# Patient Record
Sex: FEMALE | Race: WHITE | NOT HISPANIC OR LATINO | Employment: OTHER | URBAN - METROPOLITAN AREA
[De-identification: names, ages, dates, MRNs, and addresses within clinical notes are randomized per-mention and may not be internally consistent; named-entity substitution may affect disease eponyms.]

---

## 2017-01-10 ENCOUNTER — OFFICE VISIT (OUTPATIENT)
Dept: OPTOMETRY | Facility: CLINIC | Age: 72
End: 2017-01-10
Payer: MEDICARE

## 2017-01-10 DIAGNOSIS — H54.50: ICD-10-CM

## 2017-01-10 DIAGNOSIS — E11.3553 STABLE PROLIFERATIVE DIABETIC RETINOPATHY OF BOTH EYES ASSOCIATED WITH TYPE 2 DIABETES MELLITUS: Primary | ICD-10-CM

## 2017-01-10 DIAGNOSIS — E11.39: ICD-10-CM

## 2017-01-10 DIAGNOSIS — H52.13 MYOPIA WITH PRESBYOPIA OF BOTH EYES: ICD-10-CM

## 2017-01-10 DIAGNOSIS — H52.4 MYOPIA WITH PRESBYOPIA OF BOTH EYES: ICD-10-CM

## 2017-01-10 PROCEDURE — 99499 UNLISTED E&M SERVICE: CPT | Mod: S$GLB,,, | Performed by: OPTOMETRIST

## 2017-01-10 PROCEDURE — 92015 DETERMINE REFRACTIVE STATE: CPT | Mod: S$GLB,,, | Performed by: OPTOMETRIST

## 2017-01-10 PROCEDURE — 92004 COMPRE OPH EXAM NEW PT 1/>: CPT | Mod: S$GLB,,, | Performed by: OPTOMETRIST

## 2017-01-10 PROCEDURE — 99999 PR PBB SHADOW E&M-EST. PATIENT-LVL II: CPT | Mod: PBBFAC,,, | Performed by: OPTOMETRIST

## 2017-01-10 NOTE — LETTER
January 11, 2017      Myriam Vasquez, NP  [address]  [facility] [address]           [facility]  [address]  [facility] [address]  Phone: [phone]  Fax: [phone]          Patient: Zoey Ham   MR Number: [id number]   YOB: 1945   Date of Visit: 1/10/2017       Dear Myriam Vasquez:    Thank you for referring Zoey Ham to me for evaluation. Attached you will find relevant portions of my assessment and plan of care.    If you have questions, please do not hesitate to call me. I look forward to following Zoey Ham along with you.    Sincerely,    Alem Garza, OD    Enclosure  CC:  No Recipients    If you would like to receive this communication electronically, please contact [email or web address] or [phone] to request more information on [employer] Link access.    For providers and/or their staff who would like to refer a patient to [facility], please contact us through our one-stop-shop provider referral line, [facility], at [phone].    If you feel you have received this communication in error or would no longer like to receive these types of communications, please e-mail [email or web address]

## 2017-01-11 NOTE — PROGRESS NOTES
HPI     Hemoglobin A1C       Date                     Value               Ref Range             Status                02/28/2016               9.2 (H)             4.5 - 6.2 %           Final            ----------    Low vision OD with normal vision OS  Pt reports doctor is happy with BG control. Reports it could be a little   better.  Last  yesterday morning    Pt lost glasses 6/2015. Has been using OTC readers    (-)fluctuations in vision  (-)flashes  (+)longstanding floater  (-)headaches  (-)dryness, itching, watering  (-)transient vision loss  (-)pain on eye movements         Last edited by Alem Garza, OD on 1/10/2017  8:58 AM.     ROS     Negative for: Constitutional, Gastrointestinal, Neurological, Skin,   Genitourinary, Musculoskeletal, HENT, Endocrine, Cardiovascular, Eyes,   Respiratory, Psychiatric, Allergic/Imm, Heme/Lymph    Last edited by Alem Garza, OD on 1/11/2017 12:47 PM. (History)        Assessment /Plan     For exam results, see Encounter Report.    Stable proliferative diabetic retinopathy of both eyes associated with type 2 diabetes mellitus    Diabetic visual loss: low vision, one eye    Myopia with presbyopia of both eyes            1-2.  Extensive history of laser to both retina.  Vision loss OD secondary to PDR.  No active retinopathy OU.  3. Bifocal rx given        RTC 1 year for dm exam.

## 2017-01-18 ENCOUNTER — OFFICE VISIT (OUTPATIENT)
Dept: INTERNAL MEDICINE | Facility: CLINIC | Age: 72
End: 2017-01-18
Payer: MEDICARE

## 2017-01-18 ENCOUNTER — OFFICE VISIT (OUTPATIENT)
Dept: NEPHROLOGY | Facility: CLINIC | Age: 72
End: 2017-01-18
Payer: MEDICARE

## 2017-01-18 VITALS
OXYGEN SATURATION: 97 % | HEIGHT: 64 IN | DIASTOLIC BLOOD PRESSURE: 60 MMHG | RESPIRATION RATE: 18 BRPM | BODY MASS INDEX: 31.96 KG/M2 | SYSTOLIC BLOOD PRESSURE: 119 MMHG | HEART RATE: 72 BPM | TEMPERATURE: 98 F | WEIGHT: 187.19 LBS

## 2017-01-18 VITALS
SYSTOLIC BLOOD PRESSURE: 136 MMHG | WEIGHT: 186.5 LBS | OXYGEN SATURATION: 98 % | DIASTOLIC BLOOD PRESSURE: 70 MMHG | BODY MASS INDEX: 31.84 KG/M2 | HEART RATE: 70 BPM | HEIGHT: 64 IN

## 2017-01-18 DIAGNOSIS — Z79.4 TYPE 2 DIABETES MELLITUS WITH DIABETIC POLYNEUROPATHY, WITH LONG-TERM CURRENT USE OF INSULIN: ICD-10-CM

## 2017-01-18 DIAGNOSIS — E11.42 DIABETIC POLYNEUROPATHY ASSOCIATED WITH TYPE 2 DIABETES MELLITUS: ICD-10-CM

## 2017-01-18 DIAGNOSIS — M85.80 OSTEOPENIA: ICD-10-CM

## 2017-01-18 DIAGNOSIS — E11.42 TYPE 2 DIABETES MELLITUS WITH DIABETIC POLYNEUROPATHY, WITH LONG-TERM CURRENT USE OF INSULIN: ICD-10-CM

## 2017-01-18 DIAGNOSIS — N18.4 CHRONIC KIDNEY DISEASE, STAGE IV (SEVERE): ICD-10-CM

## 2017-01-18 DIAGNOSIS — E21.3 HYPERPARATHYROIDISM: ICD-10-CM

## 2017-01-18 DIAGNOSIS — I25.708 CORONARY ARTERY DISEASE OF BYPASS GRAFT OF NATIVE HEART WITH STABLE ANGINA PECTORIS: ICD-10-CM

## 2017-01-18 DIAGNOSIS — I12.9 BENIGN HYPERTENSION WITH CKD (CHRONIC KIDNEY DISEASE) STAGE IV: ICD-10-CM

## 2017-01-18 DIAGNOSIS — I15.0 RENOVASCULAR HYPERTENSION: ICD-10-CM

## 2017-01-18 DIAGNOSIS — D63.1 ANEMIA OF CHRONIC RENAL FAILURE, STAGE 4 (SEVERE): ICD-10-CM

## 2017-01-18 DIAGNOSIS — I70.0 ATHEROSCLEROSIS OF AORTA: ICD-10-CM

## 2017-01-18 DIAGNOSIS — Z79.4 TYPE 2 DIABETES MELLITUS WITH DIABETIC NEPHROPATHY, WITH LONG-TERM CURRENT USE OF INSULIN: ICD-10-CM

## 2017-01-18 DIAGNOSIS — N18.4 ANEMIA OF CHRONIC RENAL FAILURE, STAGE 4 (SEVERE): ICD-10-CM

## 2017-01-18 DIAGNOSIS — E11.22 CKD STAGE 4 DUE TO TYPE 2 DIABETES MELLITUS: ICD-10-CM

## 2017-01-18 DIAGNOSIS — D69.2 SENILE PURPURA: ICD-10-CM

## 2017-01-18 DIAGNOSIS — N18.4 CKD STAGE 4 DUE TO TYPE 2 DIABETES MELLITUS: ICD-10-CM

## 2017-01-18 DIAGNOSIS — Z60.4 SOCIAL ISOLATION: ICD-10-CM

## 2017-01-18 DIAGNOSIS — N18.4 BENIGN HYPERTENSION WITH CKD (CHRONIC KIDNEY DISEASE) STAGE IV: ICD-10-CM

## 2017-01-18 DIAGNOSIS — I48.20 CHRONIC ATRIAL FIBRILLATION: ICD-10-CM

## 2017-01-18 DIAGNOSIS — I10 ESSENTIAL HYPERTENSION: ICD-10-CM

## 2017-01-18 DIAGNOSIS — E11.21 TYPE 2 DIABETES MELLITUS WITH DIABETIC NEPHROPATHY, WITH LONG-TERM CURRENT USE OF INSULIN: ICD-10-CM

## 2017-01-18 DIAGNOSIS — R80.9 PROTEINURIA, UNSPECIFIED TYPE: ICD-10-CM

## 2017-01-18 DIAGNOSIS — N18.4 CKD (CHRONIC KIDNEY DISEASE) STAGE 4, GFR 15-29 ML/MIN: Primary | ICD-10-CM

## 2017-01-18 DIAGNOSIS — N25.0 RENAL OSTEODYSTROPHY: ICD-10-CM

## 2017-01-18 DIAGNOSIS — Z12.31 ENCOUNTER FOR SCREENING MAMMOGRAM FOR MALIGNANT NEOPLASM OF BREAST: ICD-10-CM

## 2017-01-18 DIAGNOSIS — I50.42 CHRONIC COMBINED SYSTOLIC AND DIASTOLIC CONGESTIVE HEART FAILURE: ICD-10-CM

## 2017-01-18 PROCEDURE — 1126F AMNT PAIN NOTED NONE PRSNT: CPT | Mod: S$GLB,,, | Performed by: INTERNAL MEDICINE

## 2017-01-18 PROCEDURE — 99499 UNLISTED E&M SERVICE: CPT | Mod: S$GLB,,, | Performed by: INTERNAL MEDICINE

## 2017-01-18 PROCEDURE — 3074F SYST BP LT 130 MM HG: CPT | Mod: S$GLB,,, | Performed by: INTERNAL MEDICINE

## 2017-01-18 PROCEDURE — 1157F ADVNC CARE PLAN IN RCRD: CPT | Mod: S$GLB,,, | Performed by: INTERNAL MEDICINE

## 2017-01-18 PROCEDURE — 99215 OFFICE O/P EST HI 40 MIN: CPT | Mod: S$GLB,,, | Performed by: INTERNAL MEDICINE

## 2017-01-18 PROCEDURE — 3066F NEPHROPATHY DOC TX: CPT | Mod: S$GLB,,, | Performed by: INTERNAL MEDICINE

## 2017-01-18 PROCEDURE — 3078F DIAST BP <80 MM HG: CPT | Mod: S$GLB,,, | Performed by: INTERNAL MEDICINE

## 2017-01-18 PROCEDURE — 99999 PR PBB SHADOW E&M-EST. PATIENT-LVL III: CPT | Mod: PBBFAC,,, | Performed by: INTERNAL MEDICINE

## 2017-01-18 PROCEDURE — 3075F SYST BP GE 130 - 139MM HG: CPT | Mod: S$GLB,,, | Performed by: INTERNAL MEDICINE

## 2017-01-18 PROCEDURE — 3046F HEMOGLOBIN A1C LEVEL >9.0%: CPT | Mod: S$GLB,,, | Performed by: INTERNAL MEDICINE

## 2017-01-18 PROCEDURE — 1160F RVW MEDS BY RX/DR IN RCRD: CPT | Mod: S$GLB,,, | Performed by: INTERNAL MEDICINE

## 2017-01-18 PROCEDURE — 1159F MED LIST DOCD IN RCRD: CPT | Mod: S$GLB,,, | Performed by: INTERNAL MEDICINE

## 2017-01-18 PROCEDURE — 2022F DILAT RTA XM EVC RTNOPTHY: CPT | Mod: S$GLB,,, | Performed by: INTERNAL MEDICINE

## 2017-01-18 PROCEDURE — 99999 PR PBB SHADOW E&M-EST. PATIENT-LVL IV: CPT | Mod: PBBFAC,,, | Performed by: INTERNAL MEDICINE

## 2017-01-18 RX ORDER — INSULIN GLARGINE 100 [IU]/ML
11 INJECTION, SOLUTION SUBCUTANEOUS NIGHTLY
Qty: 3.3 ML | Refills: 11 | Status: SHIPPED | OUTPATIENT
Start: 2017-01-18 | End: 2017-01-30 | Stop reason: SDUPTHER

## 2017-01-18 RX ORDER — ERGOCALCIFEROL 1.25 MG/1
50000 CAPSULE ORAL
Qty: 10 CAPSULE | Refills: 0 | Status: CANCELLED | OUTPATIENT
Start: 2017-01-18

## 2017-01-18 RX ORDER — CLOPIDOGREL BISULFATE 75 MG/1
TABLET ORAL
Qty: 90 TABLET | Refills: 3 | Status: SHIPPED | OUTPATIENT
Start: 2017-01-18 | End: 2018-02-16 | Stop reason: SDUPTHER

## 2017-01-18 RX ORDER — NITROGLYCERIN 400 UG/1
1 SPRAY ORAL EVERY 5 MIN PRN
Qty: 12 G | Refills: 0 | Status: SHIPPED | OUTPATIENT
Start: 2017-01-18 | End: 2017-01-30 | Stop reason: SDUPTHER

## 2017-01-18 RX ORDER — LOSARTAN POTASSIUM 50 MG/1
50 TABLET ORAL DAILY
Qty: 90 TABLET | Refills: 3 | Status: SHIPPED | OUTPATIENT
Start: 2017-01-18 | End: 2017-02-20 | Stop reason: SDUPTHER

## 2017-01-18 RX ORDER — METOPROLOL TARTRATE 50 MG/1
50 TABLET ORAL 2 TIMES DAILY
Qty: 180 TABLET | Refills: 3 | Status: SHIPPED | OUTPATIENT
Start: 2017-01-18 | End: 2017-03-10 | Stop reason: SDUPTHER

## 2017-01-18 RX ORDER — ATORVASTATIN CALCIUM 80 MG/1
TABLET, FILM COATED ORAL
Qty: 90 TABLET | Refills: 3 | Status: SHIPPED | OUTPATIENT
Start: 2017-01-18 | End: 2018-02-16 | Stop reason: SDUPTHER

## 2017-01-18 RX ORDER — INSULIN ASPART 100 [IU]/ML
15 INJECTION, SOLUTION INTRAVENOUS; SUBCUTANEOUS
Qty: 13.5 ML | Refills: 11 | Status: SHIPPED | OUTPATIENT
Start: 2017-01-18 | End: 2017-01-30 | Stop reason: SDUPTHER

## 2017-01-18 RX ORDER — FUROSEMIDE 40 MG/1
40 TABLET ORAL DAILY PRN
Qty: 90 TABLET | Refills: 3 | Status: SHIPPED | OUTPATIENT
Start: 2017-01-18 | End: 2018-02-16 | Stop reason: SDUPTHER

## 2017-01-18 RX ORDER — FERROUS SULFATE 325(65) MG
TABLET ORAL
Qty: 90 TABLET | Refills: 0 | Status: CANCELLED | OUTPATIENT
Start: 2017-01-18

## 2017-01-18 RX ORDER — INSULIN PUMP SYRINGE, 3 ML
EACH MISCELLANEOUS
Qty: 1 EACH | Refills: 0 | Status: SHIPPED | OUTPATIENT
Start: 2017-01-18 | End: 2017-01-30 | Stop reason: SDUPTHER

## 2017-01-18 SDOH — SOCIAL DETERMINANTS OF HEALTH (SDOH): SOCIAL EXCLUSION AND REJECTION: Z60.4

## 2017-01-18 NOTE — ASSESSMENT & PLAN NOTE
GFR 23, followed by Nephrology, previously on Fe supplements  · F/u Nephrology  · Repeat anemia labs  · Previously on fe supplementation  · Defer to nephrology for continuation

## 2017-01-18 NOTE — ASSESSMENT & PLAN NOTE
Patient lives alone, but has children nearby  · Info given for:  · [facility] center in [place]  · [employer] [facility] at [facility]  · [facility]    Silver sneakers

## 2017-01-18 NOTE — PROGRESS NOTES
Primary Care Provider Appointment    Subjective:      Patient ID: Zoey Ham is a 71 y.o. female.    Chief Complaint: Establish Care; Chronic Kidney Disease (Pt stated that she has stage 4 kidney failure); Foot Swelling (Ankle swelling Rt off and on for 2+ years during cold weather); and Numbness (hand numbeness rt off and on for 2+ years during cold weather)  Previous patient of Dr Smith, here to establish care today. She was in prescription doughnut hole in 11/2016 and could not afford her insulin. She currently uses insulin pens and is willing to switch to vials. CBG today was 263, at home has been 170s in AM. Leoncio stopped checking due to not having insulin.    She experiences 4/10 pain in L ankle with swelling worse with weather changes. She does not like being on pain medications. Is able to ambulate.    She rarely eats at restaurants with her friends. Her son lives near her in [place], and her daughter lives on the [facility]. Patient cleaned out her garage this weekend by herself, continues to cross stitch and craft regularly. She is making 9 quilts for her grandchildren.    She likes inspirational [employer] tv, old sitcoms ([employer], [place]).       Social History  Components    Tobacco (-)     Alcohol (-)    Ililcit drugs (-)    Sex (-) , exclusive, one male partner    Employment (-) retired       Past Surgical History   Procedure Laterality Date    Hysterectomy      Tonsillectomy      Appendectomy      Cardiac surgery  2002     CABG    Cholecystectomy      Coronary artery bypass graft      Fracture surgery       right ankle    Eye surgery       cataracts bilaterally       Past Medical History   Diagnosis Date    Acute myocardial infarction of anterolateral wall 7/9/2015    Air embolism as an early complication of trauma 7/9/2015    Anemia of chronic renal failure, stage 4 (severe) 1/22/2015    Atherosclerosis of aorta 10/3/2012    Benign hypertension  with CKD (chronic kidney disease) stage IV 3/11/2016    Chronic diastolic congestive heart failure 10/3/2012    Chronic kidney disease, stage IV (severe) 7/3/2012    Coronary artery disease      s/p 1v CABG 2002 and multiple PCI (last angiogram in 6/2012 with patent LIMA->LAD and patent LCx and RCA stents)    Diabetic polyneuropathy associated with type 2 diabetes mellitus 7/9/2015    Diabetic polyneuropathy associated with type 2 diabetes mellitus 7/9/2015    Heart attack September 2012    Hyperlipidemia     Nephritis and nephropathy, 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 7/9/2015    Occlusion and stenosis of carotid artery with cerebral infarction 7/9/2015    PAF (paroxysmal atrial fibrillation) 10/3/2012    Pneumonia     Proliferative diabetic retinopathy 10/3/2012    Sepsis due to Escherichia coli with acute renal failure 2/2016     UTI     Type II diabetes mellitus with neurological manifestations 7/9/2015    Type II diabetes mellitus with renal manifestations 7/3/2012       Family History  Diagnosis    Heart disease- first degree relatives    Cancer    · Lung- mother     Diabetes- first degree relative       Review of Systems   Constitutional: Positive for fatigue. Negative for activity change and unexpected weight change.   HENT: Negative for ear discharge.    Eyes: Negative for photophobia and discharge.   Cardiovascular: Negative for chest pain, palpitations and leg swelling.   Gastrointestinal: Negative for constipation and diarrhea.   Endocrine: Negative for polydipsia and polyphagia.   Genitourinary: Negative for frequency and menstrual problem.   Musculoskeletal: Negative for back pain and gait problem.        Ankle pain - R  Ankle swelling - R   Skin: Negative for color change.   Neurological: Negative for light-headedness.   Hematological: Bruises/bleeds easily.   Psychiatric/Behavioral: Negative for confusion.       Objective:     Visit Vitals    /60 (BP Location: Right arm,  "Patient Position: Sitting, BP Method: Manual)    Pulse 72    Temp 97.8 °F (36.6 °C)    Resp 18    Ht 5' 4" (1.626 m)    Wt 84.9 kg (187 lb 2.7 oz)    LMP  (LMP Unknown)    SpO2 97%    BMI 32.13 kg/m2       Physical Exam   Constitutional: She appears well-developed and well-nourished.   HENT:   Head: Normocephalic and atraumatic.   Eyes: Conjunctivae and EOM are normal. Pupils are equal, round, and reactive to light. Right eye exhibits no discharge. Left eye exhibits no discharge.   Neck: Normal range of motion.   Cardiovascular:   Distant heart sounds  Radial pulse 1+, regular   Pulmonary/Chest: Effort normal and breath sounds normal. No respiratory distress. She has no wheezes.   Abdominal: Soft. She exhibits no distension. There is no tenderness.   Musculoskeletal: She exhibits edema and deformity. She exhibits no tenderness.   R ankle swelling and no TTP   Lymphadenopathy:     She has no cervical adenopathy.   Neurological: No cranial nerve deficit.   Skin: No erythema.   Ecchymoses and purpura on UE bilaterally   Psychiatric: She has a normal mood and affect. Her behavior is normal.   Vitals reviewed.          Lab Results   Component Value Date    WBC 9.87 03/03/2016    HGB 11.7 (L) 03/03/2016    HCT 35.4 (L) 03/03/2016     03/03/2016    CHOL 149 02/04/2014    TRIG 178 (H) 02/04/2014    HDL 36 (L) 02/04/2014    ALT 22 03/01/2016    AST 28 03/01/2016     06/23/2016    K 5.4 (H) 06/23/2016     06/23/2016    CREATININE 2.1 (H) 06/23/2016    BUN 39 (H) 06/23/2016    CO2 27 06/23/2016    TSH 2.011 06/17/2012    INR 1.0 02/04/2015    GLUF 132 (H) 05/03/2012    HGBA1C 9.2 (H) 02/28/2016       Current Outpatient Prescriptions on File Prior to Visit   Medication Sig Dispense Refill    aspirin (ECOTRIN) 81 MG EC tablet Take 81 mg by mouth. 1 Tablet, Delayed Release (E.C.) Oral Every day      insulin needles, disposable, 31 Ndle Takes insulin 4 times daily 300 each 3    [DISCONTINUED] " ACCU-CHEK FASTCLIX Misc USE  1  LANCET FOUR TIMES DAILY 408 each 11    [DISCONTINUED] ACCU-CHEK SMARTVIEW TEST STRIP Strp USE  1  STRIP FOUR TIMES DAILY 400 strip 11    [DISCONTINUED] atorvastatin (LIPITOR) 80 MG tablet TAKE 1 TABLET ONE TIME DAILY 90 tablet 3    [DISCONTINUED] clopidogrel (PLAVIX) 75 mg tablet TAKE 1 TABLET ONE TIME DAILY 90 tablet 4    [DISCONTINUED] ergocalciferol (ERGOCALCIFEROL) 50,000 unit Cap Take 1 capsule (50,000 Units total) by mouth every 7 days. (Patient taking differently: Take 50,000 Units by mouth every 7 days. on wednesday) 10 capsule 0    [DISCONTINUED] ferrous sulfate 325 mg (65 mg iron) Tab tablet TAKE 1 TABLET ONE TIME DAILY 90 tablet 0    [DISCONTINUED] furosemide (LASIX) 40 MG tablet TAKE 1 TABLET ONE TIME DAILY 90 tablet 3    [DISCONTINUED] insulin aspart (NOVOLOG FLEXPEN) 100 unit/mL InPn pen 12 units am, 15 units afternoon and 15 units in the pm with meals, Sliding Scale 90 mL 3    [DISCONTINUED] insulin glargine (LANTUS SOLOSTAR) 100 unit/mL (3 mL) InPn pen Inject 11 Units into the skin every evening.      [DISCONTINUED] losartan (COZAAR) 50 MG tablet TAKE 1 TABLET BY MOUTH ONCE DAILY 30 tablet 3    [DISCONTINUED] losartan (COZAAR) 50 MG tablet Take 1 tablet (50 mg total) by mouth once daily. 30 tablet 1    [DISCONTINUED] metoprolol tartrate (LOPRESSOR) 50 MG tablet Take 1 tablet (50 mg total) by mouth 2 (two) times daily. 60 tablet 1    [DISCONTINUED] nitroGLYCERIN 0.4 MG/DOSE TL SPRY (NITROLINGUAL) 0.4 mg/dose spray Place 1 spray under the tongue every 5 (five) minutes as needed for Chest pain. 12 g 0     No current facility-administered medications on file prior to visit.          Assessment:   71 y.o. female with multiple co-morbid illnesses here to establish care with new PCP and continue work-up of chronic issues notably DM, CKD, HF.     Plan:     Problem List Items Addressed This Visit        Neuro    Diabetic polyneuropathy associated with type 2  diabetes mellitus     Stocking/glove pattern intermittent neuropathy  · DM control   · Annual diabetic foot exam         Relevant Medications    insulin glargine (LANTUS) 100 unit/mL injection    insulin aspart (NOVOLOG) 100 unit/mL injection    Other Relevant Orders    Hemoglobin A1c       Cardiac    Atherosclerosis of aorta     Seen on CXR 5/2/2012  · Continue statin, ASA, DM control  · monitor         Relevant Orders    Ambulatory consult to Cardiology    Chronic combined systolic and diastolic congestive heart failure     Seen on echo in 3/2016  · Refer to Cardiology  · Compliant with BB, ARB, DAPT, statin, diuretic  · DM control         Relevant Medications    nitroGLYCERIN 0.4 MG/DOSE TL SPRY (NITROLINGUAL) 400 mcg/spray spray    atorvastatin (LIPITOR) 80 MG tablet    clopidogrel (PLAVIX) 75 mg tablet    furosemide (LASIX) 40 MG tablet    metoprolol tartrate (LOPRESSOR) 50 MG tablet    Other Relevant Orders    Ambulatory consult to Cardiology    Chronic atrial fibrillation     Rate-controlled on metoprolol 50mg BID  · Continue BB BID  · Elevated ZRQJW-2-[id number] score of 9 (10.8% risk of stroke)  · Cards eval for anticoagulation  · Continue DAPT         Relevant Medications    metoprolol tartrate (LOPRESSOR) 50 MG tablet    Other Relevant Orders    Ambulatory consult to Cardiology    Coronary artery disease of bypass graft of native heart with stable angina pectoris     CABG X2  2002, stent RCA, OM1 2003, NSTEMI with total occlusion of LAD in 2012. Continues to have stable angina  · Refer to Cardiology  · Elevated BMFAL-7-Nlbu score of 9 (10.8% risk of stroke)  · Cards eval for anticoagulation  · Continue DAPT         Relevant Medications    nitroGLYCERIN 0.4 MG/DOSE TL SPRY (NITROLINGUAL) 400 mcg/spray spray    insulin glargine (LANTUS) 100 unit/mL injection    insulin aspart (NOVOLOG) 100 unit/mL injection    atorvastatin (LIPITOR) 80 MG tablet    clopidogrel (PLAVIX) 75 mg tablet    furosemide (LASIX) 40 MG  tablet    metoprolol tartrate (LOPRESSOR) 50 MG tablet    Other Relevant Orders    Lipid panel    Ambulatory consult to Cardiology    Senile purpura     Ecchymoses and purpura on LE bilterally, on DAPT  · Continue DAPT  · Advised mindfulness of movements         Relevant Orders    Mammo Digital Screening Bilateral With CAD       Renal    CKD stage 4 due to type 2 diabetes mellitus     GFR 23 in 6/2016, followed by Nephrology  · Repeat labs  · Seen today by Nephro         Relevant Medications    blood-glucose meter kit    insulin glargine (LANTUS) 100 unit/mL injection    insulin aspart (NOVOLOG) 100 unit/mL injection    losartan (COZAAR) 50 MG tablet    Other Relevant Orders    Hemoglobin A1c    Uncontrolled type 2 diabetes mellitus with stage 4 chronic kidney disease, with long-term current use of insulin     A1c 9.2, noncompliant with insulin due to finances  · Refill insulin in vials  · Avoid doughnut hole by discotinuing insulin pens  · F/U in 2 months with glucose log         Relevant Medications    atorvastatin (LIPITOR) 80 MG tablet    clopidogrel (PLAVIX) 75 mg tablet    furosemide (LASIX) 40 MG tablet    metoprolol tartrate (LOPRESSOR) 50 MG tablet    Other Relevant Orders    Hemoglobin A1c    Lipid panel       Hem/Onc    Anemia of chronic renal failure, stage 4 (severe)     GFR 23, followed by Nephrology, previously on Fe supplements  · F/u Nephrology  · Repeat anemia labs  · Previously on fe supplementation  · Defer to nephrology for continuation         Relevant Orders    Vitamin D    Occult blood x 1, stool       Endocrine    Hyperparathyroidism     Elevated PTH of 310 in 6/2016  · F/u with Renal  · Recheck PTH  · Check Vit D            Musculoskeletal and Integument    Osteopenia     T-score -1.8 in 2014, low vitamin D, CKD, uncontrolled DM, ankle fracture 2013  · Start Ca + vit D  · Completed high dose vit D supplement  · Check vit D            Other    Social isolation     Patient lives alone, but  has children nearby  · Info given for:  · [facility] in [place]  · [employer] [facility] at [facility]  · [employer]    Silver sneakers           Other Visit Diagnoses     Benign hypertension with CKD (chronic kidney disease) stage IV        Relevant Medications    losartan (COZAAR) 50 MG tablet    Chronic kidney disease, stage IV (severe)        Relevant Medications    metoprolol tartrate (LOPRESSOR) 50 MG tablet    Other Relevant Orders    Hemoglobin A1c    Renovascular hypertension        Relevant Medications    losartan (COZAAR) 50 MG tablet    metoprolol tartrate (LOPRESSOR) 50 MG tablet    Other Relevant Orders    Lipid panel    Ambulatory consult to Cardiology    Encounter for screening mammogram for malignant neoplasm of breast         Relevant Orders    Mammo Digital Screening Bilateral With CAD    Type 2 diabetes mellitus with diabetic polyneuropathy, with long-term current use of insulin              Health Maintenance       Date Due Completion Date    Colonoscopy 8/21/1996 --- FOBT today    Lipid Panel 2/4/2015 2/4/2014- today    Mammogram 2/4/2016 2/4/2014- today    Hemoglobin A1c 8/28/2016 2/28/2016- today    DEXA SCAN 2/4/2017 2/4/2014    Foot Exam 12/6/2017 12/6/2016    Override on 12/6/2016: Done    Eye Exam 1/10/2018 1/10/2017    TETANUS VACCINE 3/3/2026 3/3/2016          Return in about 2 months (around 3/18/2017). One hour spent with this patient today, half of that in counseling.    Cesia Rosales MD/MPH  Internal Medicine  [facility]  [phone]

## 2017-01-18 NOTE — ASSESSMENT & PLAN NOTE
Seen on echo in 3/2016  · Refer to Cardiology  · Compliant with BB, ARB, DAPT, statin, diuretic  · DM control

## 2017-01-18 NOTE — PATIENT INSTRUCTIONS
TODAY:  - switch to insulin vials  - referral to cardiology  - glucose log  - mammogram  - refills sent to [facility]  - labs  - hold high dose vitamin D and iron until labs result  - home stool card    NEXT APPT:  - check on average glucose  - stool card

## 2017-01-18 NOTE — ASSESSMENT & PLAN NOTE
A1c 9.2, noncompliant with insulin due to finances  · Refill insulin in vials  · Avoid doughnut hole by discotinuing insulin pens  · F/U in 2 months with glucose log

## 2017-01-18 NOTE — ASSESSMENT & PLAN NOTE
Rate-controlled on metoprolol 50mg BID  · Continue BB BID  · Elevated KRXLT-1-Qnzg score of 9 (10.8% risk of stroke)  · Cards eval for anticoagulation  · Continue DAPT

## 2017-01-18 NOTE — PROGRESS NOTES
Patient, Zoey Ham (MRN #[id number]), presented with a recent Estimated Glumerular Filtration Rate (EGFR) between 15 and 29 consistent with the definition of chronic kidney disease stage 4 (ICD10 - N18.4).    eGFR if non    Date Value Ref Range Status   06/23/2016 23.5 (A) >60 mL/min/1.73 m^2 Final     Comment:     Calculation used to obtain the estimated glomerular filtration  rate (eGFR) is the CKD-EPI equation. Since race is unknown   in our information system, the eGFR values for   -American and Non--American patients are given   for each creatinine result.         The patient's chronic kidney disease stage 4 was monitored, evaluated, addressed and/or treated. This addendum to the medical record is made on 01/18/2017.

## 2017-01-18 NOTE — PROGRESS NOTES
Subjective:       Patient ID: Zoey Ham is a 71 y.o. White female who presents for follow-up evaluation of No chief complaint on file.    HPI This is a 71 -year-old  female with longstanding diabetes, hyperparathyroidism, hypertension, and CKD is coming in for followup of these. Her blood pressures are stable in the 120's/70's . DM stable. Her CKD has been stable. Has proteinuria.  Creatinine stable.    PAST MEDICAL HISTORY: Significant for hypertension for 10 years, diabetes   for 10 years, CABG, lacunar infarcts, hyperlipidemia, hyperparathyroidism,   anemia, CABG, CAD, and diabetic retinopathy.     PAST MEDICAL HISTORY: Significant for hypertension for 10 years, diabetes   for 10 years, CABG, lacunar infarcts, hyperlipidemia, hyperparathyroidism,   anemia, CABG, CAD, and diabetic retinopathy.     Review of Systems   Constitutional: Negative for fatigue.   Eyes: Negative for discharge.   Respiratory: Negative for cough, shortness of breath and wheezing.    Cardiovascular: Negative for chest pain and palpitations.   Gastrointestinal: Negative for abdominal pain, diarrhea, nausea and vomiting.   Genitourinary: Negative for dysuria, frequency, hematuria and urgency.   Skin: Negative for color change and rash.   Psychiatric/Behavioral: Negative for confusion.   All other systems reviewed and are negative.      Objective:      Physical Exam   Constitutional: She is oriented to person, place, and time. She appears well-developed and well-nourished.   HENT:   Right Ear: External ear normal.   Left Ear: External ear normal.   Nose: Nose normal.   Mouth/Throat: Normal dentition.   Eyes: Conjunctivae, EOM and lids are normal. Pupils are equal, round, and reactive to light.   Neck: Trachea normal. No thyroid mass present.   Cardiovascular: Normal rate and regular rhythm.  Exam reveals no friction rub.    No murmur heard.  No lower extremity edema   Pulmonary/Chest: Effort normal and breath sounds normal. No  respiratory distress. She has no decreased breath sounds. She has no rales.   Abdominal: Soft. Bowel sounds are normal. She exhibits no mass. There is no tenderness. There is no rebound. No hernia.   Musculoskeletal: She exhibits no edema.   Neurological: She is alert and oriented to person, place, and time.   Skin: Skin is warm and dry. No rash noted. No erythema.   Psychiatric: She has a normal mood and affect. Judgment normal. Her mood appears not anxious. She does not exhibit a depressed mood.   Oriented to time, place and person.   Vitals reviewed.      Assessment:       No diagnosis found.    Plan:           1.  Renovascular hypertension    2.  Type 2 diabetes mellitus with diabetic nephropathy    3.  Proteinuria    4.  Anemia of chronic renal failure, stage 4 (severe)      Plan:     Labs pending.   1. CKD stage 3-4 with an MDRD GFR of 25-28 mL/min. Her baseline creatinine is   anywhere from 1.8 to 2.1 with last creatinine of 2.0 with gfr of 25 ml/min. Her CKD is secondary to longstanding hypertension and diabetes.   2. Hypertension. Blood pressures are stable  at home. On cozaar.   3. Diabetes. on cozaar. Increased cozaar to 50 mg last time as there was increase in proteinuria likely sec poorly controlled DM in the recent past but better now per pt.  Recheck. Does not want biopsy.   4. Hyperparathyroidism. Check i pth -off calcitriol. Ca/phos stable.  6. Anemia. H&H is stable. On iron once a day.   Attended ckd education class and would like HD. Will send her for access if her GFR decreases further.   Return in 3 months with rfp, urine protin and urine creatinine, ipth.

## 2017-01-18 NOTE — MR AVS SNAPSHOT
[facility] - Internal Medicine  [address]  [facility] [address]  Phone: [phone]  Fax: [phone]                  Zoey Ham   2017 10:00 AM   Office Visit    Description:  Female : 1945   Provider:  Cesia Rosales MD   Department:  [facility] - [facility]           Reason for Visit     Establish Care     Chronic Kidney Disease     Foot Swelling     Numbness           Diagnoses this Visit        Comments    Atherosclerosis of aorta         CKD stage 4 due to type 2 diabetes mellitus         Chronic atrial fibrillation         Coronary artery disease of bypass graft of native heart with stable angina pectoris         Chronic combined systolic and diastolic congestive heart failure         Uncontrolled type 2 diabetes mellitus with stage 4 chronic kidney disease, with long-term current use of insulin         Hyperparathyroidism         Senile purpura         Diabetic polyneuropathy associated with type 2 diabetes mellitus         Anemia of chronic renal failure, stage 4 (severe)         Osteopenia         Benign hypertension with CKD (chronic kidney disease) stage IV         Chronic kidney disease, stage IV (severe)         Renovascular hypertension         Encounter for screening mammogram for malignant neoplasm of breast         Social isolation                To Do List           Future Appointments        Provider Department Dept Phone    2017 11:20 AM LAB, APPOINTMENT [facility] IN[facility]ED [facility] [phone]    2017 12:30 PM Cesia Rosales MD [facility] Internal Medicine [phone]    2017 9:30 AM [facility] MAMMO2 SCREEN [facility] [phone]      Goals (5 Years of Data)     None      Follow-Up and Disposition     Return in about 2 months (around 3/18/2017).       These Medications        Disp Refills Start End    nitroGLYCERIN 0.4 MG/DOSE TL SPRY (NITROLINGUAL) 400 mcg/spray spray 12 g 0 2017      Place 1 spray under the tongue every 5 (five) minutes as needed for Chest pain. - Sublingual    Pharmacy: [address] Ph #: [phone]       blood-glucose meter kit 1 each 0 1/18/2017 1/18/2018    Use as instructed    Pharmacy: [address] Ph #: [phone]       Notes to Pharmacy: PayDragon glucose monitoring kit with needles, strips, machine    insulin glargine (LANTUS) 100 unit/mL injection 3.3 mL 11 1/18/2017 1/18/2018    Inject 11 Units into the skin every evening. - Subcutaneous    Pharmacy: [address] Ph #: [phone]       insulin aspart (NOVOLOG) 100 unit/mL injection 13.5 mL 11 1/18/2017 1/18/2018    Inject 15 Units into the skin 3 (three) times daily before meals. - Subcutaneous    Pharmacy: [address] Ph #: [phone]       losartan (COZAAR) 50 MG tablet 90 tablet 3 1/18/2017     Take 1 tablet (50 mg total) by mouth once daily. - Oral    Pharmacy: [address] Ph #: [phone]       atorvastatin (LIPITOR) 80 MG tablet 90 tablet 3 1/18/2017     TAKE 1 TABLET ONE TIME DAILY    Pharmacy: [address] Ph #: [phone]       clopidogrel (PLAVIX) 75 mg tablet 90 tablet 3 1/18/2017     TAKE 1 TABLET ONE TIME DAILY    Pharmacy: [address] Ph #: [phone]       furosemide (LASIX) 40 MG tablet 90 tablet 3 1/18/2017     Take 1 tablet (40 mg total) by mouth daily as needed (take water pill daily as needed for leg swelling and SOB). - Oral    Pharmacy: [address] Ph #: [phone]       metoprolol tartrate (LOPRESSOR) 50 MG tablet 180 tablet 3 1/18/2017  1/18/2018    Take 1 tablet (50 mg total) by mouth 2 (two) times daily. - Oral    Pharmacy: [facility] - [facility] [address]  Ph #: [phone]         [facility]     [facility] On Call Nurse Care Line - 24/7 Assistance  Registered nurses in the [facility] On Call Center provide clinical advisement, health education, appointment booking, and other advisory services.  Call for this free service at [phone].             Medications           Message regarding Medications     Verify the changes and/or additions to your medication regime listed below are the same as discussed with your clinician today.  If any of these changes or additions are incorrect, please notify your healthcare provider.        START taking these NEW medications        Refills    blood-glucose meter kit 0    Sig: Use as instructed    Class: Normal    insulin glargine (LANTUS) 100 unit/mL injection 11    Sig: Inject 11 Units into the skin every evening.    Class: Normal    Route: Subcutaneous    insulin aspart (NOVOLOG) 100 unit/mL injection 11    Sig: Inject 15 Units into the skin 3 (three) times daily before meals.    Class: Normal    Route: Subcutaneous      CHANGE how you are taking these medications     Start Taking Instead of    nitroGLYCERIN 0.4 MG/DOSE TL SPRY (NITROLINGUAL) 400 mcg/spray spray nitroGLYCERIN 0.4 MG/DOSE TL SPRY (NITROLINGUAL) 0.4 mg/dose spray    Dosage:  Place 1 spray under the tongue every 5 (five) minutes as needed for Chest pain. Dosage:  Place 1 spray under the tongue every 5 (five) minutes as needed for Chest pain.    Reason for Change:  Reorder     furosemide (LASIX) 40 MG tablet furosemide (LASIX) 40 MG tablet    Dosage:  Take 1 tablet (40 mg total) by mouth daily as needed (take water pill daily as needed for leg swelling and SOB). Dosage:  TAKE 1 TABLET ONE TIME DAILY    Reason for Change:  Reorder       STOP taking these medications     insulin aspart (NOVOLOG FLEXPEN) 100  unit/mL InPn pen 12 units am, 15 units afternoon and 15 units in the pm with meals, Sliding Scale    insulin glargine (LANTUS SOLOSTAR) 100 unit/mL (3 mL) InPn pen Inject 11 Units into the skin every evening.    ACCU-CHEK FASTCLIX Misc USE  1  LANCET FOUR TIMES DAILY    ACCU-CHEK SMARTVIEW TEST STRIP Strp USE  1  STRIP FOUR TIMES DAILY    ergocalciferol (ERGOCALCIFEROL) 50,000 unit Cap Take 1 capsule (50,000 Units total) by mouth every 7 days.    ferrous sulfate 325 mg (65 mg iron) Tab tablet TAKE 1 TABLET ONE TIME DAILY           Verify that the below list of medications is an accurate representation of the medications you are currently taking.  If none reported, the list may be blank. If incorrect, please contact your healthcare provider. Carry this list with you in case of emergency.           Current Medications     aspirin (ECOTRIN) 81 MG EC tablet Take 81 mg by mouth. 1 Tablet, Delayed Release (E.C.) Oral Every day    insulin needles, disposable, 31 Ndle Takes insulin 4 times daily    atorvastatin (LIPITOR) 80 MG tablet TAKE 1 TABLET ONE TIME DAILY    blood-glucose meter kit Use as instructed    clopidogrel (PLAVIX) 75 mg tablet TAKE 1 TABLET ONE TIME DAILY    furosemide (LASIX) 40 MG tablet Take 1 tablet (40 mg total) by mouth daily as needed (take water pill daily as needed for leg swelling and SOB).    insulin aspart (NOVOLOG) 100 unit/mL injection Inject 15 Units into the skin 3 (three) times daily before meals.    insulin glargine (LANTUS) 100 unit/mL injection Inject 11 Units into the skin every evening.    losartan (COZAAR) 50 MG tablet Take 1 tablet (50 mg total) by mouth once daily.    metoprolol tartrate (LOPRESSOR) 50 MG tablet Take 1 tablet (50 mg total) by mouth 2 (two) times daily.    nitroGLYCERIN 0.4 MG/DOSE TL SPRY (NITROLINGUAL) 400 mcg/spray spray Place 1 spray under the tongue every 5 (five) minutes as needed for Chest pain.           Clinical Reference Information           Vital Signs -  "Last Recorded  Most recent update: 1/18/2017  9:08 AM by Nena Bahena MA    BP Pulse Temp Resp Ht Wt    119/60 (BP Location: Right arm, Patient Position: Sitting, BP Method: Manual) 72 97.8 °F (36.6 °C) 18 5' 4" (1.626 m) 84.9 kg (187 lb 2.7 oz)    LMP SpO2 BMI          (LMP Unknown) 97% 32.13 kg/m2        Blood Pressure          Most Recent Value    BP  119/60      Allergies as of 1/18/2017     Pcn [Penicillins]    Percodan [Oxycodone-aspirin]    Phenergan [Promethazine]      Immunizations Administered on Date of Encounter - 1/18/2017     None      Orders Placed During Today's Visit     Future Labs/Procedures Expected by Expires    Hemoglobin A1c  1/18/2017 3/19/2018    Lipid panel  1/18/2017 3/19/2018    Mammo Digital Screening Bilateral With CAD  1/18/2017 3/18/2018    Occult blood x 1, stool  1/18/2017 1/18/2018    Vitamin D  1/18/2017 3/19/2018      Instructions    TODAY:  - switch to insulin vials  - referral to cardiology  - glucose log  - mammogram  - refills sent to [facility] mail order pharmacy  - labs  - hold high dose vitamin D and iron until labs result  - home stool card    NEXT APPT:  - check on average glucose  - stool card       "

## 2017-01-18 NOTE — ASSESSMENT & PLAN NOTE
CABG X2  2002, stent RCA, OM1 2003, NSTEMI with total occlusion of LAD in 2012. Continues to have stable angina  · Refer to Cardiology  · Elevated UWRJH-2-Hwjz score of 9 (10.8% risk of stroke)  · Cards eval for anticoagulation  · Continue DAPT

## 2017-01-18 NOTE — ASSESSMENT & PLAN NOTE
T-score -1.8 in 2014, low vitamin D, CKD, uncontrolled DM, ankle fracture 2013  · Start Ca + vit D  · Completed high dose vit D supplement  · Check vit D

## 2017-01-19 ENCOUNTER — TELEPHONE (OUTPATIENT)
Dept: INTERNAL MEDICINE | Facility: CLINIC | Age: 72
End: 2017-01-19

## 2017-01-19 DIAGNOSIS — E55.9 HYPOVITAMINOSIS D: Primary | ICD-10-CM

## 2017-01-19 NOTE — TELEPHONE ENCOUNTER
Please let patient know that her A1c was extremely high (10), which is due to her not taking insulin recently. She is low on vitamin D. I sent a high-dose vitamin D script to her pharmacy. She should restart her insulin as soon as possible    ThanksSOLEDAD

## 2017-01-20 RX ORDER — ERGOCALCIFEROL 1.25 MG/1
50000 CAPSULE ORAL
Qty: 12 CAPSULE | Refills: 0 | Status: SHIPPED | OUTPATIENT
Start: 2017-01-20 | End: 2017-04-08

## 2017-01-20 NOTE — TELEPHONE ENCOUNTER
Pt stated that she has started taking insulin and that her meter went out today and that Dr. Rosales ordered her a new one which should be on the way , Pt stated that she will go  her vitamin D and has been scheduled for cardio appt.     Thanks Dr. Rosales

## 2017-01-24 ENCOUNTER — HOSPITAL ENCOUNTER (OUTPATIENT)
Dept: CARDIOLOGY | Facility: CLINIC | Age: 72
Discharge: HOME | End: 2017-01-24
Payer: MEDICARE

## 2017-01-24 ENCOUNTER — OFFICE VISIT (OUTPATIENT)
Dept: CARDIOLOGY | Facility: CLINIC | Age: 72
End: 2017-01-24
Payer: MEDICARE

## 2017-01-24 VITALS
SYSTOLIC BLOOD PRESSURE: 140 MMHG | BODY MASS INDEX: 31.62 KG/M2 | DIASTOLIC BLOOD PRESSURE: 65 MMHG | HEART RATE: 71 BPM | HEIGHT: 64 IN | WEIGHT: 185.19 LBS

## 2017-01-24 DIAGNOSIS — E66.9 OBESITY (BMI 30.0-34.9): ICD-10-CM

## 2017-01-24 DIAGNOSIS — E11.22 CKD STAGE 4 DUE TO TYPE 2 DIABETES MELLITUS: ICD-10-CM

## 2017-01-24 DIAGNOSIS — I25.708 CORONARY ARTERY DISEASE OF BYPASS GRAFT OF NATIVE HEART WITH STABLE ANGINA PECTORIS: Primary | ICD-10-CM

## 2017-01-24 DIAGNOSIS — E11.42 DIABETIC POLYNEUROPATHY ASSOCIATED WITH TYPE 2 DIABETES MELLITUS: ICD-10-CM

## 2017-01-24 DIAGNOSIS — N18.4 CKD STAGE 4 DUE TO TYPE 2 DIABETES MELLITUS: ICD-10-CM

## 2017-01-24 DIAGNOSIS — I25.10 CORONARY ARTERY DISEASE INVOLVING NATIVE CORONARY ARTERY OF NATIVE HEART WITHOUT ANGINA PECTORIS: ICD-10-CM

## 2017-01-24 DIAGNOSIS — I25.10 CORONARY ARTERY DISEASE INVOLVING NATIVE CORONARY ARTERY OF NATIVE HEART WITHOUT ANGINA PECTORIS: Primary | ICD-10-CM

## 2017-01-24 PROCEDURE — 93000 ELECTROCARDIOGRAM COMPLETE: CPT | Mod: S$GLB,,, | Performed by: INTERNAL MEDICINE

## 2017-01-24 PROCEDURE — 1159F MED LIST DOCD IN RCRD: CPT | Mod: S$GLB,,, | Performed by: INTERNAL MEDICINE

## 2017-01-24 PROCEDURE — 3066F NEPHROPATHY DOC TX: CPT | Mod: S$GLB,,, | Performed by: INTERNAL MEDICINE

## 2017-01-24 PROCEDURE — 99499 UNLISTED E&M SERVICE: CPT | Mod: S$GLB,,, | Performed by: INTERNAL MEDICINE

## 2017-01-24 PROCEDURE — 1157F ADVNC CARE PLAN IN RCRD: CPT | Mod: S$GLB,,, | Performed by: INTERNAL MEDICINE

## 2017-01-24 PROCEDURE — 1160F RVW MEDS BY RX/DR IN RCRD: CPT | Mod: S$GLB,,, | Performed by: INTERNAL MEDICINE

## 2017-01-24 PROCEDURE — 99999 PR PBB SHADOW E&M-EST. PATIENT-LVL III: CPT | Mod: PBBFAC,,, | Performed by: INTERNAL MEDICINE

## 2017-01-24 PROCEDURE — 3077F SYST BP >= 140 MM HG: CPT | Mod: S$GLB,,, | Performed by: INTERNAL MEDICINE

## 2017-01-24 PROCEDURE — 3046F HEMOGLOBIN A1C LEVEL >9.0%: CPT | Mod: S$GLB,,, | Performed by: INTERNAL MEDICINE

## 2017-01-24 PROCEDURE — 3078F DIAST BP <80 MM HG: CPT | Mod: S$GLB,,, | Performed by: INTERNAL MEDICINE

## 2017-01-24 PROCEDURE — 99203 OFFICE O/P NEW LOW 30 MIN: CPT | Mod: S$GLB,,, | Performed by: INTERNAL MEDICINE

## 2017-01-24 PROCEDURE — 1126F AMNT PAIN NOTED NONE PRSNT: CPT | Mod: S$GLB,,, | Performed by: INTERNAL MEDICINE

## 2017-01-24 NOTE — Clinical Note
Thank you for referring Zoey Ham for evaluation of coronary artery disease. Please see my note for details of this encounter. If you have any questions, please contact me.  Thank you again for the referral.

## 2017-01-24 NOTE — LETTER
January 24, 2017      Cesia Rosales MD  [address]  [place] [address]           [facility] - Cardiology  [address]  [place] [address]  Phone: [phone]          Patient: Zoey Ham   MR Number: [id number]   YOB: 1945   Date of Visit: 1/24/2017       Dear Dr. Cesia Rosales:    Thank you for referring Zoey Ham to me for evaluation. Attached you will find relevant portions of my assessment and plan of care.    If you have questions, please do not hesitate to call me. I look forward to following Zoey Ham along with you.    Sincerely,    Elver Kelley MD    Enclosure  CC:  No Recipients    If you would like to receive this communication electronically, please contact [email or web address] or [phone] to request more information on [employer] access.    For providers and/or their staff who would like to refer a patient to [facility], please contact us through our one-stop-shop provider referral line, [facility] , at [phone].    If you feel you have received this communication in error or would no longer like to receive these types of communications, please e-mail [email or web address]

## 2017-01-24 NOTE — PROGRESS NOTES
Chart has been dictated using voice recognition software.  It is not been reviewed carefully for any transcriptional errors due to this technology.   Subjective:   Patient ID:  Zoey Ham is a 71 y.o. female who presents for evaluation of Atherosclerosis of aorta; Congestive Heart Failure; Coronary Artery Disease; and Renovascular hypertension      HPI: Patent with IDDM, S/P CABG x 1 2002, S/P OM1 stent 03/26/2003, S/P RCA stent 03/26/2003, NSTEMI 2/05, PCI P-LAD, OM2, OM3 2/05,  RESIDUAL ANATOMY 2/05: PATENT LIMA-LAD, NO SIG RCA disease, LVEF PRESERVED, hypertension, dyslipidemia, and obese. Patient presents because she has not seen a cardiologist in a while.Patient has been feeling well.  Patient denies any chest discomfort on exertion or at rest.  Patient denies any dyspnea at rest or on exertion, orthopnea, PND, or edema, although ha some swelling around right ankle which was previously fractured. Patient denies any palpitations, lightheadedness, or syncope.     Cardiac risk factors: diabetes, hyperlipidemia, hypertension, obesity, positive family history    Past Medical History   Diagnosis Date    Acute myocardial infarction of anterolateral wall 7/9/2015    Air embolism as an early complication of trauma 7/9/2015    Anemia of chronic renal failure, stage 4 (severe) 1/22/2015    Atherosclerosis of aorta 10/3/2012    Benign hypertension with CKD (chronic kidney disease) stage IV 3/11/2016    Chronic diastolic congestive heart failure 10/3/2012    Chronic kidney disease, stage IV (severe) 7/3/2012    Coronary artery disease      s/p 1v CABG 2002 and multiple PCI (last angiogram in 6/2012 with patent LIMA->LAD and patent LCx and RCA stents)    Diabetic polyneuropathy associated with type 2 diabetes mellitus 7/9/2015    Diabetic polyneuropathy associated with type 2 diabetes mellitus 7/9/2015    Heart attack September 2012    Hyperlipidemia     Nephritis and nephropathy, with pathological lesion  in kidney 7/9/2015    Occlusion and stenosis of carotid artery with cerebral infarction 7/9/2015    PAF (paroxysmal atrial fibrillation) 10/3/2012    Pneumonia     Proliferative diabetic retinopathy 10/3/2012    Sepsis due to Escherichia coli with acute renal failure 2/2016     UTI     Type II diabetes mellitus with neurological manifestations 7/9/2015    Type II diabetes mellitus with renal manifestations 7/3/2012       Past Surgical History   Procedure Laterality Date    Hysterectomy      Tonsillectomy      Appendectomy      Cardiac surgery  2002     CABG    Cholecystectomy      Coronary artery bypass graft      Fracture surgery       right ankle    Eye surgery       cataracts bilaterally       Social History   Substance Use Topics    Smoking status: Never Smoker    Smokeless tobacco: Never Used    Alcohol use No         Current Outpatient Prescriptions:     aspirin (ECOTRIN) 81 MG EC tablet, Take 81 mg by mouth. 1 Tablet, Delayed Release (E.C.) Oral Every day, Disp: , Rfl:     atorvastatin (LIPITOR) 80 MG tablet, TAKE 1 TABLET ONE TIME DAILY, Disp: 90 tablet, Rfl: 3    blood-glucose meter kit, Use as instructed, Disp: 1 each, Rfl: 0    clopidogrel (PLAVIX) 75 mg tablet, TAKE 1 TABLET ONE TIME DAILY, Disp: 90 tablet, Rfl: 3    ergocalciferol (VITAMIN D2) 50,000 unit Cap, Take 1 capsule (50,000 Units total) by mouth every 7 days., Disp: 12 capsule, Rfl: 0    furosemide (LASIX) 40 MG tablet, Take 1 tablet (40 mg total) by mouth daily as needed (take water pill daily as needed for leg swelling and SOB)., Disp: 90 tablet, Rfl: 3    insulin aspart (NOVOLOG) 100 unit/mL injection, Inject 15 Units into the skin 3 (three) times daily before meals., Disp: 13.5 mL, Rfl: 11    insulin glargine (LANTUS) 100 unit/mL injection, Inject 11 Units into the skin every evening., Disp: 3.3 mL, Rfl: 11    insulin needles, disposable, 31 Ndle, Takes insulin 4 times daily, Disp: 300 each, Rfl: 3    losartan  "(COZAAR) 50 MG tablet, Take 1 tablet (50 mg total) by mouth once daily., Disp: 90 tablet, Rfl: 3    metoprolol tartrate (LOPRESSOR) 50 MG tablet, Take 1 tablet (50 mg total) by mouth 2 (two) times daily., Disp: 180 tablet, Rfl: 3    nitroGLYCERIN 0.4 MG/DOSE TL SPRY (NITROLINGUAL) 400 mcg/spray spray, Place 1 spray under the tongue every 5 (five) minutes as needed for Chest pain., Disp: 12 g, Rfl: 0    Review of patient's allergies indicates:   Allergen Reactions    Pcn [penicillins] Swelling    Percodan [oxycodone-aspirin] Hives     Tolerated Rocephin IM in clinic    Phenergan [promethazine] Other (See Comments)     Altered mental status       ROS - Less strength in legs with diabetic neuropathy. Otherwise, the patient's review of systems is negative for any significant constitutional, respiratory, endocrine, hematologic, musculoskeletal or neurologic symptoms.   Objective:   Physical Exam   Constitutional: She is oriented to person, place, and time. She appears well-developed and well-nourished.   BP (!) 140/65 (BP Location: Left arm, Patient Position: Sitting, BP Method: Automatic)  Pulse 71  Ht 5' 4" (1.626 m)  Wt 84 kg (185 lb 3 oz)  LMP  (LMP Unknown)  BMI 31.79 kg/m2   Neck: Neck supple. No JVD present. Carotid bruit is not present. No thyromegaly present.   Cardiovascular: Normal rate, regular rhythm, S1 normal, S2 normal and intact distal pulses.  Exam reveals S4. Exam reveals no friction rub.    No murmur heard.  Pulmonary/Chest: Breath sounds normal. She has no wheezes. She has no rales.   Abdominal: Soft. Bowel sounds are normal. There is no hepatosplenomegaly. There is no tenderness.   Musculoskeletal: She exhibits no edema.   Neurological: She is alert and oriented to person, place, and time. She has normal strength.   Skin: No cyanosis. Nails show no clubbing.       Lab Results   Component Value Date    WBC 9.87 03/03/2016    HGB 11.7 (L) 03/03/2016    HCT 35.4 (L) 03/03/2016    MCV 93 03/03/2016 "     03/03/2016       Lab Results   Component Value Date     06/23/2016    K 5.4 (H) 06/23/2016    BUN 39 (H) 06/23/2016    CREATININE 2.1 (H) 06/23/2016     (H) 06/23/2016    HGBA1C 10.8 (H) 01/18/2017    CHOL 172 01/18/2017    HDL 31 (L) 01/18/2017    LDLCALC 90.2 01/18/2017    TRIG 254 (H) 01/18/2017    CHOLHDL 18.0 (L) 01/18/2017    HGB 11.7 (L) 03/03/2016    HCT 35.4 (L) 03/03/2016     03/03/2016    INR 1.0 02/04/2015     ECG shows normal sinus rhythm, possible inferior infarct of undetermined age, T-wave inversions in lateral leads compatible with ischemia, nonspecific ST changes.  The rhythm strip shows occasional PACs.  Assessment:     1. Coronary artery disease of bypass graft of native heart with stable angina pectoris    2. Diabetic polyneuropathy associated with type 2 diabetes mellitus    3. CKD stage 4 due to type 2 diabetes mellitus    4. Obesity (BMI 30.0-34.9)       The patient has significant coronary risk factors of which only diabetes is relatively uncontrolled.  At this time, she has no symptom of dermatology of significant arrhythmias, heart failure, or recurrent ischemia.  Patient will continue on her current medications.  The patient is a candidate for the ADAPTABLE study which is testing 81 mg versus 325 mg of aspirin in patients with coronary artery disease.  Patient will return in 6 months for reevaluation     Plan:     Zoey was seen today for atherosclerosis of aorta, congestive heart failure, coronary artery disease and renovascular hypertension.    Diagnoses and all orders for this visit:    Coronary artery disease of bypass graft of native heart with stable angina pectoris    Diabetic polyneuropathy associated with type 2 diabetes mellitus    CKD stage 4 due to type 2 diabetes mellitus    Obesity (BMI 30.0-34.9)

## 2017-01-24 NOTE — MR AVS SNAPSHOT
[facility] - Cardiology  [address]  [place] [address]  Phone: [phone]                  Zoey Ham   2017 1:00 PM   Office Visit    Description:  Female : 1945   Provider:  Elver Kelley MD   Department:  [facility]           Reason for Visit     Atherosclerosis of aorta     Congestive Heart Failure     Coronary Artery Disease     Renovascular hypertension           Diagnoses this Visit        Comments    Coronary artery disease of bypass graft of native heart with stable angina pectoris    -  Primary     Diabetic polyneuropathy associated with type 2 diabetes mellitus         CKD stage 4 due to type 2 diabetes mellitus         Obesity (BMI 30.0-34.9)                To Do List           Future Appointments        Provider Department Dept Phone    2017 9:30 AM [facility] MAMMO2 SCREEN [facility] [phone]    3/20/2017 10:00 AM Cesia Rosales MD [facility] - Internal Medicine [phone]      Goals (5 Years of Data)     None      Follow-Up and Disposition     Return in about 6 months (around 2017).      [facility]     [facility] Nurse Care Line -  Assistance  Registered nurses in the [facility] On [facility] Center provide clinical advisement, health education, appointment booking, and other advisory services.  Call for this free service at [phone].             Medications           Message regarding Medications     Verify the changes and/or additions to your medication regime listed below are the same as discussed with your clinician today.  If any of these changes or additions are incorrect, please notify your healthcare provider.             Verify that the below list of medications is an accurate representation of the medications you are currently taking.  If none reported, the list may be blank. If incorrect, please contact your healthcare provider. Carry this list with you in case of emergency.           Current  "Medications     aspirin (ECOTRIN) 81 MG EC tablet Take 81 mg by mouth. 1 Tablet, Delayed Release (E.C.) Oral Every day    atorvastatin (LIPITOR) 80 MG tablet TAKE 1 TABLET ONE TIME DAILY    blood-glucose meter kit Use as instructed    clopidogrel (PLAVIX) 75 mg tablet TAKE 1 TABLET ONE TIME DAILY    ergocalciferol (VITAMIN D2) 50,000 unit Cap Take 1 capsule (50,000 Units total) by mouth every 7 days.    furosemide (LASIX) 40 MG tablet Take 1 tablet (40 mg total) by mouth daily as needed (take water pill daily as needed for leg swelling and SOB).    insulin aspart (NOVOLOG) 100 unit/mL injection Inject 15 Units into the skin 3 (three) times daily before meals.    insulin glargine (LANTUS) 100 unit/mL injection Inject 11 Units into the skin every evening.    insulin needles, disposable, 31 Ndle Takes insulin 4 times daily    losartan (COZAAR) 50 MG tablet Take 1 tablet (50 mg total) by mouth once daily.    metoprolol tartrate (LOPRESSOR) 50 MG tablet Take 1 tablet (50 mg total) by mouth 2 (two) times daily.    nitroGLYCERIN 0.4 MG/DOSE TL SPRY (NITROLINGUAL) 400 mcg/spray spray Place 1 spray under the tongue every 5 (five) minutes as needed for Chest pain.           Clinical Reference Information           Vital Signs - Last Recorded  Most recent update: 1/24/2017 12:44 PM by Bethany Masters MA    BP Pulse Ht Wt LMP BMI    (!) 140/65 (BP Location: Left arm, Patient Position: Sitting, BP Method: Automatic) 71 5' 4" (1.626 m) 84 kg (185 lb 3 oz) (LMP Unknown) 31.79 kg/m2      Blood Pressure          Most Recent Value    Right Arm BP - Sitting  142/70    Left Arm BP - Sitting  140/65    BP  (!)  140/65      Allergies as of 1/24/2017     Pcn [Penicillins]    Percodan [Oxycodone-aspirin]    Phenergan [Promethazine]      Immunizations Administered on Date of Encounter - 1/24/2017     None      "

## 2017-01-25 ENCOUNTER — HOSPITAL ENCOUNTER (OUTPATIENT)
Dept: RADIOLOGY | Facility: HOSPITAL | Age: 72
Discharge: HOME | End: 2017-01-25
Attending: INTERNAL MEDICINE
Payer: MEDICARE

## 2017-01-25 DIAGNOSIS — Z12.31 ENCOUNTER FOR SCREENING MAMMOGRAM FOR MALIGNANT NEOPLASM OF BREAST: ICD-10-CM

## 2017-01-25 DIAGNOSIS — D69.2 SENILE PURPURA: ICD-10-CM

## 2017-01-25 PROCEDURE — 77067 SCR MAMMO BI INCL CAD: CPT | Mod: 26,,, | Performed by: RADIOLOGY

## 2017-01-25 PROCEDURE — 77067 SCR MAMMO BI INCL CAD: CPT | Mod: TC

## 2017-01-25 PROCEDURE — 77063 BREAST TOMOSYNTHESIS BI: CPT | Mod: 26,,, | Performed by: RADIOLOGY

## 2017-01-26 ENCOUNTER — TELEPHONE (OUTPATIENT)
Dept: INTERNAL MEDICINE | Facility: CLINIC | Age: 72
End: 2017-01-26

## 2017-01-26 NOTE — TELEPHONE ENCOUNTER
Called Pt no answer, message has been left informing Pt to please contact us at our direct line , will try again .    GUICHO Bahena

## 2017-01-26 NOTE — TELEPHONE ENCOUNTER
Patient called to discuss her abnormal mammogram, but she did not answer phone. Message was left on her voicemail about follow-up mammogram along with clinic phone number if she has questions.    Please give her repeat mammogram information if she returns call to discuss it.    SOLEDAD

## 2017-01-27 ENCOUNTER — HOSPITAL ENCOUNTER (OUTPATIENT)
Dept: RADIOLOGY | Facility: HOSPITAL | Age: 72
Discharge: HOME | End: 2017-01-27
Attending: INTERNAL MEDICINE
Payer: MEDICARE

## 2017-01-27 DIAGNOSIS — R92.8 ABNORMAL MAMMOGRAM: ICD-10-CM

## 2017-01-27 PROCEDURE — 77061 BREAST TOMOSYNTHESIS UNI: CPT | Mod: TC,LT

## 2017-01-27 PROCEDURE — 77061 BREAST TOMOSYNTHESIS UNI: CPT | Mod: 26,LT,, | Performed by: RADIOLOGY

## 2017-01-27 PROCEDURE — 77065 DX MAMMO INCL CAD UNI: CPT | Mod: 26,LT,, | Performed by: RADIOLOGY

## 2017-01-30 ENCOUNTER — TELEPHONE (OUTPATIENT)
Dept: INTERNAL MEDICINE | Facility: CLINIC | Age: 72
End: 2017-01-30

## 2017-01-30 DIAGNOSIS — I50.42 CHRONIC COMBINED SYSTOLIC AND DIASTOLIC CONGESTIVE HEART FAILURE: ICD-10-CM

## 2017-01-30 DIAGNOSIS — E11.42 DIABETIC POLYNEUROPATHY ASSOCIATED WITH TYPE 2 DIABETES MELLITUS: ICD-10-CM

## 2017-01-30 DIAGNOSIS — I25.708 CORONARY ARTERY DISEASE OF BYPASS GRAFT OF NATIVE HEART WITH STABLE ANGINA PECTORIS: ICD-10-CM

## 2017-01-30 DIAGNOSIS — N18.4 CKD STAGE 4 DUE TO TYPE 2 DIABETES MELLITUS: ICD-10-CM

## 2017-01-30 DIAGNOSIS — E11.22 CKD STAGE 4 DUE TO TYPE 2 DIABETES MELLITUS: ICD-10-CM

## 2017-01-30 RX ORDER — INSULIN PUMP SYRINGE, 3 ML
EACH MISCELLANEOUS
Qty: 1 EACH | Refills: 0 | Status: SHIPPED | OUTPATIENT
Start: 2017-01-30 | End: 2018-01-30

## 2017-01-30 RX ORDER — INSULIN ASPART 100 [IU]/ML
15 INJECTION, SOLUTION INTRAVENOUS; SUBCUTANEOUS
Qty: 13.5 ML | Refills: 11 | Status: SHIPPED | OUTPATIENT
Start: 2017-01-30 | End: 2018-02-08

## 2017-01-30 RX ORDER — NITROGLYCERIN 400 UG/1
1 SPRAY ORAL EVERY 5 MIN PRN
Qty: 12 G | Refills: 0 | Status: SHIPPED | OUTPATIENT
Start: 2017-01-30 | End: 2018-05-14 | Stop reason: SDUPTHER

## 2017-01-30 RX ORDER — INSULIN GLARGINE 100 [IU]/ML
11 INJECTION, SOLUTION SUBCUTANEOUS NIGHTLY
Qty: 3.3 ML | Refills: 11 | Status: SHIPPED | OUTPATIENT
Start: 2017-01-30 | End: 2018-02-08 | Stop reason: SDUPTHER

## 2017-01-30 NOTE — TELEPHONE ENCOUNTER
Pt needs her insulin vials along with syringes to be faxed over to [facility] if possible and glucometer to be faxed over to [facility] . Currently in the process of calling [facility] to verify :::::    Spoke with rep at [facility] and they informed me that the following medications were cancelled for Pt :::    Glucometer - Accucheck Diana plus   Lantus-  Novolog -   Nitro glycerin -     Rep could not give me any reason why these medications were cancelled .   Please advise

## 2017-01-30 NOTE — TELEPHONE ENCOUNTER
They were all ordered on 1/18/17, i just sent them again. Not sure what happened.    Thanks,  SOLEDAD

## 2017-01-31 NOTE — TELEPHONE ENCOUNTER
Pt has been advised that medication has been called into local pharmacy , Pt stressed great understanding .    Mery    Thanks Dr. Rosales

## 2017-02-08 ENCOUNTER — HOSPITAL ENCOUNTER (INPATIENT)
Facility: HOSPITAL | Age: 72
LOS: 2 days | Discharge: HOME | DRG: 247 | End: 2017-02-10
Attending: HOSPITALIST | Admitting: HOSPITALIST
Payer: MEDICARE

## 2017-02-08 ENCOUNTER — TELEPHONE (OUTPATIENT)
Dept: INTERNAL MEDICINE | Facility: CLINIC | Age: 72
End: 2017-02-08

## 2017-02-08 ENCOUNTER — HOSPITAL ENCOUNTER (EMERGENCY)
Facility: OTHER | Age: 72
Discharge: ANOTHER HOSPITAL | End: 2017-02-08
Attending: EMERGENCY MEDICINE
Payer: MEDICARE

## 2017-02-08 VITALS
SYSTOLIC BLOOD PRESSURE: 158 MMHG | DIASTOLIC BLOOD PRESSURE: 66 MMHG | HEART RATE: 76 BPM | HEIGHT: 64 IN | RESPIRATION RATE: 18 BRPM | BODY MASS INDEX: 31.58 KG/M2 | OXYGEN SATURATION: 95 % | TEMPERATURE: 98 F | WEIGHT: 185 LBS

## 2017-02-08 DIAGNOSIS — E21.3 HYPERPARATHYROIDISM: ICD-10-CM

## 2017-02-08 DIAGNOSIS — D63.1 ANEMIA OF CHRONIC RENAL FAILURE, STAGE 4 (SEVERE): ICD-10-CM

## 2017-02-08 DIAGNOSIS — E78.5 HYPERLIPIDEMIA, UNSPECIFIED HYPERLIPIDEMIA TYPE: ICD-10-CM

## 2017-02-08 DIAGNOSIS — I20.0 UNSTABLE ANGINA: ICD-10-CM

## 2017-02-08 DIAGNOSIS — E66.9 OBESITY (BMI 30.0-34.9): ICD-10-CM

## 2017-02-08 DIAGNOSIS — R07.9 CHEST PAIN: ICD-10-CM

## 2017-02-08 DIAGNOSIS — I48.20 CHRONIC ATRIAL FIBRILLATION: ICD-10-CM

## 2017-02-08 DIAGNOSIS — E11.3553 STABLE PROLIFERATIVE DIABETIC RETINOPATHY OF BOTH EYES ASSOCIATED WITH TYPE 2 DIABETES MELLITUS: ICD-10-CM

## 2017-02-08 DIAGNOSIS — R07.9 CHEST PAIN, UNSPECIFIED TYPE: ICD-10-CM

## 2017-02-08 DIAGNOSIS — I10 ESSENTIAL (PRIMARY) HYPERTENSION: ICD-10-CM

## 2017-02-08 DIAGNOSIS — D69.2 SENILE PURPURA: ICD-10-CM

## 2017-02-08 DIAGNOSIS — I50.42 CHRONIC COMBINED SYSTOLIC AND DIASTOLIC CONGESTIVE HEART FAILURE: Primary | ICD-10-CM

## 2017-02-08 DIAGNOSIS — N18.9 CKD (CHRONIC KIDNEY DISEASE), UNSPECIFIED STAGE: ICD-10-CM

## 2017-02-08 DIAGNOSIS — I70.0 ATHEROSCLEROSIS OF AORTA: ICD-10-CM

## 2017-02-08 DIAGNOSIS — R07.9 ACUTE CHEST PAIN: ICD-10-CM

## 2017-02-08 DIAGNOSIS — M85.80 OSTEOPENIA: ICD-10-CM

## 2017-02-08 DIAGNOSIS — N18.4 ANEMIA OF CHRONIC RENAL FAILURE, STAGE 4 (SEVERE): ICD-10-CM

## 2017-02-08 DIAGNOSIS — I20.0 UNSTABLE ANGINA PECTORIS: Primary | ICD-10-CM

## 2017-02-08 DIAGNOSIS — E11.22 CKD STAGE 4 DUE TO TYPE 2 DIABETES MELLITUS: ICD-10-CM

## 2017-02-08 DIAGNOSIS — Z60.4 SOCIAL ISOLATION: ICD-10-CM

## 2017-02-08 DIAGNOSIS — I10 ESSENTIAL HYPERTENSION: ICD-10-CM

## 2017-02-08 DIAGNOSIS — I21.4 NSTEMI (NON-ST ELEVATED MYOCARDIAL INFARCTION): ICD-10-CM

## 2017-02-08 DIAGNOSIS — E11.42 DIABETIC POLYNEUROPATHY ASSOCIATED WITH TYPE 2 DIABETES MELLITUS: ICD-10-CM

## 2017-02-08 DIAGNOSIS — I25.708 CORONARY ARTERY DISEASE OF BYPASS GRAFT OF NATIVE HEART WITH STABLE ANGINA PECTORIS: ICD-10-CM

## 2017-02-08 DIAGNOSIS — N18.4 CKD STAGE 4 DUE TO TYPE 2 DIABETES MELLITUS: ICD-10-CM

## 2017-02-08 LAB
ALBUMIN SERPL BCP-MCNC: 3.4 G/DL
ALBUMIN SERPL-MCNC: 3.3 G/DL (ref 3.3–5.5)
ALP SERPL-CCNC: 153 U/L
ALP SERPL-CCNC: 161 U/L (ref 42–141)
ALT SERPL W/O P-5'-P-CCNC: 11 U/L
ANION GAP SERPL CALC-SCNC: 8 MMOL/L
AST SERPL-CCNC: 15 U/L
BASOPHILS # BLD AUTO: 0.04 K/UL
BASOPHILS NFR BLD: 0.4 %
BILIRUB SERPL-MCNC: 0.2 MG/DL
BILIRUB SERPL-MCNC: 0.4 MG/DL (ref 0.2–1.6)
BUN SERPL-MCNC: 76 MG/DL
BUN SERPL-MCNC: 79 MG/DL (ref 7–22)
CALCIUM SERPL-MCNC: 8.6 MG/DL
CALCIUM SERPL-MCNC: 8.7 MG/DL (ref 8–10.3)
CHLORIDE SERPL-SCNC: 106 MMOL/L (ref 98–108)
CHLORIDE SERPL-SCNC: 112 MMOL/L
CHOLEST/HDLC SERPL: 4.6 {RATIO}
CO2 SERPL-SCNC: 21 MMOL/L
CREAT SERPL-MCNC: 2.5 MG/DL
CREAT SERPL-MCNC: 2.7 MG/DL (ref 0.6–1.2)
CRP SERPL-MCNC: 2.45 MG/L
DIASTOLIC DYSFUNCTION: NO
DIASTOLIC DYSFUNCTION: YES
DIFFERENTIAL METHOD: ABNORMAL
EOSINOPHIL # BLD AUTO: 0.1 K/UL
EOSINOPHIL NFR BLD: 1.2 %
ERYTHROCYTE [DISTWIDTH] IN BLOOD BY AUTOMATED COUNT: 13.8 %
ERYTHROCYTE [SEDIMENTATION RATE] IN BLOOD BY WESTERGREN METHOD: 37 MM/HR
EST. GFR  (AFRICAN AMERICAN): 22 ML/MIN/1.73 M^2
EST. GFR  (NON AFRICAN AMERICAN): 19 ML/MIN/1.73 M^2
ESTIMATED PA SYSTOLIC PRESSURE: 39.36
GLOBAL PERICARDIAL EFFUSION: ABNORMAL
GLUCOSE SERPL-MCNC: 152 MG/DL
GLUCOSE SERPL-MCNC: 294 MG/DL (ref 73–118)
HCT VFR BLD AUTO: 29.8 %
HDL/CHOLESTEROL RATIO: 21.9 %
HDLC SERPL-MCNC: 137 MG/DL
HDLC SERPL-MCNC: 30 MG/DL
HGB BLD-MCNC: 9.7 G/DL
INR PPP: 0.9
LDLC SERPL CALC-MCNC: 77.4 MG/DL
LYMPHOCYTES # BLD AUTO: 2.4 K/UL
LYMPHOCYTES NFR BLD: 22.6 %
MAGNESIUM SERPL-MCNC: 2.1 MG/DL
MCH RBC QN AUTO: 30.8 PG
MCHC RBC AUTO-ENTMCNC: 32.6 %
MCV RBC AUTO: 95 FL
MITRAL VALVE MOBILITY: NORMAL
MITRAL VALVE REGURGITATION: ABNORMAL
MONOCYTES # BLD AUTO: 1.1 K/UL
MONOCYTES NFR BLD: 10.3 %
NEUTROPHILS # BLD AUTO: 6.9 K/UL
NEUTROPHILS NFR BLD: 65.5 %
NONHDLC SERPL-MCNC: 107 MG/DL
PHOSPHATE SERPL-MCNC: 4.7 MG/DL
PLATELET # BLD AUTO: 246 K/UL
PMV BLD AUTO: 11.4 FL
POC ALT (SGPT): 20 (ref 10–47)
POC AST (SGOT): 27 (ref 11–38)
POC B-TYPE NATRIURETIC PEPTIDE: 318 PG/ML (ref 0–100)
POC CARDIAC TROPONIN I: 0.03 NG/ML
POC PTINR: 1 (ref 0.9–1.2)
POC PTWBT: 12 SEC (ref 9.7–14.3)
POC TCO2: 22 (ref 18–33)
POCT GLUCOSE: 129 MG/DL (ref 70–110)
POCT GLUCOSE: 191 MG/DL (ref 70–110)
POCT GLUCOSE: 225 MG/DL (ref 70–110)
POTASSIUM BLD-SCNC: 5 MMOL/L (ref 3.6–5.1)
POTASSIUM SERPL-SCNC: 4.9 MMOL/L
PROT SERPL-MCNC: 6.5 G/DL
PROTEIN, POC: 6.7 (ref 6.4–8.1)
PROTHROMBIN TIME: 10 SEC
RBC # BLD AUTO: 3.15 M/UL
RETICS/RBC NFR AUTO: 1.7 %
RETIRED EF AND QEF - SEE NOTES: 55 (ref 55–65)
SAMPLE: NORMAL
SAMPLE: NORMAL
SODIUM BLD-SCNC: 142 MMOL/L (ref 128–145)
SODIUM SERPL-SCNC: 141 MMOL/L
T3FREE SERPL-MCNC: 2.6 PG/ML
T4 FREE SERPL-MCNC: 0.9 NG/DL
TRICUSPID VALVE REGURGITATION: ABNORMAL
TRIGL SERPL-MCNC: 148 MG/DL
TROPONIN I SERPL DL<=0.01 NG/ML-MCNC: 0.21 NG/ML
TROPONIN I SERPL DL<=0.01 NG/ML-MCNC: 0.23 NG/ML
TROPONIN I SERPL DL<=0.01 NG/ML-MCNC: 0.28 NG/ML
TSH SERPL DL<=0.005 MIU/L-ACNC: 1.58 UIU/ML
WBC # BLD AUTO: 10.5 K/UL

## 2017-02-08 PROCEDURE — 63600175 PHARM REV CODE 636 W HCPCS: Performed by: HOSPITALIST

## 2017-02-08 PROCEDURE — 85025 COMPLETE CBC W/AUTO DIFF WBC: CPT

## 2017-02-08 PROCEDURE — 80061 LIPID PANEL: CPT

## 2017-02-08 PROCEDURE — 25000003 PHARM REV CODE 250: Performed by: HOSPITALIST

## 2017-02-08 PROCEDURE — 84481 FREE ASSAY (FT-3): CPT

## 2017-02-08 PROCEDURE — 63600175 PHARM REV CODE 636 W HCPCS

## 2017-02-08 PROCEDURE — 93306 TTE W/DOPPLER COMPLETE: CPT

## 2017-02-08 PROCEDURE — 93017 CV STRESS TEST TRACING ONLY: CPT

## 2017-02-08 PROCEDURE — 85045 AUTOMATED RETICULOCYTE COUNT: CPT

## 2017-02-08 PROCEDURE — 85610 PROTHROMBIN TIME: CPT

## 2017-02-08 PROCEDURE — 93016 CV STRESS TEST SUPVJ ONLY: CPT | Mod: ,,, | Performed by: INTERNAL MEDICINE

## 2017-02-08 PROCEDURE — 25000003 PHARM REV CODE 250: Performed by: INTERNAL MEDICINE

## 2017-02-08 PROCEDURE — 83735 ASSAY OF MAGNESIUM: CPT

## 2017-02-08 PROCEDURE — 86141 C-REACTIVE PROTEIN HS: CPT

## 2017-02-08 PROCEDURE — 84484 ASSAY OF TROPONIN QUANT: CPT

## 2017-02-08 PROCEDURE — 36415 COLL VENOUS BLD VENIPUNCTURE: CPT

## 2017-02-08 PROCEDURE — 84443 ASSAY THYROID STIM HORMONE: CPT

## 2017-02-08 PROCEDURE — 83880 ASSAY OF NATRIURETIC PEPTIDE: CPT

## 2017-02-08 PROCEDURE — 84100 ASSAY OF PHOSPHORUS: CPT

## 2017-02-08 PROCEDURE — 84484 ASSAY OF TROPONIN QUANT: CPT | Mod: 91

## 2017-02-08 PROCEDURE — 80053 COMPREHEN METABOLIC PANEL: CPT

## 2017-02-08 PROCEDURE — 84439 ASSAY OF FREE THYROXINE: CPT

## 2017-02-08 PROCEDURE — 93306 TTE W/DOPPLER COMPLETE: CPT | Mod: 26,,, | Performed by: INTERNAL MEDICINE

## 2017-02-08 PROCEDURE — 63600175 PHARM REV CODE 636 W HCPCS: Performed by: EMERGENCY MEDICINE

## 2017-02-08 PROCEDURE — 99223 1ST HOSP IP/OBS HIGH 75: CPT | Mod: ,,, | Performed by: INTERNAL MEDICINE

## 2017-02-08 PROCEDURE — 96376 TX/PRO/DX INJ SAME DRUG ADON: CPT

## 2017-02-08 PROCEDURE — 96375 TX/PRO/DX INJ NEW DRUG ADDON: CPT

## 2017-02-08 PROCEDURE — 81000 URINALYSIS NONAUTO W/SCOPE: CPT

## 2017-02-08 PROCEDURE — 99285 EMERGENCY DEPT VISIT HI MDM: CPT | Mod: 25

## 2017-02-08 PROCEDURE — 93018 CV STRESS TEST I&R ONLY: CPT | Mod: ,,, | Performed by: INTERNAL MEDICINE

## 2017-02-08 PROCEDURE — 78452 HT MUSCLE IMAGE SPECT MULT: CPT | Mod: 26,,, | Performed by: INTERNAL MEDICINE

## 2017-02-08 PROCEDURE — 85651 RBC SED RATE NONAUTOMATED: CPT

## 2017-02-08 PROCEDURE — 21400001 HC TELEMETRY ROOM

## 2017-02-08 PROCEDURE — 25000003 PHARM REV CODE 250: Performed by: EMERGENCY MEDICINE

## 2017-02-08 PROCEDURE — 96374 THER/PROPH/DIAG INJ IV PUSH: CPT

## 2017-02-08 PROCEDURE — 96372 THER/PROPH/DIAG INJ SC/IM: CPT

## 2017-02-08 RX ORDER — SODIUM CHLORIDE 9 MG/ML
INJECTION, SOLUTION INTRAVENOUS CONTINUOUS
Status: DISCONTINUED | OUTPATIENT
Start: 2017-02-08 | End: 2017-02-09

## 2017-02-08 RX ORDER — ACETAMINOPHEN 325 MG/1
650 TABLET ORAL EVERY 6 HOURS PRN
Status: DISCONTINUED | OUTPATIENT
Start: 2017-02-08 | End: 2017-02-10 | Stop reason: HOSPADM

## 2017-02-08 RX ORDER — NITROGLYCERIN 0.4 MG/1
0.4 TABLET SUBLINGUAL EVERY 5 MIN PRN
Status: DISCONTINUED | OUTPATIENT
Start: 2017-02-08 | End: 2017-02-10 | Stop reason: HOSPADM

## 2017-02-08 RX ORDER — LOSARTAN POTASSIUM 25 MG/1
50 TABLET ORAL DAILY
Status: DISCONTINUED | OUTPATIENT
Start: 2017-02-08 | End: 2017-02-10 | Stop reason: HOSPADM

## 2017-02-08 RX ORDER — METOPROLOL TARTRATE 50 MG/1
50 TABLET ORAL 2 TIMES DAILY
Status: DISCONTINUED | OUTPATIENT
Start: 2017-02-08 | End: 2017-02-10 | Stop reason: HOSPADM

## 2017-02-08 RX ORDER — HEPARIN SODIUM 5000 [USP'U]/ML
5000 INJECTION, SOLUTION INTRAVENOUS; SUBCUTANEOUS EVERY 8 HOURS
Status: DISCONTINUED | OUTPATIENT
Start: 2017-02-08 | End: 2017-02-10 | Stop reason: HOSPADM

## 2017-02-08 RX ORDER — REGADENOSON 0.08 MG/ML
INJECTION, SOLUTION INTRAVENOUS
Status: DISPENSED
Start: 2017-02-08 | End: 2017-02-08

## 2017-02-08 RX ORDER — RAMELTEON 8 MG/1
8 TABLET ORAL NIGHTLY PRN
Status: DISCONTINUED | OUTPATIENT
Start: 2017-02-08 | End: 2017-02-10 | Stop reason: HOSPADM

## 2017-02-08 RX ORDER — ONDANSETRON 2 MG/ML
8 INJECTION INTRAMUSCULAR; INTRAVENOUS EVERY 8 HOURS PRN
Status: DISCONTINUED | OUTPATIENT
Start: 2017-02-08 | End: 2017-02-09 | Stop reason: SDUPTHER

## 2017-02-08 RX ORDER — AMOXICILLIN 250 MG
1 CAPSULE ORAL 2 TIMES DAILY
Status: DISCONTINUED | OUTPATIENT
Start: 2017-02-08 | End: 2017-02-10 | Stop reason: HOSPADM

## 2017-02-08 RX ORDER — ASPIRIN 325 MG
325 TABLET ORAL DAILY
Status: DISCONTINUED | OUTPATIENT
Start: 2017-02-08 | End: 2017-02-10 | Stop reason: HOSPADM

## 2017-02-08 RX ORDER — MORPHINE SULFATE 10 MG/ML
2 INJECTION INTRAMUSCULAR; INTRAVENOUS; SUBCUTANEOUS EVERY 4 HOURS PRN
Status: DISCONTINUED | OUTPATIENT
Start: 2017-02-08 | End: 2017-02-10 | Stop reason: HOSPADM

## 2017-02-08 RX ORDER — INSULIN ASPART 100 [IU]/ML
1-10 INJECTION, SOLUTION INTRAVENOUS; SUBCUTANEOUS EVERY 6 HOURS PRN
Status: DISCONTINUED | OUTPATIENT
Start: 2017-02-08 | End: 2017-02-10 | Stop reason: HOSPADM

## 2017-02-08 RX ORDER — DEXTROMETHORPHAN POLISTIREX 30 MG/5 ML
1 SUSPENSION, EXTENDED RELEASE 12 HR ORAL DAILY PRN
Status: DISCONTINUED | OUTPATIENT
Start: 2017-02-08 | End: 2017-02-10 | Stop reason: HOSPADM

## 2017-02-08 RX ORDER — GLUCAGON 1 MG
1 KIT INJECTION
Status: DISCONTINUED | OUTPATIENT
Start: 2017-02-08 | End: 2017-02-10 | Stop reason: HOSPADM

## 2017-02-08 RX ORDER — ONDANSETRON 2 MG/ML
4 INJECTION INTRAMUSCULAR; INTRAVENOUS
Status: COMPLETED | OUTPATIENT
Start: 2017-02-08 | End: 2017-02-08

## 2017-02-08 RX ORDER — ASPIRIN 325 MG
325 TABLET ORAL DAILY
COMMUNITY
End: 2018-04-13 | Stop reason: DRUGHIGH

## 2017-02-08 RX ORDER — ERGOCALCIFEROL 1.25 MG/1
50000 CAPSULE ORAL
Status: DISCONTINUED | OUTPATIENT
Start: 2017-02-08 | End: 2017-02-10 | Stop reason: HOSPADM

## 2017-02-08 RX ORDER — NITROGLYCERIN 0.4 MG/1
0.4 TABLET SUBLINGUAL EVERY 5 MIN PRN
Status: DISCONTINUED | OUTPATIENT
Start: 2017-02-08 | End: 2017-02-08 | Stop reason: HOSPADM

## 2017-02-08 RX ORDER — POLYETHYLENE GLYCOL 3350 17 G/17G
17 POWDER, FOR SOLUTION ORAL DAILY
Status: DISCONTINUED | OUTPATIENT
Start: 2017-02-08 | End: 2017-02-10 | Stop reason: HOSPADM

## 2017-02-08 RX ORDER — ASPIRIN 325 MG
325 TABLET ORAL
Status: COMPLETED | OUTPATIENT
Start: 2017-02-08 | End: 2017-02-08

## 2017-02-08 RX ORDER — ATORVASTATIN CALCIUM 40 MG/1
80 TABLET, FILM COATED ORAL DAILY
Status: DISCONTINUED | OUTPATIENT
Start: 2017-02-08 | End: 2017-02-10 | Stop reason: HOSPADM

## 2017-02-08 RX ORDER — BISACODYL 10 MG
10 SUPPOSITORY, RECTAL RECTAL DAILY PRN
Status: DISCONTINUED | OUTPATIENT
Start: 2017-02-08 | End: 2017-02-10 | Stop reason: HOSPADM

## 2017-02-08 RX ORDER — SODIUM CHLORIDE 0.9 % (FLUSH) 0.9 %
3 SYRINGE (ML) INJECTION EVERY 8 HOURS
Status: DISCONTINUED | OUTPATIENT
Start: 2017-02-08 | End: 2017-02-10 | Stop reason: HOSPADM

## 2017-02-08 RX ORDER — INSULIN ASPART 100 [IU]/ML
15 INJECTION, SOLUTION INTRAVENOUS; SUBCUTANEOUS
Status: DISCONTINUED | OUTPATIENT
Start: 2017-02-08 | End: 2017-02-10 | Stop reason: HOSPADM

## 2017-02-08 RX ORDER — CLOPIDOGREL BISULFATE 75 MG/1
75 TABLET ORAL DAILY
Status: DISCONTINUED | OUTPATIENT
Start: 2017-02-08 | End: 2017-02-10 | Stop reason: HOSPADM

## 2017-02-08 RX ORDER — FUROSEMIDE 40 MG/1
40 TABLET ORAL DAILY
Status: DISCONTINUED | OUTPATIENT
Start: 2017-02-08 | End: 2017-02-09

## 2017-02-08 RX ADMIN — LOSARTAN POTASSIUM 50 MG: 25 TABLET, FILM COATED ORAL at 09:02

## 2017-02-08 RX ADMIN — CLOPIDOGREL 75 MG: 75 TABLET, FILM COATED ORAL at 09:02

## 2017-02-08 RX ADMIN — Medication 3 ML: at 02:02

## 2017-02-08 RX ADMIN — FUROSEMIDE 40 MG: 40 TABLET ORAL at 09:02

## 2017-02-08 RX ADMIN — NITROGLYCERIN 1 INCH: 20 OINTMENT TOPICAL at 02:02

## 2017-02-08 RX ADMIN — ONDANSETRON HYDROCHLORIDE 4 MG: 2 SOLUTION INTRAMUSCULAR; INTRAVENOUS at 01:02

## 2017-02-08 RX ADMIN — SODIUM CHLORIDE: 0.9 INJECTION, SOLUTION INTRAVENOUS at 04:02

## 2017-02-08 RX ADMIN — ASPIRIN 325 MG ORAL TABLET 325 MG: 325 PILL ORAL at 09:02

## 2017-02-08 RX ADMIN — INSULIN ASPART 4 UNITS: 100 INJECTION, SOLUTION INTRAVENOUS; SUBCUTANEOUS at 02:02

## 2017-02-08 RX ADMIN — ONDANSETRON HYDROCHLORIDE 4 MG: 2 SOLUTION INTRAMUSCULAR; INTRAVENOUS at 02:02

## 2017-02-08 RX ADMIN — INSULIN ASPART 1 UNITS: 100 INJECTION, SOLUTION INTRAVENOUS; SUBCUTANEOUS at 10:02

## 2017-02-08 RX ADMIN — METOPROLOL TARTRATE 50 MG: 50 TABLET ORAL at 09:02

## 2017-02-08 RX ADMIN — HEPARIN SODIUM 5000 UNITS: 5000 INJECTION, SOLUTION INTRAVENOUS; SUBCUTANEOUS at 06:02

## 2017-02-08 RX ADMIN — ASPIRIN 325 MG ORAL TABLET 325 MG: 325 PILL ORAL at 01:02

## 2017-02-08 RX ADMIN — ATORVASTATIN CALCIUM 80 MG: 40 TABLET, FILM COATED ORAL at 06:02

## 2017-02-08 RX ADMIN — HEPARIN SODIUM 5000 UNITS: 5000 INJECTION, SOLUTION INTRAVENOUS; SUBCUTANEOUS at 09:02

## 2017-02-08 RX ADMIN — DOCUSATE SODIUM AND SENNOSIDES 1 TABLET: 8.6; 5 TABLET, FILM COATED ORAL at 09:02

## 2017-02-08 RX ADMIN — ERGOCALCIFEROL 50000 UNITS: 1.25 CAPSULE ORAL at 09:02

## 2017-02-08 RX ADMIN — METOPROLOL TARTRATE 50 MG: 50 TABLET ORAL at 06:02

## 2017-02-08 RX ADMIN — INSULIN DETEMIR 8 UNITS: 100 INJECTION, SOLUTION SUBCUTANEOUS at 09:02

## 2017-02-08 RX ADMIN — Medication 3 ML: at 06:02

## 2017-02-08 RX ADMIN — HEPARIN SODIUM 5000 UNITS: 5000 INJECTION, SOLUTION INTRAVENOUS; SUBCUTANEOUS at 02:02

## 2017-02-08 SDOH — SOCIAL DETERMINANTS OF HEALTH (SDOH): SOCIAL EXCLUSION AND REJECTION: Z60.4

## 2017-02-08 NOTE — IP AVS SNAPSHOT
[place] [address] [address] [address]  Phone: [phone]           Patient Discharge Instructions     Our goal is to set you up for success. This packet includes information on your condition, medications, and your home care. It will help you to care for yourself so you don't get sicker and need to go back to the hospital.     Please ask your nurse if you have any questions.        There are many details to remember when preparing to leave the hospital. Here is what you will need to do:    1. Take your medicine. If you are prescribed medications, review your Medication List in the following pages. You may have new medications to  at the pharmacy and others that you'll need to stop taking. Review the instructions for how and when to take your medications. Talk with your doctor or nurses if you are unsure of what to do.     2. Go to your follow-up appointments. Specific follow-up information is listed in the following pages. Your may be contacted by a transition nurse or clinical provider about future appointments. Be sure we have all of the phone numbers to reach you, if needed. Please contact your provider's office if you are unable to make an appointment.     3. Watch for warning signs. Your doctor or nurse will give you detailed warning signs to watch for and when to call for assistance. These instructions may also include educational information about your condition. If you experience any of warning signs to your health, call your doctor.               [facility]  Unless otherwise directed by your provider, please contact [facility], our nurse care line that is available for 24/7 assistance.     [phone] (toll-free)    Registered nurses in the [facility] provide clinical advisement, health education, appointment booking, and other advisory services.                    ** Verify the list of medication(s) below is accurate and up to date.  Carry this with you in case of emergency. If your medications have changed, please notify your healthcare provider.             Medication List      CHANGE how you take these medications        Additional Info                      aspirin 325 MG tablet   Refills:  0   Dose:  325 mg   What changed:  Another medication with the same name was removed. Continue taking this medication, and follow the directions you see here.    Last time this was given:  325 mg on 2/10/2017  9:33 AM   Instructions:  Take 325 mg by mouth once daily.     Begin Date    AM    Noon    PM    Bedtime         CONTINUE taking these medications        Additional Info                      atorvastatin 80 MG tablet   Commonly known as:  LIPITOR   Quantity:  90 tablet   Refills:  3    Last time this was given:  80 mg on 2/10/2017  9:32 AM   Instructions:  TAKE 1 TABLET ONE TIME DAILY     Begin Date    AM    Noon    PM    Bedtime       blood-glucose meter kit   Quantity:  1 each   Refills:  0   Comments:  Unified Color glucose monitoring kit with needles, strips, machine    Instructions:  Use as instructed     Begin Date    AM    Noon    PM    Bedtime       clopidogrel 75 mg tablet   Commonly known as:  PLAVIX   Quantity:  90 tablet   Refills:  3    Last time this was given:  75 mg on 2/10/2017  9:33 AM   Instructions:  TAKE 1 TABLET ONE TIME DAILY     Begin Date    AM    Noon    PM    Bedtime       ergocalciferol 50,000 unit Cap   Commonly known as:  VITAMIN D2   Quantity:  12 capsule   Refills:  0   Dose:  31852 Units    Last time this was given:  50,000 Units on 2/8/2017  9:02 AM   Instructions:  Take 1 capsule (50,000 Units total) by mouth every 7 days.     Begin Date    AM    Noon    PM    Bedtime       furosemide 40 MG tablet   Commonly known as:  LASIX   Quantity:  90 tablet   Refills:  3   Dose:  40 mg    Last time this was given:  40 mg on 2/9/2017  8:20 AM   Instructions:  Take 1 tablet (40 mg total) by mouth daily as needed (take water pill daily  as needed for leg swelling and SOB).     Begin Date    AM    Noon    PM    Bedtime       insulin aspart 100 unit/mL injection   Commonly known as:  NOVOLOG   Quantity:  13.5 mL   Refills:  11   Dose:  15 Units    Instructions:  Inject 15 Units into the skin 3 (three) times daily before meals.     Begin Date    AM    Noon    PM    Bedtime       insulin glargine 100 unit/mL injection   Commonly known as:  LANTUS   Quantity:  3.3 mL   Refills:  11   Dose:  11 Units    Instructions:  Inject 11 Units into the skin every evening.     Begin Date    AM    Noon    PM    Bedtime       insulin needles (disposable) 31 Ndle   Quantity:  300 each   Refills:  3    Instructions:  Takes insulin 4 times daily     Begin Date    AM    Noon    PM    Bedtime       losartan 50 MG tablet   Commonly known as:  COZAAR   Quantity:  90 tablet   Refills:  3   Dose:  50 mg    Last time this was given:  50 mg on 2/10/2017  9:32 AM   Instructions:  Take 1 tablet (50 mg total) by mouth once daily.     Begin Date    AM    Noon    PM    Bedtime       metoprolol tartrate 50 MG tablet   Commonly known as:  LOPRESSOR   Quantity:  180 tablet   Refills:  3   Dose:  50 mg    Last time this was given:  50 mg on 2/10/2017  9:33 AM   Instructions:  Take 1 tablet (50 mg total) by mouth 2 (two) times daily.     Begin Date    AM    Noon    PM    Bedtime       nitroGLYCERIN 0.4 MG/DOSE TL SPRY 400 mcg/spray spray   Commonly known as:  NITROLINGUAL   Quantity:  12 g   Refills:  0   Dose:  1 spray    Instructions:  Place 1 spray under the tongue every 5 (five) minutes as needed for Chest pain.     Begin Date    AM    Noon    PM    Bedtime                  Please bring to all follow up appointments:    1. A copy of your discharge instructions.  2. All medicines you are currently taking in their original bottles.  3. Identification and insurance card.    Please arrive 15 minutes ahead of scheduled appointment time.    Please call 24 hours in advance if you must  reschedule your appointment and/or time.        Your Scheduled Appointments     Feb 16, 2017 11:30 AM CST   Established Patient Visit with Cesia Rosales MD   [facility] - Internal Medicine ([facility])    [address]  [facility] [address]   [phone]            Mar 03, 2017  2:30 PM CST   Established Patient Visit with Elver Kelley MD   [facility] - Cardiology ([facility] )    [address]  [place] [address]   [phone]            Mar 20, 2017 10:00 AM CDT   Established Patient Visit with Cesia Rosales MD   [facility] - [facility] ([facility])    [address]  [facility] [address]   [phone]              Follow-up Information     Follow up with Cesia Rosales MD. Go on 2/16/2017.    Specialty:  Internal Medicine    Why:  Outpatient Services, PCP Follow-up Appointment, Please arrive to clinic for 11:30AM    Contact information:    [address]  [facility] [address]  [phone]          Follow up with Elver Kelley MD On 2/20/2017.    Specialty:  Cardiology    Why:  Appointment scheduled for Monday February 20th at 2pm.    Contact information:    [address]  [facility] [address]  [phone]          Discharge Instructions     Future Orders    Activity as tolerated     Diet general     Questions:    Total calories:      Fat restriction, if any:      Protein restriction, if any:      Na restriction, if any:      Fluid restriction:      Additional restrictions:        Discharge References/Attachments     CARDIAC CATHETERIZATION, DISCHARGE INSTRUCTIONS FOR (ENGLISH)        Primary Diagnosis     Your primary diagnosis was:  Heart Attack      Admission Information     Date & Time Provider Department CSN    2/8/2017  3:59 AM Yasmin De Souza MD [facility] [id number]      Care Providers     Provider Role Specialty Primary office phone    Yasmin  "MD Nolvia Attending Provider Hospitalist [phone]    Earl Maynard MD Consulting Physician  Cardiology [phone]      Important Medicare Message          Most Recent Value    Important Message from Medicare Regarding Discharge Appeal Rights  Given to patient/caregiver, Explained to patient/caregiver, Signed/date by patient/caregiver yes 02/08/2017 1645      Your Vitals Were     BP Pulse Temp Resp Height Weight    129/60 (BP Location: Left arm, Patient Position: Sitting, BP Method: Automatic) 60 98.5 °F (36.9 °C) (Oral) 18 5' 4" (1.626 m) 82.3 kg (181 lb 7 oz)    Last Period SpO2 BMI          (LMP Unknown) 93% 31.14 kg/m2        Recent Lab Values        5/30/2012 6/18/2012 9/16/2012 2/4/2014 4/16/2014 1/20/2015 2/28/2016 1/18/2017      4:57 AM  3:00 AM  5:10 AM  9:10 AM  6:09 AM 12:20 AM  9:06 AM 11:25 AM    A1C 7.7 (H) 7.3 (H) 10.3 (H) 10.5 (H) 11.1 (H) 9.7 (H) 9.2 (H) 10.8 (H)    Comment for A1C at 11:25 AM on 1/18/2017:  According to ADA guidelines, hemoglobin A1C <7.0% represents  optimal control in non-pregnant diabetic patients.  Different  metrics may apply to specific populations.   Standards of Medical Care in Diabetes - 2016.  For the purpose of screening for the presence of diabetes:  <5.7%     Consistent with the absence of diabetes  5.7-6.4%  Consistent with increasing risk for diabetes   (prediabetes)  >or=6.5%  Consistent with diabetes  Currently no consensus exists for use of hemoglobin A1C  for diagnosis of diabetes for children.        Allergies as of 2/10/2017        Reactions    Pcn [Penicillins] Hives, Swelling    Percodan [Oxycodone-aspirin] Hives    welps  Tolerated Rocephin IM in clinic    Phenergan [Promethazine] Other (See Comments)    Altered mental status; jerking of extremities      Advance Directives     An advance directive is a document which, in the event you are no longer able to make decisions for yourself, tells your healthcare team what kind of treatment you do " or do not want to receive, or who you would like to make those decisions for you.  If you do not currently have an advance directive, [facility] encourages you to create one.  For more information call:  [phone]), [phone] ([phone]),  or log on to [email or web address].        Language Assistance Services     ATTENTION: Language assistance services are available, free of charge. Please call [phone].      ATENCIÓN: Si habla español, tiene a friend disposición servicios gratuitos de asistencia lingüística. Llame al [phone].     CHÚ Ý: N?u b?n nói Ti?ng Vi?t, có các d?ch v? h? tr? ngôn ng? mi?n phí dành cho b?n. G?i s? [phone].        Heart Failure Education       Heart Failure: Being Active  You have a condition called heart failure. Being active doesnt mean that you have to wear yourself out. Even a little movement each day helps to strengthen your heart. If you cant get out to exercise, you can do simple stretching and strengthening exercises at home. These are good ways to keep you well-conditioned and prevent you and your heart from becoming excessively weak.    Ideas to get you started  · Add a little movement to things you do now. Walk to mail letters. Park your car at the far end of the parking lot and walk to the store. Walk up a flight of stairs instead of taking the elevator.  · Choose activities you enjoy. You might walk, swim, or ride an exercise bike. Things like gardening and washing the car count, too. Other possibilities include: washing dishes, walking the dog, walking around the mall, and doing aerobic activities with friends.  · Join a group exercise program at a [facility] or [facility], a senior center, or a community center. Or look into a hospital cardiac rehabilitation program. Ask your doctor if you qualify.  Tips to keep you going  · Get up and get dressed each day. Go to a coffee shop and read a newspaper or go somewhere that you'll be in the presence of  other active people. Youll feel more like being active.  · Make a plan. Choose one or more activities that you enjoy and that you can easily do. Then plan to do at least one each day. You might write your plan on a calendar.  · Go with a friend or a group if you like company. This can help you feel supported and stay motivated, too.  · Plan social events that you enjoy. This will keep you mentally engaged as well as physically motivated to do things you find pleasure in.  For your safety  · Talk with your healthcare provider before starting an exercise program.  · Exercise indoors when its too hot or too cold outside, or when the air quality is poor. Try walking at a shopping mall.  · Wear socks and sturdy shoes to maintain your balance and prevent falls.  · Start slowly. Do a few minutes several times a day at first. Increase your time and speed little by little.  · Stop and rest whenever you feel tired or get short of breath.  · Dont push yourself on days when you dont feel well.  Date Last Reviewed: 3/20/2016  © 0550-1429 [employer]. [address] [place] [address]. All rights reserved. This information is not intended as a substitute for professional medical care. Always follow your healthcare professional's instructions.              Heart Failure: Evaluating Your Heart  You have a condition called heart failure. To evaluate your condition, your doctor will examine you, ask questions, and do some tests. Along with looking for signs of heart failure, the doctor looks for any other health problems that may have led to heart failure. The results of your evaluation will help your doctor form a treatment plan.  Health history and physical exam  Your visit will start with a health history. Tell the doctor about any symptoms youve noticed and about all medicines you take. Then youll have a physical exam. This includes listening to your heartbeat and breathing. Youll also be checked for  swelling (edema) in your legs and neck. When you have fluid buildup or fluid in the lungs, it may be called congestive heart failure.  Diagnosing heart failure     During an echocardiogram, sound waves bounce off the heart. These are converted into a picture on the screen.   The following may be done to help your doctor form a diagnosis:  · X-rays show the size and shape of your heart. These pictures can also show fluid in your lungs.  · An electrocardiogram (ECG or EKG) shows the pattern of your heartbeat. Small pads (electrodes) are placed on your chest, arms, and legs. Wires connect the pads to the ECG machine, which records your hearts electrical signals. This can give the doctor information about heart function.  · An echocardiogram uses ultrasound waves to show the structure and movement of your heart muscle. This shows how well the heart pumps. It also shows the thickness of the heart walls, and if the heart is enlarged. It is one of the most useful, non-invasive tests as it provides information about the heart's general function. This helps your doctor make treatment decisions.  · Lab tests evaluate small amounts of blood or urine for signs of problems. A BNP lab test can help diagnose and evaluate heart failure. BNP stands for B-type natriuretic peptide. The ventricles secrete more BNP when heart failure worsens. Lab tests can also provide information about metabolic dysfunction or heart dysfunction.  Your treatment plan  Based on the results of your evaluation and tests, your doctor will develop a treatment plan. This plan is designed to relieve some of your heart failure symptoms and help make you more comfortable. Your treatment plan may include:  · Medicine to help your heart work better and improve your quality of life  · Changes in what you eat and drink to help prevent fluid from backing up in your body  · Daily monitoring of your weight and heart failure symptoms to see how well your treatment plan  is working  · Exercise to help you stay healthy  · Help with quitting smoking  · Emotional and psychological support to help adjust to the changes  · Referrals to other specialists to make sure you are being treated comprehensively  Date Last Reviewed: 3/21/2016  © 9952-3366 [employer]. [address] [place] [address], [place] [address]. All rights reserved. This information is not intended as a substitute for professional medical care. Always follow your healthcare professional's instructions.              Heart Failure: Making Changes to Your Diet  You have a condition called heart failure. When you have heart failure, excess fluid is more likely to build up in your body because your heart isn't working well. This makes the heart work harder to pump blood. Fluid buildup causes symptoms such as shortness of breath and swelling (edema). This is often referred to as congestive heart failure or CHF. Controlling the amount of salt (sodium) you eat may help stop fluid from building up. Your doctor may also tell you to reduce the amount of fluid you drink.  Reading food labels    Your healthcare provider will tell you how much sodium you can eat each day. Read food labels to keep track. Keep in mind that certain foods are high in salt. These include canned, frozen, and processed foods. Check the amount of sodium in each serving. Watch out for high-sodium ingredients. These include MSG (monosodium glutamate), baking soda, and sodium phosphate.   Eating less salt  Give yourself time to get used to eating less salt. It may take a little while. Here are some tips to help:  · Take the saltshaker off the table. Replace it with salt-free herb mixes and spices.  · Eat fresh or plain frozen vegetables. These have much less salt than canned vegetables.  · Choose low-sodium snacks like sodium-free pretzels, crackers, or air-popped popcorn.  · Dont add salt to your food when youre cooking. Instead, season your foods with  pepper, lemon, garlic, or onion.  · When you eat out, ask that your food be cooked without added salt.  · Avoid eating fried foods as these often have a great deal of salt.  If youre told to limit fluids  You may need to limit how much fluid you have to help prevent swelling. This includes anything that is liquid at room temperature, such as ice cream and soup. If your doctor tells you to limit fluid, try these tips:  · Measure drinks in a measuring cup before you drink them. This will help you meet daily goals.  · Chill drinks to make them more refreshing.  · Suck on frozen lemon wedges to quench thirst.  · Only drink when youre thirsty.  · Chew sugarless gum or suck on hard candy to keep your mouth moist.  · Weigh yourself daily to know if your body's fluid content is rising.  My sodium goal  Your healthcare provider may give you a sodium goal to meet each day. This includes sodium found in food as well as salt that you add. My goal is to eat no more than ___________ mg of sodium per day.     When to call your doctor  Call your doctor right away if you have any symptoms of worsening heart failure. These can include:  · Sudden weight gain  · Increased swelling of your legs or ankles  · Trouble breathing when youre resting or at night  · Increase in the number of pillows you have to sleep on  · Chest pain, pressure, discomfort, or pain in the jaw, neck, or back   Date Last Reviewed: 3/21/2016  © 1547-4315 [employer]. [address] [place] [address], [place] [address]. All rights reserved. This information is not intended as a substitute for professional medical care. Always follow your healthcare professional's instructions.              Heart Failure: Medicines to Help Your Heart    You have a condition called heart failure (also known as congestive heart failure, or CHF). Your doctor will likely prescribe medicines for heart failure and any underlying health problems you have. Most heart failure patients  take one or more types of medicinen. Your healthcare provider will work to find the combination of medicines that works best for you.  Heart failure medicines  Here are the most common heart failure medicines:  · ACE inhibitors lower blood pressure and decrease strain on the heart. This makes it easier for the heart to pump. Angiotensin receptor blockers have similar effects. These are prescribed for some patients instead of ACE inhibitors.  · Beta-blockers relieve stress on the heart. They also improve symptoms. They may also improve the heart's pumping action over time.  · Diuretics (also called water pills) help rid your body of excess water. This can help rid your body of swelling (edema). Having less fluid to pump means your heart doesnt have to work as hard. Some diuretics make your body lose a mineral called potassium. Your doctor will tell you if you need to take supplements or eat more foods high in potassium.  · Digoxin helps your heart pump with more strength. This helps your heart pump more blood with each beat. So, more oxygen-rich blood travels to the rest of the body.  · Aldosterone antagonists help alter hormones and decrease strain on the heart.  · Hydralazine and nitrates are two separate medicines used together to treat heart failure. They may come in one combination pill. They lower blood pressure and decrease how hard the heart has to pump.  Medicines for related conditions  Controlling other heart problems helps keep heart failure under control, too. Depending on other heart problems you have, medicines may be prescribed to:  · Lower blood pressure (antihypertensives).  · Lower cholesterol levels (statins).  · Prevent blood clots (anticoagulants or aspirin).  · Keep the heartbeat steady (antiarrhythmics).  Date Last Reviewed: 3/5/2016  © 5914-7447 [employer]. [address] [place] [address], [place] [address]. All rights reserved. This information is not intended as a substitute for  professional medical care. Always follow your healthcare professional's instructions.              Heart Failure: Procedures That May Help    The heart is a muscle that pumps oxygen-rich blood to all parts of the body. When you have heart failure, the heart is not able to pump as well as it should. Blood and fluid may back up into the lungs (congestive heart failure), and some parts of the body dont get enough oxygen-rich blood to work normally. These problems lead to the symptoms of heart failure.     Certain procedures may help the heart pump better in some cases of heart failure. Some procedures are done to treat health problems that may have caused the heart failure such as coronary artery disease or heart rhythm problems. For more serious heart failure, other options are available.  Treating artery and valve problems  If you have coronary artery disease or valve disease, procedures may be done to improve blood flow. This helps the heart pump better, which can improve heart failure symptoms. First, your doctor may do a cardiac catheterization to help detect clogged blood vessels or valve damage. During this procedure, a  thin tube (catheter) in inserted into a blood vessel and guided to the heart. There a dye is injected and a special type of X-ray (angiogram) is taken of the blood vessels. Procedures to open a blocked artery or fix damaged valves can also be done using catheterization.  · Angioplasty uses a balloon-tipped instrument at the end of the catheter. The balloon is inflated to widen the narrowed artery. In many cases, a stent is expanded to further support the narrowed artery. A stent is a metal mesh tube.  · Valve surgery repairs or replacement of faulty valves can also be done during catheterization so blood can flow properly through the chambers of the heart.  Bypass surgery is another option to help treat blocked arteries. It uses a healthy blood vessel from elsewhere in the body. The healthy  blood vessel is attached above and below the blocked area so that blood can flow around the blocked artery.  Treating heart rhythm problems  A device may be placed in the chest to help a weak heart maintain a healthy, heartbeat so the heart can pump more effectively:  · Pacemaker. A pacemaker is an implanted device that regulates your heartbeat electronically. It monitors your heart's rhythm and generates a painless electric impulse that helps the heart beat in a regular rhythm. A pacemaker is programmed to meet your specific heart rhythm needs.  · Biventricular pacing/cardiac resynchronization therapy. A type of pacemaker that paces both pumping chambers of the heart at the same time to coordinate contractions and to improve the heart's function. Some people with heart failure are candidates for this therapy.  · Implantable cardioverter defibrillator. A device similar to a pacemaker that senses when the heart is beating too fast and delivers an electrical shock to convert the fast rhythm to a normal rhythm. This can be a life saving device.  In severe cases  In more serious cases of heart failure when other treatments no longer work, other options may include:  · Ventricular assist devices (VADs). These are mechanical devices used to take over the pumping function for one or both of the heart's ventricles, or pumping chambers. A VAD may be necessary when heart failure progresses to the point that medicines and other treatments no longer help. In some cases, a VAD may be used as a bridge to transplant.  · Heart transplant. This is replacing the diseased heart with a healthy one from a donor. This is an option for a few people who are very sick. A heart transplant is very serious and not an option for all patients. Your doctor can tell you more.  Date Last Reviewed: 3/20/2016  © 0415-5502 [employer]. [address] [place] [id number]. All rights reserved. This information is not intended as a  substitute for professional medical care. Always follow your healthcare professional's instructions.              Heart Failure: Tracking Your Weight  You have a condition called heart failure. When you have heart failure, a sudden weight gain or a steady rise in weight is a warning sign that your body is retaining too much water and salt. This could mean your heart failure is getting worse. If left untreated, it can cause problems for your lungs and result in shortness of breath. Weighing yourself each day is the best way to know if youre retaining water. If your weight goes up quickly, call your doctor. You will be given instructions on how to get rid of the excess water. You will likely need medicines and to avoid salt. This will help your heart work better.  Call your doctor if you gain more than 2 pounds in 1 day, more than 5 pounds in 1 week, or whatever weight gain you were told to report by your doctor. This is often a sign of worsening heart failure and needs to be evaluated and treated. Your doctor will tell you what to do next.   Tips for weighing yourself    · Weigh yourself at the same time each morning, wearing the same clothes. Weigh yourself after urinating and before eating.  · Use the same scale each day. Make sure the numbers are easy to read. Put the scale on a flat, hard surface -- not on a rug or carpet.  · Do not stop weighing yourself. If you forget one day, weigh again the next morning.  How to use your weight chart  · Keep your weight chart near the scale. Write your weight on the chart as soon as you get off the scale.  · Fill in the month and the start date on the chart. Then write down your weight each day. Your chart will look like this:    · If you miss a day, leave the space blank. Weigh yourself the next day and write your weight in the next space.  · Take your weight chart with you when you go to see your doctor.  Date Last Reviewed: 3/20/2016  © 9592-0004 [employer] [address] [place] [address]. All rights reserved. This information is not intended as a substitute for professional medical care. Always follow your healthcare professional's instructions.              Heart Failure: Warning Signs of a Flare-Up  You have a condition called heart failure. Once you have heart failure, flare-ups can happen. Below are signs that can mean your heart failure is getting worse. If you notice any of these warning signs, call your healthcare provider.  Swelling    · Your feet, ankles, or lower legs get puffier.  · You notice skin changes on your lower legs.  · Your shoes feel too tight.  · Your clothes are tighter in the waist.  · You have trouble getting rings on or off your fingers.  Shortness of breath  · You have to breathe harder even when youre doing your normal activities or when youre resting.  · You are short of breath walking up stairs or even short distances.  · You wake up at night short of breath or coughing.  · You need to use more pillows or sit up to sleep.  · You wake up tired or restless.  Other warning signs  · You feel weaker, dizzy, or more tired.  · You have chest pain or changes in your heartbeat.  · You have a cough that wont go away.  · You cant remember things or dont feel like eating.  Tracking your weight  Gaining weight is often the first warning sign that heart failure is getting worse. Gaining even a few pounds can be a sign that your body is retaining excess water and salt. Weighing yourself each day in the morning after you urinate and before you eat, is the best way to know if you're retaining water. Get a scale that is easy to read and make sure you wear the same clothes and use the same scale every time you weigh. Your healthcare provider will show you how to track your weight. Call your doctor if you gain more than 2 pounds in 1 day, 5 pounds in 1 week, or whatever weight gain you were told to report by your doctor. This is often a  sign of worsening heart failure and needs to be evaluated and treated before it compromises your breathing. Your doctor will tell you what to do next.    Date Last Reviewed: 3/15/2016  © 1181-6872 [employer]. [address] [place] [address], [place] [address]. All rights reserved. This information is not intended as a substitute for professional medical care. Always follow your healthcare professional's instructions.              Chronic [place] Disease Education             Diabetes Discharge Instructions                                    [facility] complies with applicable Federal civil rights laws and does not discriminate on the basis of race, color, national origin, age, disability, or sex.

## 2017-02-08 NOTE — NURSING
Pt received from [facility] assisted by ambulance service via stretcher. Daughter and grand daughter at bedside . Assisted to bed and bed locked, lowered, SR up x2. Vitals obtained and documented. Tele monitor initiated. Pt is AAO x4. Resp are diminished on room air. Pt is presenting SR with PAC's on tele monitor. Abd is obese, active x4 and reports last bowel movement on 2/7.  Skin is c/d/i, however pt has bruising to RUE. 18g PIV to RAC SL. Pt denied pain at current time and is in NAD. Assessment completed and documented. See doc flow sheet. Plan of care reviewed with pt and pt verbalized understanding. Call light placed within reach. Will continue to monitor pt closely.

## 2017-02-08 NOTE — ASSESSMENT & PLAN NOTE
Acute Chest Pain, rule-out myocardial infarction   · Intitial EKG findings shows no evidence of ST elevation MI, but with chronic ST changes similar to previous EKGs  · Initial troponin is within normal limits  · Intermediate to high risk for MI;  known coronary artery disease with previous MIs and 2002, 2003, 2012.  Status post CABG X2  2002, stent RCA, OM1 2003, NSTEMI with total occlusion of LAD in 2012 and reported to have continued stable angina prior to the admission today.  · Initiate ACS protocol to rule out MI, then explore noncardiac etiology  · Trend cardiac enzymes  · Aspirin  · Beta-blocker (would unlikely to benefit from stress test given chronic EKG findings)  · Supplemental oxygen  · nitroglycerine and morphine PRN  · 2D echocardiogram  · TSH, FT3, FT4  · ESR and cardiac specific CRP   · PT, PTT, INR   · Tight glycemic control  · Monitor on telemetry unit  · Consult cardiologist  · Will admit for rule out acute MI and possible further provocative testing for risk stratification.

## 2017-02-08 NOTE — ASSESSMENT & PLAN NOTE
· Currently rate controlled  · Maintain with beta-blocker with hold parameters  · Monitor on telemetry  · Maintain magnesium around 2.0  · Maintain potassium around 4.0  _______________________________________  · CHF - yes  · Hypertension - yes  · Age >74 - no  · DM - yes  · Prior stroke or TIA - no    CHADS2 score = 3    Oral anticoagulation is strongly advised with a score of greater than 1.

## 2017-02-08 NOTE — ASSESSMENT & PLAN NOTE
· BG is not in acceptable range at this time  · Maintain w/ subcutaneous insulin management order set  · Hold oral diabetic meds  · ADA 1800 kcal diet  · BG goal while in patient is <180mg/dL  · HgA1c = Pending

## 2017-02-08 NOTE — ED PROVIDER NOTES
Encounter Date: 2/8/2017       History     Chief Complaint   Patient presents with    Chest Pain     left sided cp, radiated to right side earlier, + nausea, started at 2215, took nitro spray and eased pain, hx of CABG     Review of patient's allergies indicates:   Allergen Reactions    Pcn [penicillins] Swelling    Percodan [oxycodone-aspirin] Hives     Tolerated Rocephin IM in clinic    Phenergan [promethazine] Other (See Comments)     Altered mental status     The history is provided by the patient, a relative and medical records.    this is a 71-year-old who complains of nausea.  Patient is currently nauseated.  However she became nauseated after she had chest pain tonight.  Patient began having chest pain around 1020.  She took nitroglycerin with improvement.  However the pain recurred again 2 hours later.  She took nitroglycerin both times with improvement of the chest pain.  However she continues to be nauseated.  Patient describes the pain as sharp and located on the left side of her chest.  The pain did not radiate.  She did feel short of breath and nausea with the pain.  She does have a history of coronary artery disease and has had bypass and stents.  She denies recent illness.  No fever.  No coughing.  She does feel dizzy.  Patient has 0 out of 10 pain currently.  Patient also had one episode of chest pain 2 nights ago that was relieved with nitroglycerin as well  Past Medical History   Diagnosis Date    Acute myocardial infarction of anterolateral wall 7/9/2015    Air embolism as an early complication of trauma 7/9/2015    Anemia of chronic renal failure, stage 4 (severe) 1/22/2015    Atherosclerosis of aorta 10/3/2012    Benign hypertension with CKD (chronic kidney disease) stage IV 3/11/2016    Chronic diastolic congestive heart failure 10/3/2012    Chronic kidney disease, stage IV (severe) 7/3/2012    Coronary artery disease      s/p 1v CABG 2002 and multiple PCI (last angiogram in 6/2012  with patent LIMA->LAD and patent LCx and RCA stents)    Diabetic polyneuropathy associated with type 2 diabetes mellitus 7/9/2015    Diabetic polyneuropathy associated with type 2 diabetes mellitus 7/9/2015    Heart attack September 2012    Hyperlipidemia     Nephritis and nephropathy, 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 7/9/2015    Occlusion and stenosis of carotid artery with cerebral infarction 7/9/2015    PAF (paroxysmal atrial fibrillation) 10/3/2012    Pneumonia     Proliferative diabetic retinopathy 10/3/2012    Sepsis due to Escherichia coli with acute renal failure 2/2016     UTI     Type II diabetes mellitus with neurological manifestations 7/9/2015    Type II diabetes mellitus with renal manifestations 7/3/2012     Past Medical History Pertinent Negatives   Diagnosis Date Noted    Amblyopia 10/22/2013    Arthritis 10/22/2013    Cancer 12/6/2016    Cataract 10/22/2013    Glaucoma 12/6/2016    Macular degeneration 10/22/2013    Retinal detachment 10/22/2013    Seizures 12/6/2016    Strabismus 10/22/2013    Stroke 12/6/2016    Uveitis 10/22/2013     Past Surgical History   Procedure Laterality Date    Hysterectomy      Tonsillectomy      Appendectomy      Cardiac surgery  2002     CABG    Cholecystectomy      Coronary artery bypass graft      Fracture surgery       right ankle    Eye surgery       cataracts bilaterally     Family History   Problem Relation Age of Onset    Heart disease Mother     Hypertension Mother     Cancer Mother      lung    Heart disease Father     Hypertension Father     Psoriasis Father     Dementia Father     Hypertension Sister     Stroke Sister     Hypertension Brother     Diabetes Daughter     No Known Problems Son     Melanoma Neg Hx     Lupus Neg Hx     Eczema Neg Hx     Amblyopia Neg Hx     Blindness Neg Hx     Cataracts Neg Hx     Glaucoma Neg Hx     Macular degeneration Neg Hx     Retinal detachment Neg Hx     Strabismus  Neg Hx     Thyroid disease Neg Hx      Social History   Substance Use Topics    Smoking status: Never Smoker    Smokeless tobacco: Never Used    Alcohol use No     Review of Systems   Constitutional: Negative for fever.   HENT: Positive for congestion. Negative for sore throat. Rhinorrhea: resolved.    Eyes: Negative for pain.   Respiratory: Positive for shortness of breath.    Cardiovascular: Positive for chest pain and leg swelling (chronic).   Gastrointestinal: Positive for nausea. Negative for abdominal pain.   Genitourinary: Negative for dysuria.   Musculoskeletal: Positive for back pain (right sided, short lived, resolved).   Skin: Negative for rash.   Neurological: Positive for dizziness. Negative for headaches.   Hematological:        No bleeding       Physical Exam   Initial Vitals   BP Pulse Resp Temp SpO2   02/08/17 0042 02/08/17 0042 02/08/17 0042 02/08/17 0042 02/08/17 0042   166/76 98 18 98 °F (36.7 °C) 97 %     Physical Exam    Nursing note and vitals reviewed.  Constitutional: She appears well-developed and well-nourished.   HENT:   Head: Normocephalic and atraumatic.   Eyes: Conjunctivae and EOM are normal. Pupils are equal, round, and reactive to light.   Neck: Normal range of motion. Neck supple.   Cardiovascular: Normal rate, regular rhythm and intact distal pulses.   Pulmonary/Chest: Breath sounds normal.   Abdominal: Soft. There is no tenderness. There is no rebound and no guarding.   Musculoskeletal: Normal range of motion. She exhibits edema.   Neurological: She is alert and oriented to person, place, and time. She has normal strength.   Skin: Skin is warm and dry.   Psychiatric: She has a normal mood and affect.         ED Course   Critical Care  Date/Time: 2/8/2017 1:38 AM  Performed by: EDGAR CRUZ.  Authorized by: EDGAR CRUZ   Direct patient critical care time: 30 minutes  Additional history critical care time: 5 minutes  Ordering / reviewing critical care time: 5  minutes  Documentation critical care time: 5 minutes  Consulting other physicians critical care time: 5 minutes  Total critical care time (exclusive of procedural time) : 50 minutes  Critical care was time spent personally by me on the following activities: development of treatment plan with patient or surrogate, discussions with consultants, examination of patient, ordering and review of radiographic studies, ordering and review of laboratory studies, evaluation of patient's response to treatment, ordering and performing treatments and interventions, obtaining history from patient or surrogate, pulse oximetry, re-evaluation of patient's condition and review of old charts.        Labs Reviewed - No data to display  EKG Readings: (Independently Interpreted)   Normal sinus rhythm, heart rate 102, nonspecific ST changes, unchanged from EKG done on January 17, 2017.  No ST segment elevation, occasional PACs          Medical Decision Making:   Initial Assessment:   71-year-old with a history significant for coronary artery disease who complains of chest pain.  Patient says that she has not had to take nitroglycerin for chest pain for several months but has had to take it 3 times in the last 2 days.  On exam patient is in no acute distress.  She denies chest pain at this time.  Clinical Tests:   Lab Tests: Ordered and Reviewed  The following lab test(s) were unremarkable: CBC, PT, BNP and Troponin       <> Summary of Lab: Creatinine 2.7  Radiological Study: Ordered and Reviewed  ED Management:  EKG shows nonspecific changes which are unchanged from EKG done on January 17.  She will be given aspirin and nitroglycerin when necessary.  However she does not have chest pain.  She will be given Zofran.  Cardiac workup will be initiated.  Patient will require admission for further workup.  Patient's workup was significant for a creatinine of 2.7 which appears to be chronic based on previous labs.  Troponin was negative.  I  discussed the patient with Barbie  in the transfer center.                   ED Course     Clinical Impression:   The primary encounter diagnosis was Unstable angina pectoris. Diagnoses of Chest pain, Chest pain, unspecified type, and CKD (chronic kidney disease), unspecified stage were also pertinent to this visit.          Meseret Garcia MD  02/08/17 0138       Meseret Garcia MD  02/08/17 0147

## 2017-02-08 NOTE — ASSESSMENT & PLAN NOTE
· previous MIs and 2002, 2003, 2012.  Status post CABG X2  2002, stent RCA, OM1 2003, NSTEMI with total occlusion of LAD in 2012 and reported to have continued stable angina prior to the admission today.  No evidence of acute coronary syndrome at this time  Maintain adequate blood pressure control  Heart healthy diet  Aspirin  Statin

## 2017-02-08 NOTE — ASSESSMENT & PLAN NOTE
· This may be contributory to her ongoing chest pain.  · Obtain urinalysis and Hemoccult stool to look for chronic low loss although this is most likely related to renal failure as noted above.

## 2017-02-08 NOTE — ASSESSMENT & PLAN NOTE
· Goal while inpatient is a systolic blood pressure less than 160mmHg  · BP in acceptable range at this time  · Continue current home regimen with hold parameters  · PRN antihypertensives available

## 2017-02-08 NOTE — ASSESSMENT & PLAN NOTE
Compensated CHF   · Evidenced by history,   · No evidence of pulmonary edema, peripheral edema  · BNP pending  · Provide diuresis w/ by mouth medication  · Maintain w/ beta-blocker, ACE inhibitor   · Daily Weights  · Strict I/O  · Low-sodium cardiac diet  · Chest X-ray  · Check TSH, albumin, UA, and renal function  · Obtain 2D echo if <6 months     EF   Date Value Ref Range Status   02/29/2016 65 55 - 65      · EKG and cardiac enzymes PRN  · DVT prophylaxis w/ pharmacological and/or mechanical measures  · Oxygen supplementation support PRN    Instructions given to patient/family:  Monitor daily weight.  Regular activity within patient's limitations.  Low salt, low fat and low choleterol diet and restrict fluid < 2L per day.  Call MD if SOB, chest pain, weight gain > 2-3 lbs per day and/or 5-6 lbs per week.   No smoking. Annual influenza vaccine required.

## 2017-02-08 NOTE — PLAN OF CARE
02/08/17 1046   Discharge Assessment   Assessment Type Discharge Planning Assessment   TN to patient's room to complete DC needs assessment.  Patient out of room.  TN to follow up.

## 2017-02-08 NOTE — TELEPHONE ENCOUNTER
Called Pt and no answer on the home line , called Pt on mobile and left voice message informing Pt to please give us a call back on our direct line . I will continue to try I am aware that Pt is in the hospital at this time .

## 2017-02-08 NOTE — CONSULTS
CARDIOVASCULAR CONSULTATION      Consult Requested By: Katty Trent MD  PCP: Cesia Rosales MD  Primary Cardiologist: [place] ([facility] [place])     Reason for Consult: CP    Admitting Diagnosis:  Unstable angina [I20.0]      SUBJECTIVE:     History of Present Illness:   71-year-old who complains of nausea. Patient is currently nauseated. However she became nauseated after she had chest pain tonight. Patient began having chest pain around 1020. She took nitroglycerin with improvement. However the pain recurred again 2 hours later. She took nitroglycerin both times with improvement of the chest pain. However she continues to be nauseated. Patient describes the pain as sharp and located on the left side of her chest. The pain did not radiate. She did feel short of breath and nausea with the pain. She does have a history of coronary artery disease and has had bypass and stents. She denies recent illness. No fever. No coughing. She does feel dizzy. Patient has 0 out of 10 pain currently. Patient also had one episode of chest pain 2 nights ago that was relieved with nitroglycerin as well.    The pt describes stabbing CP and associated nausea.  There was some pressure associated with this.  Sxs abated with NTG, but lasted 3-4 hrs and trop only up to 0.2 (initial trop neg in [facility]) in setting of CRI.  She has recently been seen by Dr. Kelley and is in the ADAPTABLE study and was randomized to the 325mg arm.    Past Medical History   Diagnosis Date    Acute myocardial infarction of anterolateral wall 7/9/2015    Anemia of chronic renal failure, stage 4 (severe) 1/22/2015    Atherosclerosis of aorta 10/3/2012    Benign hypertension with CKD (chronic kidney disease) stage IV 3/11/2016    Chronic diastolic congestive heart failure 10/3/2012    Chronic kidney disease, stage IV (severe) 7/3/2012    Coronary artery disease      s/p 1v CABG 2002 and multiple PCI (last angiogram in 6/2012 with patent LIMA->LAD and  patent LCx and RCA stents)    Diabetic polyneuropathy associated with type 2 diabetes mellitus 7/9/2015    Diabetic polyneuropathy associated with type 2 diabetes mellitus 7/9/2015    Encounter for blood transfusion     Heart attack 09/2012     5-6 events    Hyperlipidemia     Nephritis and nephropathy, 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 7/9/2015    Occlusion and stenosis of carotid artery with cerebral infarction 7/9/2015    PAF (paroxysmal atrial fibrillation) 10/3/2012    Pneumonia     Proliferative diabetic retinopathy 10/3/2012    Sepsis due to Escherichia coli with acute renal failure 2/2016     UTI     Type II diabetes mellitus with neurological manifestations 7/9/2015    Type II diabetes mellitus with renal manifestations 7/3/2012     Past Surgical History   Procedure Laterality Date    Hysterectomy      Tonsillectomy      Appendectomy      Cholecystectomy      Coronary artery bypass graft      Eye surgery       cataracts bilaterally    Fracture surgery       right ankle    Cardiac surgery  2002     CABG    Coronary angioplasty  2004       Prescriptions Prior to Admission   Medication Sig Dispense Refill Last Dose    atorvastatin (LIPITOR) 80 MG tablet TAKE 1 TABLET ONE TIME DAILY 90 tablet 3 2/7/2017 at Unknown time    blood-glucose meter kit Use as instructed 1 each 0 Past Week at Unknown time    clopidogrel (PLAVIX) 75 mg tablet TAKE 1 TABLET ONE TIME DAILY 90 tablet 3 Past Week at Unknown time    furosemide (LASIX) 40 MG tablet Take 1 tablet (40 mg total) by mouth daily as needed (take water pill daily as needed for leg swelling and SOB). 90 tablet 3 2/7/2017 at Unknown time    insulin aspart (NOVOLOG) 100 unit/mL injection Inject 15 Units into the skin 3 (three) times daily before meals. 13.5 mL 11 Past Week at Unknown time    insulin glargine (LANTUS) 100 unit/mL injection Inject 11 Units into the skin every evening. 3.3 mL 11 2/7/2017 at Unknown time    losartan (COZAAR)  50 MG tablet Take 1 tablet (50 mg total) by mouth once daily. 90 tablet 3 Past Week at Unknown time    metoprolol tartrate (LOPRESSOR) 50 MG tablet Take 1 tablet (50 mg total) by mouth 2 (two) times daily. 180 tablet 3 2/7/2017 at Unknown time    nitroGLYCERIN 0.4 MG/DOSE TL SPRY (NITROLINGUAL) 400 mcg/spray spray Place 1 spray under the tongue every 5 (five) minutes as needed for Chest pain. 12 g 0 2/7/2017 at Unknown time    aspirin 325 MG tablet Take 325 mg by mouth once daily.   2/7/2017    ergocalciferol (VITAMIN D2) 50,000 unit Cap Take 1 capsule (50,000 Units total) by mouth every 7 days. 12 capsule 0 Taking    insulin needles, disposable, 31 Ndle Takes insulin 4 times daily 300 each 3 Taking       Current Medications:   aspirin  325 mg Oral Daily    atorvastatin  80 mg Oral Daily    clopidogrel  75 mg Oral Daily    ergocalciferol  50,000 Units Oral Q7 Days    furosemide  40 mg Oral Daily    heparin (porcine)  5,000 Units Subcutaneous Q8H    insulin aspart  15 Units Subcutaneous TID AC    insulin detemir  8 Units Subcutaneous BID    losartan  50 mg Oral Daily    metoprolol tartrate  50 mg Oral BID    polyethylene glycol  17 g Oral Daily    senna-docusate 8.6-50 mg  1 tablet Oral BID    sodium chloride 0.9%  3 mL Intravenous Q8H        acetaminophen, bisacodyl, dextrose 50%, glucagon (human recombinant), flu vacc [id number] 65yr up(PF), insulin aspart, mineral oil, morphine, nitroGLYCERIN, ondansetron, ramelteon    Family History   Problem Relation Age of Onset    Heart disease Mother     Hypertension Mother     Cancer Mother      lung    Heart disease Father     Hypertension Father     Psoriasis Father     Dementia Father     Hypertension Sister     Stroke Sister     Hypertension Brother     Diabetes Daughter     No Known Problems Son     Melanoma Neg Hx     Lupus Neg Hx     Eczema Neg Hx     Amblyopia Neg Hx     Blindness Neg Hx     Cataracts Neg Hx     Glaucoma Neg Hx      Macular degeneration Neg Hx     Retinal detachment Neg Hx     Strabismus Neg Hx     Thyroid disease Neg Hx        Social History   Substance Use Topics    Smoking status: Never Smoker    Smokeless tobacco: Never Used    Alcohol use No       Review of patient's allergies indicates:   Allergen Reactions    Pcn [penicillins] Hives and Swelling    Percodan [oxycodone-aspirin] Hives     welps  Tolerated Rocephin IM in clinic    Phenergan [promethazine] Other (See Comments)     Altered mental status; jerking of extremities        Review of Systems   Constitutional: Negative for chills, diaphoresis and fever.   HENT: Negative for nosebleeds.    Eyes: Negative for blurred vision, double vision and photophobia.   Respiratory: Positive for shortness of breath. Negative for hemoptysis and wheezing.    Cardiovascular: Positive for chest pain. Negative for palpitations, orthopnea, claudication, leg swelling and PND.   Gastrointestinal: Positive for nausea. Negative for abdominal pain, blood in stool, heartburn, melena and vomiting.   Genitourinary: Negative for flank pain and hematuria.   Musculoskeletal: Negative for falls, myalgias and neck pain.   Skin: Negative for rash.   Neurological: Negative for dizziness, seizures, loss of consciousness, weakness and headaches.   Endo/Heme/Allergies: Negative for polydipsia. Does not bruise/bleed easily.   Psychiatric/Behavioral: Negative for depression and memory loss. The patient is not nervous/anxious.          OBJECTIVE:     Vital Signs Range (Last 24H):  Temp:  [97.6 °F (36.4 °C)-98 °F (36.7 °C)]   Pulse:  [76-98]   Resp:  [18]   BP: (158-178)/(66-82)   SpO2:  [95 %-98 %]     Body mass index is 31.24 kg/(m^2).    Physical Exam   Constitutional: She is oriented to person, place, and time and well-developed, well-nourished, and in no distress.   HENT:   Head: Normocephalic and atraumatic.   Eyes: Conjunctivae and EOM are normal. Pupils are equal, round, and reactive to  light. No scleral icterus.   Neck: Normal range of motion. Neck supple. No JVD present.   Cardiovascular: Normal rate, regular rhythm, S1 normal and S2 normal.  Exam reveals no gallop and no friction rub.    No murmur heard.  Pulmonary/Chest: Effort normal and breath sounds normal. No respiratory distress. She has no wheezes. She has no rales.   Abdominal: Soft. Bowel sounds are normal. There is no rebound and no guarding.   obese   Musculoskeletal: She exhibits no edema or tenderness.   Neurological: She is alert and oriented to person, place, and time. No cranial nerve deficit.   Skin: Skin is warm and dry. No rash noted. No erythema.   Psychiatric: Mood and affect normal.       Laboratory:  CBC:    Recent Labs  Lab 03/01/16  0452 03/03/16  1117 02/08/17  0549   WHITE BLOOD CELL COUNT 8.72 9.87 10.50   HEMOGLOBIN 10.8 L 11.7 L 9.7 L   HEMATOCRIT 32.6 L 35.4 L 29.8 L   PLATELETS 206 273 246       CHEMISTRIES:    Recent Labs  Lab 02/29/16  0546  03/01/16  0452  03/10/16  1149 06/23/16  0945 02/08/17  0549   GLUCOSE 185 H  < > 170 H  < > 109 208 H 152 H   SODIUM 132 L  < > 136  < > 142 137 141   POTASSIUM 4.0  < > 4.4  < > 5.3 H 5.4 H 4.9   BUN BLD 45 H  < > 53 H  < > 36 H 39 H 76 H   CREATININE 2.4 H  < > 2.5 H  < > 1.7 H 2.1 H 2.5 H   EGFR IF  23.0 A  < > 21.9 A  < > 35.0 A 27.1 A 22 A   EGFR IF NON- 20.0 A  < > 19.0 A  < > 30.3 A 23.5 A 19 A   CALCIUM 8.6 L  < > 8.9  < > 9.3 9.4 8.6 L   MAGNESIUM 1.7  --  1.9  --   --   --  2.1   < > = values in this interval not displayed.    CARDIAC BIOMARKERS:    Recent Labs  Lab 02/28/16  0906 02/28/16  1253 02/08/17  0549   TROPONIN I 0.083 H 0.085 H 0.214 H       COAGS:    Recent Labs  Lab 01/19/15  1203 02/04/15  0500 02/08/17  0549   INR 0.9 1.0 0.9       LIPIDS/LFTS:    Recent Labs  Lab 02/29/16  0546 03/01/16  0452 01/18/17  1125 02/08/17  0549   CHOLESTEROL  --   --  172 137   TRIGLYCERIDES  --   --  254 H 148   HDL  --   --  31 L 30 L    LDL CHOLESTEROL  --   --  90.2 77.4   NON-HDL CHOLESTEROL  --   --  141 107   AST 26 28  --  15   ALT 18 22  --  11           Diagnostic Results:  ECG (personally reviewed tracings):   2/8/17 0043 NSR 94 with PACs, PRWP lat ST abnl ?isch but similar to 1/24/17    Chest X-Ray (personally reviewed image(s)): 2/8/17 NAD, prior sternotomy    Echo: 2/29/16    1 - Concentric hypertrophy.     2 - Normal left ventricular systolic function (EF 60-65%).     3 - Left ventricular diastolic dysfunction.     4 - The estimated PA systolic pressure is 32 mmHg.     5 - Right ventricular hypertrophy.     6 - Low normal to mildly depressed right ventricular systolic function.     7 - Trivial tricuspid regurgitation.    Cath: 6/17/12  Previous Angios:  2004   CABG x 1 (2002 LIMA to LAD (single graft))  Previous PCI:  S/P stent to RCA, 03/26/2003   S/P stent to OM1, 03/26/2003   ACS:  unstable angina 03/29/2012   NSTEMI 05/28/2012   B. HEMODYNAMIC RESULTS:  LVP: 90  LVPEDP: 10  AOP: 92/50 (66)  C. ANGIOGRAPHIC RESULTS:       Patient has a right dominant coronary artery.      - Left Main Coronary Artery:             The LM is normal. There is KEDAR 3 flow.     - Left Anterior Descending Artery:             The proximal LAD has chronic total occlusion. There is KEDAR 0 flow.     - Left Circumflex Artery:             The LCX is patent within the stent. There is KEDAR 3 flow.     - Right Coronary Artery:             The RCA is patent within the stent. There is KEDAR 3 flow.     - KEE To LAD:             The LIMA to LAD is patent. There is KEDAR 3 flow.     - Common Femoral Artery:             The right CFA has luminal irregularities.  D. SUMMARY:  1. Severe 3 vessel CAD with patent RCA and LCX stents.  2.  of proximal LAD with Patent LIMA to LAD.  Proximal vessel and diagonal diffusely diseased.  3. LVEDP = 10.  4. L arm and shoulder pain, etiology unclear.  Doubt ischemia.  Suspect radiculopathy, thoracic pathology, or other?  5. Contrast  given to patient:  17cc.    ASSESSMENT:   # CP with both typ and atyp features.  Flat trop on background of CRI.  Cannot exclude ACS.  # CAD s/p single vessel CABG (LIMA-LAD) and PCI of RCA/LCx.  Cath 6/2012 with patent LIMA and stents.  # HTN  # HLP, LDL acceptable  # DM  # CRI, POA  # ADAPTABLE STUDY PATIENT (on ASA 325mg)    Active Hospital Problems    Diagnosis  POA    *Acute chest pain [R07.9]  Yes    Unstable angina [I20.0]  Yes    Coronary artery disease of bypass graft of native heart with stable angina pectoris [I25.708]  Yes     CABG X2  2002, stent RCA, OM1 2003, NSTEMI with total occlusion of LAD in 2012. Continues to have stable angina      Uncontrolled type 2 diabetes mellitus with stage 4 chronic kidney disease, with long-term current use of insulin [E11.22, E11.65, N18.4, Z79.4]  Not Applicable     A1c 9.2, noncompliant with insulin due to finances      Chronic atrial fibrillation [I48.2]  Yes     Rate-controlled on BB, [id number] score of 9, compliant with DAPT      Hyperparathyroidism [E21.3]  Yes     Elevated PTH of 310 in 6/2016      CKD stage 4 due to type 2 diabetes mellitus [E11.22, N18.4]  Yes     GFR 23 in 6/2016, followed by Nephrology      Anemia of chronic renal failure, stage 4 (severe) [N18.4, D63.1]  Yes     GFR 23, followed by Nephrology, taking Fe supplements      Chronic combined systolic and diastolic congestive heart failure [I50.42]  Yes     Seen on echo in 3/2016, MIs in 2002, 2003, 2012      HTN (hypertension) [I10]  Yes      Resolved Hospital Problems    Diagnosis Date Resolved POA   No resolved problems to display.       PLAN:   Cont ASA 325mg qd (sxs may be related to GI discomfort from higher ASA dose)  Cont Plavix/BBl/Statin/ARB  Check echo  Daksha MPI (would prefer to avoid cath if possible given her renal insuff)  Further recs pending outcome of above  I will review angio from 2012      Earl Maynard MD, [facility]    Addendum 315pm:    Angio 2012 images  reviewed noting patent LIMA-LAD.  Multiple stents present in RCA and LCx.    Daksha MPI 2/8/17 (images pers rev)  Nuclear Quantitative Functional Analysis:   LVEF: 50 %  LVED Volume: 108 ml  LVES Volume: 54 ml  Impression: ABNORMAL MYOCARDIAL PERFUSION  1. There is evidence for a large sized area of moderate myocardial ischemia that extends from the base to the apical lateral wall of the left ventricle.   2. The perfusion scan is free of evidence for myocardial injury.   3. There is abnormal wall motion at rest showing hypokinesis of the lateral wall of the left ventricle.   4. Resting LV function is normal.   5. The ventricular volumes are normal at rest and stress.   6. The extracardiac distribution of radioactivity is normal.   7. There was no previous study available to compare.    Echo 2/8/17 (images pers rev)    1 - Normal left ventricular systolic function (EF 55-60%).  No diagnostic regional wall motion abnormalities.     2 - Left ventricular diastolic dysfunction.     3 - Mild mitral regurgitation.     4 - Trivial to mild tricuspid regurgitation.     5 - The estimated PA systolic pressure is 39 mmHg.     Findings d/w pt and daughter.  Cath advised (attendant renal risks noted) and they agree to proceed.  Risks, benefits and alternatives of the catheterization procedure were discussed with the patient and her daughter in attendance which include but are not limited to: bleeding, infection, death, heart attack, arrhythmia, kidney failure, stroke, need for emergency surgery, etc.  Patient understands and and agrees to proceed.  Consent was placed on the chart.

## 2017-02-08 NOTE — ASSESSMENT & PLAN NOTE
· Relief given with nitroglycerin administration ×2 episodes prior to arrival  · Nitroglycerin when necessary with possibility to escalate to drip if she does not remain chest pain-free

## 2017-02-08 NOTE — H&P
[facility] Medicine  History & Physical    Patient Name: Zoey Ham  MRN: [id number]  Admission Date: 02/08/2017  Attending Physician: Earl Martin MD, MPH      PCP:     Cesia Rosales MD    CC:     Transfer from [place] emergency department for evaluation of chest pain    HISTORY OF PRESENT ILLNESS:     Zoey Ham is a 71 y.o. female that (in part)  has a past medical history of Acute myocardial infarction of anterolateral wall (7/9/2015); Anemia of chronic renal failure, stage 4 (severe) (1/22/2015); Atherosclerosis of aorta (10/3/2012); Benign hypertension with CKD (chronic kidney disease) stage IV (3/11/2016); Chronic diastolic congestive heart failure (10/3/2012); Chronic kidney disease, stage IV (severe) (7/3/2012); Coronary artery disease; Diabetic polyneuropathy associated with type 2 diabetes mellitus (7/9/2015); Diabetic polyneuropathy associated with type 2 diabetes mellitus (7/9/2015); Encounter for blood transfusion; Heart attack (09/2012); Hyperlipidemia; Nephritis and nephropathy, 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 Presents to [facility]  as a transfer patient from the [place] emergency department for evaluation of chest pain as described below in detail.     Description of symptoms    Location:  Left sided chest  Onset:  Acute onset at 10:15 PM prior to arrival  Character:  Aching, squeezing pain  Frequency:  Occurring at two-hour intervals approximately  Duration:  Each episode lasting 15-20 minutes  Associated Symptoms:   Positive for shortness of breath and nausea Denies vomiting, or diaphoresis.    Radiation:    radiates to right chest  Exacerbating factors:  exertion   Relieving factors:  nitroglycerin given in the ED  X 2        In the emergency department routine laboratory studies, chest x-ray, and EKG were obtained.   Repeat EKG appeared unchanged from prior EKGs.       Hospital medicine has been asked to admit for further evaluation and treatment.       REVIEW OF SYSTEMS:     -- Constitutional: No fever or chills.  -- Eyes: No visual changes, diplopia, pain, tearing, blind spots, or discharge.   -- Ears, nose, mouth, throat, and face:  positive for congestion.No epistaxis, d/c, bleeding gums, neck stiffness masses, or dental issues.  -- Respiratory: as above.  -- Cardiovascular: As above in the history of present illness.   -- Gastrointestinal: nausea.  No vomiting, abdominal pain, dysphagia, hematemesis, melena, dyspepsia, or change in bowel habits.  -- Genitourinary: No hematuria, dysuria, frequency, urgency, nocturia, polyuria, stones, or incontinence.  -- Integument/breast: No rash, pruritis, pigmentation changes, dryness, or changes in hair  -- Hematologic/lymphatic:  positive for easy bruising or lymphadenopathy.   -- Musculoskeletal: Chronic back pain.  No acute arthralgias, acute myalgias, joint swelling, acute limitations of ROM,  -- Neurological: Chronic dizziness.  Headache with nitroglycerin.  No seizures, incoordination, paraesthesias, ataxia, vertigo, or tremors.  -- Behavioral/Psych: No auditory or visual hallucinations, depression, or suicidal/homicidal ideations.  -- Endocrine: No heat or cold intolerance, polydipsia, or unintentional weight gain / loss.  -- Allergy/Immunologic: No recurrent infections or adverse reaction to food, insects, or difficulty breathing.        PAST MEDICAL / SURGICAL HISTORY:     Past Medical History   Diagnosis Date    Acute myocardial infarction of anterolateral wall 7/9/2015     Anemia of chronic renal failure, stage 4 (severe) 1/22/2015    Atherosclerosis of aorta 10/3/2012    Benign hypertension with CKD (chronic kidney disease) stage IV 3/11/2016    Chronic diastolic congestive heart failure 10/3/2012    Chronic kidney disease, stage IV (severe) 7/3/2012    Coronary artery disease      s/p 1v CABG 2002 and multiple PCI (last angiogram in 6/2012 with patent LIMA->LAD and patent LCx and RCA stents)    Diabetic polyneuropathy associated with type 2 diabetes mellitus 7/9/2015    Diabetic polyneuropathy associated with type 2 diabetes mellitus 7/9/2015    Encounter for blood transfusion     Heart attack 09/2012     5-6 events    Hyperlipidemia     Nephritis and nephropathy, 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 7/9/2015    Occlusion and stenosis of carotid artery with cerebral infarction 7/9/2015    PAF (paroxysmal atrial fibrillation) 10/3/2012    Pneumonia     Proliferative diabetic retinopathy 10/3/2012    Sepsis due to Escherichia coli with acute renal failure 2/2016     UTI     Type II diabetes mellitus with neurological manifestations 7/9/2015    Type II diabetes mellitus with renal manifestations 7/3/2012     Past Surgical History   Procedure Laterality Date    Hysterectomy      Tonsillectomy      Appendectomy      Cholecystectomy      Coronary artery bypass graft      Eye surgery       cataracts bilaterally    Fracture surgery       right ankle    Cardiac surgery  2002     CABG    Coronary angioplasty  2004         FAMILY HISTORY:     Family History   Problem Relation Age of Onset    Heart disease Mother     Hypertension Mother     Cancer Mother      lung    Heart disease Father     Hypertension Father     Psoriasis Father     Dementia Father     Hypertension Sister     Stroke Sister     Hypertension Brother     Diabetes Daughter     No Known Problems Son     Melanoma Neg Hx     Lupus Neg Hx     Eczema Neg Hx     Amblyopia Neg Hx      Blindness Neg Hx     Cataracts Neg Hx     Glaucoma Neg Hx     Macular degeneration Neg Hx     Retinal detachment Neg Hx     Strabismus Neg Hx     Thyroid disease Neg Hx          SOCIAL HISTORY:     Social History   Substance Use Topics    Smoking status: Never Smoker    Smokeless tobacco: Never Used    Alcohol use No     Social History     Social History    Marital status: Single     Spouse name: N/A    Number of children: N/A    Years of education: N/A     Occupational History    Homemaker      Social History Main Topics    Smoking status: Never Smoker    Smokeless tobacco: Never Used    Alcohol use No    Drug use: No    Sexual activity: No     Other Topics Concern    Are You Pregnant Or Think You May Be? No    Breast-Feeding No     Social History Narrative    2 biological kids, 1 adopted9 grandkids (1 nory lives with her while she 's attending pharmacy school at [employer])3 great-grandkidsGoing to [place] in [place] for 1 month         ALLERGIES:       Review of patient's allergies indicates:   Allergen Reactions    Pcn [penicillins] Hives and Swelling    Percodan [oxycodone-aspirin] Hives     welps  Tolerated Rocephin IM in clinic    Phenergan [promethazine] Other (See Comments)     Altered mental status; jerking of extremities         HEALTH SCREENING:     -- Prevnar 13 pneumonia vaccine =  evidence of previous vaccination found in the medical record  -- Influenza vaccine = No evidence of current flu vaccination found in the medical record for this flu season      HOME MEDICATIONS:     Prior to Admission medications    Medication Sig Start Date End Date Taking? Authorizing Provider   atorvastatin (LIPITOR) 80 MG tablet TAKE 1 TABLET ONE TIME DAILY 1/18/17  Yes Cesia Rosales MD   blood-glucose meter kit Use as instructed 1/30/17 1/30/18 Yes Cesia Rosales MD   clopidogrel (PLAVIX) 75 mg tablet TAKE 1 TABLET ONE TIME DAILY 1/18/17  Yes Cesia Tapia  MD Connie   furosemide (LASIX) 40 MG tablet Take 1 tablet (40 mg total) by mouth daily as needed (take water pill daily as needed for leg swelling and SOB). 1/18/17  Yes Cesia Rosales MD   insulin aspart (NOVOLOG) 100 unit/mL injection Inject 15 Units into the skin 3 (three) times daily before meals. 1/30/17 1/30/18 Yes Cesai Rosales MD   insulin glargine (LANTUS) 100 unit/mL injection Inject 11 Units into the skin every evening. 1/30/17 1/30/18 Yes Cesia Rosales MD   losartan (COZAAR) 50 MG tablet Take 1 tablet (50 mg total) by mouth once daily. 1/18/17  Yes Cesia Rosales MD   metoprolol tartrate (LOPRESSOR) 50 MG tablet Take 1 tablet (50 mg total) by mouth 2 (two) times daily. 1/18/17 1/18/18 Yes Cesia Rosales MD   nitroGLYCERIN 0.4 MG/DOSE TL SPRY (NITROLINGUAL) 400 mcg/spray spray Place 1 spray under the tongue every 5 (five) minutes as needed for Chest pain. 1/30/17  Yes Cesia Rosales MD   aspirin 325 MG tablet Take 325 mg by mouth once daily.    Historical Provider, MD   ergocalciferol (VITAMIN D2) 50,000 unit Cap Take 1 capsule (50,000 Units total) by mouth every 7 days. 1/20/17 4/8/17  Cesia Rosales MD   insulin needles, disposable, 31 Ndle Takes insulin 4 times daily 3/11/14   Terence Mclean MD   aspirin (ECOTRIN) 81 MG EC tablet Take 81 mg by mouth. 1 Tablet, Delayed Release (E.C.) Oral Every day  2/8/17  Historical Provider, MD         HOSPITAL MEDICATIONS:     Scheduled Meds:  Continuous Infusions:  PRN Meds:.      PHYSICAL EXAM:     There is no height or weight on file to calculate BMI.  Wt Readings from Last 1 Encounters:   02/08/17 0042 83.9 kg (185 lb)       Vitals:    02/08/17 0413   Pulse: 80          -- General appearance: elderly chronically ill-appearing  female who is well developed. appears stated age   -- Head: normocephalic, atraumatic   -- Eyes: conjunctivae clear. Extraocular muscles intact  -- Nose: Nares  normal. Septum midline.   -- Throat: lips, mucosa, and tongue normal. no throat erythema.   -- Neck: supple, symmetrical, trachea midline, no JVD and thyroid not grossly enlarged, appears symmetric  -- Lungs: clear to auscultation bilaterally. normal respiratory effort. No use of accessory muscles.   -- Chest wall: no tenderness. equal bilateral chest rise   -- Heart: regular rate and rhythm. S1, S2 normal.  no click, rub or gallop   -- Abdomen: soft, non-tender, non-distended, non-tympanic; bowel sounds normal; no masses  -- Extremities: no cyanosis, club.  Peripheral edema.  - Pulses: 2+ and symmetric   -- Skin: color normal, texture normal, turgor normal. No rashes or lesions.   -- Neurologic: Normal strength and tone. No focal numbness or weakness. CNII-XII intact. Chelsea coma scale: eyes open spontaneously-4, oriented & converses-5, obeys commands-6.      LABORATORY STUDIES:     See laboratory studies scanned into Epic.   CMP appears to be missing and has been requested.     Recent Results (from the past 36 hour(s))   ISTAT PROCEDURE    Collection Time: 02/08/17  1:08 AM   Result Value Ref Range    POC PTWBT 12.0 9.7 - 14.3 sec    POC PTINR 1.0 0.9 - 1.2    Sample UNK    POCT CMP    Collection Time: 02/08/17  1:10 AM   Result Value Ref Range    Albumin, POC 3.3 3.3 - 5.5    Alkaline Phosphatase,  42 - 141    ALT (SGPT), POC 20 10.0 - 47.0    AST (SGOT), POC 27 11.0 - 38    POC BUN 79 7.0 - 22.0    Calcium, POC 8.7 8.0 - 10.3    POC Chloride 106 98 - 108    POC Creatinine 2.7 0.6 - 1.2    POC Glucose 294 73 - 118    POC Potassium 5.0 3.6 - 5.1    POC Sodium 142 128 - 145    Bilirubin, UA 0.4 0.2 - 1.6    POC TCO2 22 18 - 33    Protein, UA 6.7 6.4 - 8.1   Troponin ISTAT    Collection Time: 02/08/17  1:14 AM   Result Value Ref Range    POC Cardiac Troponin I 0.03 <0.09 ng/mL    Sample UNK    POCT B-type natriuretic peptide (BNP)    Collection Time: 02/08/17  1:28 AM   Result Value Ref Range    POC B-Type  Natriuretic Peptide 318 0.0 - 100 pg/mL       Lab Results   Component Value Date    INR 1.0 02/04/2015    INR 0.9 01/19/2015    INR 1.0 09/15/2012     Lab Results   Component Value Date    HGBA1C 10.8 (H) 01/18/2017           IMAGING:       CHEST Xray single view     COMPARISON: None.     FINDINGS:  The lungs are clear.     No focal airspace disease or pleural effusions appreciated. The   cardiomediastinal silhouette demonstrates no acute pathology.  Sternotomy wires and   vascular clips noted.     IMPRESSION:     No acute cardiopulmonary disease appreciated.     Follow-up recommended if ongoing clinical concern.     SL: 24 Signed by: Derrell Cartagena MD.  2017-02-08 01:24:58             CONSULTS:     None       ASSESSMENT & PLAN:     Primary Diagnosis:  Acute chest pain    Active Hospital Problems    Diagnosis  POA    *Acute chest pain [R07.9]  Yes     Priority: 1 - High    Unstable angina [I20.0]  Yes     Priority: 2     Coronary artery disease of bypass graft of native heart with stable angina pectoris [I25.708]  Yes     Priority: 3      CABG X2  2002, stent RCA, OM1 2003, NSTEMI with total occlusion of LAD in 2012. Continues to have stable angina      Chronic combined systolic and diastolic congestive heart failure [I50.42]  Yes     Priority: 4      Seen on echo in 3/2016, MIs in 2002, 2003, 2012      HTN (hypertension) [I10]  Yes     Priority: 4     Uncontrolled type 2 diabetes mellitus with stage 4 chronic kidney disease, with long-term current use of insulin [E11.22, E11.65, N18.4, Z79.4]  Not Applicable     Priority: 5      A1c 9.2, noncompliant with insulin due to finances      Anemia of chronic renal failure, stage 4 (severe) [N18.4, D63.1]  Yes     Priority: 6      GFR 23, followed by Nephrology, taking Fe supplements      CKD stage 4 due to type 2 diabetes mellitus [E11.22, N18.4]  Yes     Priority: 7      GFR 23 in 6/2016, followed by Nephrology      Chronic atrial fibrillation [I48.2]  Yes      Priority: 8      Rate-controlled on BB, TDYVI-0-AWVP score of 9, compliant with DAPT      Hyperparathyroidism [E21.3]  Yes     Priority: 8      Elevated PTH of 310 in 6/2016        Resolved Hospital Problems    Diagnosis Date Resolved POA   No resolved problems to display.         Acute chest pain  Acute Chest Pain, rule-out myocardial infarction   · Intitial EKG findings shows no evidence of ST elevation MI, but with chronic ST changes similar to previous EKGs  · Initial troponin is within normal limits  · Intermediate to high risk for MI;  known coronary artery disease with previous MIs and 2002, 2003, 2012.  Status post CABG X2  2002, stent RCA, OM1 2003, NSTEMI with total occlusion of LAD in 2012 and reported to have continued stable angina prior to the admission today.  · Initiate ACS protocol to rule out MI, then explore noncardiac etiology  · Trend cardiac enzymes  · Aspirin  · Beta-blocker (would unlikely to benefit from stress test given chronic EKG findings)  · Supplemental oxygen  · nitroglycerine and morphine PRN  · 2D echocardiogram  · TSH, FT3, FT4  · ESR and cardiac specific CRP   · PT, PTT, INR   · Tight glycemic control  · Monitor on telemetry unit  · Consult cardiologist  · Will admit for rule out acute MI and possible further provocative testing for risk stratification.    Unstable angina  · Relief given with nitroglycerin administration ×2 episodes prior to arrival  · Nitroglycerin when necessary with possibility to escalate to drip if she does not remain chest pain-free      Coronary artery disease of bypass graft of native heart with stable angina pectoris  · previous MIs and 2002, 2003, 2012.  Status post CABG X2  2002, stent RCA, OM1 2003, NSTEMI with total occlusion of LAD in 2012 and reported to have continued stable angina prior to the admission today.  No evidence of acute coronary syndrome at this time  Maintain adequate blood pressure control  Heart healthy diet  Aspirin  Statin    HTN  (hypertension)  · Goal while inpatient is a systolic blood pressure less than 160mmHg  · BP in acceptable range at this time  · Continue current home regimen with hold parameters  · PRN antihypertensives available        Chronic combined systolic and diastolic congestive heart failure  Compensated CHF   · Evidenced by history,   · No evidence of pulmonary edema, peripheral edema  · BNP pending  · Provide diuresis w/ by mouth medication  · Maintain w/ beta-blocker, ACE inhibitor   · Daily Weights  · Strict I/O  · Low-sodium cardiac diet  · Chest X-ray  · Check TSH, albumin, UA, and renal function  · Obtain 2D echo if <6 months     EF   Date Value Ref Range Status   02/29/2016 65 55 - 65      · EKG and cardiac enzymes PRN  · DVT prophylaxis w/ pharmacological and/or mechanical measures  · Oxygen supplementation support PRN    Instructions given to patient/family:  Monitor daily weight.  Regular activity within patient's limitations.  Low salt, low fat and low choleterol diet and restrict fluid < 2L per day.  Call MD if SOB, chest pain, weight gain > 2-3 lbs per day and/or 5-6 lbs per week.   No smoking. Annual influenza vaccine required.      Uncontrolled type 2 diabetes mellitus with stage 4 chronic kidney disease, with long-term current use of insulin  · BG is not in acceptable range at this time  · Maintain w/ subcutaneous insulin management order set  · Hold oral diabetic meds  · ADA 1800 kcal diet  · BG goal while in patient is <180mg/dL  · HgA1c = Pending      Anemia of chronic renal failure, stage 4 (severe)  · This may be contributory to her ongoing chest pain.  · Obtain urinalysis and Hemoccult stool to look for chronic low loss although this is most likely related to renal failure as noted above.      CKD stage 4 due to type 2 diabetes mellitus  · Renal dose medications  · Avoid nephrotoxic agents  · Maintain euvolemic state            Hyperparathyroidism  · Continue current home regimen  · Ordered PTH    · follow-up outpatient with endocrinology       Chronic atrial fibrillation  · Currently rate controlled  · Maintain with beta-blocker with hold parameters  · Monitor on telemetry  · Maintain magnesium around 2.0  · Maintain potassium around 4.0  _______________________________________  · CHF - yes  · Hypertension - yes  · Age >74 - no  · DM - yes  · Prior stroke or TIA - no    CHADS2 score = 3    Oral anticoagulation is strongly advised with a score of greater than 1.        VTE Risk Mitigation     None            Adult PRN medications available   DVT prophylaxis given       DISPOSITION:     Will admit to the Hospital Medicine service for further evaluation and treatment.    Chart reviewed and updated where applicable.    High Risk Conditions:  Patient has a condition that poses threat to life and bodily function: Acute chest pain rule out MI      ===============================================================    Earl Martin MD, MPH  Department of Hospital Medicine   [facility]  [phone] pg  (7pm - 6am)          This note is dictated using Dragon Medical 360 voice recognition software.  There are word recognition mistakes that are occasionally missed on review.

## 2017-02-09 PROBLEM — I21.4 NSTEMI (NON-ST ELEVATED MYOCARDIAL INFARCTION): Status: ACTIVE | Noted: 2017-02-09

## 2017-02-09 LAB
ALBUMIN SERPL BCP-MCNC: 3.5 G/DL
ALP SERPL-CCNC: 134 U/L
ALT SERPL W/O P-5'-P-CCNC: 9 U/L
ANION GAP SERPL CALC-SCNC: 8 MMOL/L
AST SERPL-CCNC: 17 U/L
BACTERIA #/AREA URNS HPF: NORMAL /HPF
BASOPHILS # BLD AUTO: 0.04 K/UL
BASOPHILS NFR BLD: 0.5 %
BILIRUB SERPL-MCNC: 0.3 MG/DL
BILIRUB UR QL STRIP: NEGATIVE
BUN SERPL-MCNC: 59 MG/DL
CALCIUM SERPL-MCNC: 8.8 MG/DL
CHLORIDE SERPL-SCNC: 114 MMOL/L
CLARITY UR: CLEAR
CO2 SERPL-SCNC: 20 MMOL/L
COLOR UR: ABNORMAL
CORONARY STENOSIS: ABNORMAL
CORONARY STENT: YES
CREAT SERPL-MCNC: 2.2 MG/DL
DIFFERENTIAL METHOD: ABNORMAL
EOSINOPHIL # BLD AUTO: 0.2 K/UL
EOSINOPHIL NFR BLD: 2 %
ERYTHROCYTE [DISTWIDTH] IN BLOOD BY AUTOMATED COUNT: 13.7 %
EST. GFR  (AFRICAN AMERICAN): 25 ML/MIN/1.73 M^2
EST. GFR  (NON AFRICAN AMERICAN): 22 ML/MIN/1.73 M^2
GLUCOSE SERPL-MCNC: 139 MG/DL
GLUCOSE UR QL STRIP: ABNORMAL
HCT VFR BLD AUTO: 32.8 %
HGB BLD-MCNC: 10.4 G/DL
HGB UR QL STRIP: ABNORMAL
HYALINE CASTS #/AREA URNS LPF: 0 /LPF
KETONES UR QL STRIP: NEGATIVE
LEUKOCYTE ESTERASE UR QL STRIP: NEGATIVE
LYMPHOCYTES # BLD AUTO: 1.9 K/UL
LYMPHOCYTES NFR BLD: 22.7 %
MAGNESIUM SERPL-MCNC: 2 MG/DL
MCH RBC QN AUTO: 30.4 PG
MCHC RBC AUTO-ENTMCNC: 31.7 %
MCV RBC AUTO: 96 FL
MICROSCOPIC COMMENT: NORMAL
MONOCYTES # BLD AUTO: 0.7 K/UL
MONOCYTES NFR BLD: 8.4 %
NEUTROPHILS # BLD AUTO: 5.7 K/UL
NEUTROPHILS NFR BLD: 66.4 %
NITRITE UR QL STRIP: NEGATIVE
PH UR STRIP: 5 [PH] (ref 5–8)
PLATELET # BLD AUTO: 271 K/UL
PMV BLD AUTO: 11.4 FL
POCT GLUCOSE: 137 MG/DL (ref 70–110)
POCT GLUCOSE: 157 MG/DL (ref 70–110)
POCT GLUCOSE: 207 MG/DL (ref 70–110)
POCT GLUCOSE: 92 MG/DL (ref 70–110)
POTASSIUM SERPL-SCNC: 5.3 MMOL/L
PROT SERPL-MCNC: 6.7 G/DL
PROT UR QL STRIP: ABNORMAL
RBC # BLD AUTO: 3.42 M/UL
RBC #/AREA URNS HPF: 1 /HPF (ref 0–4)
SODIUM SERPL-SCNC: 142 MMOL/L
SP GR UR STRIP: 1.01 (ref 1–1.03)
SQUAMOUS #/AREA URNS HPF: 5 /HPF
URN SPEC COLLECT METH UR: ABNORMAL
UROBILINOGEN UR STRIP-ACNC: NEGATIVE EU/DL
WBC # BLD AUTO: 8.56 K/UL
WBC #/AREA URNS HPF: 1 /HPF (ref 0–5)

## 2017-02-09 PROCEDURE — 21400001 HC TELEMETRY ROOM

## 2017-02-09 PROCEDURE — 92929 CATH LAB PROCEDURE: CPT | Mod: LC,,, | Performed by: INTERNAL MEDICINE

## 2017-02-09 PROCEDURE — B2131ZZ FLUOROSCOPY OF MULTIPLE CORONARY ARTERY BYPASS GRAFTS USING LOW OSMOLAR CONTRAST: ICD-10-PCS | Performed by: INTERNAL MEDICINE

## 2017-02-09 PROCEDURE — 80053 COMPREHEN METABOLIC PANEL: CPT

## 2017-02-09 PROCEDURE — B2111ZZ FLUOROSCOPY OF MULTIPLE CORONARY ARTERIES USING LOW OSMOLAR CONTRAST: ICD-10-PCS | Performed by: INTERNAL MEDICINE

## 2017-02-09 PROCEDURE — 63600175 PHARM REV CODE 636 W HCPCS: Performed by: HOSPITALIST

## 2017-02-09 PROCEDURE — 92928 PRQ TCAT PLMT NTRAC ST 1 LES: CPT | Mod: LC,,, | Performed by: INTERNAL MEDICINE

## 2017-02-09 PROCEDURE — 36415 COLL VENOUS BLD VENIPUNCTURE: CPT

## 2017-02-09 PROCEDURE — 93005 ELECTROCARDIOGRAM TRACING: CPT

## 2017-02-09 PROCEDURE — 027135Z DILATION OF CORONARY ARTERY, TWO ARTERIES WITH TWO DRUG-ELUTING INTRALUMINAL DEVICES, PERCUTANEOUS APPROACH: ICD-10-PCS | Performed by: INTERNAL MEDICINE

## 2017-02-09 PROCEDURE — 63600175 PHARM REV CODE 636 W HCPCS

## 2017-02-09 PROCEDURE — 25000003 PHARM REV CODE 250

## 2017-02-09 PROCEDURE — B2181ZZ FLUOROSCOPY OF LEFT INTERNAL MAMMARY BYPASS GRAFT USING LOW OSMOLAR CONTRAST: ICD-10-PCS | Performed by: INTERNAL MEDICINE

## 2017-02-09 PROCEDURE — 85025 COMPLETE CBC W/AUTO DIFF WBC: CPT

## 2017-02-09 PROCEDURE — 83735 ASSAY OF MAGNESIUM: CPT

## 2017-02-09 PROCEDURE — B2151ZZ FLUOROSCOPY OF LEFT HEART USING LOW OSMOLAR CONTRAST: ICD-10-PCS | Performed by: INTERNAL MEDICINE

## 2017-02-09 PROCEDURE — 4A023N7 MEASUREMENT OF CARDIAC SAMPLING AND PRESSURE, LEFT HEART, PERCUTANEOUS APPROACH: ICD-10-PCS | Performed by: INTERNAL MEDICINE

## 2017-02-09 PROCEDURE — C1887 CATHETER, GUIDING: HCPCS

## 2017-02-09 PROCEDURE — 93459 L HRT ART/GRFT ANGIO: CPT | Mod: 26,59,, | Performed by: INTERNAL MEDICINE

## 2017-02-09 PROCEDURE — 25000003 PHARM REV CODE 250: Performed by: HOSPITALIST

## 2017-02-09 PROCEDURE — 25000003 PHARM REV CODE 250: Performed by: INTERNAL MEDICINE

## 2017-02-09 RX ORDER — ONDANSETRON 2 MG/ML
4 INJECTION INTRAMUSCULAR; INTRAVENOUS EVERY 12 HOURS PRN
Status: DISCONTINUED | OUTPATIENT
Start: 2017-02-09 | End: 2017-02-10 | Stop reason: HOSPADM

## 2017-02-09 RX ORDER — MORPHINE SULFATE 10 MG/ML
2 INJECTION INTRAMUSCULAR; INTRAVENOUS; SUBCUTANEOUS EVERY 10 MIN PRN
Status: DISCONTINUED | OUTPATIENT
Start: 2017-02-09 | End: 2017-02-10 | Stop reason: HOSPADM

## 2017-02-09 RX ORDER — HYDRALAZINE HYDROCHLORIDE 20 MG/ML
10 INJECTION INTRAMUSCULAR; INTRAVENOUS EVERY 4 HOURS PRN
Status: DISCONTINUED | OUTPATIENT
Start: 2017-02-09 | End: 2017-02-10 | Stop reason: HOSPADM

## 2017-02-09 RX ORDER — HYDROCODONE BITARTRATE AND ACETAMINOPHEN 5; 325 MG/1; MG/1
1 TABLET ORAL EVERY 4 HOURS PRN
Status: DISCONTINUED | OUTPATIENT
Start: 2017-02-09 | End: 2017-02-09 | Stop reason: SINTOL

## 2017-02-09 RX ORDER — AMLODIPINE BESYLATE 5 MG/1
10 TABLET ORAL DAILY
Status: DISCONTINUED | OUTPATIENT
Start: 2017-02-09 | End: 2017-02-10

## 2017-02-09 RX ORDER — ACETAMINOPHEN 325 MG/1
650 TABLET ORAL EVERY 4 HOURS PRN
Status: DISCONTINUED | OUTPATIENT
Start: 2017-02-09 | End: 2017-02-10 | Stop reason: HOSPADM

## 2017-02-09 RX ORDER — ATROPINE SULFATE 0.1 MG/ML
0.5 INJECTION INTRAVENOUS
Status: DISCONTINUED | OUTPATIENT
Start: 2017-02-09 | End: 2017-02-10 | Stop reason: HOSPADM

## 2017-02-09 RX ORDER — SODIUM CHLORIDE 9 MG/ML
3 INJECTION, SOLUTION INTRAVENOUS CONTINUOUS
Status: ACTIVE | OUTPATIENT
Start: 2017-02-09 | End: 2017-02-09

## 2017-02-09 RX ADMIN — INSULIN DETEMIR 8 UNITS: 100 INJECTION, SOLUTION SUBCUTANEOUS at 09:02

## 2017-02-09 RX ADMIN — FUROSEMIDE 40 MG: 40 TABLET ORAL at 08:02

## 2017-02-09 RX ADMIN — LOSARTAN POTASSIUM 50 MG: 25 TABLET, FILM COATED ORAL at 08:02

## 2017-02-09 RX ADMIN — Medication 3 ML: at 10:02

## 2017-02-09 RX ADMIN — INSULIN DETEMIR 8 UNITS: 100 INJECTION, SOLUTION SUBCUTANEOUS at 08:02

## 2017-02-09 RX ADMIN — HEPARIN SODIUM 5000 UNITS: 5000 INJECTION, SOLUTION INTRAVENOUS; SUBCUTANEOUS at 09:02

## 2017-02-09 RX ADMIN — NITROGLYCERIN 0.4 MG: 0.4 TABLET SUBLINGUAL at 07:02

## 2017-02-09 RX ADMIN — Medication 3 ML: at 02:02

## 2017-02-09 RX ADMIN — ASPIRIN 325 MG ORAL TABLET 325 MG: 325 PILL ORAL at 08:02

## 2017-02-09 RX ADMIN — METOPROLOL TARTRATE 50 MG: 50 TABLET ORAL at 09:02

## 2017-02-09 RX ADMIN — SODIUM CHLORIDE 100 ML/HR: 0.9 INJECTION, SOLUTION INTRAVENOUS at 02:02

## 2017-02-09 RX ADMIN — CLOPIDOGREL 75 MG: 75 TABLET, FILM COATED ORAL at 08:02

## 2017-02-09 RX ADMIN — DOCUSATE SODIUM AND SENNOSIDES 1 TABLET: 8.6; 5 TABLET, FILM COATED ORAL at 08:02

## 2017-02-09 RX ADMIN — SODIUM POLYSTYRENE SULFONATE 30 G: 15 SUSPENSION ORAL; RECTAL at 02:02

## 2017-02-09 RX ADMIN — NITROGLYCERIN 1 INCH: 20 OINTMENT TOPICAL at 11:02

## 2017-02-09 RX ADMIN — AMLODIPINE BESYLATE 10 MG: 5 TABLET ORAL at 02:02

## 2017-02-09 RX ADMIN — SODIUM CHLORIDE 3 ML/KG/HR: 0.9 INJECTION, SOLUTION INTRAVENOUS at 01:02

## 2017-02-09 RX ADMIN — NITROGLYCERIN 1 INCH: 20 OINTMENT TOPICAL at 08:02

## 2017-02-09 RX ADMIN — INSULIN ASPART 15 UNITS: 100 INJECTION, SOLUTION INTRAVENOUS; SUBCUTANEOUS at 05:02

## 2017-02-09 RX ADMIN — SODIUM CHLORIDE 3 ML/KG/HR: 0.9 INJECTION, SOLUTION INTRAVENOUS at 12:02

## 2017-02-09 RX ADMIN — ATORVASTATIN CALCIUM 80 MG: 40 TABLET, FILM COATED ORAL at 08:02

## 2017-02-09 RX ADMIN — METOPROLOL TARTRATE 50 MG: 50 TABLET ORAL at 08:02

## 2017-02-09 RX ADMIN — ONDANSETRON 8 MG: 2 INJECTION INTRAMUSCULAR; INTRAVENOUS at 07:02

## 2017-02-09 NOTE — OP NOTE
[facility]  Brief Operative Note     SUMMARY     Surgery Date: 2/9/2017     Surgeon(s) and Role:     * Earl Maynard MD - Primary    Assisting Surgeon: None    Pre-op Diagnosis:  Unstable angina [I20.0]    Post-op Diagnosis:  same    Procedure(s) (LRB):  HEART CATH WITH GRAFTS-LEFT Not before 9am (N/A)   [facility]  Cor angio  LIMA angio  PCI mid LCx/OM1 2.75x18 & 2.5x26 Resolute BETHANY  Angioseal RFA    Anesthesia: Choice    Description of the findings of the procedure: see below    Findings/Key Components:  LVEDP: 19mmHg  LVEF: 55-60% by echo  Wall Motion: normal by echo    Dominance: Right  LM: normal  LAD: prox , distal vessel supplied by patent LIMA-LAD  LCx: mid 90% ISR T3 flow   OM1 prox 70% ISR T3 flow  RCA: dom, prox-mid stents patent, dist 90% (?de arron vs ISR) T3 flow    LIMA-LAD patent, backfills into Diag1 with diffuse disease, native LAD with diffuse noncritical disease.    PCI LCx/OM1  Angiomax  Predil 2.5x12 and 2.5x10 Angioscore  Stent:  OM1 2.5x26 Resolute BETHANY  Mid LCx 2.75x18 Resolute BETHANY  Postdil prox 2.75x15 NC @ 25 dustin  Excellent result, T3 flow, 0% residual stenosis    Hemostasis:  RFA angioseal    Impression:  ACS  MPI with lateral (LCx territory) ischemia  3V CAD, normal LV fxn  Patent LIMA-LAD  Patent RCA stents, distal de-arron stenosis noted  Severe ISR mid LCx/OM1 (in territory of ischemia on MPI)  Successful PCI:  OM1 2.5x26 Resolute BETHANY  Mid LCx 2.75x18 Resolute BETHANY  RFA angioseal  43cc contrast used for case    Plan:  Observe overnight  IVF hydration  ASA/Plavix/Statin/BBl (ASA dose 325 mg, pt enrolled in ADAPTABLE Study)  Home in am  If recurrent angina, consider PCI dist RCA  Pt to follow up with Dr. Elver Kelley at [facility]    Estimated Blood Loss: <50cc         Specimens: None

## 2017-02-09 NOTE — ASSESSMENT & PLAN NOTE
Acute Chest Pain, rule-out myocardial infarction   · Intitial EKG findings shows no evidence of ST elevation MI, but with chronic ST changes similar to previous EKGs  · Initial troponin is within normal limits  · Intermediate to high risk for MI;  known coronary artery disease with previous MIs and 2002, 2003, 2012.  Status post CABG X2  2002, stent RCA, OM1 2003, NSTEMI with total occlusion of LAD in 2012 and reported to have continued stable angina prior to the admission today.  · Initiate ACS protocol to rule out MI, then explore noncardiac etiology  · Trend cardiac enzymes  · Aspirin  · Beta-blocker (would unlikely to benefit from stress test given chronic EKG findings)  · Supplemental oxygen  · nitroglycerine and morphine PRN  · 2D echocardiogram  · TSH, FT3, FT4  · ESR and cardiac specific CRP   · PT, PTT, INR   · Tight glycemic control  · Monitor on telemetry unit  · Consult cardiologist,S/P LHC,S/P PCI on Lcx and OM1

## 2017-02-09 NOTE — PROGRESS NOTES
@ 0727 Patient complain of nausea, chest discomfort and pressure 6/10    @ 0728   BP automatic 216/97   HR 82 sinus rhtyhm   Room air oxygen saturation 91%  2L of oxygen via nasal canula applied    @0729  BP manual 186/92   HR 82 Sinus rhythm  Oxygen saturation 100% on 2L via nasal canula     @0730  0.4mg of nitroglycerin sublingual given  8mg of IV Zofran given  Informed Dr. Maynard  12 lead EKG order STAT     @0736 chest discomfort and pressure 3/10  BP automatic 140/74  HR 75 sinus rhythm   Patient refuse another sublingual nitroglycerin     @0750 12 lead EKG results show to Dr. Aleyda Maynard ordered 1inch of nitro pasta q6hrs.

## 2017-02-09 NOTE — ASSESSMENT & PLAN NOTE
· Goal while inpatient is a systolic blood pressure less than 160mmHg  · BP in acceptable range at this time  · Continue current home regimen with hold parameters  · PRN antihypertensives available  Added Norvasc and prn Hydralazine.

## 2017-02-09 NOTE — PLAN OF CARE
02/08/17 1645   Medicare Message   Important Message from Medicare regarding Discharge Appeal Rights Given to patient/caregiver;Explained to patient/caregiver;Signed/date by patient/caregiver

## 2017-02-09 NOTE — PROGRESS NOTES
Patient seen and examined. I agreed with Dr. Martin H&P. Patient admitted for left chest pain radiating to right chest associated with shortness of breath felt to be unstable angina found to have abnormal NST. Patient aware of risk of worsen CKD and agrees to proceed with cardiac cath. IVF have been started. Plan for cardiac cath tomorrow 2/9/17. Patient admitted to inpatient.     Kristina Reyes NP

## 2017-02-09 NOTE — PROGRESS NOTES
PT care assumed, pt lying in supine position talking with family at bedside. Pt with no c/o of pain or any discomfort. Pt with telemetry monitor in use with alarm in use.pt respirations are even and unlabored. Pt with iv fluids infusing to rt ac area at 100ml/hour. Pt  And family informed of md orders for the am.pt personal item within reach and call bell for nurse at bedside.

## 2017-02-09 NOTE — ASSESSMENT & PLAN NOTE
·   · Currently rate controlled  · Maintain with beta-blocker with hold parameters  · Monitor on telemetry  · Maintain magnesium around 2.0  · Maintain potassium around 4.0  _______________________________________  · CHF - yes  · Hypertension - yes  · Age >74 - no  · DM - yes  · Prior stroke or TIA - no    CHADS2 score = 3    Oral anticoagulation is strongly advised with a score of greater than 1.  She would like follow with her cardiologist for OAC,krish on ASA and plavix.

## 2017-02-09 NOTE — PLAN OF CARE
Problem: Cardiac: ACS (Acute Coronary Syndrome) (Adult)  Intervention: Monitor ECG for Changes/Signs of Ischemia    02/09/17 0649   ECG   Lead Monitored Lead II   Rhythm normal sinus rhythm       Intervention: Prevent/Manage Thrombi/Emboli    02/08/17 0800 02/08/17 2115   Safety Interventions   Bleeding Precautions --  blood pressure closely monitored;monitored for signs of bleeding   Minimize Embolism Risk   VTE Prevention/Management ambulation promoted;bleeding precautions maintained;bleeding risk assessed;ROM (active) performed --    OTHER   VTE Required Core Measure --  Pharmacological prophylaxis initiated/maintained       Intervention: Support Psychosocial Response to Life-changing Event/Hospitalization    02/09/17 0649   Coping/Psychosocial Interventions   Supportive Measures active listening utilized;relaxation techniques promoted       Intervention: Monitor/Manage Fluid Balance    02/08/17 2115   Nutrition Interventions   Fluid/Electrolyte Management intravenous fluids adjusted           Comments:   Pt  Has been free of chest pains or any cardiac discomfort. Pt remains in NSR on tele.pt for cardiac cath this am. Pt has been NPO.shaved  And preped.

## 2017-02-09 NOTE — SUBJECTIVE & OBJECTIVE
Past Medical History   Diagnosis Date    Acute myocardial infarction of anterolateral wall 7/9/2015    Anemia of chronic renal failure, stage 4 (severe) 1/22/2015    Atherosclerosis of aorta 10/3/2012    Benign hypertension with CKD (chronic kidney disease) stage IV 3/11/2016    Chronic diastolic congestive heart failure 10/3/2012    Chronic kidney disease, stage IV (severe) 7/3/2012    Coronary artery disease      s/p 1v CABG 2002 and multiple PCI (last angiogram in 6/2012 with patent LIMA->LAD and patent LCx and RCA stents)    Diabetic polyneuropathy associated with type 2 diabetes mellitus 7/9/2015    Diabetic polyneuropathy associated with type 2 diabetes mellitus 7/9/2015    Encounter for blood transfusion     Heart attack 09/2012     5-6 events    Hyperlipidemia     Nephritis and nephropathy, 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 7/9/2015    Occlusion and stenosis of carotid artery with cerebral infarction 7/9/2015    PAF (paroxysmal atrial fibrillation) 10/3/2012    Pneumonia     Proliferative diabetic retinopathy 10/3/2012    Sepsis due to Escherichia coli with acute renal failure 2/2016     UTI     Type II diabetes mellitus with neurological manifestations 7/9/2015    Type II diabetes mellitus with renal manifestations 7/3/2012       Past Surgical History   Procedure Laterality Date    Hysterectomy      Tonsillectomy      Appendectomy      Cholecystectomy      Coronary artery bypass graft      Eye surgery       cataracts bilaterally    Fracture surgery       right ankle    Cardiac surgery  2002     CABG    Coronary angioplasty  2004       Review of patient's allergies indicates:   Allergen Reactions    Pcn [penicillins] Hives and Swelling    Percodan [oxycodone-aspirin] Hives     welps  Tolerated Rocephin IM in clinic    Phenergan [promethazine] Other (See Comments)     Altered mental status; jerking of extremities       No current facility-administered medications on file prior  to encounter.      Current Outpatient Prescriptions on File Prior to Encounter   Medication Sig    atorvastatin (LIPITOR) 80 MG tablet TAKE 1 TABLET ONE TIME DAILY    blood-glucose meter kit Use as instructed    clopidogrel (PLAVIX) 75 mg tablet TAKE 1 TABLET ONE TIME DAILY    furosemide (LASIX) 40 MG tablet Take 1 tablet (40 mg total) by mouth daily as needed (take water pill daily as needed for leg swelling and SOB).    insulin aspart (NOVOLOG) 100 unit/mL injection Inject 15 Units into the skin 3 (three) times daily before meals.    insulin glargine (LANTUS) 100 unit/mL injection Inject 11 Units into the skin every evening.    losartan (COZAAR) 50 MG tablet Take 1 tablet (50 mg total) by mouth once daily.    metoprolol tartrate (LOPRESSOR) 50 MG tablet Take 1 tablet (50 mg total) by mouth 2 (two) times daily.    nitroGLYCERIN 0.4 MG/DOSE TL SPRY (NITROLINGUAL) 400 mcg/spray spray Place 1 spray under the tongue every 5 (five) minutes as needed for Chest pain.    aspirin 325 MG tablet Take 325 mg by mouth once daily.    ergocalciferol (VITAMIN D2) 50,000 unit Cap Take 1 capsule (50,000 Units total) by mouth every 7 days.    insulin needles, disposable, 31 Ndle Takes insulin 4 times daily     Family History     Problem Relation (Age of Onset)    Cancer Mother    Dementia Father    Diabetes Daughter    Heart disease Mother, Father    Hypertension Mother, Father, Sister, Brother    No Known Problems Son    Psoriasis Father    Stroke Sister        Social History Main Topics    Smoking status: Never Smoker    Smokeless tobacco: Never Used    Alcohol use No    Drug use: No    Sexual activity: No     Review of Systems   Constitutional: Negative for activity change and appetite change.   HENT: Negative for congestion and dental problem.    Eyes: Negative for discharge and itching.   Respiratory: Negative for apnea.    Cardiovascular: Negative for chest pain and leg swelling.   Gastrointestinal: Negative  for abdominal distention and abdominal pain.   Endocrine: Negative for cold intolerance and heat intolerance.   Genitourinary: Negative for difficulty urinating and dyspareunia.   Musculoskeletal: Negative for arthralgias.   Allergic/Immunologic: Negative for environmental allergies and food allergies.   Hematological: Negative for adenopathy. Does not bruise/bleed easily.   Psychiatric/Behavioral: Negative for agitation and behavioral problems.     Objective:     Vital Signs (Most Recent):  Temp: 98.5 °F (36.9 °C) (02/09/17 1055)  Pulse: 69 (02/09/17 1110)  Resp: 18 (02/09/17 1055)  BP: (!) 184/84 (02/09/17 1110)  SpO2: 98 % (02/09/17 1110) Vital Signs (24h Range):  Temp:  [97.7 °F (36.5 °C)-98.5 °F (36.9 °C)] 98.5 °F (36.9 °C)  Pulse:  [64-80] 69  Resp:  [18] 18  SpO2:  [95 %-98 %] 98 %  BP: (120-216)/(70-97) 184/84     Weight: 82.6 kg (182 lb)  Body mass index is 31.24 kg/(m^2).    Physical Exam   Constitutional: She is oriented to person, place, and time. No distress.   HENT:   Head: Atraumatic.   Eyes: EOM are normal. Pupils are equal, round, and reactive to light.   Neck: Normal range of motion. Neck supple.   Cardiovascular: Normal rate and regular rhythm.    Pulmonary/Chest: Effort normal and breath sounds normal.   Abdominal: Soft. Bowel sounds are normal.   Musculoskeletal: Normal range of motion. She exhibits no edema.   Neurological: She is oriented to person, place, and time. No cranial nerve deficit.   Skin: Skin is warm and dry. She is not diaphoretic.   Psychiatric: She has a normal mood and affect. Her behavior is normal.        Significant Labs:   BMP:   Recent Labs  Lab 02/09/17  0520   *      K 5.3*   *   CO2 20*   BUN 59*   CREATININE 2.2*   CALCIUM 8.8   MG 2.0     CBC:   Recent Labs  Lab 02/08/17  0549 02/09/17  0520   WBC 10.50 8.56   HGB 9.7* 10.4*   HCT 29.8* 32.8*    271     Troponin:   Recent Labs  Lab 02/08/17  0549 02/08/17  0840 02/08/17  1600   TROPONINI  0.214* 0.283* 0.234*       Significant Imaging: reviewed

## 2017-02-09 NOTE — PROGRESS NOTES
ANDREY contacted Dr. Rosales office @ [phone] to schedule PCP follow-up, ANDREY spoke to Nena, appointment scheduled for 2/16/17 @ 11:30am. ANDREY contacted [facility] @ [phone] to schedule follow-up, ANDREY spoke to Zoey, appointment scheduled for 3/3/17 @ 2:30pm.

## 2017-02-09 NOTE — PROGRESS NOTES
[facility] Hospital Medicine  Progress Note    Patient Name: Zoey Ham  MRN: [id number]  Patient Class: IP- Inpatient   Admission Date: 2/8/2017  Length of Stay: 1 days  Attending Physician: Yasmin De Souza MD  Primary Care Provider: Cesia Rosales MD        Subjective:     Principal Problem:NSTEMI (non-ST elevated myocardial infarction)    HPI:  Zoey Ham is a 71 y.o. female that (in part)  has a past medical history of Acute myocardial infarction of anterolateral wall (7/9/2015); Anemia of chronic renal failure, stage 4 (severe) (1/22/2015); Atherosclerosis of aorta (10/3/2012); Benign hypertension with CKD (chronic kidney disease) stage IV (3/11/2016); Chronic diastolic congestive heart failure (10/3/2012); Chronic kidney disease, stage IV (severe) (7/3/2012); Coronary artery disease; Diabetic polyneuropathy associated with type 2 diabetes mellitus (7/9/2015); Diabetic polyneuropathy associated with type 2 diabetes mellitus (7/9/2015); Encounter for blood transfusion; Heart attack (09/2012); Hyperlipidemia; Nephritis and nephropathy, 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 Presents to [facility] as a transfer patient from the [place] emergency department for evaluation of chest pain as described below in detail.     Description of symptoms     Location: Left sided chest  Onset: Acute onset at 10:15 PM prior to arrival  Character: Aching, squeezing pain  Frequency: Occurring at two-hour intervals approximately  Duration: Each episode lasting 15-20  minutes  Associated Symptoms: Positive for shortness of breath and nausea Denies vomiting, or diaphoresis.   Radiation:  radiates to right chest  Exacerbating factors: exertion   Relieving factors: nitroglycerin given in the ED X 2          In the emergency department routine laboratory studies, chest x-ray, and EKG were obtained. Repeat EKG appeared unchanged from prior EKGs.      Hospital medicine has been asked to admit for further evaluation and treatment.       Hospital Course:  Zoey Ham is a 71 y.o. female that (in part)  has a past medical history of Acute myocardial infarction of anterolateral wall (7/9/2015); Anemia of chronic renal failure, stage 4 (severe) (1/22/2015); Atherosclerosis of aorta (10/3/2012); Benign hypertension with CKD (chronic kidney disease) stage IV (3/11/2016); Chronic diastolic congestive heart failure (10/3/2012); Chronic kidney disease, stage IV (severe) (7/3/2012); Coronary artery disease; Diabetic polyneuropathy associated with type 2 diabetes mellitus (7/9/2015); Diabetic polyneuropathy associated with type 2 diabetes mellitus (7/9/2015); Encounter for blood transfusion; Heart attack (09/2012); Hyperlipidemia; Nephritis and nephropathy, 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 Presents to [facility] as a transfer patient from the [place] emergency department for evaluation of chest pain as described below in detail.     Description of symptoms     Location: Left sided chest  Onset: Acute onset at 10:15 PM prior to arrival  Character: Aching, squeezing  pain  Frequency: Occurring at two-hour intervals approximately  Duration: Each episode lasting 15-20 minutes  Associated Symptoms: Positive for shortness of breath and nausea Denies vomiting, or diaphoresis.   Radiation:  radiates to right chest  Exacerbating factors: exertion   Relieving factors: nitroglycerin given in the ED X 2          In the emergency department routine laboratory studies, chest x-ray, and EKG were obtained. Repeat EKG appeared unchanged from prior EKGs.      Hospital medicine has been asked to admit for further evaluation and treatment.  Patient had abnormal NST,she had LHC with PCI on Lcx and OM1.  She denies chest pain at this time.      Past Medical History   Diagnosis Date    Acute myocardial infarction of anterolateral wall 7/9/2015    Anemia of chronic renal failure, stage 4 (severe) 1/22/2015    Atherosclerosis of aorta 10/3/2012    Benign hypertension with CKD (chronic kidney disease) stage IV 3/11/2016    Chronic diastolic congestive heart failure 10/3/2012    Chronic kidney disease, stage IV (severe) 7/3/2012    Coronary artery disease      s/p 1v CABG 2002 and multiple PCI (last angiogram in 6/2012 with patent LIMA->LAD and patent LCx and RCA stents)    Diabetic polyneuropathy associated with type 2 diabetes mellitus 7/9/2015    Diabetic polyneuropathy associated with type 2 diabetes mellitus 7/9/2015    Encounter for blood transfusion     Heart attack 09/2012     5-6 events    Hyperlipidemia     Nephritis and nephropathy, 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 7/9/2015    Occlusion and stenosis of carotid artery with cerebral infarction 7/9/2015    PAF (paroxysmal atrial fibrillation) 10/3/2012    Pneumonia     Proliferative diabetic retinopathy 10/3/2012    Sepsis due to Escherichia coli with acute renal failure 2/2016     UTI     Type II diabetes mellitus with neurological manifestations 7/9/2015    Type II diabetes mellitus with renal manifestations 7/3/2012        Past Surgical History   Procedure Laterality Date    Hysterectomy      Tonsillectomy      Appendectomy      Cholecystectomy      Coronary artery bypass graft      Eye surgery       cataracts bilaterally    Fracture surgery       right ankle    Cardiac surgery  2002     CABG    Coronary angioplasty  2004       Review of patient's allergies indicates:   Allergen Reactions    Pcn [penicillins] Hives and Swelling    Percodan [oxycodone-aspirin] Hives     welps  Tolerated Rocephin IM in clinic    Phenergan [promethazine] Other (See Comments)     Altered mental status; jerking of extremities       No current facility-administered medications on file prior to encounter.      Current Outpatient Prescriptions on File Prior to Encounter   Medication Sig    atorvastatin (LIPITOR) 80 MG tablet TAKE 1 TABLET ONE TIME DAILY    blood-glucose meter kit Use as instructed    clopidogrel (PLAVIX) 75 mg tablet TAKE 1 TABLET ONE TIME DAILY    furosemide (LASIX) 40 MG tablet Take 1 tablet (40 mg total) by mouth daily as needed (take water pill daily as needed for leg swelling and SOB).    insulin aspart (NOVOLOG) 100 unit/mL injection Inject 15 Units into the skin 3 (three) times daily before meals.    insulin glargine (LANTUS) 100 unit/mL injection Inject 11 Units into the skin every evening.    losartan (COZAAR) 50 MG tablet Take 1 tablet (50 mg total) by mouth once daily.    metoprolol tartrate (LOPRESSOR) 50 MG tablet Take 1 tablet (50 mg total) by mouth 2 (two) times daily.    nitroGLYCERIN 0.4 MG/DOSE TL SPRY (NITROLINGUAL) 400 mcg/spray spray Place 1 spray under the tongue every 5 (five) minutes as needed for Chest pain.    aspirin 325 MG tablet Take 325 mg by mouth once daily.    ergocalciferol (VITAMIN D2) 50,000 unit Cap Take 1 capsule (50,000 Units total) by mouth every 7 days.    insulin needles, disposable, 31 Ndle Takes insulin 4 times daily     Family History     Problem Relation (Age of  Onset)    Cancer Mother    Dementia Father    Diabetes Daughter    Heart disease Mother, Father    Hypertension Mother, Father, Sister, Brother    No Known Problems Son    Psoriasis Father    Stroke Sister        Social History Main Topics    Smoking status: Never Smoker    Smokeless tobacco: Never Used    Alcohol use No    Drug use: No    Sexual activity: No     Review of Systems   Constitutional: Negative for activity change and appetite change.   HENT: Negative for congestion and dental problem.    Eyes: Negative for discharge and itching.   Respiratory: Negative for apnea.    Cardiovascular: Negative for chest pain and leg swelling.   Gastrointestinal: Negative for abdominal distention and abdominal pain.   Endocrine: Negative for cold intolerance and heat intolerance.   Genitourinary: Negative for difficulty urinating and dyspareunia.   Musculoskeletal: Negative for arthralgias.   Allergic/Immunologic: Negative for environmental allergies and food allergies.   Hematological: Negative for adenopathy. Does not bruise/bleed easily.   Psychiatric/Behavioral: Negative for agitation and behavioral problems.     Objective:     Vital Signs (Most Recent):  Temp: 98.5 °F (36.9 °C) (02/09/17 1055)  Pulse: 69 (02/09/17 1110)  Resp: 18 (02/09/17 1055)  BP: (!) 184/84 (02/09/17 1110)  SpO2: 98 % (02/09/17 1110) Vital Signs (24h Range):  Temp:  [97.7 °F (36.5 °C)-98.5 °F (36.9 °C)] 98.5 °F (36.9 °C)  Pulse:  [64-80] 69  Resp:  [18] 18  SpO2:  [95 %-98 %] 98 %  BP: (120-216)/(70-97) 184/84     Weight: 82.6 kg (182 lb)  Body mass index is 31.24 kg/(m^2).    Physical Exam   Constitutional: She is oriented to person, place, and time. No distress.   HENT:   Head: Atraumatic.   Eyes: EOM are normal. Pupils are equal, round, and reactive to light.   Neck: Normal range of motion. Neck supple.   Cardiovascular: Normal rate and regular rhythm.    Pulmonary/Chest: Effort normal and breath sounds normal.   Abdominal: Soft. Bowel  sounds are normal.   Musculoskeletal: Normal range of motion. She exhibits no edema.   Neurological: She is oriented to person, place, and time. No cranial nerve deficit.   Skin: Skin is warm and dry. She is not diaphoretic.   Psychiatric: She has a normal mood and affect. Her behavior is normal.        Significant Labs:   BMP:   Recent Labs  Lab 02/09/17  0520   *      K 5.3*   *   CO2 20*   BUN 59*   CREATININE 2.2*   CALCIUM 8.8   MG 2.0     CBC:   Recent Labs  Lab 02/08/17  0549 02/09/17  0520   WBC 10.50 8.56   HGB 9.7* 10.4*   HCT 29.8* 32.8*    271     Troponin:   Recent Labs  Lab 02/08/17  0549 02/08/17  0840 02/08/17  1600   TROPONINI 0.214* 0.283* 0.234*       Significant Imaging: reviewed    Assessment/Plan:      * NSTEMI (non-ST elevated myocardial infarction)  S/P [facility] with PCI in Lcx and OM1,patient is on core measure acute MI with ASA,plavix,statin,BB,ARB,cardiology is following,continue monitor.      Essential hypertension  · Goal while inpatient is a systolic blood pressure less than 160mmHg  · BP in acceptable range at this time  · Continue current home regimen with hold parameters  · PRN antihypertensives available  Added Norvasc and prn Hydralazine.      Chronic combined systolic and diastolic congestive heart failure  Compensated CHF   · Evidenced by history,   · No evidence of pulmonary edema, peripheral edema  · BNP pending  · Provide diuresis w/ by mouth medication  · Maintain w/ beta-blocker, ACE inhibitor   · Daily Weights  · Strict I/O  · Low-sodium cardiac diet  · Chest X-ray  · Check TSH, albumin, UA, and renal function  · Obtain 2D echo if <6 months     EF   Date Value Ref Range Status   02/08/2017 55 55 - 65    02/29/2016 65 55 - 65      · EKG and cardiac enzymes PRN  · DVT prophylaxis w/ pharmacological and/or mechanical measures  · Oxygen supplementation support PRN    Instructions given to patient/family:  Monitor daily weight.  Regular activity within  patient's limitations.  Low salt, low fat and low choleterol diet and restrict fluid < 2L per day.  Call MD if SOB, chest pain, weight gain > 2-3 lbs per day and/or 5-6 lbs per week.   No smoking. Annual influenza vaccine required.      Anemia of chronic renal failure, stage 4 (severe)  · This may be contributory to her ongoing chest pain.  .      CKD stage 4 due to type 2 diabetes mellitus  · Renal dose medications  · Avoid nephrotoxic agents  · Maintain euvolemic state            Uncontrolled type 2 diabetes mellitus with stage 4 chronic kidney disease, with long-term current use of insulin  · BG is not in acceptable range at this time  · Maintain w/ subcutaneous insulin management order set  · Hold oral diabetic meds  · ADA 1800 kcal diet  · BG goal while in patient is <180mg/dL  · HgA1c = Pending      Hyperparathyroidism  · Continue current home regimen  · Ordered PTH   · follow-up outpatient with endocrinology       Chronic atrial fibrillation  ·   · Currently rate controlled  · Maintain with beta-blocker with hold parameters  · Monitor on telemetry  · Maintain magnesium around 2.0  · Maintain potassium around 4.0  _______________________________________  · CHF - yes  · Hypertension - yes  · Age >74 - no  · DM - yes  · Prior stroke or TIA - no    CHADS2 score = 3    Oral anticoagulation is strongly advised with a score of greater than 1.  She would like follow with her cardiologist for OAC,alreday on ASA and plavix.    Coronary artery disease of bypass graft of native heart with stable angina pectoris  · previous MIs and 2002, 2003, 2012.  Status post CABG X2  2002, stent RCA, OM1 2003, NSTEMI with total occlusion of LAD in 2012 and reported to have continued stable angina prior to the admission today.  No evidence of acute coronary syndrome at this time  Maintain adequate blood pressure control  Heart healthy diet  Aspirin  Statin    Unstable angina  · Relief given with nitroglycerin administration ×2 episodes  prior to arrival      Acute chest pain  Acute Chest Pain, rule-out myocardial infarction   · Intitial EKG findings shows no evidence of ST elevation MI, but with chronic ST changes similar to previous EKGs  · Initial troponin is within normal limits  · Intermediate to high risk for MI;  known coronary artery disease with previous MIs and 2002, 2003, 2012.  Status post CABG X2  2002, stent RCA, OM1 2003, NSTEMI with total occlusion of LAD in 2012 and reported to have continued stable angina prior to the admission today.  · Initiate ACS protocol to rule out MI, then explore noncardiac etiology  · Trend cardiac enzymes  · Aspirin  · Beta-blocker (would unlikely to benefit from stress test given chronic EKG findings)  · Supplemental oxygen  · nitroglycerine and morphine PRN  · 2D echocardiogram  · TSH, FT3, FT4  · ESR and cardiac specific CRP   · PT, PTT, INR   · Tight glycemic control  · Monitor on telemetry unit  · Consult cardiologist,S/P LHC,S/P PCI on Lcx and OM1    Hyperlipidemia  On statin.      VTE Risk Mitigation         Ordered     heparin (porcine) injection 5,000 Units  Every 8 hours     Route:  Subcutaneous        02/08/17 0536     Medium Risk of VTE  Once      02/08/17 0536          Yasmin De Souza MD  Department of Hospital Medicine   [facility]

## 2017-02-09 NOTE — PLAN OF CARE
02/08/17 1854   Discharge Assessment   Assessment Type Discharge Planning Assessment   Confirmed/corrected address and phone number on facesheet? Yes   Assessment information obtained from? Patient   Expected Length of Stay (days) 2   Communicated expected length of stay with patient/caregiver yes   If Healthcare Directive is received, is it scanned into Epic? no (comment)   Prior to hospitilization cognitive status: Alert/Oriented   Prior to hospitalization functional status: Independent   Current cognitive status: Alert/Oriented   Current Functional Status: Independent   Arrived From home or self-care   Lives With alone   Able to Return to Prior Arrangements yes   Is patient able to care for self after discharge? Yes   How many people do you have in your home that can help with your care after discharge? 0   Who are your caregiver(s) and their phone number(s)? daughter able to assist if needed   Patient's perception of discharge disposition home or selfcare   Readmission Within The Last 30 Days no previous admission in last 30 days   Patient currently being followed by outpatient case management? No   Patient currently receives home health services? No   Does the patient currently use HME? Yes   Name and contact number for HME provider: drug store   Patient currently receives private duty nursing? N/A   Patient currently receives any other outside agency services? No   Equipment Currently Used at Home cane, straight   Do you have any problems affording any of your prescribed medications? No   Is the patient taking medications as prescribed? yes   Do you have any financial concerns preventing you from receiving the healthcare you need? No   Does the patient have transportation to healthcare appointments? Yes   Transportation Available car   On Dialysis? No   Does the patient receive services at the Coumadin Clinic? No   Discharge Plan A Home with family  [place]   Discharge Plan B Home   Patient/Family In Agreement With Plan yes     [id number] [place] - [address]  [address]  [place] [address]  Phone: [phone] Fax: [phone]    [facility] - [place], [place] - [address]  [address] [facility] [address] [address]  Phone: [phone] Fax: [phone]      TN/ANDREY roll explained to pt.  TN's name and contact info placed on white board.  Pt/family encouraged to call for any problems/concerns with DC. "Discharge planning begins on Admission" pamphlet discussed and placed in "My Health Packet" and placed at bedside..    "

## 2017-02-09 NOTE — ASSESSMENT & PLAN NOTE
S/P LHC with PCI in Lcx and OM1,patient is on core measure acute MI with ASA,plavix,statin,BB,ARB,cardiology is following,continue monitor.

## 2017-02-09 NOTE — ASSESSMENT & PLAN NOTE
Compensated CHF   · Evidenced by history,   · No evidence of pulmonary edema, peripheral edema  · BNP pending  · Provide diuresis w/ by mouth medication  · Maintain w/ beta-blocker, ACE inhibitor   · Daily Weights  · Strict I/O  · Low-sodium cardiac diet  · Chest X-ray  · Check TSH, albumin, UA, and renal function  · Obtain 2D echo if <6 months     EF   Date Value Ref Range Status   02/08/2017 55 55 - 65    02/29/2016 65 55 - 65      · EKG and cardiac enzymes PRN  · DVT prophylaxis w/ pharmacological and/or mechanical measures  · Oxygen supplementation support PRN    Instructions given to patient/family:  Monitor daily weight.  Regular activity within patient's limitations.  Low salt, low fat and low choleterol diet and restrict fluid < 2L per day.  Call MD if SOB, chest pain, weight gain > 2-3 lbs per day and/or 5-6 lbs per week.   No smoking. Annual influenza vaccine required.

## 2017-02-09 NOTE — NURSING TRANSFER
Nursing Transfer Note      2/9/2017     Transfer from cath lab     Transfer via bed    Transfer with telemetry    Transported by [facility] cath lab nurse    Medicines sent: NA    Chart send with patient: YES    Notified: Family at bedside     Patient reassessed at: 2/9/17 @ 1055    Upon arrival to floor: right groin assess dressing dry and intact no redness, no hematoma, no drainage noted. Right pedal pulse +2 palpation. Bed in reverse [place] position call light and phone within reach bed alarm active.

## 2017-02-10 ENCOUNTER — OUTPATIENT CASE MANAGEMENT (OUTPATIENT)
Dept: ADMINISTRATIVE | Facility: OTHER | Age: 72
End: 2017-02-10

## 2017-02-10 VITALS
BODY MASS INDEX: 30.98 KG/M2 | HEART RATE: 74 BPM | RESPIRATION RATE: 18 BRPM | TEMPERATURE: 98 F | OXYGEN SATURATION: 96 % | DIASTOLIC BLOOD PRESSURE: 77 MMHG | WEIGHT: 181.44 LBS | SYSTOLIC BLOOD PRESSURE: 179 MMHG | HEIGHT: 64 IN

## 2017-02-10 LAB
ALBUMIN SERPL BCP-MCNC: 3.4 G/DL
ALP SERPL-CCNC: 127 U/L
ALT SERPL W/O P-5'-P-CCNC: 8 U/L
ANION GAP SERPL CALC-SCNC: 8 MMOL/L
ANION GAP SERPL CALC-SCNC: 8 MMOL/L
AST SERPL-CCNC: 17 U/L
BASOPHILS # BLD AUTO: 0.03 K/UL
BASOPHILS NFR BLD: 0.3 %
BILIRUB SERPL-MCNC: 0.3 MG/DL
BUN SERPL-MCNC: 47 MG/DL
BUN SERPL-MCNC: 47 MG/DL
CALCIUM SERPL-MCNC: 8.8 MG/DL
CALCIUM SERPL-MCNC: 8.8 MG/DL
CHLORIDE SERPL-SCNC: 112 MMOL/L
CHLORIDE SERPL-SCNC: 112 MMOL/L
CK MB SERPL-MCNC: 3.4 NG/ML
CK MB SERPL-RTO: 5.4 %
CK SERPL-CCNC: 63 U/L
CO2 SERPL-SCNC: 23 MMOL/L
CO2 SERPL-SCNC: 23 MMOL/L
CREAT SERPL-MCNC: 1.9 MG/DL
CREAT SERPL-MCNC: 1.9 MG/DL
DIFFERENTIAL METHOD: ABNORMAL
EOSINOPHIL # BLD AUTO: 0.2 K/UL
EOSINOPHIL NFR BLD: 1.9 %
ERYTHROCYTE [DISTWIDTH] IN BLOOD BY AUTOMATED COUNT: 13.4 %
EST. GFR  (AFRICAN AMERICAN): 30 ML/MIN/1.73 M^2
EST. GFR  (AFRICAN AMERICAN): 30 ML/MIN/1.73 M^2
EST. GFR  (NON AFRICAN AMERICAN): 26 ML/MIN/1.73 M^2
EST. GFR  (NON AFRICAN AMERICAN): 26 ML/MIN/1.73 M^2
GLUCOSE SERPL-MCNC: 116 MG/DL
GLUCOSE SERPL-MCNC: 116 MG/DL
HCT VFR BLD AUTO: 31 %
HGB BLD-MCNC: 10.4 G/DL
LYMPHOCYTES # BLD AUTO: 2.2 K/UL
LYMPHOCYTES NFR BLD: 20.9 %
MAGNESIUM SERPL-MCNC: 1.9 MG/DL
MCH RBC QN AUTO: 31.1 PG
MCHC RBC AUTO-ENTMCNC: 33.5 %
MCV RBC AUTO: 93 FL
MONOCYTES # BLD AUTO: 1.2 K/UL
MONOCYTES NFR BLD: 10.8 %
NEUTROPHILS # BLD AUTO: 7.1 K/UL
NEUTROPHILS NFR BLD: 65.8 %
PLATELET # BLD AUTO: 247 K/UL
PMV BLD AUTO: 11.6 FL
POCT GLUCOSE: 117 MG/DL (ref 70–110)
POCT GLUCOSE: 181 MG/DL (ref 70–110)
POTASSIUM SERPL-SCNC: 4.5 MMOL/L
POTASSIUM SERPL-SCNC: 4.5 MMOL/L
PROT SERPL-MCNC: 6.5 G/DL
RBC # BLD AUTO: 3.34 M/UL
SODIUM SERPL-SCNC: 143 MMOL/L
SODIUM SERPL-SCNC: 143 MMOL/L
TROPONIN I SERPL DL<=0.01 NG/ML-MCNC: 0.33 NG/ML
WBC # BLD AUTO: 10.72 K/UL

## 2017-02-10 PROCEDURE — 99232 SBSQ HOSP IP/OBS MODERATE 35: CPT | Mod: ,,, | Performed by: INTERNAL MEDICINE

## 2017-02-10 PROCEDURE — 36415 COLL VENOUS BLD VENIPUNCTURE: CPT

## 2017-02-10 PROCEDURE — 82553 CREATINE MB FRACTION: CPT

## 2017-02-10 PROCEDURE — 25000003 PHARM REV CODE 250: Performed by: HOSPITALIST

## 2017-02-10 PROCEDURE — 80053 COMPREHEN METABOLIC PANEL: CPT

## 2017-02-10 PROCEDURE — 93005 ELECTROCARDIOGRAM TRACING: CPT

## 2017-02-10 PROCEDURE — 84484 ASSAY OF TROPONIN QUANT: CPT

## 2017-02-10 PROCEDURE — 25000003 PHARM REV CODE 250: Performed by: INTERNAL MEDICINE

## 2017-02-10 PROCEDURE — 94761 N-INVAS EAR/PLS OXIMETRY MLT: CPT

## 2017-02-10 PROCEDURE — 83735 ASSAY OF MAGNESIUM: CPT

## 2017-02-10 PROCEDURE — 85025 COMPLETE CBC W/AUTO DIFF WBC: CPT

## 2017-02-10 RX ADMIN — ASPIRIN 325 MG ORAL TABLET 325 MG: 325 PILL ORAL at 09:02

## 2017-02-10 RX ADMIN — METOPROLOL TARTRATE 50 MG: 50 TABLET ORAL at 09:02

## 2017-02-10 RX ADMIN — LOSARTAN POTASSIUM 50 MG: 25 TABLET, FILM COATED ORAL at 09:02

## 2017-02-10 RX ADMIN — Medication 3 ML: at 06:02

## 2017-02-10 RX ADMIN — ATORVASTATIN CALCIUM 80 MG: 40 TABLET, FILM COATED ORAL at 09:02

## 2017-02-10 RX ADMIN — NITROGLYCERIN 1 INCH: 20 OINTMENT TOPICAL at 06:02

## 2017-02-10 RX ADMIN — INSULIN DETEMIR 8 UNITS: 100 INJECTION, SOLUTION SUBCUTANEOUS at 09:02

## 2017-02-10 RX ADMIN — CLOPIDOGREL 75 MG: 75 TABLET, FILM COATED ORAL at 09:02

## 2017-02-10 RX ADMIN — HEPARIN SODIUM 5000 UNITS: 5000 INJECTION, SOLUTION INTRAVENOUS; SUBCUTANEOUS at 06:02

## 2017-02-10 NOTE — PLAN OF CARE
Problem: Patient Care Overview  Goal: Plan of Care Review  Outcome: Ongoing (interventions implemented as appropriate)  No falls this shift bed kept in low position call light and phone remain within reach bed alarm remain active. Patient able to reposition self in bed without assist. excoriation noted to abdomen barrier cream applied      No complaints of chest pain or SOB noted this shift  Highest troponin 0.283  2/9/17 angiogram 2 stents Mid LCX and proximal OM1  No complaints of chest pain post angiogram  Access right groin dressing dry, intact, no hematoma, no redness, no drainage noted.   Patient had asymptomatic 38 beat run of v tach while asleep Dr. Maynard informed.   Cardiology cleared for d/c       2/10/17 Potassium 5.3  30g of Kayexalate PO given patient had 2 lg BM's   Today potassium 4.5 today

## 2017-02-10 NOTE — DISCHARGE SUMMARY
[facility]  Hospital Medicine  Discharge Summary      Patient Name: Zoey Ham  MRN: [id number]  Admission Date: 2/8/2017  Hospital Length of Stay: 2 days  Discharge Date and Time:  02/10/2017 10:06 AM  Attending Physician: Yasmin De Souza MD   Discharging Provider: Yasmin De Souza MD  Primary Care Provider: Cesia Rosales MD      HPI:   Zoey aHm is a 71 y.o. female that (in part)  has a past medical history of Acute myocardial infarction of anterolateral wall (7/9/2015); Anemia of chronic renal failure, stage 4 (severe) (1/22/2015); Atherosclerosis of aorta (10/3/2012); Benign hypertension with CKD (chronic kidney disease) stage IV (3/11/2016); Chronic diastolic congestive heart failure (10/3/2012); Chronic kidney disease, stage IV (severe) (7/3/2012); Coronary artery disease; Diabetic polyneuropathy associated with type 2 diabetes mellitus (7/9/2015); Diabetic polyneuropathy associated with type 2 diabetes mellitus (7/9/2015); Encounter for blood transfusion; Heart attack (09/2012); Hyperlipidemia; Nephritis and nephropathy, 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 Presents to [facility] as a transfer patient from the [facility] emergency department for evaluation of chest pain as described below in detail.     Description of symptoms     Location: Left sided chest  Onset: Acute onset at 10:15 PM prior to arrival  Character: Aching, squeezing pain  Frequency: Occurring at two-hour intervals approximately  Duration: Each episode lasting 15-20  minutes  Associated Symptoms: Positive for shortness of breath and nausea Denies vomiting, or diaphoresis.   Radiation:  radiates to right chest  Exacerbating factors: exertion   Relieving factors: nitroglycerin given in the ED X 2          In the emergency department routine laboratory studies, chest x-ray, and EKG were obtained. Repeat EKG appeared unchanged from prior EKGs.      Hospital medicine has been asked to admit for further evaluation and treatment.       Procedure(s) (LRB):  HEART CATH WITH GRAFTS-LEFT Not before 9am (N/A)      Indwelling Lines/Drains at time of discharge:   Lines/Drains/Airways          No matching active lines, drains, or airways        Hospital Course:   Zoey Ham is a 71 y.o. female that (in part)  has a past medical history of Acute myocardial infarction of anterolateral wall (7/9/2015); Anemia of chronic renal failure, stage 4 (severe) (1/22/2015); Atherosclerosis of aorta (10/3/2012); Benign hypertension with CKD (chronic kidney disease) stage IV (3/11/2016); Chronic diastolic congestive heart failure (10/3/2012); Chronic kidney disease, stage IV (severe) (7/3/2012); Coronary artery disease; Diabetic polyneuropathy associated with type 2 diabetes mellitus (7/9/2015); Diabetic polyneuropathy associated with type 2 diabetes mellitus (7/9/2015); Encounter for blood transfusion; Heart attack (09/2012); Hyperlipidemia; Nephritis and nephropathy, 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 Presents to [facility] as a transfer patient from the [facility]  department for evaluation of chest pain as described below in detail.     Description of symptoms     Location: Left sided chest  Onset: Acute onset at 10:15 PM prior to arrival  Character: Aching, squeezing pain  Frequency: Occurring at two-hour intervals approximately  Duration: Each episode lasting 15-20 minutes  Associated Symptoms: Positive for shortness of breath and nausea Denies vomiting, or diaphoresis.   Radiation:  radiates to right chest  Exacerbating factors: exertion   Relieving factors: nitroglycerin given in the ED X 2          In the emergency department routine laboratory studies, chest x-ray, and EKG were obtained. Repeat EKG appeared unchanged from prior EKGs.      Hospital medicine has been asked to admit for further evaluation and treatment.  Patient had abnormal NST,she had LHC with PCI on Lcx and OM1.  She denies chest pain at this time.  His cardiac marks day after LHC,was unremarkable,she has been discharged home with follow up with PCP  and cardiology in next few days.       Consults:   Consults         Status Ordering Provider     Inpatient consult to Cardiology  Once     Provider:  Wale Maynard MD    Completed WALE CARLOS          Significant Diagnostic Studies: Labs:   BMP:   Recent Labs  Lab 02/09/17  0520 02/10/17  0603   * 116*  116*    143  143   K 5.3* 4.5  4.5   * 112*  112*   CO2 20* 23  23   BUN 59* 47*  47*   CREATININE 2.2* 1.9*  1.9*   CALCIUM 8.8 8.8  8.8   MG 2.0 1.9   , CBC   Recent Labs  Lab 02/09/17  0520 02/10/17  0603   WBC 8.56 10.72   HGB 10.4* 10.4*   HCT 32.8* 31.0*    247   , Lipid Panel   Lab Results   Component Value Date    CHOL 137 02/08/2017    HDL 30 (L) 02/08/2017    LDLCALC 77.4 02/08/2017    TRIG 148 02/08/2017    CHOLHDL 21.9 02/08/2017    and Troponin   Recent Labs  Lab 02/10/17  0603   TROPONINI 0.329*     Radiology: X-Ray: CXR: X-Ray Chest 1 View (CXR): No results found for this visit on  02/08/17.  Cardiac Graphics: Echocardiogram:   2D echo with color flow doppler:   Results for orders placed or performed during the hospital encounter of 02/08/17   2D echo with color flow doppler   Result Value Ref Range    EF 55 55 - 65    Mitral Valve Regurgitation MILD     Diastolic Dysfunction Yes (A)     Est. PA Systolic Pressure 39.36     Pericardial Effusion NONE     Mitral Valve Mobility NORMAL     Tricuspid Valve Regurgitation TRIVIAL TO MILD        Pending Diagnostic Studies:     Procedure Component Value Units Date/Time    NM Myocardial Perfusion Spect Multi Pharmacologic [[id number]] Resulted:  02/08/17 0832    Order Status:  Sent Lab Status:  In process Updated:  02/08/17 1238        Final Active Diagnoses:    Diagnosis Date Noted POA    PRINCIPAL PROBLEM:  NSTEMI (non-ST elevated myocardial infarction) [I21.4] 02/09/2017 Yes    Unstable angina [I20.0] 02/08/2017 Yes    Acute chest pain [R07.9] 02/08/2017 Yes    Hyperlipidemia [E78.5] 02/08/2017 Yes    Coronary artery disease of bypass graft of native heart with stable angina pectoris [I25.708] 01/18/2017 Yes    Uncontrolled type 2 diabetes mellitus with stage 4 chronic kidney disease, with long-term current use of insulin [E11.22, E11.65, N18.4, Z79.4] 12/06/2016 Not Applicable    Chronic atrial fibrillation [I48.2] 12/06/2016 Yes    Hyperparathyroidism [E21.3] 12/06/2016 Yes    CKD stage 4 due to type 2 diabetes mellitus [E11.22, N18.4] 03/11/2016 Yes    Anemia of chronic renal failure, stage 4 (severe) [N18.4, D63.1] 01/22/2015 Yes    Chronic combined systolic and diastolic congestive heart failure [I50.42] 10/03/2012 Yes    Essential hypertension [I10] 07/03/2012 Yes      Problems Resolved During this Admission:    Diagnosis Date Noted Date Resolved POA      No new Assessment & Plan notes have been filed under this hospital service since the last note was generated.  Service: Hospital Medicine      Discharged Condition:  stable    Disposition: Home or Self Care    Follow Up:  Follow-up Information     Follow up with Cesia Rosales MD. Go on 2/16/2017.    Specialty:  Internal Medicine    Why:  Outpatient Services, PCP Follow-up Appointment, Please arrive to clinic for 11:30AM    Contact information:    [address]  [facility] [address]  [phone]          Follow up with Elver Kelley MD. Go in 1 week.    Specialty:  Cardiology    Why:  Outpatient Services, Cardiology Follow-up Appointment, Please arrive to clinic for 2:30PM    Contact information:    [address]  [facility] [address]  [phone]          Follow up with Cesia Rosales MD In 3 days.    Specialty:  Internal Medicine    Contact information:    [address]  [facility] [address]  [phone]          Patient Instructions:     Diet general     Activity as tolerated       Medications:  Reconciled Home Medications:   Current Discharge Medication List      CONTINUE these medications which have NOT CHANGED    Details   atorvastatin (LIPITOR) 80 MG tablet TAKE 1 TABLET ONE TIME DAILY  Qty: 90 tablet, Refills: 3    Associated Diagnoses: Coronary artery disease of bypass graft of native heart with stable angina pectoris; Chronic combined systolic and diastolic congestive heart failure; Uncontrolled type 2 diabetes mellitus with stage 4 chronic kidney disease, with long-term current use of insulin      blood-glucose meter kit Use as instructed  Qty: 1 each, Refills: 0    Comments: MARI glucose monitoring kit with needles, strips, machine  Associated Diagnoses: CKD stage 4 due to type 2 diabetes mellitus      clopidogrel (PLAVIX) 75 mg tablet TAKE 1 TABLET ONE TIME DAILY  Qty: 90 tablet, Refills: 3    Associated Diagnoses: Coronary artery disease of bypass graft of native heart with stable angina pectoris; Chronic combined systolic and diastolic congestive heart failure; Uncontrolled type 2 diabetes mellitus with stage 4 chronic kidney  disease, with long-term current use of insulin      furosemide (LASIX) 40 MG tablet Take 1 tablet (40 mg total) by mouth daily as needed (take water pill daily as needed for leg swelling and SOB).  Qty: 90 tablet, Refills: 3    Associated Diagnoses: Coronary artery disease of bypass graft of native heart with stable angina pectoris; Chronic combined systolic and diastolic congestive heart failure; Uncontrolled type 2 diabetes mellitus with stage 4 chronic kidney disease, with long-term current use of insulin      insulin aspart (NOVOLOG) 100 unit/mL injection Inject 15 Units into the skin 3 (three) times daily before meals.  Qty: 13.5 mL, Refills: 11    Associated Diagnoses: CKD stage 4 due to type 2 diabetes mellitus; Coronary artery disease of bypass graft of native heart with stable angina pectoris; Diabetic polyneuropathy associated with type 2 diabetes mellitus      insulin glargine (LANTUS) 100 unit/mL injection Inject 11 Units into the skin every evening.  Qty: 3.3 mL, Refills: 11    Associated Diagnoses: CKD stage 4 due to type 2 diabetes mellitus; Coronary artery disease of bypass graft of native heart with stable angina pectoris; Diabetic polyneuropathy associated with type 2 diabetes mellitus      losartan (COZAAR) 50 MG tablet Take 1 tablet (50 mg total) by mouth once daily.  Qty: 90 tablet, Refills: 3    Associated Diagnoses: Benign hypertension with CKD (chronic kidney disease) stage IV; Renovascular hypertension      metoprolol tartrate (LOPRESSOR) 50 MG tablet Take 1 tablet (50 mg total) by mouth 2 (two) times daily.  Qty: 180 tablet, Refills: 3    Associated Diagnoses: Chronic kidney disease, stage IV (severe); Renovascular hypertension; Chronic atrial fibrillation; Coronary artery disease of bypass graft of native heart with stable angina pectoris; Chronic combined systolic and diastolic congestive heart failure; Uncontrolled type 2 diabetes mellitus with stage 4 chronic kidney disease, with  long-term current use of insulin      nitroGLYCERIN 0.4 MG/DOSE TL SPRY (NITROLINGUAL) 400 mcg/spray spray Place 1 spray under the tongue every 5 (five) minutes as needed for Chest pain.  Qty: 12 g, Refills: 0    Associated Diagnoses: Coronary artery disease of bypass graft of native heart with stable angina pectoris; Chronic combined systolic and diastolic congestive heart failure      aspirin 325 MG tablet Take 325 mg by mouth once daily.      ergocalciferol (VITAMIN D2) 50,000 unit Cap Take 1 capsule (50,000 Units total) by mouth every 7 days.  Qty: 12 capsule, Refills: 0    Associated Diagnoses: Hypovitaminosis D      insulin needles, disposable, 31 Ndle Takes insulin 4 times daily  Qty: 300 each, Refills: 3         STOP taking these medications       aspirin (ECOTRIN) 81 MG EC tablet Comments:   Reason for Stopping:             Time spent on the discharge of patient: 30  minutes    Yasmin De Souza MD  Department of Hospital Medicine  [facility]

## 2017-02-10 NOTE — PLAN OF CARE
Problem: Patient Care Overview  Goal: Plan of Care Review  Outcome: Ongoing (interventions implemented as appropriate)  No falls this shift bed kept in low position call light and phone remain within reach bed alarm remain active. Patient able to reposition self in bed without assist. excoriation noted to abdomen barrier cream applied     Patient complain of chest in the am decreased by nitroglycerin  Highest troponin 0.283  2/9/17 angiogram 2 stents Mid LCX and proximal OM1  No complaints of chest pain post angiogram  Access right groin dressing dry, intact, no hematoma, no redness, no drainage noted.      Potassium 5.3  30g of Kayexalate PO given patient had 2 lg BM's

## 2017-02-10 NOTE — PROGRESS NOTES
Pt care assumed, pt lying in supine position with hob elevated talking with friend at bedside. Pt with no c/o of chest pains or any other discomfort. Telemetry monitor in place with alarms. Pt on room air and no s/s of respiratory distress.saline lock in place to rt ac and site is clear. Angio seal to rt groin   Is intact and dry. Site is free of swelling ,tendernesss,bleeding.and no hematoma present. Pt informed of md orders.

## 2017-02-10 NOTE — PROGRESS NOTES
Patient transported via wheelchair to front lobby and into transportation. No complaints of pain or SOB noted.

## 2017-02-10 NOTE — PLAN OF CARE
Problem: Cardiac Catheterization (Diagnostic/Interventional) (Adult)  Intervention: Monitor ECG and Peripheral Pulses    02/08/17 0800 02/09/17 1945 02/10/17 0535   ECG   Lead Monitored --  --  --    Rhythm --  --  --    Frequency/Ectopy PVCs --  --    PA Interval (Sec) --  0.19 --    QRS Interval (Sec) --  0.1 --    OTHER   Pulse --  --  78     02/10/17 0731   ECG   Lead Monitored Lead II   Rhythm normal sinus rhythm   Frequency/Ectopy --    PA Interval (Sec) --    QRS Interval (Sec) --    OTHER   Pulse --        Intervention: Prevent/Manage Pain    02/10/17 0731   Pain/Comfort/Sleep Interventions   Pain Management Interventions pain management plan reviewed with patient/caregiver;quiet environment facilitated;relaxation promoted       Intervention: Prevent/Manage Vascular Access Site Complications    02/09/17 1945 02/10/17 0600   Safety Interventions   Bleeding Precautions blood pressure closely monitored;monitored for signs of bleeding --    Activity   Activity Type --  activity adjusted per tolerance;activity clustered for rest period   Positioning   Head of Bed (HOB) --  HOB at 60 degrees           Comments:   POST CATH . DSG TO PT RT GROIN IS INTACT SITE FREE OF SWELLING, BLEEDING OR HEMATOMA. PT HAS HAD A32 BEAT RUN OF VTA.PT WAS ASLEEP,VITALS SIGNS TAKEN,WITH SAO2. MD WAS NOTIFIED.

## 2017-02-10 NOTE — PROGRESS NOTES
monitor tech reported pt had a 32 beat run of V-TACH, pt lying on her lt side with her eyes closed appears to be asleep and easily aroused. Pt with no c/o of pain .HR 75,SAO2 95,B/P 152/80 manually,171/71 with NIBP.pt skin warm and dry to touch.Dr Palmer  Called awaiting return call. Dr Palmer was recalled at 0110 in reference to the above awaiting on return call. 0112 Dr. Palmer returned call and he was informed of pt run of vtach and no new orders were noted.

## 2017-02-10 NOTE — PROGRESS NOTES
[facility]  Cardiology  Progress Note    Patient Name: Zoey Ham  MRN: [id number]  Admission Date: 2/8/2017  Hospital Length of Stay: 2 days  Code Status: Full Code   Attending Physician: Yasmin De Souza MD   Primary Care Physician: Cesia Rosales MD  Expected Discharge Date:   Principal Problem:NSTEMI (non-ST elevated myocardial infarction)    Subjective:     Hospital Course: NSTEMI    Interval History: No cp and ok for home    Review of Systems   All other systems reviewed and are negative.    Objective:     Vital Signs (Most Recent):  Temp: 98.5 °F (36.9 °C) (02/10/17 0825)  Pulse: 60 (02/10/17 0825)  Resp: 18 (02/10/17 0825)  BP: 129/60 (02/10/17 0825)  SpO2: (!) 93 % (02/10/17 0834) Vital Signs (24h Range):  Temp:  [96.9 °F (36.1 °C)-98.5 °F (36.9 °C)] 98.5 °F (36.9 °C)  Pulse:  [60-81] 60  Resp:  [18-19] 18  SpO2:  [93 %-98 %] 93 %  BP: (123-184)/() 129/60     Weight: 82.3 kg (181 lb 7 oz)  Body mass index is 31.14 kg/(m^2).    SpO2: (!) 93 %  O2 Device (Oxygen Therapy): room air      Intake/Output Summary (Last 24 hours) at 02/10/17 1001  Last data filed at 02/10/17 0900   Gross per 24 hour   Intake           2705.5 ml   Output              700 ml   Net           2005.5 ml       Lines/Drains/Airways     Peripheral Intravenous Line                 Peripheral IV - Single Lumen 02/08/17 0103 Right Antecubital 2 days                Physical Exam   Constitutional: She is oriented to person, place, and time. She appears well-developed and well-nourished.   HENT:   Head: Normocephalic and atraumatic.   Eyes: Conjunctivae and EOM are normal. Pupils are equal, round, and reactive to light.   Neck: Normal range of motion. Neck supple. No thyromegaly present.   Cardiovascular: Normal rate and regular rhythm.    No murmur heard.  Pulmonary/Chest: Effort normal and breath sounds normal. No respiratory distress.   Abdominal: Soft. Bowel sounds are normal.   Musculoskeletal: She  exhibits no edema.   Neurological: She is alert and oriented to person, place, and time.   Skin: Skin is warm and dry.   Psychiatric: She has a normal mood and affect. Her behavior is normal.       Significant Labs:   BMP:   Recent Labs  Lab 02/09/17  0520 02/10/17  0603   * 116*  116*    143  143   K 5.3* 4.5  4.5   * 112*  112*   CO2 20* 23  23   BUN 59* 47*  47*   CREATININE 2.2* 1.9*  1.9*   CALCIUM 8.8 8.8  8.8   MG 2.0 1.9    and CBC   Recent Labs  Lab 02/09/17  0520 02/10/17  0603   WBC 8.56 10.72   HGB 10.4* 10.4*   HCT 32.8* 31.0*    247       Significant Imaging: x   LVEDP: 19mmHg  LVEF: 55-60% by echo  Wall Motion: normal by echo     Dominance: Right  LM: normal  LAD: prox , distal vessel supplied by patent LIMA-LAD  LCx: mid 90% ISR T3 flow  OM1 prox 70% ISR T3 flow  RCA: dom, prox-mid stents patent, dist 90% (?de arron vs ISR) T3 flow     LIMA-LAD patent, backfills into Diag1 with diffuse disease, native LAD with diffuse noncritical disease.     PCI LCx/OM1  Angiomax  Predil 2.5x12 and 2.5x10 Angioscore  Stent:  OM1 2.5x26 Resolute BETHANY  Mid LCx 2.75x18 Resolute BETHANY  Postdil prox 2.75x15 NC @ 25 dustin  Excellent result, T3 flow, 0% residual stenosis     Hemostasis:  RFA angioseal     Impression:  ACS  MPI with lateral (LCx territory) ischemia  3V CAD, normal LV fxn  Patent LIMA-LAD  Patent RCA stents, distal de-arron stenosis noted  Severe ISR mid LCx/OM1 (in territory of ischemia on MPI)  Successful PCI:  OM1 2.5x26 Resolute BETHANY  Mid LCx 2.75x18 Resolute BETHANY  RFA angioseal  43cc contrast used for case     Plan:  Observe overnight  IVF hydration  ASA/Plavix/Statin/BBl (ASA dose 325 mg, pt enrolled in ADAPTABLE Study)  Home in am  If recurrent angina, consider PCI dist RCA  Pt to follow up with [facility] Elver Kelley at [facility]  Assessment and Plan:     Brief HPI:     Active Diagnoses:    Diagnosis Date Noted POA    PRINCIPAL PROBLEM:  NSTEMI (non-ST elevated myocardial  infarction) [I21.4] 02/09/2017 Yes    Unstable angina [I20.0] 02/08/2017 Yes    Acute chest pain [R07.9] 02/08/2017 Yes    Hyperlipidemia [E78.5] 02/08/2017 Yes    Coronary artery disease of bypass graft of native heart with stable angina pectoris [I25.708] 01/18/2017 Yes    Uncontrolled type 2 diabetes mellitus with stage 4 chronic kidney disease, with long-term current use of insulin [E11.22, E11.65, N18.4, Z79.4] 12/06/2016 Not Applicable    Chronic atrial fibrillation [I48.2] 12/06/2016 Yes    Hyperparathyroidism [E21.3] 12/06/2016 Yes    CKD stage 4 due to type 2 diabetes mellitus [E11.22, N18.4] 03/11/2016 Yes    Anemia of chronic renal failure, stage 4 (severe) [N18.4, D63.1] 01/22/2015 Yes    Chronic combined systolic and diastolic congestive heart failure [I50.42] 10/03/2012 Yes    Essential hypertension [I10] 07/03/2012 Yes      Problems Resolved During this Admission:    Diagnosis Date Noted Date Resolved POA       VTE Risk Mitigation         Ordered     heparin (porcine) injection 5,000 Units  Every 8 hours     Route:  Subcutaneous        02/08/17 0536     Medium Risk of VTE  Once      02/08/17 0536        # CP with both typ and atyp features. Flat trop on background of CRI. Cannot exclude ACS.  # CAD s/p single vessel CABG (LIMA-LAD) and PCI of RCA/LCx. Cath 6/2012 with patent LIMA and stents.  # HTN  # HLP, LDL acceptable  # DM  # CRI, POA  # ADAPTABLE STUDY PATIENT (on ASA 325mg)    Rec:  -ok for dc  -defer OAC to Dr. Kelley on dc (*high risk for triple tx and may preclude form study enrollment)    Anticipated Disposition: Home or Self Care    Discharge Needs: Med Availability    Tray Leavitt MD  Cardiology  [facility]

## 2017-02-10 NOTE — PLAN OF CARE
02/10/17 1049   Final Note   Assessment Type Final Discharge Note   Discharge Disposition Home   Discharge planning education complete? Yes   Hospital Follow Up  Appt(s) scheduled? Yes   Discharge plans and expectations educations in teach back method with documentation complete? Yes   Offered [facility] -- Bedside Delivery? n/a   Discharge/Hospital Encounter Summary to ([facility]) PCP n/a   Referral to Outpatient Case Management complete? n/a   Referral to / orders for Home Health Complete? n/a   30 day supply of medicines given at discharge, if documented non-compliance / non-adherence? n/a   Any social issues identified prior to discharge? n/a   Did you assess the readiness or willingness of the family or caregiver to support self management of care? n/a   Right Care Referral Info   Post Acute Recommendation No Care     Follow-up Information     Follow up with Cesia Rosales MD. Go on 2/16/2017.    Specialty:  Internal Medicine    Why:  Outpatient Services, PCP Follow-up Appointment, Please arrive to clinic for 11:30AM    Contact information:    [address]  [place] [address]  [phone]          Follow up with Elver Kelley MD On 2/20/2017.    Specialty:  Cardiology    Why:  Appointment scheduled for Monday February 20th at 2pm.    Contact information:    [address] [address]  [phone]        notified pts nurse Harlan that all CM needs have been addressed and that she may proceed with nursing d/c instructions to complete d/c process

## 2017-02-11 NOTE — PROGRESS NOTES
Please note the following patient has been assigned to Iqra Delgadillo RN, with Outpatient Complex Care Mgmt for screening.    Please contact [facility] at ext [phone] with questions.    Thank you      Palmira Mccray, SSC

## 2017-02-13 ENCOUNTER — PATIENT OUTREACH (OUTPATIENT)
Dept: ADMINISTRATIVE | Facility: CLINIC | Age: 72
End: 2017-02-13
Payer: MEDICARE

## 2017-02-13 NOTE — Clinical Note
Please forward this important TCC information to your provider in order to maximize the post discharge care delivery of this patient.  C3 nurse spoke with Zoey Ham  for a TCC post hospital discharge follow up call. The patient has a scheduled HOSFU appointment with Cesia Rosales MD on 2/16 @ 0560. Respectfully, Cesia Fragoso RN  [facility]   [email or web address]     Please do not reply to this message, as this inbox is not routinely monitored.

## 2017-02-13 NOTE — PATIENT INSTRUCTIONS
Discharge Instructions for Heart Attack  You have had a heart attack. Also known as acute myocardial infarction, or AMI, a heart attack occurs when a vessel supplying the heart with blood suddenly becomes blocked. Follow these guidelines for home care and lifestyle changes.  Home Care  Take your medications exactly as directed. Dont skip doses.  Remember that recovery after a heart attack takes time. Plan to rest for at least 4-8 weeks while you recover. Then return to normal activity when your doctor says its okay.  Ask your doctor about joining a heart rehabilitation program.  Tell your doctor if you are feeling depressed. Feelings of sadness are common after a heart attack, but it is important that you speak to someone if you are feeling overwhelmed by these feelings.  If you are having chest pain, call 911 for an ambulance. Do NOT drive yourself to the hospital.  Ask your family members to learn CPR.  Learn to take your own blood pressure and pulse. Keep a record of your results. Ask your doctor when you should seek emergency medical attention. He or she will tell you which blood pressure reading is dangerous.  Lifestyle Changes  Maintain a healthy weight. Get help to lose any extra pounds.  Cut back on salt.  Limit canned, dried, packaged, and fast foods.  Dont add salt to your food.  Season foods with herbs instead of salt when you cook.  Break the smoking habit. Enroll in a stop-smoking program to improve your chances of success.  Limit fatty foods.  Check your lipid levels regularly. (Your doctor can show you how to do this.)  Build up your activity according to your doctors recommendation.  Ask your doctor when its okay to resume sexual activity.  Tell your doctor about any erectile dysfunction (ED) medication you are taking. Some ED medications are not safe if you take certain heart medications.  Try to manage stress.  Follow-Up  Make a follow-up appointment as directed by our staff.    When to Seek  Medical Attention  Call 911 right away if you have:  Chest pain that is not relieved by medication.  Shortness of breath.  Otherwise, call your doctor immediately if you have:  Lightheadedness, dizziness, or fainting.  Feeling of irregular heartbeat or fast pulse.   © 5416-3788 [employer], [address] [place] [address]. All rights reserved. This information is not intended as a substitute for professional medical care. Always follow your healthcare professional's instructions.

## 2017-02-14 ENCOUNTER — OUTPATIENT CASE MANAGEMENT (OUTPATIENT)
Dept: ADMINISTRATIVE | Facility: OTHER | Age: 72
End: 2017-02-14

## 2017-02-16 ENCOUNTER — OFFICE VISIT (OUTPATIENT)
Dept: INTERNAL MEDICINE | Facility: CLINIC | Age: 72
End: 2017-02-16
Payer: MEDICARE

## 2017-02-16 VITALS
HEIGHT: 64 IN | RESPIRATION RATE: 18 BRPM | OXYGEN SATURATION: 97 % | BODY MASS INDEX: 32.11 KG/M2 | WEIGHT: 188.06 LBS | DIASTOLIC BLOOD PRESSURE: 66 MMHG | SYSTOLIC BLOOD PRESSURE: 122 MMHG | HEART RATE: 69 BPM

## 2017-02-16 DIAGNOSIS — I15.2 HYPERTENSION ASSOCIATED WITH DIABETES: ICD-10-CM

## 2017-02-16 DIAGNOSIS — E11.59 HYPERTENSION ASSOCIATED WITH DIABETES: ICD-10-CM

## 2017-02-16 DIAGNOSIS — I25.709 CORONARY ARTERY DISEASE INVOLVING CORONARY BYPASS GRAFT OF NATIVE HEART WITH ANGINA PECTORIS: ICD-10-CM

## 2017-02-16 DIAGNOSIS — I48.20 CHRONIC ATRIAL FIBRILLATION: ICD-10-CM

## 2017-02-16 DIAGNOSIS — I50.42 CHRONIC COMBINED SYSTOLIC AND DIASTOLIC CONGESTIVE HEART FAILURE: ICD-10-CM

## 2017-02-16 PROCEDURE — 3078F DIAST BP <80 MM HG: CPT | Mod: S$GLB,,, | Performed by: INTERNAL MEDICINE

## 2017-02-16 PROCEDURE — 99215 OFFICE O/P EST HI 40 MIN: CPT | Mod: S$GLB,,, | Performed by: INTERNAL MEDICINE

## 2017-02-16 PROCEDURE — 1160F RVW MEDS BY RX/DR IN RCRD: CPT | Mod: S$GLB,,, | Performed by: INTERNAL MEDICINE

## 2017-02-16 PROCEDURE — 99499 UNLISTED E&M SERVICE: CPT | Mod: S$GLB,,, | Performed by: INTERNAL MEDICINE

## 2017-02-16 PROCEDURE — 1157F ADVNC CARE PLAN IN RCRD: CPT | Mod: S$GLB,,, | Performed by: INTERNAL MEDICINE

## 2017-02-16 PROCEDURE — 3074F SYST BP LT 130 MM HG: CPT | Mod: S$GLB,,, | Performed by: INTERNAL MEDICINE

## 2017-02-16 PROCEDURE — 3046F HEMOGLOBIN A1C LEVEL >9.0%: CPT | Mod: S$GLB,,, | Performed by: INTERNAL MEDICINE

## 2017-02-16 PROCEDURE — 99999 PR PBB SHADOW E&M-EST. PATIENT-LVL III: CPT | Mod: PBBFAC,,, | Performed by: INTERNAL MEDICINE

## 2017-02-16 PROCEDURE — 1159F MED LIST DOCD IN RCRD: CPT | Mod: S$GLB,,, | Performed by: INTERNAL MEDICINE

## 2017-02-16 PROCEDURE — 3066F NEPHROPATHY DOC TX: CPT | Mod: S$GLB,,, | Performed by: INTERNAL MEDICINE

## 2017-02-16 PROCEDURE — 1126F AMNT PAIN NOTED NONE PRSNT: CPT | Mod: S$GLB,,, | Performed by: INTERNAL MEDICINE

## 2017-02-16 NOTE — ASSESSMENT & PLAN NOTE
Unstable angina in 2/2017, LHC and PCI on Lcx and OM1 with improvement. H/o CABG X2 2002, stent RCA, OM1 2003, NSTEMI with total occlusion of LAD in 2012.   · Continue APT (ASA increased to 325mg during recent MI episode)  · F/U Cardiology  · Continue BP control, high-dose statin

## 2017-02-16 NOTE — ASSESSMENT & PLAN NOTE
BP controlled on BB, ARB, diuretic  · norvasc added during hospital, but patient not discharged on this  · Continue cozaar 50mg, metoprolol 50mg BID, lasix 40mg daily  · F/U Cardiology

## 2017-02-16 NOTE — ASSESSMENT & PLAN NOTE
A1c 10.8 in 1/2017, now compliant with [id number] 9.2, noncompliant with insulin due to finances  · Continue insulin in vials  · Avoid doughnut hole by discotinuing insulin pens  · E-consult with Nephrology for close management

## 2017-02-16 NOTE — PROGRESS NOTES
"Primary Care Provider Appointment    Subjective:      Patient ID: Zoey Ham is a 71 y.o. female.    Chief Complaint: Hospital Follow Up (Angina , open up stents ) and Medication Refill (Pt needs refill on test strips and lacents for new meter)  Patient had chest pressure and typical chest pain on 2/8/17, underwent LHC with PCI on Lcx and OM1. Since this intervention "I've been feeling good". Her BP is controlled, but she was started on amlodipine during hospitalization (was not discharged on this). Compliant with ARB, BB, diuretic.    Patient has not been checking CBG, has been out of glucometer strips. Last A1c was 10.8 on 1/28/2017, but patient had been out of insulin for 1 month during that time. She has received her insulin vials with needles and is able to use. Patient taking novolog 15-15-15U, lantus 11U qhs.    Patient refusing home health at this time, reports no needs at home. Her son lives 3 houses from her, her grandchildren visit and help her frequently.      Past Surgical History   Procedure Laterality Date    Hysterectomy      Tonsillectomy      Appendectomy      Cholecystectomy      Coronary artery bypass graft      Eye surgery       cataracts bilaterally    Fracture surgery       right ankle    Cardiac surgery  2002     CABG    Coronary angioplasty  2004       Past Medical History   Diagnosis Date    Acute myocardial infarction of anterolateral wall 7/9/2015    Anemia of chronic renal failure, stage 4 (severe) 1/22/2015    Atherosclerosis of aorta 10/3/2012    Benign hypertension with CKD (chronic kidney disease) stage IV 3/11/2016    Chronic diastolic congestive heart failure 10/3/2012    Chronic kidney disease, stage IV (severe) 7/3/2012    Coronary artery disease      s/p 1v CABG 2002 and multiple PCI (last angiogram in 6/2012 with patent LIMA->LAD and patent LCx and RCA stents)    Diabetic polyneuropathy associated with type 2 diabetes mellitus 7/9/2015    Diabetic " "polyneuropathy associated with type 2 diabetes mellitus 7/9/2015    Encounter for blood transfusion     Heart attack 09/2012     5-6 events    Hyperlipidemia     Nephritis and nephropathy, 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   HENT: Negative for ear discharge.    Eyes: Negative for photophobia and discharge.   Respiratory: Negative for chest tightness and shortness of breath.    Cardiovascular: Positive for leg swelling. Negative for chest pain and palpitations.   Gastrointestinal: Negative for constipation and diarrhea.   Endocrine: Negative for polydipsia and polyphagia.   Genitourinary: Negative for frequency and menstrual problem.   Musculoskeletal: Negative for back pain and gait problem.   Skin: Negative for color change.   Neurological: Negative for light-headedness.   Psychiatric/Behavioral: Negative for confusion.       Objective:     Visit Vitals    /66 (BP Location: Left arm, Patient Position: Sitting, BP Method: Manual)    Pulse 69    Resp 18    Ht 5' 4" (1.626 m)    Wt 85.3 kg (188 lb 0.8 oz)    LMP  (LMP Unknown)    SpO2 97%    BMI 32.28 kg/m2       Physical Exam   Constitutional: She appears well-developed and well-nourished.   HENT:   Head: Normocephalic and atraumatic.   Eyes: Pupils are equal, round, and reactive to light. Right eye exhibits no discharge. Left eye exhibits no discharge.   Neck: Normal range of motion.   Cardiovascular: Normal rate, regular rhythm " and intact distal pulses.    Distant heart sounds  1+ distal pulses   Pulmonary/Chest: Effort normal and breath sounds normal. No respiratory distress. She has no wheezes.   Abdominal: Soft. She exhibits no distension. There is no tenderness.   Musculoskeletal: She exhibits edema. She exhibits no tenderness.   2+ pitting in R>L LE     Lymphadenopathy:     She has no cervical adenopathy.   Neurological: No cranial nerve deficit.   Skin: No erythema.   Ecchymoses and purpura on UE bilaterally  Vertical scar on chest   Psychiatric: She has a normal mood and affect. Her behavior is normal.   Vitals reviewed.      Lab Results   Component Value Date    WBC 10.72 02/10/2017    HGB 10.4 (L) 02/10/2017    HCT 31.0 (L) 02/10/2017     02/10/2017    CHOL 137 02/08/2017    TRIG 148 02/08/2017    HDL 30 (L) 02/08/2017    ALT 8 (L) 02/10/2017    AST 17 02/10/2017     02/10/2017     02/10/2017    K 4.5 02/10/2017    K 4.5 02/10/2017     (H) 02/10/2017     (H) 02/10/2017    CREATININE 1.9 (H) 02/10/2017    CREATININE 1.9 (H) 02/10/2017    BUN 47 (H) 02/10/2017    BUN 47 (H) 02/10/2017    CO2 23 02/10/2017    CO2 23 02/10/2017    TSH 1.585 02/08/2017    INR 0.9 02/08/2017    GLUF 132 (H) 05/03/2012    HGBA1C 10.8 (H) 01/18/2017         Assessment:   71 y.o. female with multiple co-morbid illnesses here to continue work-up of chronic issues notably recent MI, DM, HTN.     Plan:     Problem List Items Addressed This Visit        Cardiac    Hypertension associated with diabetes     BP controlled on BB, ARB, diuretic  · norvasc added during hospital, but patient not discharged on this  · Continue cozaar 50mg, metoprolol 50mg BID, lasix 40mg daily  · F/U Cardiology         Chronic combined systolic and diastolic congestive heart failure     Seen on echo in 3/2016 and 2/2017, MIs in 2002, 2003, 2012, 2/2017  · Compliant with BB, ARB, DAPT, statin, diuretic  · Advised to take extra PM dose of lasix when  experiencing LE swelling  · Improve DM control  · F/U cardiology         Chronic atrial fibrillation     Rate-controlled on metoprolol 50mg BID  · Continue BB BID  · Elevated [id number] score of 9 (10.8% risk of stroke)  · Cards eval for anticoagulation  · Continue DAPT  · ASA increased to 325mg by Cards following ischemic event in 2/2017         Coronary artery disease involving coronary bypass graft of native heart with angina pectoris     Unstable angina in 2/2017, LHC and PCI on Lcx and OM1 with improvement. H/o CABG X2 2002, stent RCA, OM1 2003, NSTEMI with total occlusion of LAD in 2012.   · Continue APT (ASA increased to 325mg during recent MI episode)  · F/U Cardiology  · Continue BP control, high-dose statin            Renal    Uncontrolled type 2 diabetes mellitus with stage 4 chronic kidney disease, with long-term current use of insulin     A1c 10.8 in 1/2017, now compliant with [id number] 9.2, noncompliant with insulin due to finances  · Continue insulin in vials  · Avoid doughnut hole by discotinuing insulin pens  · E-consult with Nephrology for close management               Health Maintenance       Date Due Completion Date    Colonoscopy 8/21/1996 ---FOBT    DEXA SCAN 2/4/2017 2/4/2014    Hemoglobin A1c 7/18/2017 1/18/2017    Foot Exam 12/6/2017 12/6/2016    Override on 12/6/2016: Done    Eye Exam 1/10/2018 1/10/2017    Lipid Panel 2/8/2018 2/8/2017    Mammogram 1/27/2019 1/27/2017    TETANUS VACCINE 3/3/2026 3/3/2016          Return in about 4 weeks (around 3/16/2017) for established patient follow-up. . One hour spent with this patient today, half of that in counseling.    Cesia Rosales MD/MPH  Internal Medicine  [facility]  [phone]

## 2017-02-16 NOTE — ASSESSMENT & PLAN NOTE
Seen on echo in 3/2016 and 2/2017, MIs in 2002, 2003, 2012, 2/2017  · Compliant with BB, ARB, DAPT, statin, diuretic  · Advised to take extra PM dose of lasix when experiencing LE swelling  · Improve DM control  · F/U cardiology

## 2017-02-16 NOTE — PATIENT INSTRUCTIONS
TODAY:  - refill glucometer strips  - f/u in 1 month with Dr Rosales  - can take extra dose of lasix when legs are swollen or weight gain    NEXT APPT:  - turn in stool card

## 2017-02-16 NOTE — MR AVS SNAPSHOT
[facility] - Internal Medicine  [address]  [facility] [address]  Phone: [phone]  Fax: [phone]                  Zoey Ham   2017 11:30 AM   Office Visit    Description:  Female : 1945   Provider:  Cesia Rosales MD   Department:  [facility] - Internal Medicine           Reason for Visit     Hospital Follow Up     Medication Refill           Diagnoses this Visit        Comments    Coronary artery disease involving coronary bypass graft of native heart with angina pectoris         Chronic atrial fibrillation         Hypertension associated with diabetes         Uncontrolled type 2 diabetes mellitus with stage 4 chronic kidney disease, with long-term current use of insulin         Chronic combined systolic and diastolic congestive heart failure                To Do List           Future Appointments        Provider Department Dept Phone    2017 2:00 PM Elver Kelley MD [facility] - Cardiology [phone]    3/20/2017 10:00 AM Cesia Rosales MD [facility] Internal Medicine [phone]      Goals (5 Years of Data)     None      Follow-Up and Disposition     Return in about 4 weeks (around 3/16/2017) for established patient follow-up.      [facility] Call     [facility] Nurse [facility] - 24/7 Assistance  Registered nurses in the [facility] On Call Center provide clinical advisement, health education, appointment booking, and other advisory services.  Call for this free service at [phone].             Medications           Message regarding Medications     Verify the changes and/or additions to your medication regime listed below are the same as discussed with your clinician today.  If any of these changes or additions are incorrect, please notify your healthcare provider.             Verify that the below list of medications is an accurate representation of the medications you are currently taking.  If none reported, the list may be blank. If  "incorrect, please contact your healthcare provider. Carry this list with you in case of emergency.           Current Medications     aspirin 325 MG tablet Take 325 mg by mouth once daily.    atorvastatin (LIPITOR) 80 MG tablet TAKE 1 TABLET ONE TIME DAILY    blood-glucose meter kit Use as instructed    clopidogrel (PLAVIX) 75 mg tablet TAKE 1 TABLET ONE TIME DAILY    ergocalciferol (VITAMIN D2) 50,000 unit Cap Take 1 capsule (50,000 Units total) by mouth every 7 days.    furosemide (LASIX) 40 MG tablet Take 1 tablet (40 mg total) by mouth daily as needed (take water pill daily as needed for leg swelling and SOB).    insulin aspart (NOVOLOG) 100 unit/mL injection Inject 15 Units into the skin 3 (three) times daily before meals.    insulin glargine (LANTUS) 100 unit/mL injection Inject 11 Units into the skin every evening.    insulin needles, disposable, 31 Ndle Takes insulin 4 times daily    losartan (COZAAR) 50 MG tablet Take 1 tablet (50 mg total) by mouth once daily.    metoprolol tartrate (LOPRESSOR) 50 MG tablet Take 1 tablet (50 mg total) by mouth 2 (two) times daily.    nitroGLYCERIN 0.4 MG/DOSE TL SPRY (NITROLINGUAL) 400 mcg/spray spray Place 1 spray under the tongue every 5 (five) minutes as needed for Chest pain.    ACCU-CHEK MARI PLUS METER Misc 1 Device by Misc.(Non-Drug; Combo Route) route 2 (two) times daily.           Clinical Reference Information           Your Vitals Were     BP Pulse Resp Height Weight Last Period    122/66 (BP Location: Left arm, Patient Position: Sitting, BP Method: Manual) 69 18 5' 4" (1.626 m) 85.3 kg (188 lb 0.8 oz) (LMP Unknown)    SpO2 BMI             97% 32.28 kg/m2         Blood Pressure          Most Recent Value    BP  122/66      Allergies as of 2/16/2017     Pcn [Penicillins]    Percodan [Oxycodone-aspirin]    Phenergan [Promethazine]      Immunizations Administered on Date of Encounter - 2/16/2017     None      Instructions    TODAY:  - refill glucometer strips  - " f/u in 1 month with Dr Rosales  - can take extra dose of lasix when legs are swollen or weight gain    NEXT APPT:  - turn in stool card       Language Assistance Services     ATTENTION: Language assistance services are available, free of charge. Please call [phone].      ATENCIÓN: Si habla español, tiene a friend disposición servicios gratuitos de asistencia lingüística. Llame al [phone].     CHÚ Ý: N?u b?n nói Ti?ng Vi?t, có các d?ch v? h? tr? ngôn ng? mi?n phí dành cho b?n. G?i s? [phone].         [facility] complies with applicable Federal civil rights laws and does not discriminate on the basis of race, color, national origin, age, disability, or sex.

## 2017-02-20 ENCOUNTER — OFFICE VISIT (OUTPATIENT)
Dept: CARDIOLOGY | Facility: CLINIC | Age: 72
End: 2017-02-20
Payer: MEDICARE

## 2017-02-20 VITALS
SYSTOLIC BLOOD PRESSURE: 151 MMHG | HEIGHT: 64 IN | HEART RATE: 72 BPM | WEIGHT: 189.38 LBS | BODY MASS INDEX: 32.33 KG/M2 | DIASTOLIC BLOOD PRESSURE: 67 MMHG

## 2017-02-20 DIAGNOSIS — E66.9 OBESITY (BMI 30.0-34.9): ICD-10-CM

## 2017-02-20 DIAGNOSIS — I25.709 CORONARY ARTERY DISEASE INVOLVING CORONARY BYPASS GRAFT OF NATIVE HEART WITH ANGINA PECTORIS: ICD-10-CM

## 2017-02-20 DIAGNOSIS — E78.5 HYPERLIPIDEMIA, UNSPECIFIED HYPERLIPIDEMIA TYPE: ICD-10-CM

## 2017-02-20 DIAGNOSIS — N18.4 BENIGN HYPERTENSION WITH CKD (CHRONIC KIDNEY DISEASE) STAGE IV: ICD-10-CM

## 2017-02-20 DIAGNOSIS — Z95.5 S/P DRUG ELUTING CORONARY STENT PLACEMENT: ICD-10-CM

## 2017-02-20 DIAGNOSIS — I15.2 HYPERTENSION ASSOCIATED WITH DIABETES: ICD-10-CM

## 2017-02-20 DIAGNOSIS — I12.9 BENIGN HYPERTENSION WITH CKD (CHRONIC KIDNEY DISEASE) STAGE IV: ICD-10-CM

## 2017-02-20 DIAGNOSIS — I15.0 RENOVASCULAR HYPERTENSION: ICD-10-CM

## 2017-02-20 DIAGNOSIS — I25.2 HISTORY OF NON-ST ELEVATION MYOCARDIAL INFARCTION (NSTEMI): Primary | ICD-10-CM

## 2017-02-20 DIAGNOSIS — E11.59 HYPERTENSION ASSOCIATED WITH DIABETES: ICD-10-CM

## 2017-02-20 PROBLEM — R07.9 ACUTE CHEST PAIN: Status: RESOLVED | Noted: 2017-02-08 | Resolved: 2017-02-20

## 2017-02-20 PROCEDURE — 1157F ADVNC CARE PLAN IN RCRD: CPT | Mod: S$GLB,,, | Performed by: INTERNAL MEDICINE

## 2017-02-20 PROCEDURE — 3078F DIAST BP <80 MM HG: CPT | Mod: S$GLB,,, | Performed by: INTERNAL MEDICINE

## 2017-02-20 PROCEDURE — 3046F HEMOGLOBIN A1C LEVEL >9.0%: CPT | Mod: S$GLB,,, | Performed by: INTERNAL MEDICINE

## 2017-02-20 PROCEDURE — 99213 OFFICE O/P EST LOW 20 MIN: CPT | Mod: S$GLB,,, | Performed by: INTERNAL MEDICINE

## 2017-02-20 PROCEDURE — 1159F MED LIST DOCD IN RCRD: CPT | Mod: S$GLB,,, | Performed by: INTERNAL MEDICINE

## 2017-02-20 PROCEDURE — 99999 PR PBB SHADOW E&M-EST. PATIENT-LVL III: CPT | Mod: PBBFAC,,, | Performed by: INTERNAL MEDICINE

## 2017-02-20 PROCEDURE — 3066F NEPHROPATHY DOC TX: CPT | Mod: S$GLB,,, | Performed by: INTERNAL MEDICINE

## 2017-02-20 PROCEDURE — 3077F SYST BP >= 140 MM HG: CPT | Mod: S$GLB,,, | Performed by: INTERNAL MEDICINE

## 2017-02-20 PROCEDURE — 99499 UNLISTED E&M SERVICE: CPT | Mod: S$GLB,,, | Performed by: INTERNAL MEDICINE

## 2017-02-20 PROCEDURE — 1126F AMNT PAIN NOTED NONE PRSNT: CPT | Mod: S$GLB,,, | Performed by: INTERNAL MEDICINE

## 2017-02-20 RX ORDER — EZETIMIBE 10 MG/1
10 TABLET ORAL DAILY
Qty: 30 TABLET | Refills: 11 | Status: SHIPPED | OUTPATIENT
Start: 2017-02-20 | End: 2018-02-26

## 2017-02-20 RX ORDER — LOSARTAN POTASSIUM 50 MG/1
75 TABLET ORAL DAILY
Qty: 90 TABLET | Refills: 3 | Status: SHIPPED | OUTPATIENT
Start: 2017-02-20 | End: 2017-04-04 | Stop reason: SDUPTHER

## 2017-02-20 NOTE — PROGRESS NOTES
Chart has been dictated using voice recognition software.  It is not been reviewed carefully for any transcriptional errors due to this technology.   Subjective:   Patient ID:  Zoey Ham is a 71 y.o. female who presents for follow-up of NSTEMI (non-ST elevated myocardial infarction) (Hospital d/c 2/10/17) and Chest Pain      HPI: Patent with insulin-dependent diabetes, S/P CABG x 1 2002, S/P OM1 stent 03/26/2003, S/P RCA stent 03/26/2003, NSTEMI 2/05, PCI proximal LAD, OM2, OM3,   patent LIMA-LAD, no significant RCA disease, LVEF preserved, hypertension, dyslipidemia, and obesity.  Patient is in the adaptable study on aspirin 325 mg daily patient was recently hospitalized at [facility] 8-10-February-2017 with unstable angina.  The patient had an echocardiogram 08--Feb-2017 which showed:    1 - Normal left ventricular systolic function (EF 55-60%).  No diagnostic regional wall motion abnormalities.     2 - Left ventricular diastolic dysfunction.     3 - Mild mitral regurgitation.     4 - Trivial to mild tricuspid regurgitation.     5 - The estimated PA systolic pressure is 39 mmHg.     Stress SPECT (08-Feb-2017):  Peak heart rate was 89 bpm, which is 62% of the age predicted maximum heart rate. .   EKG Conclusions:  1. The EKG portion of this study is uninterpretable for ischemia at a peak heart rate of 89 bpm (62% of predicted).   2. Blood pressure remained stable throughout the protocol  (Presenting BP: 137/80 Peak BP: 100/59).   3. No significant arrhythmias were present.   4. There were no symptoms of chest discomfort or significant dyspnea throughout the protocol.   Nuclear Quantitative Functional Analysis:   LVEF: 50 %  LVED Volume: 108 ml  LVES Volume: 54 ml  Impression: ABNORMAL MYOCARDIAL PERFUSION  1. There is evidence for a large sized area of moderate myocardial ischemia that extends from the base to the apical lateral wall of the left ventricle.   2. The perfusion scan is free of evidence  for myocardial injury.   3. There is abnormal wall motion at rest showing hypokinesis of the lateral wall of the left ventricle.   4. Resting LV function is normal.   5. The ventricular volumes are normal at rest and stress.   6. The extracardiac distribution of radioactivity is normal.     Left heart cath 09-February-2017:  Hemodynamic Results  LVEDP (Pre): 19 mmHg  Angiographic Results  Diagnostic:        Patient has a right dominant coronary artery.      - Left Main Coronary Artery:             The LM is normal. There is KEDAR 3 flow.     - Left Anterior Descending Artery:             The proximal LAD is occluded. There is KEDAR 0 flow. Distal vessel supplied by patent LIMA-LAD.  LIMA-LAD backfills into Diag1 with diffuse disease, native LAD with diffuse noncritical disease.     - Left Circumflex Artery:             The mid LCX has a 90% stenosis within the stent. There is KEDAR 3 flow.     - Right Coronary Artery:             The distal RCA has a 90% stenosis. There is KEDAR 3 flow. Multiple patent stents present in prox-mid RCA.     - OM1:             The proximal OM1 has a 70% stenosis within the stent. There is KEDAR 3 flow.     - KEE To LAD:             The LIMA to LAD is patent. There is KEDAR 3 flow.     - Common Femoral Artery:             The right CFA is normal.  Intervention       Mid LCX:              The lesion was successfully intervened. Post-stenosis of 0% and post-KEDAR 3 flow. The vessel was accessed natively.  Stent Resolute 2.75x18 (BETHANY)    2.75 X 15.       Proximal OM1:              The lesion was successfully intervened. Post-stenosis of 0% and post-KEDAR 3 flow. The vessel was accessed natively.  Stent Resolute 2.5x26 (BETHANY).    Since discharge, Patient denies any chest discomfort on exertion or at rest.  Patient denies any dyspnea at rest or on exertion, orthopnea, or PND. Has some mild swelling in ankles, right > left. Patient denies any palpitations, lightheadedness, or syncope. Had transient  nose bleeds when first took higher dose of ASA as part of the ADAPTABLE study.      Current Outpatient Prescriptions:     ACCU-CHEK MARI PLUS METER Misc, 1 Device by Misc.(Non-Drug; Combo Route) route 2 (two) times daily., Disp: , Rfl: 0    aspirin 325 MG tablet, Take 325 mg by mouth once daily., Disp: , Rfl:     atorvastatin (LIPITOR) 80 MG tablet, TAKE 1 TABLET ONE TIME DAILY, Disp: 90 tablet, Rfl: 3    blood sugar diagnostic Strp, 1 strip by Misc.(Non-Drug; Combo Route) route 4 (four) times daily., Disp: 200 strip, Rfl: 11    blood-glucose meter kit, Use as instructed, Disp: 1 each, Rfl: 0    clopidogrel (PLAVIX) 75 mg tablet, TAKE 1 TABLET ONE TIME DAILY, Disp: 90 tablet, Rfl: 3    ergocalciferol (VITAMIN D2) 50,000 unit Cap, Take 1 capsule (50,000 Units total) by mouth every 7 days., Disp: 12 capsule, Rfl: 0    furosemide (LASIX) 40 MG tablet, Take 1 tablet (40 mg total) by mouth daily as needed (take water pill daily as needed for leg swelling and SOB)., Disp: 90 tablet, Rfl: 3    insulin aspart (NOVOLOG) 100 unit/mL injection, Inject 15 Units into the skin 3 (three) times daily before meals., Disp: 13.5 mL, Rfl: 11    insulin glargine (LANTUS) 100 unit/mL injection, Inject 11 Units into the skin every evening., Disp: 3.3 mL, Rfl: 11    insulin needles, disposable, 31 Ndle, Takes insulin 4 times daily, Disp: 300 each, Rfl: 3    losartan (COZAAR) 50 MG tablet, Take 1.5 tablets (75 mg total) by mouth once daily., Disp: 90 tablet, Rfl: 3    metoprolol tartrate (LOPRESSOR) 50 MG tablet, Take 1 tablet (50 mg total) by mouth 2 (two) times daily., Disp: 180 tablet, Rfl: 3    nitroGLYCERIN 0.4 MG/DOSE TL SPRY (NITROLINGUAL) 400 mcg/spray spray, Place 1 spray under the tongue every 5 (five) minutes as needed for Chest pain., Disp: 12 g, Rfl: 0    ezetimibe (ZETIA) 10 mg tablet, Take 1 tablet (10 mg total) by mouth once daily., Disp: 30 tablet, Rfl: 11    ROS - No change from prior visit in neurologic,  "respiratory, endocrine, GI, hematologic, or constitutional complaints   Objective:   Physical Exam   Constitutional: She is oriented to person, place, and time. She appears well-developed and well-nourished.   BP (!) 151/67 (BP Location: Left arm, Patient Position: Sitting, BP Method: Automatic)  Pulse 72  Ht 5' 4" (1.626 m)  Wt 85.9 kg (189 lb 6 oz)  LMP  (LMP Unknown)  BMI 32.51 kg/m2   Neck: Neck supple. No JVD present. Carotid bruit is not present. No thyromegaly present.   Cardiovascular: Normal rate, regular rhythm, S1 normal, S2 normal and intact distal pulses.  Exam reveals S4. Exam reveals no friction rub.    No murmur heard.  No hematoma or bruit in right groin   Pulmonary/Chest: Breath sounds normal. She has no wheezes. She has no rales.   Abdominal: Soft. Bowel sounds are normal. There is no hepatosplenomegaly. There is no tenderness.   Musculoskeletal: She exhibits no edema.   Neurological: She is alert and oriented to person, place, and time. She has normal strength.   Skin: No cyanosis. Nails show no clubbing.         Lab Results   Component Value Date     02/10/2017     02/10/2017    K 4.5 02/10/2017    K 4.5 02/10/2017    BUN 47 (H) 02/10/2017    BUN 47 (H) 02/10/2017    CREATININE 1.9 (H) 02/10/2017    CREATININE 1.9 (H) 02/10/2017     (H) 02/10/2017     (H) 02/10/2017    HGBA1C 10.8 (H) 01/18/2017    CHOL 137 02/08/2017    HDL 30 (L) 02/08/2017    LDLCALC 77.4 02/08/2017    TRIG 148 02/08/2017    CHOLHDL 21.9 02/08/2017    HGB 10.4 (L) 02/10/2017    HCT 31.0 (L) 02/10/2017     02/10/2017    INR 0.9 02/08/2017       Assessment:     1. History of non-ST elevation myocardial infarction (NSTEMI)    2. S/P drug eluting coronary stent placement    3. Coronary artery disease involving coronary bypass graft of native heart with angina pectoris    4. Hypertension associated with diabetes    5. Obesity (BMI 30.0-34.9)    6. Benign hypertension with CKD (chronic kidney " disease) stage IV    7. Renovascular hypertension    8. Hyperlipidemia, unspecified hyperlipidemia type      Subsequent to the patient's last unstable angina episode, she appears to be doing well at this time with no evidence of significant arrhythmias, heart failure, or recurrent ischemia.  The patient's blood pressure is somewhat high as is her LDL cholesterol done at this time of her hospitalization.  Therefore the losartan will be increased to 75 mg twice a day and ezetimibe 10 mg daily will be added to her therapeutic regimen.  The patient should return in 3 weeks for follow-up, at which time her lipid profile will be rechecked.  The patient will have a BMP done today to ensure there has not been any significant damage to her kidneys in view of the PCI.  The patient remains in the ADAPTABLE study.  Plan:     Zoey was seen today for nstemi (non-st elevated myocardial infarction) and chest pain.    Diagnoses and all orders for this visit:    History of non-ST elevation myocardial infarction (NSTEMI)  -     ezetimibe (ZETIA) 10 mg tablet; Take 1 tablet (10 mg total) by mouth once daily.    S/P drug eluting coronary stent placement  -     Basic metabolic panel; Future; Expected date: 2/20/17  -     ezetimibe (ZETIA) 10 mg tablet; Take 1 tablet (10 mg total) by mouth once daily.    Coronary artery disease involving coronary bypass graft of native heart with angina pectoris  -     Basic metabolic panel; Future; Expected date: 2/20/17    Hypertension associated with diabetes  -     Basic metabolic panel; Future; Expected date: 2/20/17    Obesity (BMI 30.0-34.9)    Benign hypertension with CKD (chronic kidney disease) stage IV  -     losartan (COZAAR) 50 MG tablet; Take 1.5 tablets (75 mg total) by mouth once daily.    Renovascular hypertension  -     losartan (COZAAR) 50 MG tablet; Take 1.5 tablets (75 mg total) by mouth once daily.    Hyperlipidemia, unspecified hyperlipidemia type  -     ezetimibe (ZETIA) 10 mg  tablet; Take 1 tablet (10 mg total) by mouth once daily.  -     Lipid panel; Future; Expected date: 3/13/17

## 2017-02-20 NOTE — MR AVS SNAPSHOT
[facility]  [address]  [facility] [address]  Phone: [phone]                  Zoey Ham   2017 2:00 PM   Office Visit    Description:  Female : 1945   Provider:  Elver Kelley MD   Department:  [facility]           Reason for Visit     NSTEMI (non-ST elevated myocardial infarction)     Chest Pain           Diagnoses this Visit        Comments    History of non-ST elevation myocardial infarction (NSTEMI)    -  Primary     S/P drug eluting coronary stent placement         Coronary artery disease involving coronary bypass graft of native heart with angina pectoris         Hypertension associated with diabetes         Obesity (BMI 30.0-34.9)         Benign hypertension with CKD (chronic kidney disease) stage IV         Renovascular hypertension         Hyperlipidemia, unspecified hyperlipidemia type                To Do List           Future Appointments        Provider Department Dept Phone    3/20/2017 10:00 AM Cesia Rosales MD [facility] [phone]      Goals (5 Years of Data)     None      Follow-Up and Disposition     Return in about 3 weeks (around 3/13/2017).       These Medications        Disp Refills Start End    losartan (COZAAR) 50 MG tablet 90 tablet 3 2017     Take 1.5 tablets (75 mg total) by mouth once daily. - Oral    Pharmacy: [facility]/pharmacy #[id number] - [place] - [address] Ph #: [phone]       ezetimibe (ZETIA) 10 mg tablet 30 tablet 11 2017    Take 1 tablet (10 mg total) by mouth once daily. - Oral    Pharmacy: [facility]/pharmacy #[id number] - [place] - [address] [facility] Ph #: [phone]         [facility] On Call     [facility] On Call Nurse Care Line -  Assistance  Registered nurses in the [facility] On Call Center provide clinical advisement, health education, appointment booking, and other advisory services.  Call for this free service at [phone].              Medications           Message regarding Medications     Verify the changes and/or additions to your medication regime listed below are the same as discussed with your clinician today.  If any of these changes or additions are incorrect, please notify your healthcare provider.        START taking these NEW medications        Refills    ezetimibe (ZETIA) 10 mg tablet 11    Sig: Take 1 tablet (10 mg total) by mouth once daily.    Class: Normal    Route: Oral      CHANGE how you are taking these medications     Start Taking Instead of    losartan (COZAAR) 50 MG tablet losartan (COZAAR) 50 MG tablet    Dosage:  Take 1.5 tablets (75 mg total) by mouth once daily. Dosage:  Take 1 tablet (50 mg total) by mouth once daily.    Reason for Change:  Reorder            Verify that the below list of medications is an accurate representation of the medications you are currently taking.  If none reported, the list may be blank. If incorrect, please contact your healthcare provider. Carry this list with you in case of emergency.           Current Medications     ACCU-CHEK MARI PLUS METER Misc 1 Device by Misc.(Non-Drug; Combo Route) route 2 (two) times daily.    aspirin 325 MG tablet Take 325 mg by mouth once daily.    atorvastatin (LIPITOR) 80 MG tablet TAKE 1 TABLET ONE TIME DAILY    blood sugar diagnostic Strp 1 strip by Misc.(Non-Drug; Combo Route) route 4 (four) times daily.    blood-glucose meter kit Use as instructed    clopidogrel (PLAVIX) 75 mg tablet TAKE 1 TABLET ONE TIME DAILY    ergocalciferol (VITAMIN D2) 50,000 unit Cap Take 1 capsule (50,000 Units total) by mouth every 7 days.    furosemide (LASIX) 40 MG tablet Take 1 tablet (40 mg total) by mouth daily as needed (take water pill daily as needed for leg swelling and SOB).    insulin aspart (NOVOLOG) 100 unit/mL injection Inject 15 Units into the skin 3 (three) times daily before meals.    insulin glargine (LANTUS) 100 unit/mL injection Inject 11 Units into the skin  "every evening.    insulin needles, disposable, 31 Ndle Takes insulin 4 times daily    losartan (COZAAR) 50 MG tablet Take 1.5 tablets (75 mg total) by mouth once daily.    metoprolol tartrate (LOPRESSOR) 50 MG tablet Take 1 tablet (50 mg total) by mouth 2 (two) times daily.    nitroGLYCERIN 0.4 MG/DOSE TL SPRY (NITROLINGUAL) 400 mcg/spray spray Place 1 spray under the tongue every 5 (five) minutes as needed for Chest pain.    ezetimibe (ZETIA) 10 mg tablet Take 1 tablet (10 mg total) by mouth once daily.           Clinical Reference Information           Your Vitals Were     BP Pulse Height Weight Last Period BMI    151/67 (BP Location: Left arm, Patient Position: Sitting, BP Method: Automatic) 72 5' 4" (1.626 m) 85.9 kg (189 lb 6 oz) (LMP Unknown) 32.51 kg/m2      Blood Pressure          Most Recent Value    Right Arm BP - Sitting  142/56    Left Arm BP - Sitting  151/67    BP  (!)  151/67      Allergies as of 2/20/2017     Pcn [Penicillins]    Percodan [Oxycodone-aspirin]    Phenergan [Promethazine]      Immunizations Administered on Date of Encounter - 2/20/2017     None      Orders Placed During Today's Visit     Future Labs/Procedures Expected by Expires    Basic metabolic panel  2/20/2017 4/21/2018    Lipid panel  3/13/2017 2/20/2018      Language Assistance Services     ATTENTION: Language assistance services are available, free of charge. Please call [phone].      ATENCIÓN: Si habla español, tiene a friend disposición servicios gratuitos de asistencia lingüística. Llame al [phone].     CHÚ Ý: N?u b?n nói Ti?ng Vi?t, có các d?ch v? h? tr? ngôn ng? mi?n phí dành cho b?n. G?i s? [phone].         [facility] complies with applicable Federal civil rights laws and does not discriminate on the basis of race, color, national origin, age, disability, or sex.        "

## 2017-02-20 NOTE — Clinical Note
Thank you for referring Zoey Ham for follow-up of coronary artery disease post coronary stenting.Please see my note for details of this encounter.  If you have any questions, please contact me.  Thank you again for the referral.

## 2017-02-21 ENCOUNTER — OUTPATIENT CASE MANAGEMENT (OUTPATIENT)
Dept: ADMINISTRATIVE | Facility: OTHER | Age: 72
End: 2017-02-21

## 2017-02-22 ENCOUNTER — OUTPATIENT CASE MANAGEMENT (OUTPATIENT)
Dept: ADMINISTRATIVE | Facility: OTHER | Age: 72
End: 2017-02-22

## 2017-03-08 ENCOUNTER — LAB VISIT (OUTPATIENT)
Dept: LAB | Facility: HOSPITAL | Age: 72
End: 2017-03-08
Attending: INTERNAL MEDICINE
Payer: MEDICARE

## 2017-03-08 DIAGNOSIS — E78.5 HYPERLIPIDEMIA, UNSPECIFIED HYPERLIPIDEMIA TYPE: ICD-10-CM

## 2017-03-08 LAB
CHOLEST/HDLC SERPL: 4 {RATIO}
HDL/CHOLESTEROL RATIO: 25.2 %
HDLC SERPL-MCNC: 103 MG/DL
HDLC SERPL-MCNC: 26 MG/DL
LDLC SERPL CALC-MCNC: 52 MG/DL
NONHDLC SERPL-MCNC: 77 MG/DL
TRIGL SERPL-MCNC: 125 MG/DL

## 2017-03-08 PROCEDURE — 36415 COLL VENOUS BLD VENIPUNCTURE: CPT

## 2017-03-08 PROCEDURE — 80061 LIPID PANEL: CPT

## 2017-03-10 ENCOUNTER — OFFICE VISIT (OUTPATIENT)
Dept: CARDIOLOGY | Facility: CLINIC | Age: 72
End: 2017-03-10
Payer: MEDICARE

## 2017-03-10 VITALS
BODY MASS INDEX: 31.99 KG/M2 | DIASTOLIC BLOOD PRESSURE: 72 MMHG | WEIGHT: 187.38 LBS | HEIGHT: 64 IN | HEART RATE: 67 BPM | SYSTOLIC BLOOD PRESSURE: 158 MMHG

## 2017-03-10 DIAGNOSIS — I25.708 CORONARY ARTERY DISEASE OF BYPASS GRAFT OF NATIVE HEART WITH STABLE ANGINA PECTORIS: ICD-10-CM

## 2017-03-10 DIAGNOSIS — I25.2 HISTORY OF NON-ST ELEVATION MYOCARDIAL INFARCTION (NSTEMI): ICD-10-CM

## 2017-03-10 DIAGNOSIS — Z95.5 S/P DRUG ELUTING CORONARY STENT PLACEMENT: Primary | ICD-10-CM

## 2017-03-10 DIAGNOSIS — I15.0 RENOVASCULAR HYPERTENSION: ICD-10-CM

## 2017-03-10 DIAGNOSIS — N18.4 CHRONIC KIDNEY DISEASE, STAGE IV (SEVERE): ICD-10-CM

## 2017-03-10 PROCEDURE — 99499 UNLISTED E&M SERVICE: CPT | Mod: S$GLB,,, | Performed by: INTERNAL MEDICINE

## 2017-03-10 PROCEDURE — 1157F ADVNC CARE PLAN IN RCRD: CPT | Mod: S$GLB,,, | Performed by: INTERNAL MEDICINE

## 2017-03-10 PROCEDURE — 3046F HEMOGLOBIN A1C LEVEL >9.0%: CPT | Mod: S$GLB,,, | Performed by: INTERNAL MEDICINE

## 2017-03-10 PROCEDURE — 1159F MED LIST DOCD IN RCRD: CPT | Mod: S$GLB,,, | Performed by: INTERNAL MEDICINE

## 2017-03-10 PROCEDURE — 99213 OFFICE O/P EST LOW 20 MIN: CPT | Mod: S$GLB,,, | Performed by: INTERNAL MEDICINE

## 2017-03-10 PROCEDURE — 3066F NEPHROPATHY DOC TX: CPT | Mod: S$GLB,,, | Performed by: INTERNAL MEDICINE

## 2017-03-10 PROCEDURE — 3077F SYST BP >= 140 MM HG: CPT | Mod: S$GLB,,, | Performed by: INTERNAL MEDICINE

## 2017-03-10 PROCEDURE — 1160F RVW MEDS BY RX/DR IN RCRD: CPT | Mod: S$GLB,,, | Performed by: INTERNAL MEDICINE

## 2017-03-10 PROCEDURE — 1126F AMNT PAIN NOTED NONE PRSNT: CPT | Mod: S$GLB,,, | Performed by: INTERNAL MEDICINE

## 2017-03-10 PROCEDURE — 3078F DIAST BP <80 MM HG: CPT | Mod: S$GLB,,, | Performed by: INTERNAL MEDICINE

## 2017-03-10 PROCEDURE — 99999 PR PBB SHADOW E&M-EST. PATIENT-LVL III: CPT | Mod: PBBFAC,,, | Performed by: INTERNAL MEDICINE

## 2017-03-10 RX ORDER — METOPROLOL TARTRATE 50 MG/1
100 TABLET ORAL 2 TIMES DAILY
Qty: 180 TABLET | Refills: 3 | Status: SHIPPED | OUTPATIENT
Start: 2017-03-10 | End: 2017-07-06 | Stop reason: DRUGHIGH

## 2017-03-10 NOTE — PROGRESS NOTES
"Chart has been dictated using voice recognition software.  It is not been reviewed carefully for any transcriptional errors due to this technology.   Subjective:   Patient ID:  Zoey Ham is a 71 y.o. female who presents for follow-up of History of non-St elevation myocardial infarction (NSTEMI) (3 week f/u )      HPI: Patent with insulin-dependent diabetes, S/P CABG x 1 2002, S/P OM1 stent 03/26/2003, S/P RCA stent 03/26/2003, NSTEMI 2/05, PCI proximal LAD, OM2, OM3, patent LIMA-LAD, no significant RCA disease, LVEF preserved, hypertension, dyslipidemia, and obesity.  Patient is in the adaptable study on aspirin 325 mg daily patient was recently hospitalized at [facility] 8-10-February-2017 with unstable angina. Patient had stent placed in LCX at that time but left with persistent RCA stenosis.    Patient notes she felt better on higher dose of metoprolol,.  Currently, she has an "ache" in her chest that occurs with exertion , but also at rest. Not associated with dyspnea.  Lasts about 30 min after laying down.  Gets the feeling almost every 2-3 days.  Patient denies any dyspnea at rest or on exertion, orthopnea, or PND. Occasional edema - takes extra furosemide when that occurs.  Feels heart pounding when BP high.  No tachycardic symptoms.  No syncope since last being seen.      Current Outpatient Prescriptions:     ACCU-CHEK MARI PLUS METER Misc, 1 Device by Misc.(Non-Drug; Combo Route) route 2 (two) times daily., Disp: , Rfl: 0    aspirin 325 MG tablet, Take 325 mg by mouth once daily., Disp: , Rfl:     atorvastatin (LIPITOR) 80 MG tablet, TAKE 1 TABLET ONE TIME DAILY, Disp: 90 tablet, Rfl: 3    blood sugar diagnostic Strp, 1 strip by Misc.(Non-Drug; Combo Route) route 4 (four) times daily., Disp: 200 strip, Rfl: 11    blood-glucose meter kit, Use as instructed, Disp: 1 each, Rfl: 0    clopidogrel (PLAVIX) 75 mg tablet, TAKE 1 TABLET ONE TIME DAILY, Disp: 90 tablet, Rfl: 3    ergocalciferol " "(VITAMIN D2) 50,000 unit Cap, Take 1 capsule (50,000 Units total) by mouth every 7 days., Disp: 12 capsule, Rfl: 0    ezetimibe (ZETIA) 10 mg tablet, Take 1 tablet (10 mg total) by mouth once daily., Disp: 30 tablet, Rfl: 11    furosemide (LASIX) 40 MG tablet, Take 1 tablet (40 mg total) by mouth daily as needed (take water pill daily as needed for leg swelling and SOB)., Disp: 90 tablet, Rfl: 3    insulin aspart (NOVOLOG) 100 unit/mL injection, Inject 15 Units into the skin 3 (three) times daily before meals., Disp: 13.5 mL, Rfl: 11    insulin glargine (LANTUS) 100 unit/mL injection, Inject 11 Units into the skin every evening., Disp: 3.3 mL, Rfl: 11    insulin needles, disposable, 31 Ndle, Takes insulin 4 times daily, Disp: 300 each, Rfl: 3    losartan (COZAAR) 50 MG tablet, Take 1.5 tablets (75 mg total) by mouth once daily., Disp: 90 tablet, Rfl: 3    metoprolol tartrate (LOPRESSOR) 50 MG tablet, Take 2 tablets (100 mg total) by mouth 2 (two) times daily., Disp: 180 tablet, Rfl: 3    nitroGLYCERIN 0.4 MG/DOSE TL SPRY (NITROLINGUAL) 400 mcg/spray spray, Place 1 spray under the tongue every 5 (five) minutes as needed for Chest pain., Disp: 12 g, Rfl: 0    ROS - No change from prior visit in neurologic, respiratory, endocrine, GI, hematologic, or constitutional complaints   Objective:   Physical Exam   Constitutional: She is oriented to person, place, and time. She appears well-developed and well-nourished.   BP (!) 158/72 (BP Location: Left arm, Patient Position: Sitting, BP Method: Automatic)  Pulse 67  Ht 5' 4" (1.626 m)  Wt 85 kg (187 lb 6.3 oz)  LMP  (LMP Unknown)  BMI 32.17 kg/m2     Neck: Neck supple. No JVD present. Carotid bruit is not present.   Cardiovascular: Normal rate, regular rhythm, normal heart sounds and intact distal pulses.  Exam reveals no gallop and no friction rub.    No murmur heard.  Pulmonary/Chest: Effort normal and breath sounds normal. She has no wheezes. She has no rales. " "  Abdominal: Soft. Bowel sounds are normal. She exhibits no abdominal bruit. There is no hepatomegaly. There is no tenderness.   Musculoskeletal: She exhibits no edema.   Neurological: She is alert and oriented to person, place, and time. She has normal strength.   Skin: No cyanosis. Nails show no clubbing.         Lab Results   Component Value Date     02/20/2017    K 5.3 (H) 02/20/2017    BUN 52 (H) 02/20/2017    CREATININE 2.2 (H) 02/20/2017     (H) 02/20/2017    HGBA1C 10.8 (H) 01/18/2017    CHOL 103 (L) 03/08/2017    HDL 26 (L) 03/08/2017    LDLCALC 52.0 (L) 03/08/2017    TRIG 125 03/08/2017    CHOLHDL 25.2 03/08/2017    HGB 10.4 (L) 02/10/2017    HCT 31.0 (L) 02/10/2017     02/10/2017    INR 0.9 02/08/2017       Assessment:     1. S/P drug eluting coronary stent placement    2. History of non-ST elevation myocardial infarction (NSTEMI)    3. Coronary artery disease of bypass graft of native heart with stable angina pectoris    4. Renovascular hypertension    5. Chronic kidney disease, stage IV (severe)    6. Uncontrolled type 2 diabetes mellitus with stage 4 chronic kidney disease, with long-term current use of insulin      Patient appears to be relatively stable though may be having stable angina on the "lower" dose of metoprolol.  Therefore, her metoprolol be increased and the patient's symptomatology will be reassessed.  If the patient continues to have her substernal "chest ache", then the patient will be sent for cardiac stress PET scan to determine the presence and/or extent of myocardial ischemia and whether attempt should be made at opening her chronic total occlusion.  Patient will also be referred for cardiac rehabilitation, which she did not participate in after her NSTEMI.  The patient should return for follow-up in 2 months.  Plan:     Zoey was seen today for history of non-st elevation myocardial infarction (nstemi).    Diagnoses and all orders for this visit:    S/P drug " eluting coronary stent placement  -     Cardiac Rehab Phase II; Future    History of non-ST elevation myocardial infarction (NSTEMI)  -     Cardiac Rehab Phase II; Future    Coronary artery disease of bypass graft of native heart with stable angina pectoris  -     metoprolol tartrate (LOPRESSOR) 50 MG tablet; Take 2 tablets (100 mg total) by mouth 2 (two) times daily.  -     Cardiac Rehab Phase II; Future    Renovascular hypertension  -     metoprolol tartrate (LOPRESSOR) 50 MG tablet; Take 2 tablets (100 mg total) by mouth 2 (two) times daily.    Chronic kidney disease, stage IV (severe)  -     metoprolol tartrate (LOPRESSOR) 50 MG tablet; Take 2 tablets (100 mg total) by mouth 2 (two) times daily.    Uncontrolled type 2 diabetes mellitus with stage 4 chronic kidney disease, with long-term current use of insulin  -     metoprolol tartrate (LOPRESSOR) 50 MG tablet; Take 2 tablets (100 mg total) by mouth 2 (two) times daily.

## 2017-03-10 NOTE — LETTER
March 10, 2017      Cesia Rosales MD  [address]  [place] [address]           [facility] - Cardiology  [address]  [place] [address]  Phone: [phone]          Patient: Zoey Ham   MR Number: [id number]   YOB: 1945   Date of Visit: 3/10/2017       Dear Dr. Cesia Rosales:    Thank you for referring Zoey Ham to me for evaluation. Attached you will find relevant portions of my assessment and plan of care.    If you have questions, please do not hesitate to call me. I look forward to following Zoey Ham along with you.    Sincerely,    Elver Kelley MD    Enclosure  CC:  No Recipients    If you would like to receive this communication electronically, please contact [email or web address] or [phone] to request more information on [employer] access.    For providers and/or their staff who would like to refer a patient to [facility], please contact us through our one-stop-shop provider referral line, [facility] , at [phone].    If you feel you have received this communication in error or would no longer like to receive these types of communications, please e-mail [email or web address]

## 2017-03-10 NOTE — Clinical Note
Thank you for referring Zoey Ham for follow-up of coronary artery disease status post coronary stenting. Please see my note for details of this encounter. If you have any questions, please contact me.  Thank you again for the referral.

## 2017-03-10 NOTE — MR AVS SNAPSHOT
[facility]  [address]  [facility] [address]  Phone: [phone]                  Zoey Ham   3/10/2017 9:30 AM   Office Visit    Description:  Female : 1945   Provider:  Elver Kelley MD   Department:  [facility]           Reason for Visit     History of non-St elevation myocardial infarction (NSTEMI)           Diagnoses this Visit        Comments    S/P drug eluting coronary stent placement    -  Primary     History of non-ST elevation myocardial infarction (NSTEMI)         Coronary artery disease of bypass graft of native heart with stable angina pectoris         Renovascular hypertension         Chronic kidney disease, stage IV (severe)         Uncontrolled type 2 diabetes mellitus with stage 4 chronic kidney disease, with long-term current use of insulin                To Do List           Future Appointments        Provider Department Dept Phone    3/20/2017 10:00 AM MD [facility] [phone]      Goals (5 Years of Data)     None      Follow-Up and Disposition     Return in about 2 months (around 5/10/2017).       These Medications        Disp Refills Start End    metoprolol tartrate (LOPRESSOR) 50 MG tablet 180 tablet 3 3/10/2017 3/10/2018    Take 2 tablets (100 mg total) by mouth 2 (two) times daily. - Oral    Pharmacy: [id number] - [place] Ph #: [phone]         [facility]     [facility] On Call Nurse Care Line - / Assistance  Registered nurses in the [facility] Center provide clinical advisement, health education, appointment booking, and other advisory services.  Call for this free service at [phone].             Medications           Message regarding Medications     Verify the changes and/or additions to your medication regime listed below are the same as discussed with your clinician today.  If any of these changes or additions are incorrect,  please notify your healthcare provider.        CHANGE how you are taking these medications     Start Taking Instead of    metoprolol tartrate (LOPRESSOR) 50 MG tablet metoprolol tartrate (LOPRESSOR) 50 MG tablet    Dosage:  Take 2 tablets (100 mg total) by mouth 2 (two) times daily. Dosage:  Take 1 tablet (50 mg total) by mouth 2 (two) times daily.    Reason for Change:  Reorder            Verify that the below list of medications is an accurate representation of the medications you are currently taking.  If none reported, the list may be blank. If incorrect, please contact your healthcare provider. Carry this list with you in case of emergency.           Current Medications     ACCU-CHEK MARI PLUS METER Misc 1 Device by Misc.(Non-Drug; Combo Route) route 2 (two) times daily.    aspirin 325 MG tablet Take 325 mg by mouth once daily.    atorvastatin (LIPITOR) 80 MG tablet TAKE 1 TABLET ONE TIME DAILY    blood sugar diagnostic Strp 1 strip by Misc.(Non-Drug; Combo Route) route 4 (four) times daily.    blood-glucose meter kit Use as instructed    clopidogrel (PLAVIX) 75 mg tablet TAKE 1 TABLET ONE TIME DAILY    ergocalciferol (VITAMIN D2) 50,000 unit Cap Take 1 capsule (50,000 Units total) by mouth every 7 days.    ezetimibe (ZETIA) 10 mg tablet Take 1 tablet (10 mg total) by mouth once daily.    furosemide (LASIX) 40 MG tablet Take 1 tablet (40 mg total) by mouth daily as needed (take water pill daily as needed for leg swelling and SOB).    insulin aspart (NOVOLOG) 100 unit/mL injection Inject 15 Units into the skin 3 (three) times daily before meals.    insulin glargine (LANTUS) 100 unit/mL injection Inject 11 Units into the skin every evening.    insulin needles, disposable, 31 Ndle Takes insulin 4 times daily    losartan (COZAAR) 50 MG tablet Take 1.5 tablets (75 mg total) by mouth once daily.    metoprolol tartrate (LOPRESSOR) 50 MG tablet Take 2 tablets (100 mg total) by mouth 2 (two) times daily.     "nitroGLYCERIN 0.4 MG/DOSE TL SPRY (NITROLINGUAL) 400 mcg/spray spray Place 1 spray under the tongue every 5 (five) minutes as needed for Chest pain.           Clinical Reference Information           Your Vitals Were     BP Pulse Height Weight Last Period BMI    158/72 (BP Location: Left arm, Patient Position: Sitting, BP Method: Automatic) 67 5' 4" (1.626 m) 85 kg (187 lb 6.3 oz) (LMP Unknown) 32.17 kg/m2      Blood Pressure          Most Recent Value    Right Arm BP - Sitting  160/69    Left Arm BP - Sitting  158/72    BP  (!)  158/72      Allergies as of 3/10/2017     Pcn [Penicillins]    Percodan [Oxycodone-aspirin]    Phenergan [Promethazine]      Immunizations Administered on Date of Encounter - 3/10/2017     None      Orders Placed During Today's Visit     Future Labs/Procedures Expected by Expires    Cardiac Rehab Phase II  As directed 3/10/2018      Language Assistance Services     ATTENTION: Language assistance services are available, free of charge. Please call [phone].      ATENCIÓN: Si habla español, tiene a friend disposición servicios gratuitos de asistencia lingüística. Llame al [phone].     SWAPNIL Ý: N?u b?n nói Ti?ng Vi?t, có các d?ch v? h? tr? ngôn ng? mi?n phí dành cho b?n. G?i s? [phone].         [facility] complies with applicable Federal civil rights laws and does not discriminate on the basis of race, color, national origin, age, disability, or sex.        "

## 2017-04-04 ENCOUNTER — OFFICE VISIT (OUTPATIENT)
Dept: INTERNAL MEDICINE | Facility: CLINIC | Age: 72
End: 2017-04-04
Payer: MEDICARE

## 2017-04-04 ENCOUNTER — LAB VISIT (OUTPATIENT)
Dept: LAB | Facility: HOSPITAL | Age: 72
End: 2017-04-04
Attending: INTERNAL MEDICINE
Payer: MEDICARE

## 2017-04-04 VITALS
WEIGHT: 183.88 LBS | DIASTOLIC BLOOD PRESSURE: 60 MMHG | BODY MASS INDEX: 31.39 KG/M2 | OXYGEN SATURATION: 97 % | RESPIRATION RATE: 18 BRPM | HEIGHT: 64 IN | HEART RATE: 67 BPM | SYSTOLIC BLOOD PRESSURE: 118 MMHG

## 2017-04-04 DIAGNOSIS — E11.59 HYPERTENSION ASSOCIATED WITH DIABETES: ICD-10-CM

## 2017-04-04 DIAGNOSIS — I15.0 RENOVASCULAR HYPERTENSION: ICD-10-CM

## 2017-04-04 DIAGNOSIS — I25.709 CORONARY ARTERY DISEASE INVOLVING CORONARY BYPASS GRAFT OF NATIVE HEART WITH ANGINA PECTORIS: ICD-10-CM

## 2017-04-04 DIAGNOSIS — I12.9 BENIGN HYPERTENSION WITH CKD (CHRONIC KIDNEY DISEASE) STAGE IV: ICD-10-CM

## 2017-04-04 DIAGNOSIS — I48.20 CHRONIC ATRIAL FIBRILLATION: Primary | ICD-10-CM

## 2017-04-04 DIAGNOSIS — N18.4 BENIGN HYPERTENSION WITH CKD (CHRONIC KIDNEY DISEASE) STAGE IV: ICD-10-CM

## 2017-04-04 DIAGNOSIS — E11.69 TYPE 2 DIABETES MELLITUS WITH HYPERLIPIDEMIA: ICD-10-CM

## 2017-04-04 DIAGNOSIS — I15.2 HYPERTENSION ASSOCIATED WITH DIABETES: ICD-10-CM

## 2017-04-04 DIAGNOSIS — I50.32 CHRONIC DIASTOLIC HEART FAILURE: ICD-10-CM

## 2017-04-04 DIAGNOSIS — E78.5 TYPE 2 DIABETES MELLITUS WITH HYPERLIPIDEMIA: ICD-10-CM

## 2017-04-04 LAB
ANION GAP SERPL CALC-SCNC: 11 MMOL/L
BUN SERPL-MCNC: 73 MG/DL
CALCIUM SERPL-MCNC: 9.4 MG/DL
CHLORIDE SERPL-SCNC: 108 MMOL/L
CO2 SERPL-SCNC: 23 MMOL/L
CREAT SERPL-MCNC: 2.2 MG/DL
EST. GFR  (AFRICAN AMERICAN): 25 ML/MIN/1.73 M^2
EST. GFR  (NON AFRICAN AMERICAN): 22 ML/MIN/1.73 M^2
GLUCOSE SERPL-MCNC: 146 MG/DL
POTASSIUM SERPL-SCNC: 5.2 MMOL/L
SODIUM SERPL-SCNC: 142 MMOL/L

## 2017-04-04 PROCEDURE — 1160F RVW MEDS BY RX/DR IN RCRD: CPT | Mod: S$GLB,,, | Performed by: INTERNAL MEDICINE

## 2017-04-04 PROCEDURE — 1126F AMNT PAIN NOTED NONE PRSNT: CPT | Mod: S$GLB,,, | Performed by: INTERNAL MEDICINE

## 2017-04-04 PROCEDURE — 1157F ADVNC CARE PLAN IN RCRD: CPT | Mod: S$GLB,,, | Performed by: INTERNAL MEDICINE

## 2017-04-04 PROCEDURE — 3074F SYST BP LT 130 MM HG: CPT | Mod: S$GLB,,, | Performed by: INTERNAL MEDICINE

## 2017-04-04 PROCEDURE — 99215 OFFICE O/P EST HI 40 MIN: CPT | Mod: S$GLB,,, | Performed by: INTERNAL MEDICINE

## 2017-04-04 PROCEDURE — 3066F NEPHROPATHY DOC TX: CPT | Mod: S$GLB,,, | Performed by: INTERNAL MEDICINE

## 2017-04-04 PROCEDURE — 3078F DIAST BP <80 MM HG: CPT | Mod: S$GLB,,, | Performed by: INTERNAL MEDICINE

## 2017-04-04 PROCEDURE — 99499 UNLISTED E&M SERVICE: CPT | Mod: S$GLB,,, | Performed by: INTERNAL MEDICINE

## 2017-04-04 PROCEDURE — 83036 HEMOGLOBIN GLYCOSYLATED A1C: CPT

## 2017-04-04 PROCEDURE — 99999 PR PBB SHADOW E&M-EST. PATIENT-LVL III: CPT | Mod: PBBFAC,,, | Performed by: INTERNAL MEDICINE

## 2017-04-04 PROCEDURE — 3046F HEMOGLOBIN A1C LEVEL >9.0%: CPT | Mod: S$GLB,,, | Performed by: INTERNAL MEDICINE

## 2017-04-04 PROCEDURE — 80048 BASIC METABOLIC PNL TOTAL CA: CPT

## 2017-04-04 PROCEDURE — 1159F MED LIST DOCD IN RCRD: CPT | Mod: S$GLB,,, | Performed by: INTERNAL MEDICINE

## 2017-04-04 PROCEDURE — 36415 COLL VENOUS BLD VENIPUNCTURE: CPT

## 2017-04-04 RX ORDER — LOSARTAN POTASSIUM 50 MG/1
50 TABLET ORAL DAILY
Qty: 90 TABLET | Refills: 3 | Status: SHIPPED | OUTPATIENT
Start: 2017-04-04 | End: 2017-12-06

## 2017-04-04 NOTE — ASSESSMENT & PLAN NOTE
BP overtreated on metoprolol 100mg BID, losartan 75mg, diuretic PRN  · norvasc added during hospital, but patient not discharged on this  · Decrease cozaar to 50mg  · Cards increased metoprolol to 100mg BID  · Continue lasix 40mg PRN  · Can increase to BID dosing if needed  · F/U Cardiology

## 2017-04-04 NOTE — ASSESSMENT & PLAN NOTE
Seen on echo in 2/2017, MIs in 2002, 2003, 2012, 2/2017  · Compliant with BB, ARB, DAPT, statin, diuretic  · Advised to take extra PM dose of lasix when experiencing LE swelling  · Improve DM control  · F/U cardiology

## 2017-04-04 NOTE — PATIENT INSTRUCTIONS
TODAY:  - check glucose first thing in AM, before each meal and at bedtime   + call Nena with results on Thursday  - call this office if you haven't heard from Cardiac rehab  - labs today

## 2017-04-04 NOTE — ASSESSMENT & PLAN NOTE
A1c 10.8 in 1/2017, previously non-compliant due to finances: short-acting 15-15-15U, long-acting 11U. Has NOT been checking CBG  · Continue insulin in vials  · Avoid doughnut hole by discotinuing insulin pens  · Continue current regimen  · Pt to call with qac and qhs readings in 2 days  · Adjust insulin dosing with this info

## 2017-04-04 NOTE — ASSESSMENT & PLAN NOTE
Rate-controlled on metoprolol 100mg BID  · Continue BB BID  · Elevated UZOPQ-1-Iwqj score of 9 (10.8% risk of stroke)  · Cards eval for anticoagulation  · Continue DAPT

## 2017-04-04 NOTE — ASSESSMENT & PLAN NOTE
Unstable angina in 2/2017, LHC and PCI on Lcx and OM1 with improvement. H/o CABG X2 2002, stent RCA, OM1 2003, NSTEMI with total occlusion of LAD in 2012.   · Continue APT (ASA increased to 325mg during recent MI episode)  · F/U Cardiology  · Patient to call if she is not registered for cardiac rehab  · Continue BP control, high-dose statin

## 2017-04-04 NOTE — PROGRESS NOTES
"Primary Care Provider Appointment    Subjective:      Patient ID: Zoey Ham is a 71 y.o. female.    Chief Complaint: Follow-up (Pt is here for overall health check up ) and Medication Re-Started (Pt re started back on Lopressor Per Cardio )  Patient recently went on 2 week trip to visit her her adopted daughter in [place], and her grandchildren through her.    Patient is taking lopressor 100mg BID, with significant improvement in energy, "I feel like my old self again."    A referral was placed by Dr Kinney (Cardiology) for cardiac rehab, patient has been out of town and unaware of them reaching out.     LE swelling has improved since last appointment. Has been taking lasix PRN, with BID dosing if continued swelling. On average takes AM dose of lasix q3-4 days.  She checks herself for pedal edema daily.     She wants to switch to nephrology in the [facility].      She has not been checking CBG. She has been dosing her insulin with short-acting 15-15-15U, and long-acting 11U. She has had no symptoms of hypOglycemia.    Past Surgical History:   Procedure Laterality Date    APPENDECTOMY      CARDIAC SURGERY  2002    CABG    CHOLECYSTECTOMY      CORONARY ANGIOPLASTY  2004    CORONARY ARTERY BYPASS GRAFT      EYE SURGERY      cataracts bilaterally    FRACTURE SURGERY      right ankle    HYSTERECTOMY      TONSILLECTOMY         Past Medical History:   Diagnosis Date    Acute myocardial infarction of anterolateral wall 7/9/2015    Anemia of chronic renal failure, stage 4 (severe) 1/22/2015    Atherosclerosis of aorta 10/3/2012    Benign hypertension with CKD (chronic kidney disease) stage IV 3/11/2016    Chronic diastolic congestive heart failure 10/3/2012    Chronic kidney disease, stage IV (severe) 7/3/2012    Coronary artery disease     s/p 1v CABG 2002 and multiple PCI (last angiogram in 6/2012 with patent LIMA->LAD and patent LCx and RCA stents)    Diabetic polyneuropathy associated with " "type 2 diabetes mellitus 7/9/2015    Diabetic polyneuropathy associated with type 2 diabetes mellitus 7/9/2015    Encounter for blood transfusion     Heart attack 09/2012    5-6 events    Hyperlipidemia     Nephritis and nephropathy, 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 7/9/2015    Occlusion and stenosis of carotid artery with cerebral infarction 7/9/2015    PAF (paroxysmal atrial fibrillation) 10/3/2012    Pneumonia     Proliferative diabetic retinopathy 10/3/2012    Sepsis due to Escherichia coli with acute renal failure 2/2016    UTI     Type II diabetes mellitus with neurological manifestations 7/9/2015    Type II diabetes mellitus with renal manifestations 7/3/2012       Review of Systems   Constitutional: Negative for activity change, appetite change and unexpected weight change.   Respiratory: Negative for cough and choking.    Cardiovascular: Negative for leg swelling.   Musculoskeletal: Negative for back pain, gait problem and myalgias.   Neurological: Negative for dizziness, tremors, syncope, facial asymmetry, weakness and headaches.   Psychiatric/Behavioral: Negative for agitation, behavioral problems and decreased concentration.       Objective:   /60 (BP Location: Left arm, Patient Position: Sitting, BP Method: Manual)  Pulse 67  Resp 18  Ht 5' 4" (1.626 m)  Wt 83.4 kg (183 lb 13.8 oz)  LMP  (LMP Unknown)  SpO2 97%  BMI 31.56 kg/m2    Physical Exam   Constitutional: She is oriented to person, place, and time. She appears well-developed and well-nourished.   HENT:   Head: Normocephalic and atraumatic.   Neck: Normal range of motion.   Neurological: She is alert and oriented to person, place, and time.   Psychiatric: She has a normal mood and affect. Her behavior is normal. Thought content normal.   Vitals reviewed.      Lab Results   Component Value Date    WBC 10.72 02/10/2017    HGB 10.4 (L) 02/10/2017    HCT 31.0 (L) 02/10/2017     02/10/2017    CHOL 103 (L) 03/08/2017    " TRIG 125 03/08/2017    HDL 26 (L) 03/08/2017    ALT 8 (L) 02/10/2017    AST 17 02/10/2017     02/20/2017    K 5.3 (H) 02/20/2017     02/20/2017    CREATININE 2.2 (H) 02/20/2017    BUN 52 (H) 02/20/2017    CO2 24 02/20/2017    TSH 1.585 02/08/2017    INR 0.9 02/08/2017    GLUF 132 (H) 05/03/2012    HGBA1C 10.8 (H) 01/18/2017         Assessment:   71 y.o. female with multiple co-morbid illnesses here to continue work-up of chronic issues notably DM, HTN, CAD.     Plan:     Problem List Items Addressed This Visit        Other    Hypertension associated with diabetes     BP overtreated on metoprolol 100mg BID, losartan 75mg, diuretic PRN  · norvasc added during hospital, but patient not discharged on this  · Decrease cozaar to 50mg  · Cards increased metoprolol to 100mg BID  · Continue lasix 40mg PRN  · Can increase to BID dosing if needed  · F/U Cardiology         Relevant Medications    losartan (COZAAR) 50 MG tablet    Uncontrolled type 2 diabetes mellitus with stage 4 chronic kidney disease, with long-term current use of insulin     A1c 10.8 in 1/2017, previously non-compliant due to finances: short-acting 15-15-15U, long-acting 11U. Has NOT been checking CBG  · Continue insulin in vials  · Avoid doughnut hole by discotinuing insulin pens  · Continue current regimen  · Pt to call with qac and qhs readings in 2 days  · Adjust insulin dosing with this info         Relevant Orders    Hemoglobin A1c    Chronic atrial fibrillation - Primary     Rate-controlled on metoprolol 100mg BID  · Continue BB BID  · Elevated NMCFA-8-Nmvk score of 9 (10.8% risk of stroke)  · Cards eval for anticoagulation  · Continue DAPT         Coronary artery disease involving coronary bypass graft of native heart with angina pectoris     Unstable angina in 2/2017, LHC and PCI on Lcx and OM1 with improvement. H/o CABG X2 2002, stent RCA, OM1 2003, NSTEMI with total occlusion of LAD in 2012.   · Continue APT (ASA increased to 325mg  during recent MI episode)  · F/U Cardiology  · Patient to call if she is not registered for cardiac rehab  · Continue BP control, high-dose statin         Type 2 diabetes mellitus with hyperlipidemia     LDL 52 on atorvastatin 40mg, high ASCVD with DM and age  · Continue statin  · Continue ASA         Relevant Orders    Hemoglobin A1c    Chronic diastolic heart failure     Seen on echo in 2/2017, MIs in 2002, 2003, 2012, 2/2017  · Compliant with BB, ARB, DAPT, statin, diuretic  · Advised to take extra PM dose of lasix when experiencing LE swelling  · Improve DM control  · F/U cardiology         Relevant Orders    Basic metabolic panel      Other Visit Diagnoses     Benign hypertension with CKD (chronic kidney disease) stage IV        Relevant Medications    losartan (COZAAR) 50 MG tablet    Renovascular hypertension        Relevant Medications    losartan (COZAAR) 50 MG tablet          Health Maintenance       Date Due Completion Date    Colonoscopy 8/21/1996 ---    DEXA SCAN 2/4/2016 2/4/2014    Hemoglobin A1c 7/18/2017 1/18/2017    Mammogram 7/27/2017 1/27/2017    Foot Exam 12/6/2017 12/6/2016 (Done)    Override on 12/6/2016: Done    Eye Exam 1/10/2018 1/10/2017    Lipid Panel 3/8/2018 3/8/2017    TETANUS VACCINE 3/3/2026 3/3/2016          Return in about 3 months (around 7/4/2017) for established patient follow-up. . One hour spent with this patient today, half of that in counseling.    Cesia Rosales MD/MPH  Internal Medicine  [facility]  [phone]

## 2017-04-04 NOTE — MR AVS SNAPSHOT
[facility]  [address]  [facility] [address]  Phone: [phone]  Fax: [phone]                  Zoey Ham   2017 9:00 AM   Office Visit    Description:  Female : 1945   Provider:  Cesia Rosales MD   Department:  [facility]           Reason for Visit     Follow-up     Medication Re-Started           Diagnoses this Visit        Comments    Chronic atrial fibrillation    -  Primary     Uncontrolled type 2 diabetes mellitus with stage 4 chronic kidney disease, with long-term current use of insulin         Type 2 diabetes mellitus with hyperlipidemia         Chronic diastolic heart failure         Benign hypertension with CKD (chronic kidney disease) stage IV         Renovascular hypertension         Hypertension associated with diabetes         Coronary artery disease involving coronary bypass graft of native heart with angina pectoris                To Do List           Future Appointments        Provider Department Dept Phone    5/10/2017 10:30 AM Elver Kelley MD [facility] Cardiology [phone]    5/10/2017 12:30 PM Cesia Rosales MD [facility] Internal Medicine [phone]    2017 10:00 AM LAB, APPOINTMENT [place] [facility] [phone]    2017 10:05 AM SPECIMEN, MAIN CAMPUS [facility] [phone]    6/15/2017 9:00 AM Karla Iglesias,  [facility] Nephrology [phone]      Goals (5 Years of Data)     None      Follow-Up and Disposition     Return in about 3 months (around 2017) for established patient follow-up.    Follow-up and Disposition History       These Medications        Disp Refills Start End    losartan (COZAAR) 50 MG tablet 90 tablet 3 2017     Take 1 tablet (50 mg total) by mouth once daily. - Oral    Pharmacy: [facility] - [address] Ph #: [phone]         [facility]     [facility] Nurse Care Line - 24/7 Assistance  Unless otherwise directed by your provider, please contact [facility], our nurse care line that is available for 24/7 assistance.     Registered nurses in the [facility] provide: appointment scheduling, clinical advisement, health education, and other advisory services.  Call: [phone] (toll free)               Medications           Message regarding Medications     Verify the changes and/or additions to your medication regime listed below are the same as discussed with your clinician today.  If any of these changes or additions are incorrect, please notify your healthcare provider.        CHANGE how you are taking these medications     Start Taking Instead of    losartan (COZAAR) 50 MG tablet losartan (COZAAR) 50 MG tablet    Dosage:  Take 1 tablet (50 mg total) by mouth once daily. Dosage:  Take 1.5 tablets (75 mg total) by mouth once daily.    Reason for Change:  Reorder            Verify that the below list of medications is an accurate representation of the medications you are currently taking.  If none reported, the list may be blank. If incorrect, please contact your healthcare provider. Carry this list with you in case of emergency.           Current Medications     ACCU-CHEK MARI PLUS METER Misc 1 Device by Misc.(Non-Drug; Combo Route) route 2 (two) times daily.    aspirin 325 MG tablet Take 325 mg by mouth once daily.    atorvastatin (LIPITOR) 80 MG tablet TAKE 1 TABLET ONE TIME DAILY    blood sugar diagnostic Strp 1 strip by Misc.(Non-Drug; Combo Route) route 4 (four) times daily.    blood-glucose meter kit Use as instructed    clopidogrel (PLAVIX) 75 mg tablet TAKE 1 TABLET ONE TIME DAILY    ergocalciferol (VITAMIN D2) 50,000 unit Cap Take 1 capsule (50,000 Units total) by mouth every 7 days.    ezetimibe (ZETIA) 10 mg tablet Take 1 tablet (10 mg total) by mouth once daily.    furosemide (LASIX) 40 MG tablet Take 1 tablet (40 mg total) by  "mouth daily as needed (take water pill daily as needed for leg swelling and SOB).    insulin aspart (NOVOLOG) 100 unit/mL injection Inject 15 Units into the skin 3 (three) times daily before meals.    insulin glargine (LANTUS) 100 unit/mL injection Inject 11 Units into the skin every evening.    insulin needles, disposable, 31 Ndle Takes insulin 4 times daily    losartan (COZAAR) 50 MG tablet Take 1 tablet (50 mg total) by mouth once daily.    metoprolol tartrate (LOPRESSOR) 50 MG tablet Take 2 tablets (100 mg total) by mouth 2 (two) times daily.    nitroGLYCERIN 0.4 MG/DOSE TL SPRY (NITROLINGUAL) 400 mcg/spray spray Place 1 spray under the tongue every 5 (five) minutes as needed for Chest pain.           Clinical Reference Information           Your Vitals Were     BP Pulse Resp Height Weight Last Period    118/60 (BP Location: Left arm, Patient Position: Sitting, BP Method: Manual) 67 18 5' 4" (1.626 m) 83.4 kg (183 lb 13.8 oz) (LMP Unknown)    SpO2 BMI             97% 31.56 kg/m2         Blood Pressure          Most Recent Value    BP  118/60      Allergies as of 4/4/2017     Pcn [Penicillins]    Percodan [Oxycodone-aspirin]    Phenergan [Promethazine]      Immunizations Administered on Date of Encounter - 4/4/2017     None      Orders Placed During Today's Visit     Future Labs/Procedures Expected by Expires    Basic metabolic panel  4/4/2017 6/3/2018    Hemoglobin A1c  4/4/2017 6/3/2018      Instructions    TODAY:  - check glucose first thing in AM, before each meal and at bedtime   + call Nena with results on Thursday  - call this office if you haven't heard from Cardiac rehab  - labs today       Language Assistance Services     ATTENTION: Language assistance services are available, free of charge. Please call [phone].      ATENCIÓN: Si bobbyla zita, tiene a friend disposición servicios gratuitos de asistencia lingüística. Llame al [phone].     CHÚ Ý: N?u b?n nói Ti?ng Vi?t, có các d?ch v? h? " tr? derrek mcmillan? mi?n phí dành cho b?n. G?i s? [phone].         [facility] complies with applicable Federal civil rights laws and does not discriminate on the basis of race, color, national origin, age, disability, or sex.

## 2017-04-05 ENCOUNTER — TELEPHONE (OUTPATIENT)
Dept: INTERNAL MEDICINE | Facility: CLINIC | Age: 72
End: 2017-04-05

## 2017-04-05 LAB
ESTIMATED AVG GLUCOSE: 212 MG/DL
HBA1C MFR BLD HPLC: 9 %

## 2017-04-05 NOTE — TELEPHONE ENCOUNTER
Please advise patient that her A1c is improved from December, it is 9 (the lowest it's ever been). Her goal A1c is 7.     Please advise her to increase her to give us her glucose log information as soon as she collects 1-2 days of it. We will change her insulin regimen based on that.    Thanks,  SOLEDAD

## 2017-04-05 NOTE — TELEPHONE ENCOUNTER
Pt has been advised about results and stressed great understanding .    * Pt stated that she will call in tomorrow with glucose readings.    Thanks Dr. Rosales

## 2017-04-06 ENCOUNTER — TELEPHONE (OUTPATIENT)
Dept: INTERNAL MEDICINE | Facility: CLINIC | Age: 72
End: 2017-04-06

## 2017-04-06 NOTE — TELEPHONE ENCOUNTER
Pt has been advised and stressed a great understanding.    Pt has also been advised to please call us on Monday with weekend readings .    Thanks Dr. Rosales

## 2017-04-06 NOTE — TELEPHONE ENCOUNTER
These look great! Please advise her to call on Monday with her recordings over the weekend.    Thanks,  SOLEDAD

## 2017-04-06 NOTE — TELEPHONE ENCOUNTER
Pt called with glucose readings :      05th:  AM- 142  Lunch- 107  Supper- 82  Bed-  145    06th:  AM- 101  Lunch- 105      * Please advise

## 2017-04-12 DIAGNOSIS — E11.9 DIABETES MELLITUS WITHOUT COMPLICATION: ICD-10-CM

## 2017-06-15 ENCOUNTER — LAB VISIT (OUTPATIENT)
Dept: LAB | Facility: HOSPITAL | Age: 72
End: 2017-06-15
Attending: INTERNAL MEDICINE
Payer: MEDICARE

## 2017-06-15 ENCOUNTER — TELEPHONE (OUTPATIENT)
Dept: NEPHROLOGY | Facility: CLINIC | Age: 72
End: 2017-06-15

## 2017-06-15 ENCOUNTER — OFFICE VISIT (OUTPATIENT)
Dept: NEPHROLOGY | Facility: CLINIC | Age: 72
End: 2017-06-15
Payer: MEDICARE

## 2017-06-15 VITALS
BODY MASS INDEX: 31.66 KG/M2 | OXYGEN SATURATION: 99 % | DIASTOLIC BLOOD PRESSURE: 62 MMHG | HEART RATE: 68 BPM | WEIGHT: 185.44 LBS | HEIGHT: 64 IN | SYSTOLIC BLOOD PRESSURE: 128 MMHG

## 2017-06-15 DIAGNOSIS — I10 ESSENTIAL HYPERTENSION: ICD-10-CM

## 2017-06-15 DIAGNOSIS — D64.9 ANEMIA, UNSPECIFIED TYPE: ICD-10-CM

## 2017-06-15 DIAGNOSIS — N18.4 CKD (CHRONIC KIDNEY DISEASE) STAGE 4, GFR 15-29 ML/MIN: Primary | ICD-10-CM

## 2017-06-15 DIAGNOSIS — N25.0 RENAL OSTEODYSTROPHY: ICD-10-CM

## 2017-06-15 DIAGNOSIS — N25.0 RENAL OSTEODYSTROPHY: Primary | ICD-10-CM

## 2017-06-15 DIAGNOSIS — R80.9 PROTEINURIA, UNSPECIFIED TYPE: ICD-10-CM

## 2017-06-15 LAB
BACTERIA #/AREA URNS AUTO: ABNORMAL /HPF
BILIRUB UR QL STRIP: NEGATIVE
CLARITY UR REFRACT.AUTO: ABNORMAL
COLOR UR AUTO: YELLOW
CREAT UR-MCNC: 55 MG/DL
GLUCOSE UR QL STRIP: NEGATIVE
HGB UR QL STRIP: ABNORMAL
HYALINE CASTS UR QL AUTO: 0 /LPF
KETONES UR QL STRIP: NEGATIVE
LEUKOCYTE ESTERASE UR QL STRIP: ABNORMAL
MICROSCOPIC COMMENT: ABNORMAL
NITRITE UR QL STRIP: NEGATIVE
NON-SQ EPI CELLS #/AREA URNS AUTO: 3 /HPF
PH UR STRIP: 5 [PH] (ref 5–8)
PROT UR QL STRIP: ABNORMAL
PROT UR-MCNC: 61 MG/DL
PROT/CREAT RATIO, UR: 1.11
RBC #/AREA URNS AUTO: 7 /HPF (ref 0–4)
SP GR UR STRIP: 1.01 (ref 1–1.03)
SQUAMOUS #/AREA URNS AUTO: 7 /HPF
URN SPEC COLLECT METH UR: ABNORMAL
UROBILINOGEN UR STRIP-ACNC: NEGATIVE EU/DL
WBC #/AREA URNS AUTO: >100 /HPF (ref 0–5)
WBC CLUMPS UR QL AUTO: ABNORMAL

## 2017-06-15 PROCEDURE — 1159F MED LIST DOCD IN RCRD: CPT | Mod: S$GLB,,, | Performed by: INTERNAL MEDICINE

## 2017-06-15 PROCEDURE — 99499 UNLISTED E&M SERVICE: CPT | Mod: S$GLB,,, | Performed by: INTERNAL MEDICINE

## 2017-06-15 PROCEDURE — 84156 ASSAY OF PROTEIN URINE: CPT

## 2017-06-15 PROCEDURE — 87088 URINE BACTERIA CULTURE: CPT

## 2017-06-15 PROCEDURE — 87086 URINE CULTURE/COLONY COUNT: CPT

## 2017-06-15 PROCEDURE — 99999 PR PBB SHADOW E&M-EST. PATIENT-LVL III: CPT | Mod: PBBFAC,,, | Performed by: INTERNAL MEDICINE

## 2017-06-15 PROCEDURE — 99214 OFFICE O/P EST MOD 30 MIN: CPT | Mod: S$GLB,,, | Performed by: INTERNAL MEDICINE

## 2017-06-15 PROCEDURE — 81001 URINALYSIS AUTO W/SCOPE: CPT

## 2017-06-15 PROCEDURE — 87077 CULTURE AEROBIC IDENTIFY: CPT | Mod: 59

## 2017-06-15 PROCEDURE — 1126F AMNT PAIN NOTED NONE PRSNT: CPT | Mod: S$GLB,,, | Performed by: INTERNAL MEDICINE

## 2017-06-15 PROCEDURE — 87186 SC STD MICRODIL/AGAR DIL: CPT

## 2017-06-15 RX ORDER — CALCITRIOL 0.25 UG/1
CAPSULE ORAL
Qty: 12 CAPSULE | Refills: 2 | Status: SHIPPED | OUTPATIENT
Start: 2017-06-15 | End: 2017-09-30 | Stop reason: SDUPTHER

## 2017-06-15 RX ORDER — CIPROFLOXACIN 500 MG/1
250 TABLET ORAL 2 TIMES DAILY
Qty: 10 TABLET | Refills: 0 | Status: SHIPPED | OUTPATIENT
Start: 2017-06-15 | End: 2017-07-06

## 2017-06-15 NOTE — TELEPHONE ENCOUNTER
Potassium elevated.  Please ask the pt to stop losartan for now and take an extra lasix today and hydrate well and repeat rfp tomorrow.  Hydrate well.  Follow low potassium diet.  Please review potassium list.

## 2017-06-15 NOTE — PROGRESS NOTES
Subjective:       Patient ID: Zoey Ham is a 71 y.o. White female who presents for follow-up evaluation of No chief complaint on file.    HPI This is a 71 -year-old  female with longstanding diabetes, hyperparathyroidism, hypertension, and CKD is coming in for followup of these. Her blood pressures are stable in the 120's-130's/70's . DM not controlled with a1c of 9 but improving. Her CKD has been stable. Has proteinuria.  Creatinine stable.    PAST MEDICAL HISTORY: Significant for hypertension for 10 years, diabetes   for 10 years, CABG, lacunar infarcts, hyperlipidemia, hyperparathyroidism,   anemia, CABG, CAD, and diabetic retinopathy.     PAST MEDICAL HISTORY: Significant for hypertension for 10 years, diabetes   for 10 years, CABG, lacunar infarcts, hyperlipidemia, hyperparathyroidism,   anemia, CABG, CAD, and diabetic retinopathy.     Review of Systems   Constitutional: Positive for appetite change and fatigue.   Eyes: Negative for discharge.   Respiratory: Negative for cough, shortness of breath and wheezing.    Cardiovascular: Negative for chest pain and palpitations.   Gastrointestinal: Negative for abdominal pain, diarrhea, nausea and vomiting.   Genitourinary: Positive for urgency. Negative for dysuria, frequency and hematuria.   Skin: Negative for color change and rash.   Psychiatric/Behavioral: Negative for confusion.   All other systems reviewed and are negative.      Objective:      Physical Exam   Constitutional: She is oriented to person, place, and time. She appears well-developed and well-nourished.   HENT:   Right Ear: External ear normal.   Left Ear: External ear normal.   Nose: Nose normal.   Mouth/Throat: Normal dentition.   Eyes: Conjunctivae, EOM and lids are normal. Pupils are equal, round, and reactive to light.   Neck: Trachea normal. No thyroid mass present.   Cardiovascular: Normal rate and regular rhythm.  Exam reveals no friction rub.    No murmur heard.  No lower  extremity edema   Pulmonary/Chest: Effort normal and breath sounds normal. No respiratory distress. She has no decreased breath sounds. She has no rales.   Abdominal: Soft. Bowel sounds are normal. She exhibits no mass. There is no tenderness. There is no rebound. No hernia.   Musculoskeletal: She exhibits edema.   Trace edema   Neurological: She is alert and oriented to person, place, and time.   Skin: Skin is warm and dry. No rash noted. No erythema.   l neck mole-rec derm f/u.   Psychiatric: She has a normal mood and affect. Judgment normal. Her mood appears not anxious. She does not exhibit a depressed mood.   Oriented to time, place and person.   Vitals reviewed.      Assessment:       No diagnosis found.    Plan:           1.  Renovascular hypertension    2.  Type 2 diabetes mellitus with diabetic nephropathy    3.  Proteinuria    4.  Anemia of chronic renal failure, stage 4 (severe)      Plan:     Labs pending.   1. CKD stage 3-4 with an MDRD GFR of 25-28 mL/min. Her baseline creatinine is   anywhere from 1.8 to 2.1 with last creatinine of 2.2 with gfr of 22 ml/min. Her CKD is secondary to longstanding hypertension and diabetes.   2. Hypertension. Blood pressures are stable  at home. On cozaar. Unable to get her off metoprolol -tried in the past but HR became a problem per the pt and had to go back on it.  Unable to titrate cozaar without decreasing metoprolol.   3. Diabetes. on cozaar. Increased cozaar to 50 mg last time as there was increase in proteinuria likely sec poorly controlled DM.  Recheck. Does not want biopsy.   4. Hyperparathyroidism. Check i pth -off calcitriol. Ca/phos stable. Low phos and low potassium diet discussed-lists given.  6. Anemia. H&H is stable. On iron once a day.   Attended ckd education class and would like HD. Will send her for access if her GFR decreases further.   Return in 3 months with rfp, urine protin and urine creatinine, ipth.

## 2017-06-17 LAB — BACTERIA UR CULT: NORMAL

## 2017-06-19 ENCOUNTER — LAB VISIT (OUTPATIENT)
Dept: LAB | Facility: HOSPITAL | Age: 72
End: 2017-06-19
Attending: INTERNAL MEDICINE
Payer: MEDICARE

## 2017-06-19 ENCOUNTER — TELEPHONE (OUTPATIENT)
Dept: NEPHROLOGY | Facility: CLINIC | Age: 72
End: 2017-06-19

## 2017-06-19 DIAGNOSIS — N18.4 CKD (CHRONIC KIDNEY DISEASE) STAGE 4, GFR 15-29 ML/MIN: Primary | ICD-10-CM

## 2017-06-19 DIAGNOSIS — N18.4 CKD (CHRONIC KIDNEY DISEASE) STAGE 4, GFR 15-29 ML/MIN: ICD-10-CM

## 2017-06-19 DIAGNOSIS — I12.9 BENIGN HYPERTENSION WITH CKD (CHRONIC KIDNEY DISEASE) STAGE IV: ICD-10-CM

## 2017-06-19 DIAGNOSIS — N18.4 BENIGN HYPERTENSION WITH CKD (CHRONIC KIDNEY DISEASE) STAGE IV: ICD-10-CM

## 2017-06-19 LAB
ALBUMIN SERPL BCP-MCNC: 3.6 G/DL
ALBUMIN SERPL BCP-MCNC: 3.6 G/DL
ANION GAP SERPL CALC-SCNC: 11 MMOL/L
ANION GAP SERPL CALC-SCNC: 11 MMOL/L
BUN SERPL-MCNC: 69 MG/DL
BUN SERPL-MCNC: 69 MG/DL
CALCIUM SERPL-MCNC: 9.3 MG/DL
CALCIUM SERPL-MCNC: 9.3 MG/DL
CHLORIDE SERPL-SCNC: 106 MMOL/L
CHLORIDE SERPL-SCNC: 106 MMOL/L
CO2 SERPL-SCNC: 24 MMOL/L
CO2 SERPL-SCNC: 24 MMOL/L
CREAT SERPL-MCNC: 2.4 MG/DL
CREAT SERPL-MCNC: 2.4 MG/DL
EST. GFR  (AFRICAN AMERICAN): 22.9 ML/MIN/1.73 M^2
EST. GFR  (AFRICAN AMERICAN): 22.9 ML/MIN/1.73 M^2
EST. GFR  (NON AFRICAN AMERICAN): 19.9 ML/MIN/1.73 M^2
EST. GFR  (NON AFRICAN AMERICAN): 19.9 ML/MIN/1.73 M^2
GLUCOSE SERPL-MCNC: 148 MG/DL
GLUCOSE SERPL-MCNC: 148 MG/DL
PHOSPHATE SERPL-MCNC: 4.6 MG/DL
PHOSPHATE SERPL-MCNC: 4.6 MG/DL
POTASSIUM SERPL-SCNC: 4.9 MMOL/L
POTASSIUM SERPL-SCNC: 4.9 MMOL/L
SODIUM SERPL-SCNC: 141 MMOL/L
SODIUM SERPL-SCNC: 141 MMOL/L

## 2017-06-19 PROCEDURE — 36415 COLL VENOUS BLD VENIPUNCTURE: CPT

## 2017-06-19 PROCEDURE — 80069 RENAL FUNCTION PANEL: CPT

## 2017-06-19 NOTE — TELEPHONE ENCOUNTER
Please let her know that her kidney function is slightly better at 20%.  Potassium improved.  She  should still con't to hold her losartan and repeat rfp in about 2 weeks and monitor her bp's.  Her urine cltx grew bacteria-cipro should help. Please arrange appoint in august.

## 2017-06-20 ENCOUNTER — TELEPHONE (OUTPATIENT)
Dept: NEPHROLOGY | Facility: CLINIC | Age: 72
End: 2017-06-20

## 2017-07-06 ENCOUNTER — DOCUMENTATION ONLY (OUTPATIENT)
Dept: INTERNAL MEDICINE | Facility: CLINIC | Age: 72
End: 2017-07-06

## 2017-07-06 ENCOUNTER — OFFICE VISIT (OUTPATIENT)
Dept: INTERNAL MEDICINE | Facility: CLINIC | Age: 72
End: 2017-07-06
Payer: MEDICARE

## 2017-07-06 ENCOUNTER — TELEPHONE (OUTPATIENT)
Dept: CARDIOLOGY | Facility: CLINIC | Age: 72
End: 2017-07-06

## 2017-07-06 VITALS
OXYGEN SATURATION: 97 % | SYSTOLIC BLOOD PRESSURE: 119 MMHG | HEART RATE: 64 BPM | HEIGHT: 64 IN | BODY MASS INDEX: 31.32 KG/M2 | DIASTOLIC BLOOD PRESSURE: 58 MMHG | WEIGHT: 183.44 LBS

## 2017-07-06 DIAGNOSIS — R92.1 BREAST CALCIFICATION, LEFT: ICD-10-CM

## 2017-07-06 DIAGNOSIS — Z12.11 COLON CANCER SCREENING: Primary | ICD-10-CM

## 2017-07-06 DIAGNOSIS — E21.3 HYPERPARATHYROIDISM: ICD-10-CM

## 2017-07-06 DIAGNOSIS — E11.69 TYPE 2 DIABETES MELLITUS WITH HYPERLIPIDEMIA: ICD-10-CM

## 2017-07-06 DIAGNOSIS — I15.2 HYPERTENSION ASSOCIATED WITH DIABETES: ICD-10-CM

## 2017-07-06 DIAGNOSIS — I25.708 CORONARY ARTERY DISEASE OF BYPASS GRAFT OF NATIVE HEART WITH STABLE ANGINA PECTORIS: ICD-10-CM

## 2017-07-06 DIAGNOSIS — E55.9 HYPOVITAMINOSIS D: ICD-10-CM

## 2017-07-06 DIAGNOSIS — I15.0 RENOVASCULAR HYPERTENSION: ICD-10-CM

## 2017-07-06 DIAGNOSIS — E11.59 HYPERTENSION ASSOCIATED WITH DIABETES: ICD-10-CM

## 2017-07-06 DIAGNOSIS — N18.4 CKD STAGE 4 DUE TO TYPE 2 DIABETES MELLITUS: ICD-10-CM

## 2017-07-06 DIAGNOSIS — E78.5 TYPE 2 DIABETES MELLITUS WITH HYPERLIPIDEMIA: ICD-10-CM

## 2017-07-06 DIAGNOSIS — E11.22 CKD STAGE 4 DUE TO TYPE 2 DIABETES MELLITUS: ICD-10-CM

## 2017-07-06 DIAGNOSIS — R17 JAUNDICE: ICD-10-CM

## 2017-07-06 DIAGNOSIS — N18.4 CHRONIC KIDNEY DISEASE, STAGE IV (SEVERE): ICD-10-CM

## 2017-07-06 DIAGNOSIS — K29.00 ACUTE GASTRITIS WITHOUT HEMORRHAGE, UNSPECIFIED GASTRITIS TYPE: ICD-10-CM

## 2017-07-06 DIAGNOSIS — M85.89 OSTEOPENIA OF MULTIPLE SITES: ICD-10-CM

## 2017-07-06 PROCEDURE — 1126F AMNT PAIN NOTED NONE PRSNT: CPT | Mod: S$GLB,,, | Performed by: INTERNAL MEDICINE

## 2017-07-06 PROCEDURE — 1159F MED LIST DOCD IN RCRD: CPT | Mod: S$GLB,,, | Performed by: INTERNAL MEDICINE

## 2017-07-06 PROCEDURE — 99999 PR PBB SHADOW E&M-EST. PATIENT-LVL III: CPT | Mod: PBBFAC,,, | Performed by: INTERNAL MEDICINE

## 2017-07-06 PROCEDURE — 99215 OFFICE O/P EST HI 40 MIN: CPT | Mod: S$GLB,,, | Performed by: INTERNAL MEDICINE

## 2017-07-06 PROCEDURE — 3066F NEPHROPATHY DOC TX: CPT | Mod: S$GLB,,, | Performed by: INTERNAL MEDICINE

## 2017-07-06 PROCEDURE — 99499 UNLISTED E&M SERVICE: CPT | Mod: S$GLB,,, | Performed by: INTERNAL MEDICINE

## 2017-07-06 PROCEDURE — 3045F PR MOST RECENT HEMOGLOBIN A1C LEVEL 7.0-9.0%: CPT | Mod: S$GLB,,, | Performed by: INTERNAL MEDICINE

## 2017-07-06 RX ORDER — METOPROLOL TARTRATE 50 MG/1
100 TABLET ORAL 2 TIMES DAILY
Qty: 180 TABLET | Refills: 3 | Status: CANCELLED | OUTPATIENT
Start: 2017-07-06 | End: 2018-07-06

## 2017-07-06 RX ORDER — METOPROLOL TARTRATE 100 MG/1
100 TABLET ORAL 2 TIMES DAILY
Qty: 180 TABLET | Refills: 3 | Status: SHIPPED | OUTPATIENT
Start: 2017-07-06 | End: 2018-05-14 | Stop reason: SDUPTHER

## 2017-07-06 RX ORDER — METOPROLOL TARTRATE 100 MG/1
100 TABLET ORAL 2 TIMES DAILY
Qty: 180 TABLET | Refills: 3 | Status: SHIPPED | OUTPATIENT
Start: 2017-07-06 | End: 2017-07-06 | Stop reason: SDUPTHER

## 2017-07-06 NOTE — TELEPHONE ENCOUNTER
----- Message from Cheyenne Holloway sent at 7/5/2017  6:04 PM CDT -----  Contact: pt  Pt called and states she would like a sooner appointment then Sept 12 with the doctor for her follow up visited. Pt can be reached at [phone].

## 2017-07-06 NOTE — ASSESSMENT & PLAN NOTE
Related to fast food intake, with questionable jaundice on exam. History of cholecystectomy in 2016.  · Check biliary labs, LFTs and acute hep panel  · monitor

## 2017-07-06 NOTE — ASSESSMENT & PLAN NOTE
BP controlled on BB, diuretic. ARB discontinued by Renal due to hyperkalemia  · norvasc added during hospital, but patient not discharged on this  · cozaar on hold  · Cards increased metoprolol to 100mg BID  · Continue lasix 40mg PRN  · Can increase to BID dosing if needed  · F/U Cardiology

## 2017-07-06 NOTE — ASSESSMENT & PLAN NOTE
T-score -1.8 in 2014, low vitamin D (15 in 1/2017), CKD, uncontrolled DM, ankle fracture 2013  · PCP message Renal (Dr Iglesias) about calcium supplements  · Patient taking calcitriol  · Completed high dose vit D supplement  · Start daily vitamin D supplementation  · Order from [employer] OTC mag  · Repeat DEXA

## 2017-07-06 NOTE — ASSESSMENT & PLAN NOTE
GFR 23 in 6/2016, followed by Nephrology  · Repeat labs today  · Cozaar on hold due to hyperkalemia  · Seen today by Nephro

## 2017-07-06 NOTE — ASSESSMENT & PLAN NOTE
Vit D level 15 in 1/2017, completed high dose supplementation  · Start daily supplementation  · Repeat DEXA

## 2017-07-06 NOTE — PATIENT INSTRUCTIONS
TODAY:  - Dr Iglesias appt in 8/2017  - labs today  - mammogram  - DEXA (bone density)  - stool card for blood and colon cancer

## 2017-07-06 NOTE — ASSESSMENT & PLAN NOTE
Compliant with atorvastatin 80mg and zetia, high ASCVD with DM and age  · cotinue atorvastatin 80mg and zetia  · Continue ASA and plavix  · DM and BP control

## 2017-07-06 NOTE — PROGRESS NOTES
Primary Care Provider Appointment    Subjective:      Patient ID: Zoey Hma is a 71 y.o. female with CAF, CHF, HTN, DM    Chief Complaint: Follow-up (Pt is here for a over all check up ); Pressure Behind the Eyes (Pt stated that since she has tahira taken off of her losartan medication Pt has been having pressure behind her head and would like to know if she should re- start taking medication ); and Abdominal Cramping (5+ days , Pt wanted to throw up but was unable to , nausea )    Patient reports elevated home BPs at night in 170s systolic.    She has had an acute GI illness for past 2 days with. She ate a chicken sandwich with her daughter at [employer]. She started having stomach cramping not long after with nausea throughout the night. She did not vomit but experienced fatigue for the next 2 days. Her skin is darker on exam today with jaundiced appearance, she reports working in her yard last week.    She also complains of headaches located in the occipital area. She reports it feels like a pressure that radiates to front of head. She has history of migraines. Headaches started 2 days ago.    Her skin is darker on exam today with jaundiced appearance, she reports working in her yard last week.    Patient has been compliant with insulin and lifestyle changes. Plans to attend diabetes education in 7/2017. Reports AM CBG in 90s-120s with insulin 11U long-acting, 15-15-15 short-acting.       Past Surgical History:   Procedure Laterality Date    APPENDECTOMY      CARDIAC SURGERY  2002    CABG    CHOLECYSTECTOMY      CORONARY ANGIOPLASTY  2004    CORONARY ARTERY BYPASS GRAFT      EYE SURGERY      cataracts bilaterally    FRACTURE SURGERY      right ankle    HYSTERECTOMY      TONSILLECTOMY         Past Medical History:   Diagnosis Date    Acute myocardial infarction of anterolateral wall 7/9/2015    Anemia of chronic renal failure, stage 4 (severe) 1/22/2015    Atherosclerosis of aorta 10/3/2012    Benign  "hypertension with CKD (chronic kidney disease) stage IV 3/11/2016    Chronic diastolic congestive heart failure 10/3/2012    Chronic kidney disease, stage IV (severe) 7/3/2012    Coronary artery disease     s/p 1v CABG 2002 and multiple PCI (last angiogram in 6/2012 with patent LIMA->LAD and patent LCx and RCA stents)    Diabetic polyneuropathy associated with type 2 diabetes mellitus 7/9/2015    Diabetic polyneuropathy associated with type 2 diabetes mellitus 7/9/2015    Encounter for blood transfusion     Heart attack 09/2012    5-6 events    Hyperlipidemia     Nephritis and nephropathy, 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 Negative for activity change, appetite change and unexpected weight change.   Respiratory: Negative for cough and choking.    Cardiovascular: Negative for leg swelling.   Gastrointestinal: Positive for abdominal pain and nausea.   Musculoskeletal: Negative for back pain, gait problem and myalgias.   Neurological: Positive for headaches. Negative for dizziness, tremors, syncope, facial asymmetry and weakness.   Hematological: Bruises/bleeds easily.        Darkened skin with yellow hue   Psychiatric/Behavioral: Negative for agitation, behavioral problems and decreased concentration.       Objective:   BP (!) 119/58 (BP Location: Left arm, Patient Position: Sitting, BP Method: Manual)   Pulse 64   Ht 5' 4" (1.626 m)   Wt 83.2 kg (183 lb 6.8 oz)   LMP  (LMP Unknown)   SpO2 97%   BMI 31.48 kg/m²     Physical Exam "   Constitutional: She is oriented to person, place, and time. She appears well-developed and well-nourished.   HENT:   Head: Normocephalic and atraumatic.   Neck: Normal range of motion.   Abdominal: Soft. Bowel sounds are normal. There is no tenderness. There is no guarding.   Neurological: She is alert and oriented to person, place, and time.   Skin:   Darkened skin with yellow hue  Ecchymoses on UE bilaterally   Psychiatric: She has a normal mood and affect. Her behavior is normal. Thought content normal.   Vitals reviewed.      Lab Results   Component Value Date    WBC 10.72 02/10/2017    HGB 11.7 (L) 06/15/2017    HCT 35.8 (L) 06/15/2017     02/10/2017    CHOL 103 (L) 03/08/2017    TRIG 125 03/08/2017    HDL 26 (L) 03/08/2017    ALT 8 (L) 02/10/2017    AST 17 02/10/2017     06/19/2017     06/19/2017    K 4.9 06/19/2017    K 4.9 06/19/2017     06/19/2017     06/19/2017    CREATININE 2.4 (H) 06/19/2017    CREATININE 2.4 (H) 06/19/2017    BUN 69 (H) 06/19/2017    BUN 69 (H) 06/19/2017    CO2 24 06/19/2017    CO2 24 06/19/2017    TSH 1.585 02/08/2017    INR 0.9 02/08/2017    GLUF 132 (H) 05/03/2012    HGBA1C 9.0 (H) 04/04/2017         Assessment:   71 y.o. female with multiple co-morbid illnesses here to continue work-up of chronic issues notably DM, HTN, CAD, CKD.     Plan:     Problem List Items Addressed This Visit        Cardiac    Hypertension associated with diabetes     BP controlled on BB, diuretic. ARB discontinued by Renal due to hyperkalemia  · norvasc added during hospital, but patient not discharged on this  · cozaar on hold  · Cards increased metoprolol to 100mg BID  · Continue lasix 40mg PRN  · Can increase to BID dosing if needed  · F/U Cardiology         Relevant Medications    metoprolol tartrate (LOPRESSOR) 100 MG tablet    Other Relevant Orders    Hemoglobin A1c    Comprehensive metabolic panel       Renal    CKD stage 4 due to type 2 diabetes mellitus     GFR 23  in 6/2016, followed by Nephrology  · Repeat labs today  · Cozaar on hold due to hyperkalemia  · Seen today by Nephro         Relevant Orders    DXA Bone Density Spine And Hip    Comprehensive metabolic panel    Uncontrolled type 2 diabetes mellitus with stage 4 chronic kidney disease, with long-term current use of insulin     A1c 9 in 4/2017, previously non-compliant due to finances: short-acting 15-15-15U, long-acting 11U. Continue insulin in vials  · Avoid doughnut hole by discotinuing insulin pens  · Continue current regimen         Relevant Orders    Hemoglobin A1c       Endocrine    Hyperparathyroidism     PTH trending up from 310 in 6/2016 to 589 in 6/2017   · F/U Renal recs  · Monitor  · Repeat DEXA         Relevant Orders    DXA Bone Density Spine And Hip    Type 2 diabetes mellitus with hyperlipidemia     Compliant with atorvastatin 80mg and zetia, high ASCVD with DM and age  · cotinue atorvastatin 80mg and zetia  · Continue ASA and plavix  · DM and BP control         Relevant Orders    Hemoglobin A1c       Fluids/Electrolytes/Nutrition/GI    Acute gastritis without hemorrhage     Related to fast food intake, with questionable jaundice on exam. History of cholecystectomy in 2016.  · Check biliary labs, LFTs and acute hep panel  · monitor          Relevant Orders    Comprehensive metabolic panel    Hepatitis panel, acute    Hypovitaminosis D     Vit D level 15 in 1/2017, completed high dose supplementation  · Start daily supplementation  · Repeat DEXA            Musculoskeletal and Integument    Osteopenia of multiple sites     T-score -1.8 in 2014, low vitamin D (15 in 1/2017), CKD, uncontrolled DM, ankle fracture 2013  · PCP message Renal (Dr Iglesias) about calcium supplements  · Patient taking calcitriol  · Completed high dose vit D supplement  · Start daily vitamin D supplementation  · Order from [employer] OTC mag  · Repeat DEXA           Relevant Orders    DXA Bone Density Spine And Hip       Other     Breast calcification, left     L breast calcifications in 1/2017, repeat mammo in 6 mos due  · Repeat diagnostic mammo         Relevant Orders    Mammo Digital Diagnostic Left with Tomosynthesis_CAD      Other Visit Diagnoses     Colon cancer screening    -  Primary    Relevant Orders    Fecal Immunochemical Test (iFOBT)    Chronic kidney disease, stage IV (severe)        Renovascular hypertension        Relevant Medications    metoprolol tartrate (LOPRESSOR) 100 MG tablet    Coronary artery disease of bypass graft of native heart with stable angina pectoris        Relevant Medications    metoprolol tartrate (LOPRESSOR) 100 MG tablet    Jaundice         Relevant Orders    Hepatitis panel, acute          Health Maintenance       Date Due Completion Date    Colonoscopy 08/21/1996 --- iFOBT TODAY    DEXA SCAN 02/04/2016 2/4/2014- TODAY    Mammogram 07/27/2017 1/27/2017- TODAY    Influenza Vaccine 08/01/2017 8/29/2016 (Done)    Override on 8/29/2016: Done    Override on 10/27/2013: Done    Hemoglobin A1c 10/04/2017 4/4/2017    Foot Exam 12/06/2017 12/6/2016    Override on 12/6/2016: Done    Eye Exam 01/10/2018 1/10/2017    Lipid Panel 03/08/2018 3/8/2017    TETANUS VACCINE 03/03/2026 3/3/2016          Return in about 2 months (around 9/6/2017). . One hour spent with this patient today, half of that in counseling.    Cesia Rosales MD/MPH  Internal Medicine  [facility]  [phone]

## 2017-07-06 NOTE — ASSESSMENT & PLAN NOTE
A1c 9 in 4/2017, previously non-compliant due to finances: short-acting 15-15-15U, long-acting 11U. Continue insulin in vials  · Avoid doughnut hole by discotinuing insulin pens  · Continue current regimen

## 2017-07-10 ENCOUNTER — OUTPATIENT CASE MANAGEMENT (OUTPATIENT)
Dept: ADMINISTRATIVE | Facility: OTHER | Age: 72
End: 2017-07-10

## 2017-07-10 ENCOUNTER — HOSPITAL ENCOUNTER (OUTPATIENT)
Dept: RADIOLOGY | Facility: HOSPITAL | Age: 72
Discharge: HOME | End: 2017-07-10
Attending: INTERNAL MEDICINE
Payer: MEDICARE

## 2017-07-10 VITALS — WEIGHT: 183 LBS | HEIGHT: 64 IN | BODY MASS INDEX: 31.24 KG/M2

## 2017-07-10 DIAGNOSIS — E11.9 DIABETES MELLITUS WITHOUT COMPLICATION: ICD-10-CM

## 2017-07-10 DIAGNOSIS — R92.1 BREAST CALCIFICATION, LEFT: ICD-10-CM

## 2017-07-10 PROCEDURE — 77061 BREAST TOMOSYNTHESIS UNI: CPT | Mod: 26,LT,, | Performed by: RADIOLOGY

## 2017-07-10 PROCEDURE — 77061 BREAST TOMOSYNTHESIS UNI: CPT | Mod: TC,LT

## 2017-07-10 PROCEDURE — 77065 DX MAMMO INCL CAD UNI: CPT | Mod: 26,LT,, | Performed by: RADIOLOGY

## 2017-07-10 NOTE — PROGRESS NOTES
Please note the following patient has been assigned to Iqra Delgadillo RN,  in Outpatient Case Management for Diabetes Disease Management with a hbA1C greater than 10.0.    Please contact [facility] at Ext. [phone] with any questions.    Thank you,    Palmira Mccray, SSC

## 2017-07-12 ENCOUNTER — TELEPHONE (OUTPATIENT)
Dept: INTERNAL MEDICINE | Facility: CLINIC | Age: 72
End: 2017-07-12

## 2017-07-12 DIAGNOSIS — R92.8 ABNORMAL MAMMOGRAM: Primary | ICD-10-CM

## 2017-07-13 ENCOUNTER — HOSPITAL ENCOUNTER (OUTPATIENT)
Dept: RADIOLOGY | Facility: CLINIC | Age: 72
Discharge: HOME | End: 2017-07-13
Attending: INTERNAL MEDICINE
Payer: MEDICARE

## 2017-07-13 DIAGNOSIS — M85.89 OSTEOPENIA OF MULTIPLE SITES: ICD-10-CM

## 2017-07-13 DIAGNOSIS — E11.22 CKD STAGE 4 DUE TO TYPE 2 DIABETES MELLITUS: ICD-10-CM

## 2017-07-13 DIAGNOSIS — N18.4 CKD STAGE 4 DUE TO TYPE 2 DIABETES MELLITUS: ICD-10-CM

## 2017-07-13 DIAGNOSIS — E21.3 HYPERPARATHYROIDISM: ICD-10-CM

## 2017-07-13 PROCEDURE — 77080 DXA BONE DENSITY AXIAL: CPT | Mod: 26,,, | Performed by: INTERNAL MEDICINE

## 2017-07-13 PROCEDURE — 77080 DXA BONE DENSITY AXIAL: CPT | Mod: TC

## 2017-07-14 ENCOUNTER — OUTPATIENT CASE MANAGEMENT (OUTPATIENT)
Dept: ADMINISTRATIVE | Facility: OTHER | Age: 72
End: 2017-07-14

## 2017-07-14 NOTE — LETTER
July 14, 2017    Zoey Ham  [address] [place] [address]             [facility] [address] [place] [address] Dear Ms. Zoey Ham,    Thank you for talking with me on Friday July 14, 2017. You have been enrolled in [facility] for disease management. I will assist you with managing your diabetes mellitus. I will provide you with additions information about your disease, medications, and treatments.      If you have any questions or concerns, please don't hesitate to call me at [phone].    Sincerely,        Iqra Delgadillo, RN

## 2017-07-14 NOTE — PROGRESS NOTES
Pt reports that she does not give permission to speak to a family member on her behalf. Pt reports that she has a medical POA. Pt reports that she prepares her medications for the month. Pt request for the med rec to be completed on another day due to she has plans to leave the home shortly.Pt reports that she takes her medications as ordered.  Pt reports that she tries to check her blood sugars three times a day. Pt reports that she plans to start logging her blood sugars today. Pt reports that she is aware to call her PCP next week with glucose readings. Reviewed with patient the benefits of using a blood glucose log. Pt reports that she is aware of her hemoglobin A1c level of 10.6 on 7/6/17. Pt reports that she is trying to eat a diabetic diet. Pt reports that she prepares her meals.Pt reports that she has a tendency to skip meals. Pt reports that she drives and has no transportation problems at this time.  Pt reports that she has some vision difficulties but is able to see and she wears glasses. Pt reports that she is aware of what to do but finds it had to maintain following a diabetes regimen. Pt reports that she had one fall which was due to tripping over an object.

## 2017-07-20 ENCOUNTER — TELEPHONE (OUTPATIENT)
Dept: INTERNAL MEDICINE | Facility: CLINIC | Age: 72
End: 2017-07-20

## 2017-07-21 ENCOUNTER — OUTPATIENT CASE MANAGEMENT (OUTPATIENT)
Dept: ADMINISTRATIVE | Facility: OTHER | Age: 72
End: 2017-07-21

## 2017-07-28 ENCOUNTER — OUTPATIENT CASE MANAGEMENT (OUTPATIENT)
Dept: ADMINISTRATIVE | Facility: OTHER | Age: 72
End: 2017-07-28

## 2017-07-28 NOTE — PROGRESS NOTES
Pt reports that her blood sugar this morning was 119. Pt reports that she can tell that her blood sugar is elevated when she fells a pain in her feet. Pt reports that the pain can be frequent.Pt reports that she has started to avoid eating breads, rice, and pasta. Pt reports that she has been feeling better for the past two weeks.  Pt reports that she checks her feet everyday with no problems noted.  Reviewed hemoglobin A1c and s/s of DM. Plan: Review a well balanced plate.

## 2017-08-04 ENCOUNTER — OUTPATIENT CASE MANAGEMENT (OUTPATIENT)
Dept: ADMINISTRATIVE | Facility: OTHER | Age: 72
End: 2017-08-04

## 2017-08-11 ENCOUNTER — OUTPATIENT CASE MANAGEMENT (OUTPATIENT)
Dept: ADMINISTRATIVE | Facility: OTHER | Age: 72
End: 2017-08-11

## 2017-08-18 ENCOUNTER — OUTPATIENT CASE MANAGEMENT (OUTPATIENT)
Dept: ADMINISTRATIVE | Facility: OTHER | Age: 72
End: 2017-08-18

## 2017-08-18 NOTE — PROGRESS NOTES
Pt reports that her blood sugars are running between 109 and 120. Pt reports that she is reading the materials mailed. Pt reports that she is trying to stick to the diabetic diet. Pt reports that she is trying to follow the diabetic diet.Pt reports that she looks at her feet daily. Pt reports that she checks her blood pressure at times. Reviewed with patient upcoming appts.Reviewed with patient the well balanced food plate. Plan to review with patient the risk factors of DM.

## 2017-09-01 ENCOUNTER — OUTPATIENT CASE MANAGEMENT (OUTPATIENT)
Dept: ADMINISTRATIVE | Facility: OTHER | Age: 72
End: 2017-09-01

## 2017-09-01 NOTE — PROGRESS NOTES
Call placed to [phone] and [phone] with no answer. Message left containing contact information.

## 2017-09-13 ENCOUNTER — OUTPATIENT CASE MANAGEMENT (OUTPATIENT)
Dept: ADMINISTRATIVE | Facility: OTHER | Age: 72
End: 2017-09-13

## 2017-09-13 NOTE — PROGRESS NOTES
Pt reports that she is out of town. Pt reports that she is still checking her blood sugar and blood pressure. Pt reports that her blood sugars are between 105 and 115. Pt reports that her blood pressure is doing good. Pt reports that she is following a diabetic diet. Reviewed with patient tips for eating out. Plan: Review risk factors of DM and importance of using a BS log.

## 2017-10-01 RX ORDER — CALCITRIOL 0.25 UG/1
CAPSULE ORAL
Qty: 12 CAPSULE | Refills: 2 | Status: SHIPPED | OUTPATIENT
Start: 2017-10-01 | End: 2017-12-06 | Stop reason: SDUPTHER

## 2017-10-02 ENCOUNTER — OUTPATIENT CASE MANAGEMENT (OUTPATIENT)
Dept: ADMINISTRATIVE | Facility: OTHER | Age: 72
End: 2017-10-02

## 2017-10-06 ENCOUNTER — OUTPATIENT CASE MANAGEMENT (OUTPATIENT)
Dept: ADMINISTRATIVE | Facility: OTHER | Age: 72
End: 2017-10-06

## 2017-10-06 NOTE — PROGRESS NOTES
Good morning. My name is Iqra Delgadillo,RN. I work for [facility] case [facility] department with Dr. OMER.I wanted to call and review your disaster plan with you. I also wanted to go over some crucial information and provide you with emergency phone numbers for your [place].   [x] Called patient, no answer, I left a message with the phone number for the [place] Office of Emergency Preparedness.   [] Please be sure to have a supply of water, non-perishable food items, flashlights and batteries with you in your house.   [] Please be sure you bring all of your medications with you. Bring at least a five day supply. It is best to bring your medication bottles, in case you are displaced for a longer period of time, therefore you can get your medication refilled from your temporary location.   [] Please bring sure to bring any DME that you may need. This includes walkers, wheelchairs, shower chairs, nebulizer machine, etc.   [] If you are a diabetic- be sure to bring your glucometer and all glucometer monitoring supplies.   [] If you have high blood pressure- be sure to bring your blood pressure cuff so you can continue to monitor.   [] If you have CHF-be sure to bring your scale so you can continue to monitor.  [] If on PEG feeding- be sure to bring tube feedings and feeding supplies.  [] If on oxygen- be sure to bring all of your oxygen supplies: Cannula, portable tanks, concentrator, etc. Also contact you Oxygen Supply Company to find out the nearest location of an oxygen supply company to where you will be located. ([employer]: [phone], [facility]: [phone], [facility]:[phone], [employer]: [phone]).   [] If you receive hemodialysis- you should already have a plan in place with your dialysis center. If you do not know where you need to evacuate to in order to be close to a dialysis center- reach out to your dialysis center for further direction. ([facility] service line:  [phone], [facility] service line [phone])  [] If receiving treatment at an infusion center- please contact the infusion center to find out which infusion center they have a contract with. Also ask your infusion center for location of the infusion center they are in contract with, so if need be you can evacuate to that area.   Office of Emergency Preparedness Phone number:  [x] [facility]: [phone]

## 2017-11-10 ENCOUNTER — OUTPATIENT CASE MANAGEMENT (OUTPATIENT)
Dept: ADMINISTRATIVE | Facility: OTHER | Age: 72
End: 2017-11-10

## 2017-11-10 DIAGNOSIS — E11.9 DIABETES MELLITUS WITHOUT COMPLICATION: ICD-10-CM

## 2017-11-10 NOTE — PROGRESS NOTES
Call placed to [phone] with a message left containing contact information. Call placed to [phone] with no answer.

## 2017-11-27 ENCOUNTER — TELEPHONE (OUTPATIENT)
Dept: INTERNAL MEDICINE | Facility: CLINIC | Age: 72
End: 2017-11-27

## 2017-11-27 ENCOUNTER — LAB VISIT (OUTPATIENT)
Dept: LAB | Facility: HOSPITAL | Age: 72
End: 2017-11-27
Attending: INTERNAL MEDICINE
Payer: MEDICARE

## 2017-11-27 ENCOUNTER — TELEPHONE (OUTPATIENT)
Dept: NEPHROLOGY | Facility: CLINIC | Age: 72
End: 2017-11-27

## 2017-11-27 DIAGNOSIS — R80.9 PROTEINURIA, UNSPECIFIED TYPE: ICD-10-CM

## 2017-11-27 DIAGNOSIS — N18.4 CKD (CHRONIC KIDNEY DISEASE) STAGE 4, GFR 15-29 ML/MIN: ICD-10-CM

## 2017-11-27 DIAGNOSIS — N18.4 BENIGN HYPERTENSION WITH CKD (CHRONIC KIDNEY DISEASE) STAGE IV: ICD-10-CM

## 2017-11-27 DIAGNOSIS — N18.5 BENIGN HYPERTENSION WITH CKD (CHRONIC KIDNEY DISEASE) STAGE V: ICD-10-CM

## 2017-11-27 DIAGNOSIS — I10 ESSENTIAL HYPERTENSION: ICD-10-CM

## 2017-11-27 DIAGNOSIS — N18.4 CKD (CHRONIC KIDNEY DISEASE) STAGE 4, GFR 15-29 ML/MIN: Primary | ICD-10-CM

## 2017-11-27 DIAGNOSIS — I12.0 BENIGN HYPERTENSION WITH CKD (CHRONIC KIDNEY DISEASE) STAGE V: ICD-10-CM

## 2017-11-27 DIAGNOSIS — I12.9 BENIGN HYPERTENSION WITH CKD (CHRONIC KIDNEY DISEASE) STAGE IV: ICD-10-CM

## 2017-11-27 DIAGNOSIS — E11.21 TYPE 2 DIABETES MELLITUS WITH DIABETIC NEPHROPATHY, WITH LONG-TERM CURRENT USE OF INSULIN: ICD-10-CM

## 2017-11-27 DIAGNOSIS — Z79.4 TYPE 2 DIABETES MELLITUS WITH DIABETIC NEPHROPATHY, WITH LONG-TERM CURRENT USE OF INSULIN: ICD-10-CM

## 2017-11-27 DIAGNOSIS — N25.0 RENAL OSTEODYSTROPHY: ICD-10-CM

## 2017-11-27 LAB
ALBUMIN SERPL BCP-MCNC: 3.1 G/DL
ALBUMIN SERPL BCP-MCNC: 3.1 G/DL
ANION GAP SERPL CALC-SCNC: 8 MMOL/L
ANION GAP SERPL CALC-SCNC: 8 MMOL/L
BUN SERPL-MCNC: 37 MG/DL
BUN SERPL-MCNC: 37 MG/DL
CALCIUM SERPL-MCNC: 9.4 MG/DL
CALCIUM SERPL-MCNC: 9.4 MG/DL
CHLORIDE SERPL-SCNC: 108 MMOL/L
CHLORIDE SERPL-SCNC: 108 MMOL/L
CO2 SERPL-SCNC: 25 MMOL/L
CO2 SERPL-SCNC: 25 MMOL/L
CREAT SERPL-MCNC: 2.1 MG/DL
CREAT SERPL-MCNC: 2.1 MG/DL
EST. GFR  (AFRICAN AMERICAN): 26.7 ML/MIN/1.73 M^2
EST. GFR  (AFRICAN AMERICAN): 26.7 ML/MIN/1.73 M^2
EST. GFR  (NON AFRICAN AMERICAN): 23.2 ML/MIN/1.73 M^2
EST. GFR  (NON AFRICAN AMERICAN): 23.2 ML/MIN/1.73 M^2
FERRITIN SERPL-MCNC: 400 NG/ML
GLUCOSE SERPL-MCNC: 190 MG/DL
GLUCOSE SERPL-MCNC: 190 MG/DL
HCT VFR BLD AUTO: 34.2 %
HGB BLD-MCNC: 10.9 G/DL
IRON SERPL-MCNC: 38 UG/DL
PHOSPHATE SERPL-MCNC: 3.7 MG/DL
PHOSPHATE SERPL-MCNC: 3.7 MG/DL
POTASSIUM SERPL-SCNC: 5.2 MMOL/L
POTASSIUM SERPL-SCNC: 5.2 MMOL/L
PTH-INTACT SERPL-MCNC: 390 PG/ML
SATURATED IRON: 13 %
SODIUM SERPL-SCNC: 141 MMOL/L
SODIUM SERPL-SCNC: 141 MMOL/L
TOTAL IRON BINDING CAPACITY: 293 UG/DL
TRANSFERRIN SERPL-MCNC: 198 MG/DL

## 2017-11-27 PROCEDURE — 80069 RENAL FUNCTION PANEL: CPT

## 2017-11-27 PROCEDURE — 85014 HEMATOCRIT: CPT

## 2017-11-27 PROCEDURE — 83540 ASSAY OF IRON: CPT

## 2017-11-27 PROCEDURE — 85018 HEMOGLOBIN: CPT

## 2017-11-27 PROCEDURE — 83970 ASSAY OF PARATHORMONE: CPT

## 2017-11-27 PROCEDURE — 82728 ASSAY OF FERRITIN: CPT

## 2017-11-27 PROCEDURE — 36415 COLL VENOUS BLD VENIPUNCTURE: CPT

## 2017-11-27 NOTE — TELEPHONE ENCOUNTER
Patient stated that she has been sewing a lot lately and noticed her hands were getting numb along with pain for  few weeks ago , pain= 10 , patient has been taking Tylenol , patient describes this pain as a electrical shock . Patient denies redness or rash in hands .    *December the 5th would be the soonest .

## 2017-11-27 NOTE — TELEPHONE ENCOUNTER
This is likely carpal tunnel syndrome. I e-scribed a script for a brace to her pharmacy ([facility] [address]). She should see if she can try that especially at night before the 12/5 appt.    Thanks,  SOLEDAD

## 2017-11-27 NOTE — TELEPHONE ENCOUNTER
Patient has been informed that it is likely carpal tunnel syndrome , and that Dr. Rosales has sent in a brace for patient to wear at night .  Patient has been informed to please wear it the night before 12/5 appt.    Patient verbalized great understanding.

## 2017-11-27 NOTE — TELEPHONE ENCOUNTER
----- Message from Harinder Matamoros sent at 11/27/2017 11:05 AM CST -----  Contact: Self [phone]  Pt would like to speak with the nurse regarding she has been having hand Pain and had no injury to her hand and would like to know can she get a referral to Ortho or should she see Dr Mac first ,Please call

## 2017-11-28 NOTE — TELEPHONE ENCOUNTER
----- Message from Frida Steve sent at 11/28/2017 11:47 AM CST -----  Contact: Patient [phone]  Patient is stating that [facility] is telling her that they do not have the brace either. She wants it sent to [facility]. Informed patient that she needs to contact the pharmacy prior to us sending another order. Requesting to speak to you.    Please call and advise.    Thank You

## 2017-11-28 NOTE — TELEPHONE ENCOUNTER
Please have her repeat urine studies before appointment as her urine protein came back higher than before.

## 2017-11-28 NOTE — TELEPHONE ENCOUNTER
Spoke with pt let her know the following message below per Dr. Iglesias. Urine test have been scheduled.

## 2017-11-29 ENCOUNTER — TELEPHONE (OUTPATIENT)
Dept: INTERNAL MEDICINE | Facility: CLINIC | Age: 72
End: 2017-11-29

## 2017-11-29 DIAGNOSIS — M25.539 WRIST PAIN, ACUTE, UNSPECIFIED LATERALITY: ICD-10-CM

## 2017-11-29 NOTE — TELEPHONE ENCOUNTER
----- Message from Autumn Rod sent at 11/29/2017 11:09 AM CST -----  Contact: Patient [phone]    Patient is stating that [facility] is telling her that they do not have the brace either. She wants it sent to [facility].  Requesting to speak with someone in office today if possible urgent      Please call and advise.     Thank You

## 2017-11-30 PROBLEM — M25.539: Status: ACTIVE | Noted: 2017-11-30

## 2017-11-30 NOTE — TELEPHONE ENCOUNTER
Script for carpal tunnel wrist brace (bilateral) printed. Please forward to [facility].    Thanks,  KJ

## 2017-12-05 ENCOUNTER — OFFICE VISIT (OUTPATIENT)
Dept: INTERNAL MEDICINE | Facility: CLINIC | Age: 72
End: 2017-12-05
Payer: MEDICARE

## 2017-12-05 ENCOUNTER — LAB VISIT (OUTPATIENT)
Dept: LAB | Facility: HOSPITAL | Age: 72
End: 2017-12-05
Attending: INTERNAL MEDICINE
Payer: MEDICARE

## 2017-12-05 ENCOUNTER — TELEPHONE (OUTPATIENT)
Dept: INTERNAL MEDICINE | Facility: CLINIC | Age: 72
End: 2017-12-05

## 2017-12-05 DIAGNOSIS — D63.1 ANEMIA OF CHRONIC RENAL FAILURE, STAGE 4 (SEVERE): ICD-10-CM

## 2017-12-05 DIAGNOSIS — M25.539 WRIST PAIN, ACUTE, UNSPECIFIED LATERALITY: ICD-10-CM

## 2017-12-05 DIAGNOSIS — E78.5 TYPE 2 DIABETES MELLITUS WITH HYPERLIPIDEMIA: ICD-10-CM

## 2017-12-05 DIAGNOSIS — N18.4 CKD STAGE 4 DUE TO TYPE 2 DIABETES MELLITUS: ICD-10-CM

## 2017-12-05 DIAGNOSIS — I48.20 CHRONIC ATRIAL FIBRILLATION: ICD-10-CM

## 2017-12-05 DIAGNOSIS — K29.00 ACUTE GASTRITIS WITHOUT HEMORRHAGE, UNSPECIFIED GASTRITIS TYPE: ICD-10-CM

## 2017-12-05 DIAGNOSIS — E11.22 CKD STAGE 4 DUE TO TYPE 2 DIABETES MELLITUS: ICD-10-CM

## 2017-12-05 DIAGNOSIS — E11.42 DIABETIC POLYNEUROPATHY ASSOCIATED WITH TYPE 2 DIABETES MELLITUS: ICD-10-CM

## 2017-12-05 DIAGNOSIS — E11.69 TYPE 2 DIABETES MELLITUS WITH HYPERLIPIDEMIA: ICD-10-CM

## 2017-12-05 DIAGNOSIS — E55.9 HYPOVITAMINOSIS D: ICD-10-CM

## 2017-12-05 DIAGNOSIS — Z12.11 COLON CANCER SCREENING: Primary | ICD-10-CM

## 2017-12-05 DIAGNOSIS — N18.4 ANEMIA OF CHRONIC RENAL FAILURE, STAGE 4 (SEVERE): ICD-10-CM

## 2017-12-05 DIAGNOSIS — M85.89 OSTEOPENIA OF MULTIPLE SITES: ICD-10-CM

## 2017-12-05 LAB
ALBUMIN SERPL BCP-MCNC: 3.1 G/DL
ALP SERPL-CCNC: 120 U/L
ALT SERPL W/O P-5'-P-CCNC: 14 U/L
ANION GAP SERPL CALC-SCNC: 10 MMOL/L
AST SERPL-CCNC: 20 U/L
BILIRUB SERPL-MCNC: 0.3 MG/DL
BUN SERPL-MCNC: 27 MG/DL
CALCIUM SERPL-MCNC: 9.1 MG/DL
CHLORIDE SERPL-SCNC: 109 MMOL/L
CHOLEST SERPL-MCNC: 135 MG/DL
CHOLEST/HDLC SERPL: 4.8 {RATIO}
CO2 SERPL-SCNC: 20 MMOL/L
CREAT SERPL-MCNC: 1.9 MG/DL
EST. GFR  (AFRICAN AMERICAN): 30.1 ML/MIN/1.73 M^2
EST. GFR  (NON AFRICAN AMERICAN): 26.1 ML/MIN/1.73 M^2
ESTIMATED AVG GLUCOSE: 246 MG/DL
GLUCOSE SERPL-MCNC: 106 MG/DL
HBA1C MFR BLD HPLC: 10.2 %
HDLC SERPL-MCNC: 28 MG/DL
HDLC SERPL: 20.7 %
LDLC SERPL CALC-MCNC: 63 MG/DL
NONHDLC SERPL-MCNC: 107 MG/DL
POTASSIUM SERPL-SCNC: 4.6 MMOL/L
PROT SERPL-MCNC: 6.9 G/DL
SODIUM SERPL-SCNC: 139 MMOL/L
TRIGL SERPL-MCNC: 220 MG/DL

## 2017-12-05 PROCEDURE — 80061 LIPID PANEL: CPT

## 2017-12-05 PROCEDURE — 83036 HEMOGLOBIN GLYCOSYLATED A1C: CPT

## 2017-12-05 PROCEDURE — 99215 OFFICE O/P EST HI 40 MIN: CPT | Mod: S$GLB,,, | Performed by: INTERNAL MEDICINE

## 2017-12-05 PROCEDURE — 99499 UNLISTED E&M SERVICE: CPT | Mod: S$GLB,,, | Performed by: INTERNAL MEDICINE

## 2017-12-05 PROCEDURE — 36415 COLL VENOUS BLD VENIPUNCTURE: CPT

## 2017-12-05 PROCEDURE — 99999 PR PBB SHADOW E&M-EST. PATIENT-LVL III: CPT | Mod: PBBFAC,,, | Performed by: INTERNAL MEDICINE

## 2017-12-05 PROCEDURE — 80053 COMPREHEN METABOLIC PANEL: CPT

## 2017-12-05 NOTE — PROGRESS NOTES
Primary Care Provider Appointment    Subjective:      Patient ID: Zoey Ham is a 72 y.o. female with a fib, CHF, DM, HTN    Chief Complaint: Diabetes and Diarrhea    Patient complaining of 1 week of diarrhea (non-bloody) associated with nausea but no vomiting. She has a sick contact (her adult daughter, grandson, granddaughter-in-law)). She has had no foods out of normal. She is attempting to hydrate with gator-jaspreet. She has only drank danny soup. She also had GI illness in 7/2017, with some element of jaundice.    She also complaining of R hand pain which occurred since performing more cross-stitch projects than normal. She called the office one week ago and was ordered a wrist splint (she purchased this OTC from [employer] on Wed). She is using it constantly (aside from restroom use). She has noticed mild improvement in the pain with the splint, but still has decreased sensation. She continues to have numbness on the tips of thumb, index, middle finder on R hand; with wrist pain. She has stopped cross-stitching because she can no longer feel the needle.    Her DEXA returned with osteopenia (t score -1.9). Patient is compliant with calcium and vit d. She walks for exercise, and works in her yard. She is taking calcitriol three times weekly, and calcium supplement with Vit D    Her CBG have been elevated during her acute gastroenteritis illness. They are usually in 70-120s (isolated highs of 170s during acute illness). She continues to take 11U long-acting, 15U qac. Last A1c was 10.4.    Patient over-due for Cardiology. She has a fib with AZREM-5-TENR score of 9. She has been noncompliant with Cardiology follow-up. Last seen in 3/2017, was supposed to return in 2 mos.    Patient is no longer taking cozaar due to hyperkalemia, will see Nephrology tomorrow to discuss this. She states her BPs have been normal.    Past Surgical History:   Procedure Laterality Date    APPENDECTOMY      CARDIAC SURGERY  2002     CABG    CHOLECYSTECTOMY      CORONARY ANGIOPLASTY  2004    CORONARY ARTERY BYPASS GRAFT      EYE SURGERY      cataracts bilaterally    FRACTURE SURGERY      right ankle    HYSTERECTOMY      TONSILLECTOMY         Past Medical History:   Diagnosis Date    Acute myocardial infarction of anterolateral wall 7/9/2015    Anemia of chronic renal failure, stage 4 (severe) 1/22/2015    Atherosclerosis of aorta 10/3/2012    Benign hypertension with CKD (chronic kidney disease) stage IV 3/11/2016    Chronic diastolic congestive heart failure 10/3/2012    Chronic kidney disease, stage IV (severe) 7/3/2012    Coronary artery disease     s/p 1v CABG 2002 and multiple PCI (last angiogram in 6/2012 with patent LIMA->LAD and patent LCx and RCA stents)    Diabetic polyneuropathy associated with type 2 diabetes mellitus 7/9/2015    Diabetic polyneuropathy associated with type 2 diabetes mellitus 7/9/2015    Encounter for blood transfusion     Heart attack 09/2012    5-6 events    Hyperlipidemia     Nephritis and nephropathy, 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 Negative for activity change, appetite change and unexpected weight change.   Respiratory: Negative for cough and choking.    Cardiovascular: Negative for leg swelling.   Gastrointestinal: Positive for abdominal pain, diarrhea and nausea.   Musculoskeletal: Negative for back pain, gait problem and myalgias.   Neurological: Positive for weakness and numbness. Negative for dizziness, tremors,  syncope, facial asymmetry and headaches.   Hematological: Bruises/bleeds easily.   Psychiatric/Behavioral: Negative for agitation, behavioral problems and decreased concentration.       Objective:   LMP  (LMP Unknown)     Physical Exam   Constitutional: She is oriented to person, place, and time. She appears well-developed and well-nourished.   HENT:   Head: Normocephalic and atraumatic.   Neck: Normal range of motion.   Abdominal: Soft. Bowel sounds are normal. There is no tenderness. There is no guarding.   Neurological: She is alert and oriented to person, place, and time.   Skin:   Ecchymoses on UE bilaterally   Psychiatric: She has a normal mood and affect. Her behavior is normal. Thought content normal.   Flat affect   Vitals reviewed.      Lab Results   Component Value Date    WBC 10.72 02/10/2017    HGB 10.9 (L) 11/27/2017    HCT 34.2 (L) 11/27/2017     02/10/2017    CHOL 103 (L) 03/08/2017    TRIG 125 03/08/2017    HDL 26 (L) 03/08/2017    ALT 15 07/06/2017    AST 21 07/06/2017     11/27/2017     11/27/2017    K 5.2 (H) 11/27/2017    K 5.2 (H) 11/27/2017     11/27/2017     11/27/2017    CREATININE 2.1 (H) 11/27/2017    CREATININE 2.1 (H) 11/27/2017    BUN 37 (H) 11/27/2017    BUN 37 (H) 11/27/2017    CO2 25 11/27/2017    CO2 25 11/27/2017    TSH 1.585 02/08/2017    INR 0.9 02/08/2017    GLUF 132 (H) 05/03/2012    HGBA1C 10.4 (H) 07/06/2017         Assessment:   72 y.o. female with multiple co-morbid illnesses here to continue work-up of chronic issues notably a fib, CHF, DM, HTN    Plan:     Problem List Items Addressed This Visit        Neuro    Diabetic polyneuropathy associated with type 2 diabetes mellitus     Stocking/glove pattern intermittent neuropathy  · DM control   · Annual diabetic foot exam         Relevant Orders    EMG- 1 EXTREMITY    Ambulatory Referral to Podiatry    Hemoglobin A1c       Cardiac/Vascular    Chronic atrial fibrillation     Rate-controlled on  metoprolol 100mg BID  · Continue BB BID  · Elevated AUGBI-7-Fjay score of 9 (10.8% risk of stroke)  · Cards eval for anticoagulation  · Continue DAPT            Oncology    Anemia of chronic renal failure, stage 4 (severe)     GFR 23, followed by Nephrology, previously on Fe supplements  · F/u Nephrology  · Repeat urine studies            Endocrine    CKD stage 4 due to type 2 diabetes mellitus     GFR 23, followed by Nephrology  · Cozaar on hold due to hyperkalemia  · Seen today by Nephro         Uncontrolled type 2 diabetes mellitus with stage 4 chronic kidney disease, with long-term current use of insulin     A1c 9 in 4/2017, previously non-compliant due to finances: short-acting 15-15-15U, long-acting 11U. Continue insulin in vials  · Avoid doughnut hole by discotinuing insulin pens  · Continue current regimen         Relevant Orders    Hemoglobin A1c    Type 2 diabetes mellitus with hyperlipidemia     Compliant with atorvastatin 80mg and zetia, high ASCVD with DM and age  · cotinue atorvastatin 80mg and zetia  · Continue ASA and plavix  · DM and BP control         Relevant Orders    Lipid panel    Hypovitaminosis D     Vit D level 15 in 1/2017, completed high dose supplementation  · Start daily supplementation  · Continue calcitriol  · Repeat DEXA            GI    Acute gastritis without hemorrhage     Likely viral in 12/2017, related to fast food intake in 7/2017. History of cholecystectomy in 2016.  · Advised supportive care  · monitor          Relevant Orders    Comprehensive metabolic panel       Orthopedic    Osteopenia of multiple sites     T-score -1.8 in 2014, -1.9 in 2017, low vitamin D (15 in 1/2017), CKD, uncontrolled DM, ankle fracture 2013  · Continue calcium and Vit D supplements  · Patient taking calcitriol  · Completed high dose vit D supplement  · Continue weight bearing exercise           Wrist pain, acute, unspecified laterality     Likely carpal tunnel, bilateral presentation. Some  improvement with splinting  · EMG advised  · Patient prefers to postpone until 2018         Relevant Orders    EMG- 1 EXTREMITY      Other Visit Diagnoses     Colon cancer screening    -  Primary    Relevant Orders    Fecal Immunochemical Test (iFOBT)          Health Maintenance       Date Due Completion Date    Colonoscopy 08/21/1996 ---iFOBT    Influenza Vaccine 08/01/2017 8/29/2016 (Done)- DONE    Override on 8/29/2016: Done    Override on 10/27/2013: Done    Foot Exam 12/06/2017 12/6/2016- TODAY    Override on 12/6/2016: Done    Mammogram 01/10/2018 7/10/2017- NEXT    Hemoglobin A1c 01/06/2018 7/6/2017- TODAY    Lipid Panel 03/08/2018 3/8/2017    Eye Exam 07/06/2018 7/6/2017    DEXA SCAN 07/13/2019 7/13/2017    TETANUS VACCINE 03/03/2026 3/3/2016          Return in about 6 weeks (around 1/16/2018). . One hour spent with this patient today, half of that in counseling.    Cesia Rosales MD/MPH  Internal Medicine  [facility]  [phone]

## 2017-12-05 NOTE — ASSESSMENT & PLAN NOTE
Likely viral in 12/2017, related to fast food intake in 7/2017. History of cholecystectomy in 2016.  · Advised supportive care  · monitor

## 2017-12-05 NOTE — PATIENT INSTRUCTIONS
TODAY:  - EMG of RUE in 2018  - appt with Dr Kelley  - bring all meds to appt with Dr Iglesias  - urine studies today  - podiatry referral (female provider)  - stool card for colon cancer screening    NEXT:  - mammogram

## 2017-12-05 NOTE — ASSESSMENT & PLAN NOTE
Likely carpal tunnel, bilateral presentation. Some improvement with splinting  · EMG advised  · Patient prefers to postpone until 2018

## 2017-12-05 NOTE — ASSESSMENT & PLAN NOTE
Vit D level 15 in 1/2017, completed high dose supplementation  · Start daily supplementation  · Continue calcitriol  · Repeat DEXA

## 2017-12-05 NOTE — ASSESSMENT & PLAN NOTE
T-score -1.8 in 2014, -1.9 in 2017, low vitamin D (15 in 1/2017), CKD, uncontrolled DM, ankle fracture 2013  · Continue calcium and Vit D supplements  · Patient taking calcitriol  · Completed high dose vit D supplement  · Continue weight bearing exercise

## 2017-12-05 NOTE — ASSESSMENT & PLAN NOTE
GFR 23, followed by Nephrology, previously on Fe supplements  · F/u Nephrology  · Repeat urine studies

## 2017-12-05 NOTE — ASSESSMENT & PLAN NOTE
Rate-controlled on metoprolol 100mg BID  · Continue BB BID  · Elevated ZUYQQ-3-Adqw score of 9 (10.8% risk of stroke)  · Cards eval for anticoagulation  · Continue DAPT

## 2017-12-06 ENCOUNTER — LAB VISIT (OUTPATIENT)
Dept: LAB | Facility: HOSPITAL | Age: 72
End: 2017-12-06
Payer: MEDICARE

## 2017-12-06 ENCOUNTER — OFFICE VISIT (OUTPATIENT)
Dept: NEPHROLOGY | Facility: CLINIC | Age: 72
End: 2017-12-06
Payer: MEDICARE

## 2017-12-06 ENCOUNTER — TELEPHONE (OUTPATIENT)
Dept: NEPHROLOGY | Facility: CLINIC | Age: 72
End: 2017-12-06

## 2017-12-06 VITALS
WEIGHT: 185.88 LBS | HEART RATE: 83 BPM | BODY MASS INDEX: 31.73 KG/M2 | DIASTOLIC BLOOD PRESSURE: 80 MMHG | SYSTOLIC BLOOD PRESSURE: 146 MMHG | OXYGEN SATURATION: 98 % | HEIGHT: 64 IN

## 2017-12-06 DIAGNOSIS — I12.9 BENIGN HYPERTENSION WITH CKD (CHRONIC KIDNEY DISEASE) STAGE IV: ICD-10-CM

## 2017-12-06 DIAGNOSIS — I15.0 RENOVASCULAR HYPERTENSION: ICD-10-CM

## 2017-12-06 DIAGNOSIS — R80.9 PROTEINURIA, UNSPECIFIED TYPE: ICD-10-CM

## 2017-12-06 DIAGNOSIS — N18.4 BENIGN HYPERTENSION WITH CKD (CHRONIC KIDNEY DISEASE) STAGE IV: ICD-10-CM

## 2017-12-06 DIAGNOSIS — I10 ESSENTIAL HYPERTENSION: ICD-10-CM

## 2017-12-06 DIAGNOSIS — N25.0 RENAL OSTEODYSTROPHY: ICD-10-CM

## 2017-12-06 DIAGNOSIS — N18.4 CKD (CHRONIC KIDNEY DISEASE) STAGE 4, GFR 15-29 ML/MIN: Primary | ICD-10-CM

## 2017-12-06 DIAGNOSIS — N18.4 CKD (CHRONIC KIDNEY DISEASE) STAGE 4, GFR 15-29 ML/MIN: ICD-10-CM

## 2017-12-06 LAB
BACTERIA #/AREA URNS AUTO: ABNORMAL /HPF
BILIRUB UR QL STRIP: NEGATIVE
CLARITY UR REFRACT.AUTO: ABNORMAL
COLOR UR AUTO: YELLOW
GLUCOSE UR QL STRIP: ABNORMAL
HGB UR QL STRIP: ABNORMAL
HYALINE CASTS UR QL AUTO: 0 /LPF
KETONES UR QL STRIP: NEGATIVE
LEUKOCYTE ESTERASE UR QL STRIP: ABNORMAL
MICROSCOPIC COMMENT: ABNORMAL
NITRITE UR QL STRIP: NEGATIVE
NON-SQ EPI CELLS #/AREA URNS AUTO: 3 /HPF
PH UR STRIP: 5 [PH] (ref 5–8)
PROT UR QL STRIP: ABNORMAL
RBC #/AREA URNS AUTO: 37 /HPF (ref 0–4)
SP GR UR STRIP: 1.01 (ref 1–1.03)
SQUAMOUS #/AREA URNS AUTO: 20 /HPF
URN SPEC COLLECT METH UR: ABNORMAL
UROBILINOGEN UR STRIP-ACNC: NEGATIVE EU/DL
WBC #/AREA URNS AUTO: 28 /HPF (ref 0–5)

## 2017-12-06 PROCEDURE — 81001 URINALYSIS AUTO W/SCOPE: CPT

## 2017-12-06 PROCEDURE — 87086 URINE CULTURE/COLONY COUNT: CPT

## 2017-12-06 PROCEDURE — 99999 PR PBB SHADOW E&M-EST. PATIENT-LVL IV: CPT | Mod: PBBFAC,,, | Performed by: INTERNAL MEDICINE

## 2017-12-06 PROCEDURE — 99215 OFFICE O/P EST HI 40 MIN: CPT | Mod: S$GLB,,, | Performed by: INTERNAL MEDICINE

## 2017-12-06 PROCEDURE — 99499 UNLISTED E&M SERVICE: CPT | Mod: S$GLB,,, | Performed by: INTERNAL MEDICINE

## 2017-12-06 RX ORDER — CALCITRIOL 0.25 UG/1
0.25 CAPSULE ORAL
Qty: 16 CAPSULE | Refills: 4 | Status: SHIPPED | OUTPATIENT
Start: 2017-12-07 | End: 2018-03-21 | Stop reason: SDUPTHER

## 2017-12-06 RX ORDER — LOSARTAN POTASSIUM 50 MG/1
50 TABLET ORAL DAILY
Qty: 90 TABLET | Refills: 3 | Status: SHIPPED | OUTPATIENT
Start: 2017-12-06 | End: 2018-02-05 | Stop reason: SDUPTHER

## 2017-12-06 NOTE — TELEPHONE ENCOUNTER
Meryl.  I saw Mrs. Ham in clinic today.  I am concerned about her proteinuria and I have emphasized to her importance of good diabetes control.  Her recent A1C is still high at 10+.  I asked her to contact your office to discuss her sugars.  Thanks.

## 2017-12-06 NOTE — PROGRESS NOTES
Subjective:       Patient ID: Zoey Ham is a 72 y.o. White female who presents for follow-up evaluation of No chief complaint on file.    HPI This is a 72 -year-old  female with longstanding diabetes, hyperparathyroidism, hypertension, and CKD is coming in for followup of these. Her blood pressures are stable at home . DM not controlled with a1c of 10+. Her CKD has been stable.  Has proteinuria.  Creatinine stable. For the past few days, had a  lot of diahrrea and was drinking power aid and trying to hydrate well and creatinine stable.  C/o slight edema and SOB with recent sodium ingestion with power aide. Has proteinuria.    PAST MEDICAL HISTORY: Significant for hypertension for 10 years, diabetes   for 10 years, CABG, lacunar infarcts, hyperlipidemia, hyperparathyroidism,   anemia, CABG, CAD, and diabetic retinopathy.     PAST MEDICAL HISTORY: Significant for hypertension for 10 years, diabetes   for 10 years, CABG, lacunar infarcts, hyperlipidemia, hyperparathyroidism,   anemia, CABG, CAD, and diabetic retinopathy.     Review of Systems   Constitutional: Positive for appetite change. Negative for fatigue.   Eyes: Negative for discharge.   Respiratory: Negative for cough, shortness of breath and wheezing.    Cardiovascular: Negative for chest pain and palpitations.   Gastrointestinal: Negative for abdominal pain, diarrhea, nausea and vomiting.   Genitourinary: Negative for dysuria, frequency, hematuria and urgency.   Skin: Negative for color change and rash.   Psychiatric/Behavioral: Negative for confusion.   All other systems reviewed and are negative.      Objective:      Physical Exam   Constitutional: She is oriented to person, place, and time. She appears well-developed and well-nourished.   HENT:   Right Ear: External ear normal.   Left Ear: External ear normal.   Nose: Nose normal.   Mouth/Throat: Normal dentition.   Eyes: Conjunctivae, EOM and lids are normal. Pupils are equal, round, and  reactive to light.   Neck: Trachea normal. No thyroid mass present.   Cardiovascular: Normal rate and regular rhythm.  Exam reveals no friction rub.    No murmur heard.  Pulmonary/Chest: Effort normal and breath sounds normal. No respiratory distress. She has no decreased breath sounds. She has no rales.   Abdominal: Soft. Bowel sounds are normal. She exhibits no mass. There is no tenderness. There is no rebound. No hernia.   Musculoskeletal: She exhibits edema.   Trace edema   Neurological: She is alert and oriented to person, place, and time.   Skin: Skin is warm and dry. No rash noted. No erythema.   l neck mole-rec derm f/u.   Psychiatric: She has a normal mood and affect. Judgment normal. Her mood appears not anxious. She does not exhibit a depressed mood.   Oriented to time, place and person.   Vitals reviewed.      Assessment:       No diagnosis found.    Plan:           1.  Renovascular hypertension    2.  Type 2 diabetes mellitus with diabetic nephropathy    3.  Proteinuria    4.  Anemia of chronic renal failure, stage 4 (severe)      Plan:     Labs pending.   1. CKD stage 3-4 with an MDRD GFR of 25-28 mL/min. Her baseline creatinine is   anywhere from 1.8 to 2.1 with last creatinine of 1.9 with gfr of 25 ml/min. Her CKD is secondary to longstanding hypertension and diabetes.   2. Hypertension. Blood pressures are stable  at home. On cozaar but held for the past few months b/c of hyperkalemia. Unable to get her off metoprolol -tried in the past but HR became a problem per the pt and had to go back on it.    3. Diabetes/proteinuria:  off losartan.  Increased losartan  to 50 mg last time as there was increase in proteinuria likely sec poorly controlled DM.   Does not want biopsy. Losartan has been held for the last few months b/c of hyperkalemia-restart and follow low K diet. Repeat labs after restarting losartan. Appears to have UTI although asymptomatic-will get cltx.  UTI may be falsely elevating  proteinuria. Will repeat next week.  She likely has  worsening proteinuria with high A1c's and being off losartan.    4. Hyperparathyroidism.  i pth improving on  calcitriol. Increase to 4 times. Ca/phos stable. Low phos and low potassium diet discussed-lists given.  6. Anemia. H&H is stable.    Attended ckd education class and would like HD. Will send her for access if her GFR decreases.   Return in 2  months with rfp, urine protin and urine creatinine, ipth.

## 2017-12-07 ENCOUNTER — TELEPHONE (OUTPATIENT)
Dept: INTERNAL MEDICINE | Facility: CLINIC | Age: 72
End: 2017-12-07

## 2017-12-07 LAB
BACTERIA UR CULT: NORMAL
BACTERIA UR CULT: NORMAL

## 2017-12-07 NOTE — TELEPHONE ENCOUNTER
Could we add this patient to Wed 10/13 to see this patietn in the AM? The OPCM conference call needs to be cancelled that day. She will be here for labs.    Thanks,  SOLEDAD

## 2017-12-07 NOTE — TELEPHONE ENCOUNTER
Patient has been scheduled and informed to please come in for 9 AM to be seen by Dr. Rosales .    Patient verbalized great understanding of coming in for am appt.    *Patient also needs a new glucometer , she stated that her meter broke.

## 2017-12-08 ENCOUNTER — TELEPHONE (OUTPATIENT)
Dept: NEPHROLOGY | Facility: CLINIC | Age: 72
End: 2017-12-08

## 2017-12-08 RX ORDER — LANCETS
1 EACH MISCELLANEOUS
Qty: 200 EACH | Refills: 11 | Status: SHIPPED | OUTPATIENT
Start: 2017-12-08 | End: 2019-05-02 | Stop reason: SDUPTHER

## 2017-12-08 NOTE — TELEPHONE ENCOUNTER
VM message left that script for strips, lancets and glucometer was sent to [facility], instructed to call our office at [phone] if she has any problems we can send scripts to [facility] instead.

## 2017-12-08 NOTE — TELEPHONE ENCOUNTER
Scripts for glucometer, test strips and lancets [place] to  [place] [facility]. A note that the previous one was broken is in the script.    Please advise her to call the office if her pharmacy gives her a hard time about replacing the meter. We can use the [facility] instead.    Thanks,  SOLEDAD

## 2017-12-08 NOTE — TELEPHONE ENCOUNTER
Urine cltx did not grow anything specific.  She should repeat urine next week as we initially planned.

## 2017-12-13 ENCOUNTER — LAB VISIT (OUTPATIENT)
Dept: LAB | Facility: HOSPITAL | Age: 72
End: 2017-12-13
Payer: MEDICARE

## 2017-12-13 ENCOUNTER — TELEPHONE (OUTPATIENT)
Dept: NEPHROLOGY | Facility: CLINIC | Age: 72
End: 2017-12-13

## 2017-12-13 ENCOUNTER — OFFICE VISIT (OUTPATIENT)
Dept: INTERNAL MEDICINE | Facility: CLINIC | Age: 72
End: 2017-12-13
Payer: MEDICARE

## 2017-12-13 VITALS
HEIGHT: 64 IN | WEIGHT: 183 LBS | SYSTOLIC BLOOD PRESSURE: 138 MMHG | DIASTOLIC BLOOD PRESSURE: 70 MMHG | BODY MASS INDEX: 31.24 KG/M2 | HEART RATE: 64 BPM | OXYGEN SATURATION: 97 %

## 2017-12-13 DIAGNOSIS — I25.708 CORONARY ARTERY DISEASE OF BYPASS GRAFT OF NATIVE HEART WITH STABLE ANGINA PECTORIS: ICD-10-CM

## 2017-12-13 DIAGNOSIS — I15.2 HYPERTENSION ASSOCIATED WITH DIABETES: ICD-10-CM

## 2017-12-13 DIAGNOSIS — N18.4 CKD STAGE 4 DUE TO TYPE 2 DIABETES MELLITUS: ICD-10-CM

## 2017-12-13 DIAGNOSIS — N18.4 CKD (CHRONIC KIDNEY DISEASE) STAGE 4, GFR 15-29 ML/MIN: ICD-10-CM

## 2017-12-13 DIAGNOSIS — E11.22 CKD STAGE 4 DUE TO TYPE 2 DIABETES MELLITUS: ICD-10-CM

## 2017-12-13 DIAGNOSIS — E11.3553 STABLE PROLIFERATIVE DIABETIC RETINOPATHY OF BOTH EYES ASSOCIATED WITH TYPE 2 DIABETES MELLITUS: ICD-10-CM

## 2017-12-13 DIAGNOSIS — I10 ESSENTIAL HYPERTENSION: ICD-10-CM

## 2017-12-13 DIAGNOSIS — R80.9 PROTEINURIA, UNSPECIFIED TYPE: ICD-10-CM

## 2017-12-13 DIAGNOSIS — I15.0 RENOVASCULAR HYPERTENSION: ICD-10-CM

## 2017-12-13 DIAGNOSIS — I50.42 CHRONIC COMBINED SYSTOLIC AND DIASTOLIC CONGESTIVE HEART FAILURE: ICD-10-CM

## 2017-12-13 DIAGNOSIS — I12.9 BENIGN HYPERTENSION WITH CKD (CHRONIC KIDNEY DISEASE) STAGE IV: ICD-10-CM

## 2017-12-13 DIAGNOSIS — E11.59 HYPERTENSION ASSOCIATED WITH DIABETES: ICD-10-CM

## 2017-12-13 DIAGNOSIS — N18.4 CKD (CHRONIC KIDNEY DISEASE) STAGE 4, GFR 15-29 ML/MIN: Primary | ICD-10-CM

## 2017-12-13 DIAGNOSIS — N25.0 RENAL OSTEODYSTROPHY: ICD-10-CM

## 2017-12-13 DIAGNOSIS — N18.4 BENIGN HYPERTENSION WITH CKD (CHRONIC KIDNEY DISEASE) STAGE IV: ICD-10-CM

## 2017-12-13 LAB
ALBUMIN SERPL BCP-MCNC: 3.2 G/DL
ANION GAP SERPL CALC-SCNC: 10 MMOL/L
BUN SERPL-MCNC: 46 MG/DL
CALCIUM SERPL-MCNC: 9.2 MG/DL
CHLORIDE SERPL-SCNC: 108 MMOL/L
CO2 SERPL-SCNC: 21 MMOL/L
CREAT SERPL-MCNC: 2.4 MG/DL
EST. GFR  (AFRICAN AMERICAN): 22.7 ML/MIN/1.73 M^2
EST. GFR  (NON AFRICAN AMERICAN): 19.7 ML/MIN/1.73 M^2
GLUCOSE SERPL-MCNC: 98 MG/DL
PHOSPHATE SERPL-MCNC: 3.8 MG/DL
POTASSIUM SERPL-SCNC: 5.1 MMOL/L
SODIUM SERPL-SCNC: 139 MMOL/L

## 2017-12-13 PROCEDURE — 99499 UNLISTED E&M SERVICE: CPT | Mod: S$GLB,,, | Performed by: INTERNAL MEDICINE

## 2017-12-13 PROCEDURE — 80069 RENAL FUNCTION PANEL: CPT

## 2017-12-13 PROCEDURE — 36415 COLL VENOUS BLD VENIPUNCTURE: CPT

## 2017-12-13 PROCEDURE — 99999 PR PBB SHADOW E&M-EST. PATIENT-LVL III: CPT | Mod: PBBFAC,,, | Performed by: INTERNAL MEDICINE

## 2017-12-13 PROCEDURE — 99215 OFFICE O/P EST HI 40 MIN: CPT | Mod: S$GLB,,, | Performed by: INTERNAL MEDICINE

## 2017-12-13 RX ORDER — NITROGLYCERIN 400 UG/1
1 SPRAY ORAL EVERY 5 MIN PRN
Qty: 12 G | Refills: 0 | Status: CANCELLED | OUTPATIENT
Start: 2017-12-13

## 2017-12-13 NOTE — PATIENT INSTRUCTIONS
TODAY:  - follow-up Fri 12/15 at 11am  - BRING GLUCOSE LOG with food log   + handout given  - change insulin to     + 7U before meals   + 17U long acting in the morning  - may go up to 20U long acting on Friday

## 2017-12-13 NOTE — ASSESSMENT & PLAN NOTE
BP controlled on BB, diuretic. ARB discontinued by Renal due to hyperkalemia  · norvasc added during hospital, but patient not discharged on this  · cozaar restarted by Nephro  · Cards increased metoprolol to 100mg BID  · Continue lasix 40mg PRN  · Can increase to BID dosing if needed  · F/U Cardiology

## 2017-12-13 NOTE — ASSESSMENT & PLAN NOTE
A1c 9 in 4/2017, then 10.2 in 12/2017, previously non-compliant due to finances: short-acting 15-15-15U, long-acting 11U. Continue insulin in vials  · Increase long-acting to 17U  · Decrease short-acting to 7U with meals

## 2017-12-13 NOTE — PROGRESS NOTES
"Primary Care Provider Appointment    Subjective:      Patient ID: Zoey Ham is a 72 y.o. female with uncontrolled DM, HTN, HLD, h/o MI    Chief Complaint: Follow-up (Patient is here for an over all check up ); Eye Problem (Patient stated that she has sight issues ); and Diabetes    Patient with uncontrolled DM and CKD due to HTN, DM.  Concern for UTI causing worsening proteinuria at last Nephrology appt on 12/6, but urine cultures are neg. She was restarted on losartan by Dr Iglesias due to proteinuria.    Patient states she is noncompliant with diabetic diet. She states that she skips meals, then overeats at other meals. She skips insulin doses when she doesn't eat, and gets distracted by other activities so does not comply with diet. She states "my goal for this year coming" is to eat healthier and take her insulin as directed. She brought her glucose log, which has a reading of 53 at 1:30am, other readings controlled (scanned into chart).    She still has her fitkit for colon cancer screening. She is due for diab foot exam, has podiatry appt in 1/2018. Her mammo is due, already scheduled in 1/2018.    Past Surgical History:   Procedure Laterality Date    APPENDECTOMY      CARDIAC SURGERY  2002    CABG    CHOLECYSTECTOMY      CORONARY ANGIOPLASTY  2004    CORONARY ARTERY BYPASS GRAFT      EYE SURGERY      cataracts bilaterally    FRACTURE SURGERY      right ankle    HYSTERECTOMY      TONSILLECTOMY         Past Medical History:   Diagnosis Date    Acute myocardial infarction of anterolateral wall 7/9/2015    Anemia of chronic renal failure, stage 4 (severe) 1/22/2015    Atherosclerosis of aorta 10/3/2012    Benign hypertension with CKD (chronic kidney disease) stage IV 3/11/2016    Chronic diastolic congestive heart failure 10/3/2012    Chronic kidney disease, stage IV (severe) 7/3/2012    Coronary artery disease     s/p 1v CABG 2002 and multiple PCI (last angiogram in 6/2012 with patent " "LIMA->LAD and patent LCx and RCA stents)    Diabetic polyneuropathy associated with type 2 diabetes mellitus 7/9/2015    Diabetic polyneuropathy associated with type 2 diabetes mellitus 7/9/2015    Encounter for blood transfusion     Heart attack 09/2012    5-6 events    Hyperlipidemia     Nephritis and nephropathy, 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 Negative for activity change, appetite change and unexpected weight change.   Respiratory: Negative for cough and choking.    Cardiovascular: Negative for leg swelling.   Gastrointestinal: Negative for abdominal pain, diarrhea and nausea.   Musculoskeletal: Negative for back pain, gait problem and myalgias.   Neurological: Positive for weakness and numbness. Negative for dizziness, tremors, syncope, facial asymmetry and headaches.   Hematological: Bruises/bleeds easily.   Psychiatric/Behavioral: Positive for confusion. Negative for agitation, behavioral problems and decreased concentration.       Objective:   /70 (BP Location: Right arm, Patient Position: Sitting, BP Method: Medium (Manual))   Pulse 64   Ht 5' 4" (1.626 m)   Wt 83 kg (182 lb 15.7 oz)   LMP  (LMP Unknown)   SpO2 97%   BMI 31.41 kg/m²     Physical Exam   Constitutional: She is oriented to person, place, and time. She appears well-developed and well-nourished.   HENT:   Head: Normocephalic and atraumatic.   Neck: Normal range of motion.   Abdominal: Soft. Bowel sounds are normal. There is no tenderness. There is no " guarding.   Neurological: She is alert and oriented to person, place, and time.   Skin:   Ecchymoses on UE bilaterally   Psychiatric: She has a normal mood and affect. Her behavior is normal. Thought content normal.   Flat affect   Vitals reviewed.      Lab Results   Component Value Date    WBC 10.72 02/10/2017    HGB 10.9 (L) 11/27/2017    HCT 34.2 (L) 11/27/2017     02/10/2017    CHOL 135 12/05/2017    TRIG 220 (H) 12/05/2017    HDL 28 (L) 12/05/2017    ALT 14 12/05/2017    AST 20 12/05/2017     12/05/2017    K 4.6 12/05/2017     12/05/2017    CREATININE 1.9 (H) 12/05/2017    BUN 27 (H) 12/05/2017    CO2 20 (L) 12/05/2017    TSH 1.585 02/08/2017    INR 0.9 02/08/2017    GLUF 132 (H) 05/03/2012    HGBA1C 10.2 (H) 12/05/2017             Assessment:   72 y.o. female with multiple co-morbid illnesses here to continue work-up of chronic issues notably uncontrolled DM, HTN, HLD, h/o MI    Plan:     Problem List Items Addressed This Visit        Ophtho    Proliferative diabetic retinopathy     Eye complaints on 12/13/17  · appt with optho  · Dr Horne or Dr Garza            Cardiac/Vascular    Hypertension associated with diabetes     BP controlled on BB, diuretic. ARB discontinued by Renal due to hyperkalemia  · norvasc added during hospital, but patient not discharged on this  · cozaar restarted by Nephro  · Cards increased metoprolol to 100mg BID  · Continue lasix 40mg PRN  · Can increase to BID dosing if needed  · F/U Cardiology            Endocrine    CKD stage 4 due to type 2 diabetes mellitus     GFR 23, followed by Nephrology  · Cozaar previously on hold due to hyperkalemia  · restarted  · Labs today  · Change insulin to 7-7-7-17  · F/U in 2 days         Uncontrolled type 2 diabetes mellitus with stage 4 chronic kidney disease, with long-term current use of insulin     A1c 9 in 4/2017, then 10.2 in 12/2017, previously non-compliant due to finances: short-acting 15-15-15U, long-acting 11U.  Continue insulin in vials  · Increase long-acting to 17U  · Decrease short-acting to 7U with meals           Other Visit Diagnoses     Coronary artery disease of bypass graft of native heart with stable angina pectoris        Chronic combined systolic and diastolic congestive heart failure              Health Maintenance       Date Due Completion Date    Colonoscopy 08/21/1996 ---iFOBT    Foot Exam 12/06/2017 12/6/2016- IN 1/2018    Override on 12/6/2016: Done    Mammogram 01/10/2018 7/10/2017- IN 1/2018    Hemoglobin A1c 06/05/2018 12/5/2017    Eye Exam 07/06/2018 7/6/2017    Lipid Panel 12/05/2018 12/5/2017    DEXA SCAN 07/13/2019 7/13/2017    TETANUS VACCINE 03/03/2026 3/3/2016          Return in about 3 days (around 12/16/2017). . One hour spent with this patient today, half of that in counseling.    Cesia Rosales MD/MPH  Internal Medicine  [facility]  [phone]

## 2017-12-13 NOTE — ASSESSMENT & PLAN NOTE
GFR 23, followed by Nephrology  · Cozaar previously on hold due to hyperkalemia  · restarted  · Labs today  · Change insulin to 7-7-7-17  · F/U in 2 days

## 2017-12-13 NOTE — TELEPHONE ENCOUNTER
creatinine slightly higher on losartan.   Please have her hydrate well and follow low potassium diet and repeat rfp on Monday and if the creatinien doesn't improve, we will hold losartan.

## 2017-12-14 ENCOUNTER — TELEPHONE (OUTPATIENT)
Dept: NEPHROLOGY | Facility: CLINIC | Age: 72
End: 2017-12-14

## 2017-12-15 ENCOUNTER — TELEPHONE (OUTPATIENT)
Dept: NEPHROLOGY | Facility: CLINIC | Age: 72
End: 2017-12-15

## 2017-12-15 ENCOUNTER — OFFICE VISIT (OUTPATIENT)
Dept: INTERNAL MEDICINE | Facility: CLINIC | Age: 72
End: 2017-12-15
Payer: MEDICARE

## 2017-12-15 VITALS
OXYGEN SATURATION: 97 % | SYSTOLIC BLOOD PRESSURE: 138 MMHG | BODY MASS INDEX: 31.47 KG/M2 | HEART RATE: 66 BPM | WEIGHT: 184.31 LBS | DIASTOLIC BLOOD PRESSURE: 62 MMHG | HEIGHT: 64 IN

## 2017-12-15 DIAGNOSIS — R80.9 PROTEINURIA, UNSPECIFIED TYPE: Primary | ICD-10-CM

## 2017-12-15 PROCEDURE — 99999 PR PBB SHADOW E&M-EST. PATIENT-LVL III: CPT | Mod: PBBFAC,,, | Performed by: INTERNAL MEDICINE

## 2017-12-15 PROCEDURE — 99215 OFFICE O/P EST HI 40 MIN: CPT | Mod: S$GLB,,, | Performed by: INTERNAL MEDICINE

## 2017-12-15 PROCEDURE — 99499 UNLISTED E&M SERVICE: CPT | Mod: S$GLB,,, | Performed by: INTERNAL MEDICINE

## 2017-12-15 NOTE — PROGRESS NOTES
"Primary Care Provider Appointment    Subjective:      Patient ID: Zoey Ham is a 72 y.o. female with uncontrolled diabetes and hypoglycemic episodes    Chief Complaint: Follow-up (3 day follow up "Patient has brought in food log")    Patient with food, glucose log. Numerous low readings, although A1c was elevated at 10.2. At last appt her pre-prandial insulin was reduced to 7U before meals, and long acting increased to 17U. She states her readings are as low as 53. She frequently skips meals and was using 15U preprandial with 11U long-acting insulin (which she had been on prior to establishing care here).     Past Surgical History:   Procedure Laterality Date    APPENDECTOMY      CARDIAC SURGERY  2002    CABG    CHOLECYSTECTOMY      CORONARY ANGIOPLASTY  2004    CORONARY ARTERY BYPASS GRAFT      EYE SURGERY      cataracts bilaterally    FRACTURE SURGERY      right ankle    HYSTERECTOMY      TONSILLECTOMY         Past Medical History:   Diagnosis Date    Acute myocardial infarction of anterolateral wall 7/9/2015    Anemia of chronic renal failure, stage 4 (severe) 1/22/2015    Atherosclerosis of aorta 10/3/2012    Benign hypertension with CKD (chronic kidney disease) stage IV 3/11/2016    Chronic diastolic congestive heart failure 10/3/2012    Chronic kidney disease, stage IV (severe) 7/3/2012    Coronary artery disease     s/p 1v CABG 2002 and multiple PCI (last angiogram in 6/2012 with patent LIMA->LAD and patent LCx and RCA stents)    Diabetic polyneuropathy associated with type 2 diabetes mellitus 7/9/2015    Diabetic polyneuropathy associated with type 2 diabetes mellitus 7/9/2015    Encounter for blood transfusion     Heart attack 09/2012    5-6 events    Hyperlipidemia     Nephritis and nephropathy, 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 7/9/2015    Occlusion and stenosis of carotid artery with cerebral infarction 7/9/2015    PAF (paroxysmal atrial fibrillation) 10/3/2012    " "Pneumonia     Proliferative diabetic retinopathy 10/3/2012    Sepsis due to Escherichia coli with acute renal failure 2/2016    UTI     Type II diabetes mellitus with neurological manifestations 7/9/2015    Type II diabetes mellitus with renal manifestations 7/3/2012       Review of Systems   Constitutional: Positive for fatigue. Negative for activity change, appetite change and unexpected weight change.   Respiratory: Negative for cough and choking.    Cardiovascular: Negative for leg swelling.   Gastrointestinal: Negative for abdominal pain, diarrhea and nausea.   Musculoskeletal: Negative for back pain, gait problem and myalgias.   Neurological: Positive for weakness and numbness. Negative for dizziness, tremors, syncope, facial asymmetry and headaches.   Hematological: Bruises/bleeds easily.   Psychiatric/Behavioral: Positive for confusion. Negative for agitation, behavioral problems and decreased concentration.       Objective:   /62 (BP Location: Right arm, Patient Position: Sitting, BP Method: Medium (Manual))   Pulse 66   Ht 5' 4" (1.626 m)   Wt 83.6 kg (184 lb 4.9 oz)   LMP  (LMP Unknown)   SpO2 97%   BMI 31.64 kg/m²     Physical Exam   Constitutional: She is oriented to person, place, and time. She appears well-developed and well-nourished.   HENT:   Head: Normocephalic and atraumatic.   Neck: Normal range of motion.   Abdominal: Soft. Bowel sounds are normal. There is no tenderness. There is no guarding.   Neurological: She is alert and oriented to person, place, and time.   Skin:   Ecchymoses on UE bilaterally   Psychiatric: She has a normal mood and affect. Her behavior is normal. Thought content normal.   Flat affect   Vitals reviewed.      Lab Results   Component Value Date    WBC 10.72 02/10/2017    HGB 10.9 (L) 11/27/2017    HCT 34.2 (L) 11/27/2017     02/10/2017    CHOL 135 12/05/2017    TRIG 220 (H) 12/05/2017    HDL 28 (L) 12/05/2017    ALT 14 12/05/2017    AST 20 " 12/05/2017     12/13/2017    K 5.1 12/13/2017     12/13/2017    CREATININE 2.4 (H) 12/13/2017    BUN 46 (H) 12/13/2017    CO2 21 (L) 12/13/2017    TSH 1.585 02/08/2017    INR 0.9 02/08/2017    GLUF 132 (H) 05/03/2012    HGBA1C 10.2 (H) 12/05/2017             Assessment:   72 y.o. female with multiple co-morbid illnesses here to continue work-up of chronic issues notably with uncontrolled diabetes and hypoglycemic episodes    Plan:     Problem List Items Addressed This Visit        Endocrine    Uncontrolled type 2 diabetes mellitus with stage 4 chronic kidney disease, with long-term current use of insulin     A1c 9 in 4/2017, then 10.2 in 12/2017, previously non-compliant due to finances: short-acting 15-15-15U, long-acting 11U (for unknown amount of time). Changed to 7-7-7-17 on 12/13/17 and continued low readings  · Continue long-acting to 17U  · Discontinue short-acting               Health Maintenance       Date Due Completion Date    Colonoscopy 08/21/1996 ---DIARRHEA    Foot Exam 12/06/2017 12/6/2016- IN JANUARY    Override on 12/6/2016: Done    Mammogram 01/10/2018 7/10/2017    Hemoglobin A1c 06/05/2018 12/5/2017    Eye Exam 07/06/2018 7/6/2017    Lipid Panel 12/05/2018 12/5/2017    DEXA SCAN 07/13/2019 7/13/2017    TETANUS VACCINE 03/03/2026 3/3/2016          Return in about 3 days (around 12/18/2017). . One hour spent with this patient today, half of that in counseling.    Cesia Rosales MD/MPH  Internal Medicine  [facility]  [phone]

## 2017-12-15 NOTE — TELEPHONE ENCOUNTER
Please add warren, anca, spep, c3, c4 and ua and urine culture to this mondays labs.  I am repeating urine labs as they did not do a  Urine culture this last time.

## 2017-12-15 NOTE — PATIENT INSTRUCTIONS
TODAY:  - stop all insulin before meals (aspart)  - continue long-acting 17U once daily  - follow-up in 3 days  - continue food/glucose log through the weekend  - labs on Monday

## 2017-12-15 NOTE — ASSESSMENT & PLAN NOTE
A1c 9 in 4/2017, then 10.2 in 12/2017, previously non-compliant due to finances: short-acting 15-15-15U, long-acting 11U (for unknown amount of time). Changed to 7-7-7-17 on 12/13/17 and continued low readings  · Continue long-acting to 17U  · Discontinue short-acting

## 2017-12-18 ENCOUNTER — LAB VISIT (OUTPATIENT)
Dept: LAB | Facility: HOSPITAL | Age: 72
End: 2017-12-18
Attending: INTERNAL MEDICINE
Payer: MEDICARE

## 2017-12-18 ENCOUNTER — OFFICE VISIT (OUTPATIENT)
Dept: INTERNAL MEDICINE | Facility: CLINIC | Age: 72
End: 2017-12-18
Payer: MEDICARE

## 2017-12-18 ENCOUNTER — TELEPHONE (OUTPATIENT)
Dept: NEPHROLOGY | Facility: CLINIC | Age: 72
End: 2017-12-18

## 2017-12-18 VITALS
OXYGEN SATURATION: 97 % | BODY MASS INDEX: 30.75 KG/M2 | HEIGHT: 64 IN | WEIGHT: 180.13 LBS | HEART RATE: 69 BPM | DIASTOLIC BLOOD PRESSURE: 62 MMHG | SYSTOLIC BLOOD PRESSURE: 138 MMHG

## 2017-12-18 DIAGNOSIS — M25.539 WRIST PAIN, ACUTE, UNSPECIFIED LATERALITY: ICD-10-CM

## 2017-12-18 DIAGNOSIS — N18.4 CKD (CHRONIC KIDNEY DISEASE) STAGE 4, GFR 15-29 ML/MIN: ICD-10-CM

## 2017-12-18 DIAGNOSIS — R80.9 PROTEINURIA, UNSPECIFIED TYPE: ICD-10-CM

## 2017-12-18 DIAGNOSIS — N18.4 CKD (CHRONIC KIDNEY DISEASE) STAGE 4, GFR 15-29 ML/MIN: Primary | ICD-10-CM

## 2017-12-18 DIAGNOSIS — E11.3553 STABLE PROLIFERATIVE DIABETIC RETINOPATHY OF BOTH EYES ASSOCIATED WITH TYPE 2 DIABETES MELLITUS: ICD-10-CM

## 2017-12-18 LAB
ALBUMIN SERPL BCP-MCNC: 3.4 G/DL
ANION GAP SERPL CALC-SCNC: 11 MMOL/L
BUN SERPL-MCNC: 70 MG/DL
C3 SERPL-MCNC: 145 MG/DL
C4 SERPL-MCNC: 42 MG/DL
CALCIUM SERPL-MCNC: 9.1 MG/DL
CHLORIDE SERPL-SCNC: 106 MMOL/L
CO2 SERPL-SCNC: 20 MMOL/L
CREAT SERPL-MCNC: 2.7 MG/DL
EST. GFR  (AFRICAN AMERICAN): 19.7 ML/MIN/1.73 M^2
EST. GFR  (NON AFRICAN AMERICAN): 17.1 ML/MIN/1.73 M^2
GLUCOSE SERPL-MCNC: 235 MG/DL
PHOSPHATE SERPL-MCNC: 4.5 MG/DL
POTASSIUM SERPL-SCNC: 5.1 MMOL/L
SODIUM SERPL-SCNC: 137 MMOL/L

## 2017-12-18 PROCEDURE — 84165 PROTEIN E-PHORESIS SERUM: CPT

## 2017-12-18 PROCEDURE — 84165 PROTEIN E-PHORESIS SERUM: CPT | Mod: 26,,, | Performed by: PATHOLOGY

## 2017-12-18 PROCEDURE — 86334 IMMUNOFIX E-PHORESIS SERUM: CPT

## 2017-12-18 PROCEDURE — 86255 FLUORESCENT ANTIBODY SCREEN: CPT

## 2017-12-18 PROCEDURE — 99999 PR PBB SHADOW E&M-EST. PATIENT-LVL III: CPT | Mod: PBBFAC,,, | Performed by: INTERNAL MEDICINE

## 2017-12-18 PROCEDURE — 86160 COMPLEMENT ANTIGEN: CPT

## 2017-12-18 PROCEDURE — 86160 COMPLEMENT ANTIGEN: CPT | Mod: 59

## 2017-12-18 PROCEDURE — 86334 IMMUNOFIX E-PHORESIS SERUM: CPT | Mod: 26,,, | Performed by: PATHOLOGY

## 2017-12-18 PROCEDURE — 99499 UNLISTED E&M SERVICE: CPT | Mod: S$GLB,,, | Performed by: INTERNAL MEDICINE

## 2017-12-18 PROCEDURE — 36415 COLL VENOUS BLD VENIPUNCTURE: CPT

## 2017-12-18 PROCEDURE — 99215 OFFICE O/P EST HI 40 MIN: CPT | Mod: S$GLB,,, | Performed by: INTERNAL MEDICINE

## 2017-12-18 PROCEDURE — 80069 RENAL FUNCTION PANEL: CPT

## 2017-12-18 PROCEDURE — 86038 ANTINUCLEAR ANTIBODIES: CPT

## 2017-12-18 NOTE — ASSESSMENT & PLAN NOTE
A1c 9 in 4/2017, then 10.2 in 12/2017, previously non-compliant due to finances: short-acting 15-15-15U, long-acting 11U (for unknown amount of time). Changed to 7-7-7-17 on 12/13/17 and continued low readings. Changed again on 12/15 to 17U with no lows  · Increase long-acting to 20U  · Discontinue short-acting

## 2017-12-18 NOTE — ASSESSMENT & PLAN NOTE
Likely carpal tunnel, bilateral presentation. Significant improvement with splinting  · EMG advised  · Patient prefers to postpone indefinitely

## 2017-12-18 NOTE — PROGRESS NOTES
"Primary Care Provider Appointment    Subjective:      Patient ID: Zoey Ham is a 72 y.o. female with uncontrolled diabetes, CKD, carpal tunnel    Chief Complaint: Follow-up (follow up from last week " Patient has food log ")    Patient with uncontrolled DM. Was previously on 15-15-15-11 insulin with frequent low readings. She was switched to 7-7-7-17 pm 12/14 with continued low readings, then switched again to only 17U long-action on Fri 12/15. No lows recorded.    She is hesitant to be considered for carpal tunnel release because she is improving with conservative care of daily use of the wrist brace. She is able to sew again with the brace use, and no longer interested in EMG nor surgery.    Patient with previous eye discomfort that has resolved, last full eye exam was in 1/2017, but a 7/2017 evaluation was recorded in health maintenance. She prefers to defer her eye exam until 3/2018.    Past Surgical History:   Procedure Laterality Date    APPENDECTOMY      CARDIAC SURGERY  2002    CABG    CHOLECYSTECTOMY      CORONARY ANGIOPLASTY  2004    CORONARY ARTERY BYPASS GRAFT      EYE SURGERY      cataracts bilaterally    FRACTURE SURGERY      right ankle    HYSTERECTOMY      TONSILLECTOMY         Past Medical History:   Diagnosis Date    Acute myocardial infarction of anterolateral wall 7/9/2015    Anemia of chronic renal failure, stage 4 (severe) 1/22/2015    Atherosclerosis of aorta 10/3/2012    Benign hypertension with CKD (chronic kidney disease) stage IV 3/11/2016    Chronic diastolic congestive heart failure 10/3/2012    Chronic kidney disease, stage IV (severe) 7/3/2012    Coronary artery disease     s/p 1v CABG 2002 and multiple PCI (last angiogram in 6/2012 with patent LIMA->LAD and patent LCx and RCA stents)    Diabetic polyneuropathy associated with type 2 diabetes mellitus 7/9/2015    Diabetic polyneuropathy associated with type 2 diabetes mellitus 7/9/2015    Encounter for blood " "transfusion     Heart attack 09/2012    5-6 events    Hyperlipidemia     Nephritis and nephropathy, 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 Negative for activity change, appetite change and unexpected weight change.   Respiratory: Negative for cough and choking.    Cardiovascular: Negative for leg swelling.   Gastrointestinal: Negative for abdominal pain, diarrhea and nausea.   Musculoskeletal: Negative for back pain, gait problem and myalgias.   Neurological: Positive for weakness and numbness. Negative for dizziness, tremors, syncope, facial asymmetry and headaches.   Hematological: Bruises/bleeds easily.   Psychiatric/Behavioral: Positive for confusion. Negative for agitation, behavioral problems and decreased concentration.       Objective:   /62 (BP Location: Left arm, Patient Position: Sitting, BP Method: Medium (Automatic))   Pulse 69   Ht 5' 4" (1.626 m)   Wt 81.7 kg (180 lb 1.9 oz)   LMP  (LMP Unknown)   SpO2 97%   BMI 30.92 kg/m²     Physical Exam   Constitutional: She is oriented to person, place, and time. She appears well-developed and well-nourished.   HENT:   Head: Normocephalic and atraumatic.   Neck: Normal range of motion.   Abdominal: Soft. Bowel sounds are normal. There is no tenderness. There is no guarding.   Neurological: She is alert and oriented to person, place, and time.   Skin:   Ecchymoses on UE bilaterally   Psychiatric: She has a normal mood and affect. Her behavior is normal. Thought content normal. "   Flat affect   Vitals reviewed.      Lab Results   Component Value Date    WBC 10.72 02/10/2017    HGB 10.9 (L) 11/27/2017    HCT 34.2 (L) 11/27/2017     02/10/2017    CHOL 135 12/05/2017    TRIG 220 (H) 12/05/2017    HDL 28 (L) 12/05/2017    ALT 14 12/05/2017    AST 20 12/05/2017     12/13/2017    K 5.1 12/13/2017     12/13/2017    CREATININE 2.4 (H) 12/13/2017    BUN 46 (H) 12/13/2017    CO2 21 (L) 12/13/2017    TSH 1.585 02/08/2017    INR 0.9 02/08/2017    GLUF 132 (H) 05/03/2012    HGBA1C 10.2 (H) 12/05/2017             Assessment:   72 y.o. female with multiple co-morbid illnesses here to continue work-up of chronic issues notably uncontrolled diabetes, CKD, carpal tunnel     Plan:     Problem List Items Addressed This Visit        Ophtho    Proliferative diabetic retinopathy     Eye complaints on 12/13/17, resolved 12/18/17  · appt with optho  · Dr Horne or Dr Garza  · Change to 3/2018            Endocrine    Uncontrolled type 2 diabetes mellitus with stage 4 chronic kidney disease, with long-term current use of insulin     A1c 9 in 4/2017, then 10.2 in 12/2017, previously non-compliant due to finances: short-acting 15-15-15U, long-acting 11U (for unknown amount of time). Changed to 7-7-7-17 on 12/13/17 and continued low readings. Changed again on 12/15 to 17U with no lows  · Increase long-acting to 20U  · Discontinue short-acting            Orthopedic    Wrist pain, acute, unspecified laterality     Likely carpal tunnel, bilateral presentation. Significant improvement with splinting  · EMG advised  · Patient prefers to postpone indefinitely               Health Maintenance       Date Due Completion Date    Colonoscopy 08/21/1996 ---    Foot Exam 12/06/2017 12/6/2016    Override on 12/6/2016: Done    Mammogram 01/10/2018 7/10/2017    Hemoglobin A1c 06/05/2018 12/5/2017    Eye Exam 07/06/2018 7/6/2017    Lipid Panel 12/05/2018 12/5/2017    DEXA SCAN 07/13/2019 7/13/2017    TETANUS VACCINE  03/03/2026 3/3/2016          Return in about 4 weeks (around 1/15/2018).  One hour spent with this patient today, half of that in counseling.    Cesia Rosales MD/MPH  Internal Medicine  [facility]  [phone]

## 2017-12-18 NOTE — ASSESSMENT & PLAN NOTE
Eye complaints on 12/13/17, resolved 12/18/17  · appt with optho  · Dr Horne or Dr Garza  · Change to 3/2018

## 2017-12-18 NOTE — PATIENT INSTRUCTIONS
TODAY:  - postpone EMG  - keep weekly food journal with glucose readings   + call on Friday with glucose log  - reschedule eye exam until feb or march  - get all sheets for appointments

## 2017-12-19 LAB
ALBUMIN SERPL ELPH-MCNC: 3.73 G/DL
ALPHA1 GLOB SERPL ELPH-MCNC: 0.29 G/DL
ALPHA2 GLOB SERPL ELPH-MCNC: 1.03 G/DL
ANA SER QL IF: NORMAL
B-GLOBULIN SERPL ELPH-MCNC: 0.76 G/DL
GAMMA GLOB SERPL ELPH-MCNC: 0.99 G/DL
INTERPRETATION SERPL IFE-IMP: NORMAL
PATHOLOGIST INTERPRETATION IFE: NORMAL
PATHOLOGIST INTERPRETATION SPE: NORMAL
PROT SERPL-MCNC: 6.8 G/DL

## 2017-12-20 ENCOUNTER — TELEPHONE (OUTPATIENT)
Dept: NEPHROLOGY | Facility: CLINIC | Age: 72
End: 2017-12-20

## 2017-12-20 ENCOUNTER — LAB VISIT (OUTPATIENT)
Dept: LAB | Facility: HOSPITAL | Age: 72
End: 2017-12-20
Attending: INTERNAL MEDICINE
Payer: MEDICARE

## 2017-12-20 DIAGNOSIS — N18.4 CKD (CHRONIC KIDNEY DISEASE) STAGE 4, GFR 15-29 ML/MIN: Primary | ICD-10-CM

## 2017-12-20 DIAGNOSIS — N18.4 CKD (CHRONIC KIDNEY DISEASE) STAGE 4, GFR 15-29 ML/MIN: ICD-10-CM

## 2017-12-20 LAB
ALBUMIN SERPL BCP-MCNC: 3.3 G/DL
ANCA AB TITR SER IF: NORMAL TITER
ANION GAP SERPL CALC-SCNC: 9 MMOL/L
BUN SERPL-MCNC: 76 MG/DL
CALCIUM SERPL-MCNC: 8.7 MG/DL
CHLORIDE SERPL-SCNC: 109 MMOL/L
CO2 SERPL-SCNC: 21 MMOL/L
CREAT SERPL-MCNC: 2.8 MG/DL
EST. GFR  (AFRICAN AMERICAN): 18.9 ML/MIN/1.73 M^2
EST. GFR  (NON AFRICAN AMERICAN): 16.4 ML/MIN/1.73 M^2
GLUCOSE SERPL-MCNC: 135 MG/DL
P-ANCA TITR SER IF: NORMAL TITER
PHOSPHATE SERPL-MCNC: 4.6 MG/DL
POTASSIUM SERPL-SCNC: 5.2 MMOL/L
SODIUM SERPL-SCNC: 139 MMOL/L

## 2017-12-20 PROCEDURE — 80069 RENAL FUNCTION PANEL: CPT

## 2017-12-20 PROCEDURE — 36415 COLL VENOUS BLD VENIPUNCTURE: CPT

## 2017-12-20 NOTE — TELEPHONE ENCOUNTER
Please let her know that her creatinine is similar to last time and seems to be stabilizing but still higher than her baseline.  She should con't to hydrate well and repeat rfp next wed or Thursday.

## 2018-01-26 DIAGNOSIS — I25.708 CORONARY ARTERY DISEASE OF BYPASS GRAFT OF NATIVE HEART WITH STABLE ANGINA PECTORIS: ICD-10-CM

## 2018-01-26 DIAGNOSIS — I50.42 CHRONIC COMBINED SYSTOLIC AND DIASTOLIC CONGESTIVE HEART FAILURE: ICD-10-CM

## 2018-01-29 ENCOUNTER — LAB VISIT (OUTPATIENT)
Dept: LAB | Facility: HOSPITAL | Age: 73
End: 2018-01-29
Payer: MEDICARE

## 2018-01-29 DIAGNOSIS — I10 ESSENTIAL HYPERTENSION: ICD-10-CM

## 2018-01-29 DIAGNOSIS — Z79.4 TYPE 2 DIABETES MELLITUS WITH DIABETIC NEPHROPATHY, WITH LONG-TERM CURRENT USE OF INSULIN: ICD-10-CM

## 2018-01-29 DIAGNOSIS — E11.21 TYPE 2 DIABETES MELLITUS WITH DIABETIC NEPHROPATHY, WITH LONG-TERM CURRENT USE OF INSULIN: ICD-10-CM

## 2018-01-29 DIAGNOSIS — N18.4 CKD (CHRONIC KIDNEY DISEASE) STAGE 4, GFR 15-29 ML/MIN: ICD-10-CM

## 2018-01-29 DIAGNOSIS — R80.9 PROTEINURIA, UNSPECIFIED TYPE: ICD-10-CM

## 2018-01-29 DIAGNOSIS — N25.0 RENAL OSTEODYSTROPHY: ICD-10-CM

## 2018-01-29 LAB
ALBUMIN SERPL BCP-MCNC: 3.3 G/DL
ANION GAP SERPL CALC-SCNC: 8 MMOL/L
BUN SERPL-MCNC: 74 MG/DL
CALCIUM SERPL-MCNC: 9.3 MG/DL
CHLORIDE SERPL-SCNC: 111 MMOL/L
CO2 SERPL-SCNC: 24 MMOL/L
CREAT SERPL-MCNC: 2.5 MG/DL
EST. GFR  (AFRICAN AMERICAN): 21.6 ML/MIN/1.73 M^2
EST. GFR  (NON AFRICAN AMERICAN): 18.8 ML/MIN/1.73 M^2
GLUCOSE SERPL-MCNC: 133 MG/DL
PHOSPHATE SERPL-MCNC: 4 MG/DL
POTASSIUM SERPL-SCNC: 5 MMOL/L
SODIUM SERPL-SCNC: 143 MMOL/L

## 2018-01-29 PROCEDURE — 36415 COLL VENOUS BLD VENIPUNCTURE: CPT

## 2018-01-29 PROCEDURE — 80069 RENAL FUNCTION PANEL: CPT

## 2018-01-30 ENCOUNTER — TELEPHONE (OUTPATIENT)
Dept: INTERNAL MEDICINE | Facility: CLINIC | Age: 73
End: 2018-01-30

## 2018-01-30 RX ORDER — CLOPIDOGREL BISULFATE 75 MG/1
TABLET ORAL
Qty: 90 TABLET | Refills: 3 | OUTPATIENT
Start: 2018-01-30

## 2018-01-30 RX ORDER — ATORVASTATIN CALCIUM 80 MG/1
TABLET, FILM COATED ORAL
Qty: 90 TABLET | Refills: 3 | OUTPATIENT
Start: 2018-01-30

## 2018-01-30 RX ORDER — FUROSEMIDE 40 MG/1
40 TABLET ORAL DAILY PRN
Qty: 90 TABLET | Refills: 3 | OUTPATIENT
Start: 2018-01-30

## 2018-01-30 NOTE — TELEPHONE ENCOUNTER
Atorvastatin, lasix and plavix all sent to [facility] pharmacy on 1/18/18. Patient should reach out to [facility] to get her refills.    Thanks,  KJ

## 2018-02-01 ENCOUNTER — TELEPHONE (OUTPATIENT)
Dept: OPTOMETRY | Facility: CLINIC | Age: 73
End: 2018-02-01

## 2018-02-01 NOTE — TELEPHONE ENCOUNTER
Left message stating patient missed their 9:00 AM appointment with Dr. Garza today and needs to call back to reschedule.

## 2018-02-05 DIAGNOSIS — I12.9 BENIGN HYPERTENSION WITH CKD (CHRONIC KIDNEY DISEASE) STAGE IV: ICD-10-CM

## 2018-02-05 DIAGNOSIS — I15.0 RENOVASCULAR HYPERTENSION: ICD-10-CM

## 2018-02-05 DIAGNOSIS — N18.4 BENIGN HYPERTENSION WITH CKD (CHRONIC KIDNEY DISEASE) STAGE IV: ICD-10-CM

## 2018-02-06 RX ORDER — LOSARTAN POTASSIUM 50 MG/1
TABLET ORAL
Qty: 90 TABLET | Refills: 3 | Status: SHIPPED | OUTPATIENT
Start: 2018-02-06 | End: 2018-02-22 | Stop reason: DRUGHIGH

## 2018-02-06 NOTE — TELEPHONE ENCOUNTER
Refill for 90-day supply of cozaar sent to [facility] mail order pharmacy.    Thanks,  KJ

## 2018-02-08 DIAGNOSIS — I25.708 CORONARY ARTERY DISEASE OF BYPASS GRAFT OF NATIVE HEART WITH STABLE ANGINA PECTORIS: ICD-10-CM

## 2018-02-08 DIAGNOSIS — N18.4 CKD STAGE 4 DUE TO TYPE 2 DIABETES MELLITUS: ICD-10-CM

## 2018-02-08 DIAGNOSIS — E11.22 CKD STAGE 4 DUE TO TYPE 2 DIABETES MELLITUS: ICD-10-CM

## 2018-02-08 DIAGNOSIS — E11.42 DIABETIC POLYNEUROPATHY ASSOCIATED WITH TYPE 2 DIABETES MELLITUS: ICD-10-CM

## 2018-02-08 RX ORDER — INSULIN GLARGINE 100 [IU]/ML
20 INJECTION, SOLUTION SUBCUTANEOUS NIGHTLY
Qty: 10 ML | Refills: 3 | Status: SHIPPED | OUTPATIENT
Start: 2018-02-08 | End: 2018-07-31 | Stop reason: SDUPTHER

## 2018-02-08 NOTE — TELEPHONE ENCOUNTER
Please remind patient that she should no longer be using short acting insulin before meals. She should only be on 20U long acting daily. A new script for new dose of long acting sent to [facility] on [address]

## 2018-02-08 NOTE — TELEPHONE ENCOUNTER
Spoke with patient and reminded her she should not  Be taking the short insulin before meals anymore. And that she should only be taking the long acting insulin, also that her Rx Is ready for .

## 2018-02-16 DIAGNOSIS — I25.708 CORONARY ARTERY DISEASE OF BYPASS GRAFT OF NATIVE HEART WITH STABLE ANGINA PECTORIS: ICD-10-CM

## 2018-02-16 DIAGNOSIS — I50.42 CHRONIC COMBINED SYSTOLIC AND DIASTOLIC CONGESTIVE HEART FAILURE: ICD-10-CM

## 2018-02-16 RX ORDER — FUROSEMIDE 40 MG/1
TABLET ORAL
Qty: 90 TABLET | Refills: 3 | Status: SHIPPED | OUTPATIENT
Start: 2018-02-16 | End: 2018-05-09 | Stop reason: SDUPTHER

## 2018-02-16 RX ORDER — ATORVASTATIN CALCIUM 80 MG/1
TABLET, FILM COATED ORAL
Qty: 90 TABLET | Refills: 3 | Status: SHIPPED | OUTPATIENT
Start: 2018-02-16 | End: 2018-09-06 | Stop reason: SDUPTHER

## 2018-02-16 RX ORDER — CLOPIDOGREL BISULFATE 75 MG/1
TABLET ORAL
Qty: 90 TABLET | Refills: 3 | Status: SHIPPED | OUTPATIENT
Start: 2018-02-16 | End: 2018-09-06 | Stop reason: SDUPTHER

## 2018-02-16 NOTE — TELEPHONE ENCOUNTER
Refills of lasix, atorvastatin, plavix sent to [facility] pharmacy. 90-d supplies with 3 refills.    Thanks,  KJ

## 2018-02-22 ENCOUNTER — TELEPHONE (OUTPATIENT)
Dept: NEPHROLOGY | Facility: CLINIC | Age: 73
End: 2018-02-22

## 2018-02-22 ENCOUNTER — OFFICE VISIT (OUTPATIENT)
Dept: NEPHROLOGY | Facility: CLINIC | Age: 73
End: 2018-02-22
Payer: MEDICARE

## 2018-02-22 ENCOUNTER — LAB VISIT (OUTPATIENT)
Dept: LAB | Facility: HOSPITAL | Age: 73
End: 2018-02-22
Payer: MEDICARE

## 2018-02-22 VITALS
OXYGEN SATURATION: 99 % | BODY MASS INDEX: 29.7 KG/M2 | DIASTOLIC BLOOD PRESSURE: 68 MMHG | SYSTOLIC BLOOD PRESSURE: 122 MMHG | HEIGHT: 64 IN | HEART RATE: 76 BPM | WEIGHT: 173.94 LBS

## 2018-02-22 DIAGNOSIS — R80.9 PROTEINURIA, UNSPECIFIED TYPE: ICD-10-CM

## 2018-02-22 DIAGNOSIS — N18.4 CKD (CHRONIC KIDNEY DISEASE) STAGE 4, GFR 15-29 ML/MIN: Primary | ICD-10-CM

## 2018-02-22 DIAGNOSIS — N18.4 CKD (CHRONIC KIDNEY DISEASE) STAGE 4, GFR 15-29 ML/MIN: ICD-10-CM

## 2018-02-22 DIAGNOSIS — I10 ESSENTIAL HYPERTENSION: ICD-10-CM

## 2018-02-22 LAB
ALBUMIN SERPL BCP-MCNC: 3.4 G/DL
ANION GAP SERPL CALC-SCNC: 9 MMOL/L
BUN SERPL-MCNC: 76 MG/DL
CALCIUM SERPL-MCNC: 9.3 MG/DL
CHLORIDE SERPL-SCNC: 105 MMOL/L
CO2 SERPL-SCNC: 24 MMOL/L
CREAT SERPL-MCNC: 2.4 MG/DL
EST. GFR  (AFRICAN AMERICAN): 22.7 ML/MIN/1.73 M^2
EST. GFR  (NON AFRICAN AMERICAN): 19.7 ML/MIN/1.73 M^2
FERRITIN SERPL-MCNC: 321 NG/ML
GLUCOSE SERPL-MCNC: 129 MG/DL
HCT VFR BLD AUTO: 35.6 %
HGB BLD-MCNC: 11.5 G/DL
IRON SERPL-MCNC: 56 UG/DL
PHOSPHATE SERPL-MCNC: 4.3 MG/DL
POTASSIUM SERPL-SCNC: 5.2 MMOL/L
PTH-INTACT SERPL-MCNC: 429 PG/ML
SATURATED IRON: 17 %
SODIUM SERPL-SCNC: 138 MMOL/L
TOTAL IRON BINDING CAPACITY: 329 UG/DL
TRANSFERRIN SERPL-MCNC: 222 MG/DL

## 2018-02-22 PROCEDURE — 99499 UNLISTED E&M SERVICE: CPT | Mod: S$GLB,,, | Performed by: INTERNAL MEDICINE

## 2018-02-22 PROCEDURE — 3008F BODY MASS INDEX DOCD: CPT | Mod: S$GLB,,, | Performed by: INTERNAL MEDICINE

## 2018-02-22 PROCEDURE — 83970 ASSAY OF PARATHORMONE: CPT

## 2018-02-22 PROCEDURE — 82728 ASSAY OF FERRITIN: CPT

## 2018-02-22 PROCEDURE — 85014 HEMATOCRIT: CPT

## 2018-02-22 PROCEDURE — 83540 ASSAY OF IRON: CPT

## 2018-02-22 PROCEDURE — 99215 OFFICE O/P EST HI 40 MIN: CPT | Mod: S$GLB,,, | Performed by: INTERNAL MEDICINE

## 2018-02-22 PROCEDURE — 85018 HEMOGLOBIN: CPT

## 2018-02-22 PROCEDURE — 1159F MED LIST DOCD IN RCRD: CPT | Mod: S$GLB,,, | Performed by: INTERNAL MEDICINE

## 2018-02-22 PROCEDURE — 80069 RENAL FUNCTION PANEL: CPT

## 2018-02-22 PROCEDURE — 1126F AMNT PAIN NOTED NONE PRSNT: CPT | Mod: S$GLB,,, | Performed by: INTERNAL MEDICINE

## 2018-02-22 PROCEDURE — 36415 COLL VENOUS BLD VENIPUNCTURE: CPT

## 2018-02-22 PROCEDURE — 99999 PR PBB SHADOW E&M-EST. PATIENT-LVL II: CPT | Mod: PBBFAC,,, | Performed by: INTERNAL MEDICINE

## 2018-02-22 NOTE — PROGRESS NOTES
Subjective:       Patient ID: Zoey Ham is a 72 y.o. White female who presents for follow-up evaluation of No chief complaint on file.    HPI This is a 72 -year-old  female with longstanding diabetes, hyperparathyroidism, hypertension, and CKD is coming in for followup of these. Her blood pressures are stable at home in the 120's-130's . DM not controlled with a1c of 10+ in December and states they have been much better now with her diet and recent weight loss of 16 lbs in the last 3 months. Her CKD has been stable.  Has proteinuria.  Creatinine stable.    PAST MEDICAL HISTORY: Significant for hypertension for 10 years, diabetes   for 10 years, CABG, lacunar infarcts, hyperlipidemia, hyperparathyroidism,   anemia, CABG, CAD, and diabetic retinopathy.         Review of Systems   Constitutional: Negative for appetite change and fatigue.        Lost 16 lbs in 3 months with diet.    Eyes: Negative for discharge.   Respiratory: Negative for cough, shortness of breath and wheezing.    Cardiovascular: Negative for chest pain and palpitations.   Gastrointestinal: Negative for abdominal pain, diarrhea, nausea and vomiting.   Genitourinary: Negative for dysuria, frequency, hematuria and urgency.   Skin: Negative for color change and rash.   Psychiatric/Behavioral: Negative for confusion.   All other systems reviewed and are negative.      Objective:      Physical Exam   Constitutional: She is oriented to person, place, and time. She appears well-developed and well-nourished.   HENT:   Right Ear: External ear normal.   Left Ear: External ear normal.   Nose: Nose normal.   Mouth/Throat: Normal dentition.   Eyes: Conjunctivae, EOM and lids are normal. Pupils are equal, round, and reactive to light.   Neck: Trachea normal. No thyroid mass present.   Cardiovascular: Normal rate and regular rhythm.  Exam reveals no friction rub.    No murmur heard.  Pulmonary/Chest: Effort normal and breath sounds normal. No  respiratory distress. She has no decreased breath sounds. She has no rales.   Abdominal: Soft. Bowel sounds are normal. She exhibits no mass. There is no tenderness. There is no rebound. No hernia.   Musculoskeletal: She exhibits edema.   Trace edema   Neurological: She is alert and oriented to person, place, and time.   Skin: Skin is warm and dry. No rash noted. No erythema.   l neck mole-rec derm f/u.   Psychiatric: She has a normal mood and affect. Judgment normal. Her mood appears not anxious. She does not exhibit a depressed mood.   Oriented to time, place and person.   Vitals reviewed.      Assessment:       No diagnosis found.    Plan:           1.  Renovascular hypertension    2.  Type 2 diabetes mellitus with diabetic nephropathy    3.  Proteinuria    4.  Anemia of chronic renal failure, stage 4 (severe)      Plan:     Labs pending.   1. CKD stage 3-4 with an MDRD GFR of 25-28 mL/min. Her baseline creatinine is   anywhere from 1.8 to 2.2 with last creatinine of 2.5 with gfr of 20 ml/min. Her CKD is secondary to longstanding hypertension and diabetes. Hydrating better-repeat.   2. Hypertension. Blood pressures are stable  at home. On cozaar but held for the past few months b/c of hyperkalemia. Unable to get her off metoprolol -tried in the past but HR became a problem per the pt and had to go back on it.    3. Diabetes/proteinuria:  off losartan.  Increased losartan  to 50 mg last time as there was increase in proteinuria likely sec poorly controlled DM.   Does not want biopsy. Losartan has been held for the last few months b/c of hyperkalemia. She likely has  worsening proteinuria with high A1c's and being off losartan.    4. Hyperparathyroidism.  i pth improving on  Calcitriol -on   4 times/weekly. Ca/phos stable. Low phos and low potassium diet discussed-lists given.  6. Anemia. H&H is stable.    Attended ckd education class and would like HD. Will send her for access if her GFR decreases. No uremic  symptoms.  Return in 1-2  months with rfp, urine protin and urine creatinine, ipth.

## 2018-02-25 NOTE — PROGRESS NOTES
Chart has been dictated using voice recognition software.  It is not been reviewed carefully for any transcriptional errors due to this technology.   Subjective:   Patient ID:  Zoey Ham is a 72 y.o. female who presents for follow-up of No chief complaint on file.      HPI: Patent with insulin-dependent diabetes, S/P CABG x 1 2002, S/P OM1 stent 03/26/2003, S/P RCA stent 03/26/2003, NSTEMI 2/05, PCI proximal LAD, OM2, OM3, s/ PCI OM1 ([facility]) 30-Mar-2012, repeat cath 31-May-2015 showed  LAD with distal vessel supplied by intact LIMA), LVEF preserved, chronic atrial fibrillation, hypertension, dyslipidemia, and obesity.     Patient has been feeling good without chest discomfort or dyspnea.  No edema, othopnea, or PND. Patient denies any palpitations, lightheadedness, or syncope. Patient notes BP at home in 120s systolic and diastolic <80.     Of note, patient states that her nephrologist had stopped her beta blocker.  The patient notes that her heart started going very fast and that she started getting chest discomfort with it.  The patient restarted beta blocker and now has no symptoms.    Past Medical History:   Diagnosis Date    Acute myocardial infarction of anterolateral wall 7/9/2015    Anemia of chronic renal failure, stage 4 (severe) 1/22/2015    Atherosclerosis of aorta 10/3/2012    Benign hypertension with CKD (chronic kidney disease) stage IV 3/11/2016    Chronic diastolic congestive heart failure 10/3/2012    Chronic kidney disease, stage IV (severe) 7/3/2012    Coronary artery disease     s/p 1v CABG 2002 and multiple PCI (last angiogram in 6/2012 with patent LIMA->LAD and patent LCx and RCA stents)    Diabetic polyneuropathy associated with type 2 diabetes mellitus 7/9/2015    Diabetic polyneuropathy associated with type 2 diabetes mellitus 7/9/2015    Encounter for blood transfusion     Heart attack 09/2012    5-6 events    Hyperlipidemia     Nephritis and nephropathy, 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 1 each 0    arm brace Misc 1 application by Misc.(Non-Drug; Combo Route) route once daily. Carpal tunnel wrist brace for both left and right wrists 2 each 0    aspirin 325 MG tablet Take 325 mg by mouth once daily.      atorvastatin (LIPITOR) 80 MG tablet TAKE 1 TABLET EVERY DAY 90 tablet 3    blood sugar diagnostic Strp 1 strip by Misc.(Non-Drug; Combo Route) route 4 (four) times daily. 200 strip 11    calcitRIOL (ROCALTROL) 0.25 MCG Cap Take 1 capsule (0.25 mcg total) by mouth 4 (four) times a week. 16 capsule 4    clopidogrel (PLAVIX) 75 mg tablet TAKE 1 TABLET EVERY DAY 90 tablet 3    ezetimibe (ZETIA) 10 mg tablet Take 1 tablet (10 mg total) by mouth once daily. 30 tablet 11    furosemide (LASIX) 40 MG tablet TAKE 1 TABLET (40 MG TOTAL)  DAILY AS NEEDED (TAKE WATER PILL DAILY AS NEEDED FOR LEG SWELLING AND SHORTNESS OF BREATH). 90 tablet 3    insulin glargine (LANTUS) 100 unit/mL injection Inject 20 Units into the skin every evening. 10 mL 3    insulin needles, disposable, 31 Ndle Takes insulin 4 times daily 300 each 3    lancets (ACCU-CHEK SOFTCLIX LANCETS) Misc 1 application by Misc.(Non-Drug; Combo Route) route 4 (four) times daily with meals and nightly. 200 each 11    metoprolol tartrate (LOPRESSOR) 100 MG tablet Take 1 tablet (100 mg total) by mouth 2 (two) times daily. 180  "tablet 3    nitroGLYCERIN 0.4 MG/DOSE TL SPRY (NITROLINGUAL) 400 mcg/spray spray Place 1 spray under the tongue every 5 (five) minutes as needed for Chest pain. 12 g 0     No facility-administered medications prior to visit.        ROS -  Since eating more regularly has been losing weight (16 lbs in 3 month). Otherwise, No change from prior visit in neurologic, respiratory, endocrine, GI, hematologic, or constitutional complaints   Objective:   Physical Exam   Constitutional: She is oriented to person, place, and time. She appears well-developed and well-nourished.   BP (!) 170/76 (BP Location: Left arm, Patient Position: Sitting, BP Method: Medium (Automatic))   Pulse 69   Ht 5' 4" (1.626 m)   Wt 79.7 kg (175 lb 11.3 oz)   LMP  (LMP Unknown)   BMI 30.16 kg/m²    Neck: Neck supple. No JVD present. Carotid bruit is not present. No thyromegaly present.   Cardiovascular: Normal rate, regular rhythm, S1 normal, S2 normal and intact distal pulses.  Exam reveals S4. Exam reveals no friction rub.    No murmur heard.  Pulses:       Carotid pulses are 2+ on the right side, and 2+ on the left side.       Radial pulses are 2+ on the right side, and 2+ on the left side.        Dorsalis pedis pulses are 1+ on the right side, and 2+ on the left side.        Posterior tibial pulses are 0 on the right side, and 1+ on the left side.   Pulmonary/Chest: Breath sounds normal. She has no wheezes. She has no rales.   Abdominal: Soft. Bowel sounds are normal. There is no hepatosplenomegaly. There is no tenderness.   Musculoskeletal: She exhibits no edema.   Neurological: She is alert and oriented to person, place, and time. She has normal strength.   Skin: No cyanosis. Nails show no clubbing.         Lab Results   Component Value Date     02/22/2018    K 5.2 (H) 02/22/2018    BUN 76 (H) 02/22/2018    CREATININE 2.4 (H) 02/22/2018     (H) 02/22/2018    HGBA1C 10.2 (H) 12/05/2017    CHOL 135 12/05/2017    HDL 28 (L) " 12/05/2017    LDLCALC 63.0 12/05/2017    TRIG 220 (H) 12/05/2017    CHOLHDL 20.7 12/05/2017    HGB 11.5 (L) 02/22/2018    HCT 35.6 (L) 02/22/2018     02/10/2017    INR 0.9 02/08/2017       Assessment:     1. S/P drug eluting coronary stent placement    2. History of non-ST elevation myocardial infarction (NSTEMI)    3. Hypertension associated with diabetes    4. Chronic diastolic heart failure    5. Chronic atrial fibrillation    6. CKD stage 4 due to type 2 diabetes mellitus    7. Type 2 diabetes mellitus with hyperlipidemia      Patient has no symptoms of cardiac ischemia, heart failure, or significant arrhythmias.  The patient's blood pressure is elevated today but has not been elevated on clinic visits to other physicians.  Therefore, will not make any intervention at this time.  We'll continue current medications although there does not appear to be any difference in her potassium since the ARB was stopped.  Highly recommend that the patient continues on metoprolol as it appears to be controlling her ischemic symptoms.  Would recommend the arm be restarted as he does not appear to be any difference in either renal function or potassium since was stopped.  Alpha blockade may also be considered for this patient.  Patient should return in 6 months if there are no intervening problems.  Plan:     Diagnoses and all orders for this visit:    S/P drug eluting coronary stent placement    History of non-ST elevation myocardial infarction (NSTEMI)    Hypertension associated with diabetes    Chronic diastolic heart failure    Chronic atrial fibrillation    CKD stage 4 due to type 2 diabetes mellitus    Type 2 diabetes mellitus with hyperlipidemia          Elver Kelley MD  Consultative Cardiology

## 2018-02-26 ENCOUNTER — OFFICE VISIT (OUTPATIENT)
Dept: CARDIOLOGY | Facility: CLINIC | Age: 73
End: 2018-02-26
Payer: MEDICARE

## 2018-02-26 VITALS
WEIGHT: 175.69 LBS | HEIGHT: 64 IN | HEART RATE: 69 BPM | DIASTOLIC BLOOD PRESSURE: 76 MMHG | BODY MASS INDEX: 29.99 KG/M2 | SYSTOLIC BLOOD PRESSURE: 170 MMHG

## 2018-02-26 DIAGNOSIS — E78.5 TYPE 2 DIABETES MELLITUS WITH HYPERLIPIDEMIA: ICD-10-CM

## 2018-02-26 DIAGNOSIS — E11.59 HYPERTENSION ASSOCIATED WITH DIABETES: ICD-10-CM

## 2018-02-26 DIAGNOSIS — I25.2 HISTORY OF NON-ST ELEVATION MYOCARDIAL INFARCTION (NSTEMI): ICD-10-CM

## 2018-02-26 DIAGNOSIS — I48.20 CHRONIC ATRIAL FIBRILLATION: ICD-10-CM

## 2018-02-26 DIAGNOSIS — I15.2 HYPERTENSION ASSOCIATED WITH DIABETES: ICD-10-CM

## 2018-02-26 DIAGNOSIS — I50.32 CHRONIC DIASTOLIC HEART FAILURE: ICD-10-CM

## 2018-02-26 DIAGNOSIS — E11.69 TYPE 2 DIABETES MELLITUS WITH HYPERLIPIDEMIA: ICD-10-CM

## 2018-02-26 DIAGNOSIS — N18.4 CKD STAGE 4 DUE TO TYPE 2 DIABETES MELLITUS: ICD-10-CM

## 2018-02-26 DIAGNOSIS — E11.22 CKD STAGE 4 DUE TO TYPE 2 DIABETES MELLITUS: ICD-10-CM

## 2018-02-26 DIAGNOSIS — Z95.5 S/P DRUG ELUTING CORONARY STENT PLACEMENT: Primary | ICD-10-CM

## 2018-02-26 PROCEDURE — 1126F AMNT PAIN NOTED NONE PRSNT: CPT | Mod: S$GLB,,, | Performed by: INTERNAL MEDICINE

## 2018-02-26 PROCEDURE — 99214 OFFICE O/P EST MOD 30 MIN: CPT | Mod: S$GLB,,, | Performed by: INTERNAL MEDICINE

## 2018-02-26 PROCEDURE — 99499 UNLISTED E&M SERVICE: CPT | Mod: S$GLB,,, | Performed by: INTERNAL MEDICINE

## 2018-02-26 PROCEDURE — 3008F BODY MASS INDEX DOCD: CPT | Mod: S$GLB,,, | Performed by: INTERNAL MEDICINE

## 2018-02-26 PROCEDURE — 1159F MED LIST DOCD IN RCRD: CPT | Mod: S$GLB,,, | Performed by: INTERNAL MEDICINE

## 2018-02-26 PROCEDURE — 99999 PR PBB SHADOW E&M-EST. PATIENT-LVL III: CPT | Mod: PBBFAC,,, | Performed by: INTERNAL MEDICINE

## 2018-02-26 NOTE — Clinical Note
Thank you for referring Zoey Ham for follow-up of coronary artery disease. Please see my note for details of this encounter. If you have any questions, please contact me.  Thank you again for the referral.

## 2018-03-08 ENCOUNTER — PATIENT OUTREACH (OUTPATIENT)
Dept: ADMINISTRATIVE | Facility: HOSPITAL | Age: 73
End: 2018-03-08

## 2018-03-08 NOTE — PROGRESS NOTES
VM left to remind pt to call our office to reschedule her f/u with her PCP and to bring her food log and blood sugar readings to her appt, She also needs to complete her FIT KIT.

## 2018-03-12 ENCOUNTER — PES CALL (OUTPATIENT)
Dept: ADMINISTRATIVE | Facility: CLINIC | Age: 73
End: 2018-03-12

## 2018-03-20 ENCOUNTER — TELEPHONE (OUTPATIENT)
Dept: ADMINISTRATIVE | Facility: HOSPITAL | Age: 73
End: 2018-03-20

## 2018-03-21 ENCOUNTER — OFFICE VISIT (OUTPATIENT)
Dept: NEPHROLOGY | Facility: CLINIC | Age: 73
End: 2018-03-21
Payer: MEDICARE

## 2018-03-21 ENCOUNTER — PES CALL (OUTPATIENT)
Dept: ADMINISTRATIVE | Facility: CLINIC | Age: 73
End: 2018-03-21

## 2018-03-21 ENCOUNTER — LAB VISIT (OUTPATIENT)
Dept: LAB | Facility: HOSPITAL | Age: 73
End: 2018-03-21
Payer: MEDICARE

## 2018-03-21 VITALS
HEIGHT: 64 IN | DIASTOLIC BLOOD PRESSURE: 64 MMHG | WEIGHT: 177.25 LBS | OXYGEN SATURATION: 96 % | HEART RATE: 75 BPM | SYSTOLIC BLOOD PRESSURE: 134 MMHG | BODY MASS INDEX: 30.26 KG/M2

## 2018-03-21 DIAGNOSIS — I10 ESSENTIAL HYPERTENSION: ICD-10-CM

## 2018-03-21 DIAGNOSIS — N18.4 CKD (CHRONIC KIDNEY DISEASE) STAGE 4, GFR 15-29 ML/MIN: ICD-10-CM

## 2018-03-21 DIAGNOSIS — R80.9 PROTEINURIA, UNSPECIFIED TYPE: ICD-10-CM

## 2018-03-21 DIAGNOSIS — N18.4 CKD (CHRONIC KIDNEY DISEASE) STAGE 4, GFR 15-29 ML/MIN: Primary | ICD-10-CM

## 2018-03-21 LAB
ALBUMIN SERPL BCP-MCNC: 3.4 G/DL
ANION GAP SERPL CALC-SCNC: 12 MMOL/L
BUN SERPL-MCNC: 72 MG/DL
CALCIUM SERPL-MCNC: 8.9 MG/DL
CHLORIDE SERPL-SCNC: 105 MMOL/L
CO2 SERPL-SCNC: 22 MMOL/L
CREAT SERPL-MCNC: 2.9 MG/DL
EST. GFR  (AFRICAN AMERICAN): 18.1 ML/MIN/1.73 M^2
EST. GFR  (NON AFRICAN AMERICAN): 15.7 ML/MIN/1.73 M^2
GLUCOSE SERPL-MCNC: 194 MG/DL
PHOSPHATE SERPL-MCNC: 4.5 MG/DL
POTASSIUM SERPL-SCNC: 5.9 MMOL/L
SODIUM SERPL-SCNC: 139 MMOL/L

## 2018-03-21 PROCEDURE — 99215 OFFICE O/P EST HI 40 MIN: CPT | Mod: S$GLB,,, | Performed by: INTERNAL MEDICINE

## 2018-03-21 PROCEDURE — 99499 UNLISTED E&M SERVICE: CPT | Mod: S$GLB,,, | Performed by: INTERNAL MEDICINE

## 2018-03-21 PROCEDURE — 99999 PR PBB SHADOW E&M-EST. PATIENT-LVL III: CPT | Mod: PBBFAC,,, | Performed by: INTERNAL MEDICINE

## 2018-03-21 PROCEDURE — 36415 COLL VENOUS BLD VENIPUNCTURE: CPT

## 2018-03-21 PROCEDURE — 3075F SYST BP GE 130 - 139MM HG: CPT | Mod: CPTII,S$GLB,, | Performed by: INTERNAL MEDICINE

## 2018-03-21 PROCEDURE — 80069 RENAL FUNCTION PANEL: CPT

## 2018-03-21 PROCEDURE — 3078F DIAST BP <80 MM HG: CPT | Mod: CPTII,S$GLB,, | Performed by: INTERNAL MEDICINE

## 2018-03-21 RX ORDER — CALCITRIOL 0.25 UG/1
0.25 CAPSULE ORAL DAILY
Qty: 30 CAPSULE | Refills: 4 | Status: SHIPPED | OUTPATIENT
Start: 2018-03-21 | End: 2018-06-21 | Stop reason: SDUPTHER

## 2018-03-21 NOTE — PROGRESS NOTES
Subjective:       Patient ID: Zoey Ham is a 72 y.o. White female who presents for follow-up evaluation of No chief complaint on file.    HPI This is a 72 -year-old  female with longstanding diabetes, hyperparathyroidism, hypertension, and CKD is coming in for followup of these. Her blood pressures are stable at home in the 120's-130's/60-70's . DM not controlled with a1c of 10+ in December and states they have been much better now with her diet and recent weight loss. Her CKD has been stable.  Has proteinuria.  Creatinine stable.    PAST MEDICAL HISTORY: Significant for hypertension for 10 years, diabetes   for 10 years, CABG, lacunar infarcts, hyperlipidemia, hyperparathyroidism,   anemia, CABG, CAD, and diabetic retinopathy.         Review of Systems   Constitutional: Negative for appetite change and fatigue.   Eyes: Negative for discharge.   Respiratory: Negative for cough, shortness of breath and wheezing.    Cardiovascular: Negative for chest pain and palpitations.   Gastrointestinal: Negative for abdominal pain, diarrhea, nausea and vomiting.   Genitourinary: Negative for dysuria, frequency, hematuria and urgency.   Skin: Negative for color change and rash.   Psychiatric/Behavioral: Negative for confusion.   All other systems reviewed and are negative.      Objective:      Physical Exam   Constitutional: She is oriented to person, place, and time. She appears well-developed and well-nourished.   HENT:   Right Ear: External ear normal.   Left Ear: External ear normal.   Nose: Nose normal.   Mouth/Throat: Normal dentition.   Eyes: Conjunctivae, EOM and lids are normal. Pupils are equal, round, and reactive to light.   Neck: Trachea normal. No thyroid mass present.   Cardiovascular: Normal rate and regular rhythm.  Exam reveals no friction rub.    No murmur heard.  Pulmonary/Chest: Effort normal and breath sounds normal. No respiratory distress. She has no decreased breath sounds. She has no  rales.   Abdominal: Soft. Bowel sounds are normal. She exhibits no mass. There is no tenderness. There is no rebound. No hernia.   Musculoskeletal: She exhibits edema.   Trace edema   Neurological: She is alert and oriented to person, place, and time.   Skin: Skin is warm and dry. No rash noted. No erythema.   l neck mole-rec derm f/u.   Psychiatric: She has a normal mood and affect. Judgment normal. Her mood appears not anxious. She does not exhibit a depressed mood.   Oriented to time, place and person.   Vitals reviewed.      Assessment:       No diagnosis found.    Plan:           1.  Renovascular hypertension    2.  Type 2 diabetes mellitus with diabetic nephropathy    3.  Proteinuria    4.  Anemia of chronic renal failure, stage 4 (severe)      Plan:     Labs pending.   1. CKD stage 4 with an MDRD GFR of 20 mL/min. Her baseline creatinine is   anywhere from 1.8 to 2.2 with last creatinine of 2.4 with gfr of 20 ml/min. Her CKD is secondary to longstanding hypertension and diabetes. Hydrating better-repeat.   2. Hypertension. Blood pressures are stable  at home. On cozaar but held for the past few months b/c of hyperkalemia and GFR of 16 ml/min. Unable to get her off metoprolol -tried in the past but HR became a problem per the pt and had to go back on it.    3. Diabetes/proteinuria:  off losartan.   proteinuria likely sec poorly controlled DM.   Does not want biopsy. Losartan has been held for the last few months b/c of low GFR. She likely has  worsening proteinuria with high A1c's and being off losartan.    4. Hyperparathyroidism.  i pth higher on  Calcitriol -on   4 times/weekly change to once day. Ca/phos stable. Low phos and low potassium diet discussed-lists given.  6. Anemia. H&H is stable.    Attended ckd education class and would like HD. Will send her for access if her GFR decreases. No uremic symptoms.  Return in  1-2  months with rfp, urine protin and urine creatinine, ipth.

## 2018-03-22 ENCOUNTER — TELEPHONE (OUTPATIENT)
Dept: NEPHROLOGY | Facility: CLINIC | Age: 73
End: 2018-03-22

## 2018-03-22 DIAGNOSIS — N18.4 CKD (CHRONIC KIDNEY DISEASE) STAGE 4, GFR 15-29 ML/MIN: Primary | ICD-10-CM

## 2018-03-22 NOTE — TELEPHONE ENCOUNTER
Potassium elevated and creatinine higher.  Pt states she is following a low K diet.  Only takes lasix 3 times a week.  Asked her to take lasix daily to help with potassium. She will take  lasix tonight and tomorrow morning and will hydrate well.  She will get rfp tomorrow morning at 10:00 am on [address].  Please get a [facility] surgery consult ASAP for dialysis access.  Discussed with pt. Early may appointment if no April available.

## 2018-03-23 ENCOUNTER — TELEPHONE (OUTPATIENT)
Dept: NEPHROLOGY | Facility: CLINIC | Age: 73
End: 2018-03-23

## 2018-03-23 ENCOUNTER — LAB VISIT (OUTPATIENT)
Dept: LAB | Facility: HOSPITAL | Age: 73
End: 2018-03-23
Payer: MEDICARE

## 2018-03-23 DIAGNOSIS — N18.6 END STAGE RENAL DISEASE: Primary | ICD-10-CM

## 2018-03-23 DIAGNOSIS — N18.4 CKD (CHRONIC KIDNEY DISEASE) STAGE 4, GFR 15-29 ML/MIN: ICD-10-CM

## 2018-03-23 DIAGNOSIS — R80.9 PROTEINURIA, UNSPECIFIED TYPE: ICD-10-CM

## 2018-03-23 DIAGNOSIS — N18.4 CKD (CHRONIC KIDNEY DISEASE) STAGE 4, GFR 15-29 ML/MIN: Primary | ICD-10-CM

## 2018-03-23 DIAGNOSIS — I10 ESSENTIAL HYPERTENSION: ICD-10-CM

## 2018-03-23 DIAGNOSIS — Z01.818 PREOP EXAMINATION: ICD-10-CM

## 2018-03-23 LAB
ALBUMIN SERPL BCP-MCNC: 3.4 G/DL
ANION GAP SERPL CALC-SCNC: 9 MMOL/L
BUN SERPL-MCNC: 73 MG/DL
CALCIUM SERPL-MCNC: 9.1 MG/DL
CHLORIDE SERPL-SCNC: 104 MMOL/L
CO2 SERPL-SCNC: 26 MMOL/L
CREAT SERPL-MCNC: 2.8 MG/DL
EST. GFR  (AFRICAN AMERICAN): 18.9 ML/MIN/1.73 M^2
EST. GFR  (NON AFRICAN AMERICAN): 16.4 ML/MIN/1.73 M^2
GLUCOSE SERPL-MCNC: 88 MG/DL
PHOSPHATE SERPL-MCNC: 4.4 MG/DL
POTASSIUM SERPL-SCNC: 5.2 MMOL/L
PTH-INTACT SERPL-MCNC: 533 PG/ML
SODIUM SERPL-SCNC: 139 MMOL/L

## 2018-03-23 PROCEDURE — 80069 RENAL FUNCTION PANEL: CPT

## 2018-03-23 PROCEDURE — 36415 COLL VENOUS BLD VENIPUNCTURE: CPT

## 2018-03-23 PROCEDURE — 83970 ASSAY OF PARATHORMONE: CPT

## 2018-04-10 ENCOUNTER — HOSPITAL ENCOUNTER (OUTPATIENT)
Dept: VASCULAR SURGERY | Facility: CLINIC | Age: 73
Discharge: HOME | End: 2018-04-10
Attending: SURGERY
Payer: MEDICARE

## 2018-04-10 ENCOUNTER — INITIAL CONSULT (OUTPATIENT)
Dept: VASCULAR SURGERY | Facility: CLINIC | Age: 73
End: 2018-04-10
Payer: MEDICARE

## 2018-04-10 ENCOUNTER — HOSPITAL ENCOUNTER (OUTPATIENT)
Dept: CARDIOLOGY | Facility: CLINIC | Age: 73
Discharge: HOME | End: 2018-04-10
Payer: MEDICARE

## 2018-04-10 ENCOUNTER — HOSPITAL ENCOUNTER (OUTPATIENT)
Dept: RADIOLOGY | Facility: HOSPITAL | Age: 73
Discharge: HOME | End: 2018-04-10
Attending: SURGERY
Payer: MEDICARE

## 2018-04-10 VITALS
DIASTOLIC BLOOD PRESSURE: 66 MMHG | TEMPERATURE: 98 F | HEIGHT: 64 IN | HEART RATE: 77 BPM | BODY MASS INDEX: 30.48 KG/M2 | SYSTOLIC BLOOD PRESSURE: 148 MMHG | WEIGHT: 178.56 LBS

## 2018-04-10 DIAGNOSIS — Z01.818 PRE-OP EVALUATION: ICD-10-CM

## 2018-04-10 DIAGNOSIS — N18.6 END STAGE RENAL DISEASE: ICD-10-CM

## 2018-04-10 DIAGNOSIS — E11.22 CKD STAGE 4 DUE TO TYPE 2 DIABETES MELLITUS: Primary | ICD-10-CM

## 2018-04-10 DIAGNOSIS — N18.4 CKD STAGE 4 DUE TO TYPE 2 DIABETES MELLITUS: Primary | ICD-10-CM

## 2018-04-10 DIAGNOSIS — Z01.818 PRE-OP EVALUATION: Primary | ICD-10-CM

## 2018-04-10 DIAGNOSIS — Z01.818 PREOP EXAMINATION: ICD-10-CM

## 2018-04-10 PROCEDURE — 71046 X-RAY EXAM CHEST 2 VIEWS: CPT | Mod: 26,,, | Performed by: RADIOLOGY

## 2018-04-10 PROCEDURE — 71046 X-RAY EXAM CHEST 2 VIEWS: CPT | Mod: TC,FY

## 2018-04-10 PROCEDURE — 3046F HEMOGLOBIN A1C LEVEL >9.0%: CPT | Mod: CPTII,S$GLB,, | Performed by: SURGERY

## 2018-04-10 PROCEDURE — G0365 VESSEL MAPPING HEMO ACCESS: HCPCS | Mod: S$GLB,,, | Performed by: SURGERY

## 2018-04-10 PROCEDURE — 99499 UNLISTED E&M SERVICE: CPT | Mod: S$GLB,,, | Performed by: SURGERY

## 2018-04-10 PROCEDURE — 93000 ELECTROCARDIOGRAM COMPLETE: CPT | Mod: S$GLB,,, | Performed by: INTERNAL MEDICINE

## 2018-04-10 PROCEDURE — 99204 OFFICE O/P NEW MOD 45 MIN: CPT | Mod: S$GLB,,, | Performed by: SURGERY

## 2018-04-10 PROCEDURE — 99999 PR PBB SHADOW E&M-EST. PATIENT-LVL III: CPT | Mod: PBBFAC,,, | Performed by: SURGERY

## 2018-04-10 RX ORDER — MUPIROCIN 20 MG/G
OINTMENT TOPICAL
Status: CANCELLED | OUTPATIENT
Start: 2018-04-10

## 2018-04-10 RX ORDER — LIDOCAINE HYDROCHLORIDE 10 MG/ML
1 INJECTION, SOLUTION EPIDURAL; INFILTRATION; INTRACAUDAL; PERINEURAL ONCE
Status: CANCELLED | OUTPATIENT
Start: 2018-04-10 | End: 2018-04-10

## 2018-04-10 NOTE — LETTER
April 10, 2018      Karla Iglesias,   [address]  [facility] [address]           [facility]  [address]  [facility] [address]  Phone: [phone]  Fax: [phone]          Patient: Zoey Ham   MR Number: [id number]   YOB: 1945   Date of Visit: 4/10/2018       Dear Dr. Karla Iglesias:    Thank you for referring Zoey Ham to me for evaluation. Attached you will find relevant portions of my assessment and plan of care.    If you have questions, please do not hesitate to call me. I look forward to following Zoey aHm along with you.    Sincerely,    YAMEL Perry III, MD    Enclosure  CC:  No Recipients    If you would like to receive this communication electronically, please contact [email or web address] or [phone] to request more information on [employer] access.    For providers and/or their staff who would like to refer a patient to [facility], please contact us through our one-stop-shop provider referral line, [facility], at [phone].    If you feel you have received this communication in error or would no longer like to receive these types of communications, please e-mail [email or web address]

## 2018-04-10 NOTE — PROGRESS NOTES
REFERRING PHYSICIAN:  Karla Iglesias D.O.    HISTORY OF PRESENT ILLNESS:  A 72-year-old female with advanced stage IV chronic   kidney disease with a creatinine of 2.8 and GFR of 16.4, who is sent for   dialysis access.  She is right-handed.  She has no history of central venous   cannulation, AICD or pacemaker placement.    PAST MEDICAL HISTORY:  1.  Coronary artery disease, status post MI x5.  Her last PCI was greater than   one year ago.  2.  Hypertension.  3.  Diabetes.  4.  Hyperparathyroidism.  5.  Chronic atrial fibrillation.    PAST SURGICAL HISTORY:  1.  Hysterectomy.  2.  Tonsillectomy.  3.  Appendectomy.  4.  Coronary artery bypass.  5.  PCI.  6.  Fracture surgery.    FAMILY HISTORY:  Positive for diabetes and hypertension.    MEDICATIONS:  Include Plavix, aspirin and statin.  See EPIC for full list.    ALLERGIES:  Penicillin, Percodan and Phenergan.    REVIEW OF SYSTEMS:  Denies postprandial pain or DVT.  All other systems including eyes, ENT, respiratory, musculoskeletal, breast,   neurologic, psychiatric, heme, lymph, allergy and immune are negative.    PHYSICAL EXAMINATION:  VITAL SIGNS:  See nursing notes.  GENERAL:  She is in no acute distress.  RESPIRATORY:  Normal effort.  Clear to claudication.  CARDIOVASCULAR:  Regular rhythm.  Nondisplaced PMI, no murmur.  VASCULAR:  2+ radial and brachial pulses bilaterally.  EXTREMITIES:  Left arm shows a ballottable antecubital vein.  Her forearm   cephalic vein is robust near the wrist, but that appears to be very, very small   in the mid forearm.  NEUROLOGIC:  Cranial nerves VII through XII intact.  5/5 motor strength in all   extremities.    IMAGING:  Vein mapping reveals adequate left upper arm vein, 3.4 mm.  Her   contralateral vein in her dominant right arm is actually better.    ASSESSMENT:  Reasonable AV fistula candidate.  Vein mapping would suggest   adequate upper arm vein and forearm vein, although on physical exam the forearm   vein on the  left appears discontinuous.    RECOMMENDATION:  1.  Left brachiocephalic AV fistula creation, 04/16/2018.  2.  Regional block.  3.  Hold Plavix now and decrease aspirin dose to 81 mg daily until   postoperatively.    The patient understands the risks and rationale of procedure and wishes to   proceed.      WILLEM/CICI  dd: 04/10/2018 15:26:22 (CDT)  td: 04/10/2018 15:52:39 (CDT)  Doc ID   #[id number]  Job ID #[id number]    CC: Karla Iglesias D.O.

## 2018-04-13 ENCOUNTER — TELEPHONE (OUTPATIENT)
Dept: VASCULAR SURGERY | Facility: CLINIC | Age: 73
End: 2018-04-13

## 2018-04-13 RX ORDER — ASPIRIN 81 MG/1
81 TABLET ORAL EVERY MORNING
COMMUNITY
End: 2018-05-14 | Stop reason: DRUGHIGH

## 2018-04-14 ENCOUNTER — ANESTHESIA EVENT (OUTPATIENT)
Dept: SURGERY | Facility: HOSPITAL | Age: 73
End: 2018-04-14
Payer: MEDICARE

## 2018-04-14 NOTE — PRE-PROCEDURE INSTRUCTIONS
"Spoke with Patient.  NPO, medication, and pre-op instructions reviewed.  Arrival time 1000 per Patient.  Instructed that she can eat and drink until 0200 and then to remain NPO after 0200.  Stated that about 4 years ago when she was getting a Cardiac Stent with Anesthesia at another Facility, she "stopped breathing."  She has had procedures since without incident.  Stated that she is "very sensitive to drugs."  Stated that she has "had problems with a high potassium."  Her morning glucose readings have been 103-112.  Stated that she only gets symptoms of Hypoglycemia if she skips a meal.  Changed Aspirin from 325 mg to 81 mg.  Plavix is on Hold.  Verbalized understanding of instructions.    "

## 2018-04-14 NOTE — ANESTHESIA PREPROCEDURE EVALUATION
04/14/2018  Zoey Ham is a 72 y.o., female.  Pre-operative evaluation for Procedure(s) (LRB):  [id number]  - Left brachiocephalic (Left)    Zoey Ham is a 72 y.o. female  with advanced stage IV chronic   kidney disease with a creatinine of 2.8 and GFR of 16.4, who is sent for   dialysis access.      Patient Active Problem List   Diagnosis    Hypertension associated with diabetes    Proliferative diabetic retinopathy    Atherosclerosis of aorta    Anemia of chronic renal failure, stage 4 (severe)    Diabetic polyneuropathy associated with type 2 diabetes mellitus    CKD stage 4 due to type 2 diabetes mellitus    Uncontrolled type 2 diabetes mellitus with stage 4 chronic kidney disease, with long-term current use of insulin    Obesity (BMI 30.0-34.9)    Hyperparathyroidism    Chronic atrial fibrillation    Coronary artery disease involving coronary bypass graft of native heart with angina pectoris    Senile purpura    Osteopenia of multiple sites    Social isolation    Unstable angina    Type 2 diabetes mellitus with hyperlipidemia    History of non-ST elevation myocardial infarction (NSTEMI)    S/P drug eluting coronary stent placement    Chronic diastolic heart failure    Acute gastritis without hemorrhage    Breast calcification, left    Hypovitaminosis D    Wrist pain, acute, unspecified laterality    Preop examination       Review of patient's allergies indicates:   Allergen Reactions    Percodan [oxycodone-aspirin] Hives     Welps     Pcn [penicillins] Hives and Swelling     Tolerated Rocephin IM in clinic      Phenergan [promethazine] Other (See Comments)     Altered mental status; jerking of extremities       No current facility-administered medications on file prior to encounter.      Current Outpatient Prescriptions on File Prior to Encounter    Medication Sig Dispense Refill    ACCU-CHEK MARI PLUS METER Misc 1 Device by Misc.(Non-Drug; Combo Route) route 2 (two) times daily. 1 each 0    arm brace Misc 1 application by Misc.(Non-Drug; Combo Route) route once daily. Carpal tunnel wrist brace for both left and right wrists 2 each 0    atorvastatin (LIPITOR) 80 MG tablet TAKE 1 TABLET EVERY DAY (Patient taking differently: TAKE 1 TABLET EVERY DAY, morning) 90 tablet 3    blood sugar diagnostic Strp 1 strip by Misc.(Non-Drug; Combo Route) route 4 (four) times daily. 200 strip 11    calcitRIOL (ROCALTROL) 0.25 MCG Cap Take 1 capsule (0.25 mcg total) by mouth once daily. (Patient taking differently: Take 0.25 mcg by mouth every morning. ) 30 capsule 4    clopidogrel (PLAVIX) 75 mg tablet TAKE 1 TABLET EVERY DAY (Patient taking differently: TAKE 1 TABLET EVERY DAY, morning) 90 tablet 3    furosemide (LASIX) 40 MG tablet TAKE 1 TABLET (40 MG TOTAL)  DAILY AS NEEDED (TAKE WATER PILL DAILY AS NEEDED FOR LEG SWELLING AND SHORTNESS OF BREATH). (Patient taking differently: TAKE 1 TABLET (40 MG TOTAL)  DAILY, morning) 90 tablet 3    insulin glargine (LANTUS) 100 unit/mL injection Inject 20 Units into the skin every evening. (Patient taking differently: Inject 20 Units into the skin every morning. ) 10 mL 3    insulin needles, disposable, 31 Ndle Takes insulin 4 times daily 300 each 3    lancets (ACCU-CHEK SOFTCLIX LANCETS) Misc 1 application by Misc.(Non-Drug; Combo Route) route 4 (four) times daily with meals and nightly. 200 each 11    metoprolol tartrate (LOPRESSOR) 100 MG tablet Take 1 tablet (100 mg total) by mouth 2 (two) times daily. 180 tablet 3    nitroGLYCERIN 0.4 MG/DOSE TL SPRY (NITROLINGUAL) 400 mcg/spray spray Place 1 spray under the tongue every 5 (five) minutes as needed for Chest pain. 12 g 0       Past Surgical History:   Procedure Laterality Date    APPENDECTOMY      CARDIAC SURGERY  2002    CABG    CHOLECYSTECTOMY      CORONARY  "ANGIOPLASTY  2004    CORONARY ARTERY BYPASS GRAFT      EYE SURGERY      cataracts bilaterally    FRACTURE SURGERY      right ankle    HYSTERECTOMY      TONSILLECTOMY         Social History     Social History    Marital status: Single     Spouse name: N/A    Number of children: N/A    Years of education: N/A     Occupational History    Homemaker      Social History Main Topics    Smoking status: Never Smoker    Smokeless tobacco: Never Used    Alcohol use No    Drug use: No    Sexual activity: No     Other Topics Concern    Are You Pregnant Or Think You May Be? No    Breast-Feeding No     Social History Narrative    2 biological kids, 1 adopted9 grandkids (1 nory lives with her while she 's attending pharmacy school at [employer])3 great[place]kids[place]         CBC: No results for input(s): WBC, RBC, HGB, HCT, PLT, MCV, MCH, MCHC in the last 72 hours.    CMP: No results for input(s): NA, K, CL, CO2, BUN, CREATININE, GLU, MG, PHOS, CALCIUM, ALBUMIN, PROT, ALKPHOS, ALT, AST, BILITOT in the last 72 hours.    INR  No results for input(s): PT, INR, PROTIME, APTT in the last 72 hours.        Diagnostic Studies:    Last 3 sets of Vitals    Vitals - 1 value per visit 2018 3/21/2018 4/10/2018   SYSTOLIC 170 134 148   DIASTOLIC 76 64 66   PULSE 69 75 77   TEMPERATURE - - 98.3   RESPIRATIONS - - -   SPO2 - 96 -   Weight (lb) 175.71 177.25 178.57   Weight (kg) 79.7 80.4 81   HEIGHT 5' 4" 5' 4" 5' 4"   BODY MASS INDEX 30.16 30.42 30.65   VISIT REPORT - - -   Pain Score  0 0 0   Some recent data might be hidden       EKD Echo:  Results for orders placed or performed during the hospital encounter of 17   2D echo with color flow doppler   Result Value Ref Range    EF 55 55 - 65    Mitral Valve Regurgitation MILD     Diastolic Dysfunction Yes (A)     Est. PA Systolic Pressure 39.36     Pericardial Effusion NONE     Mitral Valve Mobility NORMAL " "    Tricuspid Valve Regurgitation TRIVIAL TO MILD          Anesthesia Evaluation    I have reviewed the Patient Summary Reports.     I have reviewed the Medications.     Review of Systems  Cardiovascular:   Exercise tolerance: poor Hypertension Past MI CAD  CABG/stent Dysrhythmias atrial fibrillation Angina CHF    Renal/:   Chronic Renal Disease, CRI    Musculoskeletal:  Bone Disorders: Osteopenia    Neurological:   Neuromuscular Disease,   Peripheral Neuropathy    Endocrine:   Diabetes, poorly controlled, type 2           Anesthesia Plan  Type of Anesthesia, risks & benefits discussed:  Anesthesia Type:  general, regional, MAC  Patient's Preference: same  Intra-op Monitoring Plan: standard ASA monitors  Intra-op Monitoring Plan Comments:   Post Op Pain Control Plan: per primary service following discharge from PACU and IV/PO Opioids PRN  Post Op Pain Control Plan Comments:   Induction:   IV  Beta Blocker:  Patient is on a Beta-Blocker and has received one dose within the past 24 hours (No further documentation required).       Informed Consent: Patient understands risks and agrees with Anesthesia plan.  Questions answered. Anesthesia consent signed with patient.  ASA Score: 3     Day of Surgery Review of History & Physical:    H&P update referred to the surgeon.         Ready For Surgery From Anesthesia Perspective.     Stated that about 4 years ago when she was getting a Cardiac Stent with Anesthesia at another Facility, she "stopped breathing."  She has had procedures since without incident.  Stated that she is "very sensitive to drugs."  Stated that she has "had problems with a high potassium."      "

## 2018-04-16 ENCOUNTER — SURGERY (OUTPATIENT)
Age: 73
End: 2018-04-16

## 2018-04-16 ENCOUNTER — HOSPITAL ENCOUNTER (OUTPATIENT)
Facility: HOSPITAL | Age: 73
Discharge: HOME | End: 2018-04-16
Attending: SURGERY | Admitting: SURGERY
Payer: MEDICARE

## 2018-04-16 ENCOUNTER — ANESTHESIA (OUTPATIENT)
Dept: SURGERY | Facility: HOSPITAL | Age: 73
End: 2018-04-16
Payer: MEDICARE

## 2018-04-16 VITALS
HEIGHT: 64 IN | WEIGHT: 172 LBS | TEMPERATURE: 97 F | SYSTOLIC BLOOD PRESSURE: 153 MMHG | OXYGEN SATURATION: 99 % | RESPIRATION RATE: 16 BRPM | HEART RATE: 61 BPM | BODY MASS INDEX: 29.37 KG/M2 | DIASTOLIC BLOOD PRESSURE: 51 MMHG

## 2018-04-16 DIAGNOSIS — E11.22 CKD STAGE 4 DUE TO TYPE 2 DIABETES MELLITUS: ICD-10-CM

## 2018-04-16 DIAGNOSIS — N18.4 CKD STAGE 4 DUE TO TYPE 2 DIABETES MELLITUS: ICD-10-CM

## 2018-04-16 LAB
POCT GLUCOSE: 171 MG/DL (ref 70–110)
POCT GLUCOSE: 179 MG/DL (ref 70–110)

## 2018-04-16 PROCEDURE — 27200750 HC INSULATED NEEDLE/ STIMUPLEX: Performed by: STUDENT IN AN ORGANIZED HEALTH CARE EDUCATION/TRAINING PROGRAM

## 2018-04-16 PROCEDURE — 37000008 HC ANESTHESIA 1ST 15 MINUTES: Performed by: SURGERY

## 2018-04-16 PROCEDURE — 37000009 HC ANESTHESIA EA ADD 15 MINS: Performed by: SURGERY

## 2018-04-16 PROCEDURE — 25000003 PHARM REV CODE 250: Performed by: STUDENT IN AN ORGANIZED HEALTH CARE EDUCATION/TRAINING PROGRAM

## 2018-04-16 PROCEDURE — 71000015 HC POSTOP RECOV 1ST HR: Performed by: SURGERY

## 2018-04-16 PROCEDURE — 63600175 PHARM REV CODE 636 W HCPCS: Performed by: NURSE ANESTHETIST, CERTIFIED REGISTERED

## 2018-04-16 PROCEDURE — 36821 AV FUSION DIRECT ANY SITE: CPT | Mod: ,,, | Performed by: SURGERY

## 2018-04-16 PROCEDURE — D9220A PRA ANESTHESIA: Mod: CRNA,,, | Performed by: NURSE ANESTHETIST, CERTIFIED REGISTERED

## 2018-04-16 PROCEDURE — 36000706: Performed by: SURGERY

## 2018-04-16 PROCEDURE — 25000003 PHARM REV CODE 250: Performed by: NURSE ANESTHETIST, CERTIFIED REGISTERED

## 2018-04-16 PROCEDURE — 71000016 HC POSTOP RECOV ADDL HR: Performed by: SURGERY

## 2018-04-16 PROCEDURE — 36000707: Performed by: SURGERY

## 2018-04-16 PROCEDURE — D9220A PRA ANESTHESIA: Mod: ANES,,, | Performed by: ANESTHESIOLOGY

## 2018-04-16 PROCEDURE — 71000044 HC DOSC ROUTINE RECOVERY FIRST HOUR: Performed by: SURGERY

## 2018-04-16 PROCEDURE — 82962 GLUCOSE BLOOD TEST: CPT | Performed by: SURGERY

## 2018-04-16 PROCEDURE — 82962 GLUCOSE BLOOD TEST: CPT | Mod: 91 | Performed by: SURGERY

## 2018-04-16 PROCEDURE — S0077 INJECTION, CLINDAMYCIN PHOSP: HCPCS | Performed by: STUDENT IN AN ORGANIZED HEALTH CARE EDUCATION/TRAINING PROGRAM

## 2018-04-16 PROCEDURE — 63600175 PHARM REV CODE 636 W HCPCS: Performed by: SURGERY

## 2018-04-16 PROCEDURE — 27201423 OPTIME MED/SURG SUP & DEVICES STERILE SUPPLY: Performed by: SURGERY

## 2018-04-16 RX ORDER — HYDROCODONE BITARTRATE AND ACETAMINOPHEN 5; 325 MG/1; MG/1
1 TABLET ORAL EVERY 6 HOURS PRN
Qty: 35 TABLET | Refills: 0 | Status: SHIPPED | OUTPATIENT
Start: 2018-04-16 | End: 2018-07-23

## 2018-04-16 RX ORDER — MIDAZOLAM HYDROCHLORIDE 1 MG/ML
INJECTION, SOLUTION INTRAMUSCULAR; INTRAVENOUS
Status: DISCONTINUED | OUTPATIENT
Start: 2018-04-16 | End: 2018-04-16

## 2018-04-16 RX ORDER — PROPOFOL 10 MG/ML
VIAL (ML) INTRAVENOUS
Status: DISCONTINUED | OUTPATIENT
Start: 2018-04-16 | End: 2018-04-16

## 2018-04-16 RX ORDER — HEPARIN SODIUM 1000 [USP'U]/ML
INJECTION, SOLUTION INTRAVENOUS; SUBCUTANEOUS
Status: DISCONTINUED | OUTPATIENT
Start: 2018-04-16 | End: 2018-04-16 | Stop reason: HOSPADM

## 2018-04-16 RX ORDER — LIDOCAINE HCL/PF 100 MG/5ML
SYRINGE (ML) INTRAVENOUS
Status: DISCONTINUED | OUTPATIENT
Start: 2018-04-16 | End: 2018-04-16

## 2018-04-16 RX ORDER — PROPOFOL 10 MG/ML
VIAL (ML) INTRAVENOUS CONTINUOUS PRN
Status: DISCONTINUED | OUTPATIENT
Start: 2018-04-16 | End: 2018-04-16

## 2018-04-16 RX ORDER — CLINDAMYCIN PHOSPHATE 900 MG/50ML
900 INJECTION, SOLUTION INTRAVENOUS ONCE
Status: COMPLETED | OUTPATIENT
Start: 2018-04-16 | End: 2018-04-16

## 2018-04-16 RX ORDER — HEPARIN SODIUM 1000 [USP'U]/ML
INJECTION, SOLUTION INTRAVENOUS; SUBCUTANEOUS
Status: DISCONTINUED | OUTPATIENT
Start: 2018-04-16 | End: 2018-04-16

## 2018-04-16 RX ORDER — SODIUM CHLORIDE 9 MG/ML
INJECTION, SOLUTION INTRAVENOUS CONTINUOUS PRN
Status: DISCONTINUED | OUTPATIENT
Start: 2018-04-16 | End: 2018-04-16

## 2018-04-16 RX ORDER — LIDOCAINE HYDROCHLORIDE 10 MG/ML
1 INJECTION, SOLUTION EPIDURAL; INFILTRATION; INTRACAUDAL; PERINEURAL ONCE
Status: COMPLETED | OUTPATIENT
Start: 2018-04-16 | End: 2018-04-16

## 2018-04-16 RX ORDER — MUPIROCIN 20 MG/G
OINTMENT TOPICAL
Status: DISCONTINUED | OUTPATIENT
Start: 2018-04-16 | End: 2018-04-16 | Stop reason: HOSPADM

## 2018-04-16 RX ORDER — HYDROCODONE BITARTRATE AND ACETAMINOPHEN 5; 325 MG/1; MG/1
1 TABLET ORAL EVERY 4 HOURS PRN
Status: DISCONTINUED | OUTPATIENT
Start: 2018-04-16 | End: 2018-04-16 | Stop reason: HOSPADM

## 2018-04-16 RX ADMIN — MIDAZOLAM HYDROCHLORIDE 1 MG: 1 INJECTION, SOLUTION INTRAMUSCULAR; INTRAVENOUS at 12:04

## 2018-04-16 RX ADMIN — LIDOCAINE HYDROCHLORIDE 40 MG: 20 INJECTION, SOLUTION INTRAVENOUS at 12:04

## 2018-04-16 RX ADMIN — SODIUM CHLORIDE: 0.9 INJECTION, SOLUTION INTRAVENOUS at 12:04

## 2018-04-16 RX ADMIN — CLINDAMYCIN IN 5 PERCENT DEXTROSE 900 MG: 18 INJECTION, SOLUTION INTRAVENOUS at 12:04

## 2018-04-16 RX ADMIN — LIDOCAINE HYDROCHLORIDE 10 MG: 10 INJECTION, SOLUTION EPIDURAL; INFILTRATION; INTRACAUDAL; PERINEURAL at 10:04

## 2018-04-16 RX ADMIN — PROPOFOL 20 MG: 10 INJECTION, EMULSION INTRAVENOUS at 12:04

## 2018-04-16 RX ADMIN — HEPARIN SODIUM 3000 UNITS: 1000 INJECTION, SOLUTION INTRAVENOUS; SUBCUTANEOUS at 01:04

## 2018-04-16 RX ADMIN — HEPARIN SODIUM 10000 UNITS: 1000 INJECTION, SOLUTION INTRAVENOUS; SUBCUTANEOUS at 01:04

## 2018-04-16 RX ADMIN — MUPIROCIN: 20 OINTMENT TOPICAL at 10:04

## 2018-04-16 RX ADMIN — PROPOFOL 50 MCG/KG/MIN: 10 INJECTION, EMULSION INTRAVENOUS at 12:04

## 2018-04-16 RX ADMIN — SODIUM CHLORIDE: 0.9 INJECTION, SOLUTION INTRAVENOUS at 01:04

## 2018-04-16 NOTE — BRIEF OP NOTE
[facility]  Brief Operative Note     SUMMARY     Surgery Date: 4/16/2018     Surgeon(s) and Role:     * YAMEL Perry III, MD - Primary     * Chelle Sanchez MD - Fellow    Assisting Surgeon: None    Pre-op Diagnosis:  CKD stage 4 due to type 2 diabetes mellitus [E11.22, N18.4]    Post-op Diagnosis:  Post-Op Diagnosis Codes:     * CKD stage 4 due to type 2 diabetes mellitus [E11.22, N18.4]    Procedure(s) (LRB):  [id number]  - Left brachiocephalic (Left)    Anesthesia: Choice    Description of the findings of the procedure: Vein adequate, good artery.    Cephalic vein more lateral than typical    Findings/Key Components: Moderate pulsatility that extended ~3-4 cm proximal from the incision, c/w intrinsic vein stenosis (not kink), 1+ radial after    Estimated Blood Loss: <10cc         Specimens:   Specimen (12h ago through future)    None          Discharge Note    SUMMARY     Admit Date: 4/16/2018    Discharge Date and Time: 4/16/18    Hospital Course (synopsis of major diagnoses, care, treatment, and services provided during the course of the hospital stay): successful outpatient procedure    Final Diagnosis: Post-Op Diagnosis Codes:     * CKD stage 4 due to type 2 diabetes mellitus [E11.22, N18.4]    Disposition: home    Follow Up/Patient Instructions: Diet: renal  Act: ad marissa  FU: 6 week with QUANTITATIVE AVF duplex     Medications: pre-op + analgesic

## 2018-04-16 NOTE — OP NOTE
[facility]  Vascular Surgery  Operative Note    SUMMARY     Date of Procedure: 4/16/2018     Procedure: Left brachiocephalic AVF    Surgeon(s) and Role:     * YAMEL Perry III, MD - Primary     * Chelle Sanchez MD - Fellow    Assisting Surgeon: None    Pre-Operative Diagnosis: CKD stage 4 due to type 2 diabetes mellitus [E11.22, N18.4]    Post-Operative Diagnosis: Post-Op Diagnosis Codes:     * CKD stage 4 due to type 2 diabetes mellitus [E11.22, N18.4]    Anesthesia: regional    Indication for operation: 73 yo F with h/o HTN,DM2, CAD s/p MI x5, atrial fibrillation presenting with CKD stage IV here for AVF creation.        Description of the Findings of the Procedure: Vein adequate, good artery.    Cephalic vein more lateral than typical  Findings/Key Components: Moderate pulsatility that extended ~3-4 cm proximal from the incision, c/w intrinsic vein stenosis (not kink), 1+ radial after      Complications: No    Estimated Blood Loss (EBL): 5 mL           Implants: * No implants in log *    Specimens:   Specimen (12h ago through future)    None                  Condition: Good    Disposition: PACU - hemodynamically stable.    Operation in detail: After an informed consent was obtained the patient taken to the operating room and placed in the supine position. The patient received a regional nerve block by anesthesia prior to the procedure. The left upper extremity was prepped and draped in the usual sterile fashion. Ultrasound was used to evaluate patient's upper arm veins with cephalic vein adequate caliber for AVF creation. The patient received the appropriate preoperative antibiotics. A transverse incision was made 2 cm distal to the antecubital fossa.  The basilic vein was identified first with median antecubital vein of adequate size of AVF creation. The vein was dissected free from the surrounding tissue and small branch points were ligated with 3-0 silk ties. The anterior margin  of the cephalic vein was marked to make correct orientation discernible using a sterile marker. The retinaculum was divided with bovie electrocautery.  The brachial artery was isolated with vessel loops. Patient was given 5000 U of IV heparin. The anterior margin of the antecubital vein was marked.  A cuff was excised from basilic vein with basilic vein then ligated proximally and distally. The antecubital vein was dilated with heparinized saline and was noted to dilate well.  A longitudinal arteriotomy was made in brachial artery and the brachial artery and cephalic vein were flushed with heparinized saline. Next the vein and artery were anastomosed in a end-to-side fashion using a running 6-0 Prolene suture. Just prior to completion of the anastomosis the fistula was flushed, and the anastomosis was then completed. The fistula thrill was noted to be pulsatile proximally. The vein course was inspected in relation to the surrounding tissues ensure no kinking or twisting.  A large branch was ligated at proximal aspect of fistula.  The thrill was pulsatile to the AC fossa past area of sharp turn of the cephalic vein, which was noted to be lateral. Distal to this area, thrill without pulastility.  Patient had a 1+ radial pulse.  Hemostasis was obtained and the wound was closed using interrupted 3-0 Monocryl sutures,followed by a running 4-0 Monocryl subcuticular suture for the skin. Sterile dry dressing was applied. The patient tolerated the procedure well and was transferred to pacu for further recovery.

## 2018-04-16 NOTE — DISCHARGE INSTRUCTIONS
Arteriovenous (AV) Fistula for Dialysis  An AV fistula is a connection between an artery and a vein. For this procedure, an AV fistula is surgically created using an artery and a vein in your arm. (Your healthcare provider will let you know if another site is to be used.) When the artery and vein are joined, blood flow increases from the artery into the vein. As a result, the vein gets bigger over time. The enlarged vein provides easier access to the blood for a treatment for kidney failure (dialysis). This sheet explains the procedure and what to expect.     An AV fistula increases blood flow from the artery into the vein. Over time, the vein becomes stronger and enlarged.   Preparing for the procedure  Prepare as you have been told. In addition:  · Tell your healthcare provider about all the medicines you take. This includes all over-the-counter and prescription medicines, and street drugs. It also includes herbs, vitamins, and other supplements. You may need to stop taking some or all of them before the procedure.  · Follow any directions youre given for not eating or drinking before the procedure.  · Do not allow anyone to draw blood from or take blood pressure on the arm that will have the fistula before the procedure.  The day of the procedure  The procedure takes about 1 to 2 hours. Youll likely go home the same day.  Before the procedure begins:  · An IV (intravenous) line is put into a vein in the arm or hand not being used for the procedure. This line supplies fluids and medicines.  · To keep you free of pain during the procedure, youre given general anesthesia. This medicine puts you into a state like a deep sleep through the procedure. Or a nerve block may be used. This medicine numbs the arm. With it, you may also be given medicine that makes you relaxed and drowsy through the procedure.  During the procedure:  · The skin over your arm may be injected with numbing medicine.  · One or more small cuts  (incisions) are then made through the numbed skin. This depends on the size of your arm and the depth of the vein in your arm.  · The vein is attached to the selected artery.  · Any incisions made are then closed with stitches (sutures), staples, surgical glue, or strips of surgical tape.  After the procedure:  · Youll be asked to keep your arm raised (elevated) as often as possible for at least a week after the procedure.  · Youll be given medicines to manage pain as needed.  · Your arm and hand will be checked to make sure blood is flowing through the fistula properly. The feeling of blood rushing through the fistula is called a thrill. It is somewhat similar to the purring of a cat. Youll be taught how to check for this feeling each day to make sure there are no problems with your fistula. Youll also be taught how to care for your fistula at home.  · When its time for you to leave the hospital, have an adult family member or friend ready to drive you home.  Recovering at home  Once at home, follow all of the instructions youve been given. Be sure to:  · Take all medicines as directed.  · Care for your incision as instructed.  · Check for signs of infection at the incision site (see below).  · Avoid heavy lifting and strenuous activities as directed.  · Monitor and care for your fistula as instructed.  · Do your hand and arm exercises as instructed. This usually involves squeezing a ball in your hand for a few minutes each hour.  Call your healthcare provider if you have any of the following:  · Fever of 100.4°F (38°C) or higher  · Signs of infection at the incision site, such as increased redness or swelling, warmth, worsening pain, bleeding, or bad-smelling drainage  · You cant feel a thrill (the vibration of blood going through your arm)  · Pain or numbness in your fingers, hand, or arm  · Bleeding, redness, or warmth around your fistula  · Sudden bulging of the fistula (more than usual; a slight bulge  is normal)   Follow-Up  Your healthcare provider will check your fistula within 1 to 2 weeks after the procedure. It will likely take about 6 to 8 weeks for the fistula to enlarge enough to start dialysis. After that, make sure the fistula is checked each time you have dialysis. Your healthcare provider may also suggest checkups every 6 months.  Risks and possible complications include:  · The fistula not working properly  · Long wait before the fistula is ready (up to 6 months)  · Coldness or numbness in the hand (due to blood flowing away from the hand and into the fistula)  · An unsightly bump under the skin (due to enlargement of the fistula)  · Prolonged bleeding from the fistula after dialysis  · Narrowing or weakening of the blood vessels used for the fistula  · Formation of blood clots in the blood vessels used for the fistula  · Risks of anesthesia or any other medicines used during the procedure   Living with an AV Fistula  A problem, such as a narrowing (stricture) of the vein or an infection, can make the fistula unusable. If this happens, you may need other treatments to repair or make a new fistula. To protect your fistula, follow these and any other guidelines youre given:  · Check your fistula as often as your healthcare provider says. If you cant feel your thrill, let your provider know right away.  · Make sure your fistula is checked before each dialysis treatment.  · Dont let anyone draw blood from or take blood pressure on the arm that has the fistula.  · Wash your hands often and keep the area around your fistula clean.  · Dont sleep on the arm that has the fistula.  · Dont wear tight jewelry or a watch on the arm with your fistula.  · Protect your fistula from cuts, scrapes, or blows.  Date Last Reviewed: 1/1/2017  © 3829-3031 [employer]. [address] [place] [address], [place] [address]. All rights reserved. This information is not intended as a substitute for professional medical  care. Always follow your healthcare professional's instructions.        Recovery After Procedural Sedation (Adult)  You have been given medicine by vein to make you sleep during your surgery. This may have included both a pain medicine and sleeping medicine. Most of the effects have worn off. But you may still have some drowsiness for the next 6 to 8 hours.  Home care  Follow these guidelines when you get home:  · For the next 8 hours, you should be watched by a responsible adult. This person should make sure your condition is not getting worse.  · Don't drink any alcohol for the next 24 hours.  · Don't drive, operate dangerous machinery, or make important business or personal decisions during the next 24 hours.  Note: Your healthcare provider may tell you not to take any medicine by mouth for pain or sleep in the next 4 hours. These medicines may react with the medicines you were given in the hospital. This could cause a much stronger response than usual.  Follow-up care  Follow up with your healthcare provider if you are not alert and back to your usual level of activity within 12 hours.  When to seek medical advice  Call your healthcare provider right away if any of these occur:  · Drowsiness gets worse  · Weakness or dizziness gets worse  · Repeated vomiting  · You can't be awakened   Date Last Reviewed: 10/18/2016  © 3881-0946 [employer]. [address] [place] [address], [place] [address]. All rights reserved. This information is not intended as a substitute for professional medical care. Always follow your healthcare professional's instructions.

## 2018-04-16 NOTE — ANESTHESIA PROCEDURE NOTES
Supraclavicular Brachial Plexus Single Injection Block    Patient location during procedure: OR    Reason for block: primary anesthetic   Diagnosis: L arm pain   Start time: 4/16/2018 12:34 PM  Timeout: 4/16/2018 12:32 PM   End time: 4/16/2018 12:45 PM  Staffing  Anesthesiologist: MAGDALENA SWIFT  Resident/CRNA: THUAN GLOVER  Performed: resident/CRNA   Preanesthetic Checklist  Completed: patient identified, site marked, surgical consent, pre-op evaluation, timeout performed, IV checked, risks and benefits discussed and monitors and equipment checked  Peripheral Block  Patient position: supine  Prep: ChloraPrep  Patient monitoring: heart rate, cardiac monitor, continuous pulse ox, continuous capnometry and frequent blood pressure checks  Block type: supraclavicular  Laterality: left  Injection technique: single shot  Needle  Needle type: Stimuplex   Needle gauge: 22 G  Needle length: 2 in  Needle localization: anatomical landmarks and ultrasound guidance   -ultrasound image captured on disc.  Assessment  Injection assessment: negative aspiration, negative parasthesia and local visualized surrounding nerve  Paresthesia pain: none  Heart rate change: no  Slow fractionated injection: yes  Medications:  Bolus administered: 30 mL of 1.5 mepivacaine  Epinephrine added: 3.75 mcg/mL (1/300,000)  Additional Notes  Intercostal brachial field block performed with mepivacaine 1.5% 10ml for additional coverage.    VSS.  See anesthesia record.  Patient tolerated procedure well.

## 2018-04-16 NOTE — PLAN OF CARE
Patient arrived from OR with MADELINE Hernandez and GENE Sanchez CRNA.  Patient stable.  Report given to GAVIN Hernandez RN. I received report from GAVIN Hernandez RN at this time.  Assumed care of patient at this time.

## 2018-04-16 NOTE — INTERVAL H&P NOTE
The patient has been examined and the H&P has been reviewed:    I concur with the findings and no changes have occurred since H&P was written.    Anesthesia/Surgery risks, benefits and alternative options discussed and understood by patient/family.          Active Hospital Problems    Diagnosis  POA    CKD stage 4 due to type 2 diabetes mellitus [E11.22, N18.4]  Yes     GFR 23 in 6/2016, followed by Nephrology        Resolved Hospital Problems    Diagnosis Date Resolved POA   No resolved problems to display.

## 2018-04-16 NOTE — TRANSFER OF CARE
"Anesthesia Transfer of Care Note    Patient: Zoey Ham    Procedure(s) Performed: Procedure(s) (LRB):  [id number]  - Left brachiocephalic (Left)    Patient location: [facility]    Anesthesia Type: general    Transport from OR: Transported from OR on room air with adequate spontaneous ventilation    Post pain: adequate analgesia    Post assessment: no apparent anesthetic complications    Post vital signs: stable    Level of consciousness: awake, alert and oriented    Complications: none    Transfer of care protocol was followed      Last vitals:   Visit Vitals  BP (!) 119/47 (BP Location: Right arm, Patient Position: Lying)   Pulse 62   Temp 36.4 °C (97.5 °F) (Temporal)   Resp 18   Ht 5' 4" (1.626 m)   Wt 78 kg (172 lb)   LMP  (LMP Unknown)   SpO2 95%   Breastfeeding? No   BMI 29.52 kg/m²     "

## 2018-04-16 NOTE — PLAN OF CARE
Patient and patient's daughter and granddaughter received discharge instructions and prescriptions.  Patient and patient's daughter and granddaughter verbalized understanding of all instructions given and all questions were addressed prior to patient's discharge.  Patient's vital signs are stable and within patient's baseline.  Patient tolerated clear liquids PO.  Patient denies nausea.  Patient denies nausea and vomiting at this time.  Patient meets all criteria for discharge at this time.  All required consents present in patient's chart upon patient's discharge.

## 2018-04-16 NOTE — H&P (VIEW-ONLY)
REFERRING PHYSICIAN:  Karla Iglesias D.O.    HISTORY OF PRESENT ILLNESS:  A 72-year-old female with advanced stage IV chronic   kidney disease with a creatinine of 2.8 and GFR of 16.4, who is sent for   dialysis access.  She is right-handed.  She has no history of central venous   cannulation, AICD or pacemaker placement.    PAST MEDICAL HISTORY:  1.  Coronary artery disease, status post MI x5.  Her last PCI was greater than   one year ago.  2.  Hypertension.  3.  Diabetes.  4.  Hyperparathyroidism.  5.  Chronic atrial fibrillation.    PAST SURGICAL HISTORY:  1.  Hysterectomy.  2.  Tonsillectomy.  3.  Appendectomy.  4.  Coronary artery bypass.  5.  PCI.  6.  Fracture surgery.    FAMILY HISTORY:  Positive for diabetes and hypertension.    MEDICATIONS:  Include Plavix, aspirin and statin.  See EPIC for full list.    ALLERGIES:  Penicillin, Percodan and Phenergan.    REVIEW OF SYSTEMS:  Denies postprandial pain or DVT.  All other systems including eyes, ENT, respiratory, musculoskeletal, breast,   neurologic, psychiatric, heme, lymph, allergy and immune are negative.    PHYSICAL EXAMINATION:  VITAL SIGNS:  See nursing notes.  GENERAL:  She is in no acute distress.  RESPIRATORY:  Normal effort.  Clear to claudication.  CARDIOVASCULAR:  Regular rhythm.  Nondisplaced PMI, no murmur.  VASCULAR:  2+ radial and brachial pulses bilaterally.  EXTREMITIES:  Left arm shows a ballottable antecubital vein.  Her forearm   cephalic vein is robust near the wrist, but that appears to be very, very small   in the mid forearm.  NEUROLOGIC:  Cranial nerves VII through XII intact.  5/5 motor strength in all   extremities.    IMAGING:  Vein mapping reveals adequate left upper arm vein, 3.4 mm.  Her   contralateral vein in her dominant right arm is actually better.    ASSESSMENT:  Reasonable AV fistula candidate.  Vein mapping would suggest   adequate upper arm vein and forearm vein, although on physical exam the forearm   vein on the  left appears discontinuous.    RECOMMENDATION:  1.  Left brachiocephalic AV fistula creation, 04/16/2018.  2.  Regional block.  3.  Hold Plavix now and decrease aspirin dose to 81 mg daily until   postoperatively.    The patient understands the risks and rationale of procedure and wishes to   proceed.      WILLEM/CICI  dd: 04/10/2018 15:26:22 (CDT)  td: 04/10/2018 15:52:39 (CDT)  Doc ID   #[id number]  Job ID #[id number]    CC: Karla Iglesias D.O.

## 2018-04-16 NOTE — ANESTHESIA POSTPROCEDURE EVALUATION
"Anesthesia Post Evaluation    Patient: Zoey Ham    Procedure(s) Performed: Procedure(s) (LRB):  [id number]  - Left brachiocephalic (Left)    Final Anesthesia Type: regional  Patient location during evaluation: [facility]  Patient participation: Yes- Able to Participate  Level of consciousness: awake and alert  Post-procedure vital signs: reviewed and stable  Pain management: adequate  Airway patency: patent  PONV status at discharge: No PONV  Anesthetic complications: no      Cardiovascular status: blood pressure returned to baseline  Respiratory status: unassisted, spontaneous ventilation and room air  Hydration status: euvolemic          Visit Vitals  BP (!) 114/50 (BP Location: Right arm, Patient Position: Lying)   Pulse 61   Temp 36.4 °C (97.5 °F) (Temporal)   Resp 18   Ht 5' 4" (1.626 m)   Wt 78 kg (172 lb)   LMP  (LMP Unknown)   SpO2 98%   Breastfeeding? No   BMI 29.52 kg/m²       Pain/Claude Score: Pain Assessment Performed: Yes (4/16/2018  2:15 PM)  Presence of Pain: denies (4/16/2018  2:15 PM)  Claude Score: 10 (4/16/2018  2:15 PM)      "

## 2018-04-16 NOTE — PROGRESS NOTES
Notified Dr. KARELY Hernandez that patient has met all criteria for release from Anesthesia's care.  Dr. KARELY Hernandez stated that he would release patient from Anesthesia's care after assessing patient in post-op.

## 2018-04-30 ENCOUNTER — LAB VISIT (OUTPATIENT)
Dept: LAB | Facility: HOSPITAL | Age: 73
End: 2018-04-30
Payer: MEDICARE

## 2018-04-30 ENCOUNTER — TELEPHONE (OUTPATIENT)
Dept: NEPHROLOGY | Facility: CLINIC | Age: 73
End: 2018-04-30

## 2018-04-30 DIAGNOSIS — R80.9 PROTEINURIA, UNSPECIFIED TYPE: ICD-10-CM

## 2018-04-30 DIAGNOSIS — I10 ESSENTIAL HYPERTENSION: ICD-10-CM

## 2018-04-30 DIAGNOSIS — N18.4 CKD (CHRONIC KIDNEY DISEASE) STAGE 4, GFR 15-29 ML/MIN: Primary | ICD-10-CM

## 2018-04-30 DIAGNOSIS — N18.4 CKD (CHRONIC KIDNEY DISEASE) STAGE 4, GFR 15-29 ML/MIN: ICD-10-CM

## 2018-04-30 LAB
ALBUMIN SERPL BCP-MCNC: 3.3 G/DL
ANION GAP SERPL CALC-SCNC: 10 MMOL/L
BUN SERPL-MCNC: 54 MG/DL
CALCIUM SERPL-MCNC: 9.3 MG/DL
CHLORIDE SERPL-SCNC: 105 MMOL/L
CO2 SERPL-SCNC: 24 MMOL/L
CREAT SERPL-MCNC: 2.6 MG/DL
EST. GFR  (AFRICAN AMERICAN): 20.6 ML/MIN/1.73 M^2
EST. GFR  (NON AFRICAN AMERICAN): 17.9 ML/MIN/1.73 M^2
GLUCOSE SERPL-MCNC: 226 MG/DL
PHOSPHATE SERPL-MCNC: 4.6 MG/DL
POTASSIUM SERPL-SCNC: 5.7 MMOL/L
SODIUM SERPL-SCNC: 139 MMOL/L

## 2018-04-30 PROCEDURE — 80069 RENAL FUNCTION PANEL: CPT

## 2018-04-30 PROCEDURE — 36415 COLL VENOUS BLD VENIPUNCTURE: CPT

## 2018-04-30 NOTE — TELEPHONE ENCOUNTER
Potassium high.  Please ask her to take an extra lasix today and hydrate well and follow a  low potassium diet.  Repeat labs on Wednesday along with potassium.

## 2018-05-02 ENCOUNTER — LAB VISIT (OUTPATIENT)
Dept: LAB | Facility: HOSPITAL | Age: 73
End: 2018-05-02
Payer: MEDICARE

## 2018-05-02 ENCOUNTER — TELEPHONE (OUTPATIENT)
Dept: NEPHROLOGY | Facility: CLINIC | Age: 73
End: 2018-05-02

## 2018-05-02 DIAGNOSIS — N18.4 CKD (CHRONIC KIDNEY DISEASE) STAGE 4, GFR 15-29 ML/MIN: ICD-10-CM

## 2018-05-02 DIAGNOSIS — N18.4 CKD (CHRONIC KIDNEY DISEASE) STAGE 4, GFR 15-29 ML/MIN: Primary | ICD-10-CM

## 2018-05-02 LAB
CA-I BLDV-SCNC: 1.17 MMOL/L
FERRITIN SERPL-MCNC: 311 NG/ML
IRON SERPL-MCNC: 49 UG/DL
POTASSIUM SERPL-SCNC: 5.7 MMOL/L
PTH-INTACT SERPL-MCNC: 432 PG/ML
SATURATED IRON: 16 %
TOTAL IRON BINDING CAPACITY: 315 UG/DL
TRANSFERRIN SERPL-MCNC: 213 MG/DL

## 2018-05-02 PROCEDURE — 36415 COLL VENOUS BLD VENIPUNCTURE: CPT

## 2018-05-02 PROCEDURE — 83970 ASSAY OF PARATHORMONE: CPT

## 2018-05-02 PROCEDURE — 83540 ASSAY OF IRON: CPT

## 2018-05-02 PROCEDURE — 82330 ASSAY OF CALCIUM: CPT

## 2018-05-02 PROCEDURE — 84132 ASSAY OF SERUM POTASSIUM: CPT

## 2018-05-02 PROCEDURE — 82728 ASSAY OF FERRITIN: CPT

## 2018-05-02 NOTE — TELEPHONE ENCOUNTER
Potassium high.  Please have her take kayexlate today and tomorrow and follow low potassium diet.  Extra lasix pill today as well to help with lasix and hydrate well. Check potassium on Friday.

## 2018-05-03 ENCOUNTER — PATIENT OUTREACH (OUTPATIENT)
Dept: ADMINISTRATIVE | Facility: HOSPITAL | Age: 73
End: 2018-05-03

## 2018-05-03 NOTE — PROGRESS NOTES
Spoke with pt, reminded her of RTC Appt with PCP 5-14-18 , she was also reminded to collect her stool specimen as instructed with the FIT KIT and to make sure she writes the time and date she collects it . She will bring it to clinic lab day of appt. She verbalized understanding.

## 2018-05-07 ENCOUNTER — LAB VISIT (OUTPATIENT)
Dept: LAB | Facility: HOSPITAL | Age: 73
End: 2018-05-07
Attending: INTERNAL MEDICINE
Payer: MEDICARE

## 2018-05-07 DIAGNOSIS — N18.4 CKD (CHRONIC KIDNEY DISEASE) STAGE 4, GFR 15-29 ML/MIN: ICD-10-CM

## 2018-05-07 LAB — POTASSIUM SERPL-SCNC: 4.5 MMOL/L

## 2018-05-07 PROCEDURE — 84132 ASSAY OF SERUM POTASSIUM: CPT

## 2018-05-07 PROCEDURE — 36415 COLL VENOUS BLD VENIPUNCTURE: CPT

## 2018-05-08 ENCOUNTER — TELEPHONE (OUTPATIENT)
Dept: NEPHROLOGY | Facility: CLINIC | Age: 73
End: 2018-05-08

## 2018-05-09 ENCOUNTER — OFFICE VISIT (OUTPATIENT)
Dept: NEPHROLOGY | Facility: CLINIC | Age: 73
End: 2018-05-09
Payer: MEDICARE

## 2018-05-09 VITALS
HEIGHT: 64 IN | SYSTOLIC BLOOD PRESSURE: 138 MMHG | BODY MASS INDEX: 29.53 KG/M2 | DIASTOLIC BLOOD PRESSURE: 70 MMHG | OXYGEN SATURATION: 100 % | WEIGHT: 173 LBS | HEART RATE: 66 BPM

## 2018-05-09 DIAGNOSIS — I25.708 CORONARY ARTERY DISEASE OF BYPASS GRAFT OF NATIVE HEART WITH STABLE ANGINA PECTORIS: ICD-10-CM

## 2018-05-09 DIAGNOSIS — I50.42 CHRONIC COMBINED SYSTOLIC AND DIASTOLIC CONGESTIVE HEART FAILURE: ICD-10-CM

## 2018-05-09 DIAGNOSIS — N18.4 CHRONIC KIDNEY DISEASE, STAGE IV (SEVERE): Primary | ICD-10-CM

## 2018-05-09 PROCEDURE — 3075F SYST BP GE 130 - 139MM HG: CPT | Mod: CPTII,S$GLB,, | Performed by: INTERNAL MEDICINE

## 2018-05-09 PROCEDURE — 99499 UNLISTED E&M SERVICE: CPT | Mod: S$GLB,,, | Performed by: INTERNAL MEDICINE

## 2018-05-09 PROCEDURE — 99999 PR PBB SHADOW E&M-EST. PATIENT-LVL III: CPT | Mod: PBBFAC,,, | Performed by: INTERNAL MEDICINE

## 2018-05-09 PROCEDURE — 3078F DIAST BP <80 MM HG: CPT | Mod: CPTII,S$GLB,, | Performed by: INTERNAL MEDICINE

## 2018-05-09 PROCEDURE — 3046F HEMOGLOBIN A1C LEVEL >9.0%: CPT | Mod: CPTII,S$GLB,, | Performed by: INTERNAL MEDICINE

## 2018-05-09 PROCEDURE — 99215 OFFICE O/P EST HI 40 MIN: CPT | Mod: S$GLB,,, | Performed by: INTERNAL MEDICINE

## 2018-05-09 RX ORDER — FUROSEMIDE 40 MG/1
TABLET ORAL
Qty: 180 TABLET | Refills: 3 | Status: SHIPPED | OUTPATIENT
Start: 2018-05-09 | End: 2018-09-06 | Stop reason: SDUPTHER

## 2018-05-09 RX ORDER — SODIUM POLYSTYRENE SULFONATE 15 G/60ML
SUSPENSION ORAL; RECTAL
Refills: 0 | COMMUNITY
Start: 2018-05-02 | End: 2018-09-19 | Stop reason: ALTCHOICE

## 2018-05-09 NOTE — PROGRESS NOTES
Subjective:       Patient ID: Zoey Ham is a 72 y.o. White female who presents for follow-up evaluation of No chief complaint on file.    HPI This is a 72 -year-old  female with longstanding diabetes, hyperparathyroidism, hypertension, and CKD is coming in for followup of these. Her blood pressures are stable at home in the 120's-130's/60-70's . DM is better per the pt.  Her CKD has been stable.  Has proteinuria.  Creatinine stable but low GFR.    PAST MEDICAL HISTORY: Significant for hypertension for 10 years, diabetes   for 10 years, CABG, lacunar infarcts, hyperlipidemia, hyperparathyroidism,   anemia, CABG, CAD, and diabetic retinopathy.         Review of Systems   Constitutional: Positive for appetite change. Negative for fatigue.   Eyes: Negative for discharge.   Respiratory: Negative for cough, shortness of breath and wheezing.    Cardiovascular: Negative for chest pain and palpitations.   Gastrointestinal: Negative for abdominal pain, diarrhea, nausea and vomiting.   Genitourinary: Negative for dysuria, frequency, hematuria and urgency.   Skin: Negative for color change and rash.   Psychiatric/Behavioral: Negative for confusion.   All other systems reviewed and are negative.      Objective:      Physical Exam   Constitutional: She is oriented to person, place, and time. She appears well-developed and well-nourished.   HENT:   Right Ear: External ear normal.   Left Ear: External ear normal.   Nose: Nose normal.   Mouth/Throat: Normal dentition.   Eyes: Conjunctivae, EOM and lids are normal. Pupils are equal, round, and reactive to light.   Neck: Trachea normal. No thyroid mass present.   Cardiovascular: Normal rate and regular rhythm.  Exam reveals no friction rub.    No murmur heard.  Pulmonary/Chest: Effort normal and breath sounds normal. No respiratory distress. She has no decreased breath sounds. She has no rales.   Abdominal: Soft. Bowel sounds are normal. She exhibits no mass. There is  no tenderness. There is no rebound. No hernia.   Musculoskeletal: She exhibits edema.   Trace edema   Neurological: She is alert and oriented to person, place, and time.   Skin: Skin is warm and dry. No rash noted. No erythema.   l neck mole-rec derm f/u.   Psychiatric: She has a normal mood and affect. Judgment normal. Her mood appears not anxious. She does not exhibit a depressed mood.   Oriented to time, place and person.   Vitals reviewed.      Assessment:       No diagnosis found.    Plan:           1.  Renovascular hypertension    2.  Type 2 diabetes mellitus with diabetic nephropathy    3.  Proteinuria    4.  Anemia of chronic renal failure, stage 4 (severe)      Plan:       1. CKD stage 4 with an MDRD GFR of 17 mL/min. Her baseline creatinine is   anywhere from 1.8 to 2.2 with last creatinine of 2.4 with gfr of 18 ml/min. Her CKD is secondary to longstanding hypertension and diabetes.   2. Hypertension. Blood pressures are stable  at home. On cozaar but held for the past few months b/c of hyperkalemia and GFR of 17 ml/min. Unable to get her off metoprolol -tried in the past but HR became a problem per the pt and had to go back on it.    3. Diabetes/proteinuria:  off losartan.   proteinuria likely sec poorly controlled DM.   Does not want biopsy. Losartan has been held for the last few months b/c of low GFR. She likely has  worsening proteinuria with high A1c's and being off losartan.    4. Hyperparathyroidism.  i pth slightly better on   Calcitriol  5times/weekly  Ca/phos stable. Low phos and low potassium diet discussed-lists given.  6. Anemia. H&H is stable.     No uremic symptoms. With persistent hyperkalemia despite low K diet and low GFR-will initiate as soon as fistula is ready to be used. Increase lasix 40 mg to bid and follow low K diet.  Discussed about different options of HD.  Discussed home therapies.   Return in  1  months with rfp, urine protein and urine creatinine, ipth.

## 2018-05-14 ENCOUNTER — HOSPITAL ENCOUNTER (OUTPATIENT)
Dept: RADIOLOGY | Facility: HOSPITAL | Age: 73
Discharge: HOME | End: 2018-05-14
Attending: INTERNAL MEDICINE
Payer: MEDICARE

## 2018-05-14 ENCOUNTER — TELEPHONE (OUTPATIENT)
Dept: INTERNAL MEDICINE | Facility: CLINIC | Age: 73
End: 2018-05-14

## 2018-05-14 ENCOUNTER — OFFICE VISIT (OUTPATIENT)
Dept: INTERNAL MEDICINE | Facility: CLINIC | Age: 73
End: 2018-05-14
Payer: MEDICARE

## 2018-05-14 VITALS
HEIGHT: 64 IN | OXYGEN SATURATION: 97 % | SYSTOLIC BLOOD PRESSURE: 132 MMHG | HEART RATE: 65 BPM | WEIGHT: 179.69 LBS | BODY MASS INDEX: 30.68 KG/M2 | DIASTOLIC BLOOD PRESSURE: 60 MMHG

## 2018-05-14 VITALS — BODY MASS INDEX: 30.56 KG/M2 | HEIGHT: 64 IN | WEIGHT: 179 LBS

## 2018-05-14 DIAGNOSIS — E11.42 DIABETIC POLYNEUROPATHY ASSOCIATED WITH TYPE 2 DIABETES MELLITUS: ICD-10-CM

## 2018-05-14 DIAGNOSIS — I25.708 CORONARY ARTERY DISEASE OF BYPASS GRAFT OF NATIVE HEART WITH STABLE ANGINA PECTORIS: ICD-10-CM

## 2018-05-14 DIAGNOSIS — E11.3553 STABLE PROLIFERATIVE DIABETIC RETINOPATHY OF BOTH EYES ASSOCIATED WITH TYPE 2 DIABETES MELLITUS: ICD-10-CM

## 2018-05-14 DIAGNOSIS — R92.8 ABNORMAL MAMMOGRAM: ICD-10-CM

## 2018-05-14 DIAGNOSIS — I50.42 CHRONIC COMBINED SYSTOLIC AND DIASTOLIC CONGESTIVE HEART FAILURE: ICD-10-CM

## 2018-05-14 DIAGNOSIS — I15.2 HYPERTENSION ASSOCIATED WITH DIABETES: ICD-10-CM

## 2018-05-14 DIAGNOSIS — D69.2 SENILE PURPURA: ICD-10-CM

## 2018-05-14 DIAGNOSIS — Z99.2 HEMODIALYSIS ACCESS, AV GRAFT: ICD-10-CM

## 2018-05-14 DIAGNOSIS — E21.3 HYPERPARATHYROIDISM: ICD-10-CM

## 2018-05-14 DIAGNOSIS — I15.0 RENOVASCULAR HYPERTENSION: ICD-10-CM

## 2018-05-14 DIAGNOSIS — I70.0 ATHEROSCLEROSIS OF AORTA: ICD-10-CM

## 2018-05-14 DIAGNOSIS — E11.59 HYPERTENSION ASSOCIATED WITH DIABETES: ICD-10-CM

## 2018-05-14 DIAGNOSIS — E11.22 CKD STAGE 4 DUE TO TYPE 2 DIABETES MELLITUS: ICD-10-CM

## 2018-05-14 DIAGNOSIS — I25.709 CORONARY ARTERY DISEASE INVOLVING CORONARY BYPASS GRAFT OF NATIVE HEART WITH ANGINA PECTORIS: ICD-10-CM

## 2018-05-14 DIAGNOSIS — N18.4 CKD STAGE 4 DUE TO TYPE 2 DIABETES MELLITUS: ICD-10-CM

## 2018-05-14 PROBLEM — T82.590S: Status: ACTIVE | Noted: 2018-05-14

## 2018-05-14 PROBLEM — I20.0 UNSTABLE ANGINA: Status: RESOLVED | Noted: 2017-02-08 | Resolved: 2018-05-14

## 2018-05-14 PROCEDURE — 77062 BREAST TOMOSYNTHESIS BI: CPT | Mod: 26,,, | Performed by: RADIOLOGY

## 2018-05-14 PROCEDURE — 77062 BREAST TOMOSYNTHESIS BI: CPT | Mod: TC,PO

## 2018-05-14 PROCEDURE — 3046F HEMOGLOBIN A1C LEVEL >9.0%: CPT | Mod: CPTII,S$GLB,, | Performed by: INTERNAL MEDICINE

## 2018-05-14 PROCEDURE — 77066 DX MAMMO INCL CAD BI: CPT | Mod: 26,,, | Performed by: RADIOLOGY

## 2018-05-14 PROCEDURE — 99999 PR PBB SHADOW E&M-EST. PATIENT-LVL III: CPT | Mod: PBBFAC,,, | Performed by: INTERNAL MEDICINE

## 2018-05-14 PROCEDURE — 3078F DIAST BP <80 MM HG: CPT | Mod: CPTII,S$GLB,, | Performed by: INTERNAL MEDICINE

## 2018-05-14 PROCEDURE — 3077F SYST BP >= 140 MM HG: CPT | Mod: CPTII,S$GLB,, | Performed by: INTERNAL MEDICINE

## 2018-05-14 PROCEDURE — 99499 UNLISTED E&M SERVICE: CPT | Mod: S$GLB,,, | Performed by: INTERNAL MEDICINE

## 2018-05-14 PROCEDURE — 99215 OFFICE O/P EST HI 40 MIN: CPT | Mod: S$GLB,,, | Performed by: INTERNAL MEDICINE

## 2018-05-14 RX ORDER — SYRINGE,SAFETY WITH NEEDLE,1ML 25GX1"
1 SYRINGE (EA) MISCELLANEOUS DAILY
Qty: 100 EACH | Refills: 3 | Status: SHIPPED | OUTPATIENT
Start: 2018-05-14 | End: 2018-09-06 | Stop reason: SDUPTHER

## 2018-05-14 RX ORDER — NITROGLYCERIN 400 UG/1
1 SPRAY ORAL EVERY 5 MIN PRN
Qty: 12 G | Refills: 2 | Status: SHIPPED | OUTPATIENT
Start: 2018-05-14 | End: 2018-05-17

## 2018-05-14 RX ORDER — METOPROLOL TARTRATE 100 MG/1
100 TABLET ORAL 2 TIMES DAILY
Qty: 180 TABLET | Refills: 3 | Status: SHIPPED | OUTPATIENT
Start: 2018-05-14 | End: 2018-09-06 | Stop reason: SDUPTHER

## 2018-05-14 RX ORDER — ASPIRIN 325 MG
325 TABLET ORAL DAILY
Qty: 90 TABLET | Refills: 3 | Status: ON HOLD | COMMUNITY
Start: 2018-05-14 | End: 2018-06-12 | Stop reason: HOSPADM

## 2018-05-14 NOTE — ASSESSMENT & PLAN NOTE
Stocking/glove pattern intermittent neuropathy  · DM control   · Annual diabetic foot exam performed  · Defer podiatry consult until acute conditions addressed  · Avoid barefoot walking

## 2018-05-14 NOTE — ASSESSMENT & PLAN NOTE
Ecchymoses and purpura on LE bilterally, on DAPT  · Continue DAPT  · Advised mindfulness of movements

## 2018-05-14 NOTE — ASSESSMENT & PLAN NOTE
AV graft placed on 4/16/18, no palpable thrill (but does have pulse) and bruit acusculated  · PCP messaged [facility]  · Monitor

## 2018-05-14 NOTE — Clinical Note
Could you get this paitent nitroglycerine spray fro chearper than $100? That's the cost at her retail pharmacy.  Script sent today.  Thanks, KJ

## 2018-05-14 NOTE — PATIENT INSTRUCTIONS
TODAY:  - attempt to get nitroglycerine spray at [facility] pharmacy (for cheaper if possible)  - metoprolol 100mg refill and new insulin needles/syringes sent to [facility]  - labs added to today  - mail in fit kit  - PCP messaged Jorge Iglesias and Vicky about AV graft evaluation  - mammogram today  - Optometry follow-up with Dr Horne or Dr Garza (7/2018 on a Mon or Tues)  - PCP messaged Dr Kelley about AV fistula placement  - do not walk barefoot    NEXT:  - podiatry appt

## 2018-05-14 NOTE — Clinical Note
Hi Jorge Iglesias and Vicky, On exam today, Ms Ham' fistula does not have a palpable thrill, but does have an audible bruit by stethoscope. Is an acceptable physical exam finding for her, or is there any additional evaluation that you recommend be performed at this time? She has follow-up with Dr Perry on 5/22/18.  Thanks, SOLEDAD Rosales MD/MPH Internal Medicine [facility] [phone]

## 2018-05-14 NOTE — PROGRESS NOTES
Primary Care Provider Appointment    Subjective:      Patient ID: Zoey Ham is a 72 y.o. female with HTN, DM, CHF, h/o MI, CAD    Chief Complaint: Diabetes and Follow-up    Patient reports having a good Mother's Day.     Patient's diabetes was un-controlled with last A1c 10, has not been checking sugars.    She has worsening renal function, with close follow-up with Renal. She is now preparing for dialysis. Her AV graft fistula was placed, has f/u in 6/2018. Surgical site has no evidence of infection. Her last K was 4.5 on 5/7/18 after taking kayexolate. Her last GFR was 17.9. Her anemia is worsening. She plans to have her dialysis performed at home. She has numerous grandchildren and nieces who will help her perform home dialysis.    Patient's daughter reports that her ASA was increased to 325mg by Cardiology.    Patient with significant healthcare anxiety, experiences stress at doctor's appointments.     She is due for mammo, foot exam, colonoscopy and labs. Eye exam due in 7/2018.    Past Surgical History:   Procedure Laterality Date    APPENDECTOMY      CARDIAC SURGERY  2002    CABG    CHOLECYSTECTOMY      CORONARY ANGIOPLASTY  2004    CORONARY ARTERY BYPASS GRAFT      EYE SURGERY      cataracts bilaterally    FRACTURE SURGERY      right ankle    HYSTERECTOMY      TONSILLECTOMY         Past Medical History:   Diagnosis Date    Acute myocardial infarction of anterolateral wall 7/9/2015    Anemia of chronic renal failure, stage 4 (severe) 1/22/2015    Atherosclerosis of aorta 10/3/2012    Benign hypertension with CKD (chronic kidney disease) stage IV 3/11/2016    Chronic diastolic congestive heart failure 10/3/2012    Chronic kidney disease, stage IV (severe) 7/3/2012    Coronary artery disease     s/p 1v CABG 2002 and multiple PCI (last angiogram in 6/2012 with patent LIMA->LAD and patent LCx and RCA stents)    Diabetic polyneuropathy associated with type 2 diabetes mellitus 7/9/2015     "Diabetic polyneuropathy associated with type 2 diabetes mellitus 7/9/2015    Encounter for blood transfusion     Gastritis without bleeding     Heart attack 09/2012    5-6 events    Hyperlipidemia     Hyperparathyroidism     Nephritis and nephropathy, 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 7/9/2015    NSTEMI (non-ST elevated myocardial infarction)     Occlusion and stenosis of carotid artery with cerebral infarction 7/9/2015    Osteopenia     PAF (paroxysmal atrial fibrillation) 10/3/2012    Pneumonia     Proliferative diabetic retinopathy 10/3/2012    Sepsis due to Escherichia coli with acute renal failure 2/2016    UTI     Type II diabetes mellitus with neurological manifestations 7/9/2015    Type II diabetes mellitus with renal manifestations 7/3/2012       Review of Systems   Constitutional: Positive for fatigue. Negative for activity change, appetite change and unexpected weight change.   Respiratory: Negative for cough and choking.    Cardiovascular: Negative for leg swelling.   Gastrointestinal: Negative for abdominal pain, diarrhea and nausea.   Musculoskeletal: Negative for back pain, gait problem and myalgias.   Neurological: Positive for weakness and numbness. Negative for dizziness, tremors, syncope, facial asymmetry and headaches.   Hematological: Bruises/bleeds easily.   Psychiatric/Behavioral: Positive for confusion. Negative for agitation, behavioral problems and decreased concentration.       Objective:   BP (!) 146/76 (BP Location: Right arm, Patient Position: Sitting, BP Method: Medium (Manual))   Pulse 65   Ht 5' 4" (1.626 m)   Wt 81.5 kg (179 lb 10.8 oz)   LMP  (LMP Unknown)   SpO2 97%   BMI 30.84 kg/m²     Physical Exam   Constitutional: She is oriented to person, place, and time. She appears well-developed and well-nourished.   HENT:   Head: Normocephalic and atraumatic.   Neck: Normal range of motion.   Cardiovascular:   Pulses:       Dorsalis pedis pulses are 2+ on the right " side, and 2+ on the left side.        Posterior tibial pulses are 2+ on the right side, and 2+ on the left side.   New AV graft on L forearm (distant thrill palpated, bruit ausculated)   Abdominal: Soft. Bowel sounds are normal. There is no tenderness. There is no guarding.   Musculoskeletal:        Right foot: There is decreased range of motion and deformity.   Feet:   Right Foot:   Protective Sensation: 10 sites tested. 0 sites sensed.   Skin Integrity: Positive for callus. Negative for ulcer, blister, skin breakdown, erythema, warmth or dry skin.   Left Foot:   Protective Sensation: 10 sites tested. 0 sites sensed.   Skin Integrity: Positive for ulcer, blister, erythema and callus. Negative for skin breakdown or warmth.   Neurological: She is alert and oriented to person, place, and time.   Skin:   Ecchymoses on UE bilaterally   Psychiatric: She has a normal mood and affect. Her behavior is normal. Thought content normal.   Flat affect   Vitals reviewed.      Lab Results   Component Value Date    WBC 11.97 04/10/2018    HGB 10.8 (L) 04/10/2018    HCT 33.1 (L) 04/10/2018     04/10/2018    CHOL 135 12/05/2017    TRIG 220 (H) 12/05/2017    HDL 28 (L) 12/05/2017    ALT 14 12/05/2017    AST 20 12/05/2017     04/30/2018    K 4.5 05/07/2018     04/30/2018    CREATININE 2.6 (H) 04/30/2018    BUN 54 (H) 04/30/2018    CO2 24 04/30/2018    TSH 1.585 02/08/2017    INR 0.9 02/08/2017    GLUF 132 (H) 05/03/2012    HGBA1C 10.2 (H) 12/05/2017         Assessment:   72 y.o. female with multiple co-morbid illnesses here to continue work-up of chronic issues notably HTN, DM, CHF, h/o MI, CAD     Plan:     Problem List Items Addressed This Visit        Neuro    Diabetic polyneuropathy associated with type 2 diabetes mellitus     Stocking/glove pattern intermittent neuropathy  · DM control   · Annual diabetic foot exam performed  · Defer podiatry consult until acute conditions addressed  · Avoid barefoot walking             Ophtho    Proliferative diabetic retinopathy     Eye complaints in 12/17, due for diab eye exam  · appt with optho  · Dr Horne or Dr Garza  · Schedule today         Relevant Orders    Ambulatory Referral to Optometry       Cardiac/Vascular    Hypertension associated with diabetes    Relevant Medications    metoprolol tartrate (LOPRESSOR) 100 MG tablet    insulin syringe-needle U-100 1 mL 31 gauge x 5/16 Syrg    Other Relevant Orders    Hemoglobin A1c    Lipid panel    Atherosclerosis of aorta     Seen on CXR 5/2/2012  · Continue statin, ASA, DM control  · monitor         Coronary artery disease involving coronary bypass graft of native heart with angina pectoris     Unstable angina in 2/2017, LHC and PCI on Lcx and OM1 with improvement. H/o CABG X2 2002, stent RCA, OM1 2003, NSTEMI with total occlusion of LAD in 2012.   · Continue APT (ASA increased to 325mg during recent MI episode)  · F/U Cardiology  · Patient to call if she is not registered for cardiac rehab  · Continue BP control, high-dose statin         Relevant Medications    nitroGLYCERIN 0.4 MG/DOSE TL SPRY (NITROLINGUAL) 400 mcg/spray spray    aspirin 325 MG tablet    metoprolol tartrate (LOPRESSOR) 100 MG tablet    Hemodialysis access, AV graft     AV graft placed on 4/16/18, no palpable thrill (but does have pulse) and bruit acusculated  · PCP messaged [facility]  · Monitor            Renal/    Abnormal mammogram     Calcifications in previous mammo, due for q6 mo diagnostic mammo  · Diagnostic mammo bilateral         Relevant Orders    Mammo Digital Diagnostic Bilateral With CAD       Hematology    Senile purpura     Ecchymoses and purpura on LE bilterally, on DAPT  · Continue DAPT  · Advised mindfulness of movements            Endocrine    CKD stage 4 due to type 2 diabetes mellitus     GFR 17, followed by Nephrology, plan for dialysis  · Being considered for dialysis  · Injecting 20U daily         Relevant Medications     insulin syringe-needle U-100 1 mL 31 gauge x 5/16 Syrg    Other Relevant Orders    Hemoglobin A1c    Lipid panel    Uncontrolled type 2 diabetes mellitus with stage 4 chronic kidney disease, with long-term current use of insulin     Since 12/18/17 to 20U, being considered for dialysis (AV graft placement)  · Continue long-acting 20U  · DF/U Vascular surgery and nephro for AV graft follow-up         Hyperparathyroidism     PTH trending up with worsening renal function   · Monitor  · F/U Renal           Other Visit Diagnoses     Coronary artery disease of bypass graft of native heart with stable angina pectoris        Relevant Medications    nitroGLYCERIN 0.4 MG/DOSE TL SPRY (NITROLINGUAL) 400 mcg/spray spray    aspirin 325 MG tablet    metoprolol tartrate (LOPRESSOR) 100 MG tablet    Chronic combined systolic and diastolic congestive heart failure        Relevant Medications    nitroGLYCERIN 0.4 MG/DOSE TL SPRY (NITROLINGUAL) 400 mcg/spray spray    Renovascular hypertension        Relevant Medications    metoprolol tartrate (LOPRESSOR) 100 MG tablet          Health Maintenance       Date Due Completion Date    Colonoscopy 08/21/1996 --- FIT KIT ALREADY GIVEN    Foot Exam 12/06/2017 12/6/2016- TODAY    Override on 12/6/2016: Done    Mammogram 01/10/2018 7/10/2017- TODAY    Hemoglobin A1c 06/05/2018 12/5/2017- TODAY    Eye Exam 07/06/2018 7/6/2017    Influenza Vaccine 08/01/2018 9/5/2017    Override on 8/29/2016: Done    Override on 10/27/2013: Done    Lipid Panel 12/05/2018 12/5/2017- TODAY    DEXA SCAN 07/13/2019 7/13/2017    TETANUS VACCINE 03/03/2026 3/3/2016          Follow-up in about 3 months (around 8/14/2018). . One hour spent with this patient today, half of that in counseling.    Cesia Rosales MD/MPH  Internal Medicine  [facility]  [phone]

## 2018-05-14 NOTE — ASSESSMENT & PLAN NOTE
Eye complaints in 12/17, due for diab eye exam  · appt with optho  · Dr Horne or Dr Garza  · Schedule today

## 2018-05-14 NOTE — Clinical Note
Kingsley Kelley,  Ms Jitendra had an AV fistula placed due to worsening GFR in preparation for possible dialysis.  Do you want to see her sooner than the planned 6 mo follow-up in 2/2018.  Thanks, SOLEDAD Rosales MD/MPH Internal Medicine [facility] [phone]

## 2018-05-14 NOTE — TELEPHONE ENCOUNTER
Please let patient know that I heard from both Dr Iglesias and Dr Perry regarding my physical exam findings for her arm. They both reassured that the exam was normal, and she should follow-up with Dr Perry in one week with the arm imaging that is already scheduled.    Thanks,  KJ

## 2018-05-14 NOTE — ASSESSMENT & PLAN NOTE
Since 12/18/17 to 20U, being considered for dialysis (AV graft placement)  · Continue long-acting 20U  · DF/U Vascular surgery and nephro for AV graft follow-up

## 2018-05-15 ENCOUNTER — TELEPHONE (OUTPATIENT)
Dept: NEPHROLOGY | Facility: CLINIC | Age: 73
End: 2018-05-15

## 2018-05-15 ENCOUNTER — TELEPHONE (OUTPATIENT)
Dept: INTERNAL MEDICINE | Facility: CLINIC | Age: 73
End: 2018-05-15

## 2018-05-15 DIAGNOSIS — N18.4 CHRONIC KIDNEY DISEASE, STAGE IV (SEVERE): ICD-10-CM

## 2018-05-15 DIAGNOSIS — E87.5 HYPERKALEMIA: Primary | ICD-10-CM

## 2018-05-15 NOTE — TELEPHONE ENCOUNTER
Her labs showed elevated potassium at 5.9. Does she have any kayexolate? She should take some now. If she doesn't, I can send some to the pharmacy for her. I will also let Dr Iglesias know.    Her A1c was elevated at 9 (but that is down from 10.9 in 12/2017).    Please let patient know that mammogram was stable (next due in 1 year).    Thanks,  SOLEDAD

## 2018-05-15 NOTE — TELEPHONE ENCOUNTER
Potassium high at 5.9.  Please have her take kayexalate today and tomorrow and increase lasix to 80 mg in am and 40 mg in pm to help with potassium.  I am getting concerned about her potassium and please let her know that if on repeat, if she still has  high potassium, we likely will need to initiate HD with a dialysis catheter as her fistula can't be used yet.  Con't low K diet.  Please get rfp on Thursday.  Please ask karla to add hepatitis labs to it.

## 2018-05-15 NOTE — TELEPHONE ENCOUNTER
Please let patient know that Dr Kelley knows about her AV fistula for dialysis. If she has any heart symptoms (like chest pain or shortness of breath) or heart failure symptoms (like leg swelling), then she should let us know so that we can get her an earlier appointment with him. Otherwise he is ok with waiting 6 months for her next follow-up.    Please also advise her to keep us posted of any changes.    Thanks,  KJ

## 2018-05-15 NOTE — TELEPHONE ENCOUNTER
Spoke with pt pt understood pt said she's tired and nausea but otherwise great pt wants to know about last blood test MEHNAZ

## 2018-05-17 ENCOUNTER — LAB VISIT (OUTPATIENT)
Dept: LAB | Facility: HOSPITAL | Age: 73
End: 2018-05-17
Payer: MEDICARE

## 2018-05-17 ENCOUNTER — TELEPHONE (OUTPATIENT)
Dept: NEPHROLOGY | Facility: CLINIC | Age: 73
End: 2018-05-17

## 2018-05-17 DIAGNOSIS — I25.708 CORONARY ARTERY DISEASE OF BYPASS GRAFT OF NATIVE HEART WITH STABLE ANGINA PECTORIS: ICD-10-CM

## 2018-05-17 DIAGNOSIS — I25.709 CORONARY ARTERY DISEASE INVOLVING CORONARY BYPASS GRAFT OF NATIVE HEART WITH ANGINA PECTORIS: Primary | ICD-10-CM

## 2018-05-17 DIAGNOSIS — N18.4 CHRONIC KIDNEY DISEASE, STAGE IV (SEVERE): Primary | ICD-10-CM

## 2018-05-17 DIAGNOSIS — I50.42 CHRONIC COMBINED SYSTOLIC AND DIASTOLIC CONGESTIVE HEART FAILURE: ICD-10-CM

## 2018-05-17 DIAGNOSIS — E87.5 HYPERKALEMIA: ICD-10-CM

## 2018-05-17 DIAGNOSIS — N18.4 CHRONIC KIDNEY DISEASE, STAGE IV (SEVERE): ICD-10-CM

## 2018-05-17 LAB
ALBUMIN SERPL BCP-MCNC: 3.3 G/DL
ALBUMIN SERPL BCP-MCNC: 3.3 G/DL
ALP SERPL-CCNC: 124 U/L
ALT SERPL W/O P-5'-P-CCNC: 12 U/L
ANION GAP SERPL CALC-SCNC: 9 MMOL/L
AST SERPL-CCNC: 16 U/L
BILIRUB DIRECT SERPL-MCNC: 0.2 MG/DL
BILIRUB SERPL-MCNC: 0.5 MG/DL
BUN SERPL-MCNC: 51 MG/DL
CALCIUM SERPL-MCNC: 8.9 MG/DL
CHLORIDE SERPL-SCNC: 106 MMOL/L
CO2 SERPL-SCNC: 28 MMOL/L
CREAT SERPL-MCNC: 2.3 MG/DL
EST. GFR  (AFRICAN AMERICAN): 23.9 ML/MIN/1.73 M^2
EST. GFR  (NON AFRICAN AMERICAN): 20.8 ML/MIN/1.73 M^2
GLUCOSE SERPL-MCNC: 177 MG/DL
HBV CORE AB SERPL QL IA: NEGATIVE
HBV SURFACE AB SER-ACNC: POSITIVE M[IU]/ML
HBV SURFACE AG SERPL QL IA: NEGATIVE
HCV AB SERPL QL IA: NEGATIVE
PHOSPHATE SERPL-MCNC: 3.9 MG/DL
POTASSIUM SERPL-SCNC: 4.8 MMOL/L
PROT SERPL-MCNC: 6.7 G/DL
SODIUM SERPL-SCNC: 143 MMOL/L

## 2018-05-17 PROCEDURE — 86704 HEP B CORE ANTIBODY TOTAL: CPT

## 2018-05-17 PROCEDURE — 36415 COLL VENOUS BLD VENIPUNCTURE: CPT

## 2018-05-17 PROCEDURE — 86706 HEP B SURFACE ANTIBODY: CPT

## 2018-05-17 PROCEDURE — 84075 ASSAY ALKALINE PHOSPHATASE: CPT

## 2018-05-17 PROCEDURE — 80069 RENAL FUNCTION PANEL: CPT

## 2018-05-17 PROCEDURE — 86803 HEPATITIS C AB TEST: CPT

## 2018-05-17 PROCEDURE — 87340 HEPATITIS B SURFACE AG IA: CPT

## 2018-05-17 RX ORDER — NITROGLYCERIN 400 UG/1
1 SPRAY ORAL EVERY 5 MIN PRN
Qty: 12 G | Refills: 2 | Status: CANCELLED | OUTPATIENT
Start: 2018-05-17

## 2018-05-17 RX ORDER — NITROGLYCERIN 0.3 MG/1
0.3 TABLET SUBLINGUAL EVERY 5 MIN PRN
Qty: 36 TABLET | Refills: 3 | Status: SHIPPED | OUTPATIENT
Start: 2018-05-17 | End: 2018-08-23 | Stop reason: SDUPTHER

## 2018-05-18 ENCOUNTER — TELEPHONE (OUTPATIENT)
Dept: NEPHROLOGY | Facility: CLINIC | Age: 73
End: 2018-05-18

## 2018-05-22 ENCOUNTER — TELEPHONE (OUTPATIENT)
Dept: NEPHROLOGY | Facility: CLINIC | Age: 73
End: 2018-05-22

## 2018-05-22 ENCOUNTER — OFFICE VISIT (OUTPATIENT)
Dept: VASCULAR SURGERY | Facility: CLINIC | Age: 73
End: 2018-05-22
Payer: MEDICARE

## 2018-05-22 ENCOUNTER — LAB VISIT (OUTPATIENT)
Dept: LAB | Facility: HOSPITAL | Age: 73
End: 2018-05-22
Payer: MEDICARE

## 2018-05-22 ENCOUNTER — HOSPITAL ENCOUNTER (OUTPATIENT)
Dept: VASCULAR SURGERY | Facility: CLINIC | Age: 73
Discharge: HOME | End: 2018-05-22
Attending: SURGERY
Payer: MEDICARE

## 2018-05-22 VITALS
DIASTOLIC BLOOD PRESSURE: 59 MMHG | SYSTOLIC BLOOD PRESSURE: 111 MMHG | HEIGHT: 64 IN | WEIGHT: 171.75 LBS | TEMPERATURE: 98 F | HEART RATE: 63 BPM | BODY MASS INDEX: 29.32 KG/M2

## 2018-05-22 DIAGNOSIS — N18.6 ESRD (END STAGE RENAL DISEASE) ON DIALYSIS: Primary | ICD-10-CM

## 2018-05-22 DIAGNOSIS — N18.4 CHRONIC KIDNEY DISEASE, STAGE IV (SEVERE): ICD-10-CM

## 2018-05-22 DIAGNOSIS — T82.858A STENOSIS OF ARTERIOVENOUS DIALYSIS FISTULA, INITIAL ENCOUNTER: ICD-10-CM

## 2018-05-22 DIAGNOSIS — N18.4 CHRONIC KIDNEY DISEASE, STAGE IV (SEVERE): Primary | ICD-10-CM

## 2018-05-22 DIAGNOSIS — E11.22 CKD STAGE 4 DUE TO TYPE 2 DIABETES MELLITUS: ICD-10-CM

## 2018-05-22 DIAGNOSIS — N18.4 CKD STAGE 4 DUE TO TYPE 2 DIABETES MELLITUS: ICD-10-CM

## 2018-05-22 DIAGNOSIS — Z99.2 ESRD (END STAGE RENAL DISEASE) ON DIALYSIS: Primary | ICD-10-CM

## 2018-05-22 DIAGNOSIS — N18.6 ESRD (END STAGE RENAL DISEASE): ICD-10-CM

## 2018-05-22 LAB
ALBUMIN SERPL BCP-MCNC: 3.7 G/DL
ANION GAP SERPL CALC-SCNC: 10 MMOL/L
BUN SERPL-MCNC: 79 MG/DL
CALCIUM SERPL-MCNC: 9.8 MG/DL
CHLORIDE SERPL-SCNC: 101 MMOL/L
CO2 SERPL-SCNC: 29 MMOL/L
CREAT SERPL-MCNC: 3 MG/DL
EST. GFR  (AFRICAN AMERICAN): 17.4 ML/MIN/1.73 M^2
EST. GFR  (NON AFRICAN AMERICAN): 15.1 ML/MIN/1.73 M^2
GLUCOSE SERPL-MCNC: 161 MG/DL
PHOSPHATE SERPL-MCNC: 4.8 MG/DL
POTASSIUM SERPL-SCNC: 5.2 MMOL/L
SODIUM SERPL-SCNC: 140 MMOL/L

## 2018-05-22 PROCEDURE — 99024 POSTOP FOLLOW-UP VISIT: CPT | Mod: S$GLB,,, | Performed by: SURGERY

## 2018-05-22 PROCEDURE — 99499 UNLISTED E&M SERVICE: CPT | Mod: S$GLB,,, | Performed by: SURGERY

## 2018-05-22 PROCEDURE — 36415 COLL VENOUS BLD VENIPUNCTURE: CPT

## 2018-05-22 PROCEDURE — 93990 DOPPLER FLOW TESTING: CPT | Mod: S$GLB,,, | Performed by: SURGERY

## 2018-05-22 PROCEDURE — 80069 RENAL FUNCTION PANEL: CPT

## 2018-05-22 PROCEDURE — 99999 PR PBB SHADOW E&M-EST. PATIENT-LVL III: CPT | Mod: PBBFAC,,, | Performed by: SURGERY

## 2018-05-22 NOTE — TELEPHONE ENCOUNTER
Please inform the pt that her potassium is better at 5.2 and borderline high. Her creatinine is higher at 3.0 from last time-please ask her to hydrate well and repeat rfp next Tuesday.

## 2018-05-22 NOTE — PROGRESS NOTES
REFERRING PHYSICIAN:  Karla Iglesias D.O.    HISTORY OF PRESENT ILLNESS:  A 72-year-old female with advanced stage IV chronic   kidney disease with a creatinine of 2.8 and GFR of 16.4, who is sent for   dialysis access.  She is right-handed.  She has no history of central venous   cannulation, AICD or pacemaker placement.    Now s/p L brachio-cephalic AVF 4/16/18.  No c/o    PAST MEDICAL HISTORY:  1.  Coronary artery disease, status post MI x5.  Her last PCI was greater than   one year ago.  2.  Hypertension.  3.  Diabetes.  4.  Hyperparathyroidism.  5.  Chronic atrial fibrillation.    PAST SURGICAL HISTORY:  1.  Hysterectomy.  2.  Tonsillectomy.  3.  Appendectomy.  4.  Coronary artery bypass.  5.  PCI.  6.  Fracture surgery.    FAMILY HISTORY:  Positive for diabetes and hypertension.    MEDICATIONS:  Include Plavix, aspirin and statin.  See EPIC for full list.    ALLERGIES:  Penicillin, Percodan and Phenergan.    REVIEW OF SYSTEMS:  Denies postprandial pain or DVT.  All other systems including eyes, ENT, respiratory, musculoskeletal, breast,   neurologic, psychiatric, heme, lymph, allergy and immune are negative.    PHYSICAL EXAMINATION:  VITAL SIGNS:  See nursing notes.  GENERAL:  She is in no acute distress.  RESPIRATORY:  Normal effort.  Clear to claudication.  CARDIOVASCULAR:  Regular rhythm.  Nondisplaced PMI, no murmur.  VASCULAR:  L radial not palp  EXTREMITIES:  Incision well healed.   Very pulsy for first ~4 xm with abrupt transition to very soft thrill  NEUROLOGIC:  Cranial nerves VII through XII intact.  5/5 motor strength in all   extremities.    IMAGING:   Signf AVF stenosis, flow rate 360, diameter 5-6 mm    ASSESSMENT:   Signif AVF stenosis with low flow rates, intervention is needed.    No radial pulse.  There should be just enough length to Rx retrograde with direct stick    RECOMMENDATION:  1.   L fistualgram ( direct stick very proximally) 5/30/18  2. Cath lab case  3. Very dilute contrast (no  yet on HD)    I have explained the risks, benefits and alternatives for this procedure in detail.  The patients voices understanding and all questions have be answered, and agrees to proceed with the procedure.    JULIO CÉSAR Perry III, MD, FACS  Professor and Chief, Vascular and Endovascular Surgery    CC: Karla Iglesias D.O.

## 2018-05-29 ENCOUNTER — TELEPHONE (OUTPATIENT)
Dept: VASCULAR SURGERY | Facility: CLINIC | Age: 73
End: 2018-05-29

## 2018-05-30 ENCOUNTER — TELEPHONE (OUTPATIENT)
Dept: NEPHROLOGY | Facility: CLINIC | Age: 73
End: 2018-05-30

## 2018-05-30 ENCOUNTER — HOSPITAL ENCOUNTER (OUTPATIENT)
Facility: HOSPITAL | Age: 73
Discharge: HOME | End: 2018-05-30
Attending: SURGERY | Admitting: SURGERY
Payer: MEDICARE

## 2018-05-30 VITALS
HEART RATE: 68 BPM | WEIGHT: 171 LBS | DIASTOLIC BLOOD PRESSURE: 64 MMHG | RESPIRATION RATE: 16 BRPM | BODY MASS INDEX: 29.19 KG/M2 | HEIGHT: 64 IN | OXYGEN SATURATION: 98 % | TEMPERATURE: 97 F | SYSTOLIC BLOOD PRESSURE: 139 MMHG

## 2018-05-30 DIAGNOSIS — T82.591A: ICD-10-CM

## 2018-05-30 DIAGNOSIS — Z99.2 DEPENDENCE ON RENAL DIALYSIS: ICD-10-CM

## 2018-05-30 DIAGNOSIS — N18.6 END STAGE RENAL DISEASE: ICD-10-CM

## 2018-05-30 LAB
PERIPHERAL PTA: YES
POCT GLUCOSE: 140 MG/DL (ref 70–110)

## 2018-05-30 PROCEDURE — 63600175 PHARM REV CODE 636 W HCPCS

## 2018-05-30 PROCEDURE — C1894 INTRO/SHEATH, NON-LASER: HCPCS

## 2018-05-30 PROCEDURE — 99152 MOD SED SAME PHYS/QHP 5/>YRS: CPT | Mod: ,,, | Performed by: SURGERY

## 2018-05-30 PROCEDURE — 36902 INTRO CATH DIALYSIS CIRCUIT: CPT | Mod: 78,,, | Performed by: SURGERY

## 2018-05-30 PROCEDURE — 27000014 CATH LAB PROCEDURE

## 2018-05-30 PROCEDURE — 25000003 PHARM REV CODE 250

## 2018-05-30 PROCEDURE — 82962 GLUCOSE BLOOD TEST: CPT

## 2018-05-30 RX ORDER — LIDOCAINE HYDROCHLORIDE 10 MG/ML
1 INJECTION, SOLUTION EPIDURAL; INFILTRATION; INTRACAUDAL; PERINEURAL ONCE
Status: DISCONTINUED | OUTPATIENT
Start: 2018-05-30 | End: 2018-05-30 | Stop reason: HOSPADM

## 2018-05-30 RX ORDER — MUPIROCIN 20 MG/G
OINTMENT TOPICAL
Status: DISCONTINUED | OUTPATIENT
Start: 2018-05-30 | End: 2018-05-30 | Stop reason: HOSPADM

## 2018-05-30 RX ORDER — HYDROCODONE BITARTRATE AND ACETAMINOPHEN 5; 325 MG/1; MG/1
1 TABLET ORAL EVERY 4 HOURS PRN
Status: DISCONTINUED | OUTPATIENT
Start: 2018-05-30 | End: 2018-05-30 | Stop reason: HOSPADM

## 2018-05-30 RX ORDER — METOPROLOL TARTRATE 25 MG/1
25 TABLET, FILM COATED ORAL ONCE
Status: DISCONTINUED | OUTPATIENT
Start: 2018-05-30 | End: 2018-05-30

## 2018-05-30 RX ORDER — HYDRALAZINE HYDROCHLORIDE 20 MG/ML
20 INJECTION INTRAMUSCULAR; INTRAVENOUS EVERY 4 HOURS PRN
Status: DISCONTINUED | OUTPATIENT
Start: 2018-05-30 | End: 2018-05-30 | Stop reason: HOSPADM

## 2018-05-30 NOTE — BRIEF OP NOTE
[facility]  Brief Operative Note     SUMMARY     Surgery Date: 5/30/2018     Surgeon(s) and Role:     * YAMEL Perry III, MD - Primary    Assisting Surgeon: JU lopez DO    Pre-op Diagnosis:   Stenosis, AVF    Post-op Diagnosis:  Same    PROCEDURES:    1. PTA, L AVF (6x20)  2. Fistulagram  3. Conscious Sedation      Anesthesia: RN IV Sedation    Description of the findings of the procedure: focal >90% stenosis, <10% residual with marked improvement in thrill    Findings/Key Components: as above; 6 cc visipaque    Estimated Blood Loss: <3cc         Specimens:   Specimen (12h ago through future)    None          Discharge Note    SUMMARY     Admit Date: 5/30/2018    Discharge Date and Time: 5/30/18    Hospital Course (synopsis of major diagnoses, care, treatment, and services provided during the course of the hospital stay): successful outpatient procedure    Final Diagnosis:  Stenosis, AVF    Disposition: home    Follow Up/Patient Instructions: Diet: renal  Act: ad marissa  FU: 5 days with QUANTITATIVE AVF duplex     Medications: pre-op

## 2018-05-30 NOTE — TELEPHONE ENCOUNTER
Potassium stable at 5.1 and creatinine slightly better at 2.7. Please have her get labs for the f/u appointment 2 weeks from now.

## 2018-05-30 NOTE — PROGRESS NOTES
Pt is AAOx4 and denies pain.  VSS.  Family member called to bedside.  Post procedure protocol reviewed.  Dr. Sanchez cleared pt for discharge.  Will continue to monitor.

## 2018-05-30 NOTE — INTERVAL H&P NOTE
The patient has been examined and the H&P has been reviewed:    I have personally examined the patient, and there are no changes since the H and P was written.    Anesthesia/Surgery risks, benefits and alternative options discussed and understood by patient/family.          Active Hospital Problems    Diagnosis  POA    AV shunt malfunction [T82.934B]  Yes      Resolved Hospital Problems    Diagnosis Date Resolved POA   No resolved problems to display.

## 2018-05-30 NOTE — OP NOTE
[facility]  Vascular Surgery  Operative Note    SUMMARY     Date of Procedure: 5/30/2018     Procedure:  Left upper extremity fistulogram    PTA proximal AVF 6x20     Conscious sedation, 30 min       Surgeon(s) and Role:     * YAMEL Perry III, MD - Primary        Chelle Sanchez DO- Fellow    Assisting Surgeon: None    Pre-Operative Diagnosis: ESRD (end stage renal disease) on dialysis [N18.6, Z99.2]; high grade AV stenosis    Post-Operative Diagnosis: same    Anesthesia: RN IV Sedation    Indication for operation: 71 yo F with h/o CKD stage V not yet on HD with failure to mature L BC AVF with evidence of high grade stenosis in proximal AVF.       Description of the Findings of the Procedure: focal >90% stenosis, <10% residual with marked improvement in thrill    Complications: No    Estimated Blood Loss (EBL):2 mL           Implants: * No implants in log *    Specimens:   Specimen (12h ago through future)    None                  Condition: Good    Disposition: PACU - hemodynamically stable.     Operation in detail: After an informed consent was obtained the patient was taken to the cath suite and placed in the supine position.  The left  arm was prepped and draped in the standard surgical fashion.  The very proximal L BC AVF was accessed with micropuncture needle and wire followed by micropuncture sheath.  Fistulogram was performed demonstrating >90% stenosis of the proximal AVF.  Decision made to intervene.  A stiff angled Glidewire was used to cross the stenosis, sheath was exchanged for a 5 [place] Islander short sheath.  A 6x20 Ultraverse was introduced across the stenosis and inflated with resolution of waste noted.  Follow up fistulogram demonstrated resolution of stenosis.   Central venogram was performed demonstrating no cephalic confluence nor central stenosis.  A U stitch of 3-0 Monocryl was placed around sheath and sheath was removed with good hemostasis noted.  Sterile dressing was  applied and patient was taken to PACU in stable condition.    Attending staff continuously monitored the cardio-respiratory systems throughout the procedure.  See nursing notes for dosing of fentanyl and versed.  30 minutes of conscious sedation.

## 2018-05-31 LAB — POCT GLUCOSE: 90 MG/DL (ref 70–110)

## 2018-06-04 ENCOUNTER — HOSPITAL ENCOUNTER (INPATIENT)
Facility: HOSPITAL | Age: 73
LOS: 8 days | Discharge: HOME | DRG: 246 | End: 2018-06-12
Attending: EMERGENCY MEDICINE | Admitting: HOSPITALIST
Payer: MEDICARE

## 2018-06-04 ENCOUNTER — HOSPITAL ENCOUNTER (EMERGENCY)
Facility: HOSPITAL | Age: 73
Discharge: ANOTHER HOSPITAL | End: 2018-06-04
Attending: INTERNAL MEDICINE
Payer: MEDICARE

## 2018-06-04 VITALS
TEMPERATURE: 98 F | HEART RATE: 71 BPM | SYSTOLIC BLOOD PRESSURE: 148 MMHG | OXYGEN SATURATION: 100 % | HEIGHT: 64 IN | RESPIRATION RATE: 18 BRPM | WEIGHT: 170 LBS | DIASTOLIC BLOOD PRESSURE: 65 MMHG | BODY MASS INDEX: 29.02 KG/M2

## 2018-06-04 DIAGNOSIS — I10 ESSENTIAL (PRIMARY) HYPERTENSION: ICD-10-CM

## 2018-06-04 DIAGNOSIS — N18.5 ACUTE RENAL FAILURE WITH ACUTE TUBULAR NECROSIS SUPERIMPOSED ON STAGE 5 CHRONIC KIDNEY DISEASE, NOT ON CHRONIC DIALYSIS: ICD-10-CM

## 2018-06-04 DIAGNOSIS — N18.6 ESRD (END STAGE RENAL DISEASE): ICD-10-CM

## 2018-06-04 DIAGNOSIS — D62 ACUTE BLOOD LOSS ANEMIA: ICD-10-CM

## 2018-06-04 DIAGNOSIS — N18.5 CKD (CHRONIC KIDNEY DISEASE), STAGE V: ICD-10-CM

## 2018-06-04 DIAGNOSIS — E11.59 HYPERTENSION ASSOCIATED WITH DIABETES: Primary | ICD-10-CM

## 2018-06-04 DIAGNOSIS — I50.30 (HFPEF) HEART FAILURE WITH PRESERVED EJECTION FRACTION: ICD-10-CM

## 2018-06-04 DIAGNOSIS — N17.9 AKI (ACUTE KIDNEY INJURY): ICD-10-CM

## 2018-06-04 DIAGNOSIS — R07.9 CHEST PAIN: ICD-10-CM

## 2018-06-04 DIAGNOSIS — E87.71 TACO (TRANSFUSION ASSOCIATED CIRCULATORY OVERLOAD): ICD-10-CM

## 2018-06-04 DIAGNOSIS — I50.33 ACUTE ON CHRONIC DIASTOLIC HEART FAILURE: ICD-10-CM

## 2018-06-04 DIAGNOSIS — I21.4 NSTEMI (NON-ST ELEVATED MYOCARDIAL INFARCTION): ICD-10-CM

## 2018-06-04 DIAGNOSIS — J96.01 ACUTE RESPIRATORY FAILURE WITH HYPOXIA: ICD-10-CM

## 2018-06-04 DIAGNOSIS — J96.01 ACUTE HYPOXEMIC RESPIRATORY FAILURE: ICD-10-CM

## 2018-06-04 DIAGNOSIS — I24.9 ACS (ACUTE CORONARY SYNDROME): Primary | ICD-10-CM

## 2018-06-04 DIAGNOSIS — E11.21 CONTROLLED TYPE 2 DIABETES MELLITUS WITH DIABETIC NEPHROPATHY, UNSPECIFIED WHETHER LONG TERM INSULIN USE: ICD-10-CM

## 2018-06-04 DIAGNOSIS — Z98.61 POSTSURGICAL PERCUTANEOUS TRANSLUMINAL CORONARY ANGIOPLASTY STATUS: Primary | ICD-10-CM

## 2018-06-04 DIAGNOSIS — I21.4 NON-ST ELEVATION MYOCARDIAL INFARCTION (NSTEMI): ICD-10-CM

## 2018-06-04 DIAGNOSIS — I25.700 CORONARY ARTERY DISEASE INVOLVING CORONARY BYPASS GRAFT OF NATIVE HEART WITH UNSTABLE ANGINA PECTORIS: ICD-10-CM

## 2018-06-04 DIAGNOSIS — N17.0 ACUTE RENAL FAILURE WITH ACUTE TUBULAR NECROSIS SUPERIMPOSED ON STAGE 5 CHRONIC KIDNEY DISEASE, NOT ON CHRONIC DIALYSIS: ICD-10-CM

## 2018-06-04 DIAGNOSIS — I15.2 HYPERTENSION ASSOCIATED WITH DIABETES: Primary | ICD-10-CM

## 2018-06-04 LAB
ABO + RH BLD: NORMAL
ALBUMIN SERPL BCP-MCNC: 3.6 G/DL
ALP SERPL-CCNC: 164 U/L
ALT SERPL W/O P-5'-P-CCNC: 19 U/L
ANION GAP SERPL CALC-SCNC: 14 MMOL/L
APTT BLDCRRT: 24.7 SEC
AST SERPL-CCNC: 18 U/L
BASOPHILS # BLD AUTO: 0.05 K/UL
BASOPHILS NFR BLD: 0.4 %
BILIRUB SERPL-MCNC: 0.3 MG/DL
BLD GP AB SCN CELLS X3 SERPL QL: NORMAL
BNP SERPL-MCNC: 1656 PG/ML
BUN SERPL-MCNC: 80 MG/DL
CALCIUM SERPL-MCNC: 8.5 MG/DL
CHLORIDE SERPL-SCNC: 104 MMOL/L
CO2 SERPL-SCNC: 22 MMOL/L
CORONARY STENOSIS: ABNORMAL
CORONARY STENT: YES
CREAT SERPL-MCNC: 3.2 MG/DL
D DIMER PPP IA.FEU-MCNC: 1 MG/L FEU
DIFFERENTIAL METHOD: ABNORMAL
EOSINOPHIL # BLD AUTO: 0.4 K/UL
EOSINOPHIL NFR BLD: 3 %
ERYTHROCYTE [DISTWIDTH] IN BLOOD BY AUTOMATED COUNT: 13.3 %
EST. GFR  (AFRICAN AMERICAN): 16 ML/MIN/1.73 M^2
EST. GFR  (NON AFRICAN AMERICAN): 14 ML/MIN/1.73 M^2
FACT X PPP CHRO-ACNC: >1.86 IU/ML
GLUCOSE SERPL-MCNC: 290 MG/DL
HCT VFR BLD AUTO: 31.5 %
HGB BLD-MCNC: 10 G/DL
INR PPP: 0.9
LYMPHOCYTES # BLD AUTO: 2.2 K/UL
LYMPHOCYTES NFR BLD: 17.3 %
MCH RBC QN AUTO: 30.5 PG
MCHC RBC AUTO-ENTMCNC: 31.7 G/DL
MCV RBC AUTO: 96 FL
MONOCYTES # BLD AUTO: 0.8 K/UL
MONOCYTES NFR BLD: 6.2 %
NEUTROPHILS # BLD AUTO: 9.4 K/UL
NEUTROPHILS NFR BLD: 73.1 %
PLATELET # BLD AUTO: 273 K/UL
PMV BLD AUTO: 11.7 FL
POCT GLUCOSE: 113 MG/DL (ref 70–110)
POCT GLUCOSE: 114 MG/DL (ref 70–110)
POTASSIUM SERPL-SCNC: 4.9 MMOL/L
PROT SERPL-MCNC: 6.9 G/DL
PROTHROMBIN TIME: 9.5 SEC
RBC # BLD AUTO: 3.28 M/UL
SODIUM SERPL-SCNC: 140 MMOL/L
TROPONIN I SERPL DL<=0.01 NG/ML-MCNC: 0.09 NG/ML
TROPONIN I SERPL DL<=0.01 NG/ML-MCNC: 0.68 NG/ML
TROPONIN I SERPL DL<=0.01 NG/ML-MCNC: 2.9 NG/ML
TROPONIN I SERPL DL<=0.01 NG/ML-MCNC: 3.73 NG/ML
WBC # BLD AUTO: 12.84 K/UL

## 2018-06-04 PROCEDURE — 36415 COLL VENOUS BLD VENIPUNCTURE: CPT

## 2018-06-04 PROCEDURE — B2111ZZ FLUOROSCOPY OF MULTIPLE CORONARY ARTERIES USING LOW OSMOLAR CONTRAST: ICD-10-PCS | Performed by: INTERNAL MEDICINE

## 2018-06-04 PROCEDURE — 25000003 PHARM REV CODE 250: Performed by: EMERGENCY MEDICINE

## 2018-06-04 PROCEDURE — 63600175 PHARM REV CODE 636 W HCPCS

## 2018-06-04 PROCEDURE — 25000003 PHARM REV CODE 250: Performed by: INTERNAL MEDICINE

## 2018-06-04 PROCEDURE — 86920 COMPATIBILITY TEST SPIN: CPT

## 2018-06-04 PROCEDURE — 25000003 PHARM REV CODE 250

## 2018-06-04 PROCEDURE — 25000003 PHARM REV CODE 250: Performed by: HOSPITALIST

## 2018-06-04 PROCEDURE — 027034Z DILATION OF CORONARY ARTERY, ONE ARTERY WITH DRUG-ELUTING INTRALUMINAL DEVICE, PERCUTANEOUS APPROACH: ICD-10-PCS | Performed by: INTERNAL MEDICINE

## 2018-06-04 PROCEDURE — 93005 ELECTROCARDIOGRAM TRACING: CPT

## 2018-06-04 PROCEDURE — 63600175 PHARM REV CODE 636 W HCPCS: Performed by: HOSPITALIST

## 2018-06-04 PROCEDURE — 27000014 CATH LAB PROCEDURE

## 2018-06-04 PROCEDURE — S0077 INJECTION, CLINDAMYCIN PHOSP: HCPCS

## 2018-06-04 PROCEDURE — C9607 PERC D-E COR REVASC CHRO SIN: HCPCS | Mod: LC

## 2018-06-04 PROCEDURE — 84484 ASSAY OF TROPONIN QUANT: CPT | Mod: 91

## 2018-06-04 PROCEDURE — 85379 FIBRIN DEGRADATION QUANT: CPT

## 2018-06-04 PROCEDURE — 83880 ASSAY OF NATRIURETIC PEPTIDE: CPT

## 2018-06-04 PROCEDURE — 96374 THER/PROPH/DIAG INJ IV PUSH: CPT

## 2018-06-04 PROCEDURE — 63600175 PHARM REV CODE 636 W HCPCS: Performed by: INTERNAL MEDICINE

## 2018-06-04 PROCEDURE — 93455 CORONARY ART/GRFT ANGIO S&I: CPT | Mod: 26,59,, | Performed by: INTERNAL MEDICINE

## 2018-06-04 PROCEDURE — 96376 TX/PRO/DX INJ SAME DRUG ADON: CPT

## 2018-06-04 PROCEDURE — 99285 EMERGENCY DEPT VISIT HI MDM: CPT | Mod: 25

## 2018-06-04 PROCEDURE — 84484 ASSAY OF TROPONIN QUANT: CPT

## 2018-06-04 PROCEDURE — 92943 PRQ TRLUML REVSC CH OCC ANT: CPT | Mod: LC,,, | Performed by: INTERNAL MEDICINE

## 2018-06-04 PROCEDURE — 99291 CRITICAL CARE FIRST HOUR: CPT | Mod: 25

## 2018-06-04 PROCEDURE — 93010 ELECTROCARDIOGRAM REPORT: CPT | Mod: ,,, | Performed by: INTERNAL MEDICINE

## 2018-06-04 PROCEDURE — 86850 RBC ANTIBODY SCREEN: CPT

## 2018-06-04 PROCEDURE — 99152 MOD SED SAME PHYS/QHP 5/>YRS: CPT | Mod: ,,, | Performed by: INTERNAL MEDICINE

## 2018-06-04 PROCEDURE — 85610 PROTHROMBIN TIME: CPT

## 2018-06-04 PROCEDURE — 20600001 HC STEP DOWN PRIVATE ROOM

## 2018-06-04 PROCEDURE — 99221 1ST HOSP IP/OBS SF/LOW 40: CPT | Mod: ,,, | Performed by: RADIOLOGY

## 2018-06-04 PROCEDURE — 85730 THROMBOPLASTIN TIME PARTIAL: CPT

## 2018-06-04 PROCEDURE — 99223 1ST HOSP IP/OBS HIGH 75: CPT | Mod: AI,GC,, | Performed by: HOSPITALIST

## 2018-06-04 PROCEDURE — 99291 CRITICAL CARE FIRST HOUR: CPT | Mod: ,,, | Performed by: EMERGENCY MEDICINE

## 2018-06-04 PROCEDURE — 96375 TX/PRO/DX INJ NEW DRUG ADDON: CPT

## 2018-06-04 PROCEDURE — 85520 HEPARIN ASSAY: CPT

## 2018-06-04 PROCEDURE — 80053 COMPREHEN METABOLIC PANEL: CPT

## 2018-06-04 PROCEDURE — 27000221 HC OXYGEN, UP TO 24 HOURS

## 2018-06-04 PROCEDURE — 85025 COMPLETE CBC W/AUTO DIFF WBC: CPT

## 2018-06-04 PROCEDURE — B2131ZZ FLUOROSCOPY OF MULTIPLE CORONARY ARTERY BYPASS GRAFTS USING LOW OSMOLAR CONTRAST: ICD-10-PCS | Performed by: INTERNAL MEDICINE

## 2018-06-04 PROCEDURE — 94760 N-INVAS EAR/PLS OXIMETRY 1: CPT

## 2018-06-04 RX ORDER — FUROSEMIDE 40 MG/1
40 TABLET ORAL 2 TIMES DAILY
Status: DISCONTINUED | OUTPATIENT
Start: 2018-06-04 | End: 2018-06-04

## 2018-06-04 RX ORDER — ONDANSETRON 8 MG/1
8 TABLET, ORALLY DISINTEGRATING ORAL
Status: COMPLETED | OUTPATIENT
Start: 2018-06-04 | End: 2018-06-04

## 2018-06-04 RX ORDER — ONDANSETRON 4 MG/1
4 TABLET, ORALLY DISINTEGRATING ORAL
Status: COMPLETED | OUTPATIENT
Start: 2018-06-04 | End: 2018-06-04

## 2018-06-04 RX ORDER — PANTOPRAZOLE SODIUM 40 MG/1
40 TABLET, DELAYED RELEASE ORAL DAILY
Status: DISCONTINUED | OUTPATIENT
Start: 2018-06-04 | End: 2018-06-12 | Stop reason: HOSPADM

## 2018-06-04 RX ORDER — NITROGLYCERIN 0.3 MG/1
0.3 TABLET SUBLINGUAL EVERY 5 MIN PRN
Status: DISCONTINUED | OUTPATIENT
Start: 2018-06-04 | End: 2018-06-12 | Stop reason: HOSPADM

## 2018-06-04 RX ORDER — CLOPIDOGREL BISULFATE 75 MG/1
75 TABLET ORAL DAILY
Status: DISCONTINUED | OUTPATIENT
Start: 2018-06-04 | End: 2018-06-05

## 2018-06-04 RX ORDER — IBUPROFEN 200 MG
24 TABLET ORAL
Status: DISCONTINUED | OUTPATIENT
Start: 2018-06-04 | End: 2018-06-12 | Stop reason: HOSPADM

## 2018-06-04 RX ORDER — FUROSEMIDE 10 MG/ML
80 INJECTION INTRAMUSCULAR; INTRAVENOUS ONCE
Status: COMPLETED | OUTPATIENT
Start: 2018-06-04 | End: 2018-06-04

## 2018-06-04 RX ORDER — ATORVASTATIN CALCIUM 20 MG/1
80 TABLET, FILM COATED ORAL DAILY
Status: DISCONTINUED | OUTPATIENT
Start: 2018-06-04 | End: 2018-06-12 | Stop reason: HOSPADM

## 2018-06-04 RX ORDER — SODIUM CHLORIDE 9 MG/ML
20 INJECTION, SOLUTION INTRAVENOUS CONTINUOUS
Status: ACTIVE | OUTPATIENT
Start: 2018-06-04 | End: 2018-06-05

## 2018-06-04 RX ORDER — MORPHINE SULFATE 4 MG/ML
2 INJECTION, SOLUTION INTRAMUSCULAR; INTRAVENOUS
Status: COMPLETED | OUTPATIENT
Start: 2018-06-04 | End: 2018-06-04

## 2018-06-04 RX ORDER — DIPHENHYDRAMINE HCL 50 MG
50 CAPSULE ORAL ONCE
Status: DISCONTINUED | OUTPATIENT
Start: 2018-06-04 | End: 2018-06-04 | Stop reason: HOSPADM

## 2018-06-04 RX ORDER — ISOSORBIDE MONONITRATE 30 MG/1
30 TABLET, EXTENDED RELEASE ORAL DAILY
Status: DISCONTINUED | OUTPATIENT
Start: 2018-06-04 | End: 2018-06-06

## 2018-06-04 RX ORDER — HEPARIN SODIUM,PORCINE/D5W 25000/250
16 INTRAVENOUS SOLUTION INTRAVENOUS CONTINUOUS
Status: DISCONTINUED | OUTPATIENT
Start: 2018-06-04 | End: 2018-06-04 | Stop reason: HOSPADM

## 2018-06-04 RX ORDER — METOPROLOL TARTRATE 100 MG/1
100 TABLET ORAL 2 TIMES DAILY
Status: DISCONTINUED | OUTPATIENT
Start: 2018-06-04 | End: 2018-06-12 | Stop reason: HOSPADM

## 2018-06-04 RX ORDER — ONDANSETRON 8 MG/1
8 TABLET, ORALLY DISINTEGRATING ORAL EVERY 8 HOURS PRN
Status: DISCONTINUED | OUTPATIENT
Start: 2018-06-04 | End: 2018-06-12 | Stop reason: HOSPADM

## 2018-06-04 RX ORDER — HYDROCODONE BITARTRATE AND ACETAMINOPHEN 5; 325 MG/1; MG/1
1 TABLET ORAL EVERY 6 HOURS PRN
Status: DISCONTINUED | OUTPATIENT
Start: 2018-06-04 | End: 2018-06-12 | Stop reason: HOSPADM

## 2018-06-04 RX ORDER — CALCITRIOL 0.25 UG/1
0.25 CAPSULE ORAL DAILY
Status: DISCONTINUED | OUTPATIENT
Start: 2018-06-05 | End: 2018-06-12 | Stop reason: HOSPADM

## 2018-06-04 RX ORDER — IBUPROFEN 200 MG
16 TABLET ORAL
Status: DISCONTINUED | OUTPATIENT
Start: 2018-06-04 | End: 2018-06-12 | Stop reason: HOSPADM

## 2018-06-04 RX ORDER — NAPROXEN SODIUM 220 MG/1
81 TABLET, FILM COATED ORAL DAILY
Status: DISCONTINUED | OUTPATIENT
Start: 2018-06-05 | End: 2018-06-04

## 2018-06-04 RX ORDER — ATORVASTATIN CALCIUM 20 MG/1
80 TABLET, FILM COATED ORAL DAILY
Status: DISCONTINUED | OUTPATIENT
Start: 2018-06-05 | End: 2018-06-04

## 2018-06-04 RX ORDER — HEPARIN SODIUM,PORCINE/D5W 25000/250
17 INTRAVENOUS SOLUTION INTRAVENOUS CONTINUOUS
Status: DISCONTINUED | OUTPATIENT
Start: 2018-06-04 | End: 2018-06-04

## 2018-06-04 RX ORDER — ONDANSETRON 2 MG/ML
4 INJECTION INTRAMUSCULAR; INTRAVENOUS ONCE
Status: DISCONTINUED | OUTPATIENT
Start: 2018-06-04 | End: 2018-06-04

## 2018-06-04 RX ORDER — INSULIN ASPART 100 [IU]/ML
0-5 INJECTION, SOLUTION INTRAVENOUS; SUBCUTANEOUS
Status: DISCONTINUED | OUTPATIENT
Start: 2018-06-04 | End: 2018-06-12 | Stop reason: HOSPADM

## 2018-06-04 RX ORDER — CLOPIDOGREL BISULFATE 75 MG/1
75 TABLET ORAL DAILY
Status: DISCONTINUED | OUTPATIENT
Start: 2018-06-05 | End: 2018-06-04

## 2018-06-04 RX ORDER — SODIUM CHLORIDE 9 MG/ML
3 INJECTION, SOLUTION INTRAVENOUS CONTINUOUS
Status: DISCONTINUED | OUTPATIENT
Start: 2018-06-04 | End: 2018-06-04

## 2018-06-04 RX ORDER — CLOPIDOGREL 300 MG/1
300 TABLET, FILM COATED ORAL ONCE
Status: COMPLETED | OUTPATIENT
Start: 2018-06-04 | End: 2018-06-04

## 2018-06-04 RX ORDER — MORPHINE SULFATE 4 MG/ML
2 INJECTION, SOLUTION INTRAMUSCULAR; INTRAVENOUS
Status: DISCONTINUED | OUTPATIENT
Start: 2018-06-04 | End: 2018-06-04

## 2018-06-04 RX ORDER — SODIUM CHLORIDE 9 MG/ML
20 INJECTION, SOLUTION INTRAVENOUS CONTINUOUS
Status: DISCONTINUED | OUTPATIENT
Start: 2018-06-04 | End: 2018-06-04

## 2018-06-04 RX ORDER — ASPIRIN 325 MG
325 TABLET ORAL DAILY
Status: DISCONTINUED | OUTPATIENT
Start: 2018-06-05 | End: 2018-06-05

## 2018-06-04 RX ORDER — GLUCAGON 1 MG
1 KIT INJECTION
Status: DISCONTINUED | OUTPATIENT
Start: 2018-06-04 | End: 2018-06-12 | Stop reason: HOSPADM

## 2018-06-04 RX ADMIN — ONDANSETRON 4 MG: 4 TABLET, ORALLY DISINTEGRATING ORAL at 03:06

## 2018-06-04 RX ADMIN — CLOPIDOGREL 75 MG: 75 TABLET, FILM COATED ORAL at 10:06

## 2018-06-04 RX ADMIN — ONDANSETRON 8 MG: 8 TABLET, ORALLY DISINTEGRATING ORAL at 12:06

## 2018-06-04 RX ADMIN — ATORVASTATIN CALCIUM 80 MG: 20 TABLET, FILM COATED ORAL at 10:06

## 2018-06-04 RX ADMIN — SODIUM CHLORIDE 1000 ML: 0.9 INJECTION, SOLUTION INTRAVENOUS at 05:06

## 2018-06-04 RX ADMIN — SODIUM CHLORIDE 1000 ML: 0.9 INJECTION, SOLUTION INTRAVENOUS at 03:06

## 2018-06-04 RX ADMIN — CLOPIDOGREL BISULFATE 300 MG: 300 TABLET, FILM COATED ORAL at 12:06

## 2018-06-04 RX ADMIN — HEPARIN SODIUM AND DEXTROSE 16 UNITS/KG/HR: 10000; 5 INJECTION INTRAVENOUS at 05:06

## 2018-06-04 RX ADMIN — PANTOPRAZOLE SODIUM 40 MG: 40 TABLET, DELAYED RELEASE ORAL at 12:06

## 2018-06-04 RX ADMIN — MORPHINE SULFATE 2 MG: 4 INJECTION INTRAVENOUS at 05:06

## 2018-06-04 RX ADMIN — ONDANSETRON 4 MG: 4 TABLET, ORALLY DISINTEGRATING ORAL at 05:06

## 2018-06-04 RX ADMIN — METOPROLOL TARTRATE 100 MG: 100 TABLET ORAL at 09:06

## 2018-06-04 RX ADMIN — ISOSORBIDE MONONITRATE 30 MG: 30 TABLET, EXTENDED RELEASE ORAL at 10:06

## 2018-06-04 RX ADMIN — MORPHINE SULFATE 2 MG: 4 INJECTION INTRAVENOUS at 03:06

## 2018-06-04 RX ADMIN — FUROSEMIDE 80 MG: 10 INJECTION, SOLUTION INTRAMUSCULAR; INTRAVENOUS at 11:06

## 2018-06-04 RX ADMIN — INSULIN DETEMIR 20 UNITS: 100 INJECTION, SOLUTION SUBCUTANEOUS at 09:06

## 2018-06-04 NOTE — PROCEDURES
"     Post Cath Note  Referring Physician: No att. providers found  Procedure: Left heart cath (Left)       Access: Right CFA    Patent LIMA -> LAD, ostial LAD , dRCA 70% disease (unchanged since last PCI) and new  of the mLCx/ostial OM likely culprit vessel (new finding since last angio).    See full report for further details    Intervention:   Successful PTAS of the mLCx/OM. Lesion crossed with Fielder XT and OTW balloon 2.5 x 20. Swapped with Support wire and PTA with 2.5 NC baloon. Stented with 3 mm BETHANY.     Closure device: angioseal.    Post Cath Exam:   BP (!) 114/55   Pulse 73   Temp 97.7 °F (36.5 °C) (Oral)   Resp 18   Ht 5' 4.1" (1.628 m)   Wt 77.1 kg (169 lb 15.6 oz)   LMP  (LMP Unknown)   SpO2 96%   Breastfeeding? No   BMI 29.09 kg/m²   No unusual pain, hematoma, thrill or bruit at vascular access site.  Distal pulse present without signs of ischemia.    Recommendations:     Patient Active Problem List   Diagnosis    Hypertension associated with diabetes    Proliferative diabetic retinopathy    Atherosclerosis of aorta    Anemia of chronic renal failure, stage 4 (severe)    Diabetic polyneuropathy associated with type 2 diabetes mellitus    CKD stage 4 due to type 2 diabetes mellitus    Uncontrolled type 2 diabetes mellitus with stage 4 chronic kidney disease, with long-term current use of insulin    Obesity (BMI 30.0-34.9)    Hyperparathyroidism    Chronic atrial fibrillation    Coronary artery disease involving coronary bypass graft of native heart with unstable angina pectoris    Senile purpura    Osteopenia of multiple sites    Social isolation    Type 2 diabetes mellitus with hyperlipidemia    History of non-ST elevation myocardial infarction (NSTEMI)    S/P drug eluting coronary stent placement    Acute on chronic diastolic heart failure    Acute gastritis without hemorrhage    Breast calcification, left    Hypovitaminosis D    Wrist pain, acute, unspecified " laterality    ESRD (end stage renal disease)    Preop examination    Abnormal mammogram    Hemodialysis access, AV graft    Stenosis of arteriovenous dialysis fistula    AV shunt malfunction    NSTEMI (non-ST elevated myocardial infarction)    CKD (chronic kidney disease), stage V     Unstable Angina   - Routine post-cath care  - IVF at 125 cc/hr for 8 hrs  - Cardiac rehab referral, Continue medical management, Risk factor reduction, Plavix for at least 1 year and ASA 81 mg indefinitely   - Continue high intensity statin theapy  - Continue Imdur 30 mg po daily  and Lopresor 100 mg BID as antianginal therapy.    Signed:  Mat Andre MD  Cardiology Fellow, PGY-7  Pager: [phone]  6/4/2018 3:32 PM

## 2018-06-04 NOTE — ED NOTES
PATIENT ESCORTED TO CATH LAB WAITING AREA BY THIS RN ON CONT CARDIAC MONITOR, CONT PULSE OX, AND AUTO BP CUFF. RADIOLUCENT EKG LEADS, WIRES, AND DEFIB PADS APPLIED TO PT PRIOR TO TRANSPORT. CARE HANDOFF TO ED IN CATH LAB.

## 2018-06-04 NOTE — PLAN OF CARE
Advised RN to hold sending patient for any type of stress test. Spoke with NM, patient not presently on schedule. No stress test indicated at this time. Full H&P to follow.

## 2018-06-04 NOTE — NURSING TRANSFER
Nursing Transfer Note      6/4/2018     Transfer From: Cath lab    Transfer via stretcher    Transfer with cardiac monitoring    Transported by RN    Medicines sent: none    Chart send with patient: Yes

## 2018-06-04 NOTE — H&P
HISTORY & PHYSICAL  Hospital Medicine    Team: [facility] [place] [facility]OSP MED C    PRESENTING HISTORY     Chief Complaint/Reason for Admission:  Evaluation of NSTEMI    History of Present Illness:  72F with hx of DM2 uncontrolled (a1c 9.0) with nephropathy CKD V with LUE Brachiocephalic fistula created 4/16/18 s/p PTA for stenosis 5/30/18, renovascular HTN, mixed HLD, extensive CAD s/p CABGx1 2002 GALLEGOS-LAD with multiple subsequent PCI's last in 2/2017 with pLIMA-LAD, patent RCA stents, severe ISR of mid LCX s/p 2.75mm BETHANY and severe ISR of OM s/p PCI with 2.5mm BETHANY and distal 90% RCA lesion (had SPECT with lateral ischemia at that time), chronic diastolic heart failure here for left sided chest pain radiating to left arm that awoke her from sleep last night. Patient came to ED and pain resolved with morphine and nitro paste. Currently pain free, but has nausea. Pt reports since PTA to LUE AVF she has been experiencing exertional angina like prior to previous stents with exertional and improved with rest and Nitro. Last night was the first time it occurred at rest so went to [facility] ED and transferred here. Given 325mg ASA and placed on hep gtt. EKG with NSR and lateral ST-T wave abnormality that are old. Trop .09-->.67 -> 2.9. Pt is not currently on HD and doesnt have any other access than fistula, which is not mature.    ECHO 2/8/2017  CONCLUSIONS    1 - Normal left ventricular systolic function (EF 55-60%).  No diagnostic regional wall motion abnormalities.     2 - Left ventricular diastolic dysfunction.     3 - Mild mitral regurgitation.     4 - Trivial to mild tricuspid regurgitation.     5 - The estimated PA systolic pressure is 39 mmHg.    Nuclear stress 2/8/2017  Impression: ABNORMAL MYOCARDIAL PERFUSION  1. There is evidence for a large sized area of moderate myocardial ischemia that extends from the base to the apical lateral wall of the left ventricle.   2. The perfusion scan is free of evidence for myocardial injury.   3.  There is abnormal wall motion at rest showing hypokinesis of the lateral wall of the left ventricle.   4. Resting LV function is normal.   5. The ventricular volumes are normal at rest and stress.   6. The extracardiac distribution of radioactivity is normal.   7. There was no previous study available to compare.    Review of Systems:    Review of Systems   Constitutional: Negative for chills and fever.   HENT: Negative for congestion and sore throat.    Eyes: Negative for photophobia, pain and discharge.   Respiratory: Negative for cough, hemoptysis, sputum production and shortness of breath.    Cardiovascular: Positive for chest pain. Negative for palpitations and leg swelling.   Gastrointestinal: Positive for nausea. Negative for abdominal pain, diarrhea and vomiting.   Genitourinary: Negative for dysuria and urgency.   Musculoskeletal: Negative for myalgias and neck pain.   Skin: Negative for itching and rash.   Neurological: Negative for sensory change, focal weakness and headaches.   Endo/Heme/Allergies: Negative for polydipsia. Does not bruise/bleed easily.   Psychiatric/Behavioral: Negative for depression and suicidal ideas.       PAST HISTORY:     Past Medical History:   Diagnosis Date    Acute myocardial infarction of anterolateral wall 7/9/2015    Anemia of chronic renal failure, stage 4 (severe) 1/22/2015    Atherosclerosis of aorta 10/3/2012    Benign hypertension with CKD (chronic kidney disease) stage IV 3/11/2016    Chronic diastolic congestive heart failure 10/3/2012    Chronic kidney disease, stage IV (severe) 7/3/2012    Coronary artery disease     s/p 1v CABG 2002 and multiple PCI (last angiogram in 6/2012 with patent LIMA->LAD and patent LCx and RCA stents)    Diabetic polyneuropathy associated with type 2 diabetes mellitus 7/9/2015    Diabetic polyneuropathy associated with type 2 diabetes mellitus 7/9/2015    Encounter for blood transfusion     Gastritis without bleeding     Heart  attack 09/2012    5-6 events    Hyperlipidemia     Hyperparathyroidism     Nephritis and nephropathy, 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 7/9/2015    NSTEMI (non-ST elevated myocardial infarction)     Occlusion and stenosis of carotid artery with cerebral infarction 7/9/2015    Osteopenia     PAF (paroxysmal atrial fibrillation) 10/3/2012    Pneumonia     Proliferative diabetic retinopathy 10/3/2012    Sepsis due to Escherichia coli with acute renal failure 2/2016    UTI     Type II diabetes mellitus with neurological manifestations 7/9/2015    Type II diabetes mellitus with renal manifestations 7/3/2012       Past Surgical History:   Procedure Laterality Date    APPENDECTOMY      CARDIAC SURGERY  2002    CABG    CHOLECYSTECTOMY      CORONARY ANGIOPLASTY  2004    CORONARY ARTERY BYPASS GRAFT      EYE SURGERY      cataracts bilaterally    FRACTURE SURGERY      right ankle    HYSTERECTOMY      TONSILLECTOMY         Family History   Problem Relation Age of Onset    Heart disease Mother     Hypertension Mother     Cancer Mother         lung    Heart disease Father     Hypertension Father     Psoriasis Father     Dementia Father     Hypertension Sister     Stroke Sister     Hypertension Brother     Diabetes Daughter     No Known Problems Son     Breast cancer Maternal Aunt     Melanoma Neg Hx     Lupus Neg Hx     Eczema Neg Hx     Amblyopia Neg Hx     Blindness Neg Hx     Cataracts Neg Hx     Glaucoma Neg Hx     Macular degeneration Neg Hx     Retinal detachment Neg Hx     Strabismus Neg Hx     Thyroid disease Neg Hx        Social History     Social History    Marital status: Single     Spouse name: N/A    Number of children: N/A    Years of education: N/A     Occupational History    Homemaker      Social History Main Topics    Smoking status: Never Smoker    Smokeless tobacco: Never Used    Alcohol use No    Drug use: No    Sexual activity: No     Other Topics Concern     Are You Pregnant Or Think You May Be? No    Breast-Feeding No     Social History Narrative    2 biological kids, 1 adopted9 grandkids (1 granddavid lives with her while she 's attending pharmacy school at [employer])3 great-grandkidsGoing to [place] in [place] month       MEDICATIONS & ALLERGIES:     Current Facility-Administered Medications on File Prior to Encounter   Medication Dose Route Frequency Provider Last Rate Last Dose    [COMPLETED] heparin 25,000 units in dextrose 5% 250 mL (100 units/mL) bolus from bag; INITIAL BOLUS DOSE  70 Units/kg Intravenous Once Yelena Lovett MD   5,397 Units at 06/04/18 0510    [COMPLETED] morphine injection 2 mg  2 mg Intravenous ED 1 Time Yelena Lovett MD   2 mg at 06/04/18 0333    [COMPLETED] morphine injection 2 mg  2 mg Intravenous ED 1 Time Yelena Lovett MD   2 mg at 06/04/18 0552    [COMPLETED] ondansetron disintegrating tablet 4 mg  4 mg Oral ED 1 Time Yelena Lovett MD   4 mg at 06/04/18 0332    [COMPLETED] ondansetron disintegrating tablet 4 mg  4 mg Oral ED 1 Time Yelena Lovett MD   4 mg at 06/04/18 0538    [DISCONTINUED] heparin 25,000 units in dextrose 5% 250 mL (100 units/mL) infusion; FEMALE  16 Units/kg/hr Intravenous Continuous Yelena Lovett MD 12.3 mL/hr at 06/04/18 0509 16 Units/kg/hr at 06/04/18 0509    [DISCONTINUED] morphine injection 2 mg  2 mg Intravenous ED 1 Time Yelena Lovett MD         Current Outpatient Prescriptions on File Prior to Encounter   Medication Sig Dispense Refill    ACCU-CHEK MARI PLUS METER Misc 1 Device by Misc.(Non-Drug; Combo Route) route 2 (two) times daily. 1 each 0    arm brace Misc 1 application by Misc.(Non-Drug; Combo Route) route once daily. Carpal tunnel wrist brace for both left and right wrists 2 each 0    aspirin 325 MG tablet Take 1 tablet (325 mg total) by mouth once daily. 90 tablet 3    atorvastatin (LIPITOR) 80 MG tablet TAKE 1 TABLET EVERY DAY (Patient taking  differently: TAKE 1 TABLET EVERY DAY, morning) 90 tablet 3    blood sugar diagnostic Strp 1 strip by Misc.(Non-Drug; Combo Route) route 4 (four) times daily. 200 strip 11    calcitRIOL (ROCALTROL) 0.25 MCG Cap Take 1 capsule (0.25 mcg total) by mouth once daily. (Patient taking differently: Take 0.25 mcg by mouth every morning. ) 30 capsule 4    clopidogrel (PLAVIX) 75 mg tablet TAKE 1 TABLET EVERY DAY (Patient taking differently: TAKE 1 TABLET EVERY DAY, morning) 90 tablet 3    furosemide (LASIX) 40 MG tablet One tab twice a day. 180 tablet 3    hydrocodone-acetaminophen 5-325mg (NORCO) 5-325 mg per tablet Take 1 tablet by mouth every 6 (six) hours as needed for Pain. 35 tablet 0    insulin glargine (LANTUS) 100 unit/mL injection Inject 20 Units into the skin every evening. (Patient taking differently: Inject 20 Units into the skin every morning. ) 10 mL 3    insulin syringe-needle U-100 1 mL 31 gauge x 5/16 Syrg 1 application by Misc.(Non-Drug; Combo Route) route once daily. 100 each 3    KIONEX, WITH SORBITOL, 15-19.3 gram/60 mL Susp TAKE 60 MLS (15 G TOTAL) BY MOUTH ONCE DAILY.  0    lancets (ACCU-CHEK SOFTCLIX LANCETS) Misc 1 application by Misc.(Non-Drug; Combo Route) route 4 (four) times daily with meals and nightly. 200 each 11    metoprolol tartrate (LOPRESSOR) 100 MG tablet Take 1 tablet (100 mg total) by mouth 2 (two) times daily. 180 tablet 3    nitroGLYCERIN (NITROSTAT) 0.3 MG SL tablet Place 1 tablet (0.3 mg total) under the tongue every 5 (five) minutes as needed for Chest pain. 36 tablet 3    sodium polystyrene (KAYEXALATE) 15 gram/60 mL Susp Take 15 g today and 15g tomorrow. 120 mL 0        Review of patient's allergies indicates:   Allergen Reactions    Percodan [oxycodone-aspirin] Hives     Welps     Pcn [penicillins] Hives and Swelling     Tolerated Rocephin IM in clinic      Phenergan [promethazine] Other (See Comments)     Altered mental status; jerking of extremities  "      OBJECTIVE:     Vital Signs:  Temp:  [96.4 °F (35.8 °C)-98.1 °F (36.7 °C)] 97.7 °F (36.5 °C)  Pulse:  [67-78] 73  Resp:  [11-21] 18  SpO2:  [91 %-100 %] 96 %  BP: (114-160)/(55-82) 114/55  Body mass index is 29.09 kg/m².     Physical Exam:    Objective:  General Appearance:  Comfortable, well-appearing, in no acute distress and not in pain.    Vital signs: (most recent): Blood pressure 137/63, pulse 69, temperature 97.7 °F (36.5 °C), temperature source Oral, resp. rate 18, height 5' 4.1" (1.628 m), weight 77.1 kg (169 lb 15.6 oz), SpO2 96 %, not currently breastfeeding.  No fever.    Output: Producing urine and producing stool.    HEENT: Normal HEENT exam.  (+JVD)    Lungs:  Normal effort.  Breath sounds clear to auscultation.  She is not in respiratory distress.  There are rales.  No wheezes.    Heart: Normal rate.  Regular rhythm.  S1 normal and S2 normal.  Positive for murmur.    Chest: Symmetric chest wall expansion.   Abdomen: Abdomen is soft.  Bowel sounds are normal.   There is no abdominal tenderness.     Extremities: Normal range of motion.  There is dependent edema.  There is no deformity, effusion or local swelling.    Pulses: Distal pulses are intact.    Neurological: Patient is alert and oriented to person, place and time.  Normal strength.    Pupils:  Pupils are equal, round, and reactive to light.    Skin:  Warm and dry.      Laboratory  Lab Results   Component Value Date    WBC 12.84 (H) 06/04/2018    HGB 10.0 (L) 06/04/2018    HCT 31.5 (L) 06/04/2018    MCV 96 06/04/2018     06/04/2018       Recent Labs  Lab 06/04/18  0336   *      K 4.9      CO2 22*   BUN 80*   CREATININE 3.2*   CALCIUM 8.5*     Lab Results   Component Value Date    INR 0.9 06/04/2018    INR 0.9 02/08/2017    INR 1.0 02/04/2015     Lab Results   Component Value Date    HGBA1C 9.0 (H) 05/14/2018     Diagnostic Results:  Labs: Reviewed  ECG: Reviewed  X-Ray: Reviewed  MRI: Reviewed  Echo: " Reviewed    ASSESSMENT & PLAN:     Current Problems List:  Active Hospital Problems    Diagnosis  POA    *NSTEMI (non-ST elevated myocardial infarction) [I21.4]  Yes    CKD (chronic kidney disease), stage V [N18.5]  Unknown    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 H/o CABG X2 2002, stent RCA, OM1 2003, NSTEMI with total occlusion of LAD in 2012.       Obesity (BMI 30.0-34.9) [E66.9]  Yes    Diabetic polyneuropathy associated with type 2 diabetes mellitus [E11.42]  Yes     Stocking/glove pattern intermittent neuropathy      Hypertension associated with diabetes [E11.59, I10]  Yes     BP controlled on BB, diuretic. ARB discontinued by Renal due to hyperkalemia        Resolved Hospital Problems    Diagnosis Date Resolved POA   No resolved problems to display.       HIGH RISK CONDITION(S):  Patient has a condition that poses threat to life and bodily function: Acute Myocardial Infarction    Problem Assessment & Treatment Plan:    NSTEMI  CAD bypassed vessel native heart with unstable angina  - ECG with lateral ischemic pattern  - Damaris 0.09 -> 0.7 -> 2.9  - Possible ISR of LCx/OM1 stents  - 2D ECHO ordered  - Interventional cardiology consultation for [facility]  - Medical management with ASA/plavix/statin/BB/imdur  - C/w heparin infusion  - Suspect leucocytosis 2/2 myocardial ischemia; closely monitor    Acute on chronic diastolic heart failure  - BNP markedly elevated from prior  - Pulmonary vascular congestion noted on CXR  - 2D ECHO  ordered for further evaluation  - IVP lasix 80 mg ordered once, with home dose resumed  - Evaluate for need for further IV diuresis based on response 1.5-2L negative daily  - C/w metoprolol, imdur    Acute renal failure superimposed on CKDIV  Hemodialysis AV graft, s/p  - Patient nearing dialysis, follows with Dr. Iglesias as outpatient  - Patient hesitant about angiography, as above, due to CKDV  - There is likely an acute component that is cardiorenal in etiology and should improve with diuresis  - Nephrology consultation ordered  - Avoid nephrotoxic agents if feasible    DM2 with nephropathy with CKDIV with long term insulin use  - a1c 9.0  - Continue with home insulin regimen  - Titrate to -180 in this critically ill patient    Renovascular HTN  - Cardiac meds as above    Mixed HLD  - C/w statin as above    GERD, with gastritis  - C/w protonix    DVT/GI PPx in place.  NPO for now.  Full code.    Disposition pending interventional cardiology evaluation.    Wilfrid Garcia MD  [facility]

## 2018-06-04 NOTE — SUBJECTIVE & OBJECTIVE
Past Medical History:   Diagnosis Date    Acute myocardial infarction of anterolateral wall 7/9/2015    Anemia of chronic renal failure, stage 4 (severe) 1/22/2015    Atherosclerosis of aorta 10/3/2012    Benign hypertension with CKD (chronic kidney disease) stage IV 3/11/2016    Chronic diastolic congestive heart failure 10/3/2012    Chronic kidney disease, stage IV (severe) 7/3/2012    Coronary artery disease     s/p 1v CABG 2002 and multiple PCI (last angiogram in 6/2012 with patent LIMA->LAD and patent LCx and RCA stents)    Diabetic polyneuropathy associated with type 2 diabetes mellitus 7/9/2015    Diabetic polyneuropathy associated with type 2 diabetes mellitus 7/9/2015    Encounter for blood transfusion     Gastritis without bleeding     Heart attack 09/2012    5-6 events    Hyperlipidemia     Hyperparathyroidism     Nephritis and nephropathy, 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 7/9/2015    NSTEMI (non-ST elevated myocardial infarction)     Occlusion and stenosis of carotid artery with cerebral infarction 7/9/2015    Osteopenia     PAF (paroxysmal atrial fibrillation) 10/3/2012    Pneumonia     Proliferative diabetic retinopathy 10/3/2012    Sepsis due to Escherichia coli with acute renal failure 2/2016    UTI     Type II diabetes mellitus with neurological manifestations 7/9/2015    Type II diabetes mellitus with renal manifestations 7/3/2012       Past Surgical History:   Procedure Laterality Date    APPENDECTOMY      CARDIAC SURGERY  2002    CABG    CHOLECYSTECTOMY      CORONARY ANGIOPLASTY  2004    CORONARY ARTERY BYPASS GRAFT      EYE SURGERY      cataracts bilaterally    FRACTURE SURGERY      right ankle    HYSTERECTOMY      TONSILLECTOMY         Review of patient's allergies indicates:   Allergen Reactions    Percodan [oxycodone-aspirin] Hives     Welps     Pcn [penicillins] Hives and Swelling     Tolerated Rocephin IM in clinic      Phenergan [promethazine] Other  (See Comments)     Altered mental status; jerking of extremities       Current Facility-Administered Medications on File Prior to Encounter   Medication    [COMPLETED] heparin 25,000 units in dextrose 5% 250 mL (100 units/mL) bolus from bag; INITIAL BOLUS DOSE    [COMPLETED] morphine injection 2 mg    [COMPLETED] morphine injection 2 mg    [COMPLETED] ondansetron disintegrating tablet 4 mg    [COMPLETED] ondansetron disintegrating tablet 4 mg    [DISCONTINUED] heparin 25,000 units in dextrose 5% 250 mL (100 units/mL) infusion; FEMALE    [DISCONTINUED] morphine injection 2 mg     Current Outpatient Prescriptions on File Prior to Encounter   Medication Sig    ACCU-CHEK MARI PLUS METER Misc 1 Device by Misc.(Non-Drug; Combo Route) route 2 (two) times daily.    arm brace Misc 1 application by Misc.(Non-Drug; Combo Route) route once daily. Carpal tunnel wrist brace for both left and right wrists    aspirin 325 MG tablet Take 1 tablet (325 mg total) by mouth once daily.    atorvastatin (LIPITOR) 80 MG tablet TAKE 1 TABLET EVERY DAY (Patient taking differently: TAKE 1 TABLET EVERY DAY, morning)    blood sugar diagnostic Strp 1 strip by Misc.(Non-Drug; Combo Route) route 4 (four) times daily.    calcitRIOL (ROCALTROL) 0.25 MCG Cap Take 1 capsule (0.25 mcg total) by mouth once daily. (Patient taking differently: Take 0.25 mcg by mouth every morning. )    clopidogrel (PLAVIX) 75 mg tablet TAKE 1 TABLET EVERY DAY (Patient taking differently: TAKE 1 TABLET EVERY DAY, morning)    furosemide (LASIX) 40 MG tablet One tab twice a day.    hydrocodone-acetaminophen 5-325mg (NORCO) 5-325 mg per tablet Take 1 tablet by mouth every 6 (six) hours as needed for Pain.    insulin glargine (LANTUS) 100 unit/mL injection Inject 20 Units into the skin every evening. (Patient taking differently: Inject 20 Units into the skin every morning. )    insulin syringe-needle U-100 1 mL 31 gauge x 5/16 Syrg 1 application by  Misc.(Non-Drug; Combo Route) route once daily.    KIONEX, WITH SORBITOL, 15-19.3 gram/60 mL Susp TAKE 60 MLS (15 G TOTAL) BY MOUTH ONCE DAILY.    lancets (ACCU-CHEK SOFTCLIX LANCETS) Misc 1 application by Misc.(Non-Drug; Combo Route) route 4 (four) times daily with meals and nightly.    metoprolol tartrate (LOPRESSOR) 100 MG tablet Take 1 tablet (100 mg total) by mouth 2 (two) times daily.    nitroGLYCERIN (NITROSTAT) 0.3 MG SL tablet Place 1 tablet (0.3 mg total) under the tongue every 5 (five) minutes as needed for Chest pain.    sodium polystyrene (KAYEXALATE) 15 gram/60 mL Susp Take 15 g today and 15g tomorrow.     Family History     Problem Relation (Age of Onset)    Breast cancer Maternal Aunt    Cancer Mother    Dementia Father    Diabetes Daughter    Heart disease Mother, Father    Hypertension Mother, Father, Sister, Brother    No Known Problems Son    Psoriasis Father    Stroke Sister        Social History Main Topics    Smoking status: Never Smoker    Smokeless tobacco: Never Used    Alcohol use No    Drug use: No    Sexual activity: No     Review of Systems   Constitution: Negative for chills and fever.   HENT: Negative for hoarse voice and sore throat.    Eyes: Negative for pain and redness.   Cardiovascular: Positive for chest pain. Negative for dyspnea on exertion, leg swelling and orthopnea.   Respiratory: Negative for cough and shortness of breath.    Endocrine: Negative for polydipsia and polyuria.   Hematologic/Lymphatic: Negative for bleeding problem. Does not bruise/bleed easily.   Skin: Negative for flushing and itching.   Musculoskeletal: Negative for joint pain and joint swelling.   Gastrointestinal: Negative for hematemesis, hematochezia and melena.   Genitourinary: Negative for dysuria and hematuria.   Neurological: Negative for light-headedness and loss of balance.     Objective:     Vital Signs (Most Recent):  Temp: 97.7 °F (36.5 °C) (06/04/18 0901)  Pulse: 69 (06/04/18  1033)  Resp: 18 (06/04/18 1000)  BP: 137/63 (06/04/18 1032)  SpO2: 96 % (06/04/18 1032) Vital Signs (24h Range):  Temp:  [96.4 °F (35.8 °C)-98.1 °F (36.7 °C)] 97.7 °F (36.5 °C)  Pulse:  [67-78] 69  Resp:  [11-21] 18  SpO2:  [91 %-100 %] 96 %  BP: (118-160)/(57-82) 137/63     Weight: 77.1 kg (169 lb 15.6 oz)  Body mass index is 29.09 kg/m².    SpO2: 96 %  O2 Device (Oxygen Therapy): nasal cannula    No intake or output data in the 24 hours ending 06/04/18 1103    Lines/Drains/Airways     Drain                 Hemodialysis AV Fistula Left upper arm -- days          Peripheral Intravenous Line                 Peripheral IV - Single Lumen 06/04/18 0237 Right Hand less than 1 day                Physical Exam   Constitutional: She is oriented to person, place, and time. She appears well-developed and well-nourished.   HENT:   Head: Normocephalic and atraumatic.   Mouth/Throat: No oropharyngeal exudate.   Eyes: EOM are normal. Pupils are equal, round, and reactive to light.   Neck: Normal range of motion. Neck supple. JVD present.   Cardiovascular: Normal rate and regular rhythm.  Exam reveals no friction rub.    No murmur heard.  Pulses:       Carotid pulses are 2+ on the right side, and 2+ on the left side.       Radial pulses are 2+ on the right side, and 2+ on the left side.        Dorsalis pedis pulses are 2+ on the right side, and 2+ on the left side.        Posterior tibial pulses are 1+ on the right side, and 1+ on the left side.   Pulmonary/Chest: Effort normal. No respiratory distress. She has rales.   Abdominal: Soft. Bowel sounds are normal. She exhibits no distension.   Musculoskeletal: Normal range of motion. She exhibits no edema.   Neurological: She is alert and oriented to person, place, and time.   Skin: No rash noted.       Significant Labs: All pertinent lab results from the last 24 hours have been reviewed.    Significant Imaging: EKG: as above

## 2018-06-04 NOTE — ED PROVIDER NOTES
Encounter Date: 6/4/2018       History     Chief Complaint   Patient presents with    Chest Pain     The history is provided by the patient.   Chest Pain   The current episode started 2 to 3 hours ago (The patient has been having chest pain over the past few days, sometimes at rest, lasting a few minutes. Today, the pain has recurred frequently and currently has been present for several hours.). Chest pain occurs frequently (The patient says that she had had only rare chest pain until she had the fistula placed. Now it has been occuring several times a day for the past few days.). The chest pain is worsening. The pain is associated with exertion. At its most intense, the chest pain is at 8/10. The chest pain is currently at 7/10. The quality of the pain is described as burning and tightness. The pain radiates to the left shoulder. Chest pain is worsened by exertion. Primary symptoms include nausea.   Nausea began today. Associated with: chest pain.     Review of patient's allergies indicates:   Allergen Reactions    Percodan [oxycodone-aspirin] Hives     Welps     Pcn [penicillins] Hives and Swelling     Tolerated Rocephin IM in clinic      Phenergan [promethazine] Other (See Comments)     Altered mental status; jerking of extremities     Past Medical History:   Diagnosis Date    Acute myocardial infarction of anterolateral wall 7/9/2015    Anemia of chronic renal failure, stage 4 (severe) 1/22/2015    Atherosclerosis of aorta 10/3/2012    Benign hypertension with CKD (chronic kidney disease) stage IV 3/11/2016    Chronic diastolic congestive heart failure 10/3/2012    Chronic kidney disease, stage IV (severe) 7/3/2012    Coronary artery disease     s/p 1v CABG 2002 and multiple PCI (last angiogram in 6/2012 with patent LIMA->LAD and patent LCx and RCA stents)    Diabetic polyneuropathy associated with type 2 diabetes mellitus 7/9/2015    Diabetic polyneuropathy associated with type 2 diabetes mellitus  7/9/2015    Encounter for blood transfusion     Gastritis without bleeding     Heart attack 09/2012    5-6 events    Hyperlipidemia     Hyperparathyroidism     Nephritis and nephropathy, 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 7/9/2015    NSTEMI (non-ST elevated myocardial infarction)     Occlusion and stenosis of carotid artery with cerebral infarction 7/9/2015    Osteopenia     PAF (paroxysmal atrial fibrillation) 10/3/2012    Pneumonia     Proliferative diabetic retinopathy 10/3/2012    Sepsis due to Escherichia coli with acute renal failure 2/2016    UTI     Type II diabetes mellitus with neurological manifestations 7/9/2015    Type II diabetes mellitus with renal manifestations 7/3/2012     Past Surgical History:   Procedure Laterality Date    APPENDECTOMY      CARDIAC SURGERY  2002    CABG    CHOLECYSTECTOMY      CORONARY ANGIOPLASTY  2004    CORONARY ARTERY BYPASS GRAFT      EYE SURGERY      cataracts bilaterally    FRACTURE SURGERY      right ankle    HYSTERECTOMY      TONSILLECTOMY       Family History   Problem Relation Age of Onset    Heart disease Mother     Hypertension Mother     Cancer Mother         lung    Heart disease Father     Hypertension Father     Psoriasis Father     Dementia Father     Hypertension Sister     Stroke Sister     Hypertension Brother     Diabetes Daughter     No Known Problems Son     Breast cancer Maternal Aunt     Melanoma Neg Hx     Lupus Neg Hx     Eczema Neg Hx     Amblyopia Neg Hx     Blindness Neg Hx     Cataracts Neg Hx     Glaucoma Neg Hx     Macular degeneration Neg Hx     Retinal detachment Neg Hx     Strabismus Neg Hx     Thyroid disease Neg Hx      Social History   Substance Use Topics    Smoking status: Never Smoker    Smokeless tobacco: Never Used    Alcohol use No     Review of Systems   Cardiovascular: Positive for chest pain.   Gastrointestinal: Positive for nausea.   All other systems reviewed and are  negative.      Physical Exam     Initial Vitals [06/04/18 0233]   BP Pulse Resp Temp SpO2   129/60 77 16 96.4 °F (35.8 °C) (!) 91 %      MAP       83         Physical Exam    Nursing note and vitals reviewed.  Constitutional: She appears well-developed. She is not diaphoretic. No distress.   Appears chronically ill.   HENT:   Head: Normocephalic and atraumatic.   Nose: Nose normal.   Mouth/Throat: Oropharynx is clear and moist.   Eyes: Conjunctivae and EOM are normal. Pupils are equal, round, and reactive to light.   Neck: Normal range of motion. Neck supple.   Cardiovascular: Normal rate, regular rhythm, normal heart sounds and intact distal pulses. Exam reveals no gallop and no friction rub.    No murmur heard.  Pulmonary/Chest: Breath sounds normal. No respiratory distress.   Abdominal: Soft. Bowel sounds are normal. She exhibits no distension.   Musculoskeletal: Normal range of motion.   Neurological: She is alert and oriented to person, place, and time. She has normal strength.   Skin: Skin is warm and dry. Capillary refill takes less than 2 seconds.         ED Course   Procedures  Labs Reviewed - No data to display  EKG Readings: (Independently Interpreted)   Rhythm: Normal Sinus Rhythm.   Sinus rhythm at 75/min with Left Bundle delay pattern present. LAD and ADRIAN. No change from EKG of April 10, 2018.       X-Rays:   Independently Interpreted Readings:   Other Readings:  Degenerative changes of aorta noted; diffuse interstitial changes noted bilaterally.    Medical Decision Making:   Initial Assessment:   Chest pain c/w ACS; h/o CAD (CABG and multiple stents)  ESRD, awaiting HD  DM II with retinopathy, neuropathy, and nephropathy    Differential Diagnosis:   ACS versus other cause of chest pain  Independently Interpreted Test(s):   I have ordered and independently interpreted X-rays - see prior notes.  I have ordered and independently interpreted EKG Reading(s) - see prior notes  Clinical Tests:   Lab Tests:  Ordered and Reviewed  Radiological Study: Ordered and Reviewed  Medical Tests: Ordered and Reviewed  ED Management:  The patient's history was c/w ACS. She will be transferred for a higher level of care.                      Clinical Impression:   The primary encounter diagnosis was ACS (acute coronary syndrome). Diagnoses of Chest pain, (HFpEF) heart failure with preserved ejection fraction, Controlled type 2 diabetes mellitus with diabetic nephropathy, unspecified whether long term insulin use, and ESRD (end stage renal disease) were also pertinent to this visit.    No orders to display       Disposition:   Disposition: Transferred  Condition: Stable                        Yelena Lovett MD  06/04/18 0500

## 2018-06-04 NOTE — ED NOTES
Phlebotomy at the bedside for blood specimen collection.  Radiology is at the bedside for X-Ray testing.

## 2018-06-04 NOTE — ED NOTES
The patient is awake, alert and cooperative with a calm affect, patient is aware of environment. Airway is open and patent, respirations are spontaneous, normal respiratory effort and rate noted, skin warm and dry, moves all extremities well, appearance: no apparent distress noted. Side rails x 2. Call bell within reach. Will continue to monitor. Pt updated on the admit status. Pt prepared for cath lab.

## 2018-06-04 NOTE — ED NOTES
Pt cleaned with a new gown because the phlebotomy band-aid bled through onto the bed and clothing. No acute distress noted at this time. Pressure dressing applied. Update given to the patient re: ICU placement.

## 2018-06-04 NOTE — ED TRIAGE NOTES
Zoey Ham, a 72 y.o. female presents to the ED transfer from [facility] for unstable angina.  Pt arrives to our ED with no chest pain but c/o of nausea.  Pt arrived on heparin and nitro paste to chest.  Nitro chest removed upon arrival.  Pt reports nausea at this time.       Chief Complaint   Patient presents with    Transfer     Och Tx from [facility] for unstable angina     Review of patient's allergies indicates:   Allergen Reactions    Percodan [oxycodone-aspirin] Hives     Welps     Pcn [penicillins] Hives and Swelling     Tolerated Rocephin IM in clinic      Phenergan [promethazine] Other (See Comments)     Altered mental status; jerking of extremities     Past Medical History:   Diagnosis Date    Acute myocardial infarction of anterolateral wall 7/9/2015    Anemia of chronic renal failure, stage 4 (severe) 1/22/2015    Atherosclerosis of aorta 10/3/2012    Benign hypertension with CKD (chronic kidney disease) stage IV 3/11/2016    Chronic diastolic congestive heart failure 10/3/2012    Chronic kidney disease, stage IV (severe) 7/3/2012    Coronary artery disease     s/p 1v CABG 2002 and multiple PCI (last angiogram in 6/2012 with patent LIMA->LAD and patent LCx and RCA stents)    Diabetic polyneuropathy associated with type 2 diabetes mellitus 7/9/2015    Diabetic polyneuropathy associated with type 2 diabetes mellitus 7/9/2015    Encounter for blood transfusion     Gastritis without bleeding     Heart attack 09/2012    5-6 events    Hyperlipidemia     Hyperparathyroidism     Nephritis and nephropathy, 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 7/9/2015    NSTEMI (non-ST elevated myocardial infarction)     Occlusion and stenosis of carotid artery with cerebral infarction 7/9/2015    Osteopenia     PAF (paroxysmal atrial fibrillation) 10/3/2012    Pneumonia     Proliferative diabetic retinopathy 10/3/2012    Sepsis due to Escherichia coli with acute renal failure 2/2016    UTI     Type II  diabetes mellitus with neurological manifestations 7/9/2015    Type II diabetes mellitus with renal manifestations 7/3/2012     LOC: Patient name and date of birth verified. The patient is awake, alert and aware of environment with an appropriate affect, the patient is oriented x 3 and speaking appropriately.   APPEARANCE: Patient resting comfortably, patient is clean and well groomed, patient's clothing is properly fastened.  MUSCULOSKELETAL: Patient moving all extremities well, no obvious swelling or deformities noted.   RESPIRATORY: Respirations are spontaneous, patient has a normal effort and rate, no accessory muscle use noted. Reports [facility]  CARDIAC: Patient has a normal rate and rhythm, no periphreal edema noted, capillary refill < 3 seconds. Denies chest pain currently   NEUROLOGIC: Eyes open spontaneously, behavior appropriate to situation, follows commands, facial expression symmetrical, bilateral hand grasp equal and even, purposeful motor response noted, normal sensation in all extremities when touched with a finger.

## 2018-06-04 NOTE — ED NOTES
Phlebotomy was at the bedside, asked me to stop the infusion so that the labs could be drawn. I educated them that I cannot stop the drip at this time.

## 2018-06-04 NOTE — ED TRIAGE NOTES
"Here per [facility] c/o "chest pain all day."  Reports that patient stated, "It feels like my last heart attack."  "

## 2018-06-04 NOTE — ED NOTES
The patient is awake, alert and cooperative with a calm affect, patient is aware of environment. Airway is open and patent, respirations are spontaneous, normal respiratory effort and rate noted, skin warm and dry, moves all extremities well, appearance: no apparent distress noted. Side rails x 2. Call bell within reach. Will continue to monitor. Pt and family updated on the current status.

## 2018-06-04 NOTE — HPI
REFERRING PHYSICIAN: Wilfrid Garcia M.D.    PROBLEM: Mrs. Ham is a 72 years old woman who has developed restenosis in a previously stented coronary artery and may benefit from endovascular irradiation at the time of a planned percutaneous angioplasty procedure to restore patency at the site.    The expected benefits and risks of endovascular irradiation were discussed with Mrs. Ham.  She understands and signs the consent.

## 2018-06-04 NOTE — NURSING
Patient c/o left first three finger numbness, no tingling. MD Garcia paged to bedside. After MD assessment, order is just to continue to observe. Will continue to monitor.

## 2018-06-04 NOTE — NURSING
During routine Right groin access, bleeding was noticed. Nurse held pressure and paged MD Thai. MD at bedside, held pressure and ordered saline bag placed on.

## 2018-06-04 NOTE — HPI
71 y/o WF with hx of DM, HTN, HLD, CKD V with LUE Brachiocephalic fistula created 4/16/18 s/p PTA for stenosis 5/30/18, CAD s/p CABGx1 2002 GALLEGOS-LAD with multiple subsequent PCI's last in 2/2017 with pLIMA-LAD, patent RCA stents, severe ISR of mid LCX s/p 2.75mm BETHANY and severe ISR of OM s/p PCI with 2.5mm BETHANY and distal 90% RCA lesion(had SPECT with Lateral ischemia at that time) here for left sided chest pain radiating to left arm that awoke her from sleep last night. Cam to ED and pain resolved with morphine and nitro past. Currently pain free. Pt reports since PTA to LUE AVF she has been experiencing exertional angina like prior to previous stents with exertional and improved with rest and Nitro. Last night was the first time it occurred at rest so went to [facility] ED and transferred here. Given 325mg ASA and placed on hep gtt. EKG with NSR and lat ST T wave abnormality that are old. Trop .09-->.67. Pt is not currently on HD and doesnt have any other access than fistula which is not mature.

## 2018-06-04 NOTE — CONSULTS
[facility]  Radiation Oncology  Consult Note    Patient Name: Zoey Ham  MRN: [id number]  Admission Date: 6/4/2018  Hospital Length of Stay: 0 days  Code Status: Full Code   Attending Provider: Wilfrid Garcia MD  Consulting Provider: Shahriar Young MD  Primary Care Physician: Cesia Rosales MD  Principal Problem:NSTEMI (non-ST elevated myocardial infarction)    Inpatient consult to Radiation Oncology  Consult performed by: SHAHRIAR YOUNG  Consult ordered by: LADI STALLINGS        Subjective:     HPI:  REFERRING PHYSICIAN: Wilfrid Garcia M.D.    PROBLEM: Mrs. Ham is a 72 years old woman who has developed restenosis in a previously stented coronary artery and may benefit from endovascular irradiation at the time of a planned percutaneous angioplasty procedure to restore patency at the site.    The expected benefits and risks of endovascular irradiation were discussed with Mrs. Ham.  She understands and signs the consent.    No new subjective & objective note has been filed under this hospital service since the last note was generated.    Assessment/Plan:     No notes have been filed under this hospital service.  Service: Radiation Oncology      Thank you for your consult.     Shahriar Young MD  Radiation Oncology  [facility]

## 2018-06-04 NOTE — ASSESSMENT & PLAN NOTE
- Pt currently CP free and HR and BP well controlled. Her story sounds typical for angina and troponins are mildly elevated in setting of CKD. I rec an angiogram as it is very likely she has repeat ISR of LCx/OM stents but pt is hesitant as she is not on HD yet and really would liek to avoid it for as long as possible.  - Given this we could proceed with an ischemia guided strategy if her troponins do not rise significantly and she remains pain free. Can do SPECT in AM as the PET generator is down.  - Would cont ASA, Plavix, hep gtt, Lipitor, metorpolol  - Will add Imdur 30mg QD and give 80mg IV lasix given elevated JVP and edema on CXR  - F/U 2D echo.  Admit to Cardiology CoManagement.

## 2018-06-04 NOTE — ED PROVIDER NOTES
Encounter Date: 6/4/2018    SCRIBE #1 NOTE: I, Cassidy Carrasco, am scribing for, and in the presence of,  Dr. Alejandro . I have scribed the entire note.       History     Chief Complaint   Patient presents with    Transfer     Och Tx from [facility] for unstable angina     Time patient was seen by the provider: 9:14 AM      The patient is a 72 y.o. female with co-morbidities including CAD, CKD, PAF, DM and CHF, who is a transfer from [facility], presents to the ED with a complaint of worsening chest pain. Patient reports she noticed intermittent chest discomfort over last week. She endorses nausea and dyspnea on exertion. She currently has no chest pain.        The history is provided by the patient and medical records.     Review of patient's allergies indicates:   Allergen Reactions    Percodan [oxycodone-aspirin] Hives     Welps     Pcn [penicillins] Hives and Swelling     Tolerated Rocephin IM in clinic      Phenergan [promethazine] Other (See Comments)     Altered mental status; jerking of extremities     Past Medical History:   Diagnosis Date    Acute myocardial infarction of anterolateral wall 7/9/2015    Anemia of chronic renal failure, stage 4 (severe) 1/22/2015    Atherosclerosis of aorta 10/3/2012    Benign hypertension with CKD (chronic kidney disease) stage IV 3/11/2016    Chronic diastolic congestive heart failure 10/3/2012    Chronic kidney disease, stage IV (severe) 7/3/2012    Coronary artery disease     s/p 1v CABG 2002 and multiple PCI (last angiogram in 6/2012 with patent LIMA->LAD and patent LCx and RCA stents)    Diabetic polyneuropathy associated with type 2 diabetes mellitus 7/9/2015    Diabetic polyneuropathy associated with type 2 diabetes mellitus 7/9/2015    Encounter for blood transfusion     Gastritis without bleeding     Heart attack 09/2012    5-6 events    Hyperlipidemia     Hyperparathyroidism     Nephritis and nephropathy, 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 7/9/2015     NSTEMI (non-ST elevated myocardial infarction)     Occlusion and stenosis of carotid artery with cerebral infarction 7/9/2015    Osteopenia     PAF (paroxysmal atrial fibrillation) 10/3/2012    Pneumonia     Proliferative diabetic retinopathy 10/3/2012    Sepsis due to Escherichia coli with acute renal failure 2/2016    UTI     Type II diabetes mellitus with neurological manifestations 7/9/2015    Type II diabetes mellitus with renal manifestations 7/3/2012     Past Surgical History:   Procedure Laterality Date    APPENDECTOMY      CARDIAC SURGERY  2002    CABG    CHOLECYSTECTOMY      CORONARY ANGIOPLASTY  2004    CORONARY ARTERY BYPASS GRAFT      EYE SURGERY      cataracts bilaterally    FRACTURE SURGERY      right ankle    HYSTERECTOMY      TONSILLECTOMY       Family History   Problem Relation Age of Onset    Heart disease Mother     Hypertension Mother     Cancer Mother         lung    Heart disease Father     Hypertension Father     Psoriasis Father     Dementia Father     Hypertension Sister     Stroke Sister     Hypertension Brother     Diabetes Daughter     No Known Problems Son     Breast cancer Maternal Aunt     Melanoma Neg Hx     Lupus Neg Hx     Eczema Neg Hx     Amblyopia Neg Hx     Blindness Neg Hx     Cataracts Neg Hx     Glaucoma Neg Hx     Macular degeneration Neg Hx     Retinal detachment Neg Hx     Strabismus Neg Hx     Thyroid disease Neg Hx      Social History   Substance Use Topics    Smoking status: Never Smoker    Smokeless tobacco: Never Used    Alcohol use No     Review of Systems   Constitutional: Negative for fever.   HENT: Negative for sore throat.    Respiratory: Positive for shortness of breath (on exertion).    Cardiovascular: Positive for chest pain.   Gastrointestinal: Positive for nausea.   Genitourinary: Negative for dysuria.   Musculoskeletal: Negative for back pain.   Skin: Negative for rash.   Neurological: Negative for weakness.    Hematological: Does not bruise/bleed easily.       Physical Exam     Initial Vitals [06/04/18 0901]   BP Pulse Resp Temp SpO2   (!) 148/75 67 18 97.7 °F (36.5 °C) 98 %      MAP       99.33         Physical Exam    Nursing note and vitals reviewed.  Constitutional: She appears well-developed and well-nourished. She is not diaphoretic. No distress.   HENT:   Head: Normocephalic and atraumatic.   Mouth/Throat: Oropharynx is clear and moist.   Eyes: Conjunctivae and EOM are normal. Pupils are equal, round, and reactive to light.   Neck: Normal range of motion. Neck supple. No JVD present.   Cardiovascular: Normal rate, regular rhythm and normal heart sounds.   No murmur heard.  Pulmonary/Chest: Breath sounds normal. No respiratory distress. She has no wheezes. She has no rhonchi. She has no rales.   Abdominal: Soft. Bowel sounds are normal. She exhibits no distension and no mass. There is no tenderness. There is no rebound and no guarding.   Musculoskeletal: Normal range of motion. She exhibits no edema or tenderness.   Fistula on left.   Neurological: She is alert and oriented to person, place, and time. She has normal strength. No cranial nerve deficit or sensory deficit.   Skin: Skin is warm and dry. No rash noted.   Psychiatric: She has a normal mood and affect.         ED Course   Procedures  Labs Reviewed   ANTI-XA HEPARIN MONITORING   TROPONIN I             Medical Decision Making:   History:   Old Medical Records: I decided to obtain old medical records.  Initial Assessment:   I reviewed medical records, labs and EKG. She has an elevated troponin. She is currently on Heparin. She currently has no cardiac symptoms, except for mild nausea. I consulted and discussed with Cardiology. They will evaluate the patient urgently in the ED.   ED Management:  10:36 AM   Discussed with Cardiology after their evaluation. Dr Schmitt felt she was stable for the floor.  Recommended continuing Heparin.  He wrote for medication  orders pertinent to her cardiac care.   They are hesitant to do angiogram.  She is close to needing dialysis but would like to put it off as long as possible.  He asked for another troponin, but also felt she was stable for the floor regardless of result.  He asked me to admit to Cardiology comanagement     Will admit to Internal Medicine.       12:16 PM  Cards attending evaluated patient and recommended cath lab today.    Other:   I have discussed this case with another health care provider.       <> Summary of the Discussion: Cardiology            Scribe Attestation:   Scribe #1: I performed the above scribed service and the documentation accurately describes the services I performed. I attest to the accuracy of the note.    Attending Attestation:         Attending Critical Care:   Critical Care Times:   Direct Patient Care (initial evaluation, reassessments, and time considering the case)................................................................24 minutes.   Additional History from reviewing old medical records or taking additional history from the family, EMS, PCP, etc.......................4 minutes.   Ordering, Reviewing, and Interpreting Diagnostic Studies...............................................................................................................4 minutes.   Documentation..................................................................................................................................................................................4 minutes.   Consultation with other Physicians. .................................................................................................................................................4 minutes.   ==============================================================  · Total Critical Care Time - exclusive of procedural time: 40 minutes.  ==============================================================                 Clinical Impression:    The encounter diagnosis was NSTEMI (non-ST elevated myocardial infarction).    No orders to display       Disposition:   Disposition: Admitted  Condition: Serious                        Antonio Alejandro MD  06/04/18 1039       Antonio Alejandro MD  06/04/18 1057       Antonio Alejandro MD  06/04/18 1217

## 2018-06-04 NOTE — ED NOTES
Pt and family made aware awaiting some lab results prior to starting Heparin. Voices understanding. Family reports that they only want to go to [facility].

## 2018-06-04 NOTE — ED NOTES
Escorted the patient to the bathroom via wheelchair and returned to the bed without incident. Upon arrival to the bed the patient became nauseated and complained of chest pain again. Rhythm change noted on cardiac monitor, Dr. Palma notified and stat EKG called for.

## 2018-06-04 NOTE — H&P
HISTORY & PHYSICAL  Hospital Medicine    Team: [facility]    PRESENTING HISTORY     Chief Complaint/Reason for Admission:  Chest pain    History of Present Illness:  Zoey Ham is a 72 y.o. female who  has a past medical history of Acute myocardial infarction of anterolateral wall; Anemia of chronic renal failure, stage 4 (severe); Atherosclerosis of aorta; Benign hypertension with CKD (chronic kidney disease) stage IV; Chronic diastolic congestive heart failure; Chronic kidney disease, stage IV (severe); Coronary artery disease; Diabetic polyneuropathy associated with type 2 diabetes mellitus; Diabetic polyneuropathy associated with type 2 diabetes mellitus; Encounter for blood transfusion; Gastritis without bleeding; Heart attack; Hyperlipidemia; Hyperparathyroidism; Nephritis and nephropathy, 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    Patient Admited for NSTEMI.  Presented to ED for left sided chest pain radiating to left arm that awoke her from sleep last night. Pain resolved with morphine and nitro. Currently pain free.  Given 325mg ASA and placed on hep gtt. EKG with NSR and lat ST T wave abnormality that are old. Trop .09-->.67-->2.9. Pt is not currently on HD and doesnt have any other access than fistula which is not mature.    Review of Systems:    Review of Systems   Constitutional: Negative for chills, diaphoresis and fever.   HENT: Negative for congestion.    Eyes: Negative for blurred vision and double vision.   Respiratory: Negative for cough, shortness of breath and stridor.    Cardiovascular: Positive for chest  pain. Negative for leg swelling.   Gastrointestinal: Positive for nausea. Negative for constipation, diarrhea and vomiting.   Genitourinary: Negative for dysuria and hematuria.   Musculoskeletal: Negative for joint pain and myalgias.   Skin: Negative for itching and rash.   Neurological: Negative for focal weakness, weakness and headaches.   Psychiatric/Behavioral: Negative for depression. The patient is not nervous/anxious.        PAST HISTORY:     Past Medical History:   Diagnosis Date    Acute myocardial infarction of anterolateral wall 7/9/2015    Anemia of chronic renal failure, stage 4 (severe) 1/22/2015    Atherosclerosis of aorta 10/3/2012    Benign hypertension with CKD (chronic kidney disease) stage IV 3/11/2016    Chronic diastolic congestive heart failure 10/3/2012    Chronic kidney disease, stage IV (severe) 7/3/2012    Coronary artery disease     s/p 1v CABG 2002 and multiple PCI (last angiogram in 6/2012 with patent LIMA->LAD and patent LCx and RCA stents)    Diabetic polyneuropathy associated with type 2 diabetes mellitus 7/9/2015    Diabetic polyneuropathy associated with type 2 diabetes mellitus 7/9/2015    Encounter for blood transfusion     Gastritis without bleeding     Heart attack 09/2012    5-6 events    Hyperlipidemia     Hyperparathyroidism     Nephritis and nephropathy, 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 7/9/2015    NSTEMI (non-ST elevated myocardial infarction)     Occlusion and stenosis of carotid artery with cerebral infarction 7/9/2015    Osteopenia     PAF (paroxysmal atrial fibrillation) 10/3/2012    Pneumonia     Proliferative diabetic retinopathy 10/3/2012    Sepsis due to Escherichia coli with acute renal failure 2/2016    UTI     Type II diabetes mellitus with neurological manifestations 7/9/2015    Type II diabetes mellitus with renal manifestations 7/3/2012       Past Surgical History:   Procedure Laterality Date    APPENDECTOMY      CARDIAC SURGERY   2002    CABG    CHOLECYSTECTOMY      CORONARY ANGIOPLASTY  2004    CORONARY ARTERY BYPASS GRAFT      EYE SURGERY      cataracts bilaterally    FRACTURE SURGERY      right ankle    HYSTERECTOMY      TONSILLECTOMY         Family History   Problem Relation Age of Onset    Heart disease Mother     Hypertension Mother     Cancer Mother         lung    Heart disease Father     Hypertension Father     Psoriasis Father     Dementia Father     Hypertension Sister     Stroke Sister     Hypertension Brother     Diabetes Daughter     No Known Problems Son     Breast cancer Maternal Aunt     Melanoma Neg Hx     Lupus Neg Hx     Eczema Neg Hx     Amblyopia Neg Hx     Blindness Neg Hx     Cataracts Neg Hx     Glaucoma Neg Hx     Macular degeneration Neg Hx     Retinal detachment Neg Hx     Strabismus Neg Hx     Thyroid disease Neg Hx        Social History     Social History    Marital status: Single     Spouse name: N/A    Number of children: N/A    Years of education: N/A     Occupational History    Homemaker      Social History Main Topics    Smoking status: Never Smoker    Smokeless tobacco: Never Used    Alcohol use No    Drug use: No    Sexual activity: No     Other Topics Concern    Are You Pregnant Or Think You May Be? No    Breast-Feeding No     Social History Narrative    2 biological kids, 1 adopted9 grandkids (1 granddavid lives with her while she 's attending pharmacy school at [employer])3 great-grandkidsGoing to [place] in [place]       MEDICATIONS & ALLERGIES:     Current Facility-Administered Medications on File Prior to Encounter   Medication Dose Route Frequency Provider Last Rate Last Dose    [COMPLETED] heparin 25,000 units in dextrose 5% 250 mL (100 units/mL) bolus from bag; INITIAL BOLUS DOSE  70 Units/kg Intravenous Once Yelena Lovett MD   5,397 Units at 06/04/18 0510    [COMPLETED] morphine injection 2 mg  2 mg Intravenous ED 1 Time  Yelena Lovett MD   2 mg at 06/04/18 0333    [COMPLETED] morphine injection 2 mg  2 mg Intravenous ED 1 Time Yelena Lovett MD   2 mg at 06/04/18 0552    [COMPLETED] ondansetron disintegrating tablet 4 mg  4 mg Oral ED 1 Time Yelena Lovett MD   4 mg at 06/04/18 0332    [COMPLETED] ondansetron disintegrating tablet 4 mg  4 mg Oral ED 1 Time Yelena Lovett MD   4 mg at 06/04/18 0538    [DISCONTINUED] heparin 25,000 units in dextrose 5% 250 mL (100 units/mL) infusion; FEMALE  16 Units/kg/hr Intravenous Continuous Yelena Lovett MD 12.3 mL/hr at 06/04/18 0509 16 Units/kg/hr at 06/04/18 0509    [DISCONTINUED] morphine injection 2 mg  2 mg Intravenous ED 1 Time Yelena Lovett MD         Current Outpatient Prescriptions on File Prior to Encounter   Medication Sig Dispense Refill    ACCU-CHEK MARI PLUS METER Misc 1 Device by Misc.(Non-Drug; Combo Route) route 2 (two) times daily. 1 each 0    arm brace Misc 1 application by Misc.(Non-Drug; Combo Route) route once daily. Carpal tunnel wrist brace for both left and right wrists 2 each 0    aspirin 325 MG tablet Take 1 tablet (325 mg total) by mouth once daily. 90 tablet 3    atorvastatin (LIPITOR) 80 MG tablet TAKE 1 TABLET EVERY DAY (Patient taking differently: TAKE 1 TABLET EVERY DAY, morning) 90 tablet 3    blood sugar diagnostic Strp 1 strip by Misc.(Non-Drug; Combo Route) route 4 (four) times daily. 200 strip 11    calcitRIOL (ROCALTROL) 0.25 MCG Cap Take 1 capsule (0.25 mcg total) by mouth once daily. (Patient taking differently: Take 0.25 mcg by mouth every morning. ) 30 capsule 4    clopidogrel (PLAVIX) 75 mg tablet TAKE 1 TABLET EVERY DAY (Patient taking differently: TAKE 1 TABLET EVERY DAY, morning) 90 tablet 3    furosemide (LASIX) 40 MG tablet One tab twice a day. 180 tablet 3    hydrocodone-acetaminophen 5-325mg (NORCO) 5-325 mg per tablet Take 1 tablet by mouth every 6 (six) hours as needed for Pain. 35 tablet 0    insulin glargine (LANTUS)  "100 unit/mL injection Inject 20 Units into the skin every evening. (Patient taking differently: Inject 20 Units into the skin every morning. ) 10 mL 3    insulin syringe-needle U-100 1 mL 31 gauge x 5/16 Syrg 1 application by Misc.(Non-Drug; Combo Route) route once daily. 100 each 3    KIONEX, WITH SORBITOL, 15-19.3 gram/60 mL Susp TAKE 60 MLS (15 G TOTAL) BY MOUTH ONCE DAILY.  0    lancets (ACCU-CHEK SOFTCLIX LANCETS) Misc 1 application by Misc.(Non-Drug; Combo Route) route 4 (four) times daily with meals and nightly. 200 each 11    metoprolol tartrate (LOPRESSOR) 100 MG tablet Take 1 tablet (100 mg total) by mouth 2 (two) times daily. 180 tablet 3    nitroGLYCERIN (NITROSTAT) 0.3 MG SL tablet Place 1 tablet (0.3 mg total) under the tongue every 5 (five) minutes as needed for Chest pain. 36 tablet 3    sodium polystyrene (KAYEXALATE) 15 gram/60 mL Susp Take 15 g today and 15g tomorrow. 120 mL 0        Review of patient's allergies indicates:   Allergen Reactions    Percodan [oxycodone-aspirin] Hives     Welps     Pcn [penicillins] Hives and Swelling     Tolerated Rocephin IM in clinic      Phenergan [promethazine] Other (See Comments)     Altered mental status; jerking of extremities       OBJECTIVE:     Vital Signs:  Temp:  [96.4 °F (35.8 °C)-98.1 °F (36.7 °C)] 97.7 °F (36.5 °C)  Pulse:  [67-78] 73  Resp:  [11-21] 18  SpO2:  [91 %-100 %] 96 %  BP: (114-160)/(55-82) 114/55  Body mass index is 29.09 kg/m².     Physical Exam:    Objective:  General Appearance:  Uncomfortable.    Vital signs: (most recent): Blood pressure (!) 114/55, pulse 73, temperature 97.7 °F (36.5 °C), temperature source Oral, resp. rate 18, height 5' 4.1" (1.628 m), weight 77.1 kg (169 lb 15.6 oz), SpO2 96 %, not currently breastfeeding.  No fever.    Output: Producing urine and producing stool.    HEENT: Normal HEENT exam.    Lungs:  Normal effort and normal respiratory rate.  Breath sounds clear to auscultation.  No rales, wheezes or " rhonchi.    Heart: Normal rate.  Regular rhythm.  S1 normal and S2 normal.  No murmur, gallop or friction rub.   Abdomen: Abdomen is soft and non-distended.  There are no signs of ascites.  There is no abdominal tenderness.     Extremities: Normal range of motion.  There is no deformity.    Pulses: Distal pulses are intact.    Neurological: Patient is alert and oriented to person, place and time.  Normal strength.    Pupils:  Pupils are equal, round, and reactive to light.    Skin:  Warm and dry.          Laboratory  Lab Results   Component Value Date    WBC 12.84 (H) 06/04/2018    HGB 10.0 (L) 06/04/2018    HCT 31.5 (L) 06/04/2018    MCV 96 06/04/2018     06/04/2018       Recent Labs  Lab 06/04/18  0336   *      K 4.9      CO2 22*   BUN 80*   CREATININE 3.2*   CALCIUM 8.5*     Lab Results   Component Value Date    INR 0.9 06/04/2018    INR 0.9 02/08/2017    INR 1.0 02/04/2015     Lab Results   Component Value Date    HGBA1C 9.0 (H) 05/14/2018     No results for input(s): POCTGLUCOSE in the last 72 hours.    Diagnostic Results:  Labs: Reviewed  ECG: Reviewed  X-Ray: Reviewed    ASSESSMENT & PLAN:     Current Problems List:  Active Hospital Problems    Diagnosis  POA    *NSTEMI (non-ST elevated myocardial infarction) [I21.4]  Yes    CKD (chronic kidney disease), stage V [N18.5]  Unknown    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 H/o CABG X2 2002, stent RCA, OM1 2003, NSTEMI with total occlusion of LAD in  2012.       Obesity (BMI 30.0-34.9) [E66.9]  Yes    Diabetic polyneuropathy associated with type 2 diabetes mellitus [E11.42]  Yes     Stocking/glove pattern intermittent neuropathy      Hypertension associated with diabetes [E11.59, I10]  Yes     BP controlled on BB, diuretic. ARB discontinued by Renal due to hyperkalemia        Resolved Hospital Problems    Diagnosis Date Resolved POA   No resolved problems to display.       HIGH RISK CONDITION(S):  Patient has a condition that poses threat to life and bodily function: Acute Myocardial Infarction    Problem Assessment & Treatment Plan:    NSTEMI  - Zoey Hma is a 72 y.o. female with NSTEMI with increasing Troponin, KEDAR score of 5.  Interventional Cardiology to take to cath lab emergently.  - Load with plavix 300mg as she on it as outpt  - cont ASA, Plavix, hep gtt, Lipitor, metorpolol  - Will add Imdur 30mg QD and give 80mg IV lasix given elevated JVP and edema on CXR  - Consult nephrology  -Nitro and morphene PRN    Diabetes Mellitus  -Continue Insulin    Chronic Kidney Disease Stage V  -Not on HD, had LUE AV graft s/p fistulogram, no other ports.    HTN  - Continue Furosemide    Kvng Wang MD  [facility]

## 2018-06-04 NOTE — CONSULTS
"Cardiac Rehab     Zoey Ham   [id number]   6/4/2018       Activity taught: Yes    Cardiac Rehab Phase Taught: Phase I & II    Risk Factors-Modifiable: diabetes, nutrition, hyperlipidemia, obesity, sedentary lifestyle, stress, exercise    Risk Factors-Non modifiable: age    Teaching Method: Verbal, Written and Living with Heart Disease book.    Understanding/Response: Pt verbalized understanding, all questions answered. Pt understands their cardiac rehab order is good for 12 months.       Comments: S/P PTCA. Discussed cardiac rehab and risk factor modification. Team to refer patient to [facility] Phase II. Educational materials were used in the process and given to the patient. They included "Your Guide to Living with Heart Disease", Phase Two Cardiac Rehabilitation information along with a sample Mediterranean diet.The patient expressed understanding of the teaching and expressed desire to take a role in modifying the risk factors when they return home.    NAHEED Cortez RN  Cardiac Rehab Nurse      "

## 2018-06-04 NOTE — CONSULTS
[facility]  Interventional Cardiology  Consult Note    Patient Name: Zoey Ham  MRN: [id number]  Admission Date: 6/4/2018  Hospital Length of Stay: 0 days  Code Status: Prior   Attending Provider: Lencho Owens MD   Consulting Provider: Umang Schmitt MD  Primary Care Physician: Cesia Rosales MD  Principal Problem:<principal problem not specified>    Patient information was obtained from patient and ER records.     Inpatient consult to Cardiology  Consult performed by: UMANG SCHMITT.  Consult ordered by: LENCHO OWENS        Subjective:     Chief Complaint:  Chest Pain     HPI:   71 y/o WF with hx of DM, HTN, HLD, CKD V with LUE Brachiocephalic fistula created 4/16/18 s/p PTA for stenosis 5/30/18, CAD s/p CABGx1 2002 GALLEGOS-LAD with multiple subsequent PCI's last in 2/2017 with pLIMA-LAD, patent RCA stents, severe ISR of mid LCX s/p 2.75mm BETHANY(Resolute) and severe ISR of OM s/p PCI with 2.5mm BETHANY(Resolute) and distal 90% RCA lesion(had SPECT with Lateral ischemia at that time) here for left sided chest pain radiating to left arm that awoke her from sleep last night. Cam to ED and pain resolved with morphine and nitro past. Currently pain free. Pt reports since PTA to LUE AVF she has been experiencing exertional angina like prior to previous stents with exertional and improved with rest and Nitro. Last night was the first time it occurred at rest so went to [facility] ED and transferred here. Given 325mg ASA and placed on hep gtt. EKG with NSR and lat ST T wave abnormality that are old. Trop .09-->.67. Pt is not currently on HD and doesnt have any other access than fistula which is not mature.    Past Medical History:   Diagnosis Date    Acute myocardial infarction of anterolateral wall 7/9/2015    Anemia of chronic renal failure, stage 4 (severe) 1/22/2015    Atherosclerosis of aorta 10/3/2012    Benign hypertension with CKD (chronic kidney disease) stage IV 3/11/2016     Chronic diastolic congestive heart failure 10/3/2012    Chronic kidney disease, stage IV (severe) 7/3/2012    Coronary artery disease     s/p 1v CABG 2002 and multiple PCI (last angiogram in 6/2012 with patent LIMA->LAD and patent LCx and RCA stents)    Diabetic polyneuropathy associated with type 2 diabetes mellitus 7/9/2015    Diabetic polyneuropathy associated with type 2 diabetes mellitus 7/9/2015    Encounter for blood transfusion     Gastritis without bleeding     Heart attack 09/2012    5-6 events    Hyperlipidemia     Hyperparathyroidism     Nephritis and nephropathy, 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 7/9/2015    NSTEMI (non-ST elevated myocardial infarction)     Occlusion and stenosis of carotid artery with cerebral infarction 7/9/2015    Osteopenia     PAF (paroxysmal atrial fibrillation) 10/3/2012    Pneumonia     Proliferative diabetic retinopathy 10/3/2012    Sepsis due to Escherichia coli with acute renal failure 2/2016    UTI     Type II diabetes mellitus with neurological manifestations 7/9/2015    Type II diabetes mellitus with renal manifestations 7/3/2012       Past Surgical History:   Procedure Laterality Date    APPENDECTOMY      CARDIAC SURGERY  2002    CABG    CHOLECYSTECTOMY      CORONARY ANGIOPLASTY  2004    CORONARY ARTERY BYPASS GRAFT      EYE SURGERY      cataracts bilaterally    FRACTURE SURGERY      right ankle    HYSTERECTOMY      TONSILLECTOMY         Review of patient's allergies indicates:   Allergen Reactions    Percodan [oxycodone-aspirin] Hives     Welps     Pcn [penicillins] Hives and Swelling     Tolerated Rocephin IM in clinic      Phenergan [promethazine] Other (See Comments)     Altered mental status; jerking of extremities       Current Facility-Administered Medications on File Prior to Encounter   Medication    [COMPLETED] heparin 25,000 units in dextrose 5% 250 mL (100 units/mL) bolus from bag; INITIAL BOLUS DOSE    [COMPLETED] morphine  injection 2 mg    [COMPLETED] morphine injection 2 mg    [COMPLETED] ondansetron disintegrating tablet 4 mg    [COMPLETED] ondansetron disintegrating tablet 4 mg    [DISCONTINUED] heparin 25,000 units in dextrose 5% 250 mL (100 units/mL) infusion; FEMALE    [DISCONTINUED] morphine injection 2 mg     Current Outpatient Prescriptions on File Prior to Encounter   Medication Sig    ACCU-CHEK MARI PLUS METER Misc 1 Device by Misc.(Non-Drug; Combo Route) route 2 (two) times daily.    arm brace Misc 1 application by Misc.(Non-Drug; Combo Route) route once daily. Carpal tunnel wrist brace for both left and right wrists    aspirin 325 MG tablet Take 1 tablet (325 mg total) by mouth once daily.    atorvastatin (LIPITOR) 80 MG tablet TAKE 1 TABLET EVERY DAY (Patient taking differently: TAKE 1 TABLET EVERY DAY, morning)    blood sugar diagnostic Strp 1 strip by Misc.(Non-Drug; Combo Route) route 4 (four) times daily.    calcitRIOL (ROCALTROL) 0.25 MCG Cap Take 1 capsule (0.25 mcg total) by mouth once daily. (Patient taking differently: Take 0.25 mcg by mouth every morning. )    clopidogrel (PLAVIX) 75 mg tablet TAKE 1 TABLET EVERY DAY (Patient taking differently: TAKE 1 TABLET EVERY DAY, morning)    furosemide (LASIX) 40 MG tablet One tab twice a day.    hydrocodone-acetaminophen 5-325mg (NORCO) 5-325 mg per tablet Take 1 tablet by mouth every 6 (six) hours as needed for Pain.    insulin glargine (LANTUS) 100 unit/mL injection Inject 20 Units into the skin every evening. (Patient taking differently: Inject 20 Units into the skin every morning. )    insulin syringe-needle U-100 1 mL 31 gauge x 5/16 Syrg 1 application by Misc.(Non-Drug; Combo Route) route once daily.    KIONEX, WITH SORBITOL, 15-19.3 gram/60 mL Susp TAKE 60 MLS (15 G TOTAL) BY MOUTH ONCE DAILY.    lancets (ACCU-CHEK SOFTCLIX LANCETS) Misc 1 application by Misc.(Non-Drug; Combo Route) route 4 (four) times daily with meals and nightly.     metoprolol tartrate (LOPRESSOR) 100 MG tablet Take 1 tablet (100 mg total) by mouth 2 (two) times daily.    nitroGLYCERIN (NITROSTAT) 0.3 MG SL tablet Place 1 tablet (0.3 mg total) under the tongue every 5 (five) minutes as needed for Chest pain.    sodium polystyrene (KAYEXALATE) 15 gram/60 mL Susp Take 15 g today and 15g tomorrow.     Family History     Problem Relation (Age of Onset)    Breast cancer Maternal Aunt    Cancer Mother    Dementia Father    Diabetes Daughter    Heart disease Mother, Father    Hypertension Mother, Father, Sister, Brother    No Known Problems Son    Psoriasis Father    Stroke Sister        Social History Main Topics    Smoking status: Never Smoker    Smokeless tobacco: Never Used    Alcohol use No    Drug use: No    Sexual activity: No     Review of Systems   Constitution: Negative for chills and fever.   HENT: Negative for hoarse voice and sore throat.    Eyes: Negative for pain and redness.   Cardiovascular: Positive for chest pain. Negative for dyspnea on exertion, leg swelling and orthopnea.   Respiratory: Negative for cough and shortness of breath.    Endocrine: Negative for polydipsia and polyuria.   Hematologic/Lymphatic: Negative for bleeding problem. Does not bruise/bleed easily.   Skin: Negative for flushing and itching.   Musculoskeletal: Negative for joint pain and joint swelling.   Gastrointestinal: Negative for hematemesis, hematochezia and melena.   Genitourinary: Negative for dysuria and hematuria.   Neurological: Negative for light-headedness and loss of balance.     Objective:     Vital Signs (Most Recent):  Temp: 97.7 °F (36.5 °C) (06/04/18 0901)  Pulse: 69 (06/04/18 1033)  Resp: 18 (06/04/18 1000)  BP: 137/63 (06/04/18 1032)  SpO2: 96 % (06/04/18 1032) Vital Signs (24h Range):  Temp:  [96.4 °F (35.8 °C)-98.1 °F (36.7 °C)] 97.7 °F (36.5 °C)  Pulse:  [67-78] 69  Resp:  [11-21] 18  SpO2:  [91 %-100 %] 96 %  BP: (118-160)/(57-82) 137/63     Weight: 77.1 kg (169 lb  15.6 oz)  Body mass index is 29.09 kg/m².    SpO2: 96 %  O2 Device (Oxygen Therapy): nasal cannula    No intake or output data in the 24 hours ending 06/04/18 1103    Lines/Drains/Airways     Drain                 Hemodialysis AV Fistula Left upper arm -- days          Peripheral Intravenous Line                 Peripheral IV - Single Lumen 06/04/18 0237 Right Hand less than 1 day                Physical Exam   Constitutional: She is oriented to person, place, and time. She appears well-developed and well-nourished.   HENT:   Head: Normocephalic and atraumatic.   Mouth/Throat: No oropharyngeal exudate.   Eyes: EOM are normal. Pupils are equal, round, and reactive to light.   Neck: Normal range of motion. Neck supple. JVD present.   Cardiovascular: Normal rate and regular rhythm.  Exam reveals no friction rub.    No murmur heard.  Pulses:       Carotid pulses are 2+ on the right side, and 2+ on the left side.       Radial pulses are 2+ on the right side, and 2+ on the left side.        Dorsalis pedis pulses are 2+ on the right side, and 2+ on the left side.        Posterior tibial pulses are 1+ on the right side, and 1+ on the left side.   Pulmonary/Chest: Effort normal. No respiratory distress. She has rales.   Abdominal: Soft. Bowel sounds are normal. She exhibits no distension.   Musculoskeletal: Normal range of motion. She exhibits no edema.   Neurological: She is alert and oriented to person, place, and time.   Skin: No rash noted.       Significant Labs: All pertinent lab results from the last 24 hours have been reviewed.    Significant Imaging: EKG: as above    Assessment and Plan:     NSTEMI (non-ST elevated myocardial infarction)    - Pt currently CP free and HR and BP well controlled. Her story sounds typical for angina and troponins are continuing to rise. I rec an angiogram as it is very likely she has repeat ISR of LCx/OM stents likely worsened by higher output after opening AVF. She understands the risk  of LETICIA leading to ASHLEY and possibly necessitating HD but she is nearing HD anyway and the benefits of treating her ACS outweigh these risk  - Will consult radiation oncology in case we need brachytherapy  - Load with plavix 300mg as she on it as outpt  - Would cont ASA, Plavix, hep gtt, Lipitor, metorpolol  - Will add Imdur 30mg QD and give 80mg IV lasix given elevated JVP and edema on CXR  - Consult nephrology  - F/U 2D echo.  - R CFA access with 6F sheath  Admit to Cardiology CoManagement.     The risks (including death, heart attack, limb loss, paralysis, emergency surgery, bleeding, renal failure, and stroke), the potential benefits of the procedure, and the alternatives to the procedure, were discussed in detail and accepted by the patient. All questions were answered. Patient agrees to proceed.              VTE Risk Mitigation         Ordered     heparin 25,000 units in dextrose 5% 250 mL (100 units/mL) bolus from bag; ADDITIONAL PRN BOLUS  As needed (PRN)      06/04/18 1031     heparin 25,000 units in dextrose 5% 250 mL (100 units/mL) bolus from bag; ADDITIONAL PRN BOLUS  As needed (PRN)      06/04/18 1031          Thank you for your consult. I will follow-up with patient. Please contact us if you have any additional questions.    Umang Schmitt MD  Cardiology   [facility]

## 2018-06-05 PROBLEM — N17.9 ACUTE RENAL FAILURE SUPERIMPOSED ON STAGE 5 CHRONIC KIDNEY DISEASE, NOT ON CHRONIC DIALYSIS: Status: ACTIVE | Noted: 2018-06-05

## 2018-06-05 PROBLEM — N18.5 ACUTE RENAL FAILURE SUPERIMPOSED ON STAGE 5 CHRONIC KIDNEY DISEASE, NOT ON CHRONIC DIALYSIS: Status: ACTIVE | Noted: 2018-06-05

## 2018-06-05 LAB
ALBUMIN SERPL BCP-MCNC: 3.2 G/DL
ALP SERPL-CCNC: 107 U/L
ALT SERPL W/O P-5'-P-CCNC: 14 U/L
ANION GAP SERPL CALC-SCNC: 11 MMOL/L
ANION GAP SERPL CALC-SCNC: 8 MMOL/L
APTT BLDCRRT: 22.8 SEC
AST SERPL-CCNC: 25 U/L
BACTERIA #/AREA URNS AUTO: NORMAL /HPF
BASOPHILS # BLD AUTO: 0.06 K/UL
BASOPHILS # BLD AUTO: 0.06 K/UL
BASOPHILS NFR BLD: 0.4 %
BASOPHILS NFR BLD: 0.5 %
BILIRUB SERPL-MCNC: 0.4 MG/DL
BILIRUB UR QL STRIP: NEGATIVE
BUN SERPL-MCNC: 72 MG/DL
BUN SERPL-MCNC: 74 MG/DL
CALCIUM SERPL-MCNC: 8.4 MG/DL
CALCIUM SERPL-MCNC: 8.5 MG/DL
CHLORIDE SERPL-SCNC: 104 MMOL/L
CHLORIDE SERPL-SCNC: 105 MMOL/L
CHOLEST SERPL-MCNC: 127 MG/DL
CHOLEST/HDLC SERPL: 4.5 {RATIO}
CLARITY UR REFRACT.AUTO: ABNORMAL
CO2 SERPL-SCNC: 22 MMOL/L
CO2 SERPL-SCNC: 24 MMOL/L
COLOR UR AUTO: YELLOW
CREAT SERPL-MCNC: 3.1 MG/DL
CREAT SERPL-MCNC: 3.2 MG/DL
CREAT UR-MCNC: 94 MG/DL
CREAT UR-MCNC: 94 MG/DL
DIASTOLIC DYSFUNCTION: YES
DIFFERENTIAL METHOD: ABNORMAL
DIFFERENTIAL METHOD: ABNORMAL
EOSINOPHIL # BLD AUTO: 0.2 K/UL
EOSINOPHIL # BLD AUTO: 0.3 K/UL
EOSINOPHIL NFR BLD: 1.1 %
EOSINOPHIL NFR BLD: 2 %
ERYTHROCYTE [DISTWIDTH] IN BLOOD BY AUTOMATED COUNT: 13.2 %
ERYTHROCYTE [DISTWIDTH] IN BLOOD BY AUTOMATED COUNT: 13.4 %
EST. GFR  (AFRICAN AMERICAN): 16.1 ML/MIN/1.73 M^2
EST. GFR  (AFRICAN AMERICAN): 16.7 ML/MIN/1.73 M^2
EST. GFR  (NON AFRICAN AMERICAN): 13.9 ML/MIN/1.73 M^2
EST. GFR  (NON AFRICAN AMERICAN): 14.5 ML/MIN/1.73 M^2
ESTIMATED AVG GLUCOSE: 209 MG/DL
ESTIMATED PA SYSTOLIC PRESSURE: 53.97
GLUCOSE SERPL-MCNC: 104 MG/DL
GLUCOSE SERPL-MCNC: 106 MG/DL
GLUCOSE UR QL STRIP: ABNORMAL
HBA1C MFR BLD HPLC: 8.9 %
HCT VFR BLD AUTO: 27 %
HCT VFR BLD AUTO: 27.1 %
HDLC SERPL-MCNC: 28 MG/DL
HDLC SERPL: 22 %
HGB BLD-MCNC: 8.5 G/DL
HGB BLD-MCNC: 8.6 G/DL
HGB UR QL STRIP: NEGATIVE
HYALINE CASTS UR QL AUTO: 1 /LPF
IMM GRANULOCYTES # BLD AUTO: 0.06 K/UL
IMM GRANULOCYTES # BLD AUTO: 0.07 K/UL
IMM GRANULOCYTES NFR BLD AUTO: 0.5 %
IMM GRANULOCYTES NFR BLD AUTO: 0.5 %
KETONES UR QL STRIP: NEGATIVE
LDLC SERPL CALC-MCNC: 66.4 MG/DL
LEUKOCYTE ESTERASE UR QL STRIP: NEGATIVE
LYMPHOCYTES # BLD AUTO: 1.4 K/UL
LYMPHOCYTES # BLD AUTO: 2.2 K/UL
LYMPHOCYTES NFR BLD: 10.1 %
LYMPHOCYTES NFR BLD: 17.6 %
MCH RBC QN AUTO: 30.7 PG
MCH RBC QN AUTO: 30.9 PG
MCHC RBC AUTO-ENTMCNC: 31.5 G/DL
MCHC RBC AUTO-ENTMCNC: 31.7 G/DL
MCV RBC AUTO: 98 FL
MCV RBC AUTO: 98 FL
MICROSCOPIC COMMENT: NORMAL
MONOCYTES # BLD AUTO: 1.1 K/UL
MONOCYTES # BLD AUTO: 1.2 K/UL
MONOCYTES NFR BLD: 8.8 %
MONOCYTES NFR BLD: 9.1 %
NEUTROPHILS # BLD AUTO: 10.9 K/UL
NEUTROPHILS # BLD AUTO: 8.6 K/UL
NEUTROPHILS NFR BLD: 70.3 %
NEUTROPHILS NFR BLD: 79.1 %
NITRITE UR QL STRIP: NEGATIVE
NONHDLC SERPL-MCNC: 99 MG/DL
NRBC BLD-RTO: 0 /100 WBC
NRBC BLD-RTO: 0 /100 WBC
OSMOLALITY UR: 344 MOSM/KG
PH UR STRIP: 5 [PH] (ref 5–8)
PLATELET # BLD AUTO: 222 K/UL
PLATELET # BLD AUTO: 223 K/UL
PMV BLD AUTO: 11.6 FL
PMV BLD AUTO: 11.9 FL
POC ACTIVATED CLOTTING TIME K: 213 SEC (ref 74–137)
POC ACTIVATED CLOTTING TIME K: 219 SEC (ref 74–137)
POC ACTIVATED CLOTTING TIME K: 378 SEC (ref 74–137)
POCT GLUCOSE: 143 MG/DL (ref 70–110)
POCT GLUCOSE: 159 MG/DL (ref 70–110)
POCT GLUCOSE: 217 MG/DL (ref 70–110)
POCT GLUCOSE: 88 MG/DL (ref 70–110)
POTASSIUM SERPL-SCNC: 4.9 MMOL/L
POTASSIUM SERPL-SCNC: 5 MMOL/L
PROT SERPL-MCNC: 6.2 G/DL
PROT UR QL STRIP: ABNORMAL
PROT UR-MCNC: 135 MG/DL
PROT UR-MCNC: 135 MG/DL
PROT/CREAT RATIO, UR: 1.44
PROT/CREAT RATIO, UR: 1.44
RBC # BLD AUTO: 2.77 M/UL
RBC # BLD AUTO: 2.78 M/UL
RBC #/AREA URNS AUTO: 2 /HPF (ref 0–4)
RETIRED EF AND QEF - SEE NOTES: 65 (ref 55–65)
SAMPLE: ABNORMAL
SODIUM SERPL-SCNC: 137 MMOL/L
SODIUM SERPL-SCNC: 137 MMOL/L
SODIUM UR-SCNC: <20 MMOL/L
SP GR UR STRIP: 1.02 (ref 1–1.03)
SQUAMOUS #/AREA URNS AUTO: 1 /HPF
TRICUSPID VALVE REGURGITATION: ABNORMAL
TRIGL SERPL-MCNC: 163 MG/DL
URN SPEC COLLECT METH UR: ABNORMAL
UROBILINOGEN UR STRIP-ACNC: NEGATIVE EU/DL
WBC # BLD AUTO: 12.24 K/UL
WBC # BLD AUTO: 13.81 K/UL
WBC #/AREA URNS AUTO: 1 /HPF (ref 0–5)

## 2018-06-05 PROCEDURE — 83036 HEMOGLOBIN GLYCOSYLATED A1C: CPT

## 2018-06-05 PROCEDURE — 85730 THROMBOPLASTIN TIME PARTIAL: CPT

## 2018-06-05 PROCEDURE — 93306 TTE W/DOPPLER COMPLETE: CPT | Mod: 26,,, | Performed by: INTERNAL MEDICINE

## 2018-06-05 PROCEDURE — 80048 BASIC METABOLIC PNL TOTAL CA: CPT

## 2018-06-05 PROCEDURE — 81001 URINALYSIS AUTO W/SCOPE: CPT

## 2018-06-05 PROCEDURE — C8929 TTE W OR WO FOL WCON,DOPPLER: HCPCS

## 2018-06-05 PROCEDURE — 36415 COLL VENOUS BLD VENIPUNCTURE: CPT

## 2018-06-05 PROCEDURE — 93005 ELECTROCARDIOGRAM TRACING: CPT

## 2018-06-05 PROCEDURE — 82570 ASSAY OF URINE CREATININE: CPT

## 2018-06-05 PROCEDURE — 83935 ASSAY OF URINE OSMOLALITY: CPT

## 2018-06-05 PROCEDURE — 80061 LIPID PANEL: CPT

## 2018-06-05 PROCEDURE — 25000003 PHARM REV CODE 250: Performed by: HOSPITALIST

## 2018-06-05 PROCEDURE — 99233 SBSQ HOSP IP/OBS HIGH 50: CPT | Mod: ,,, | Performed by: HOSPITALIST

## 2018-06-05 PROCEDURE — 99223 1ST HOSP IP/OBS HIGH 75: CPT | Mod: GC,,, | Performed by: INTERNAL MEDICINE

## 2018-06-05 PROCEDURE — 20600001 HC STEP DOWN PRIVATE ROOM

## 2018-06-05 PROCEDURE — 84300 ASSAY OF URINE SODIUM: CPT

## 2018-06-05 PROCEDURE — 85025 COMPLETE CBC W/AUTO DIFF WBC: CPT | Mod: 91

## 2018-06-05 PROCEDURE — 93010 ELECTROCARDIOGRAM REPORT: CPT | Mod: ,,, | Performed by: INTERNAL MEDICINE

## 2018-06-05 PROCEDURE — 25000003 PHARM REV CODE 250: Performed by: INTERNAL MEDICINE

## 2018-06-05 PROCEDURE — 80053 COMPREHEN METABOLIC PANEL: CPT

## 2018-06-05 PROCEDURE — 63600175 PHARM REV CODE 636 W HCPCS: Performed by: HOSPITALIST

## 2018-06-05 RX ORDER — ASPIRIN 81 MG/1
81 TABLET ORAL DAILY
Status: DISCONTINUED | OUTPATIENT
Start: 2018-06-06 | End: 2018-06-12 | Stop reason: HOSPADM

## 2018-06-05 RX ORDER — CLOPIDOGREL BISULFATE 75 MG/1
75 TABLET ORAL DAILY
Status: DISCONTINUED | OUTPATIENT
Start: 2018-06-06 | End: 2018-06-12 | Stop reason: HOSPADM

## 2018-06-05 RX ADMIN — METOPROLOL TARTRATE 100 MG: 100 TABLET ORAL at 09:06

## 2018-06-05 RX ADMIN — CLOPIDOGREL 75 MG: 75 TABLET, FILM COATED ORAL at 09:06

## 2018-06-05 RX ADMIN — ASPIRIN 325 MG ORAL TABLET 325 MG: 325 PILL ORAL at 09:06

## 2018-06-05 RX ADMIN — ISOSORBIDE MONONITRATE 30 MG: 30 TABLET, EXTENDED RELEASE ORAL at 09:06

## 2018-06-05 RX ADMIN — ATORVASTATIN CALCIUM 80 MG: 20 TABLET, FILM COATED ORAL at 09:06

## 2018-06-05 RX ADMIN — INSULIN ASPART 1 UNITS: 100 INJECTION, SOLUTION INTRAVENOUS; SUBCUTANEOUS at 09:06

## 2018-06-05 RX ADMIN — INSULIN DETEMIR 20 UNITS: 100 INJECTION, SOLUTION SUBCUTANEOUS at 09:06

## 2018-06-05 RX ADMIN — ONDANSETRON 8 MG: 8 TABLET, ORALLY DISINTEGRATING ORAL at 07:06

## 2018-06-05 RX ADMIN — PANTOPRAZOLE SODIUM 40 MG: 40 TABLET, DELAYED RELEASE ORAL at 09:06

## 2018-06-05 RX ADMIN — NITROGLYCERIN 0.3 MG: 0.4 TABLET SUBLINGUAL at 07:06

## 2018-06-05 RX ADMIN — CALCITRIOL 0.25 MCG: 0.25 CAPSULE, LIQUID FILLED ORAL at 09:06

## 2018-06-05 RX ADMIN — METOPROLOL TARTRATE 100 MG: 100 TABLET ORAL at 07:06

## 2018-06-05 NOTE — PROGRESS NOTES
Hospital Medicine   Progress note   Team: [facility] Emily Allen MD   Admit Date: 6/4/2018   NENA 6/6/2018   Code status: Full Code   Principal Problem: NSTEMI (non-ST elevated myocardial infarction)     Interval hx: s/p BETHANY to OM1 yesterday (Natanael), feeling well except for some fatigue, sitting up in chair all day.  H/H decreased to mid-8 and 27 (from 10 and 31), no active bleed, VSS and no signs of RP hematoma or pseudoaneurysm, just small ecchymosis of groin. Discussed with Dr Santoyo. Continue to monitor H/H.     ROS   Respiratory: negative for cough, shortness of breath   Cardiovascular: negative for chest discomfort, palpitations   Gastrointestinal: negative for nausea or vomiting, abdominal pain, constipation, diarrhea     PEx   Temp:  [97.9 °F (36.6 °C)-98.5 °F (36.9 °C)]   Pulse:  [68-91]   Resp:  [16-20]   BP: (110-142)/(55-65)   SpO2:  [92 %-96 %]      I & O (Last 24H):     Intake/Output Summary (Last 24 hours) at 06/05/18 1015  Last data filed at 06/05/18 0500   Gross per 24 hour   Intake             1520 ml   Output             1475 ml   Net               45 ml       General Appearance: no acute distress, very pleasant   Heart: regular rate and rhythm   Respiratory: Normal respiratory effort, no crackles   Abdomen: Soft, non-tender; bowel sounds active   Skin: intact. IV sites ok. R groin: 4 x 5 cm ecchymosis of medial groin, no mass/fullness, no exquisite TTP, no bruit  Neurologic: No focal numbness or weakness   Mental status: Alert, oriented x 4, affect appropriate, memory intact     Recent Results (from the past 24 hour(s))   Anti-Xa Heparin Monitoring    Collection Time: 06/04/18 10:36 AM   Result Value Ref Range    Heparin Anti-Xa >1.86 (H) 0.30 - 0.70 IU/mL   Troponin I    Collection Time: 06/04/18 10:36 AM   Result Value Ref Range    Troponin I 2.904 (H) 0.000 - 0.026 ng/mL   Type & Screen    Collection Time: 06/04/18 11:58 AM   Result Value Ref Range    Group & Rh O POS     Indirect  [place]hai NEG    ISTAT ACT-K    Collection Time: 06/04/18  2:45 PM   Result Value Ref Range    POC ACTIVATED CLOTTING TIME K 213 (H) 74 - 137 sec    Sample ARTERIAL    ISTAT ACT-K    Collection Time: 06/04/18  3:00 PM   Result Value Ref Range    POC ACTIVATED CLOTTING TIME K 378 (H) 74 - 137 sec    Sample ARTERIAL    ISTAT ACT-K    Collection Time: 06/04/18  3:17 PM   Result Value Ref Range    POC ACTIVATED CLOTTING TIME K 219 (H) 74 - 137 sec    Sample ARTERIAL    Cath lab procedure    Collection Time: 06/04/18  4:29 PM   Result Value Ref Range    Coronary Stenosis >= 50% (A)     Coronary Stent Yes (A)    POCT glucose    Collection Time: 06/04/18  5:52 PM   Result Value Ref Range    POCT Glucose 114 (H) 70 - 110 mg/dL   Troponin I    Collection Time: 06/04/18  5:53 PM   Result Value Ref Range    Troponin I 3.728 (H) 0.000 - 0.026 ng/mL   POCT glucose    Collection Time: 06/04/18  9:43 PM   Result Value Ref Range    POCT Glucose 113 (H) 70 - 110 mg/dL   CBC auto differential    Collection Time: 06/05/18 12:12 AM   Result Value Ref Range    WBC 12.24 3.90 - 12.70 K/uL    RBC 2.78 (L) 4.00 - 5.40 M/uL    Hemoglobin 8.6 (L) 12.0 - 16.0 g/dL    Hematocrit 27.1 (L) 37.0 - 48.5 %    MCV 98 82 - 98 fL    MCH 30.9 27.0 - 31.0 pg    MCHC 31.7 (L) 32.0 - 36.0 g/dL    RDW 13.2 11.5 - 14.5 %    Platelets 223 150 - 350 K/uL    MPV 11.9 9.2 - 12.9 fL    Immature Granulocytes 0.5 0.0 - 0.5 %    Gran # (ANC) 8.6 (H) 1.8 - 7.7 K/uL    Immature Grans (Abs) 0.06 (H) 0.00 - 0.04 K/uL    Lymph # 2.2 1.0 - 4.8 K/uL    Mono # 1.1 (H) 0.3 - 1.0 K/uL    Eos # 0.3 0.0 - 0.5 K/uL    Baso # 0.06 0.00 - 0.20 K/uL    nRBC 0 0 /100 WBC    Gran% 70.3 38.0 - 73.0 %    Lymph% 17.6 (L) 18.0 - 48.0 %    Mono% 9.1 4.0 - 15.0 %    Eosinophil% 2.0 0.0 - 8.0 %    Basophil% 0.5 0.0 - 1.9 %    Differential Method Automated    Comprehensive metabolic panel    Collection Time: 06/05/18  5:30 AM   Result Value Ref Range    Sodium 137 136 - 145 mmol/L    Potassium  4.9 3.5 - 5.1 mmol/L    Chloride 104 95 - 110 mmol/L    CO2 22 (L) 23 - 29 mmol/L    Glucose 106 70 - 110 mg/dL    BUN, Bld 72 (H) 8 - 23 mg/dL    Creatinine 3.1 (H) 0.5 - 1.4 mg/dL    Calcium 8.5 (L) 8.7 - 10.5 mg/dL    Total Protein 6.2 6.0 - 8.4 g/dL    Albumin 3.2 (L) 3.5 - 5.2 g/dL    Total Bilirubin 0.4 0.1 - 1.0 mg/dL    Alkaline Phosphatase 107 55 - 135 U/L    AST 25 10 - 40 U/L    ALT 14 10 - 44 U/L    Anion Gap 11 8 - 16 mmol/L    eGFR if African American 16.7 (A) >60 mL/min/1.73 m^2    eGFR if non African American 14.5 (A) >60 mL/min/1.73 m^2   APTT    Collection Time: 06/05/18  5:30 AM   Result Value Ref Range    aPTT 22.8 21.0 - 32.0 sec   Lipid panel    Collection Time: 06/05/18  5:30 AM   Result Value Ref Range    Cholesterol 127 120 - 199 mg/dL    Triglycerides 163 (H) 30 - 150 mg/dL    HDL 28 (L) 40 - 75 mg/dL    LDL Cholesterol 66.4 63.0 - 159.0 mg/dL    HDL/Chol Ratio 22.0 20.0 - 50.0 %    Total Cholesterol/HDL Ratio 4.5 2.0 - 5.0    Non-HDL Cholesterol 99 mg/dL   Basic metabolic panel    Collection Time: 06/05/18  5:30 AM   Result Value Ref Range    Sodium 137 136 - 145 mmol/L    Potassium 5.0 3.5 - 5.1 mmol/L    Chloride 105 95 - 110 mmol/L    CO2 24 23 - 29 mmol/L    Glucose 104 70 - 110 mg/dL    BUN, Bld 74 (H) 8 - 23 mg/dL    Creatinine 3.2 (H) 0.5 - 1.4 mg/dL    Calcium 8.4 (L) 8.7 - 10.5 mg/dL    Anion Gap 8 8 - 16 mmol/L    eGFR if African American 16.1 (A) >60 mL/min/1.73 m^2    eGFR if non African American 13.9 (A) >60 mL/min/1.73 m^2   Hemoglobin A1c    Collection Time: 06/05/18  5:31 AM   Result Value Ref Range    Hemoglobin A1C 8.9 (H) 4.0 - 5.6 %    Estimated Avg Glucose 209 (H) 68 - 131 mg/dL         Recent Labs  Lab 05/30/18  1028 05/30/18  1359 06/04/18  1752 06/04/18  2143   POCTGLUCOSE 140* 90 114* 113*            Active Hospital Problems    Diagnosis  POA    *NSTEMI (non-ST elevated myocardial infarction) [I21.4]  Yes    CKD (chronic kidney disease), stage V [N18.5]  Unknown     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 H/o CABG X2 2002, stent RCA, OM1 2003, NSTEMI with total occlusion of LAD in 2012.       Obesity (BMI 30.0-34.9) [E66.9]  Yes    Diabetic polyneuropathy associated with type 2 diabetes mellitus [E11.42]  Yes     Stocking/glove pattern intermittent neuropathy      Hypertension associated with diabetes [E11.59, I10]  Yes     BP controlled on BB, diuretic. ARB discontinued by Renal due to hyperkalemia        Resolved Hospital Problems    Diagnosis Date Resolved POA   No resolved problems to display.        Overview:  72F with CAD/CABGx1 '02 LIMA-LAD with multiple subsequent PCIs last in 2/2017 with pLIMA-LAD, patent RCA stents, severe ISR of mid LCX s/p 2.75mm BETHANY(Resolute) and severe ISR of OM s/p PCI with 2.5mm BETHANY(Resolute) and distal 90% RCA lesion(had SPECT with Lateral ischemia at that time), HTN, HLD, DM-2 uncontrolled (A1C 8.9) with neuro/ophtho/renal manifestations, CKD-5 approaching ESRD, LUE Brachiocephalic fistula created 4/16/18 s/p PTA for stenosis 5/30/18, chronic dCHF, pAF, h/o stroke, anemia in CKD, and GERD, who presented to [facility] ED for left sided chest pain radiating to left arm that awoke her from sleep last night. Pain resolved with morphine and nitro paste. Transferred to [facility] ED for higher level of care and cardiology consult. Upon admit, pain free.  Given 325mg ASA and placed on hep gtt. EKG with NSR and lat ST T wave abnormality that are old. Trop  .09-->.67-->2.9. She is not currently on HD and doesnt have any other access than fistula which is not mature.      Pt reports that since the recent PTA to LUE AVF on 5/30, she has been experiencing exertional angina like prior to previous stents with exertional and improved with rest and Nitro. Last night was the first time it occurred at rest, so she went to ED.       Assessment and Plan for problems addressed today:   NSTEMI. Trop peaked at 3.728.  KEDAR score of 5. ACS protocol started.  S/p emergent LHC by Dr Santoyo Interventional Cardiology  -  -->switch to 81 for now given possible bleed  - continue plavix for now. Avoid switching to brilinta at this time as would need to re-load with antiplatelet, and she has a possible bleed into ecchymosis. Discussed with Dr Santoyo.  - home atorva  - home BB metop 100 BID  - no ACE-I or ARB since dynamic renal function  - Added Imdur 30mg QD   - Nitro and morphine PRN    Anemia of chronic disease/CKD-5  Acute blood loss anemia, likely due to R groin ecchymosis at femoral cath site. +fatigue but VSS  - H/H 8.6/27 today, decreased from 10/31.5. Repeated to ensure not erroneous  - monitor closely with serial H/H.   - no indication for transfusion at this time.    Acute on chronic dCHF- resolved - on admit, she had elevated JVP and edema on CXR. Upon admit, given 80mg IV  - strict Is/Os   - daily weights, preferably standing   - fluid restrict 1.5L while inpatient  - tele  - keep Mg >2, K >4    Chronic Kidney Disease Stage V approaching ESRD.  Not yet on HD, had LUE AV graft s/p fistulogram, no other ports.  - f/u nephrology consult re: if HD needed s/p cath   - monitor closely for LETICIA  - d/w nephrology re: timeframe for monitoring for LETICIA, hoe many days does she need to stay inpatient?    DM-2 uncontrolled. Home meds: lantus 20 qhs  - continue lantus 20 qhs  - caution for insulin stacking in low GFR     HTN   - controlled on meds    GERD - PPI     aspirin  325 mg Oral  Daily    atorvastatin  80 mg Oral Daily    calcitRIOL  0.25 mcg Oral Daily    clopidogrel  75 mg Oral Daily    insulin detemir U-100  20 Units Subcutaneous QHS    isosorbide mononitrate  30 mg Oral Daily    metoprolol tartrate  100 mg Oral BID    pantoprazole  40 mg Oral Daily     dextrose 50%, dextrose 50%, glucagon (human recombinant), glucose, glucose, HYDROcodone-acetaminophen, insulin aspart U-100, nitroGLYCERIN, ondansetron    DVT PPx: ambulate; no heparin while possible active bleed    Discharge plan and follow up: pending clinical condition and nephrology recs    Emily Allen MD  [facility] Medicine Staff  [phone] pager

## 2018-06-05 NOTE — HPI
72F with hx of DM2 uncontrolled (a1c 9.0) with retinopathy and nephropathy CKD V with LUE Brachiocephalic fistula created 4/16/18 s/p PTA for stenosis 5/30/18, renovascular HTN, mixed HLD, extensive CAD s/p CABGx1 2002 GALLEGOS-LAD with multiple subsequent PCI's last in 2/2017 with pLIMA-LAD, patent RCA stents, severe ISR of mid LCX s/p 2.75mm BETHANY and severe ISR of OM s/p PCI with 2.5mm BETHANY and distal 90% RCA lesion (had SPECT with lateral ischemia at that time) admitted 6/4 with NSTEMI s/p PCI. Nephrology consulted for ASHLEY on CKD5 requiring LHC. Baseline Cr 1.8-2.2. On admit Cr was 3.2. Received IVFs prior to and following LHC. Kidney function stable this AM with BUN/Cr= 72/3.1, with good urine output.

## 2018-06-05 NOTE — PROGRESS NOTES
Patient complain of numbness to right hand first three fingers, radial pulse +2, no swelling noted. Neuro assessment negative-hand  strong bilateral, no arm numbness, no facial drooping noted. S/p LHC-right groin site access. Small amount bleeding noted at shift change. Dr. Garcia aware. Day nurse held pressure and placed a pressure bag. No hematoma noted, Pulse +2, changed dressing at site, instructed to patient to keep leg straight and head of bed low, vital signs stable. Not[place] Stout [place] [facility] [place], ordered to continue to monitor.

## 2018-06-05 NOTE — PLAN OF CARE
Problem: Patient Care Overview  Goal: Plan of Care Review  Outcome: Ongoing (interventions implemented as appropriate)  Patient free of falls/traumas/injuries. Skin clean, dry, and intact. Groin sites, CDI with no signs of bleeding or hematoma, possible DC today. Patient educated on plan of care and verbalized understanding. Patients VS stable and no distress. Will continue to monitor.

## 2018-06-05 NOTE — PLAN OF CARE
06/05/18 1008   Discharge Assessment   Assessment Type Discharge Planning Assessment   Confirmed/corrected address and phone number on facesheet? Yes   Assessment information obtained from? Patient;Medical Record   Expected Length of Stay (days) 2   Communicated expected length of stay with patient/caregiver yes   Prior to hospitilization cognitive status: Alert/Oriented   Prior to hospitalization functional status: Independent   Current cognitive status: Alert/Oriented   Current Functional Status: Independent   Lives With alone   Able to Return to Prior Arrangements yes   Is patient able to care for self after discharge? Yes   Patient's perception of discharge disposition home or selfcare   Readmission Within The Last 30 Days no previous admission in last 30 days   Patient currently being followed by outpatient case management? No   Patient currently receives any other outside agency services? No   Equipment Currently Used at Home cane, straight;grab bar   Do you have any problems affording any of your prescribed medications? No   Is the patient taking medications as prescribed? yes   Does the patient have transportation home? Yes   Transportation Available family or friend will provide   Does the patient receive services at the [facility]? No   Discharge Plan A Home with family   Admitted with NSTEMI. Lives alone and is independent in her ADLs. Plan is to DC home. No DC needs identified.

## 2018-06-05 NOTE — PROGRESS NOTES
Consent obtained. optison given ivp via right a/c sl for imaging. Denies transfusion rxn. Tolerated well. Sl flushed before and after with ns.

## 2018-06-05 NOTE — PROGRESS NOTES
Patient complained of numbness again to right hand first three fingers, performed a neuro assessment it was negative except for the patient verbal compliant, radial pulse +2, strong bilateral hand , no facial drooping noted, moving all extremities.  S/p LHC-right groin access. Notified Dr. Stout on call for C tonight, ordered to continue to monitor and reposition the patient extremity. Removed blood pressure from RUE & IV line. Will reassess and continue to monitor.

## 2018-06-05 NOTE — CONSULTS
[facility]  Nephrology  Consult Note    Patient Name: Zoey Ham  MRN: [id number]  Admission Date: 6/4/2018  Hospital Length of Stay: 1 days  Attending Provider: Emily Allen MD   Primary Care Physician: Cesia Rosales MD  Principal Problem:NSTEMI (non-ST elevated myocardial infarction)    Consults  Subjective:     HPI: 72F with hx of DM2 uncontrolled (a1c 9.0) with retinopathy and nephropathy CKD V with LUE Brachiocephalic fistula created 4/16/18 s/p PTA for stenosis 5/30/18, renovascular HTN, mixed HLD, extensive CAD s/p CABGx1 2002 GALLEGOS-LAD with multiple subsequent PCI's last in 2/2017 with pLIMA-LAD, patent RCA stents, severe ISR of mid LCX s/p 2.75mm BETHANY and severe ISR of OM s/p PCI with 2.5mm BETHANY and distal 90% RCA lesion (had SPECT with lateral ischemia at that time) admitted 6/4 with NSTEMI s/p PCI. Nephrology consulted for ASHLEY on CKD5 requiring LHC. Baseline Cr 1.8-2.2. On admit Cr was 3.2. Received IVFs prior to and following LHC. Kidney function stable this AM with BUN/Cr= 72/3.1, with good urine output.     Past Medical History:   Diagnosis Date    Acute myocardial infarction of anterolateral wall 7/9/2015    Anemia of chronic renal failure, stage 4 (severe) 1/22/2015    Atherosclerosis of aorta 10/3/2012    Benign hypertension with CKD (chronic kidney disease) stage IV 3/11/2016    Chronic diastolic congestive heart failure 10/3/2012    Chronic kidney disease, stage IV (severe) 7/3/2012    Coronary artery disease     s/p 1v CABG 2002 and multiple PCI (last angiogram in 6/2012 with patent LIMA->LAD and patent LCx and RCA stents)    Diabetic polyneuropathy associated with type 2 diabetes mellitus 7/9/2015    Diabetic polyneuropathy associated with type 2 diabetes mellitus 7/9/2015    Encounter for blood transfusion     Gastritis without bleeding     Heart attack 09/2012    5-6 events    Hyperlipidemia     Hyperparathyroidism     Nephritis and nephropathy,  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 7/9/2015    NSTEMI (non-ST elevated myocardial infarction)     Occlusion and stenosis of carotid artery with cerebral infarction 7/9/2015    Osteopenia     PAF (paroxysmal atrial fibrillation) 10/3/2012    Pneumonia     Proliferative diabetic retinopathy 10/3/2012    Sepsis due to Escherichia coli with acute renal failure 2/2016    UTI     Type II diabetes mellitus with neurological manifestations 7/9/2015    Type II diabetes mellitus with renal manifestations 7/3/2012       Past Surgical History:   Procedure Laterality Date    APPENDECTOMY      CARDIAC SURGERY  2002    CABG    CHOLECYSTECTOMY      CORONARY ANGIOPLASTY  2004    CORONARY ARTERY BYPASS GRAFT      EYE SURGERY      cataracts bilaterally    FRACTURE SURGERY      right ankle    HYSTERECTOMY      TONSILLECTOMY         Review of patient's allergies indicates:   Allergen Reactions    Percodan [oxycodone-aspirin] Hives     Welps     Pcn [penicillins] Hives and Swelling     Tolerated Rocephin IM in clinic      Phenergan [promethazine] Other (See Comments)     Altered mental status; jerking of extremities     Current Facility-Administered Medications   Medication Frequency    aspirin tablet 325 mg Daily    atorvastatin tablet 80 mg Daily    calcitRIOL capsule 0.25 mcg Daily    [START ON 6/6/2018] clopidogrel tablet 75 mg Daily    dextrose 50% injection 12.5 g PRN    dextrose 50% injection 25 g PRN    glucagon (human recombinant) injection 1 mg PRN    glucose chewable tablet 16 g PRN    glucose chewable tablet 24 g PRN    HYDROcodone-acetaminophen 5-325 mg per tablet 1 tablet Q6H PRN    insulin aspart U-100 pen 0-5 Units QID (AC + HS) PRN    insulin detemir U-100 pen 20 Units QHS    isosorbide mononitrate 24 hr tablet 30 mg Daily    metoprolol tartrate (LOPRESSOR) tablet 100 mg BID    nitroGLYCERIN SL tablet 0.3 mg Q5 Min PRN    ondansetron disintegrating tablet 8 mg Q8H PRN    pantoprazole  EC tablet 40 mg Daily     Family History     Problem Relation (Age of Onset)    Breast cancer Maternal Aunt    Cancer Mother    Dementia Father    Diabetes Daughter    Heart disease Mother, Father    Hypertension Mother, Father, Sister, Brother    No Known Problems Son    Psoriasis Father    Stroke Sister        Social History Main Topics    Smoking status: Never Smoker    Smokeless tobacco: Never Used    Alcohol use No    Drug use: No    Sexual activity: No     Review of Systems   Constitutional: Negative for activity change, appetite change and chills.   HENT: Negative for congestion and sore throat.    Eyes: Negative for visual disturbance.   Cardiovascular: Positive for chest pain (resolved). Negative for leg swelling.   Gastrointestinal: Negative for abdominal pain, constipation and diarrhea.   Genitourinary: Negative for decreased urine volume, difficulty urinating, dysuria and hematuria.   Musculoskeletal: Negative for arthralgias.   Neurological: Negative for facial asymmetry and headaches.   Psychiatric/Behavioral: Negative for agitation and confusion.     Objective:     Vital Signs (Most Recent):  Temp: 98.7 °F (37.1 °C) (06/05/18 1247)  Pulse: 71 (06/05/18 1400)  Resp: 18 (06/05/18 1247)  BP: (!) 122/56 (06/05/18 1247)  SpO2: (!) 94 % (06/05/18 1247)  O2 Device (Oxygen Therapy): room air (06/05/18 1247) Vital Signs (24h Range):  Temp:  [97.9 °F (36.6 °C)-98.7 °F (37.1 °C)] 98.7 °F (37.1 °C)  Pulse:  [67-91] 71  Resp:  [16-20] 18  SpO2:  [92 %-96 %] 94 %  BP: (110-142)/(56-65) 122/56     Weight: 82.1 kg (181 lb) (Previous weight stated by patient ) (06/05/18 0500)  Body mass index is 30.97 kg/m².  Body surface area is 1.93 meters squared.    I/O last 3 completed shifts:  In: 1520 [P.O.:1320; I.V.:200]  Out: 1475 [Urine:1475]    Physical Exam   Constitutional: She is oriented to person, place, and time. She appears well-developed. No distress.   HENT:   Head: Normocephalic and atraumatic.   Eyes: EOM  are normal. Pupils are equal, round, and reactive to light.   Neck: Normal range of motion. Neck supple.   Cardiovascular: Normal rate, regular rhythm and normal heart sounds.    Pulmonary/Chest: Effort normal and breath sounds normal. No respiratory distress.   Abdominal: Soft. Bowel sounds are normal. She exhibits no distension.   Musculoskeletal: Normal range of motion. She exhibits no edema.   Neurological: She is alert and oriented to person, place, and time.   Skin: Skin is warm. She is not diaphoretic.   Psychiatric: She has a normal mood and affect. Her behavior is normal.       Significant Labs:  All labs within the past 24 hours have been reviewed.    Significant Imaging:  reviewed    Assessment/Plan:     Acute renal failure superimposed on stage 5 chronic kidney disease, not on chronic dialysis    71y/o F pt with PMHX of uncontrolled DM2 with nephropathy, CKD5, HTN here with NSTEMI and ASHLEY on CKD. Suspect ASHLEY due to hypoperfusion/ ischemia in setting of NSTEMI.   --baseline Cr 1.8-2.2, was 2.7 on 5/30.  --Cr 3.2 on admit-->3.1; currently stable  --s/p LHC  --I/O: 1.5L     Plan:  --avoid all nephrotoxic agents and renally dose meds  --will monitor closely for LETICIA  --no need for RRT currently  --strict I/O              Thank you for your consult. I will follow-up with patient. Please contact us if you have any additional questions.    Juliann Henderson MD  Nephrology  [facility]    ATTENDING PHYSICIAN ATTESTATION  I have personally interviewed and examined the patient. I thoroughly reviewed the demographic, clinical, laboratorial and imaging information available in medical records. I agree with the assessment and recommendations provided by the subspecialty resident. Dr. Henderson was under my supervision.

## 2018-06-05 NOTE — SUBJECTIVE & OBJECTIVE
Past Medical History:   Diagnosis Date    Acute myocardial infarction of anterolateral wall 7/9/2015    Anemia of chronic renal failure, stage 4 (severe) 1/22/2015    Atherosclerosis of aorta 10/3/2012    Benign hypertension with CKD (chronic kidney disease) stage IV 3/11/2016    Chronic diastolic congestive heart failure 10/3/2012    Chronic kidney disease, stage IV (severe) 7/3/2012    Coronary artery disease     s/p 1v CABG 2002 and multiple PCI (last angiogram in 6/2012 with patent LIMA->LAD and patent LCx and RCA stents)    Diabetic polyneuropathy associated with type 2 diabetes mellitus 7/9/2015    Diabetic polyneuropathy associated with type 2 diabetes mellitus 7/9/2015    Encounter for blood transfusion     Gastritis without bleeding     Heart attack 09/2012    5-6 events    Hyperlipidemia     Hyperparathyroidism     Nephritis and nephropathy, 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 7/9/2015    NSTEMI (non-ST elevated myocardial infarction)     Occlusion and stenosis of carotid artery with cerebral infarction 7/9/2015    Osteopenia     PAF (paroxysmal atrial fibrillation) 10/3/2012    Pneumonia     Proliferative diabetic retinopathy 10/3/2012    Sepsis due to Escherichia coli with acute renal failure 2/2016    UTI     Type II diabetes mellitus with neurological manifestations 7/9/2015    Type II diabetes mellitus with renal manifestations 7/3/2012       Past Surgical History:   Procedure Laterality Date    APPENDECTOMY      CARDIAC SURGERY  2002    CABG    CHOLECYSTECTOMY      CORONARY ANGIOPLASTY  2004    CORONARY ARTERY BYPASS GRAFT      EYE SURGERY      cataracts bilaterally    FRACTURE SURGERY      right ankle    HYSTERECTOMY      TONSILLECTOMY         Review of patient's allergies indicates:   Allergen Reactions    Percodan [oxycodone-aspirin] Hives     Welps     Pcn [penicillins] Hives and Swelling     Tolerated Rocephin IM in clinic      Phenergan [promethazine] Other  (See Comments)     Altered mental status; jerking of extremities     Current Facility-Administered Medications   Medication Frequency    aspirin tablet 325 mg Daily    atorvastatin tablet 80 mg Daily    calcitRIOL capsule 0.25 mcg Daily    [START ON 6/6/2018] clopidogrel tablet 75 mg Daily    dextrose 50% injection 12.5 g PRN    dextrose 50% injection 25 g PRN    glucagon (human recombinant) injection 1 mg PRN    glucose chewable tablet 16 g PRN    glucose chewable tablet 24 g PRN    HYDROcodone-acetaminophen 5-325 mg per tablet 1 tablet Q6H PRN    insulin aspart U-100 pen 0-5 Units QID (AC + HS) PRN    insulin detemir U-100 pen 20 Units QHS    isosorbide mononitrate 24 hr tablet 30 mg Daily    metoprolol tartrate (LOPRESSOR) tablet 100 mg BID    nitroGLYCERIN SL tablet 0.3 mg Q5 Min PRN    ondansetron disintegrating tablet 8 mg Q8H PRN    pantoprazole EC tablet 40 mg Daily     Family History     Problem Relation (Age of Onset)    Breast cancer Maternal Aunt    Cancer Mother    Dementia Father    Diabetes Daughter    Heart disease Mother, Father    Hypertension Mother, Father, Sister, Brother    No Known Problems Son    Psoriasis Father    Stroke Sister        Social History Main Topics    Smoking status: Never Smoker    Smokeless tobacco: Never Used    Alcohol use No    Drug use: No    Sexual activity: No     Review of Systems   Constitutional: Negative for activity change, appetite change and chills.   HENT: Negative for congestion and sore throat.    Eyes: Negative for visual disturbance.   Cardiovascular: Positive for chest pain (resolved). Negative for leg swelling.   Gastrointestinal: Negative for abdominal pain, constipation and diarrhea.   Genitourinary: Negative for decreased urine volume, difficulty urinating, dysuria and hematuria.   Musculoskeletal: Negative for arthralgias.   Neurological: Negative for facial asymmetry and headaches.   Psychiatric/Behavioral: Negative for agitation  and confusion.     Objective:     Vital Signs (Most Recent):  Temp: 98.7 °F (37.1 °C) (06/05/18 1247)  Pulse: 71 (06/05/18 1400)  Resp: 18 (06/05/18 1247)  BP: (!) 122/56 (06/05/18 1247)  SpO2: (!) 94 % (06/05/18 1247)  O2 Device (Oxygen Therapy): room air (06/05/18 1247) Vital Signs (24h Range):  Temp:  [97.9 °F (36.6 °C)-98.7 °F (37.1 °C)] 98.7 °F (37.1 °C)  Pulse:  [67-91] 71  Resp:  [16-20] 18  SpO2:  [92 %-96 %] 94 %  BP: (110-142)/(56-65) 122/56     Weight: 82.1 kg (181 lb) (Previous weight stated by patient ) (06/05/18 0500)  Body mass index is 30.97 kg/m².  Body surface area is 1.93 meters squared.    I/O last 3 completed shifts:  In: 1520 [P.O.:1320; I.V.:200]  Out: 1475 [Urine:1475]    Physical Exam   Constitutional: She is oriented to person, place, and time. She appears well-developed. No distress.   HENT:   Head: Normocephalic and atraumatic.   Eyes: EOM are normal. Pupils are equal, round, and reactive to light.   Neck: Normal range of motion. Neck supple.   Cardiovascular: Normal rate, regular rhythm and normal heart sounds.    Pulmonary/Chest: Effort normal and breath sounds normal. No respiratory distress.   Abdominal: Soft. Bowel sounds are normal. She exhibits no distension.   Musculoskeletal: Normal range of motion. She exhibits no edema.   Neurological: She is alert and oriented to person, place, and time.   Skin: Skin is warm. She is not diaphoretic.   Psychiatric: She has a normal mood and affect. Her behavior is normal.       Significant Labs:  All labs within the past 24 hours have been reviewed.    Significant Imaging:  reviewed

## 2018-06-05 NOTE — ASSESSMENT & PLAN NOTE
73y/o F pt with PMHX of uncontrolled DM2 with nephropathy, CKD5, HTN here with NSTEMI and ASHLEY on CKD. Suspect ASHLEY due to hypoperfusion/ ischemia in setting of NSTEMI.   --baseline Cr 1.8-2.2, was 2.7 on 5/30.  --Cr 3.2 on admit-->3.1; currently stable  --s/p LHC  --I/O: 1.5L     Plan:  --avoid all nephrotoxic agents and renally dose meds  --will monitor closely for LETICIA  --no need for RRT currently  --strict I/O

## 2018-06-05 NOTE — PLAN OF CARE
Problem: Patient Care Overview  Goal: Plan of Care Review  Outcome: Ongoing (interventions implemented as appropriate)  Discussed with patient and family the plan of care. S/p LHC right groin site access with pressure dressing and pressure bag at site, small amount of bleeding at site noted. Pulses +1, post cath vitals continued. IV fluids infusing. Educated on the importance of monitoring and measuring urine output & monitoring cath site for hematoma or continuous bleeding. Family and patient verbalized understanding. Head of bed 30 degrees. Will continue to monitor.

## 2018-06-05 NOTE — CONSULTS
Food & Nutrition  Education    Diet Education: Cardiac, diabetes  Time Spent: 15 min  Learners: Pt      Nutrition Education provided with handouts: Meal Planning Using the Plate Method, Cardiac-TLC Nutrition Therapy      Comments: Spoke with pt regarding diabetic diet (HbA1c = 8.9) and cardiac diet. Pt has CKD4 with fistula in place as she may start dialysis in the near future. Pt states she already limits high potassium/phosphorus foods and limits carbohydrates. Provided handouts and discussed ways to incorporate recommendations into pt's renal diet. Pt voiced understanding. All questions and concerns answered.      Follow-Up: 1x weekly    Please re-consult as needed.

## 2018-06-06 PROBLEM — J96.01 ACUTE RESPIRATORY FAILURE WITH HYPOXIA: Status: ACTIVE | Noted: 2018-06-06

## 2018-06-06 PROBLEM — E87.71 TACO (TRANSFUSION ASSOCIATED CIRCULATORY OVERLOAD): Status: ACTIVE | Noted: 2018-06-06

## 2018-06-06 LAB
ALBUMIN SERPL BCP-MCNC: 3.1 G/DL
ALBUMIN SERPL BCP-MCNC: 3.4 G/DL
ALLENS TEST: ABNORMAL
ALP SERPL-CCNC: 120 U/L
ALP SERPL-CCNC: 93 U/L
ALT SERPL W/O P-5'-P-CCNC: 12 U/L
ALT SERPL W/O P-5'-P-CCNC: 26 U/L
ANION GAP SERPL CALC-SCNC: 12 MMOL/L
ANION GAP SERPL CALC-SCNC: 14 MMOL/L
ANION GAP SERPL CALC-SCNC: 16 MMOL/L
AST SERPL-CCNC: 21 U/L
AST SERPL-CCNC: 45 U/L
BASOPHILS # BLD AUTO: 0.03 K/UL
BASOPHILS NFR BLD: 0.3 %
BILIRUB SERPL-MCNC: 0.5 MG/DL
BILIRUB SERPL-MCNC: 1.2 MG/DL
BLD PROD TYP BPU: NORMAL
BLD PROD TYP BPU: NORMAL
BLOOD UNIT EXPIRATION DATE: NORMAL
BLOOD UNIT EXPIRATION DATE: NORMAL
BLOOD UNIT TYPE CODE: 5100
BLOOD UNIT TYPE CODE: 5100
BLOOD UNIT TYPE: NORMAL
BLOOD UNIT TYPE: NORMAL
BUN SERPL-MCNC: 84 MG/DL
BUN SERPL-MCNC: 84 MG/DL
BUN SERPL-MCNC: 89 MG/DL
CALCIUM SERPL-MCNC: 8.4 MG/DL
CALCIUM SERPL-MCNC: 8.9 MG/DL
CALCIUM SERPL-MCNC: 9 MG/DL
CHLORIDE SERPL-SCNC: 101 MMOL/L
CHLORIDE SERPL-SCNC: 101 MMOL/L
CHLORIDE SERPL-SCNC: 103 MMOL/L
CO2 SERPL-SCNC: 17 MMOL/L
CO2 SERPL-SCNC: 19 MMOL/L
CO2 SERPL-SCNC: 20 MMOL/L
CODING SYSTEM: NORMAL
CODING SYSTEM: NORMAL
CREAT SERPL-MCNC: 4.1 MG/DL
CREAT SERPL-MCNC: 4.4 MG/DL
CREAT SERPL-MCNC: 4.5 MG/DL
DELSYS: ABNORMAL
DIFFERENTIAL METHOD: ABNORMAL
DISPENSE STATUS: NORMAL
DISPENSE STATUS: NORMAL
EOSINOPHIL # BLD AUTO: 0 K/UL
EOSINOPHIL NFR BLD: 0.3 %
ERYTHROCYTE [DISTWIDTH] IN BLOOD BY AUTOMATED COUNT: 13.6 %
EST. GFR  (AFRICAN AMERICAN): 10.6 ML/MIN/1.73 M^2
EST. GFR  (AFRICAN AMERICAN): 10.9 ML/MIN/1.73 M^2
EST. GFR  (AFRICAN AMERICAN): 11.9 ML/MIN/1.73 M^2
EST. GFR  (NON AFRICAN AMERICAN): 10.3 ML/MIN/1.73 M^2
EST. GFR  (NON AFRICAN AMERICAN): 9.2 ML/MIN/1.73 M^2
EST. GFR  (NON AFRICAN AMERICAN): 9.5 ML/MIN/1.73 M^2
FIO2: 28
GLUCOSE SERPL-MCNC: 138 MG/DL
GLUCOSE SERPL-MCNC: 145 MG/DL
GLUCOSE SERPL-MCNC: 172 MG/DL
HCO3 UR-SCNC: 19.4 MMOL/L (ref 24–28)
HCT VFR BLD AUTO: 24.8 %
HGB BLD-MCNC: 8 G/DL
IMM GRANULOCYTES # BLD AUTO: 0.05 K/UL
IMM GRANULOCYTES NFR BLD AUTO: 0.4 %
LIPASE SERPL-CCNC: 70 U/L
LYMPHOCYTES # BLD AUTO: 1 K/UL
LYMPHOCYTES NFR BLD: 9 %
MCH RBC QN AUTO: 30.9 PG
MCHC RBC AUTO-ENTMCNC: 32.3 G/DL
MCV RBC AUTO: 96 FL
MODE: ABNORMAL
MONOCYTES # BLD AUTO: 1.1 K/UL
MONOCYTES NFR BLD: 9.3 %
NEUTROPHILS # BLD AUTO: 9.3 K/UL
NEUTROPHILS NFR BLD: 80.7 %
NRBC BLD-RTO: 0 /100 WBC
PCO2 BLDA: 35.9 MMHG (ref 35–45)
PH SMN: 7.34 [PH] (ref 7.35–7.45)
PLATELET # BLD AUTO: 196 K/UL
PMV BLD AUTO: 11.5 FL
PO2 BLDA: 63 MMHG (ref 80–100)
POC BE: -6 MMOL/L
POC SATURATED O2: 90 % (ref 95–100)
POC TCO2: 20 MMOL/L (ref 23–27)
POCT GLUCOSE: 116 MG/DL (ref 70–110)
POCT GLUCOSE: 133 MG/DL (ref 70–110)
POCT GLUCOSE: 139 MG/DL (ref 70–110)
POCT GLUCOSE: 149 MG/DL (ref 70–110)
POTASSIUM SERPL-SCNC: 4.9 MMOL/L
POTASSIUM SERPL-SCNC: 5.2 MMOL/L
POTASSIUM SERPL-SCNC: 5.3 MMOL/L
PROT SERPL-MCNC: 6.2 G/DL
PROT SERPL-MCNC: 7 G/DL
RBC # BLD AUTO: 2.59 M/UL
SAMPLE: ABNORMAL
SITE: ABNORMAL
SODIUM SERPL-SCNC: 134 MMOL/L
SODIUM SERPL-SCNC: 134 MMOL/L
SODIUM SERPL-SCNC: 135 MMOL/L
SP02: 91
TRANS ERYTHROCYTES VOL PATIENT: NORMAL ML
TRANS ERYTHROCYTES VOL PATIENT: NORMAL ML
WBC # BLD AUTO: 11.49 K/UL

## 2018-06-06 PROCEDURE — 99223 1ST HOSP IP/OBS HIGH 75: CPT | Mod: ,,, | Performed by: INTERNAL MEDICINE

## 2018-06-06 PROCEDURE — 27000221 HC OXYGEN, UP TO 24 HOURS

## 2018-06-06 PROCEDURE — 25000003 PHARM REV CODE 250: Performed by: HOSPITALIST

## 2018-06-06 PROCEDURE — 25000003 PHARM REV CODE 250: Performed by: INTERNAL MEDICINE

## 2018-06-06 PROCEDURE — 63600175 PHARM REV CODE 636 W HCPCS: Performed by: STUDENT IN AN ORGANIZED HEALTH CARE EDUCATION/TRAINING PROGRAM

## 2018-06-06 PROCEDURE — 94761 N-INVAS EAR/PLS OXIMETRY MLT: CPT

## 2018-06-06 PROCEDURE — 25000003 PHARM REV CODE 250: Performed by: STUDENT IN AN ORGANIZED HEALTH CARE EDUCATION/TRAINING PROGRAM

## 2018-06-06 PROCEDURE — 99900035 HC TECH TIME PER 15 MIN (STAT)

## 2018-06-06 PROCEDURE — 63600175 PHARM REV CODE 636 W HCPCS: Performed by: HOSPITALIST

## 2018-06-06 PROCEDURE — 36415 COLL VENOUS BLD VENIPUNCTURE: CPT

## 2018-06-06 PROCEDURE — 85025 COMPLETE CBC W/AUTO DIFF WBC: CPT

## 2018-06-06 PROCEDURE — 99233 SBSQ HOSP IP/OBS HIGH 50: CPT | Mod: ,,, | Performed by: HOSPITALIST

## 2018-06-06 PROCEDURE — 80048 BASIC METABOLIC PNL TOTAL CA: CPT

## 2018-06-06 PROCEDURE — 83690 ASSAY OF LIPASE: CPT

## 2018-06-06 PROCEDURE — P9021 RED BLOOD CELLS UNIT: HCPCS

## 2018-06-06 PROCEDURE — 20600001 HC STEP DOWN PRIVATE ROOM

## 2018-06-06 PROCEDURE — 36600 WITHDRAWAL OF ARTERIAL BLOOD: CPT

## 2018-06-06 PROCEDURE — 99232 SBSQ HOSP IP/OBS MODERATE 35: CPT | Mod: GC,,, | Performed by: INTERNAL MEDICINE

## 2018-06-06 PROCEDURE — 99233 SBSQ HOSP IP/OBS HIGH 50: CPT | Mod: GC,,, | Performed by: INTERNAL MEDICINE

## 2018-06-06 PROCEDURE — 80053 COMPREHEN METABOLIC PANEL: CPT

## 2018-06-06 PROCEDURE — 82803 BLOOD GASES ANY COMBINATION: CPT

## 2018-06-06 PROCEDURE — 36430 TRANSFUSION BLD/BLD COMPNT: CPT

## 2018-06-06 PROCEDURE — 80053 COMPREHEN METABOLIC PANEL: CPT | Mod: 91

## 2018-06-06 RX ORDER — HYDROCODONE BITARTRATE AND ACETAMINOPHEN 500; 5 MG/1; MG/1
TABLET ORAL
Status: DISCONTINUED | OUTPATIENT
Start: 2018-06-06 | End: 2018-06-12 | Stop reason: HOSPADM

## 2018-06-06 RX ORDER — METHYLPREDNISOLONE SOD SUCC 125 MG
125 VIAL (EA) INJECTION ONCE
Status: DISCONTINUED | OUTPATIENT
Start: 2018-06-06 | End: 2018-06-06

## 2018-06-06 RX ORDER — FUROSEMIDE 10 MG/ML
80 INJECTION INTRAMUSCULAR; INTRAVENOUS ONCE
Status: COMPLETED | OUTPATIENT
Start: 2018-06-06 | End: 2018-06-06

## 2018-06-06 RX ORDER — DIPHENHYDRAMINE HYDROCHLORIDE 50 MG/ML
50 INJECTION INTRAMUSCULAR; INTRAVENOUS ONCE
Status: COMPLETED | OUTPATIENT
Start: 2018-06-06 | End: 2018-06-06

## 2018-06-06 RX ORDER — DIPHENHYDRAMINE HYDROCHLORIDE 50 MG/ML
50 INJECTION INTRAMUSCULAR; INTRAVENOUS ONCE
Status: DISCONTINUED | OUTPATIENT
Start: 2018-06-06 | End: 2018-06-06

## 2018-06-06 RX ORDER — METOCLOPRAMIDE HYDROCHLORIDE 5 MG/ML
5 INJECTION INTRAMUSCULAR; INTRAVENOUS EVERY 6 HOURS PRN
Status: DISCONTINUED | OUTPATIENT
Start: 2018-06-06 | End: 2018-06-12 | Stop reason: HOSPADM

## 2018-06-06 RX ORDER — SODIUM CHLORIDE 9 MG/ML
INJECTION, SOLUTION INTRAVENOUS CONTINUOUS
Status: DISCONTINUED | OUTPATIENT
Start: 2018-06-06 | End: 2018-06-06

## 2018-06-06 RX ORDER — METHYLPREDNISOLONE SOD SUCC 125 MG
125 VIAL (EA) INJECTION ONCE
Status: COMPLETED | OUTPATIENT
Start: 2018-06-06 | End: 2018-06-06

## 2018-06-06 RX ADMIN — METOPROLOL TARTRATE 100 MG: 100 TABLET ORAL at 08:06

## 2018-06-06 RX ADMIN — METOPROLOL TARTRATE 100 MG: 100 TABLET ORAL at 09:06

## 2018-06-06 RX ADMIN — FUROSEMIDE 20 MG/HR: 10 INJECTION, SOLUTION INTRAMUSCULAR; INTRAVENOUS at 09:06

## 2018-06-06 RX ADMIN — ASPIRIN 81 MG: 81 TABLET, COATED ORAL at 08:06

## 2018-06-06 RX ADMIN — DIPHENHYDRAMINE HYDROCHLORIDE 50 MG: 50 INJECTION INTRAMUSCULAR; INTRAVENOUS at 06:06

## 2018-06-06 RX ADMIN — FUROSEMIDE 80 MG: 10 INJECTION, SOLUTION INTRAMUSCULAR; INTRAVENOUS at 06:06

## 2018-06-06 RX ADMIN — METHYLPREDNISOLONE SODIUM SUCCINATE 125 MG: 125 INJECTION, POWDER, FOR SOLUTION INTRAMUSCULAR; INTRAVENOUS at 06:06

## 2018-06-06 RX ADMIN — CLOPIDOGREL 75 MG: 75 TABLET, FILM COATED ORAL at 08:06

## 2018-06-06 RX ADMIN — PANTOPRAZOLE SODIUM 40 MG: 40 TABLET, DELAYED RELEASE ORAL at 08:06

## 2018-06-06 RX ADMIN — ONDANSETRON 8 MG: 8 TABLET, ORALLY DISINTEGRATING ORAL at 12:06

## 2018-06-06 RX ADMIN — INSULIN DETEMIR 20 UNITS: 100 INJECTION, SOLUTION SUBCUTANEOUS at 09:06

## 2018-06-06 RX ADMIN — CALCITRIOL 0.25 MCG: 0.25 CAPSULE, LIQUID FILLED ORAL at 08:06

## 2018-06-06 RX ADMIN — METOCLOPRAMIDE 5 MG: 5 INJECTION, SOLUTION INTRAMUSCULAR; INTRAVENOUS at 05:06

## 2018-06-06 RX ADMIN — FUROSEMIDE 80 MG: 10 INJECTION, SOLUTION INTRAMUSCULAR; INTRAVENOUS at 05:06

## 2018-06-06 RX ADMIN — ATORVASTATIN CALCIUM 80 MG: 20 TABLET, FILM COATED ORAL at 08:06

## 2018-06-06 RX ADMIN — ONDANSETRON 8 MG: 8 TABLET, ORALLY DISINTEGRATING ORAL at 01:06

## 2018-06-06 NOTE — ASSESSMENT & PLAN NOTE
Multivessel CAD s/p PCI of the LCx/OM1 with BETHANY. Patent LIMA-LAD and residual distal RCA disease - unchanged since previous angiogram. LCx/OM was deemed to be lesion responsible for ACS episode this time.  Overnight episode can be exertional angina during nausea related stress.   Discussed with patient the management options including consideration of RCA PCI during index hospitalization which places her at higher risk of LETICIA and hemodialysis considering worse baseline renal function vs medical optimization with antianginal therapy and outpatient reassessment with a cardiac stress test to determine timing of distal revascularization.   Patient wish to stage RCA intervention at this point and wish to try conservative medical management. Can be titrated up on BB and nitrates permitting BP.  If she develop another episode of angina during index hospitalization then will readdress the option of RCA PCI during this stay though will be associated with higher risk of LETICIA/HD. Her lucas risk for LETICIA is 57% and need to be on hemodialysis is 13%.  Continue DaPT and high intensity statin therapy.   Cardiac Rehab

## 2018-06-06 NOTE — PROGRESS NOTES
"Pt still complaining of SOB. Denies any chest pain. NC at 4L SpO2 at 95. Rapid response called. 80mg lasix IVP given x2. Benadryl and Methylprednisolone orders for pt per MD Alejandro. During Rapid pt states, "Im starting to feel a lot better." per Rapid response nurse to watch respiratory effort and BP closely. Will complete orders and monitor closely.  "

## 2018-06-06 NOTE — NURSING
While disconnecting pt from IV transfusion pt had audible wheezing. Bases of lungs bilaterally have light crackles, SpO2 is 91 on 2L O2. While ambulating pt to bathroom pt became very weak and vomited once back in bed. MD Alejandro notified. Per MD to not start NS fluids to DC them, MD put in stat xray, and to put in stat lasix orders. Will compete orders and continue to monitor closely.

## 2018-06-06 NOTE — PROGRESS NOTES
06/06/18 0709   Vital Signs   BP (!) 102/51   MAP (mmHg) 73   BP Location Right arm   Patient Position Lying   Notified MD Alejandro to verify holding am dose of isordil. Per MD to hold am dose. Will complete orders.

## 2018-06-06 NOTE — SUBJECTIVE & OBJECTIVE
Interval History:  Complaining of cp overnight, which had resolved prior to eval this AM. Reports nausea with vomiting x 1 and decreased PO intake. Kidney function worsening today Cr 3.1-->4.1.    Review of patient's allergies indicates:   Allergen Reactions    Percodan [oxycodone-aspirin] Hives     Welps     Pcn [penicillins] Hives and Swelling     Tolerated Rocephin IM in clinic      Phenergan [promethazine] Other (See Comments)     Altered mental status; jerking of extremities     Current Facility-Administered Medications   Medication Frequency    0.9%  NaCl infusion (for blood administration) Q24H PRN    aspirin EC tablet 81 mg Daily    atorvastatin tablet 80 mg Daily    calcitRIOL capsule 0.25 mcg Daily    clopidogrel tablet 75 mg Daily    dextrose 50% injection 12.5 g PRN    dextrose 50% injection 25 g PRN    glucagon (human recombinant) injection 1 mg PRN    glucose chewable tablet 16 g PRN    glucose chewable tablet 24 g PRN    HYDROcodone-acetaminophen 5-325 mg per tablet 1 tablet Q6H PRN    insulin aspart U-100 pen 0-5 Units QID (AC + HS) PRN    insulin detemir U-100 pen 20 Units QHS    isosorbide mononitrate 24 hr tablet 30 mg Daily    metoprolol tartrate (LOPRESSOR) tablet 100 mg BID    nitroGLYCERIN SL tablet 0.3 mg Q5 Min PRN    ondansetron disintegrating tablet 8 mg Q8H PRN    pantoprazole EC tablet 40 mg Daily     Family History     Problem Relation (Age of Onset)    Breast cancer Maternal Aunt    Cancer Mother    Dementia Father    Diabetes Daughter    Heart disease Mother, Father    Hypertension Mother, Father, Sister, Brother    No Known Problems Son    Psoriasis Father    Stroke Sister        Social History Main Topics    Smoking status: Never Smoker    Smokeless tobacco: Never Used    Alcohol use No    Drug use: No    Sexual activity: No     Review of Systems   Constitutional: Negative for activity change, appetite change and chills.   HENT: Negative for congestion and  sore throat.    Eyes: Negative for visual disturbance.   Cardiovascular: Positive for chest pain (resolved). Negative for leg swelling.   Gastrointestinal: Negative for abdominal pain, constipation and diarrhea.   Genitourinary: Negative for decreased urine volume, difficulty urinating, dysuria and hematuria.   Musculoskeletal: Negative for arthralgias.   Neurological: Negative for facial asymmetry and headaches.   Psychiatric/Behavioral: Negative for agitation and confusion.     Objective:     Vital Signs (Most Recent):  Temp: 99.3 °F (37.4 °C) (06/06/18 1308)  Pulse: 75 (06/06/18 1308)  Resp: 18 (06/06/18 1308)  BP: (!) 141/65 (06/06/18 1308)  SpO2: 95 % (06/06/18 1308)  O2 Device (Oxygen Therapy): nasal cannula (06/06/18 1308) Vital Signs (24h Range):  Temp:  [96.7 °F (35.9 °C)-100 °F (37.8 °C)] 99.3 °F (37.4 °C)  Pulse:  [67-84] 75  Resp:  [16-20] 18  SpO2:  [84 %-96 %] 95 %  BP: ()/(51-86) 141/65     Weight: 82.2 kg (181 lb 3.5 oz) (06/06/18 0516)  Body mass index is 31.01 kg/m².  Body surface area is 1.93 meters squared.    I/O last 3 completed shifts:  In: 2840 [P.O.:2640; I.V.:200]  Out: 1850 [Urine:1850]    Physical Exam   Constitutional: She is oriented to person, place, and time. She appears well-developed. No distress.   HENT:   Head: Normocephalic and atraumatic.   Eyes: EOM are normal. Pupils are equal, round, and reactive to light.   Neck: Normal range of motion. Neck supple.   Cardiovascular: Normal rate, regular rhythm and normal heart sounds.    Pulmonary/Chest: Effort normal and breath sounds normal. No respiratory distress.   Abdominal: Soft. Bowel sounds are normal. She exhibits no distension.   Musculoskeletal: Normal range of motion. She exhibits no edema.   Neurological: She is alert and oriented to person, place, and time.   Skin: Skin is warm. She is not diaphoretic.   Psychiatric: She has a normal mood and affect. Her behavior is normal.       Significant Labs:  All labs within the  past 24 hours have been reviewed.    Significant Imaging:  reviewed

## 2018-06-06 NOTE — NURSING
Called MD Beatriz to verify starting continuous infusion of NS during Blood transfusion. Per MD okiris to start NS infusion after blood transfusion has ended. Will complete orders and continue to monitor.

## 2018-06-06 NOTE — NURSING
Initated blood transfusion. The 15 min check resulted in a temp of 99.3. Rechecked in 15 more minuets and temp was 100.0. MD Alejandro notified. Per MD Alejandro to recheck in 30 minuets and notify her. Will complete orders and continue to monitor closely.

## 2018-06-06 NOTE — SUBJECTIVE & OBJECTIVE
Past Medical History:   Diagnosis Date    Acute myocardial infarction of anterolateral wall 7/9/2015    Anemia of chronic renal failure, stage 4 (severe) 1/22/2015    Atherosclerosis of aorta 10/3/2012    Benign hypertension with CKD (chronic kidney disease) stage IV 3/11/2016    Chronic diastolic congestive heart failure 10/3/2012    Chronic kidney disease, stage IV (severe) 7/3/2012    Coronary artery disease     s/p 1v CABG 2002 and multiple PCI (last angiogram in 6/2012 with patent LIMA->LAD and patent LCx and RCA stents)    Diabetic polyneuropathy associated with type 2 diabetes mellitus 7/9/2015    Diabetic polyneuropathy associated with type 2 diabetes mellitus 7/9/2015    Encounter for blood transfusion     Gastritis without bleeding     Heart attack 09/2012    5-6 events    Hyperlipidemia     Hyperparathyroidism     Nephritis and nephropathy, 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 7/9/2015    NSTEMI (non-ST elevated myocardial infarction)     Occlusion and stenosis of carotid artery with cerebral infarction 7/9/2015    Osteopenia     PAF (paroxysmal atrial fibrillation) 10/3/2012    Pneumonia     Proliferative diabetic retinopathy 10/3/2012    Sepsis due to Escherichia coli with acute renal failure 2/2016    UTI     Type II diabetes mellitus with neurological manifestations 7/9/2015    Type II diabetes mellitus with renal manifestations 7/3/2012       Past Surgical History:   Procedure Laterality Date    APPENDECTOMY      CARDIAC SURGERY  2002    CABG    CHOLECYSTECTOMY      CORONARY ANGIOPLASTY  2004    CORONARY ARTERY BYPASS GRAFT      EYE SURGERY      cataracts bilaterally    FRACTURE SURGERY      right ankle    HYSTERECTOMY      TONSILLECTOMY         Review of patient's allergies indicates:   Allergen Reactions    Percodan [oxycodone-aspirin] Hives     Welps     Pcn [penicillins] Hives and Swelling     Tolerated Rocephin IM in clinic      Phenergan [promethazine] Other  (See Comments)     Altered mental status; jerking of extremities       Family History     Problem Relation (Age of Onset)    Breast cancer Maternal Aunt    Cancer Mother    Dementia Father    Diabetes Daughter    Heart disease Mother, Father    Hypertension Mother, Father, Sister, Brother    No Known Problems Son    Psoriasis Father    Stroke Sister        Social History Main Topics    Smoking status: Never Smoker    Smokeless tobacco: Never Used    Alcohol use No    Drug use: No    Sexual activity: No      Review of Systems   Constitutional: Positive for fatigue.   Respiratory: Positive for cough, shortness of breath and wheezing.    Cardiovascular: Positive for leg swelling. Negative for chest pain and palpitations.   Gastrointestinal: Positive for nausea. Negative for abdominal distention and abdominal pain.   Genitourinary: Positive for decreased urine volume.   Musculoskeletal: Negative for back pain.   Skin: Negative for rash and wound.   Neurological: Negative for light-headedness.   Psychiatric/Behavioral: Negative for confusion.     Objective:     Vital Signs (Most Recent):  Temp: 98.3 °F (36.8 °C) (06/06/18 1635)  Pulse: 88 (06/06/18 1845)  Resp: 16 (06/06/18 1635)  BP: (!) 173/80 (06/06/18 1845)  SpO2: 95 % (06/06/18 1824) Vital Signs (24h Range):  Temp:  [96.7 °F (35.9 °C)-100 °F (37.8 °C)] 98.3 °F (36.8 °C)  Pulse:  [67-89] 88  Resp:  [16-20] 16  SpO2:  [84 %-100 %] 95 %  BP: ()/(51-86) 173/80   Weight: 82.2 kg (181 lb 3.5 oz)  Body mass index is 31.01 kg/m².      Intake/Output Summary (Last 24 hours) at 06/06/18 1846  Last data filed at 06/06/18 1635   Gross per 24 hour   Intake             1960 ml   Output              775 ml   Net             1185 ml       Physical Exam   Constitutional: She is oriented to person, place, and time. She appears distressed.   HENT:   Head: Normocephalic and atraumatic.   Eyes: Conjunctivae are normal. No scleral icterus.   Neck: Normal range of motion.    Cardiovascular: Normal rate, regular rhythm, normal heart sounds and intact distal pulses.    Pulmonary/Chest: She is in respiratory distress. She has wheezes. She has rales.   Visibly increased WOB  Diffuse expiratory wheezes, rales present up to mid lung fields bilaterally   Abdominal: She exhibits no distension. There is no tenderness.   Musculoskeletal: She exhibits edema.   Neurological: She is alert and oriented to person, place, and time.   Skin: Skin is warm and dry.   Psychiatric: She has a normal mood and affect.       Vents:     Lines/Drains/Airways     Drain                 Hemodialysis AV Fistula Left upper arm -- days          Peripheral Intravenous Line                 Peripheral IV - Single Lumen 06/04/18 0237 Right Hand 2 days              Significant Labs:    CBC/Anemia Profile:    Recent Labs  Lab 06/05/18  0012 06/05/18  1126 06/06/18  0910   WBC 12.24 13.81* 11.49   HGB 8.6* 8.5* 8.0*   HCT 27.1* 27.0* 24.8*    222 196   MCV 98 98 96   RDW 13.2 13.4 13.6        Chemistries:    Recent Labs  Lab 06/05/18  0530 06/06/18  0532     137 135*   K 4.9  5.0 4.9     105 103   CO2 22*  24 20*   BUN 72*  74* 84*   CREATININE 3.1*  3.2* 4.1*   CALCIUM 8.5*  8.4* 8.4*   ALBUMIN 3.2* 3.1*   PROT 6.2 6.2   BILITOT 0.4 0.5   ALKPHOS 107 93   ALT 14 12   AST 25 21       All pertinent labs within the past 24 hours have been reviewed.    Significant Imaging: I have reviewed and interpreted all pertinent imaging results/findings within the past 24 hours.

## 2018-06-06 NOTE — ASSESSMENT & PLAN NOTE
73y/o F pt with PMHX of uncontrolled DM2 with nephropathy, CKD5, HTN here with NSTEMI and ASHLEY on CKD. Suspect ASHLEY due to hypoperfusion/ ischemia in setting of NSTEMI.   Further rise in Cr possibly due to LETICIA.  --s/p C  (6/4/18)  --baseline Cr 1.8-2.2, was 2.7 on 5/30.  --Cr 3.2 on admit-->3.1; now worsening-->4.1  --I/O: 1.5L -->925cc    Plan:  --Start maintenance fluids 60cc/hr in setting of nausea/vomiting and decreased PO intake to avoid further insult to kidneys.  --avoid all nephrotoxic agents and renally dose meds  --no need for RRT currently  --strict I/O

## 2018-06-06 NOTE — PLAN OF CARE
met with pt and daughter at the bedside to complete discharge screening.  Pt is an alert lady who lives with her son in [place] and plans to return.  Pt has good family/social support.  Pt has not used home health.  Will assist with discharge needs if appropriate.

## 2018-06-06 NOTE — PROGRESS NOTES
[facility]  Nephrology  Progress Note    Patient Name: Zoey Ham  MRN: [id number]  Admission Date: 6/4/2018  Hospital Length of Stay: 2 days  Attending Provider: Emily Allen MD   Primary Care Physician: Cesia Rosales MD  Principal Problem:NSTEMI (non-ST elevated myocardial infarction)    Subjective:     HPI: 72F with hx of DM2 uncontrolled (a1c 9.0) with retinopathy and nephropathy CKD V with LUE Brachiocephalic fistula created 4/16/18 s/p PTA for stenosis 5/30/18, renovascular HTN, mixed HLD, extensive CAD s/p CABGx1 2002 GALLEGOS-LAD with multiple subsequent PCI's last in 2/2017 with pLIMA-LAD, patent RCA stents, severe ISR of mid LCX s/p 2.75mm BETHANY and severe ISR of OM s/p PCI with 2.5mm BETHANY and distal 90% RCA lesion (had SPECT with lateral ischemia at that time) admitted 6/4 with NSTEMI s/p PCI. Nephrology consulted for ASHLEY on CKD5 requiring LHC. Baseline Cr 1.8-2.2. On admit Cr was 3.2. Received IVFs prior to and following LHC. Kidney function stable this AM with BUN/Cr= 72/3.1, with good urine output.     Interval History:  Complaining of cp overnight, which had resolved prior to eval this AM. Reports nausea with vomiting x 1 and decreased PO intake. Kidney function worsening today Cr 3.1-->4.1.    Review of patient's allergies indicates:   Allergen Reactions    Percodan [oxycodone-aspirin] Hives     Welps     Pcn [penicillins] Hives and Swelling     Tolerated Rocephin IM in clinic      Phenergan [promethazine] Other (See Comments)     Altered mental status; jerking of extremities     Current Facility-Administered Medications   Medication Frequency    0.9%  NaCl infusion (for blood administration) Q24H PRN    aspirin EC tablet 81 mg Daily    atorvastatin tablet 80 mg Daily    calcitRIOL capsule 0.25 mcg Daily    clopidogrel tablet 75 mg Daily    dextrose 50% injection 12.5 g PRN    dextrose 50% injection 25 g PRN    glucagon (human recombinant) injection 1 mg PRN     glucose chewable tablet 16 g PRN    glucose chewable tablet 24 g PRN    HYDROcodone-acetaminophen 5-325 mg per tablet 1 tablet Q6H PRN    insulin aspart U-100 pen 0-5 Units QID (AC + HS) PRN    insulin detemir U-100 pen 20 Units QHS    isosorbide mononitrate 24 hr tablet 30 mg Daily    metoprolol tartrate (LOPRESSOR) tablet 100 mg BID    nitroGLYCERIN SL tablet 0.3 mg Q5 Min PRN    ondansetron disintegrating tablet 8 mg Q8H PRN    pantoprazole EC tablet 40 mg Daily     Family History     Problem Relation (Age of Onset)    Breast cancer Maternal Aunt    Cancer Mother    Dementia Father    Diabetes Daughter    Heart disease Mother, Father    Hypertension Mother, Father, Sister, Brother    No Known Problems Son    Psoriasis Father    Stroke Sister        Social History Main Topics    Smoking status: Never Smoker    Smokeless tobacco: Never Used    Alcohol use No    Drug use: No    Sexual activity: No     Review of Systems   Constitutional: Negative for activity change, appetite change and chills.   HENT: Negative for congestion and sore throat.    Eyes: Negative for visual disturbance.   Cardiovascular: Positive for chest pain (resolved). Negative for leg swelling.   Gastrointestinal: Negative for abdominal pain, constipation and diarrhea.   Genitourinary: Negative for decreased urine volume, difficulty urinating, dysuria and hematuria.   Musculoskeletal: Negative for arthralgias.   Neurological: Negative for facial asymmetry and headaches.   Psychiatric/Behavioral: Negative for agitation and confusion.     Objective:     Vital Signs (Most Recent):  Temp: 99.3 °F (37.4 °C) (06/06/18 1308)  Pulse: 75 (06/06/18 1308)  Resp: 18 (06/06/18 1308)  BP: (!) 141/65 (06/06/18 1308)  SpO2: 95 % (06/06/18 1308)  O2 Device (Oxygen Therapy): nasal cannula (06/06/18 1308) Vital Signs (24h Range):  Temp:  [96.7 °F (35.9 °C)-100 °F (37.8 °C)] 99.3 °F (37.4 °C)  Pulse:  [67-84] 75  Resp:  [16-20] 18  SpO2:  [84 %-96 %] 95  %  BP: ()/(51-86) 141/65     Weight: 82.2 kg (181 lb 3.5 oz) (06/06/18 0516)  Body mass index is 31.01 kg/m².  Body surface area is 1.93 meters squared.    I/O last 3 completed shifts:  In: 2840 [P.O.:2640; I.V.:200]  Out: 1850 [Urine:1850]    Physical Exam   Constitutional: She is oriented to person, place, and time. She appears well-developed. No distress.   HENT:   Head: Normocephalic and atraumatic.   Eyes: EOM are normal. Pupils are equal, round, and reactive to light.   Neck: Normal range of motion. Neck supple.   Cardiovascular: Normal rate, regular rhythm and normal heart sounds.    Pulmonary/Chest: Effort normal and breath sounds normal. No respiratory distress.   Abdominal: Soft. Bowel sounds are normal. She exhibits no distension.   Musculoskeletal: Normal range of motion. She exhibits no edema.   Neurological: She is alert and oriented to person, place, and time.   Skin: Skin is warm. She is not diaphoretic.   Psychiatric: She has a normal mood and affect. Her behavior is normal.       Significant Labs:  All labs within the past 24 hours have been reviewed.    Significant Imaging:  reviewed    Assessment/Plan:     Acute renal failure superimposed on stage 5 chronic kidney disease, not on chronic dialysis    73y/o F pt with PMHX of uncontrolled DM2 with nephropathy, CKD5, HTN here with NSTEMI and ASHLEY on CKD. Suspect ASHLEY due to hypoperfusion/ ischemia in setting of NSTEMI.   Further rise in Cr possibly due to acute blood loss Hgb 10-->8 vs LETICIA.  --s/p C  (6/4/18)  --baseline Cr 1.8-2.2, was 2.7 on 5/30.  --Cr 3.2 on admit-->3.1; now worsening-->4.1  --I/O: 1.5L -->925cc    Plan:  --Start maintenance fluids 60cc/hr in setting of nausea/vomiting and decreased PO intake to avoid further insult to kidneys.  --avoid all nephrotoxic agents and renally dose meds  --no need for RRT currently  --strict I/O              Thank you for your consult. I will follow-up with patient. Please contact us if you have  any additional questions.    Juliann Henderson MD  Nephrology  [facility]    ATTENDING PHYSICIAN ATTESTATION  I have personally interviewed and examined the patient. I thoroughly reviewed the demographic, clinical, laboratorial and imaging information available in medical records. I agree with the assessment and recommendations provided by the subspecialty resident. Dr. Henderson was under my supervision.

## 2018-06-06 NOTE — PLAN OF CARE
Problem: Patient Care Overview  Goal: Plan of Care Review  Outcome: Ongoing (interventions implemented as appropriate)  Patient free of falls/traumas/injuries. Skin clean, dry, and intact. H/H 8.0/24.8. 2 units RBCs to transfuse today. No chest pain or SOB noted. Patient educated on plan of care and verbalized understanding. Patients VS stable and no distress. Will continue to monitor.

## 2018-06-06 NOTE — PROGRESS NOTES
[facility]  Interventional Cardiology  Progress Note    Patient Name: Zoey Ham  MRN: [id number]  Admission Date: 6/4/2018  Hospital Length of Stay: 2 days  Code Status: Full Code   Attending Physician: Mat Andre MD   Primary Care Physician: Cesia Rosales MD  Principal Problem:NSTEMI (non-ST elevated myocardial infarction)    Subjective:     Interval History: Patient`s echo from yesterday showed improved LVEF now 65% with no WMA.  Intermediately Right sided pressures. Overnight, she had a nausea episode a/w chest pain which lasted for 2 minis relieved with anti-nausea meds. Reports associated chest pain to be of lower intensity than one which brought her to hospital. Ambulated yesterday without issues.     Objective:     Vital Signs (Most Recent):  Temp: 98.6 °F (37 °C) (06/06/18 0709)  Pulse: 82 (06/06/18 0709)  Resp: 16 (06/06/18 0709)  BP: (!) 102/51 (06/06/18 0709)  SpO2: (!) 92 % (06/06/18 0709) Vital Signs (24h Range):  Temp:  [98.1 °F (36.7 °C)-99.9 °F (37.7 °C)] 98.6 °F (37 °C)  Pulse:  [67-84] 82  Resp:  [16-20] 16  SpO2:  [92 %-96 %] 92 %  BP: (102-153)/(51-86) 102/51     Weight: 82.2 kg (181 lb 3.5 oz)  Body mass index is 31.01 kg/m².    SpO2: (!) 92 %  O2 Device (Oxygen Therapy): nasal cannula      Intake/Output Summary (Last 24 hours) at 06/06/18 0907  Last data filed at 06/06/18 0500   Gross per 24 hour   Intake             1320 ml   Output              925 ml   Net              395 ml       Lines/Drains/Airways     Drain                 Hemodialysis AV Fistula Left upper arm -- days          Peripheral Intravenous Line                 Peripheral IV - Single Lumen 06/04/18 0237 Right Hand 2 days                Physical Exam   Constitutional: She appears well-developed and well-nourished.   HENT:   Head: Normocephalic and atraumatic.   Right Ear: External ear normal.   Left Ear: External ear normal.   Eyes: Conjunctivae and EOM are normal. Pupils are equal, round, and  reactive to light.   Neck: Normal range of motion. Neck supple. No JVD present. No thyromegaly present.   Cardiovascular: Normal rate, regular rhythm, S1 normal, S2 normal, normal heart sounds and intact distal pulses.  Exam reveals no gallop, no S3 and no friction rub.    No murmur heard.  Pulses:       Radial pulses are 2+ on the right side, and 2+ on the left side.        Femoral pulses are 2+ on the right side, and 2+ on the left side.       Dorsalis pedis pulses are 2+ on the right side, and 2+ on the left side.        Posterior tibial pulses are 2+ on the right side, and 2+ on the left side.   Right groin ecchymosis area unchanged since Monday. No bruit or thrill. Non tender   Pulmonary/Chest: Effort normal. No stridor. She has no wheezes. She has no rales. She exhibits no tenderness.   Abdominal: Soft. Bowel sounds are normal. She exhibits no distension. There is no tenderness. There is no rebound and no guarding.   Musculoskeletal: Normal range of motion. She exhibits no edema or tenderness.   Psychiatric: She has a normal mood and affect.       Significant Labs: All pertinent lab results from the last 24 hours have been reviewed.        Assessment and Plan:     Patient is a 72 y.o. female presenting with:  Patient Active Problem List   Diagnosis    Hypertension associated with diabetes    Proliferative diabetic retinopathy    Atherosclerosis of aorta    Anemia of chronic renal failure, stage 4 (severe)    Diabetic polyneuropathy associated with type 2 diabetes mellitus    CKD stage 4 due to type 2 diabetes mellitus    Uncontrolled type 2 diabetes mellitus with stage 4 chronic kidney disease, with long-term current use of insulin    Obesity (BMI 30.0-34.9)    Hyperparathyroidism    Chronic atrial fibrillation    Coronary artery disease involving coronary bypass graft of native heart with unstable angina pectoris    Senile purpura    Osteopenia of multiple sites    Social isolation    Type 2  diabetes mellitus with hyperlipidemia    History of non-ST elevation myocardial infarction (NSTEMI)    S/P drug eluting coronary stent placement    Acute on chronic diastolic heart failure    Acute gastritis without hemorrhage    Breast calcification, left    Hypovitaminosis D    Wrist pain, acute, unspecified laterality    ESRD (end stage renal disease)    Preop examination    Abnormal mammogram    Hemodialysis access, AV graft    Stenosis of arteriovenous dialysis fistula    AV shunt malfunction    NSTEMI (non-ST elevated myocardial infarction)    CKD (chronic kidney disease), stage V    Acute renal failure superimposed on stage 5 chronic kidney disease, not on chronic dialysis       * NSTEMI (non-ST elevated myocardial infarction)    Multivessel CAD s/p PCI of the LCx/OM1 with BETHANY. Patent LIMA-LAD and residual distal RCA disease - unchanged since previous angiogram. LCx/OM was deemed to be lesion responsible for ACS episode this time.  Overnight episode can be exertional angina during nausea related stress.   Discussed with patient the management options including consideration of RCA PCI during index hospitalization which places her at higher risk of LETICIA and hemodialysis considering worse baseline renal function vs medical optimization with antianginal therapy and outpatient reassessment with a cardiac stress test to determine timing of distal revascularization.   Patient wish to stage RCA intervention at this point and wish to try conservative medical management. Can be titrated up on BB and nitrates permitting BP.  If she develop another episode of angina during index hospitalization then will readdress the option of RCA PCI during this stay though will be associated with higher risk of LETICIA/HD. Her lucas risk for LETICIA is 57% and need to be on hemodialysis is 13%.  Continue DaPT and high intensity statin therapy.   Cardiac Rehab            VTE Risk Mitigation         Ordered     IP VTE HIGH RISK  PATIENT  Once      06/04/18 1219     Reason for No Pharmacological VTE Prophylaxis  Once      06/04/18 1219     Reason for No Pharmacological VTE Prophylaxis  Once      06/04/18 1155          Mat Andre MD  Interventional Cardiology  [facility]

## 2018-06-06 NOTE — NURSING
Spoke with Dr. Hunter to inform her of the patient's chest pain. No new orders received at this time and the patient is no longer having chest pain, she is requesting quietly.

## 2018-06-06 NOTE — HPI
This is Ms. Zoey Ham, 72 year old female with PMHx significant for Hypertension associated with diabetes, Anemia of chronic renal failure, stage 4 (severe), hyperparathyroidism, CKD 5 approaching ESRD has LUE fistula in place and not on dialysis yet. She presented two days ago with symptoms of Acute Coronary Syndrome found to have NSTEMI. Taken to cath lab emergently on 6/4, BETHANY to OM1. Patient's hemoglobin dropped to 8.5 1 day post-cath. No evidence of active bleeding/ retroperitoneal hematoma; however, ecchymosis of groin at site of catheter present. Due to active ACS and concern for bleeding into catheter site, patient transfused 2 units of blood.     During transfusion, patient developed temperature of 100. Given Tylenol, and transfusion was continued. Second unit of blood completed at 1630. While transfusion was completing, patient developed audible wheezing. Per nursing note,  patient with crackles at bilateral bases, O2 91% on 2L. Patient then had episode of emesis. Dr. Allen contacted, and patient given total of Lasix 160 mg IV with minimal urine output. Due to concern for TACO vs. TRALI, patient given methylprenisolone and diphenhydramine. Dyspnea persisted, Rapid was called and Critical Care medicine was consulted for acute hypoxic respiratory failure.

## 2018-06-06 NOTE — ASSESSMENT & PLAN NOTE
- TACO more likely etiology than TRALI for patient's acute respiratory distress given history of diastolic dysfunction and ESRD  - CXR performed consistent with worsening pulmonary edema   - Recommend lasix infusion at 20 mg/hr  - BMP BID to monitor for electrolyte derangments on intensive diuresis  - Place queen catheter to monitor UOP and response to diuresis

## 2018-06-06 NOTE — PROGRESS NOTES
Dr. Allen viewed patient ABG results.  Patient oxygen was increase from 2L NC to 4L NC.  Patient sats are currently 95%.  Will continue to monitor.

## 2018-06-06 NOTE — PROGRESS NOTES
Hospital Medicine   Progress note   Team: [facility] MED C Emily Allen MD   Admit Date: 6/4/2018   NENA 6/6/2018   Code status: Full Code   Principal Problem: NSTEMI (non-ST elevated myocardial infarction)     Interval hx: Last night at 730pm, episode of chest pain radiating to L arm, 7/10, relieved by nitro SL x 1. This morning, called by RN for low /52, so holding Imdur. H/H decreased to 8.0/24.8 so transfused 2 u pRBCs. Had low grade temp of 100 after transfusion started, but then temp decreased to normal 30 mins later (no tylenol given) - after transfusion, c/o severe SOB, O2 sats decreased to 88% on RA with severe resp distress and wheeze/rales, giving STAT Lasix IV and checking STAT CXR. In addition, she has severe nausea all day, minimal PO intake (one grape, some water), nausea improved after zofran 8mg PO around noon then around 5pm severe nausea returned and vomited x 1. UOP 100cc after Lasix 80 IV, giving additional Lasix IV 80    Nephrology recommended IVFs earlier today, but now that she is probably volume overloaded will not give IVFs - discussed with nephrology    Check ABG  STAT CXR  STAT KUB  MICU consult    ROS   Respiratory: negative for cough, shortness of breath   Cardiovascular: negative for chest discomfort, palpitations   Gastrointestinal: negative for nausea or vomiting, abdominal pain, constipation, diarrhea     PEx   Temp:  [98.1 °F (36.7 °C)-99.9 °F (37.7 °C)]   Pulse:  [67-84]   Resp:  [16-20]   BP: (102-153)/(51-86)   SpO2:  [92 %-96 %]      I & O (Last 24H):     Intake/Output Summary (Last 24 hours) at 06/06/18 0850  Last data filed at 06/06/18 0500   Gross per 24 hour   Intake             1320 ml   Output              925 ml   Net              395 ml       General Appearance: severe distress 2/2 SOB  Heart: regular rate and rhythm   Respiratory: Normal respiratory effort, diffuse wheeze and rales  Abdomen: Soft, non-tender; bowel sounds active   Skin: intact. IV sites ok. R  groin: 4 x 5 cm ecchymosis of medial groin, no mass/fullness, no exquisite TTP, no bruit  Neurologic: No focal numbness or weakness   Mental status: Alert, oriented x 4, affect appropriate, memory intact     Recent Results (from the past 24 hour(s))   CBC auto differential    Collection Time: 06/05/18 11:26 AM   Result Value Ref Range    WBC 13.81 (H) 3.90 - 12.70 K/uL    RBC 2.77 (L) 4.00 - 5.40 M/uL    Hemoglobin 8.5 (L) 12.0 - 16.0 g/dL    Hematocrit 27.0 (L) 37.0 - 48.5 %    MCV 98 82 - 98 fL    MCH 30.7 27.0 - 31.0 pg    MCHC 31.5 (L) 32.0 - 36.0 g/dL    RDW 13.4 11.5 - 14.5 %    Platelets 222 150 - 350 K/uL    MPV 11.6 9.2 - 12.9 fL    Immature Granulocytes 0.5 0.0 - 0.5 %    Gran # (ANC) 10.9 (H) 1.8 - 7.7 K/uL    Immature Grans (Abs) 0.07 (H) 0.00 - 0.04 K/uL    Lymph # 1.4 1.0 - 4.8 K/uL    Mono # 1.2 (H) 0.3 - 1.0 K/uL    Eos # 0.2 0.0 - 0.5 K/uL    Baso # 0.06 0.00 - 0.20 K/uL    nRBC 0 0 /100 WBC    Gran% 79.1 (H) 38.0 - 73.0 %    Lymph% 10.1 (L) 18.0 - 48.0 %    Mono% 8.8 4.0 - 15.0 %    Eosinophil% 1.1 0.0 - 8.0 %    Basophil% 0.4 0.0 - 1.9 %    Differential Method Automated    POCT glucose    Collection Time: 06/05/18 12:28 PM   Result Value Ref Range    POCT Glucose 143 (H) 70 - 110 mg/dL   Urinalysis    Collection Time: 06/05/18  3:05 PM   Result Value Ref Range    Specimen UA Urine, Unspecified     Color, UA Yellow Yellow, Straw, Priyanka    Appearance, UA Hazy (A) Clear    pH, UA 5.0 5.0 - 8.0    Specific Gravity, UA 1.020 1.005 - 1.030    Protein, UA 2+ (A) Negative    Glucose, UA 1+ (A) Negative    Ketones, UA Negative Negative    Bilirubin (UA) Negative Negative    Occult Blood UA Negative Negative    Nitrite, UA Negative Negative    Urobilinogen, UA Negative <2.0 EU/dL    Leukocytes, UA Negative Negative   Protein / creatinine ratio, urine    Collection Time: 06/05/18  3:05 PM   Result Value Ref Range    Protein, Urine Random 135 (H) 0 - 15 mg/dL    Creatinine, Random Ur 94.0 15.0 - 325.0 mg/dL     Prot/Creat Ratio, Ur 1.44 (H) 0.00 - 0.20   Osmolality, urine    Collection Time: 06/05/18  3:05 PM   Result Value Ref Range    Osmolality, Ur 344 50 - 1200 mOsm/kg   Sodium, urine, random    Collection Time: 06/05/18  3:05 PM   Result Value Ref Range    Sodium Urine Random <20 (A) 20 - 250 mmol/L   Protein / creatinine ratio, urine    Collection Time: 06/05/18  3:05 PM   Result Value Ref Range    Protein, Urine Random 135 (H) 0 - 15 mg/dL    Creatinine, Random Ur 94.0 15.0 - 325.0 mg/dL    Prot/Creat Ratio, Ur 1.44 (H) 0.00 - 0.20   Urinalysis Microscopic    Collection Time: 06/05/18  3:05 PM   Result Value Ref Range    RBC, UA 2 0 - 4 /hpf    WBC, UA 1 0 - 5 /hpf    Bacteria, UA Rare None-Occ /hpf    Squam Epithel, UA 1 /hpf    Hyaline Casts, UA 1 0-1/lpf /lpf    Microscopic Comment SEE COMMENT    2D echo with color flow doppler    Collection Time: 06/05/18  3:15 PM   Result Value Ref Range    EF 65 55 - 65    Diastolic Dysfunction Yes (A)     Est. PA Systolic Pressure 53.97 (A)     Tricuspid Valve Regurgitation TRIVIAL    POCT glucose    Collection Time: 06/05/18  5:55 PM   Result Value Ref Range    POCT Glucose 159 (H) 70 - 110 mg/dL   POCT glucose    Collection Time: 06/05/18  8:58 PM   Result Value Ref Range    POCT Glucose 217 (H) 70 - 110 mg/dL   Comprehensive metabolic panel    Collection Time: 06/06/18  5:32 AM   Result Value Ref Range    Sodium 135 (L) 136 - 145 mmol/L    Potassium 4.9 3.5 - 5.1 mmol/L    Chloride 103 95 - 110 mmol/L    CO2 20 (L) 23 - 29 mmol/L    Glucose 172 (H) 70 - 110 mg/dL    BUN, Bld 84 (H) 8 - 23 mg/dL    Creatinine 4.1 (H) 0.5 - 1.4 mg/dL    Calcium 8.4 (L) 8.7 - 10.5 mg/dL    Total Protein 6.2 6.0 - 8.4 g/dL    Albumin 3.1 (L) 3.5 - 5.2 g/dL    Total Bilirubin 0.5 0.1 - 1.0 mg/dL    Alkaline Phosphatase 93 55 - 135 U/L    AST 21 10 - 40 U/L    ALT 12 10 - 44 U/L    Anion Gap 12 8 - 16 mmol/L    eGFR if African American 11.9 (A) >60 mL/min/1.73 m^2    eGFR if non African  American 10.3 (A) >60 mL/min/1.73 m^2   POCT glucose    Collection Time: 06/06/18  8:21 AM   Result Value Ref Range    POCT Glucose 149 (H) 70 - 110 mg/dL   CBC auto differential    Collection Time: 06/06/18  9:10 AM   Result Value Ref Range    WBC 11.49 3.90 - 12.70 K/uL    RBC 2.59 (L) 4.00 - 5.40 M/uL    Hemoglobin 8.0 (L) 12.0 - 16.0 g/dL    Hematocrit 24.8 (L) 37.0 - 48.5 %    MCV 96 82 - 98 fL    MCH 30.9 27.0 - 31.0 pg    MCHC 32.3 32.0 - 36.0 g/dL    RDW 13.6 11.5 - 14.5 %    Platelets 196 150 - 350 K/uL    MPV 11.5 9.2 - 12.9 fL    Immature Granulocytes 0.4 0.0 - 0.5 %    Gran # (ANC) 9.3 (H) 1.8 - 7.7 K/uL    Immature Grans (Abs) 0.05 (H) 0.00 - 0.04 K/uL    Lymph # 1.0 1.0 - 4.8 K/uL    Mono # 1.1 (H) 0.3 - 1.0 K/uL    Eos # 0.0 0.0 - 0.5 K/uL    Baso # 0.03 0.00 - 0.20 K/uL    nRBC 0 0 /100 WBC    Gran% 80.7 (H) 38.0 - 73.0 %    Lymph% 9.0 (L) 18.0 - 48.0 %    Mono% 9.3 4.0 - 15.0 %    Eosinophil% 0.3 0.0 - 8.0 %    Basophil% 0.3 0.0 - 1.9 %    Differential Method Automated          Recent Labs  Lab 06/04/18  1752 06/04/18  2143 06/05/18  0835 06/05/18  1228 06/05/18  1755 06/05/18 2058   POCTGLUCOSE 114* 113* 88 143* 159* 217*            Active Hospital Problems    Diagnosis  POA    *NSTEMI (non-ST elevated myocardial infarction) [I21.4]  Yes    Acute renal failure superimposed on stage 5 chronic kidney disease, not on chronic dialysis [N17.9, N18.5]  Yes    CKD (chronic kidney disease), stage V [N18.5]  Unknown    Stenosis of arteriovenous dialysis fistula [T82.858A]  Yes    Hemodialysis access, AV graft [Z99.2]  Not Applicable     AV graft placed on 4/16/18, no palpable thrill (but does have pulse) and bruit acusculated      Acute on chronic diastolic heart failure [I50.33]  Yes     Seen on echo in 2/2017, EF 50%, with class B symptoms      Coronary artery disease involving coronary bypass graft of native heart with unstable angina pectoris [I25.700]  Yes     Unstable angina in 2/2017, [facility] and  PCI on Lcx and OM1 with improvement. H/o CABG X2 2002, stent RCA, OM1 2003, NSTEMI with total occlusion of LAD in 2012.       Obesity (BMI 30.0-34.9) [E66.9]  Yes    Diabetic polyneuropathy associated with type 2 diabetes mellitus [E11.42]  Yes     Stocking/glove pattern intermittent neuropathy      Hypertension associated with diabetes [E11.59, I10]  Yes     BP controlled on BB, diuretic. ARB discontinued by Renal due to hyperkalemia        Resolved Hospital Problems    Diagnosis Date Resolved POA   No resolved problems to display.        Overview:  72F with CAD/CABGx1 '02 LIMA-LAD with multiple subsequent PCIs last in 2/2017 with pLIMA-LAD, patent RCA stents, severe ISR of mid LCX s/p 2.75mm BETHANY(Resolute) and severe ISR of OM s/p PCI with 2.5mm BETHANY(Resolute) and distal 90% RCA lesion(had SPECT with Lateral ischemia at that time), HTN, HLD, DM-2 uncontrolled (A1C 8.9) with neuro/ophtho/renal manifestations, CKD-5 approaching ESRD, LUE Brachiocephalic fistula created 4/16/18 s/p PTA for stenosis 5/30/18, chronic dCHF, pAF, h/o stroke, anemia in CKD, and GERD, who presented to [facility] ED for left sided chest pain radiating to left arm that awoke her from sleep last night. Pain resolved with morphine and nitro paste. Transferred to [facility] ED for higher level of care and cardiology consult. Upon admit, pain free.  Given 325mg ASA and placed on hep gtt. EKG with NSR and lat ST T wave abnormality that are old. Trop .09-->.67-->2.9. She is not currently on HD and doesnt have any other access than fistula which is not mature.      Pt reports that since the recent PTA to LUE AVF on 5/30, she has been experiencing exertional angina like prior to previous stents with exertional and improved with rest and Nitro. Last night was the first time it occurred at rest, so she went to ED.    Admitted to [facility]. Went to cath lab emergently on 6/4, s/p BETHANY to 1 (Natanael).  The next day, feeling well except for some fatigue,  sitting up in chair all day.  H/H decreased to mid-8 and 27 (from 10 and 31), no active bleed, VSS and no signs of RP hematoma or pseudoaneurysm, just small ecchymosis of groin. Discussed with Dr Santoyo. Continue to monitor H/H.        Assessment and Plan for problems addressed today:   NSTEMI. Reason for admit. Trop peaked at 3.728.  KEDAR score of 5. ACS protocol started.  S/p emergent LHC by Dr Santoyo Interventional Cardiology  -  -->switched to 81 for now given possible bleed  - continue plavix for now. Avoid switching to brilinta at this time as would need to re-load with antiplatelet, and she has a possible bleed into ecchymosis. Discussed with Dr Santoyo.  - home atorva  - home BB metop 100 BID  - no ACE-I or ARB since dynamic renal function  - Added Imdur 30mg QD - HOLD for hypotension this morning   - Nitro and morphine PRN    Anemia of chronic disease/CKD-5  Acute blood loss anemia, likely due to R groin ecchymosis at femoral cath site. +fatigue but VSS  - H/H 8.6/27 today, decreased from 10/31.5. Repeated to ensure not erroneous  - monitor closely with serial H/H.   - no indication for transfusion at this time.    Acute hypoxemic resp failure - multifactorial, volume overload s/p transfusion, acute on chronic dCHF, volume overload from ASHLEY on CKD-3, other  - NC O2  - ABG STAT    Acute on chronic dCHF- resolved - on admit, she had elevated JVP and edema on CXR. Upon admit, given 80mg IV  - Lasix 160 IV total now  - monitor closely   - may need Lasix gtt   - may need BiPAP +/- intubation watch  - strict Is/Os   - daily weights, preferably standing   - fluid restrict 1.5L while inpatient  - tele  - keep Mg >2, K >4    ASHLEY on Chronic Kidney Disease Stage V approaching ESRD.  Worsening. Not yet on HD, had LUE AV graft s/p fistulogram, no other ports.  Graft is still maturing.   - may have LETICIA s/p cath for NSTEMI  - concern for hypoperfusion from decreased PO intake from nausea as well  - severe nausea may  be due to uremia, may need HD, discussed with Dr Marsh (nephrology staff)  - repeat CMP STAT now    N/V - uremia, anginal equivalent, other  - antiemetics PRN  - STAT lipase, CMP, KUB    DM-2 uncontrolled. Home meds: lantus 20 qhs  - continue lantus 20 qhs  - caution for insulin stacking in low GFR     HTN   - controlled on meds    GERD - PPI     aspirin  81 mg Oral Daily    atorvastatin  80 mg Oral Daily    calcitRIOL  0.25 mcg Oral Daily    clopidogrel  75 mg Oral Daily    insulin detemir U-100  20 Units Subcutaneous QHS    isosorbide mononitrate  30 mg Oral Daily    metoprolol tartrate  100 mg Oral BID    pantoprazole  40 mg Oral Daily     dextrose 50%, dextrose 50%, glucagon (human recombinant), glucose, glucose, HYDROcodone-acetaminophen, insulin aspart U-100, nitroGLYCERIN, ondansetron    DVT PPx: SCDs; no heparin while possible active bleed    Discharge plan and follow up: pending clinical condition    Emily Allen MD  Hospital Medicine Staff  [phone] pager

## 2018-06-06 NOTE — NURSING
"Called to patient's room via call bell, the patient is visibly in pain complaining of nausea and chest pain. She is currently rating her chest pain at a "7". States she would like something for the nausea as well as the chest pain. Concerned that it might not be indigestion because she is reporting that it radiates to her left arm. See flow sheet charting for vitals. Patient given 1 nitro SL reports relief of chest pain with only 1 administration. Instructed to call for any additional chest pain and/or nausea. Reports intent to comply. Will continue to monitor.   "

## 2018-06-06 NOTE — PLAN OF CARE
Problem: Patient Care Overview  Goal: Plan of Care Review  Outcome: Ongoing (interventions implemented as appropriate)  Spoke with the patient regarding the plan of care. Right now she appears to be in no distress, she is able to make her needs known. No complaints of continued chest pain, Patient resting quietly at this time. Encouraged to take prescribed medications as directed. Will continue to monitor.

## 2018-06-06 NOTE — SUBJECTIVE & OBJECTIVE
Interval History: Patient`s echo from yesterday showed improved LVEF now 65% with no WMA.  Intermediately Right sided pressures. Overnight, she had a nausea episode a/w chest pain which lasted for 2 minis relieved with anti-nausea meds. Reports associated chest pain to be of lower intensity than one which brought her to hospital. Ambulated yesterday without issues.     Objective:     Vital Signs (Most Recent):  Temp: 98.6 °F (37 °C) (06/06/18 0709)  Pulse: 82 (06/06/18 0709)  Resp: 16 (06/06/18 0709)  BP: (!) 102/51 (06/06/18 0709)  SpO2: (!) 92 % (06/06/18 0709) Vital Signs (24h Range):  Temp:  [98.1 °F (36.7 °C)-99.9 °F (37.7 °C)] 98.6 °F (37 °C)  Pulse:  [67-84] 82  Resp:  [16-20] 16  SpO2:  [92 %-96 %] 92 %  BP: (102-153)/(51-86) 102/51     Weight: 82.2 kg (181 lb 3.5 oz)  Body mass index is 31.01 kg/m².    SpO2: (!) 92 %  O2 Device (Oxygen Therapy): nasal cannula      Intake/Output Summary (Last 24 hours) at 06/06/18 0907  Last data filed at 06/06/18 0500   Gross per 24 hour   Intake             1320 ml   Output              925 ml   Net              395 ml       Lines/Drains/Airways     Drain                 Hemodialysis AV Fistula Left upper arm -- days          Peripheral Intravenous Line                 Peripheral IV - Single Lumen 06/04/18 0237 Right Hand 2 days                Physical Exam   Constitutional: She appears well-developed and well-nourished.   HENT:   Head: Normocephalic and atraumatic.   Right Ear: External ear normal.   Left Ear: External ear normal.   Eyes: Conjunctivae and EOM are normal. Pupils are equal, round, and reactive to light.   Neck: Normal range of motion. Neck supple. No JVD present. No thyromegaly present.   Cardiovascular: Normal rate, regular rhythm, S1 normal, S2 normal, normal heart sounds and intact distal pulses.  Exam reveals no gallop, no S3 and no friction rub.    No murmur heard.  Pulses:       Radial pulses are 2+ on the right side, and 2+ on the left side.         Femoral pulses are 2+ on the right side, and 2+ on the left side.       Dorsalis pedis pulses are 2+ on the right side, and 2+ on the left side.        Posterior tibial pulses are 2+ on the right side, and 2+ on the left side.   Right groin ecchymosis area unchanged since Monday. No bruit or thrill. Non tender   Pulmonary/Chest: Effort normal. No stridor. She has no wheezes. She has no rales. She exhibits no tenderness.   Abdominal: Soft. Bowel sounds are normal. She exhibits no distension. There is no tenderness. There is no rebound and no guarding.   Musculoskeletal: Normal range of motion. She exhibits no edema or tenderness.   Psychiatric: She has a normal mood and affect.       Significant Labs: All pertinent lab results from the last 24 hours have been reviewed.

## 2018-06-07 PROBLEM — E87.20 METABOLIC ACIDOSIS: Status: ACTIVE | Noted: 2018-06-07

## 2018-06-07 PROBLEM — N17.9 AKI (ACUTE KIDNEY INJURY): Status: ACTIVE | Noted: 2018-06-07

## 2018-06-07 PROBLEM — J96.01 ACUTE HYPOXEMIC RESPIRATORY FAILURE: Status: ACTIVE | Noted: 2018-06-07

## 2018-06-07 PROBLEM — D62 ACUTE BLOOD LOSS ANEMIA: Status: ACTIVE | Noted: 2018-06-07

## 2018-06-07 PROBLEM — E83.39 HYPERPHOSPHATEMIA: Status: ACTIVE | Noted: 2018-06-07

## 2018-06-07 LAB
ALBUMIN SERPL BCP-MCNC: 3.1 G/DL
ALBUMIN SERPL BCP-MCNC: 3.1 G/DL
ALP SERPL-CCNC: 108 U/L
ALP SERPL-CCNC: 121 U/L
ALT SERPL W/O P-5'-P-CCNC: 23 U/L
ALT SERPL W/O P-5'-P-CCNC: 33 U/L
ANION GAP SERPL CALC-SCNC: 16 MMOL/L
ANION GAP SERPL CALC-SCNC: 17 MMOL/L
AST SERPL-CCNC: 38 U/L
AST SERPL-CCNC: 43 U/L
BASOPHILS # BLD AUTO: 0.02 K/UL
BASOPHILS NFR BLD: 0.2 %
BILIRUB SERPL-MCNC: 0.7 MG/DL
BILIRUB SERPL-MCNC: 0.7 MG/DL
BUN SERPL-MCNC: 111 MG/DL
BUN SERPL-MCNC: 96 MG/DL
CALCIUM SERPL-MCNC: 8.6 MG/DL
CALCIUM SERPL-MCNC: 9.1 MG/DL
CHLORIDE SERPL-SCNC: 102 MMOL/L
CHLORIDE SERPL-SCNC: 99 MMOL/L
CO2 SERPL-SCNC: 16 MMOL/L
CO2 SERPL-SCNC: 18 MMOL/L
CREAT SERPL-MCNC: 4.9 MG/DL
CREAT SERPL-MCNC: 5.3 MG/DL
DIFFERENTIAL METHOD: ABNORMAL
EOSINOPHIL # BLD AUTO: 0 K/UL
EOSINOPHIL NFR BLD: 0 %
ERYTHROCYTE [DISTWIDTH] IN BLOOD BY AUTOMATED COUNT: 14.5 %
EST. GFR  (AFRICAN AMERICAN): 8.7 ML/MIN/1.73 M^2
EST. GFR  (AFRICAN AMERICAN): 9.6 ML/MIN/1.73 M^2
EST. GFR  (NON AFRICAN AMERICAN): 7.6 ML/MIN/1.73 M^2
EST. GFR  (NON AFRICAN AMERICAN): 8.3 ML/MIN/1.73 M^2
GLUCOSE SERPL-MCNC: 178 MG/DL
GLUCOSE SERPL-MCNC: 268 MG/DL
HCT VFR BLD AUTO: 33.1 %
HGB BLD-MCNC: 10.7 G/DL
IMM GRANULOCYTES # BLD AUTO: 0.12 K/UL
IMM GRANULOCYTES NFR BLD AUTO: 1.1 %
LYMPHOCYTES # BLD AUTO: 0.5 K/UL
LYMPHOCYTES NFR BLD: 4.4 %
MAGNESIUM SERPL-MCNC: 2.3 MG/DL
MCH RBC QN AUTO: 30.1 PG
MCHC RBC AUTO-ENTMCNC: 32.3 G/DL
MCV RBC AUTO: 93 FL
MONOCYTES # BLD AUTO: 0.7 K/UL
MONOCYTES NFR BLD: 6.3 %
NEUTROPHILS # BLD AUTO: 9.9 K/UL
NEUTROPHILS NFR BLD: 88 %
NRBC BLD-RTO: 0 /100 WBC
PHOSPHATE SERPL-MCNC: 8.1 MG/DL
PLATELET # BLD AUTO: 199 K/UL
PMV BLD AUTO: 12.4 FL
POCT GLUCOSE: 234 MG/DL (ref 70–110)
POCT GLUCOSE: 236 MG/DL (ref 70–110)
POCT GLUCOSE: 334 MG/DL (ref 70–110)
POCT GLUCOSE: 338 MG/DL (ref 70–110)
POTASSIUM SERPL-SCNC: 4.8 MMOL/L
POTASSIUM SERPL-SCNC: 5.3 MMOL/L
PROT SERPL-MCNC: 6.8 G/DL
PROT SERPL-MCNC: 6.9 G/DL
RBC # BLD AUTO: 3.55 M/UL
SODIUM SERPL-SCNC: 133 MMOL/L
SODIUM SERPL-SCNC: 135 MMOL/L
WBC # BLD AUTO: 11.18 K/UL

## 2018-06-07 PROCEDURE — 25000003 PHARM REV CODE 250: Performed by: HOSPITALIST

## 2018-06-07 PROCEDURE — 36415 COLL VENOUS BLD VENIPUNCTURE: CPT

## 2018-06-07 PROCEDURE — 20600001 HC STEP DOWN PRIVATE ROOM

## 2018-06-07 PROCEDURE — 80053 COMPREHEN METABOLIC PANEL: CPT | Mod: 91

## 2018-06-07 PROCEDURE — 85025 COMPLETE CBC W/AUTO DIFF WBC: CPT

## 2018-06-07 PROCEDURE — 99233 SBSQ HOSP IP/OBS HIGH 50: CPT | Mod: ,,, | Performed by: HOSPITALIST

## 2018-06-07 PROCEDURE — 25000003 PHARM REV CODE 250: Performed by: INTERNAL MEDICINE

## 2018-06-07 PROCEDURE — 99233 SBSQ HOSP IP/OBS HIGH 50: CPT | Mod: GC,,, | Performed by: INTERNAL MEDICINE

## 2018-06-07 PROCEDURE — 63600175 PHARM REV CODE 636 W HCPCS: Performed by: HOSPITALIST

## 2018-06-07 PROCEDURE — 83735 ASSAY OF MAGNESIUM: CPT

## 2018-06-07 PROCEDURE — 80053 COMPREHEN METABOLIC PANEL: CPT

## 2018-06-07 PROCEDURE — 84100 ASSAY OF PHOSPHORUS: CPT

## 2018-06-07 RX ORDER — SEVELAMER CARBONATE 800 MG/1
800 TABLET, FILM COATED ORAL
Status: DISCONTINUED | OUTPATIENT
Start: 2018-06-07 | End: 2018-06-12 | Stop reason: HOSPADM

## 2018-06-07 RX ORDER — SODIUM BICARBONATE 650 MG/1
2 TABLET ORAL 3 TIMES DAILY
Status: DISCONTINUED | OUTPATIENT
Start: 2018-06-07 | End: 2018-06-11

## 2018-06-07 RX ADMIN — INSULIN ASPART 2 UNITS: 100 INJECTION, SOLUTION INTRAVENOUS; SUBCUTANEOUS at 05:06

## 2018-06-07 RX ADMIN — SODIUM BICARBONATE 650 MG TABLET 1300 MG: at 09:06

## 2018-06-07 RX ADMIN — INSULIN ASPART 4 UNITS: 100 INJECTION, SOLUTION INTRAVENOUS; SUBCUTANEOUS at 11:06

## 2018-06-07 RX ADMIN — METOPROLOL TARTRATE 100 MG: 100 TABLET ORAL at 09:06

## 2018-06-07 RX ADMIN — PANTOPRAZOLE SODIUM 40 MG: 40 TABLET, DELAYED RELEASE ORAL at 09:06

## 2018-06-07 RX ADMIN — INSULIN ASPART 1 UNITS: 100 INJECTION, SOLUTION INTRAVENOUS; SUBCUTANEOUS at 09:06

## 2018-06-07 RX ADMIN — SODIUM BICARBONATE 650 MG TABLET 1300 MG: at 06:06

## 2018-06-07 RX ADMIN — CALCITRIOL 0.25 MCG: 0.25 CAPSULE, LIQUID FILLED ORAL at 09:06

## 2018-06-07 RX ADMIN — FUROSEMIDE 30 MG/HR: 10 INJECTION, SOLUTION INTRAMUSCULAR; INTRAVENOUS at 11:06

## 2018-06-07 RX ADMIN — SODIUM POLYSTYRENE SULFONATE 15 G: 15 SUSPENSION ORAL; RECTAL at 11:06

## 2018-06-07 RX ADMIN — CLOPIDOGREL 75 MG: 75 TABLET, FILM COATED ORAL at 09:06

## 2018-06-07 RX ADMIN — ASPIRIN 81 MG: 81 TABLET, COATED ORAL at 09:06

## 2018-06-07 RX ADMIN — ATORVASTATIN CALCIUM 80 MG: 20 TABLET, FILM COATED ORAL at 09:06

## 2018-06-07 RX ADMIN — CHLOROTHIAZIDE SODIUM 500 MG: 500 INJECTION, POWDER, LYOPHILIZED, FOR SOLUTION INTRAVENOUS at 11:06

## 2018-06-07 RX ADMIN — FUROSEMIDE 30 MG/HR: 10 INJECTION, SOLUTION INTRAMUSCULAR; INTRAVENOUS at 05:06

## 2018-06-07 RX ADMIN — SEVELAMER CARBONATE 800 MG: 800 TABLET, FILM COATED ORAL at 06:06

## 2018-06-07 RX ADMIN — INSULIN DETEMIR 20 UNITS: 100 INJECTION, SOLUTION SUBCUTANEOUS at 09:06

## 2018-06-07 NOTE — PROGRESS NOTES
06/07/18 0920 06/07/18 0925   Vital Signs   SpO2 97 % (!) 91 %   Flow (L/min) 2 --    O2 Device (Oxygen Therapy) nasal cannula room air       O2Sats stable on RA; able to wean off supplemental O2 at this time. Will continue to monitor.

## 2018-06-07 NOTE — PROGRESS NOTES
Pt resting comfortably on 4L; Resp status remains stable; Dr. Hunter notified of urine output; new orders noted; will carry out and monitor

## 2018-06-07 NOTE — ASSESSMENT & PLAN NOTE
Likely due to fluid overload. Agree with aggressive diuresis. Nephrology consult already on board from CKD perspective.   Risk factor control including optimizing BP/HR/fluid status recommended considering HFpEF/CKD and recent ACS episode.

## 2018-06-07 NOTE — PROGRESS NOTES
[facility]  Interventional Cardiology  Progress Note    Patient Name: Zoey Ham  MRN: [id number]  Admission Date: 6/4/2018  Hospital Length of Stay: 3 days  Code Status: Full Code   Attending Physician: Felix Santoyo MD   Primary Care Physician: Cesia Rosales MD  Principal Problem:NSTEMI (non-ST elevated myocardial infarction)    Subjective:     Interval History: Events noted from yesterday including blood transfusion/fever during transfusion and TACO s/p IV diuresis now on lasix gtt. This am reports subjectively improve feeling much better. Patient ambulated yesterday without any anginal episode. No further anginal episode in last 36 hrs. SBP in 150s ths am.    Objective:     Vital Signs (Most Recent):  Temp: 99.4 °F (37.4 °C) (06/1946)  Pulse: 73 (06/07/18 0600)  Resp: 18 (06/07/18 0430)  BP: (!) 154/76 (06/07/18 0430)  SpO2: (!) 91 % (06/07/18 0430) Vital Signs (24h Range):  Temp:  [96.7 °F (35.9 °C)-100 °F (37.8 °C)] 99.4 °F (37.4 °C)  Pulse:  [67-89] 73  Resp:  [16-20] 18  SpO2:  [84 %-100 %] 91 %  BP: ()/(55-84) 154/76     Weight: 81.2 kg (179 lb 0.2 oz)  Body mass index is 30.63 kg/m².    SpO2: (!) 91 %  O2 Device (Oxygen Therapy): nasal cannula      Intake/Output Summary (Last 24 hours) at 06/07/18 0736  Last data filed at 06/07/18 0600   Gross per 24 hour   Intake          1240.25 ml   Output             1150 ml   Net            90.25 ml       Lines/Drains/Airways     Drain                 Hemodialysis AV Fistula Left upper arm -- days         Urethral Catheter 06/06/18 2031 less than 1 day          Peripheral Intravenous Line                 Peripheral IV - Single Lumen 06/04/18 0237 Right Hand 3 days                Physical Exam   Constitutional: She appears well-developed and well-nourished.   HENT:   Head: Normocephalic and atraumatic.   Right Ear: External ear normal.   Left Ear: External ear normal.   Eyes: Conjunctivae and EOM are normal. Pupils are  equal, round, and reactive to light.   Neck: Normal range of motion. Neck supple. No JVD present. No thyromegaly present.   Cardiovascular: Normal rate, regular rhythm, S1 normal, S2 normal, normal heart sounds and intact distal pulses.  Exam reveals no gallop, no S3 and no friction rub.    No murmur heard.  Pulses:       Radial pulses are 2+ on the right side, and 2+ on the left side.        Femoral pulses are 2+ on the right side, and 2+ on the left side.       Dorsalis pedis pulses are 2+ on the right side, and 2+ on the left side.        Posterior tibial pulses are 2+ on the right side, and 2+ on the left side.   Ecchymosis right groin - unchanged. No TTP/Bruit   Pulmonary/Chest: Effort normal. No stridor. She has decreased breath sounds in the right lower field and the left lower field. She has no wheezes. She has no rales. She exhibits no tenderness.   Abdominal: Soft. Bowel sounds are normal. She exhibits no distension. There is no tenderness. There is no rebound and no guarding.   Musculoskeletal: Normal range of motion. She exhibits no edema or tenderness.   Psychiatric: She has a normal mood and affect.       Significant Labs: All pertinent lab results from the last 24 hours have been reviewed.      Assessment and Plan:     Patient is a 72 y.o. female presenting with:    * NSTEMI (non-ST elevated myocardial infarction)    No change in plan since yesterday. Patient without any anginal episode. No arrhythmias noted on tele.  Multivessel CAD s/p PCI of the LCx/OM1 with BETHANY. Patent LIMA-LAD and residual distal RCA disease - unchanged since previous angiogram. LCx/OM was deemed to be lesion responsible for ACS episode this time and intervened upon to reduce contrast dye usage during procedure.  Overnight episode can be exertional angina during nausea related stress.   Discussed with patient the management options including consideration of RCA PCI during index hospitalization which places her at higher risk of  LETICIA and hemodialysis considering worse baseline renal function vs medical optimization with antianginal therapy and outpatient reassessment with a cardiac stress test to determine timing of distal revascularization.   Patient wish to stage RCA intervention at this point and wish to try conservative medical management. Can be titrated up on BB and nitrates permitting BP.  If she develop another episode of angina during index hospitalization then will readdress the option of RCA PCI during this stay though will be associated with higher risk of LETICIA/HD. Her lucas risk for LETICIA is 57% and risk for need to be on hemodialysis is 13%.  Continue DaPT and high intensity statin therapy.   Cardiac Rehab        Acute on chronic diastolic heart failure    Likely due to fluid overload. Agree with aggressive diuresis. Nephrology consult already on board from CKD perspective.   Risk factor control including optimizing BP/HR/fluid status recommended considering HFpEF/CKD and recent ACS episode.           Patient seen and plan discussion with Dr. Santoyo as staff.    VTE Risk Mitigation         Ordered     IP VTE HIGH RISK PATIENT  Once      06/04/18 1219     Reason for No Pharmacological VTE Prophylaxis  Once      06/04/18 1219     Reason for No Pharmacological VTE Prophylaxis  Once      06/04/18 1155          Mat Andre MD  Interventional Cardiology  [facility]

## 2018-06-07 NOTE — SUBJECTIVE & OBJECTIVE
Interval History:  Developed respiratory distress likely TACO after 2UPRBC were transfused yesterday afternoon. Pt was started on lasix gtt and rate increased to 30mg/hr, diuril 500mg x 1 given this AM. Upon evaluation pt was breathing comfortably on room air with good urine output. Denies SOB, cough, cp.     Review of patient's allergies indicates:   Allergen Reactions    Percodan [oxycodone-aspirin] Hives     Welps     Pcn [penicillins] Hives and Swelling     Tolerated Rocephin IM in clinic      Phenergan [promethazine] Other (See Comments)     Altered mental status; jerking of extremities     Current Facility-Administered Medications   Medication Frequency    0.9%  NaCl infusion (for blood administration) Q24H PRN    aspirin EC tablet 81 mg Daily    atorvastatin tablet 80 mg Daily    calcitRIOL capsule 0.25 mcg Daily    clopidogrel tablet 75 mg Daily    dextrose 50% injection 12.5 g PRN    dextrose 50% injection 25 g PRN    furosemide (LASIX) 2 mg/mL in sodium chloride 0.9% 100 mL infusion (conc: 2 mg/mL) Continuous    glucagon (human recombinant) injection 1 mg PRN    glucose chewable tablet 16 g PRN    glucose chewable tablet 24 g PRN    HYDROcodone-acetaminophen 5-325 mg per tablet 1 tablet Q6H PRN    insulin aspart U-100 pen 0-5 Units QID (AC + HS) PRN    insulin detemir U-100 pen 20 Units QHS    metoclopramide HCl injection 5 mg Q6H PRN    metoprolol tartrate (LOPRESSOR) tablet 100 mg BID    nitroGLYCERIN SL tablet 0.3 mg Q5 Min PRN    ondansetron disintegrating tablet 8 mg Q8H PRN    pantoprazole EC tablet 40 mg Daily     Family History     Problem Relation (Age of Onset)    Breast cancer Maternal Aunt    Cancer Mother    Dementia Father    Diabetes Daughter    Heart disease Mother, Father    Hypertension Mother, Father, Sister, Brother    No Known Problems Son    Psoriasis Father    Stroke Sister        Social History Main Topics    Smoking status: Never Smoker    Smokeless tobacco:  Never Used    Alcohol use No    Drug use: No    Sexual activity: No     Review of Systems   Constitutional: Negative for activity change, appetite change and chills.   HENT: Negative for congestion and sore throat.    Eyes: Negative for visual disturbance.   Cardiovascular: Positive for chest pain (resolved). Negative for leg swelling.   Gastrointestinal: Negative for abdominal pain, constipation and diarrhea.   Genitourinary: Negative for decreased urine volume, difficulty urinating, dysuria and hematuria.   Musculoskeletal: Negative for arthralgias.   Neurological: Negative for facial asymmetry and headaches.   Psychiatric/Behavioral: Negative for agitation and confusion.     Objective:     Vital Signs (Most Recent):  Temp: 97.6 °F (36.4 °C) (06/07/18 0749)  Pulse: 74 (06/07/18 1530)  Resp: 18 (06/07/18 1530)  BP: 125/63 (06/07/18 1530)  SpO2: (!) 90 % (06/07/18 1530)  O2 Device (Oxygen Therapy): room air (06/07/18 1530) Vital Signs (24h Range):  Temp:  [97.6 °F (36.4 °C)-99.4 °F (37.4 °C)] 97.6 °F (36.4 °C)  Pulse:  [72-89] 74  Resp:  [14-20] 18  SpO2:  [90 %-97 %] 90 %  BP: (125-176)/(63-84) 125/63     Weight: 81.2 kg (179 lb 0.2 oz) (06/07/18 0600)  Body mass index is 30.63 kg/m².  Body surface area is 1.92 meters squared.    I/O last 3 completed shifts:  In: 2215.3 [P.O.:1080; I.V.:135.3; Blood:1000]  Out: 1725 [Urine:1725]    Physical Exam   Constitutional: She is oriented to person, place, and time. She appears well-developed. No distress.   HENT:   Head: Normocephalic and atraumatic.   Eyes: EOM are normal. Pupils are equal, round, and reactive to light.   Neck: Normal range of motion. Neck supple.   Cardiovascular: Normal rate, regular rhythm and normal heart sounds.    Pulmonary/Chest: Effort normal.   Faint crackles   Abdominal: Soft. Bowel sounds are normal. She exhibits no distension.   Musculoskeletal: Normal range of motion. She exhibits no edema.   Neurological: She is alert and oriented to  person, place, and time.   Skin: Skin is warm. She is not diaphoretic.   Psychiatric: She has a normal mood and affect. Her behavior is normal.       Significant Labs:  All labs within the past 24 hours have been reviewed.    Significant Imaging:  reviewed

## 2018-06-07 NOTE — CARE UPDATE
Rapid Response Nurse Note     Rapid Response Metrics:     Admit Date: 2018  LOS: 2  Code Status: Full Code   Date of Consult: 2018  : 1945  Age: 72 y.o.  Weight:   Wt Readings from Last 1 Encounters:   18 82.2 kg (181 lb 3.5 oz)     Race:   Sex: female  Bed: 379/[facility] A:   MRN: [id number]  Time Rapid Response Team page Received: 1620  Time Rapid Response Team at Bedside: 1620  Time Rapid Response Team left Bedside: 1640  Was the patient discharged from an ICU this admission? no   Was the patient discharged from a PACU within last 24 hours? no  Did the patient receive conscious sedation/general anesthesia within last 24 hours? no  Was the patient in the ED within the past 24 hours? no  Was the patient started on NIPPV within the past 24 hours? no  Did this progress into an ARC or CPA: no  Attending Physician: Emily Allen MD  Primary Service: [facility] MED C  Consult Requested By: Emily Allen MD   Rapid Response Indication(s): SOB  Disposition: Remains in [facility]    SITUATION:     Reason for Call:   Called to evaluate the patient for Respiratory    BACKGROUND:     Why is the patient in the hospital?: NSTEMI  What changed?: Pt became SOB after blood transfusion    ASSESSMENT:     What did you find: Pt c/o SOB, tachypneic, RR 24. 's/80. SpO2 95% on 4 LNC. Expiratory wheezes auscultated Pt does verbalize improvement of SOB after sitting up. 2 U RBCs infused & completed at 1630. Pt recently received 160 mg IV Lasix, 100 mL urine output. MICU team to bedside to evaluate pt. Orders obtained for solu-medrol, benadryl, reglan, and queen placement. Pt stable at this time.    RECOMMENDATIONS:     We recommend: Administer medications as ordered. Place queen. Monitor VS, respiratory status, and urine output. Will reevaluate pt and respiratory status.     FOLLOW-UP/CONTINGENCY PLAN:     Patient needs a second visit at : 2000    Call the rapid response Nurse at x [phone] for additional  questions or concerns.      PHYSICIAN ESCALATION:     Orders received and case discussed with Dr. Benoit and Dr. Allen     Disposition: Remains in

## 2018-06-07 NOTE — ASSESSMENT & PLAN NOTE
71y/o F pt with PMHX of uncontrolled DM2 with nephropathy, CKD5, HTN here with NSTEMI and ASHLEY on CKD. Suspect ASHLEY due to hypoperfusion/ ischemia in setting of NSTEMI.   Further rise in Cr possibly due drop in Hgb 10-->8 vs LETICIA. Episode of respiratory distress yesterday, likely acute diastolic dysfxn or TACO could have also further exacerbated kidney dysfxn.  --s/p LHC  (6/4/18)  --baseline Cr 1.8-2.2, was 2.7 on 5/30.  --Cr 3.2 on admit-->3.1; now worsening-->4.1-->4.9  --I/O: 1.5L -->925cc    Plan:  --continue current diuretics, will reassess in AM  --avoid all nephrotoxic agents and renally dose meds  --no need for RRT currently  --strict I/O

## 2018-06-07 NOTE — CONSULTS
[facility]  Critical Care Medicine  Consult Note    Patient Name: Zoey Ham  MRN: [id number]  Admission Date: 6/4/2018  Hospital Length of Stay: 2 days  Code Status: Full Code  Attending Physician: Emily Allen MD   Primary Care Provider: Cesia Rosales MD   Principal Problem: NSTEMI (non-ST elevated myocardial infarction)    Consults  Subjective:     HPI:  This is Ms. Zoey Ham, 72 year old female with PMHx significant for Hypertension associated with diabetes, Anemia of chronic renal failure, stage 4 (severe), hyperparathyroidism, CKD 5 approaching ESRD has LUE fistula in place and not on dialysis yet. She presented two days ago with symptoms of Acute Coronary Syndrome found to have NSTEMI. Taken to cath lab emergently on 6/4, BETHANY to OM1. Patient's hemoglobin dropped to 8.5 1 day post-cath. No evidence of active bleeding/ retroperitoneal hematoma; however, ecchymosis of groin at site of catheter present. Due to active ACS and concern for bleeding into catheter site, patient transfused 2 units of blood.     During transfusion, patient developed temperature of 100. Given Tylenol, and transfusion was continued. Second unit of blood completed at 1630. While transfusion was completing, patient developed audible wheezing. Per nursing note,  patient with crackles at bilateral bases, O2 91% on 2L. Patient then had episode of emesis. Dr. Allen contacted, and patient given total of Lasix 160 mg IV with minimal urine output. Due to concern for TACO vs. TRALI, patient given methylprenisolone and diphenhydramine. Dyspnea persisted, Rapid was called and Critical Care medicine was consulted for acute hypoxic respiratory failure.     Hospital/ICU Course:  No notes on file    Past Medical History:   Diagnosis Date    Acute myocardial infarction of anterolateral wall 7/9/2015    Anemia of chronic renal failure, stage 4 (severe) 1/22/2015    Atherosclerosis of aorta 10/3/2012    Benign  hypertension with CKD (chronic kidney disease) stage IV 3/11/2016    Chronic diastolic congestive heart failure 10/3/2012    Chronic kidney disease, stage IV (severe) 7/3/2012    Coronary artery disease     s/p 1v CABG 2002 and multiple PCI (last angiogram in 6/2012 with patent LIMA->LAD and patent LCx and RCA stents)    Diabetic polyneuropathy associated with type 2 diabetes mellitus 7/9/2015    Diabetic polyneuropathy associated with type 2 diabetes mellitus 7/9/2015    Encounter for blood transfusion     Gastritis without bleeding     Heart attack 09/2012    5-6 events    Hyperlipidemia     Hyperparathyroidism     Nephritis and nephropathy, 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 7/9/2015    NSTEMI (non-ST elevated myocardial infarction)     Occlusion and stenosis of carotid artery with cerebral infarction 7/9/2015    Osteopenia     PAF (paroxysmal atrial fibrillation) 10/3/2012    Pneumonia     Proliferative diabetic retinopathy 10/3/2012    Sepsis due to Escherichia coli with acute renal failure 2/2016    UTI     Type II diabetes mellitus with neurological manifestations 7/9/2015    Type II diabetes mellitus with renal manifestations 7/3/2012       Past Surgical History:   Procedure Laterality Date    APPENDECTOMY      CARDIAC SURGERY  2002    CABG    CHOLECYSTECTOMY      CORONARY ANGIOPLASTY  2004    CORONARY ARTERY BYPASS GRAFT      EYE SURGERY      cataracts bilaterally    FRACTURE SURGERY      right ankle    HYSTERECTOMY      TONSILLECTOMY         Review of patient's allergies indicates:   Allergen Reactions    Percodan [oxycodone-aspirin] Hives     Welps     Pcn [penicillins] Hives and Swelling     Tolerated Rocephin IM in clinic      Phenergan [promethazine] Other (See Comments)     Altered mental status; jerking of extremities       Family History     Problem Relation (Age of Onset)    Breast cancer Maternal Aunt    Cancer Mother    Dementia Father    Diabetes Daughter    Heart  disease Mother, Father    Hypertension Mother, Father, Sister, Brother    No Known Problems Son    Psoriasis Father    Stroke Sister        Social History Main Topics    Smoking status: Never Smoker    Smokeless tobacco: Never Used    Alcohol use No    Drug use: No    Sexual activity: No      Review of Systems   Constitutional: Positive for fatigue.   Respiratory: Positive for cough, shortness of breath and wheezing.    Cardiovascular: Positive for leg swelling. Negative for chest pain and palpitations.   Gastrointestinal: Positive for nausea. Negative for abdominal distention and abdominal pain.   Genitourinary: Positive for decreased urine volume.   Musculoskeletal: Negative for back pain.   Skin: Negative for rash and wound.   Neurological: Negative for light-headedness.   Psychiatric/Behavioral: Negative for confusion.     Objective:     Vital Signs (Most Recent):  Temp: 98.3 °F (36.8 °C) (06/06/18 1635)  Pulse: 88 (06/06/18 1845)  Resp: 16 (06/06/18 1635)  BP: (!) 173/80 (06/06/18 1845)  SpO2: 95 % (06/06/18 1824) Vital Signs (24h Range):  Temp:  [96.7 °F (35.9 °C)-100 °F (37.8 °C)] 98.3 °F (36.8 °C)  Pulse:  [67-89] 88  Resp:  [16-20] 16  SpO2:  [84 %-100 %] 95 %  BP: ()/(51-86) 173/80   Weight: 82.2 kg (181 lb 3.5 oz)  Body mass index is 31.01 kg/m².      Intake/Output Summary (Last 24 hours) at 06/06/18 1846  Last data filed at 06/06/18 1635   Gross per 24 hour   Intake             1960 ml   Output              775 ml   Net             1185 ml       Physical Exam   Constitutional: She is oriented to person, place, and time. She appears distressed.   HENT:   Head: Normocephalic and atraumatic.   Eyes: Conjunctivae are normal. No scleral icterus.   Neck: Normal range of motion.   Cardiovascular: Normal rate, regular rhythm, normal heart sounds and intact distal pulses.    Pulmonary/Chest: She is in respiratory distress. She has wheezes. She has rales.   Visibly increased WOB  Diffuse expiratory  wheezes, rales present up to mid lung fields bilaterally   Abdominal: She exhibits no distension. There is no tenderness.   Musculoskeletal: She exhibits edema.   Neurological: She is alert and oriented to person, place, and time.   Skin: Skin is warm and dry.   Psychiatric: She has a normal mood and affect.       Vents:     Lines/Drains/Airways     Drain                 Hemodialysis AV Fistula Left upper arm -- days          Peripheral Intravenous Line                 Peripheral IV - Single Lumen 06/04/18 0237 Right Hand 2 days              Significant Labs:    CBC/Anemia Profile:    Recent Labs  Lab 06/05/18  0012 06/05/18  1126 06/06/18  0910   WBC 12.24 13.81* 11.49   HGB 8.6* 8.5* 8.0*   HCT 27.1* 27.0* 24.8*    222 196   MCV 98 98 96   RDW 13.2 13.4 13.6        Chemistries:    Recent Labs  Lab 06/05/18  0530 06/06/18  0532     137 135*   K 4.9  5.0 4.9     105 103   CO2 22*  24 20*   BUN 72*  74* 84*   CREATININE 3.1*  3.2* 4.1*   CALCIUM 8.5*  8.4* 8.4*   ALBUMIN 3.2* 3.1*   PROT 6.2 6.2   BILITOT 0.4 0.5   ALKPHOS 107 93   ALT 14 12   AST 25 21       All pertinent labs within the past 24 hours have been reviewed.    Significant Imaging: I have reviewed and interpreted all pertinent imaging results/findings within the past 24 hours.    Assessment/Plan:     Pulmonary   Acute respiratory failure with hypoxia    - ABG 7.339/35.9/63/-6 on 4 L per nasal canula, SpO2 95%  - Will closely monitor patient overnight, and will admit her to MICU should her respiratory status worsen or fail to improve with diuresis         Cardiac/Vascular   * NSTEMI (non-ST elevated myocardial infarction)    - s/p PCI 6/4  - Management per Primary        Renal/   CKD (chronic kidney disease), stage V    - HD graft placed 6 weeks ago  - Should patient fail to respond to Lsaix gtt will require HD catheter placement        TACO (transfusion associated circulatory overload)    - TACO more likely etiology than  UZMA for patient's acute respiratory distress given history of diastolic dysfunction and ESRD  - CXR performed consistent with worsening pulmonary edema   - Recommend lasix infusion at 20 mg/hr  - BMP BID to monitor for electrolyte derangments on intensive diuresis  - Place queen catheter to monitor UOP and response to diuresis             Thank you for your consult. I will follow-up with patient. Please contact us if you have any additional questions.     Mann Nam MD  Critical Care Medicine  [facility]

## 2018-06-07 NOTE — PLAN OF CARE
Problem: Patient Care Overview  Goal: Plan of Care Review  Outcome: Revised  Pt free of falls/trauma/injuries.  Denies c/o SOB, CP, or discomfort.  Generalized skin remains CDI; no edema noted.  Pt being diuresed with Lasix gtt at 30mg/hr-->15cc/hr and 1x dose of Diuril IVPB; diuresing well.   Wt trending down.  H&H stable; no need for any transfusions this shift.  Blood glucose diet controlled; no supplemental insulin required.  PT/OT following; pt able to ambulate with standby assist.  Daughter at the bedside.  Pt and family tolerating plan of care.

## 2018-06-07 NOTE — ASSESSMENT & PLAN NOTE
- ABG 7.339/35.9/63/-6 on 4 L per nasal canula, SpO2 95%  - Will closely monitor patient overnight, and will admit her to MICU should her respiratory status worsen or fail to improve with diuresis

## 2018-06-07 NOTE — ASSESSMENT & PLAN NOTE
No change in plan since yesterday. Patient without any anginal episode. No arrhythmias noted on tele.  Multivessel CAD s/p PCI of the LCx/OM1 with BETHANY. Patent LIMA-LAD and residual distal RCA disease - unchanged since previous angiogram. LCx/OM was deemed to be lesion responsible for ACS episode this time and intervened upon to reduce contrast dye usage during procedure.  Overnight episode can be exertional angina during nausea related stress.   Discussed with patient the management options including consideration of RCA PCI during index hospitalization which places her at higher risk of LETICIA and hemodialysis considering worse baseline renal function vs medical optimization with antianginal therapy and outpatient reassessment with a cardiac stress test to determine timing of distal revascularization.   Patient wish to stage RCA intervention at this point and wish to try conservative medical management. Can be titrated up on BB and nitrates permitting BP.  If she develop another episode of angina during index hospitalization then will readdress the option of RCA PCI during this stay though will be associated with higher risk of LETICIA/HD. Her lucas risk for LETICIA is 57% and risk for need to be on hemodialysis is 13%.  Continue DaPT and high intensity statin therapy.   Cardiac Rehab

## 2018-06-07 NOTE — ASSESSMENT & PLAN NOTE
- HD graft placed 6 weeks ago  - Should patient fail to respond to Lsaix gtt will require HD catheter placement

## 2018-06-07 NOTE — PROGRESS NOTES
[facility]  Nephrology  Progress Note    Patient Name: Zoey Ham  MRN: [id number]  Admission Date: 6/4/2018  Hospital Length of Stay: 3 days  Attending Provider: Emily Allen MD   Primary Care Physician: Cesia Rosales MD  Principal Problem:NSTEMI (non-ST elevated myocardial infarction)    Subjective:     HPI: 72F with hx of DM2 uncontrolled (a1c 9.0) with retinopathy and nephropathy CKD V with LUE Brachiocephalic fistula created 4/16/18 s/p PTA for stenosis 5/30/18, renovascular HTN, mixed HLD, extensive CAD s/p CABGx1 2002 GALLEGOS-LAD with multiple subsequent PCI's last in 2/2017 with pLIMA-LAD, patent RCA stents, severe ISR of mid LCX s/p 2.75mm BETHANY and severe ISR of OM s/p PCI with 2.5mm BETHANY and distal 90% RCA lesion (had SPECT with lateral ischemia at that time) admitted 6/4 with NSTEMI s/p PCI. Nephrology consulted for ASHLEY on CKD5 requiring LHC. Baseline Cr 1.8-2.2. On admit Cr was 3.2. Received IVFs prior to and following LHC. Kidney function stable this AM with BUN/Cr= 72/3.1, with good urine output.     Interval History:  Developed respiratory distress likely TACO after 2UPRBC were transfused yesterday afternoon. Pt was started on lasix gtt and rate increased to 30mg/hr, diuril 500mg x 1 given this AM. Upon evaluation pt was breathing comfortably on room air with good urine output. Denies SOB, cough, cp.     Review of patient's allergies indicates:   Allergen Reactions    Percodan [oxycodone-aspirin] Hives     Welps     Pcn [penicillins] Hives and Swelling     Tolerated Rocephin IM in clinic      Phenergan [promethazine] Other (See Comments)     Altered mental status; jerking of extremities     Current Facility-Administered Medications   Medication Frequency    0.9%  NaCl infusion (for blood administration) Q24H PRN    aspirin EC tablet 81 mg Daily    atorvastatin tablet 80 mg Daily    calcitRIOL capsule 0.25 mcg Daily    clopidogrel tablet 75 mg Daily    dextrose 50%  injection 12.5 g PRN    dextrose 50% injection 25 g PRN    furosemide (LASIX) 2 mg/mL in sodium chloride 0.9% 100 mL infusion (conc: 2 mg/mL) Continuous    glucagon (human recombinant) injection 1 mg PRN    glucose chewable tablet 16 g PRN    glucose chewable tablet 24 g PRN    HYDROcodone-acetaminophen 5-325 mg per tablet 1 tablet Q6H PRN    insulin aspart U-100 pen 0-5 Units QID (AC + HS) PRN    insulin detemir U-100 pen 20 Units QHS    metoclopramide HCl injection 5 mg Q6H PRN    metoprolol tartrate (LOPRESSOR) tablet 100 mg BID    nitroGLYCERIN SL tablet 0.3 mg Q5 Min PRN    ondansetron disintegrating tablet 8 mg Q8H PRN    pantoprazole EC tablet 40 mg Daily     Family History     Problem Relation (Age of Onset)    Breast cancer Maternal Aunt    Cancer Mother    Dementia Father    Diabetes Daughter    Heart disease Mother, Father    Hypertension Mother, Father, Sister, Brother    No Known Problems Son    Psoriasis Father    Stroke Sister        Social History Main Topics    Smoking status: Never Smoker    Smokeless tobacco: Never Used    Alcohol use No    Drug use: No    Sexual activity: No     Review of Systems   Constitutional: Negative for activity change, appetite change and chills.   HENT: Negative for congestion and sore throat.    Eyes: Negative for visual disturbance.   Cardiovascular: Positive for chest pain (resolved). Negative for leg swelling.   Gastrointestinal: Negative for abdominal pain, constipation and diarrhea.   Genitourinary: Negative for decreased urine volume, difficulty urinating, dysuria and hematuria.   Musculoskeletal: Negative for arthralgias.   Neurological: Negative for facial asymmetry and headaches.   Psychiatric/Behavioral: Negative for agitation and confusion.     Objective:     Vital Signs (Most Recent):  Temp: 97.6 °F (36.4 °C) (06/07/18 0749)  Pulse: 74 (06/07/18 1530)  Resp: 18 (06/07/18 1530)  BP: 125/63 (06/07/18 1530)  SpO2: (!) 90 % (06/07/18 1530)  O2  Device (Oxygen Therapy): room air (06/07/18 1530) Vital Signs (24h Range):  Temp:  [97.6 °F (36.4 °C)-99.4 °F (37.4 °C)] 97.6 °F (36.4 °C)  Pulse:  [72-89] 74  Resp:  [14-20] 18  SpO2:  [90 %-97 %] 90 %  BP: (125-176)/(63-84) 125/63     Weight: 81.2 kg (179 lb 0.2 oz) (06/07/18 0600)  Body mass index is 30.63 kg/m².  Body surface area is 1.92 meters squared.    I/O last 3 completed shifts:  In: 2215.3 [P.O.:1080; I.V.:135.3; Blood:1000]  Out: 1725 [Urine:1725]    Physical Exam   Constitutional: She is oriented to person, place, and time. She appears well-developed. No distress.   HENT:   Head: Normocephalic and atraumatic.   Eyes: EOM are normal. Pupils are equal, round, and reactive to light.   Neck: Normal range of motion. Neck supple.   Cardiovascular: Normal rate, regular rhythm and normal heart sounds.    Pulmonary/Chest: Effort normal.   Faint crackles   Abdominal: Soft. Bowel sounds are normal. She exhibits no distension.   Musculoskeletal: Normal range of motion. She exhibits no edema.   Neurological: She is alert and oriented to person, place, and time.   Skin: Skin is warm. She is not diaphoretic.   Psychiatric: She has a normal mood and affect. Her behavior is normal.       Significant Labs:  All labs within the past 24 hours have been reviewed.    Significant Imaging:  reviewed    Assessment/Plan:     Acute renal failure superimposed on stage 5 chronic kidney disease, not on chronic dialysis    73y/o F pt with PMHX of uncontrolled DM2 with nephropathy, CKD5, HTN here with NSTEMI and ASHLEY on CKD. Suspect ASHLEY due to hypoperfusion/ ischemia in setting of NSTEMI.   Further rise in Cr possibly due drop in Hgb 10-->8 vs LETICIA. Episode of respiratory distress yesterday, likely acute diastolic dysfxn or TACO could have also further exacerbated kidney dysfxn.  --s/p LHC  (6/4/18)  --baseline Cr 1.8-2.2, was 2.7 on 5/30.  --Cr 3.2 on admit-->3.1; now worsening-->4.1-->4.9  --I/O: 1.5L -->925cc    Plan:  --continue  current diuretics, will reassess in AM  --avoid all nephrotoxic agents and renally dose meds  --no need for RRT currently  --strict I/O              Thank you for your consult. I will follow-up with patient. Please contact us if you have any additional questions.    Juliann Henderson MD  Nephrology  [facility]    ATTENDING PHYSICIAN ATTESTATION  I have personally interviewed and examined the patient. I thoroughly reviewed the demographic, clinical, laboratorial and imaging information available in medical records. I agree with the assessment and recommendations provided by the subspecialty resident. Dr. Henderson was under my supervision.

## 2018-06-07 NOTE — SUBJECTIVE & OBJECTIVE
Interval History: Events noted from yesterday including blood transfusion/fever during transfusion and TACO s/p IV diuresis now on lasix gtt. This am reports subjectively improve feeling much better. Patient ambulated yesterday without any anginal episode. No further anginal episode in last 36 hrs. SBP in 150s ths am.    Objective:     Vital Signs (Most Recent):  Temp: 99.4 °F (37.4 °C) (06/1945)  Pulse: 73 (06/07/18 0600)  Resp: 18 (06/07/18 0430)  BP: (!) 154/76 (06/07/18 0430)  SpO2: (!) 91 % (06/07/18 0430) Vital Signs (24h Range):  Temp:  [96.7 °F (35.9 °C)-100 °F (37.8 °C)] 99.4 °F (37.4 °C)  Pulse:  [67-89] 73  Resp:  [16-20] 18  SpO2:  [84 %-100 %] 91 %  BP: ()/(55-84) 154/76     Weight: 81.2 kg (179 lb 0.2 oz)  Body mass index is 30.63 kg/m².    SpO2: (!) 91 %  O2 Device (Oxygen Therapy): nasal cannula      Intake/Output Summary (Last 24 hours) at 06/07/18 0736  Last data filed at 06/07/18 0600   Gross per 24 hour   Intake          1240.25 ml   Output             1150 ml   Net            90.25 ml       Lines/Drains/Airways     Drain                 Hemodialysis AV Fistula Left upper arm -- days         Urethral Catheter 06/06/18 2031 less than 1 day          Peripheral Intravenous Line                 Peripheral IV - Single Lumen 06/04/18 0237 Right Hand 3 days                Physical Exam   Constitutional: She appears well-developed and well-nourished.   HENT:   Head: Normocephalic and atraumatic.   Right Ear: External ear normal.   Left Ear: External ear normal.   Eyes: Conjunctivae and EOM are normal. Pupils are equal, round, and reactive to light.   Neck: Normal range of motion. Neck supple. No JVD present. No thyromegaly present.   Cardiovascular: Normal rate, regular rhythm, S1 normal, S2 normal, normal heart sounds and intact distal pulses.  Exam reveals no gallop, no S3 and no friction rub.    No murmur heard.  Pulses:       Radial pulses are 2+ on the right side, and 2+ on the left  side.        Femoral pulses are 2+ on the right side, and 2+ on the left side.       Dorsalis pedis pulses are 2+ on the right side, and 2+ on the left side.        Posterior tibial pulses are 2+ on the right side, and 2+ on the left side.   Ecchymosis right groin - unchanged. No TTP/Bruit   Pulmonary/Chest: Effort normal. No stridor. She has decreased breath sounds in the right lower field and the left lower field. She has no wheezes. She has no rales. She exhibits no tenderness.   Abdominal: Soft. Bowel sounds are normal. She exhibits no distension. There is no tenderness. There is no rebound and no guarding.   Musculoskeletal: Normal range of motion. She exhibits no edema or tenderness.   Psychiatric: She has a normal mood and affect.       Significant Labs: All pertinent lab results from the last 24 hours have been reviewed.

## 2018-06-07 NOTE — PLAN OF CARE
Problem: Patient Care Overview  Goal: Plan of Care Review  Outcome: Ongoing (interventions implemented as appropriate)  Patient progressing toward all goals. Plan of care discussed with patient, no questions at this time. Patient ambulating independently, fall precautions in place. Dawson inserted for accurate I & O with large amount of Lasix IVP and gtt; Pt resting comfortably; no complaints at this time; frequent monitoring due to reaction after blood; VS WNL. Will monitor.

## 2018-06-07 NOTE — PROGRESS NOTES
Hospital Medicine   Progress note   Team: [facility] Emily Allen MD   Admit Date: 6/4/2018   NENA 6/8/2018   Code status: Full Code   Principal Problem: NSTEMI (non-ST elevated myocardial infarction)     Interval hx:  Did not require transfer to MICU overnight. Much improved clinically today, feels great, absolutely no nausea. On Lasix gtt at 30.  UOP 800cc overnight.  90-92% on RA, sitting up comfortably in chair.  H/h responded well to transfusion, now > 10/30. Despite her feeling better, her BUN/Cr worsened to 90s/4.9 and K+ 5.3. Discussed with nephrology. Will give dose of IV diuril 500mg (discussed with PharmD).     ROS   Respiratory: negative for cough, shortness of breath   Cardiovascular: negative for chest discomfort, palpitations   Gastrointestinal: negative for nausea or vomiting, abdominal pain, constipation, diarrhea     PEx   Temp:  [96.7 °F (35.9 °C)-100 °F (37.8 °C)]   Pulse:  [67-89]   Resp:  [16-20]   BP: ()/(55-84)   SpO2:  [84 %-100 %]      I & O (Last 24H):     Intake/Output Summary (Last 24 hours) at 06/07/18 0907  Last data filed at 06/07/18 0600   Gross per 24 hour   Intake          1240.25 ml   Output             1150 ml   Net            90.25 ml       General Appearance: NAD, sitting in chair  Heart: regular rate and rhythm   Respiratory: Normal respiratory effort, no c/w/r  Abdomen: Soft, non-tender; bowel sounds active   Skin: intact. IV sites ok. R groin: 4 x 5 cm ecchymosis of medial groin, no mass/fullness, no exquisite TTP, no bruit  Neurologic: No focal numbness or weakness   Mental status: Alert, oriented x 4, affect appropriate, memory intact     Recent Results (from the past 24 hour(s))   ISTAT PROCEDURE    Collection Time: 06/06/18  6:22 PM   Result Value Ref Range    POC PH 7.339 (L) 7.35 - 7.45    POC PCO2 35.9 35 - 45 mmHg    POC PO2 63 (L) 80 - 100 mmHg    POC HCO3 19.4 (L) 24 - 28 mmol/L    POC BE -6 -2 to 2 mmol/L    POC SATURATED O2 90 (L) 95 - 100 %    POC  TCO2 20 (L) 23 - 27 mmol/L    Sample ARTERIAL     Site LR     Allens Test N/A     DelSys Nasal Can     Mode SPONT     FiO2 28     Sp02 91    Comprehensive metabolic panel    Collection Time: 06/06/18  6:48 PM   Result Value Ref Range    Sodium 134 (L) 136 - 145 mmol/L    Potassium 5.3 (H) 3.5 - 5.1 mmol/L    Chloride 101 95 - 110 mmol/L    CO2 19 (L) 23 - 29 mmol/L    Glucose 138 (H) 70 - 110 mg/dL    BUN, Bld 84 (H) 8 - 23 mg/dL    Creatinine 4.4 (H) 0.5 - 1.4 mg/dL    Calcium 8.9 8.7 - 10.5 mg/dL    Total Protein 7.0 6.0 - 8.4 g/dL    Albumin 3.4 (L) 3.5 - 5.2 g/dL    Total Bilirubin 1.2 (H) 0.1 - 1.0 mg/dL    Alkaline Phosphatase 120 55 - 135 U/L    AST 45 (H) 10 - 40 U/L    ALT 26 10 - 44 U/L    Anion Gap 14 8 - 16 mmol/L    eGFR if African American 10.9 (A) >60 mL/min/1.73 m^2    eGFR if non African American 9.5 (A) >60 mL/min/1.73 m^2   Lipase    Collection Time: 06/06/18  6:48 PM   Result Value Ref Range    Lipase 70 (H) 4 - 60 U/L   POCT glucose    Collection Time: 06/06/18  9:35 PM   Result Value Ref Range    POCT Glucose 139 (H) 70 - 110 mg/dL   Basic metabolic panel    Collection Time: 06/06/18  9:44 PM   Result Value Ref Range    Sodium 134 (L) 136 - 145 mmol/L    Potassium 5.2 (H) 3.5 - 5.1 mmol/L    Chloride 101 95 - 110 mmol/L    CO2 17 (L) 23 - 29 mmol/L    Glucose 145 (H) 70 - 110 mg/dL    BUN, Bld 89 (H) 8 - 23 mg/dL    Creatinine 4.5 (H) 0.5 - 1.4 mg/dL    Calcium 9.0 8.7 - 10.5 mg/dL    Anion Gap 16 8 - 16 mmol/L    eGFR if African American 10.6 (A) >60 mL/min/1.73 m^2    eGFR if non  9.2 (A) >60 mL/min/1.73 m^2   Comprehensive metabolic panel    Collection Time: 06/07/18  5:55 AM   Result Value Ref Range    Sodium 135 (L) 136 - 145 mmol/L    Potassium 5.3 (H) 3.5 - 5.1 mmol/L    Chloride 102 95 - 110 mmol/L    CO2 16 (L) 23 - 29 mmol/L    Glucose 178 (H) 70 - 110 mg/dL    BUN, Bld 96 (H) 8 - 23 mg/dL    Creatinine 4.9 (H) 0.5 - 1.4 mg/dL    Calcium 9.1 8.7 - 10.5 mg/dL    Total  Protein 6.8 6.0 - 8.4 g/dL    Albumin 3.1 (L) 3.5 - 5.2 g/dL    Total Bilirubin 0.7 0.1 - 1.0 mg/dL    Alkaline Phosphatase 108 55 - 135 U/L    AST 38 10 - 40 U/L    ALT 23 10 - 44 U/L    Anion Gap 17 (H) 8 - 16 mmol/L    eGFR if African American 9.6 (A) >60 mL/min/1.73 m^2    eGFR if non  8.3 (A) >60 mL/min/1.73 m^2   Magnesium    Collection Time: 06/07/18  5:55 AM   Result Value Ref Range    Magnesium 2.3 1.6 - 2.6 mg/dL   Phosphorus    Collection Time: 06/07/18  5:55 AM   Result Value Ref Range    Phosphorus 8.1 (H) 2.7 - 4.5 mg/dL   CBC auto differential    Collection Time: 06/07/18 10:14 AM   Result Value Ref Range    WBC 11.18 3.90 - 12.70 K/uL    RBC 3.55 (L) 4.00 - 5.40 M/uL    Hemoglobin 10.7 (L) 12.0 - 16.0 g/dL    Hematocrit 33.1 (L) 37.0 - 48.5 %    MCV 93 82 - 98 fL    MCH 30.1 27.0 - 31.0 pg    MCHC 32.3 32.0 - 36.0 g/dL    RDW 14.5 11.5 - 14.5 %    Platelets 199 150 - 350 K/uL    MPV 12.4 9.2 - 12.9 fL    Immature Granulocytes 1.1 (H) 0.0 - 0.5 %    Gran # (ANC) 9.9 (H) 1.8 - 7.7 K/uL    Immature Grans (Abs) 0.12 (H) 0.00 - 0.04 K/uL    Lymph # 0.5 (L) 1.0 - 4.8 K/uL    Mono # 0.7 0.3 - 1.0 K/uL    Eos # 0.0 0.0 - 0.5 K/uL    Baso # 0.02 0.00 - 0.20 K/uL    nRBC 0 0 /100 WBC    Gran% 88.0 (H) 38.0 - 73.0 %    Lymph% 4.4 (L) 18.0 - 48.0 %    Mono% 6.3 4.0 - 15.0 %    Eosinophil% 0.0 0.0 - 8.0 %    Basophil% 0.2 0.0 - 1.9 %    Differential Method Automated    POCT glucose    Collection Time: 06/07/18 11:40 AM   Result Value Ref Range    POCT Glucose 338 (H) 70 - 110 mg/dL   POCT glucose    Collection Time: 06/07/18 11:41 AM   Result Value Ref Range    POCT Glucose 334 (H) 70 - 110 mg/dL       Recent Labs  Lab 06/05/18  1755 06/05/18  2058 06/06/18  0821 06/06/18  1204 06/06/18  1719 06/06/18  2135   POCTGLUCOSE 159* 217* 149* 116* 133* 139*         Active Hospital Problems    Diagnosis  POA    *NSTEMI (non-ST elevated myocardial infarction) [I21.4]  Yes    Metabolic acidosis [E87.2]   Yes    Hyperphosphatemia [E83.39]  No    TACO (transfusion associated circulatory overload) [E87.71]  Unknown    Acute respiratory failure with hypoxia [J96.01]  Unknown    Acute renal failure superimposed on stage 5 chronic kidney disease, not on chronic dialysis [N17.9, N18.5]  Yes    CKD (chronic kidney disease), stage V [N18.5]  Unknown    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 H/o CABG X2 2002, stent RCA, OM1 2003, NSTEMI with total occlusion of LAD in 2012.       Obesity (BMI 30.0-34.9) [E66.9]  Yes    Diabetic polyneuropathy associated with type 2 diabetes mellitus [E11.42]  Yes     Stocking/glove pattern intermittent neuropathy      Hypertension associated with diabetes [E11.59, I10]  Yes     BP controlled on BB, diuretic. ARB discontinued by Renal due to hyperkalemia        Resolved Hospital Problems    Diagnosis Date Resolved POA   No resolved problems to display.        Overview:  72F with CAD/CABGx1 '02 LIMA-LAD with multiple subsequent PCIs last in 2/2017 with pLIMA-LAD, patent RCA stents, severe ISR of mid LCX s/p 2.75mm BETHANY(Resolute) and severe ISR of OM s/p PCI with 2.5mm BETHANY(Resolute) and distal 90% RCA lesion(had SPECT with Lateral ischemia at that time), HTN, HLD, DM-2 uncontrolled (A1C 8.9) with neuro/ophtho/renal manifestations, CKD-5 approaching ESRD, LUE Brachiocephalic fistula created 4/16/18 s/p PTA for stenosis 5/30/18, chronic dCHF, pAF, h/o stroke, anemia in CKD, and GERD, who presented to [facility] for left sided chest pain radiating to left arm that awoke her from sleep last night. Pain resolved with morphine and nitro paste. Transferred to [facility] ED for higher level of care and cardiology consult. Upon admit, pain free.  Given 325mg ASA and placed on hep gtt. EKG with NSR and lat ST T wave abnormality that are old. Trop .09-->.67-->2.9. She is not currently on HD and doesnt have any other access than fistula which is not mature.      Pt reports that since the recent PTA to LUE AVF on 5/30, she has been experiencing exertional angina like prior to previous stents with exertional and improved with rest and Nitro. Last night was the first time it occurred at rest, so she went to ED.    Admitted to IM.   Went to cath lab emergently on 6/4, s/p BETHANY to OM1 (Natanael), no complications.   The next day, feeling well except for some fatigue, sitting up in chair all day.  H/H decreased to mid-8 and 27 (from 10 and 31), no active bleed, VSS and no signs of RP hematoma or pseudoaneurysm, just small ecchymosis of groin from track bleed. Discussed with Dr Santoyo. Continued to monitor H/H. At 730pm on 6/5, episode of chest pain radiating to L arm, 7/10, relieved by nitro SL x 1. 6/6 morning, called by RN for low /52, so held Imdur. H/H decreased to 8.0/24.8 so transfused 2 u pRBCs for goal H/H 10/30 in setting of ACS. In addition, BUN/Cr worsening so transfusion will help renal perfusion in setting of low BP.  Had low grade temp of 100 immediately after transfusion started, asymptomatic, discussed with RN, no tylenol given in order to not mask a transfusion reaction, re-checked  temp 30 mins later and it normalized to around 98- no further issues, then, as transfusion was finishing, c/o acute severe SOB, acute hypoxemic resp failure with O2 sats decreased to 88% on RA with severe resp distress and wheeze/rales, gave STAT Lasix IV and checked STAT CXR - revealed worsened pulm edema. MICU consulted.  Gave STAT  solumedrol 125 and benadryl 50 IV for possible TRALI.  Dx more likely TACO than TRALI - UOP only 100cc after Lasix 80 IV, so gave additional Lasix IV 80 then started Lasix gtt at 20 with FOlety for strict Is/Os and close monitoring overnight in case transfer to MICU needed.  In addition, she had severe nausea all day, minimal PO intake (one grape, some water), nausea improved after zofran 8mg PO around noon then around 5pm severe nausea returned and vomited x 1. Reglan 5 IV given.       Assessment and Plan for problems addressed today:   NSTEMI. Reason for admit. Trop peaked at 3.728.  KEDAR score of 5. ACS protocol started.  S/p emergent LHC by Dr Santoyo Interventional Cardiology  - home  switched to 81 for now given possible bleed  - continue plavix for now. Avoid switching to brilinta at this time as would need to re-load with antiplatelet, and she has a track bleed. Discussed with Dr Santoyo.  - known RCA stenosis, plan for inpt vs outpt intervention   - f/u Interventional cardiology recs  - home atorva  - home BB metop 100 BID  - no ACE-I or ARB since dynamic renal function  - Added Imdur 30mg QD - HOLD for hypotension 6/6  - Nitro and morphine PRN    Anemia of chronic disease/CKD-5  Acute blood loss anemia, likely due to track bleed at femoral site. +ecchymosis. +fatigue but VSS. H/H 8.6/27 on 6/5, decreased from 10/31.5. Repeated to ensure not erroneous.   - transfused 2u pRBCs on 6/6 for H/H 8.0/24, for goal H/H 10/30 in setting of ACS  - monitor    Acute hypoxemic resp failure - multifactorial due to volume overload s/p transfusion (TACO), acute on chronic dCHF, volume overload from ASHLEY on CKD-3.  Gave solumedrol and benadryl IV STAT for possible TRALI. MICU consulted for BiPAP +/- intubation watch.  She improved overnight after Lasix and did not need MICU transfer.   - diuresed with lasix 160 IVP then gtt at 30  - diuril 500 IV x 1 (discussed with PharmD)  - continue Dawson for strict Is/Os for now,  d/c ASAP  - strict Is/Os   - daily weights, preferably standing   - fluid restrict 1.5L while inpatient  - tele  - keep Mg >2, K >4    ASHLEY on chronic Kidney Disease Stage V approaching ESRD.  Worsening. Not yet on HD, had LUE AV graft s/p fistulogram, no other ports.  Graft is still maturing.   - may have LETICIA s/p cath for NSTEMI. concern for hypoperfusion from decreased PO intake from nausea as well  - may need HD during this admit   - continue to monitor closely  - diuresis as above  - for hyperkalemia, give kayexalate 15g PO x 1 and continue renal diet   - for hyperphosphatemia, start sevelamer, defer to nephrology for any dose changes  - for metabolic acidosis, start sodium bicarb, to nephrology for any dose changes    N/V - uremia, anginal equivalent, other - resolved at this time. Pt attributes the nausea to cardiac issues, said that she has had nausea when she has voluem overload in the past.  STAT lipase unremarkable at 70 (NOT 3x ULN), CMP unremarkable for biliary issues, KUB with stool but no ileus  - antiemetics PRN    DM-2 uncontrolled. Home meds: lantus 20 qhs  Hyperglycemia today is likely due to solumedrol x 1 last night  - expect improvement as solumedrol washes out  - continue lantus 20 qhs with SSI low dose  - caution for insulin stacking in low GFR     HTN   - meds    GERD - PPI     aspirin  81 mg Oral Daily    atorvastatin  80 mg Oral Daily    calcitRIOL  0.25 mcg Oral Daily    clopidogrel  75 mg Oral Daily    insulin detemir U-100  20 Units Subcutaneous QHS    metoprolol tartrate  100 mg Oral BID    pantoprazole  40 mg Oral Daily    sodium polystyrene  15 g Oral Once     sodium chloride, dextrose 50%, dextrose 50%, glucagon (human recombinant), glucose, glucose, HYDROcodone-acetaminophen, insulin aspart U-100, metoclopramide HCl, nitroGLYCERIN, ondansetron    DVT PPx: SCDs; no heparin while possible active bleed    Discharge plan and follow up: pending clinical condition    Emily Allen,  MD  [facility] Medicine Staff  [phone] pager

## 2018-06-08 LAB
ALBUMIN SERPL BCP-MCNC: 2.9 G/DL
ALBUMIN SERPL BCP-MCNC: 3 G/DL
ALP SERPL-CCNC: 109 U/L
ALP SERPL-CCNC: 116 U/L
ALT SERPL W/O P-5'-P-CCNC: 28 U/L
ALT SERPL W/O P-5'-P-CCNC: 32 U/L
ANION GAP SERPL CALC-SCNC: 16 MMOL/L
ANION GAP SERPL CALC-SCNC: 17 MMOL/L
AST SERPL-CCNC: 32 U/L
AST SERPL-CCNC: 35 U/L
BASOPHILS # BLD AUTO: 0.03 K/UL
BASOPHILS NFR BLD: 0.2 %
BILIRUB SERPL-MCNC: 0.6 MG/DL
BILIRUB SERPL-MCNC: 0.6 MG/DL
BUN SERPL-MCNC: 116 MG/DL
BUN SERPL-MCNC: 118 MG/DL
CALCIUM SERPL-MCNC: 8.4 MG/DL
CALCIUM SERPL-MCNC: 8.5 MG/DL
CHLORIDE SERPL-SCNC: 95 MMOL/L
CHLORIDE SERPL-SCNC: 98 MMOL/L
CO2 SERPL-SCNC: 21 MMOL/L
CO2 SERPL-SCNC: 22 MMOL/L
CREAT SERPL-MCNC: 5.3 MG/DL
CREAT SERPL-MCNC: 5.5 MG/DL
DIFFERENTIAL METHOD: ABNORMAL
EOSINOPHIL # BLD AUTO: 0.2 K/UL
EOSINOPHIL NFR BLD: 1.3 %
ERYTHROCYTE [DISTWIDTH] IN BLOOD BY AUTOMATED COUNT: 14.5 %
EST. GFR  (AFRICAN AMERICAN): 8.3 ML/MIN/1.73 M^2
EST. GFR  (AFRICAN AMERICAN): 8.7 ML/MIN/1.73 M^2
EST. GFR  (NON AFRICAN AMERICAN): 7.2 ML/MIN/1.73 M^2
EST. GFR  (NON AFRICAN AMERICAN): 7.6 ML/MIN/1.73 M^2
GLUCOSE SERPL-MCNC: 189 MG/DL
GLUCOSE SERPL-MCNC: 235 MG/DL
HCT VFR BLD AUTO: 31 %
HGB BLD-MCNC: 10.2 G/DL
IMM GRANULOCYTES # BLD AUTO: 0.07 K/UL
IMM GRANULOCYTES NFR BLD AUTO: 0.5 %
LYMPHOCYTES # BLD AUTO: 1.3 K/UL
LYMPHOCYTES NFR BLD: 9 %
MAGNESIUM SERPL-MCNC: 2.4 MG/DL
MCH RBC QN AUTO: 30.2 PG
MCHC RBC AUTO-ENTMCNC: 32.9 G/DL
MCV RBC AUTO: 92 FL
MONOCYTES # BLD AUTO: 1.4 K/UL
MONOCYTES NFR BLD: 9.9 %
NEUTROPHILS # BLD AUTO: 11.1 K/UL
NEUTROPHILS NFR BLD: 79.1 %
NRBC BLD-RTO: 0 /100 WBC
PHOSPHATE SERPL-MCNC: 6.6 MG/DL
PLATELET # BLD AUTO: 225 K/UL
PMV BLD AUTO: 11.7 FL
POCT GLUCOSE: 176 MG/DL (ref 70–110)
POCT GLUCOSE: 178 MG/DL (ref 70–110)
POCT GLUCOSE: 191 MG/DL (ref 70–110)
POCT GLUCOSE: 211 MG/DL (ref 70–110)
POTASSIUM SERPL-SCNC: 4.3 MMOL/L
POTASSIUM SERPL-SCNC: 4.3 MMOL/L
PROT SERPL-MCNC: 6.6 G/DL
PROT SERPL-MCNC: 6.6 G/DL
RBC # BLD AUTO: 3.38 M/UL
SODIUM SERPL-SCNC: 134 MMOL/L
SODIUM SERPL-SCNC: 135 MMOL/L
WBC # BLD AUTO: 13.99 K/UL

## 2018-06-08 PROCEDURE — 80053 COMPREHEN METABOLIC PANEL: CPT | Mod: 91

## 2018-06-08 PROCEDURE — 25000003 PHARM REV CODE 250: Performed by: INTERNAL MEDICINE

## 2018-06-08 PROCEDURE — 25000003 PHARM REV CODE 250: Performed by: HOSPITALIST

## 2018-06-08 PROCEDURE — 99233 SBSQ HOSP IP/OBS HIGH 50: CPT | Mod: ,,, | Performed by: HOSPITALIST

## 2018-06-08 PROCEDURE — 20600001 HC STEP DOWN PRIVATE ROOM

## 2018-06-08 PROCEDURE — 63600175 PHARM REV CODE 636 W HCPCS: Performed by: HOSPITALIST

## 2018-06-08 PROCEDURE — 99233 SBSQ HOSP IP/OBS HIGH 50: CPT | Mod: GC,,, | Performed by: INTERNAL MEDICINE

## 2018-06-08 PROCEDURE — 84100 ASSAY OF PHOSPHORUS: CPT

## 2018-06-08 PROCEDURE — 85025 COMPLETE CBC W/AUTO DIFF WBC: CPT

## 2018-06-08 PROCEDURE — 83735 ASSAY OF MAGNESIUM: CPT

## 2018-06-08 PROCEDURE — 36415 COLL VENOUS BLD VENIPUNCTURE: CPT

## 2018-06-08 RX ORDER — FUROSEMIDE 80 MG/1
80 TABLET ORAL 2 TIMES DAILY
Status: DISCONTINUED | OUTPATIENT
Start: 2018-06-08 | End: 2018-06-11

## 2018-06-08 RX ORDER — FENTANYL CITRATE 50 UG/ML
INJECTION, SOLUTION INTRAMUSCULAR; INTRAVENOUS
Status: COMPLETED
Start: 2018-06-08 | End: 2018-06-08

## 2018-06-08 RX ADMIN — SODIUM BICARBONATE 650 MG TABLET 1300 MG: at 08:06

## 2018-06-08 RX ADMIN — ATORVASTATIN CALCIUM 80 MG: 20 TABLET, FILM COATED ORAL at 08:06

## 2018-06-08 RX ADMIN — INSULIN DETEMIR 20 UNITS: 100 INJECTION, SOLUTION SUBCUTANEOUS at 09:06

## 2018-06-08 RX ADMIN — FUROSEMIDE 30 MG/HR: 10 INJECTION, SOLUTION INTRAMUSCULAR; INTRAVENOUS at 12:06

## 2018-06-08 RX ADMIN — SEVELAMER CARBONATE 800 MG: 800 TABLET, FILM COATED ORAL at 11:06

## 2018-06-08 RX ADMIN — CLOPIDOGREL 75 MG: 75 TABLET, FILM COATED ORAL at 08:06

## 2018-06-08 RX ADMIN — FUROSEMIDE 80 MG: 80 TABLET ORAL at 05:06

## 2018-06-08 RX ADMIN — FUROSEMIDE 30 MG/HR: 10 INJECTION, SOLUTION INTRAMUSCULAR; INTRAVENOUS at 08:06

## 2018-06-08 RX ADMIN — INSULIN ASPART 2 UNITS: 100 INJECTION, SOLUTION INTRAVENOUS; SUBCUTANEOUS at 04:06

## 2018-06-08 RX ADMIN — SODIUM BICARBONATE 650 MG TABLET 1300 MG: at 02:06

## 2018-06-08 RX ADMIN — METOPROLOL TARTRATE 100 MG: 100 TABLET ORAL at 09:06

## 2018-06-08 RX ADMIN — SEVELAMER CARBONATE 800 MG: 800 TABLET, FILM COATED ORAL at 04:06

## 2018-06-08 RX ADMIN — CALCITRIOL 0.25 MCG: 0.25 CAPSULE, LIQUID FILLED ORAL at 08:06

## 2018-06-08 RX ADMIN — PANTOPRAZOLE SODIUM 40 MG: 40 TABLET, DELAYED RELEASE ORAL at 08:06

## 2018-06-08 RX ADMIN — SEVELAMER CARBONATE 800 MG: 800 TABLET, FILM COATED ORAL at 08:06

## 2018-06-08 RX ADMIN — ASPIRIN 81 MG: 81 TABLET, COATED ORAL at 08:06

## 2018-06-08 RX ADMIN — SODIUM BICARBONATE 650 MG TABLET 1300 MG: at 09:06

## 2018-06-08 RX ADMIN — METOPROLOL TARTRATE 100 MG: 100 TABLET ORAL at 08:06

## 2018-06-08 NOTE — PLAN OF CARE
Problem: Patient Care Overview  Goal: Plan of Care Review  Outcome: Revised  Pt free of falls/trauma/injuries.  Denies c/o SOB, CP, or discomfort.  Generalized skin remains CDI; no edema noted.  Pt being diuresed with Lasix gtt at 30mg/hr-->15cc/hr; diuresing well.   Wt remains stable.  H&H stable; no need for any transfusions this shift.  Blood glucose diet controlled; no supplemental insulin required.  PT/OT following; pt able to ambulate with standby assist.  Pt tolerating plan of care.

## 2018-06-08 NOTE — PROGRESS NOTES
[facility]  Interventional Cardiology  Progress Note    Patient Name: Zoey Ham  MRN: [id number]  Admission Date: 6/4/2018  Hospital Length of Stay: 4 days  Code Status: Full Code   Attending Physician: Emily Allen MD   Primary Care Physician: Cesia Rosales MD  Principal Problem:NSTEMI (non-ST elevated myocardial infarction)    Subjective:     Interval History: Subjectively feels good. No complaints. HGH 10.2 today and Cr last evelio was 5.3. On IV lasix gtt.    Objective:     Vital Signs (Most Recent):  Temp: 98.8 °F (37.1 °C) (06/07/18 2000)  Pulse: 69 (06/08/18 0300)  Resp: (!) 24 (06/08/18 0015)  BP: 131/69 (06/08/18 0015)  SpO2: (!) 86 % (06/08/18 0015) Vital Signs (24h Range):  Temp:  [97.6 °F (36.4 °C)-98.8 °F (37.1 °C)] 98.8 °F (37.1 °C)  Pulse:  [69-78] 69  Resp:  [14-24] 24  SpO2:  [86 %-97 %] 86 %  BP: (125-151)/(63-74) 131/69     Weight: 81.2 kg (179 lb 0.2 oz)  Body mass index is 30.63 kg/m².    SpO2: (!) 86 %  O2 Device (Oxygen Therapy): room air      Intake/Output Summary (Last 24 hours) at 06/08/18 0617  Last data filed at 06/08/18 0024   Gross per 24 hour   Intake              895 ml   Output             1150 ml   Net             -255 ml       Lines/Drains/Airways     Drain                 Hemodialysis AV Fistula Left upper arm -- days         Urethral Catheter 06/06/18 2031 1 day          Peripheral Intravenous Line                 Peripheral IV - Single Lumen 06/04/18 0237 Right Hand 4 days                Physical Exam    NAD, Verbal  PERRL. EOMI  JVD mid neck  S1+S2 nl, no mgr  A/E. Bibasilar crackles  Groin unchanged  Distal pulses intact  Significant Labs: All pertinent lab results from the last 24 hours have been reviewed.        Assessment and Plan:     Patient is a 72 y.o. female presenting with:  Patient Active Problem List   Diagnosis    Hypertension associated with diabetes    Proliferative diabetic retinopathy    Atherosclerosis of aorta    Anemia of  chronic renal failure, stage 4 (severe)    Diabetic polyneuropathy associated with type 2 diabetes mellitus    CKD stage 4 due to type 2 diabetes mellitus    Uncontrolled type 2 diabetes mellitus with stage 4 chronic kidney disease, with long-term current use of insulin    Obesity (BMI 30.0-34.9)    Hyperparathyroidism    Chronic atrial fibrillation    Coronary artery disease involving coronary bypass graft of native heart with unstable angina pectoris    Senile purpura    Osteopenia of multiple sites    Social isolation    Type 2 diabetes mellitus with hyperlipidemia    History of non-ST elevation myocardial infarction (NSTEMI)    S/P drug eluting coronary stent placement    Acute on chronic diastolic heart failure    Acute gastritis without hemorrhage    Breast calcification, left    Hypovitaminosis D    Wrist pain, acute, unspecified laterality    ESRD (end stage renal disease)    Preop examination    Abnormal mammogram    Hemodialysis access, AV graft    Stenosis of arteriovenous dialysis fistula    AV shunt malfunction    NSTEMI (non-ST elevated myocardial infarction)    CKD (chronic kidney disease), stage V    Acute renal failure superimposed on stage 5 chronic kidney disease, not on chronic dialysis    TACO (transfusion associated circulatory overload)    Acute hypoxemic respiratory failure    Metabolic acidosis    Hyperphosphatemia    Acute blood loss anemia    ASHLEY (acute kidney injury)     * NSTEMI (non-ST elevated myocardial infarction)    No further recommendations since yesterday . Patient without any anginal episode. No arrhythmias noted on tele.  Multivessel CAD s/p PCI of the LCx/OM1 with BETHANY. Patent LIMA-LAD and residual distal RCA disease - unchanged since previous angiogram. LCx/OM was deemed to be lesion responsible for ACS episode this time and intervened upon to reduce contrast dye usage during procedure.  Continue medical optimization with antianginal therapy  and outpatient reassessment with a cardiac stress test to determine timing of distal RCA revascularization.   Can be titrated up on BB and nitrates permitting BP.  Continue DaPT for 1 year considering recent NSTEMI and high intensity statin therapy. PPI for GI PPx  Cardiac Rehab on discharge        Acute on chronic diastolic heart failure    Likely due to fluid overload. Agree with aggressive diuresis. Nephrology consult already on board from CKD perspective.   Risk factor control including optimizing BP/HR/fluid status recommended considering HFpEF/CKD and recent ACS episode.  Would need diuretics on discharge.             VTE Risk Mitigation         Ordered     IP VTE HIGH RISK PATIENT  Once      06/04/18 1219     Reason for No Pharmacological VTE Prophylaxis  Once      06/04/18 1219     Reason for No Pharmacological VTE Prophylaxis  Once      06/04/18 1155          Mat Andre MD  Interventional Cardiology  [facility]

## 2018-06-08 NOTE — SUBJECTIVE & OBJECTIVE
Interval History:  NAEON. Complaining of a productive sounding cough without production of sputum. No SOB or cp reported. Diuresing adequately on lasix gtt.     Review of patient's allergies indicates:   Allergen Reactions    Percodan [oxycodone-aspirin] Hives     Welps     Pcn [penicillins] Hives and Swelling     Tolerated Rocephin IM in clinic      Phenergan [promethazine] Other (See Comments)     Altered mental status; jerking of extremities     Current Facility-Administered Medications   Medication Frequency    0.9%  NaCl infusion (for blood administration) Q24H PRN    aspirin EC tablet 81 mg Daily    atorvastatin tablet 80 mg Daily    calcitRIOL capsule 0.25 mcg Daily    clopidogrel tablet 75 mg Daily    dextrose 50% injection 12.5 g PRN    dextrose 50% injection 25 g PRN    furosemide (LASIX) 2 mg/mL in sodium chloride 0.9% 100 mL infusion (conc: 2 mg/mL) Continuous    glucagon (human recombinant) injection 1 mg PRN    glucose chewable tablet 16 g PRN    glucose chewable tablet 24 g PRN    HYDROcodone-acetaminophen 5-325 mg per tablet 1 tablet Q6H PRN    insulin aspart U-100 pen 0-5 Units QID (AC + HS) PRN    insulin detemir U-100 pen 20 Units QHS    metoclopramide HCl injection 5 mg Q6H PRN    metoprolol tartrate (LOPRESSOR) tablet 100 mg BID    nitroGLYCERIN SL tablet 0.3 mg Q5 Min PRN    ondansetron disintegrating tablet 8 mg Q8H PRN    pantoprazole EC tablet 40 mg Daily    sevelamer carbonate tablet 800 mg TID WM    sodium bicarbonate tablet 1,300 mg TID     Family History     Problem Relation (Age of Onset)    Breast cancer Maternal Aunt    Cancer Mother    Dementia Father    Diabetes Daughter    Heart disease Mother, Father    Hypertension Mother, Father, Sister, Brother    No Known Problems Son    Psoriasis Father    Stroke Sister        Social History Main Topics    Smoking status: Never Smoker    Smokeless tobacco: Never Used    Alcohol use No    Drug use: No    Sexual  activity: No     Review of Systems   Constitutional: Negative for activity change, appetite change and chills.   HENT: Negative for congestion and sore throat.    Eyes: Negative for visual disturbance.   Cardiovascular: Positive for chest pain (resolved). Negative for leg swelling.   Gastrointestinal: Negative for abdominal pain, constipation and diarrhea.   Genitourinary: Negative for decreased urine volume, difficulty urinating, dysuria and hematuria.   Musculoskeletal: Negative for arthralgias.   Neurological: Negative for facial asymmetry and headaches.   Psychiatric/Behavioral: Negative for agitation and confusion.     Objective:     Vital Signs (Most Recent):  Temp: 97.7 °F (36.5 °C) (06/08/18 0725)  Pulse: 71 (06/08/18 0725)  Resp: 19 (06/08/18 0725)  BP: 121/68 (06/08/18 0725)  SpO2: 96 % (06/08/18 0725)  O2 Device (Oxygen Therapy): room air (06/08/18 0725) Vital Signs (24h Range):  Temp:  [97.7 °F (36.5 °C)-98.8 °F (37.1 °C)] 97.7 °F (36.5 °C)  Pulse:  [69-78] 71  Resp:  [14-24] 19  SpO2:  [86 %-96 %] 96 %  BP: (121-145)/(63-74) 121/68     Weight: 81.2 kg (179 lb 0.2 oz) (06/07/18 0600)  Body mass index is 30.63 kg/m².  Body surface area is 1.92 meters squared.    I/O last 3 completed shifts:  In: 1375.3 [P.O.:830; I.V.:495.3; IV Piggyback:50]  Out: 2800 [Urine:2800]    Physical Exam   Constitutional: She is oriented to person, place, and time. She appears well-developed. No distress.   HENT:   Head: Normocephalic and atraumatic.   Eyes: EOM are normal. Pupils are equal, round, and reactive to light.   Neck: Normal range of motion. Neck supple.   Cardiovascular: Normal rate, regular rhythm and normal heart sounds.    Pulmonary/Chest: Effort normal. She has no wheezes.   Faint crackles at bases   Abdominal: Soft. Bowel sounds are normal. She exhibits no distension.   Musculoskeletal: Normal range of motion. She exhibits no edema.   Neurological: She is alert and oriented to person, place, and time.   Skin:  Skin is warm. She is not diaphoretic.   Psychiatric: She has a normal mood and affect. Her behavior is normal.       Significant Labs:  All labs within the past 24 hours have been reviewed.    Significant Imaging:  reviewed

## 2018-06-08 NOTE — ASSESSMENT & PLAN NOTE
73y/o F pt with PMHX of uncontrolled DM2 with nephropathy, CKD5, HTN here with NSTEMI and ASHLEY on CKD. Suspect ASHLEY due to hypoperfusion/ ischemia in setting of NSTEMI.   Further rise in Cr possibly due drop in Hgb 10-->8 vs LETICIA. Episode of respiratory distress yesterday, likely acute diastolic dysfxn or TACO could have also further exacerbated kidney dysfxn.  --s/p LHC  (6/4/18)  --baseline Cr 1.8-2.2, was 2.7 on 5/30.  --Cr 3.2 on admit-->3.1; now worsening-->4.1-->4.9  --I/O: 1.5L -->925cc    Plan:  --switch from lasix gtt to lasix 80mg PO BID  --avoid all nephrotoxic agents and renally dose meds  --no need for RRT currently  --strict I/O

## 2018-06-08 NOTE — SUBJECTIVE & OBJECTIVE
Interval History: Subjectively feels good. No complaints. HGH 10.2 today and Cr last evelio was 5.3. On IV lasix gtt.    Objective:     Vital Signs (Most Recent):  Temp: 98.8 °F (37.1 °C) (06/07/18 2000)  Pulse: 69 (06/08/18 0300)  Resp: (!) 24 (06/08/18 0015)  BP: 131/69 (06/08/18 0015)  SpO2: (!) 86 % (06/08/18 0015) Vital Signs (24h Range):  Temp:  [97.6 °F (36.4 °C)-98.8 °F (37.1 °C)] 98.8 °F (37.1 °C)  Pulse:  [69-78] 69  Resp:  [14-24] 24  SpO2:  [86 %-97 %] 86 %  BP: (125-151)/(63-74) 131/69     Weight: 81.2 kg (179 lb 0.2 oz)  Body mass index is 30.63 kg/m².    SpO2: (!) 86 %  O2 Device (Oxygen Therapy): room air      Intake/Output Summary (Last 24 hours) at 06/08/18 0617  Last data filed at 06/08/18 0024   Gross per 24 hour   Intake              895 ml   Output             1150 ml   Net             -255 ml       Lines/Drains/Airways     Drain                 Hemodialysis AV Fistula Left upper arm -- days         Urethral Catheter 06/06/18 2031 1 day          Peripheral Intravenous Line                 Peripheral IV - Single Lumen 06/04/18 0237 Right Hand 4 days                Physical Exam    Significant Labs: All pertinent lab results from the last 24 hours have been reviewed.

## 2018-06-08 NOTE — PLAN OF CARE
Problem: Patient Care Overview  Goal: Plan of Care Review  Outcome: Ongoing (interventions implemented as appropriate)  Patient progressing toward all goals. Plan of care discussed with patient, no questions at this time. Patient ambulating independently, fall precautions in place. Lasix infusing; Dawson in place draining clear, yellow urine; Pt on room air; She states she is feeling like a new person;  VS WNL. Will monitor.

## 2018-06-08 NOTE — ASSESSMENT & PLAN NOTE
Likely due to fluid overload. Agree with aggressive diuresis. Nephrology consult already on board from CKD perspective.   Risk factor control including optimizing BP/HR/fluid status recommended considering HFpEF/CKD and recent ACS episode.  Would need diuretics on discharge.

## 2018-06-08 NOTE — PROGRESS NOTES
[facility]  Nephrology  Progress Note    Patient Name: Zoey Ham  MRN: [id number]  Admission Date: 6/4/2018  Hospital Length of Stay: 4 days  Attending Provider: Emily Allen MD   Primary Care Physician: Cesia Rosales MD  Principal Problem:NSTEMI (non-ST elevated myocardial infarction)    Subjective:     HPI: 72F with hx of DM2 uncontrolled (a1c 9.0) with retinopathy and nephropathy CKD V with LUE Brachiocephalic fistula created 4/16/18 s/p PTA for stenosis 5/30/18, renovascular HTN, mixed HLD, extensive CAD s/p CABGx1 2002 GALLEGOS-LAD with multiple subsequent PCI's last in 2/2017 with pLIMA-LAD, patent RCA stents, severe ISR of mid LCX s/p 2.75mm BETHANY and severe ISR of OM s/p PCI with 2.5mm BETHANY and distal 90% RCA lesion (had SPECT with lateral ischemia at that time) admitted 6/4 with NSTEMI s/p PCI. Nephrology consulted for ASHLEY on CKD5 requiring LHC. Baseline Cr 1.8-2.2. On admit Cr was 3.2. Received IVFs prior to and following LHC. Kidney function stable this AM with BUN/Cr= 72/3.1, with good urine output.     Interval History:  NAEON. Complaining of a productive sounding cough without production of sputum. No SOB or cp reported. Diuresing adequately on lasix gtt.     Review of patient's allergies indicates:   Allergen Reactions    Percodan [oxycodone-aspirin] Hives     Welps     Pcn [penicillins] Hives and Swelling     Tolerated Rocephin IM in clinic      Phenergan [promethazine] Other (See Comments)     Altered mental status; jerking of extremities     Current Facility-Administered Medications   Medication Frequency    0.9%  NaCl infusion (for blood administration) Q24H PRN    aspirin EC tablet 81 mg Daily    atorvastatin tablet 80 mg Daily    calcitRIOL capsule 0.25 mcg Daily    clopidogrel tablet 75 mg Daily    dextrose 50% injection 12.5 g PRN    dextrose 50% injection 25 g PRN    furosemide (LASIX) 2 mg/mL in sodium chloride 0.9% 100 mL infusion (conc: 2 mg/mL)  Continuous    glucagon (human recombinant) injection 1 mg PRN    glucose chewable tablet 16 g PRN    glucose chewable tablet 24 g PRN    HYDROcodone-acetaminophen 5-325 mg per tablet 1 tablet Q6H PRN    insulin aspart U-100 pen 0-5 Units QID (AC + HS) PRN    insulin detemir U-100 pen 20 Units QHS    metoclopramide HCl injection 5 mg Q6H PRN    metoprolol tartrate (LOPRESSOR) tablet 100 mg BID    nitroGLYCERIN SL tablet 0.3 mg Q5 Min PRN    ondansetron disintegrating tablet 8 mg Q8H PRN    pantoprazole EC tablet 40 mg Daily    sevelamer carbonate tablet 800 mg TID WM    sodium bicarbonate tablet 1,300 mg TID     Family History     Problem Relation (Age of Onset)    Breast cancer Maternal Aunt    Cancer Mother    Dementia Father    Diabetes Daughter    Heart disease Mother, Father    Hypertension Mother, Father, Sister, Brother    No Known Problems Son    Psoriasis Father    Stroke Sister        Social History Main Topics    Smoking status: Never Smoker    Smokeless tobacco: Never Used    Alcohol use No    Drug use: No    Sexual activity: No     Review of Systems   Constitutional: Negative for activity change, appetite change and chills.   HENT: Negative for congestion and sore throat.    Eyes: Negative for visual disturbance.   Cardiovascular: Positive for chest pain (resolved). Negative for leg swelling.   Gastrointestinal: Negative for abdominal pain, constipation and diarrhea.   Genitourinary: Negative for decreased urine volume, difficulty urinating, dysuria and hematuria.   Musculoskeletal: Negative for arthralgias.   Neurological: Negative for facial asymmetry and headaches.   Psychiatric/Behavioral: Negative for agitation and confusion.     Objective:     Vital Signs (Most Recent):  Temp: 97.7 °F (36.5 °C) (06/08/18 0725)  Pulse: 71 (06/08/18 0725)  Resp: 19 (06/08/18 0725)  BP: 121/68 (06/08/18 0725)  SpO2: 96 % (06/08/18 0725)  O2 Device (Oxygen Therapy): room air (06/08/18 0725) Vital  Signs (24h Range):  Temp:  [97.7 °F (36.5 °C)-98.8 °F (37.1 °C)] 97.7 °F (36.5 °C)  Pulse:  [69-78] 71  Resp:  [14-24] 19  SpO2:  [86 %-96 %] 96 %  BP: (121-145)/(63-74) 121/68     Weight: 81.2 kg (179 lb 0.2 oz) (06/07/18 0600)  Body mass index is 30.63 kg/m².  Body surface area is 1.92 meters squared.    I/O last 3 completed shifts:  In: 1375.3 [P.O.:830; I.V.:495.3; IV Piggyback:50]  Out: 2800 [Urine:2800]    Physical Exam   Constitutional: She is oriented to person, place, and time. She appears well-developed. No distress.   HENT:   Head: Normocephalic and atraumatic.   Eyes: EOM are normal. Pupils are equal, round, and reactive to light.   Neck: Normal range of motion. Neck supple.   Cardiovascular: Normal rate, regular rhythm and normal heart sounds.    Pulmonary/Chest: Effort normal. She has no wheezes.   Faint crackles at bases   Abdominal: Soft. Bowel sounds are normal. She exhibits no distension.   Musculoskeletal: Normal range of motion. She exhibits no edema.   Neurological: She is alert and oriented to person, place, and time.   Skin: Skin is warm. She is not diaphoretic.   Psychiatric: She has a normal mood and affect. Her behavior is normal.       Significant Labs:  All labs within the past 24 hours have been reviewed.    Significant Imaging:  reviewed    Assessment/Plan:     Acute renal failure superimposed on stage 5 chronic kidney disease, not on chronic dialysis    71y/o F pt with PMHX of uncontrolled DM2 with nephropathy, CKD5, HTN here with NSTEMI and ASHLEY on CKD. Suspect ASHLEY due to hypoperfusion/ ischemia in setting of NSTEMI.   Further rise in Cr possibly due drop in Hgb 10-->8 vs LETICIA. Episode of respiratory distress yesterday, likely acute diastolic dysfxn or TACO could have also further exacerbated kidney dysfxn.  --s/p LHC  (6/4/18)  --baseline Cr 1.8-2.2, was 2.7 on 5/30.  --Cr 3.2 on admit-->3.1; now worsening-->4.1-->4.9-->5.5  --I/O: 1.9L +  --stable on RA    Plan:  --switch from lasix  gtt to lasix 80mg PO BID  --avoid all nephrotoxic agents and renally dose meds  --no need for RRT currently  --strict I/O              Thank you for your consult. I will follow-up with patient. Please contact us if you have any additional questions.    Juliann Henderson MD  Nephrology  [facility]    ATTENDING PHYSICIAN ATTESTATION  I have personally interviewed and examined the patient. I thoroughly reviewed the demographic, clinical, laboratorial and imaging information available in medical records. I agree with the assessment and recommendations provided by the subspecialty resident. Dr. Henderson was under my supervision.

## 2018-06-08 NOTE — PROGRESS NOTES
Hospital Medicine   Progress note   Team: [facility] [place] [facility] C Emily Allen MD   Admit Date: 6/4/2018   NENA 6/11/2018   Code status: Full Code   Principal Problem: NSTEMI (non-ST elevated myocardial infarction)     Interval hx:  Net -1025, still on lasix gtt at 30, BUN/Cr worsened to 116/5.5, K+ normalized to 4.3 after kayexalate yesterday, Phos and CO2 improved. Change to Lasix 80 PO BID per nephrology.     ROS   Respiratory: negative for cough, shortness of breath   Cardiovascular: negative for chest discomfort, palpitations   Gastrointestinal: negative for nausea or vomiting, abdominal pain, constipation, diarrhea     PEx   Temp:  [97.7 °F (36.5 °C)-98.8 °F (37.1 °C)]   Pulse:  [69-77]   Resp:  [14-24]   BP: (121-145)/(63-74)   SpO2:  [86 %-96 %]      I & O (Last 24H):     Intake/Output Summary (Last 24 hours) at 06/08/18 1108  Last data filed at 06/08/18 0830   Gross per 24 hour   Intake              950 ml   Output             1975 ml   Net            -1025 ml       General Appearance: NAD, sitting in chair  Heart: regular rate and rhythm   Respiratory: Normal respiratory effort, no c/w/r  Abdomen: Soft, non-tender; bowel sounds active   Skin: intact. IV sites ok. R groin: 4 x 5 cm ecchymosis of medial groin, no mass/fullness, no exquisite TTP, no bruit  Neurologic: No focal numbness or weakness   Mental status: Alert, oriented x 4, affect appropriate, memory intact     Recent Results (from the past 24 hour(s))   POCT glucose    Collection Time: 06/07/18 11:40 AM   Result Value Ref Range    POCT Glucose 338 (H) 70 - 110 mg/dL   POCT glucose    Collection Time: 06/07/18 11:41 AM   Result Value Ref Range    POCT Glucose 334 (H) 70 - 110 mg/dL   Comprehensive metabolic panel    Collection Time: 06/07/18  5:34 PM   Result Value Ref Range    Sodium 133 (L) 136 - 145 mmol/L    Potassium 4.8 3.5 - 5.1 mmol/L    Chloride 99 95 - 110 mmol/L    CO2 18 (L) 23 - 29 mmol/L    Glucose 268 (H) 70 - 110 mg/dL    BUN, Bld 111 (H) 8  - 23 mg/dL    Creatinine 5.3 (H) 0.5 - 1.4 mg/dL    Calcium 8.6 (L) 8.7 - 10.5 mg/dL    Total Protein 6.9 6.0 - 8.4 g/dL    Albumin 3.1 (L) 3.5 - 5.2 g/dL    Total Bilirubin 0.7 0.1 - 1.0 mg/dL    Alkaline Phosphatase 121 55 - 135 U/L    AST 43 (H) 10 - 40 U/L    ALT 33 10 - 44 U/L    Anion Gap 16 8 - 16 mmol/L    eGFR if African American 8.7 (A) >60 mL/min/1.73 m^2    eGFR if non  7.6 (A) >60 mL/min/1.73 m^2   POCT glucose    Collection Time: 06/07/18  5:40 PM   Result Value Ref Range    POCT Glucose 234 (H) 70 - 110 mg/dL   POCT glucose    Collection Time: 06/07/18  9:28 PM   Result Value Ref Range    POCT Glucose 236 (H) 70 - 110 mg/dL   CBC auto differential    Collection Time: 06/08/18  5:46 AM   Result Value Ref Range    WBC 13.99 (H) 3.90 - 12.70 K/uL    RBC 3.38 (L) 4.00 - 5.40 M/uL    Hemoglobin 10.2 (L) 12.0 - 16.0 g/dL    Hematocrit 31.0 (L) 37.0 - 48.5 %    MCV 92 82 - 98 fL    MCH 30.2 27.0 - 31.0 pg    MCHC 32.9 32.0 - 36.0 g/dL    RDW 14.5 11.5 - 14.5 %    Platelets 225 150 - 350 K/uL    MPV 11.7 9.2 - 12.9 fL    Immature Granulocytes 0.5 0.0 - 0.5 %    Gran # (ANC) 11.1 (H) 1.8 - 7.7 K/uL    Immature Grans (Abs) 0.07 (H) 0.00 - 0.04 K/uL    Lymph # 1.3 1.0 - 4.8 K/uL    Mono # 1.4 (H) 0.3 - 1.0 K/uL    Eos # 0.2 0.0 - 0.5 K/uL    Baso # 0.03 0.00 - 0.20 K/uL    nRBC 0 0 /100 WBC    Gran% 79.1 (H) 38.0 - 73.0 %    Lymph% 9.0 (L) 18.0 - 48.0 %    Mono% 9.9 4.0 - 15.0 %    Eosinophil% 1.3 0.0 - 8.0 %    Basophil% 0.2 0.0 - 1.9 %    Differential Method Automated    Magnesium    Collection Time: 06/08/18  5:46 AM   Result Value Ref Range    Magnesium 2.4 1.6 - 2.6 mg/dL   Phosphorus    Collection Time: 06/08/18  5:46 AM   Result Value Ref Range    Phosphorus 6.6 (H) 2.7 - 4.5 mg/dL   POCT glucose    Collection Time: 06/08/18  8:03 AM   Result Value Ref Range    POCT Glucose 176 (H) 70 - 110 mg/dL   Comprehensive metabolic panel    Collection Time: 06/08/18  8:41 AM   Result Value Ref Range     Sodium 135 (L) 136 - 145 mmol/L    Potassium 4.3 3.5 - 5.1 mmol/L    Chloride 98 95 - 110 mmol/L    CO2 21 (L) 23 - 29 mmol/L    Glucose 189 (H) 70 - 110 mg/dL    BUN, Bld 116 (H) 8 - 23 mg/dL    Creatinine 5.5 (H) 0.5 - 1.4 mg/dL    Calcium 8.5 (L) 8.7 - 10.5 mg/dL    Total Protein 6.6 6.0 - 8.4 g/dL    Albumin 2.9 (L) 3.5 - 5.2 g/dL    Total Bilirubin 0.6 0.1 - 1.0 mg/dL    Alkaline Phosphatase 109 55 - 135 U/L    AST 32 10 - 40 U/L    ALT 28 10 - 44 U/L    Anion Gap 16 8 - 16 mmol/L    eGFR if African American 8.3 (A) >60 mL/min/1.73 m^2    eGFR if non  7.2 (A) >60 mL/min/1.73 m^2       Recent Labs  Lab 06/06/18  2135 06/07/18  1140 06/07/18  1141 06/07/18  1740 06/07/18  2128 06/08/18  0803   POCTGLUCOSE 139* 338* 334* 234* 236* 176*         Active Hospital Problems    Diagnosis  POA    *NSTEMI (non-ST elevated myocardial infarction) [I21.4]  Yes    Acute hypoxemic respiratory failure [J96.01]  No    Metabolic acidosis [E87.2]  Yes    Hyperphosphatemia [E83.39]  No    Acute blood loss anemia [D62]  No    ASHLEY (acute kidney injury) [N17.9]  No    TACO (transfusion associated circulatory overload) [E87.71]  Unknown    Acute renal failure superimposed on stage 5 chronic kidney disease, not on chronic dialysis [N17.9, N18.5]  Yes    CKD (chronic kidney disease), stage V [N18.5]  Unknown    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 H/o CABG X2 2002, stent RCA, OM1 2003, NSTEMI with total occlusion of LAD in  2012.       Obesity (BMI 30.0-34.9) [E66.9]  Yes    Diabetic polyneuropathy associated with type 2 diabetes mellitus [E11.42]  Yes     Stocking/glove pattern intermittent neuropathy      Hypertension associated with diabetes [E11.59, I10]  Yes     BP controlled on BB, diuretic. ARB discontinued by Renal due to hyperkalemia        Resolved Hospital Problems    Diagnosis Date Resolved POA   No resolved problems to display.        Overview:  72F with CAD/CABGx1 '02 LIMA-LAD with multiple subsequent PCIs last in 2/2017 with pLIMA-LAD, patent RCA stents, severe ISR of mid LCX s/p 2.75mm BETHANY(Resolute) and severe ISR of OM s/p PCI with 2.5mm BETHANY(Resolute) and distal 90% RCA lesion(had SPECT with Lateral ischemia at that time), HTN, HLD, DM-2 uncontrolled (A1C 8.9) with neuro/ophtho/renal manifestations, CKD-5 approaching ESRD, LUE Brachiocephalic fistula created 4/16/18 s/p PTA for stenosis 5/30/18, chronic dCHF, pAF, h/o stroke, anemia in CKD, and GERD, who presented to [facility] ED for left sided chest pain radiating to left arm that awoke her from sleep last night. Pain resolved with morphine and nitro paste. Transferred to [facility] ED for higher level of care and cardiology consult. Upon admit, pain free.  Given 325mg ASA and placed on hep gtt. EKG with NSR and lat ST T wave abnormality that are old. Trop .09-->.67-->2.9. She is not currently on HD and doesnt have any other access than fistula which is not mature.      Pt reports that since the recent PTA to LUE AVF on 5/30, she has been experiencing exertional angina like prior to previous stents with exertional and improved with rest and Nitro. Last night was the first time it occurred at rest, so she went to ED.    [place] [facility] [place]   Went to cath lab emergently on 6/4, s/p BETHANY to OM1 (Natanael), no complications.   The next day, feeling well except for some fatigue, sitting up in chair all day.  H/H decreased to mid-8 and 27 (from 10 and 31), no active bleed, VSS  and no signs of RP hematoma or pseudoaneurysm, just small ecchymosis of groin from track bleed. Discussed with Dr Santoyo. Continued to monitor H/H. At 730pm on 6/5, episode of chest pain radiating to L arm, 7/10, relieved by nitro SL x 1. 6/6 morning, called by RN for low /52, so held Imdur. H/H decreased to 8.0/24.8 so transfused 2 u pRBCs for goal H/H 10/30 in setting of ACS. In addition, BUN/Cr worsening so transfusion will help renal perfusion in setting of low BP.  Had low grade temp of 100 immediately after transfusion started, asymptomatic, discussed with RN, no tylenol given in order to not mask a transfusion reaction, re-checked  temp 30 mins later and it normalized to around 98- no further issues, then, as transfusion was finishing, c/o acute severe SOB, acute hypoxemic resp failure with O2 sats decreased to 88% on RA with severe resp distress and wheeze/rales, gave STAT Lasix IV and checked STAT CXR - revealed worsened pulm edema. MICU consulted.  Gave STAT solumedrol 125 and benadryl 50 IV for possible TRALI.  Dx more likely TACO than TRALI - UOP only 100cc after Lasix 80 IV, so gave additional Lasix IV 80 then started Lasix gtt at 20 with FOlety for strict Is/Os and close monitoring overnight in case transfer to MICU needed.  In addition, she had severe nausea all day, minimal PO intake (one grape, some water), nausea improved after zofran 8mg PO around noon then around 5pm severe nausea returned and vomited x 1. Reglan 5 IV given.    Did not require transfer to MICU overnight. Much improved clinically the next morning 6/7, feels great, absolutely no nausea. On Lasix gtt at 30.  UOP 800cc overnight.  90-92% on RA, sitting up comfortably in chair.  H/h responded well to transfusion, now > 10/30. Despite her feeling better, her BUN/Cr worsened to 90s/4.9 and K+ 5.3. Discussed with nephrology. Will give dose of IV diuril 500mg (discussed with PharmD).      Assessment and Plan for problems addressed  today:   NSTEMI. Reason for admit. Trop peaked at 3.728.  KEDAR score of 5. ACS protocol started.  S/p emergent LHC by Dr Santoyo Interventional Cardiology  - home  switched to 81 for now given possible bleed  - continue plavix for now. Avoid switching to brilinta at this time as would need to re-load with antiplatelet, and she has a track bleed. Discussed with Dr Santoyo.  - known RCA stenosis, plan for inpt vs outpt intervention   - f/u Interventional cardiology recs  - home atorva  - home BB metop 100 BID  - no ACE-I or ARB since dynamic renal function  - Added Imdur 30mg QD - HOLD for hypotension 6/6  - Nitro and morphine PRN    Anemia of chronic disease/CKD-5  Acute blood loss anemia, likely due to track bleed at femoral site. +ecchymosis. +fatigue but VSS. H/H 8.6/27 on 6/5, decreased from 10/31.5. Repeated to ensure not erroneous.   - transfused 2u pRBCs on 6/6 for H/H 8.0/24, for goal H/H 10/30 in setting of ACS  - monitor    Acute hypoxemic resp failure - multifactorial due to volume overload s/p transfusion (TACO), acute on chronic dCHF, volume overload from ASHLEY on CKD-3.  Gave solumedrol and benadryl IV STAT for possible TRALI. MICU consulted for BiPAP +/- intubation watch.  She improved overnight after Lasix and did not need MICU transfer.   - diuresed with lasix 160 IVP then gtt at 30. diuril 500 IV x 1 on 6/7.  - change to lasix 80 PO BID  - Dawson for strict Is/Os while critical; d/c Dawson today  - strict Is/Os   - daily weights, preferably standing   - fluid restrict 1.5L while inpatient  - tele  - keep Mg >2, K >4    ASHLEY on chronic Kidney Disease Stage V approaching ESRD.  Worsening. Not yet on HD, had LUE AV graft s/p fistulogram, no other ports.  Graft is still maturing.   - may have LETICIA s/p cath for NSTEMI. concern for hypoperfusion from decreased PO intake from nausea as well  - continue watchful waiting  - may need HD during this admit   - continue to monitor closely  - diuresis as above  -  for hyperkalemia, give kayexalate 15g PO x 1 and continue renal diet   - for hyperphosphatemia, start sevelamer, defer to nephrology for any dose changes  - for metabolic acidosis, start sodium bicarb, to nephrology for any dose changes    N/V - uremia, anginal equivalent, other - resolved at this time. Pt attributes the nausea to cardiac issues, said that she has had nausea when she has voluem overload in the past.  STAT lipase unremarkable at 70 (NOT 3x ULN), CMP unremarkable for biliary issues, KUB with stool but no ileus  - antiemetics PRN    DM-2 uncontrolled. Home meds: lantus 20 qhs  Hyperglycemia today is likely due to solumedrol x 1 last night  - expect improvement as solumedrol washes out  - continue lantus 20 qhs with SSI low dose  - caution for insulin stacking in low GFR     HTN   - meds    GERD - PPI     aspirin  81 mg Oral Daily    atorvastatin  80 mg Oral Daily    calcitRIOL  0.25 mcg Oral Daily    clopidogrel  75 mg Oral Daily    insulin detemir U-100  20 Units Subcutaneous QHS    metoprolol tartrate  100 mg Oral BID    pantoprazole  40 mg Oral Daily    sevelamer carbonate  800 mg Oral TID WM    sodium bicarbonate  2 tablet Oral TID     sodium chloride, dextrose 50%, dextrose 50%, glucagon (human recombinant), glucose, glucose, HYDROcodone-acetaminophen, insulin aspart U-100, metoclopramide HCl, nitroGLYCERIN, ondansetron    DVT PPx: SCDs; no heparin while possible active bleed    Discharge plan and follow up: pending clinical condition    Emily Allen MD  Hospital Medicine Staff  [phone] pager

## 2018-06-08 NOTE — ASSESSMENT & PLAN NOTE
No further recommendations since yesterday . Patient without any anginal episode. No arrhythmias noted on tele.  Multivessel CAD s/p PCI of the LCx/OM1 with BETHANY. Patent LIMA-LAD and residual distal RCA disease - unchanged since previous angiogram. LCx/OM was deemed to be lesion responsible for ACS episode this time and intervened upon to reduce contrast dye usage during procedure.  Continue medical optimization with antianginal therapy and outpatient reassessment with a cardiac stress test to determine timing of distal RCA revascularization.   Can be titrated up on BB and nitrates permitting BP.  Continue DaPT for 1 year considering recent NSTEMI and high intensity statin therapy.   Cardiac Rehab on discharge

## 2018-06-09 DIAGNOSIS — N18.4 CKD STAGE 4 DUE TO TYPE 2 DIABETES MELLITUS: ICD-10-CM

## 2018-06-09 DIAGNOSIS — E11.22 CKD STAGE 4 DUE TO TYPE 2 DIABETES MELLITUS: ICD-10-CM

## 2018-06-09 DIAGNOSIS — I25.708 CORONARY ARTERY DISEASE OF BYPASS GRAFT OF NATIVE HEART WITH STABLE ANGINA PECTORIS: ICD-10-CM

## 2018-06-09 DIAGNOSIS — E11.42 DIABETIC POLYNEUROPATHY ASSOCIATED WITH TYPE 2 DIABETES MELLITUS: ICD-10-CM

## 2018-06-09 LAB
ALBUMIN SERPL BCP-MCNC: 3.2 G/DL
ALBUMIN SERPL BCP-MCNC: 3.2 G/DL
ALP SERPL-CCNC: 111 U/L
ALP SERPL-CCNC: 121 U/L
ALT SERPL W/O P-5'-P-CCNC: 26 U/L
ALT SERPL W/O P-5'-P-CCNC: 29 U/L
ANION GAP SERPL CALC-SCNC: 14 MMOL/L
ANION GAP SERPL CALC-SCNC: 16 MMOL/L
AST SERPL-CCNC: 24 U/L
AST SERPL-CCNC: 28 U/L
BASOPHILS # BLD AUTO: 0.04 K/UL
BASOPHILS NFR BLD: 0.4 %
BILIRUB SERPL-MCNC: 0.7 MG/DL
BILIRUB SERPL-MCNC: 0.7 MG/DL
BUN SERPL-MCNC: 117 MG/DL
BUN SERPL-MCNC: 120 MG/DL
CALCIUM SERPL-MCNC: 8.6 MG/DL
CALCIUM SERPL-MCNC: 8.8 MG/DL
CHLORIDE SERPL-SCNC: 94 MMOL/L
CHLORIDE SERPL-SCNC: 96 MMOL/L
CO2 SERPL-SCNC: 28 MMOL/L
CO2 SERPL-SCNC: 28 MMOL/L
CREAT SERPL-MCNC: 4.8 MG/DL
CREAT SERPL-MCNC: 5.1 MG/DL
DIFFERENTIAL METHOD: ABNORMAL
EOSINOPHIL # BLD AUTO: 0.5 K/UL
EOSINOPHIL NFR BLD: 4.1 %
ERYTHROCYTE [DISTWIDTH] IN BLOOD BY AUTOMATED COUNT: 14.3 %
EST. GFR  (AFRICAN AMERICAN): 9.1 ML/MIN/1.73 M^2
EST. GFR  (AFRICAN AMERICAN): 9.8 ML/MIN/1.73 M^2
EST. GFR  (NON AFRICAN AMERICAN): 7.9 ML/MIN/1.73 M^2
EST. GFR  (NON AFRICAN AMERICAN): 8.5 ML/MIN/1.73 M^2
GLUCOSE SERPL-MCNC: 117 MG/DL
GLUCOSE SERPL-MCNC: 151 MG/DL
HCT VFR BLD AUTO: 33.8 %
HGB BLD-MCNC: 11.1 G/DL
IMM GRANULOCYTES # BLD AUTO: 0.05 K/UL
IMM GRANULOCYTES NFR BLD AUTO: 0.4 %
LYMPHOCYTES # BLD AUTO: 1.2 K/UL
LYMPHOCYTES NFR BLD: 10.9 %
MAGNESIUM SERPL-MCNC: 2.2 MG/DL
MCH RBC QN AUTO: 29.6 PG
MCHC RBC AUTO-ENTMCNC: 32.8 G/DL
MCV RBC AUTO: 90 FL
MONOCYTES # BLD AUTO: 1 K/UL
MONOCYTES NFR BLD: 9 %
NEUTROPHILS # BLD AUTO: 8.6 K/UL
NEUTROPHILS NFR BLD: 75.2 %
NRBC BLD-RTO: 0 /100 WBC
PHOSPHATE SERPL-MCNC: 5.2 MG/DL
PLATELET # BLD AUTO: 255 K/UL
PMV BLD AUTO: 12 FL
POCT GLUCOSE: 112 MG/DL (ref 70–110)
POCT GLUCOSE: 157 MG/DL (ref 70–110)
POCT GLUCOSE: 168 MG/DL (ref 70–110)
POCT GLUCOSE: 225 MG/DL (ref 70–110)
POTASSIUM SERPL-SCNC: 4 MMOL/L
POTASSIUM SERPL-SCNC: 4.2 MMOL/L
PROT SERPL-MCNC: 7 G/DL
PROT SERPL-MCNC: 7.1 G/DL
RBC # BLD AUTO: 3.75 M/UL
SODIUM SERPL-SCNC: 138 MMOL/L
SODIUM SERPL-SCNC: 138 MMOL/L
TSH SERPL DL<=0.005 MIU/L-ACNC: 2.8 UIU/ML
WBC # BLD AUTO: 11.41 K/UL

## 2018-06-09 PROCEDURE — 84100 ASSAY OF PHOSPHORUS: CPT

## 2018-06-09 PROCEDURE — 84443 ASSAY THYROID STIM HORMONE: CPT

## 2018-06-09 PROCEDURE — 36415 COLL VENOUS BLD VENIPUNCTURE: CPT

## 2018-06-09 PROCEDURE — 20600001 HC STEP DOWN PRIVATE ROOM

## 2018-06-09 PROCEDURE — 25000003 PHARM REV CODE 250: Performed by: HOSPITALIST

## 2018-06-09 PROCEDURE — 85025 COMPLETE CBC W/AUTO DIFF WBC: CPT

## 2018-06-09 PROCEDURE — 83735 ASSAY OF MAGNESIUM: CPT

## 2018-06-09 PROCEDURE — 99233 SBSQ HOSP IP/OBS HIGH 50: CPT | Mod: ,,, | Performed by: HOSPITALIST

## 2018-06-09 PROCEDURE — 25000003 PHARM REV CODE 250: Performed by: INTERNAL MEDICINE

## 2018-06-09 PROCEDURE — 80053 COMPREHEN METABOLIC PANEL: CPT

## 2018-06-09 RX ADMIN — CALCITRIOL 0.25 MCG: 0.25 CAPSULE, LIQUID FILLED ORAL at 08:06

## 2018-06-09 RX ADMIN — ASPIRIN 81 MG: 81 TABLET, COATED ORAL at 08:06

## 2018-06-09 RX ADMIN — CLOPIDOGREL 75 MG: 75 TABLET, FILM COATED ORAL at 08:06

## 2018-06-09 RX ADMIN — FUROSEMIDE 80 MG: 80 TABLET ORAL at 07:06

## 2018-06-09 RX ADMIN — FUROSEMIDE 80 MG: 80 TABLET ORAL at 08:06

## 2018-06-09 RX ADMIN — SEVELAMER CARBONATE 800 MG: 800 TABLET, FILM COATED ORAL at 12:06

## 2018-06-09 RX ADMIN — INSULIN ASPART 2 UNITS: 100 INJECTION, SOLUTION INTRAVENOUS; SUBCUTANEOUS at 09:06

## 2018-06-09 RX ADMIN — PANTOPRAZOLE SODIUM 40 MG: 40 TABLET, DELAYED RELEASE ORAL at 08:06

## 2018-06-09 RX ADMIN — METOPROLOL TARTRATE 100 MG: 100 TABLET ORAL at 09:06

## 2018-06-09 RX ADMIN — SEVELAMER CARBONATE 800 MG: 800 TABLET, FILM COATED ORAL at 08:06

## 2018-06-09 RX ADMIN — METOPROLOL TARTRATE 100 MG: 100 TABLET ORAL at 08:06

## 2018-06-09 RX ADMIN — ATORVASTATIN CALCIUM 80 MG: 20 TABLET, FILM COATED ORAL at 08:06

## 2018-06-09 RX ADMIN — SEVELAMER CARBONATE 800 MG: 800 TABLET, FILM COATED ORAL at 07:06

## 2018-06-09 RX ADMIN — SODIUM BICARBONATE 650 MG TABLET 1300 MG: at 03:06

## 2018-06-09 RX ADMIN — INSULIN DETEMIR 20 UNITS: 100 INJECTION, SOLUTION SUBCUTANEOUS at 09:06

## 2018-06-09 RX ADMIN — SODIUM BICARBONATE 650 MG TABLET 1300 MG: at 08:06

## 2018-06-09 RX ADMIN — SODIUM BICARBONATE 650 MG TABLET 1300 MG: at 09:06

## 2018-06-09 NOTE — PLAN OF CARE
Problem: Patient Care Overview  Goal: Plan of Care Review  Outcome: Ongoing (interventions implemented as appropriate)  Updated care plan w/ pt.  Address all needs throughout shift.  Continue to monitor pt's output- pt did not require any insulin adjustments.  Pt is pending d/c today if not by the end of the weekend

## 2018-06-09 NOTE — NURSING
Resume care from previous nurse, pt voice no complaints, lying in bed w/ bed in lowest position and locked. visi connected and ipad at bedside. Call bell within reach, introduce myself to pt. Will continue to monitor pt status

## 2018-06-09 NOTE — PROGRESS NOTES
"Hospital Medicine   Progress note   Team: [facility] [place] [place] HOSP MED C Emily Allen MD   Admit Date: 6/4/2018   NENA 6/12/2018   Code status: Full Code   Principal Problem: NSTEMI (non-ST elevated myocardial infarction)     Interval hx:  Changed to Lasix PO 80 BID, still with excellent UOP of 4L! Net -2L. Cr finally trended down to 5.1, BUN elevated at 120. Feels okay, just fatigue ("tired") that is unchanged for the past several months.  Continue close monitoring of renal function re: if she will be able to go home without HD or if she will need inpt HD; the excellent UOP is a good prognostic sign    ROS   Respiratory: negative for cough, shortness of breath   Cardiovascular: negative for chest discomfort, palpitations   Gastrointestinal: negative for nausea or vomiting, abdominal pain, constipation, diarrhea     PEx   Temp:  [98.7 °F (37.1 °C)]   Pulse:  [68-73]   Resp:  [14-20]   BP: ()/(55-77)   SpO2:  [89 %-95 %]      I & O (Last 24H):     Intake/Output Summary (Last 24 hours) at 06/09/18 1059  Last data filed at 06/09/18 0800   Gross per 24 hour   Intake          2016.25 ml   Output             4200 ml   Net         -2183.75 ml       General Appearance: NAD, sitting in bed  Heart: regular rate and rhythm   Respiratory: Normal respiratory effort, no c/w/r  Abdomen: Soft, non-tender; bowel sounds active   Skin: intact. IV sites ok. R groin: ecchymosis - improved  Neurologic: No focal numbness or weakness   Mental status: Alert, oriented x 4, affect appropriate, memory intact     Recent Results (from the past 24 hour(s))   POCT glucose    Collection Time: 06/08/18 11:42 AM   Result Value Ref Range    POCT Glucose 178 (H) 70 - 110 mg/dL   POCT glucose    Collection Time: 06/08/18  4:20 PM   Result Value Ref Range    POCT Glucose 211 (H) 70 - 110 mg/dL   Comprehensive metabolic panel    Collection Time: 06/08/18  5:35 PM   Result Value Ref Range    Sodium 134 (L) 136 - 145 mmol/L    Potassium 4.3 3.5 - 5.1 mmol/L    " Chloride 95 95 - 110 mmol/L    CO2 22 (L) 23 - 29 mmol/L    Glucose 235 (H) 70 - 110 mg/dL    BUN, Bld 118 (H) 8 - 23 mg/dL    Creatinine 5.3 (H) 0.5 - 1.4 mg/dL    Calcium 8.4 (L) 8.7 - 10.5 mg/dL    Total Protein 6.6 6.0 - 8.4 g/dL    Albumin 3.0 (L) 3.5 - 5.2 g/dL    Total Bilirubin 0.6 0.1 - 1.0 mg/dL    Alkaline Phosphatase 116 55 - 135 U/L    AST 35 10 - 40 U/L    ALT 32 10 - 44 U/L    Anion Gap 17 (H) 8 - 16 mmol/L    eGFR if African American 8.7 (A) >60 mL/min/1.73 m^2    eGFR if non  7.6 (A) >60 mL/min/1.73 m^2   POCT glucose    Collection Time: 06/08/18  9:12 PM   Result Value Ref Range    POCT Glucose 191 (H) 70 - 110 mg/dL   CBC auto differential    Collection Time: 06/09/18  5:25 AM   Result Value Ref Range    WBC 11.41 3.90 - 12.70 K/uL    RBC 3.75 (L) 4.00 - 5.40 M/uL    Hemoglobin 11.1 (L) 12.0 - 16.0 g/dL    Hematocrit 33.8 (L) 37.0 - 48.5 %    MCV 90 82 - 98 fL    MCH 29.6 27.0 - 31.0 pg    MCHC 32.8 32.0 - 36.0 g/dL    RDW 14.3 11.5 - 14.5 %    Platelets 255 150 - 350 K/uL    MPV 12.0 9.2 - 12.9 fL    Immature Granulocytes 0.4 0.0 - 0.5 %    Gran # (ANC) 8.6 (H) 1.8 - 7.7 K/uL    Immature Grans (Abs) 0.05 (H) 0.00 - 0.04 K/uL    Lymph # 1.2 1.0 - 4.8 K/uL    Mono # 1.0 0.3 - 1.0 K/uL    Eos # 0.5 0.0 - 0.5 K/uL    Baso # 0.04 0.00 - 0.20 K/uL    nRBC 0 0 /100 WBC    Gran% 75.2 (H) 38.0 - 73.0 %    Lymph% 10.9 (L) 18.0 - 48.0 %    Mono% 9.0 4.0 - 15.0 %    Eosinophil% 4.1 0.0 - 8.0 %    Basophil% 0.4 0.0 - 1.9 %    Differential Method Automated    Magnesium    Collection Time: 06/09/18  5:25 AM   Result Value Ref Range    Magnesium 2.2 1.6 - 2.6 mg/dL   Phosphorus    Collection Time: 06/09/18  5:25 AM   Result Value Ref Range    Phosphorus 5.2 (H) 2.7 - 4.5 mg/dL   POCT glucose    Collection Time: 06/09/18  7:49 AM   Result Value Ref Range    POCT Glucose 112 (H) 70 - 110 mg/dL   Comprehensive metabolic panel    Collection Time: 06/09/18  8:08 AM   Result Value Ref Range    Sodium  138 136 - 145 mmol/L    Potassium 4.2 3.5 - 5.1 mmol/L    Chloride 96 95 - 110 mmol/L    CO2 28 23 - 29 mmol/L    Glucose 117 (H) 70 - 110 mg/dL    BUN, Bld 120 (H) 8 - 23 mg/dL    Creatinine 5.1 (H) 0.5 - 1.4 mg/dL    Calcium 8.8 8.7 - 10.5 mg/dL    Total Protein 7.1 6.0 - 8.4 g/dL    Albumin 3.2 (L) 3.5 - 5.2 g/dL    Total Bilirubin 0.7 0.1 - 1.0 mg/dL    Alkaline Phosphatase 111 55 - 135 U/L    AST 28 10 - 40 U/L    ALT 29 10 - 44 U/L    Anion Gap 14 8 - 16 mmol/L    eGFR if African American 9.1 (A) >60 mL/min/1.73 m^2    eGFR if non  7.9 (A) >60 mL/min/1.73 m^2       Recent Labs  Lab 06/07/18  2128 06/08/18  0803 06/08/18  1142 06/08/18  1620 06/08/18  2112 06/09/18  0749   POCTGLUCOSE 236* 176* 178* 211* 191* 112*         Active Hospital Problems    Diagnosis  POA    *NSTEMI (non-ST elevated myocardial infarction) [I21.4]  Yes    Acute hypoxemic respiratory failure [J96.01]  No    Metabolic acidosis [E87.2]  Yes    Hyperphosphatemia [E83.39]  No    Acute blood loss anemia [D62]  No    ASHLEY (acute kidney injury) [N17.9]  No    TACO (transfusion associated circulatory overload) [E87.71]  Unknown    Acute renal failure superimposed on stage 5 chronic kidney disease, not on chronic dialysis [N17.9, N18.5]  Yes    CKD (chronic kidney disease), stage V [N18.5]  Unknown    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 H/o CABG X2 2002, stent RCA, OM1 2003, NSTEMI with total occlusion of LAD in 2012.       Obesity (BMI  30.0-34.9) [E66.9]  Yes    Diabetic polyneuropathy associated with type 2 diabetes mellitus [E11.42]  Yes     Stocking/glove pattern intermittent neuropathy      Hypertension associated with diabetes [E11.59, I10]  Yes     BP controlled on BB, diuretic. ARB discontinued by Renal due to hyperkalemia        Resolved Hospital Problems    Diagnosis Date Resolved POA   No resolved problems to display.        Overview:  72F with CAD/CABGx1 '02 LIMA-LAD with multiple subsequent PCIs last in 2/2017 with pLIMA-LAD, patent RCA stents, severe ISR of mid LCX s/p 2.75mm BETHANY(Resolute) and severe ISR of OM s/p PCI with 2.5mm BETHANY(Resolute) and distal 90% RCA lesion(had SPECT with Lateral ischemia at that time), HTN, HLD, DM-2 uncontrolled (A1C 8.9) with neuro/ophtho/renal manifestations, CKD-5 approaching ESRD, LUE Brachiocephalic fistula created 4/16/18 s/p PTA for stenosis 5/30/18, chronic dCHF, pAF, h/o stroke, anemia in CKD, and GERD, who presented to [facility] ED for left sided chest pain radiating to left arm that awoke her from sleep last night. Pain resolved with morphine and nitro paste. Transferred to [facility] ED for higher level of care and cardiology consult. Upon admit, pain free.  Given 325mg ASA and placed on hep gtt. EKG with NSR and lat ST T wave abnormality that are old. Trop .09-->.67-->2.9. She is not currently on HD and doesnt have any other access than fistula which is not mature.      Pt reports that since the recent PTA to LUE AVF on 5/30, she has been experiencing exertional angina like prior to previous stents with exertional and improved with rest and Nitro. Last night was the first time it occurred at rest, so she went to ED.    [place] [facility] [place]ent to cath lab emergently on 6/4, s/p BETHANY to OM1 (Natanael), no complications.   The next day, feeling well except for some fatigue, sitting up in chair all day.  H/H decreased to mid-8 and 27 (from 10 and 31), no active bleed, VSS and no signs of RP  hematoma or pseudoaneurysm, just small ecchymosis of groin from track bleed. Discussed with Dr Santoyo. Continued to monitor H/H. At 730pm on 6/5, episode of chest pain radiating to L arm, 7/10, relieved by nitro SL x 1. 6/6 morning, called by RN for low /52, so held Imdur. H/H decreased to 8.0/24.8 so transfused 2 u pRBCs for goal H/H 10/30 in setting of ACS. In addition, BUN/Cr worsening so transfusion will help renal perfusion in setting of low BP.  Had low grade temp of 100 immediately after transfusion started, asymptomatic, discussed with RN, no tylenol given in order to not mask a transfusion reaction, re-checked  temp 30 mins later and it normalized to around 98- no further issues, then, as transfusion was finishing, c/o acute severe SOB, acute hypoxemic resp failure with O2 sats decreased to 88% on RA with severe resp distress and wheeze/rales, gave STAT Lasix IV and checked STAT CXR - revealed worsened pulm edema. MICU consulted.  Gave STAT solumedrol 125 and benadryl 50 IV for possible TRALI.  Dx more likely TACO than TRALI - UOP only 100cc after Lasix 80 IV, so gave additional Lasix IV 80 then started Lasix gtt at 20 with FOlety for strict Is/Os and close monitoring overnight in case transfer to MICU needed.  In addition, she had severe nausea all day, minimal PO intake (one grape, some water), nausea improved after zofran 8mg PO around noon then around 5pm severe nausea returned and vomited x 1. Reglan 5 IV given.    Did not require transfer to MICU overnight. Much improved clinically the next morning 6/7, feels great, absolutely no nausea. On Lasix gtt at 30.  UOP 800cc overnight.  90-92% on RA, sitting up comfortably in chair.  H/h responded well to transfusion, now > 10/30. Despite her feeling better, her BUN/Cr worsened to 90s/4.9 and K+ 5.3. Next day, Net -1025, still on lasix gtt at 30, BUN/Cr worsened to 116/5.5, K+ normalized to 4.3 after kayexalate yesterday, Phos and CO2 improved. Change  to Lasix 80 PO BID per nephrology.      Assessment and Plan for problems addressed today:   ASHLEY on chronic Kidney Disease Stage V approaching ESRD.  Finally improved.  Not yet on HD, had LUE AV graft s/p fistulogram, no other ports.  Graft is still maturing.   - may have LETICIA s/p cath for NSTEMI. concern for hypoperfusion from decreased PO intake from nausea as well  - continue watchful waiting  - may or may not need HD during this admit   - continue to monitor closely  - diuresis as above  - for hyperkalemia, gave kayexalate 15g PO x 1 and continue renal diet   - for hyperphosphatemia, started sevelamer, defer to nephrology for any dose changes  - for metabolic acidosis, started sodium bicarb, to nephrology for any dose changes    NSTEMI. Reason for admit. Trop peaked at 3.728.  KEDAR score of 5. ACS protocol started.  S/p emergent LHC by Dr Santoyo Interventional Cardiology  - home  switched to 81 for now given possible bleed  - continue plavix for now. Avoid switching to brilinta at this time as would need to re-load with antiplatelet, and she has a track bleed. Discussed with Dr Santoyo.  - known RCA stenosis, plan for inpt vs outpt intervention   - f/u Interventional cardiology recs  - home atorva  - home BB metop 100 BID  - no ACE-I or ARB since dynamic renal function  - Added Imdur 30mg QD - HELD for hypotension 6/6  - Nitro and morphine PRN    Profound fatigue x several months. DDx includes CKD-5/uremia  - check TSH (last checked 2/107)  - consider decreasing BB (Hr 60s)    Anemia of chronic disease/CKD-5  Acute blood loss anemia, due to track bleed at femoral site. +ecchymosis. +fatigue but VSS. H/H 8.6/27 on 6/5, decreased from 10/31.5. Repeated to ensure not erroneous.   - transfused 2u pRBCs on 6/6 for H/H 8.0/24, for goal H/H 10/30 in setting of ACS  - monitor    Acute hypoxemic resp failure - resolved for now- multifactorial due to volume overload s/p transfusion (TACO), acute on chronic dCHF,  volume overload from ASHLEY on CKD-3.  Gave solumedrol and benadryl IV STAT for possible TRALI. MICU consulted for BiPAP +/- intubation watch.  She improved overnight after Lasix and did not need MICU transfer.   - diuresed with lasix 160 IVP then gtt at 30. diuril 500 IV x 1 on 6/7.  - lasix 80 PO BID  - Dawson for strict Is/Os while critical; d/c Dawson today  - strict Is/Os   - daily weights, preferably standing   - fluid restrict 1.5L while inpatient  - tele  - keep Mg >2, K >4    N/V - uremia, anginal equivalent, other - resolved at this time. Pt attributes the nausea to cardiac issues, said that she has had nausea when she has voluem overload in the past.  STAT lipase unremarkable at 70 (NOT 3x ULN), CMP unremarkable for biliary issues, KUB with stool but no ileus  - antiemetics PRN    DM-2 uncontrolled. Home meds: lantus 20 qhs  Hyperglycemia likely due to solumedrol x 1.  improved after solumedrol washed out  - continue lantus 20 qhs with SSI low dose  - caution for insulin stacking in low GFR    HTN   - meds    GERD - PPI     aspirin  81 mg Oral Daily    atorvastatin  80 mg Oral Daily    calcitRIOL  0.25 mcg Oral Daily    clopidogrel  75 mg Oral Daily    furosemide  80 mg Oral BID    insulin detemir U-100  20 Units Subcutaneous QHS    metoprolol tartrate  100 mg Oral BID    pantoprazole  40 mg Oral Daily    sevelamer carbonate  800 mg Oral TID WM    sodium bicarbonate  2 tablet Oral TID     sodium chloride, dextrose 50%, dextrose 50%, glucagon (human recombinant), glucose, glucose, HYDROcodone-acetaminophen, insulin aspart U-100, metoclopramide HCl, nitroGLYCERIN, ondansetron    DVT PPx: SCDs; no heparin while possible active bleed    Discharge plan and follow up: pending clinical condition    Emily Allen MD  Hospital Medicine Staff  [phone] pager

## 2018-06-09 NOTE — PLAN OF CARE
Problem: Patient Care Overview  Goal: Plan of Care Review  Outcome: Ongoing (interventions implemented as appropriate)  Patient progressing toward all goals. Plan of care discussed with patient, no questions at this time. Patient ambulating independently, fall precautions in place. Dawson d/c and pt able to void; VS WNL. Will monitor.

## 2018-06-10 LAB
ALBUMIN SERPL BCP-MCNC: 3 G/DL
ALBUMIN SERPL BCP-MCNC: 3.3 G/DL
ALP SERPL-CCNC: 109 U/L
ALP SERPL-CCNC: 112 U/L
ALT SERPL W/O P-5'-P-CCNC: 20 U/L
ALT SERPL W/O P-5'-P-CCNC: 24 U/L
ANION GAP SERPL CALC-SCNC: 14 MMOL/L
ANION GAP SERPL CALC-SCNC: 18 MMOL/L
AST SERPL-CCNC: 18 U/L
AST SERPL-CCNC: 23 U/L
BASOPHILS # BLD AUTO: 0.06 K/UL
BASOPHILS NFR BLD: 0.6 %
BILIRUB SERPL-MCNC: 0.6 MG/DL
BILIRUB SERPL-MCNC: 0.6 MG/DL
BUN SERPL-MCNC: 102 MG/DL
BUN SERPL-MCNC: 112 MG/DL
CALCIUM SERPL-MCNC: 8.5 MG/DL
CALCIUM SERPL-MCNC: 9.1 MG/DL
CHLORIDE SERPL-SCNC: 93 MMOL/L
CHLORIDE SERPL-SCNC: 94 MMOL/L
CO2 SERPL-SCNC: 29 MMOL/L
CO2 SERPL-SCNC: 29 MMOL/L
CREAT SERPL-MCNC: 4 MG/DL
CREAT SERPL-MCNC: 4.2 MG/DL
DIFFERENTIAL METHOD: ABNORMAL
EOSINOPHIL # BLD AUTO: 0.3 K/UL
EOSINOPHIL NFR BLD: 3 %
ERYTHROCYTE [DISTWIDTH] IN BLOOD BY AUTOMATED COUNT: 13.7 %
EST. GFR  (AFRICAN AMERICAN): 11.6 ML/MIN/1.73 M^2
EST. GFR  (AFRICAN AMERICAN): 12.3 ML/MIN/1.73 M^2
EST. GFR  (NON AFRICAN AMERICAN): 10 ML/MIN/1.73 M^2
EST. GFR  (NON AFRICAN AMERICAN): 10.6 ML/MIN/1.73 M^2
GLUCOSE SERPL-MCNC: 317 MG/DL
GLUCOSE SERPL-MCNC: 95 MG/DL
HCT VFR BLD AUTO: 35.2 %
HGB BLD-MCNC: 11.8 G/DL
IMM GRANULOCYTES # BLD AUTO: 0.04 K/UL
IMM GRANULOCYTES NFR BLD AUTO: 0.4 %
LYMPHOCYTES # BLD AUTO: 1.7 K/UL
LYMPHOCYTES NFR BLD: 16 %
MAGNESIUM SERPL-MCNC: 2 MG/DL
MCH RBC QN AUTO: 29.9 PG
MCHC RBC AUTO-ENTMCNC: 33.5 G/DL
MCV RBC AUTO: 89 FL
MONOCYTES # BLD AUTO: 1.1 K/UL
MONOCYTES NFR BLD: 10.1 %
NEUTROPHILS # BLD AUTO: 7.2 K/UL
NEUTROPHILS NFR BLD: 69.9 %
NRBC BLD-RTO: 0 /100 WBC
PHOSPHATE SERPL-MCNC: 4.2 MG/DL
PLATELET # BLD AUTO: 270 K/UL
PMV BLD AUTO: 11.8 FL
POCT GLUCOSE: 142 MG/DL (ref 70–110)
POCT GLUCOSE: 240 MG/DL (ref 70–110)
POCT GLUCOSE: 288 MG/DL (ref 70–110)
POCT GLUCOSE: 311 MG/DL (ref 70–110)
POTASSIUM SERPL-SCNC: 4.5 MMOL/L
POTASSIUM SERPL-SCNC: 5.2 MMOL/L
PROT SERPL-MCNC: 6.7 G/DL
PROT SERPL-MCNC: 7.2 G/DL
RBC # BLD AUTO: 3.94 M/UL
SODIUM SERPL-SCNC: 136 MMOL/L
SODIUM SERPL-SCNC: 141 MMOL/L
WBC # BLD AUTO: 10.36 K/UL

## 2018-06-10 PROCEDURE — 83735 ASSAY OF MAGNESIUM: CPT

## 2018-06-10 PROCEDURE — 25000003 PHARM REV CODE 250: Performed by: HOSPITALIST

## 2018-06-10 PROCEDURE — 99232 SBSQ HOSP IP/OBS MODERATE 35: CPT | Mod: ,,, | Performed by: HOSPITALIST

## 2018-06-10 PROCEDURE — 85025 COMPLETE CBC W/AUTO DIFF WBC: CPT

## 2018-06-10 PROCEDURE — 25000003 PHARM REV CODE 250: Performed by: INTERNAL MEDICINE

## 2018-06-10 PROCEDURE — 84100 ASSAY OF PHOSPHORUS: CPT

## 2018-06-10 PROCEDURE — 80053 COMPREHEN METABOLIC PANEL: CPT | Mod: 91

## 2018-06-10 PROCEDURE — 36415 COLL VENOUS BLD VENIPUNCTURE: CPT

## 2018-06-10 PROCEDURE — 20600001 HC STEP DOWN PRIVATE ROOM

## 2018-06-10 RX ADMIN — SODIUM BICARBONATE 650 MG TABLET 1300 MG: at 08:06

## 2018-06-10 RX ADMIN — SEVELAMER CARBONATE 800 MG: 800 TABLET, FILM COATED ORAL at 12:06

## 2018-06-10 RX ADMIN — INSULIN ASPART 2 UNITS: 100 INJECTION, SOLUTION INTRAVENOUS; SUBCUTANEOUS at 09:06

## 2018-06-10 RX ADMIN — FUROSEMIDE 80 MG: 80 TABLET ORAL at 05:06

## 2018-06-10 RX ADMIN — INSULIN DETEMIR 20 UNITS: 100 INJECTION, SOLUTION SUBCUTANEOUS at 09:06

## 2018-06-10 RX ADMIN — INSULIN ASPART 2 UNITS: 100 INJECTION, SOLUTION INTRAVENOUS; SUBCUTANEOUS at 12:06

## 2018-06-10 RX ADMIN — METOPROLOL TARTRATE 100 MG: 100 TABLET ORAL at 09:06

## 2018-06-10 RX ADMIN — INSULIN ASPART 3 UNITS: 100 INJECTION, SOLUTION INTRAVENOUS; SUBCUTANEOUS at 04:06

## 2018-06-10 RX ADMIN — SODIUM BICARBONATE 650 MG TABLET 1300 MG: at 02:06

## 2018-06-10 RX ADMIN — METOPROLOL TARTRATE 100 MG: 100 TABLET ORAL at 08:06

## 2018-06-10 RX ADMIN — SEVELAMER CARBONATE 800 MG: 800 TABLET, FILM COATED ORAL at 05:06

## 2018-06-10 RX ADMIN — FUROSEMIDE 80 MG: 80 TABLET ORAL at 08:06

## 2018-06-10 RX ADMIN — ASPIRIN 81 MG: 81 TABLET, COATED ORAL at 08:06

## 2018-06-10 RX ADMIN — SODIUM BICARBONATE 650 MG TABLET 1300 MG: at 09:06

## 2018-06-10 RX ADMIN — ATORVASTATIN CALCIUM 80 MG: 20 TABLET, FILM COATED ORAL at 08:06

## 2018-06-10 RX ADMIN — CALCITRIOL 0.25 MCG: 0.25 CAPSULE, LIQUID FILLED ORAL at 08:06

## 2018-06-10 RX ADMIN — SEVELAMER CARBONATE 800 MG: 800 TABLET, FILM COATED ORAL at 08:06

## 2018-06-10 RX ADMIN — PANTOPRAZOLE SODIUM 40 MG: 40 TABLET, DELAYED RELEASE ORAL at 08:06

## 2018-06-10 RX ADMIN — CLOPIDOGREL 75 MG: 75 TABLET, FILM COATED ORAL at 08:06

## 2018-06-10 NOTE — NURSING
Patient lying in bed, siderails up x2. Bed alarm is on. Patient voiced no complaints. iPad at bedside, visi connected to patient.

## 2018-06-10 NOTE — PROGRESS NOTES
Hospital Medicine   Progress note   Team: [facility] HOSP MED C Emily Allen MD   Admit Date: 6/4/2018   NENA 6/12/2018   Code status: Full Code   Principal Problem: NSTEMI (non-ST elevated myocardial infarction)     Interval hx:  Great news, Cr trended down further to 4.8, net -1740, no indication for HD    ROS   Respiratory: negative for cough, shortness of breath   Cardiovascular: negative for chest discomfort, palpitations   Gastrointestinal: negative for nausea or vomiting, abdominal pain, constipation, diarrhea     PEx   Temp:  [97.9 °F (36.6 °C)-98.7 °F (37.1 °C)]   Pulse:  [66-78]   Resp:  [14-18]   BP: (147-174)/(69-75)   SpO2:  [90 %-100 %]      I & O (Last 24H):     Intake/Output Summary (Last 24 hours) at 06/10/18 1155  Last data filed at 06/10/18 0700   Gross per 24 hour   Intake             1560 ml   Output             3300 ml   Net            -1740 ml       General Appearance: NAD, sitting in bed  Heart: regular rate and rhythm   Respiratory: Normal respiratory effort, no c/w/r  Abdomen: Soft, non-tender; bowel sounds active   Skin: intact. IV sites ok. R groin: ecchymosis - improved  Neurologic: No focal numbness or weakness   Mental status: Alert, oriented x 4, affect appropriate, memory intact     Recent Results (from the past 24 hour(s))   POCT glucose    Collection Time: 06/09/18 12:07 PM   Result Value Ref Range    POCT Glucose 168 (H) 70 - 110 mg/dL   POCT glucose    Collection Time: 06/09/18  5:25 PM   Result Value Ref Range    POCT Glucose 157 (H) 70 - 110 mg/dL   Comprehensive metabolic panel    Collection Time: 06/09/18  5:58 PM   Result Value Ref Range    Sodium 138 136 - 145 mmol/L    Potassium 4.0 3.5 - 5.1 mmol/L    Chloride 94 (L) 95 - 110 mmol/L    CO2 28 23 - 29 mmol/L    Glucose 151 (H) 70 - 110 mg/dL    BUN, Bld 117 (H) 8 - 23 mg/dL    Creatinine 4.8 (H) 0.5 - 1.4 mg/dL    Calcium 8.6 (L) 8.7 - 10.5 mg/dL    Total Protein 7.0 6.0 - 8.4 g/dL    Albumin 3.2 (L) 3.5 - 5.2 g/dL    Total  Bilirubin 0.7 0.1 - 1.0 mg/dL    Alkaline Phosphatase 121 55 - 135 U/L    AST 24 10 - 40 U/L    ALT 26 10 - 44 U/L    Anion Gap 16 8 - 16 mmol/L    eGFR if African American 9.8 (A) >60 mL/min/1.73 m^2    eGFR if non African American 8.5 (A) >60 mL/min/1.73 m^2   POCT glucose    Collection Time: 06/09/18  9:36 PM   Result Value Ref Range    POCT Glucose 225 (H) 70 - 110 mg/dL   CBC auto differential    Collection Time: 06/10/18  4:11 AM   Result Value Ref Range    WBC 10.36 3.90 - 12.70 K/uL    RBC 3.94 (L) 4.00 - 5.40 M/uL    Hemoglobin 11.8 (L) 12.0 - 16.0 g/dL    Hematocrit 35.2 (L) 37.0 - 48.5 %    MCV 89 82 - 98 fL    MCH 29.9 27.0 - 31.0 pg    MCHC 33.5 32.0 - 36.0 g/dL    RDW 13.7 11.5 - 14.5 %    Platelets 270 150 - 350 K/uL    MPV 11.8 9.2 - 12.9 fL    Immature Granulocytes 0.4 0.0 - 0.5 %    Gran # (ANC) 7.2 1.8 - 7.7 K/uL    Immature Grans (Abs) 0.04 0.00 - 0.04 K/uL    Lymph # 1.7 1.0 - 4.8 K/uL    Mono # 1.1 (H) 0.3 - 1.0 K/uL    Eos # 0.3 0.0 - 0.5 K/uL    Baso # 0.06 0.00 - 0.20 K/uL    nRBC 0 0 /100 WBC    Gran% 69.9 38.0 - 73.0 %    Lymph% 16.0 (L) 18.0 - 48.0 %    Mono% 10.1 4.0 - 15.0 %    Eosinophil% 3.0 0.0 - 8.0 %    Basophil% 0.6 0.0 - 1.9 %    Differential Method Automated    Magnesium    Collection Time: 06/10/18  4:11 AM   Result Value Ref Range    Magnesium 2.0 1.6 - 2.6 mg/dL   Phosphorus    Collection Time: 06/10/18  4:11 AM   Result Value Ref Range    Phosphorus 4.2 2.7 - 4.5 mg/dL   POCT glucose    Collection Time: 06/10/18  7:54 AM   Result Value Ref Range    POCT Glucose 142 (H) 70 - 110 mg/dL       Recent Labs  Lab 06/08/18  2112 06/09/18  0749 06/09/18  1207 06/09/18  1725 06/09/18  2136 06/10/18  0754   POCTGLUCOSE 191* 112* 168* 157* 225* 142*         Active Hospital Problems    Diagnosis  POA    *NSTEMI (non-ST elevated myocardial infarction) [I21.4]  Yes    Acute hypoxemic respiratory failure [J96.01]  No    Metabolic acidosis [E87.2]  Yes    Hyperphosphatemia [E83.39]  No     Acute blood loss anemia [D62]  No    ASHLEY (acute kidney injury) [N17.9]  No    TACO (transfusion associated circulatory overload) [E87.71]  Unknown    Acute renal failure superimposed on stage 5 chronic kidney disease, not on chronic dialysis [N17.9, N18.5]  Yes    CKD (chronic kidney disease), stage V [N18.5]  Unknown    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 H/o CABG X2 2002, stent RCA, OM1 2003, NSTEMI with total occlusion of LAD in 2012.       Obesity (BMI 30.0-34.9) [E66.9]  Yes    Diabetic polyneuropathy associated with type 2 diabetes mellitus [E11.42]  Yes     Stocking/glove pattern intermittent neuropathy      Hypertension associated with diabetes [E11.59, I10]  Yes     BP controlled on BB, diuretic. ARB discontinued by Renal due to hyperkalemia        Resolved Hospital Problems    Diagnosis Date Resolved POA   No resolved problems to display.        Overview:  72F with CAD/CABGx1 '02 LIMA-LAD with multiple subsequent PCIs last in 2/2017 with pLIMA-LAD, patent RCA stents, severe ISR of mid LCX s/p 2.75mm BETHANY(Resolute) and severe ISR of OM s/p PCI with 2.5mm BETHANY(Resolute) and distal 90% RCA lesion(had SPECT with Lateral ischemia at that time), HTN, HLD, DM-2 uncontrolled (A1C 8.9) with neuro/ophtho/renal manifestations, CKD-5 approaching ESRD, LUE Brachiocephalic fistula created 4/16/18 s/p PTA for stenosis 5/30/18, chronic dCHF, pAF, h/o stroke, anemia in CKD, and GERD, who presented to [facility] ED for left  sided chest pain radiating to left arm that awoke her from sleep last night. Pain resolved with morphine and nitro paste. Transferred to [facility] ED for higher level of care and cardiology consult. Upon admit, pain free.  Given 325mg ASA and placed on hep gtt. EKG with NSR and lat ST T wave abnormality that are old. Trop .09-->.67-->2.9. She is not currently on HD and doesnt have any other access than fistula which is not mature.      Pt reports that since the recent PTA to LUE AVF on 5/30, she has been experiencing exertional angina like prior to previous stents with exertional and improved with rest and Nitro. Last night was the first time it occurred at rest, so she went to ED.    Admitted to [facility].   Went to cath lab emergently on 6/4, s/p BETHANY to OM1 (Natanael), no complications.   The next day, feeling well except for some fatigue, sitting up in chair all day.  H/H decreased to mid-8 and 27 (from 10 and 31), no active bleed, VSS and no signs of RP hematoma or pseudoaneurysm, just small ecchymosis of groin from track bleed. Discussed with Dr Santoyo. Continued to monitor H/H. At 730pm on 6/5, episode of chest pain radiating to L arm, 7/10, relieved by nitro SL x 1. 6/6 morning, called by RN for low /52, so held Imdur. H/H decreased to 8.0/24.8 so transfused 2 u pRBCs for goal H/H 10/30 in setting of ACS. In addition, BUN/Cr worsening so transfusion will help renal perfusion in setting of low BP.  Had low grade temp of 100 immediately after transfusion started, asymptomatic, discussed with RN, no tylenol given in order to not mask a transfusion reaction, re-checked  temp 30 mins later and it normalized to around 98- no further issues, then, as transfusion was finishing, c/o acute severe SOB, acute hypoxemic resp failure with O2 sats decreased to 88% on RA with severe resp distress and wheeze/rales, gave STAT Lasix IV and checked STAT CXR - revealed worsened pulm edema. MICU consulted.  Gave STAT solumedrol 125  "and benadryl 50 IV for possible TRALI.  Dx more likely TACO than TRALI - UOP only 100cc after Lasix 80 IV, so gave additional Lasix IV 80 then started Lasix gtt at 20 with FOlety for strict Is/Os and close monitoring overnight in case transfer to MICU needed.  In addition, she had severe nausea all day, minimal PO intake (one grape, some water), nausea improved after zofran 8mg PO around noon then around 5pm severe nausea returned and vomited x 1. Reglan 5 IV given.    Did not require transfer to MICU overnight. Much improved clinically the next morning 6/7, feels great, absolutely no nausea. On Lasix gtt at 30.  UOP 800cc overnight.  90-92% on RA, sitting up comfortably in chair.  H/h responded well to transfusion, now > 10/30. Despite her feeling better, her BUN/Cr worsened to 90s/4.9 and K+ 5.3. Next day, Net -1025, still on lasix gtt at 30, BUN/Cr worsened to 116/5.5, K+ normalized to 4.3 after kayexalate yesterday, Phos and CO2 improved. Changed to Lasix PO 80 BID, still with excellent UOP of 4L x 24h! Net -2L. Cr finally trended down to 5.1, BUN elevated at 120. Feels okay, just fatigue ("tired") that is unchanged for the past several months.  Continue close monitoring of renal function re: if she will be able to go home without HD or if she will need inpt HD; the excellent UOP is a good prognostic sign     Assessment and Plan for problems addressed today:   ASHLEY on chronic Kidney Disease Stage V approaching ESRD.  Improved yesterday and todayNot yet on HD, had LUE AV graft s/p fistulogram, no other ports.  Graft is still maturing.   - may have LETICIA s/p cath for NSTEMI. concern for hypoperfusion from decreased PO intake from nausea as well  - continue watchful waiting  - may or may not need HD during this admit   - for hyperkalemia, gave kayexalate 15g PO x 1 and continue renal diet   - for hyperphosphatemia, started sevelamer, defer to nephrology for any dose changes  - for metabolic acidosis, started sodium " bicarb, to nephrology for any dose changes    NSTEMI. Reason for admit. Trop peaked at 3.728.  KEDAR score of 5. ACS protocol started.  S/p emergent LHC by Dr Santoyo Interventional Cardiology  - home  switched to 81 for now given possible bleed  - continue plavix for now. Avoid switching to brilinta at this time as would need to re-load with antiplatelet, and she has a track bleed. Discussed with Dr Santoyo.  - known RCA stenosis, plan for inpt vs outpt intervention   - f/u Interventional cardiology recs  - home atorva  - home BB metop 100 BID  - no ACE-I or ARB since dynamic renal function  - Added Imdur 30mg QD - HELD for hypotension 6/6  - Nitro and morphine PRN    Profound fatigue x several months. DDx includes CKD-5/uremia. Checked TSH (last checked 2/107)- wnl  - consider decreasing BB (Hr 60s)    Anemia of chronic disease/CKD-5  Acute blood loss anemia, due to track bleed at femoral site. +ecchymosis. +fatigue but VSS. H/H 8.6/27 on 6/5, decreased from 10/31.5. Repeated to ensure not erroneous.   - transfused 2u pRBCs on 6/6 for H/H 8.0/24, for goal H/H 10/30 in setting of ACS  - monitor    Acute hypoxemic resp failure - resolved for now- multifactorial due to volume overload s/p transfusion (TACO), acute on chronic dCHF, volume overload from ASHLEY on CKD-3.  Gave solumedrol and benadryl IV STAT for possible TRALI. MICU consulted for BiPAP +/- intubation watch.  She improved overnight after Lasix and did not need MICU transfer.   - diuresed with lasix 160 IVP then gtt at 30. diuril 500 IV x 1 on 6/7.  - lasix 80 PO BID  - strict Is/Os (had Queen for strict Is/Os while critical then d/c-ed queen)  - daily weights, preferably standing   - fluid restrict 1.5L while inpatient  - tele  - keep Mg >2, K >4    N/V - uremia, anginal equivalent, other - resolved at this time. Pt attributes the nausea to cardiac issues, said that she has had nausea when she has voluem overload in the past.  STAT lipase unremarkable  at 70 (NOT 3x ULN), CMP unremarkable for biliary issues, KUB with stool but no ileus  - antiemetics PRN    DM-2 uncontrolled. Home meds: lantus 20 qhs  Hyperglycemia likely due to solumedrol x 1.  improved after solumedrol washed out  - continue lantus 20 qhs with SSI low dose  - caution for insulin stacking in low GFR    HTN   - meds    GERD - PPI     aspirin  81 mg Oral Daily    atorvastatin  80 mg Oral Daily    calcitRIOL  0.25 mcg Oral Daily    clopidogrel  75 mg Oral Daily    furosemide  80 mg Oral BID    insulin detemir U-100  20 Units Subcutaneous QHS    metoprolol tartrate  100 mg Oral BID    pantoprazole  40 mg Oral Daily    sevelamer carbonate  800 mg Oral TID WM    sodium bicarbonate  2 tablet Oral TID     sodium chloride, dextrose 50%, dextrose 50%, glucagon (human recombinant), glucose, glucose, HYDROcodone-acetaminophen, insulin aspart U-100, metoclopramide HCl, nitroGLYCERIN, ondansetron    DVT PPx: SCDs; no heparin while possible active bleed    Discharge plan and follow up: pending clinical condition    Emily Allen MD  Hospital Medicine Staff  [phone] pager

## 2018-06-10 NOTE — PLAN OF CARE
Problem: Patient Care Overview  Goal: Plan of Care Review  Outcome: Ongoing (interventions implemented as appropriate)  Plan of care reviewed with patient.

## 2018-06-10 NOTE — PLAN OF CARE
Problem: Patient Care Overview  Goal: Plan of Care Review  Outcome: Ongoing (interventions implemented as appropriate)  Patient remains free of falls and injury throughout shift. Continue to monitor I & O's. Patient voiced no complaints. iPad at bedside, visi connected to patient. POC reviewed with patient, will continue to monitor.

## 2018-06-11 ENCOUNTER — TELEPHONE (OUTPATIENT)
Dept: INTERNAL MEDICINE | Facility: CLINIC | Age: 73
End: 2018-06-11

## 2018-06-11 LAB
ALBUMIN SERPL BCP-MCNC: 3.2 G/DL
ALBUMIN SERPL BCP-MCNC: 3.3 G/DL
ALP SERPL-CCNC: 107 U/L
ALP SERPL-CCNC: 109 U/L
ALT SERPL W/O P-5'-P-CCNC: 16 U/L
ALT SERPL W/O P-5'-P-CCNC: 17 U/L
ANION GAP SERPL CALC-SCNC: 12 MMOL/L
ANION GAP SERPL CALC-SCNC: 14 MMOL/L
AST SERPL-CCNC: 16 U/L
AST SERPL-CCNC: 17 U/L
BASOPHILS # BLD AUTO: 0.05 K/UL
BASOPHILS NFR BLD: 0.5 %
BILIRUB SERPL-MCNC: 0.6 MG/DL
BILIRUB SERPL-MCNC: 0.6 MG/DL
BUN SERPL-MCNC: 93 MG/DL
BUN SERPL-MCNC: 97 MG/DL
CALCIUM SERPL-MCNC: 8.8 MG/DL
CALCIUM SERPL-MCNC: 9.2 MG/DL
CHLORIDE SERPL-SCNC: 91 MMOL/L
CHLORIDE SERPL-SCNC: 93 MMOL/L
CO2 SERPL-SCNC: 31 MMOL/L
CO2 SERPL-SCNC: 33 MMOL/L
CREAT SERPL-MCNC: 4 MG/DL
CREAT SERPL-MCNC: 4.2 MG/DL
DIFFERENTIAL METHOD: ABNORMAL
EOSINOPHIL # BLD AUTO: 0.2 K/UL
EOSINOPHIL NFR BLD: 2.2 %
ERYTHROCYTE [DISTWIDTH] IN BLOOD BY AUTOMATED COUNT: 13.7 %
EST. GFR  (AFRICAN AMERICAN): 11.6 ML/MIN/1.73 M^2
EST. GFR  (AFRICAN AMERICAN): 12.3 ML/MIN/1.73 M^2
EST. GFR  (NON AFRICAN AMERICAN): 10 ML/MIN/1.73 M^2
EST. GFR  (NON AFRICAN AMERICAN): 10.6 ML/MIN/1.73 M^2
GLUCOSE SERPL-MCNC: 248 MG/DL
GLUCOSE SERPL-MCNC: 298 MG/DL
HCT VFR BLD AUTO: 38.1 %
HGB BLD-MCNC: 12.6 G/DL
IMM GRANULOCYTES # BLD AUTO: 0.08 K/UL
IMM GRANULOCYTES NFR BLD AUTO: 0.7 %
LYMPHOCYTES # BLD AUTO: 1.7 K/UL
LYMPHOCYTES NFR BLD: 15.6 %
MAGNESIUM SERPL-MCNC: 2 MG/DL
MCH RBC QN AUTO: 30 PG
MCHC RBC AUTO-ENTMCNC: 33.1 G/DL
MCV RBC AUTO: 91 FL
MONOCYTES # BLD AUTO: 1.5 K/UL
MONOCYTES NFR BLD: 13.9 %
NEUTROPHILS # BLD AUTO: 7.2 K/UL
NEUTROPHILS NFR BLD: 67.1 %
NRBC BLD-RTO: 0 /100 WBC
PHOSPHATE SERPL-MCNC: 3.6 MG/DL
PLATELET # BLD AUTO: 291 K/UL
PMV BLD AUTO: 11.7 FL
POCT GLUCOSE: 180 MG/DL (ref 70–110)
POCT GLUCOSE: 225 MG/DL (ref 70–110)
POCT GLUCOSE: 229 MG/DL (ref 70–110)
POCT GLUCOSE: 246 MG/DL (ref 70–110)
POTASSIUM SERPL-SCNC: 4.5 MMOL/L
POTASSIUM SERPL-SCNC: 5 MMOL/L
PROT SERPL-MCNC: 7.1 G/DL
PROT SERPL-MCNC: 7.2 G/DL
RBC # BLD AUTO: 4.2 M/UL
SODIUM SERPL-SCNC: 136 MMOL/L
SODIUM SERPL-SCNC: 138 MMOL/L
WBC # BLD AUTO: 10.67 K/UL

## 2018-06-11 PROCEDURE — 99232 SBSQ HOSP IP/OBS MODERATE 35: CPT | Mod: GC,,, | Performed by: INTERNAL MEDICINE

## 2018-06-11 PROCEDURE — 25000003 PHARM REV CODE 250: Performed by: INTERNAL MEDICINE

## 2018-06-11 PROCEDURE — 25000003 PHARM REV CODE 250: Performed by: HOSPITALIST

## 2018-06-11 PROCEDURE — 80053 COMPREHEN METABOLIC PANEL: CPT | Mod: 91

## 2018-06-11 PROCEDURE — 20600001 HC STEP DOWN PRIVATE ROOM

## 2018-06-11 PROCEDURE — 85025 COMPLETE CBC W/AUTO DIFF WBC: CPT

## 2018-06-11 PROCEDURE — 36415 COLL VENOUS BLD VENIPUNCTURE: CPT

## 2018-06-11 PROCEDURE — 83735 ASSAY OF MAGNESIUM: CPT

## 2018-06-11 PROCEDURE — 99233 SBSQ HOSP IP/OBS HIGH 50: CPT | Mod: ,,, | Performed by: HOSPITALIST

## 2018-06-11 PROCEDURE — 84100 ASSAY OF PHOSPHORUS: CPT

## 2018-06-11 RX ORDER — FUROSEMIDE 40 MG/1
40 TABLET ORAL 2 TIMES DAILY
Status: DISCONTINUED | OUTPATIENT
Start: 2018-06-12 | End: 2018-06-12 | Stop reason: HOSPADM

## 2018-06-11 RX ORDER — POLYETHYLENE GLYCOL 3350 17 G/17G
17 POWDER, FOR SOLUTION ORAL ONCE
Status: COMPLETED | OUTPATIENT
Start: 2018-06-11 | End: 2018-06-11

## 2018-06-11 RX ORDER — INSULIN GLARGINE 100 [IU]/ML
20 INJECTION, SOLUTION SUBCUTANEOUS NIGHTLY
Qty: 10 ML | Refills: 3 | OUTPATIENT
Start: 2018-06-11 | End: 2019-06-11

## 2018-06-11 RX ORDER — CALCITRIOL 0.25 UG/1
0.25 CAPSULE ORAL
Qty: 16 CAPSULE | Refills: 4 | OUTPATIENT
Start: 2018-06-12 | End: 2018-06-12 | Stop reason: HOSPADM

## 2018-06-11 RX ORDER — SENNOSIDES 8.6 MG/1
8.6 TABLET ORAL DAILY PRN
Status: DISCONTINUED | OUTPATIENT
Start: 2018-06-11 | End: 2018-06-11

## 2018-06-11 RX ORDER — LACTULOSE 10 G/15ML
20 SOLUTION ORAL EVERY 6 HOURS
Status: DISCONTINUED | OUTPATIENT
Start: 2018-06-11 | End: 2018-06-12 | Stop reason: HOSPADM

## 2018-06-11 RX ORDER — AMOXICILLIN 250 MG
1 CAPSULE ORAL 2 TIMES DAILY
Status: DISCONTINUED | OUTPATIENT
Start: 2018-06-11 | End: 2018-06-12 | Stop reason: HOSPADM

## 2018-06-11 RX ADMIN — SODIUM BICARBONATE 650 MG TABLET 1300 MG: at 08:06

## 2018-06-11 RX ADMIN — INSULIN ASPART 2 UNITS: 100 INJECTION, SOLUTION INTRAVENOUS; SUBCUTANEOUS at 04:06

## 2018-06-11 RX ADMIN — ASPIRIN 81 MG: 81 TABLET, COATED ORAL at 08:06

## 2018-06-11 RX ADMIN — CLOPIDOGREL 75 MG: 75 TABLET, FILM COATED ORAL at 08:06

## 2018-06-11 RX ADMIN — SEVELAMER CARBONATE 800 MG: 800 TABLET, FILM COATED ORAL at 04:06

## 2018-06-11 RX ADMIN — FUROSEMIDE 80 MG: 80 TABLET ORAL at 08:06

## 2018-06-11 RX ADMIN — SEVELAMER CARBONATE 800 MG: 800 TABLET, FILM COATED ORAL at 12:06

## 2018-06-11 RX ADMIN — POLYETHYLENE GLYCOL 3350 17 G: 17 POWDER, FOR SOLUTION ORAL at 09:06

## 2018-06-11 RX ADMIN — ATORVASTATIN CALCIUM 80 MG: 20 TABLET, FILM COATED ORAL at 08:06

## 2018-06-11 RX ADMIN — INSULIN ASPART 2 UNITS: 100 INJECTION, SOLUTION INTRAVENOUS; SUBCUTANEOUS at 12:06

## 2018-06-11 RX ADMIN — INSULIN ASPART 1 UNITS: 100 INJECTION, SOLUTION INTRAVENOUS; SUBCUTANEOUS at 09:06

## 2018-06-11 RX ADMIN — SEVELAMER CARBONATE 800 MG: 800 TABLET, FILM COATED ORAL at 08:06

## 2018-06-11 RX ADMIN — METOPROLOL TARTRATE 100 MG: 100 TABLET ORAL at 08:06

## 2018-06-11 RX ADMIN — PANTOPRAZOLE SODIUM 40 MG: 40 TABLET, DELAYED RELEASE ORAL at 08:06

## 2018-06-11 RX ADMIN — CALCITRIOL 0.25 MCG: 0.25 CAPSULE, LIQUID FILLED ORAL at 08:06

## 2018-06-11 RX ADMIN — INSULIN DETEMIR 20 UNITS: 100 INJECTION, SOLUTION SUBCUTANEOUS at 09:06

## 2018-06-11 RX ADMIN — SODIUM BICARBONATE 650 MG TABLET 1300 MG: at 02:06

## 2018-06-11 RX ADMIN — FUROSEMIDE 80 MG: 80 TABLET ORAL at 06:06

## 2018-06-11 RX ADMIN — SENNOSIDES 8.6 MG: 8.6 TABLET, FILM COATED ORAL at 02:06

## 2018-06-11 RX ADMIN — METOPROLOL TARTRATE 100 MG: 100 TABLET ORAL at 09:06

## 2018-06-11 NOTE — PROGRESS NOTES
[facility]  Nephrology  Progress Note    Patient Name: Zoey Ham  MRN: [id number]  Admission Date: 6/4/2018  Hospital Length of Stay: 7 days  Attending Provider: Emily Allen MD   Primary Care Physician: Cesia Rosales MD  Principal Problem:NSTEMI (non-ST elevated myocardial infarction)    Subjective:     HPI: 72F with hx of DM2 uncontrolled (a1c 9.0) with retinopathy and nephropathy CKD V with LUE Brachiocephalic fistula created 4/16/18 s/p PTA for stenosis 5/30/18, renovascular HTN, mixed HLD, extensive CAD s/p CABGx1 2002 [place]OS-LAD with multiple subsequent PCI's last in 2/2017 with pLIMA-LAD, patent RCA stents, severe ISR of mid LCX s/p 2.75mm BETHANY and severe ISR of OM s/p PCI with 2.5mm BETHANY and distal 90% RCA lesion (had SPECT with lateral ischemia at that time) admitted 6/4 with NSTEMI s/p PCI. Nephrology consulted for ASHLEY on CKD5 requiring LHC. Baseline Cr 1.8-2.2. On admit Cr was 3.2. Received IVFs prior to and following LHC. Kidney function stable this AM with BUN/Cr= 72/3.1, with good urine output.     Interval History: Feeling well this morning and looking forward to discharge. Stable on RA. Kidney function improving slowly.     Review of patient's allergies indicates:   Allergen Reactions    Percodan [oxycodone-aspirin] Hives     Welps     Pcn [penicillins] Hives and Swelling     Tolerated Rocephin IM in clinic      Phenergan [promethazine] Other (See Comments)     Altered mental status; jerking of extremities     Current Facility-Administered Medications   Medication Frequency    0.9%  NaCl infusion (for blood administration) Q24H PRN    aspirin EC tablet 81 mg Daily    atorvastatin tablet 80 mg Daily    calcitRIOL capsule 0.25 mcg Daily    clopidogrel tablet 75 mg Daily    dextrose 50% injection 12.5 g PRN    dextrose 50% injection 25 g PRN    furosemide tablet 80 mg BID    glucagon (human recombinant) injection 1 mg PRN    glucose chewable tablet 16 g PRN     glucose chewable tablet 24 g PRN    HYDROcodone-acetaminophen 5-325 mg per tablet 1 tablet Q6H PRN    insulin aspart U-100 pen 0-5 Units QID (AC + HS) PRN    insulin detemir U-100 pen 20 Units QHS    metoclopramide HCl injection 5 mg Q6H PRN    metoprolol tartrate (LOPRESSOR) tablet 100 mg BID    nitroGLYCERIN SL tablet 0.3 mg Q5 Min PRN    ondansetron disintegrating tablet 8 mg Q8H PRN    pantoprazole EC tablet 40 mg Daily    senna tablet 8.6 mg Daily PRN    sevelamer carbonate tablet 800 mg TID WM     Family History     Problem Relation (Age of Onset)    Breast cancer Maternal Aunt    Cancer Mother    Dementia Father    Diabetes Daughter    Heart disease Mother, Father    Hypertension Mother, Father, Sister, Brother    No Known Problems Son    Psoriasis Father    Stroke Sister        Social History Main Topics    Smoking status: Never Smoker    Smokeless tobacco: Never Used    Alcohol use No    Drug use: No    Sexual activity: No     Review of Systems   Constitutional: Negative for activity change, appetite change and chills.   HENT: Negative for congestion and sore throat.    Eyes: Negative for visual disturbance.   Cardiovascular: Positive for chest pain (resolved). Negative for leg swelling.   Gastrointestinal: Negative for abdominal pain, constipation and diarrhea.   Genitourinary: Negative for decreased urine volume, difficulty urinating, dysuria and hematuria.   Musculoskeletal: Negative for arthralgias.   Neurological: Negative for facial asymmetry and headaches.   Psychiatric/Behavioral: Negative for agitation and confusion.     Objective:     Vital Signs (Most Recent):  Temp: 96.9 °F (36.1 °C) (06/11/18 1429)  Pulse: 75 (06/11/18 1429)  Resp: 18 (06/11/18 1429)  BP: (!) 153/67 (06/11/18 1429)  SpO2: (!) 94 % (06/11/18 1300)  O2 Device (Oxygen Therapy): room air (06/11/18 1429) Vital Signs (24h Range):  Temp:  [96.1 °F (35.6 °C)-98.7 °F (37.1 °C)] 96.9 °F (36.1 °C)  Pulse:  [68-82] 75  Resp:   [8-18] 18  SpO2:  [90 %-99 %] 94 %  BP: (121-185)/(58-82) 153/67     Weight: 76.5 kg (168 lb 10.4 oz) (06/11/18 0600)  Body mass index is 28.86 kg/m².  Body surface area is 1.86 meters squared.    I/O last 3 completed shifts:  In: 2760 [P.O.:2760]  Out: 4700 [Urine:4700]    Physical Exam   Constitutional: She is oriented to person, place, and time. She appears well-developed. No distress.   HENT:   Head: Normocephalic and atraumatic.   Eyes: EOM are normal. Pupils are equal, round, and reactive to light.   Neck: Normal range of motion. Neck supple.   Cardiovascular: Normal rate, regular rhythm and normal heart sounds.    Pulmonary/Chest: Effort normal and breath sounds normal. She has no wheezes.       Abdominal: Soft. Bowel sounds are normal. She exhibits no distension.   Musculoskeletal: Normal range of motion. She exhibits no edema.   Neurological: She is alert and oriented to person, place, and time.   Skin: Skin is warm. She is not diaphoretic.   Psychiatric: She has a normal mood and affect. Her behavior is normal.       Significant Labs:  All labs within the past 24 hours have been reviewed.    Significant Imaging:  reviewed    Assessment/Plan:     Acute renal failure superimposed on stage 5 chronic kidney disease, not on chronic dialysis    71y/o F pt with PMHX of uncontrolled DM2 with nephropathy, CKD5, HTN here with NSTEMI and ASHLEY on CKD. Suspect ASHLEY due to hypoperfusion/ ischemia in setting of NSTEMI.   Further rise in Cr possibly due drop in Hgb 10-->8 vs LETICIA and then by episode of acute respiratory distress post transfusion, likely ADHF.  --s/p LHC  (6/4/18)  --baseline Cr 1.8-2.2, was 2.7 on 5/30.  --Cr 3.2 on admit-->peaked at 5.5 (6/8), now 4.2  --has diuresed to euvolemia    Plan:  --recommend lowering lasix to home dose of 40mg PO BID  --now with metab alkalosis (HCO3-31), possibly due to contraction alkalosis vs iatrogenic. Will hold sodium bicarb tablets.  --avoid all nephrotoxic agents and  renally dose meds  --strict I/O              Thank you for your consult. I will follow-up with patient. Please contact us if you have any additional questions.    Juliann Henderson MD  Nephrology  [facility]    ATTENDING PHYSICIAN ATTESTATION  I have personally interviewed and examined the patient. I thoroughly reviewed the demographic, clinical, laboratorial and imaging information available in medical records. I agree with the assessment and recommendations provided by the subspecialty resident. Dr. Henderson was under my supervision.

## 2018-06-11 NOTE — ASSESSMENT & PLAN NOTE
71y/o F pt with PMHX of uncontrolled DM2 with nephropathy, CKD5, HTN here with NSTEMI and ASHLEY on CKD. Suspect ASHLEY due to hypoperfusion/ ischemia in setting of NSTEMI.   Further rise in Cr possibly due drop in Hgb 10-->8 vs LETICIA and then by episode of acute respiratory distress post transfusion, likely ADHF.  --s/p LHC  (6/4/18)  --baseline Cr 1.8-2.2, was 2.7 on 5/30.  --Cr 3.2 on admit-->peaked at 5.5 (6/8), now 4.2  --has diuresed to euvolemia    Plan:  --recommend lowering lasix to home dose of 40mg PO BID  --avoid all nephrotoxic agents and renally dose meds  --no need for RRT currently  --strict I/O

## 2018-06-11 NOTE — PROGRESS NOTES
Hospital Medicine   Progress note   Team: [facility] HOSP MED C Emily Allen MD   Admit Date: 6/4/2018   NENA 6/12/2018   Code status: Full Code   Principal Problem: NSTEMI (non-ST elevated myocardial infarction)     Interval hx:  Cr further downtrended to 4.0 but slightly up to 4.2 today, continue to monitor, f/u nephrology recs    ROS   Respiratory: negative for cough, shortness of breath   Cardiovascular: negative for chest discomfort, palpitations   Gastrointestinal: negative for nausea or vomiting, abdominal pain, constipation, diarrhea     PEx   Temp:  [96.1 °F (35.6 °C)-98.7 °F (37.1 °C)]   Pulse:  [67-82]   Resp:  [8-18]   BP: (121-185)/(58-82)   SpO2:  [90 %-99 %]      I & O (Last 24H):     Intake/Output Summary (Last 24 hours) at 06/11/18 0840  Last data filed at 06/11/18 0800   Gross per 24 hour   Intake             1320 ml   Output             1700 ml   Net             -380 ml       General Appearance: NAD, sitting in bed  Heart: regular rate and rhythm   Respiratory: Normal respiratory effort, no c/w/r  Abdomen: Soft, non-tender; bowel sounds active   Skin: intact. IV sites ok. R groin: ecchymosis - improved  Neurologic: No focal numbness or weakness   Mental status: Alert, oriented x 4, affect appropriate, memory intact     Recent Results (from the past 24 hour(s))   POCT glucose    Collection Time: 06/10/18  4:59 PM   Result Value Ref Range    POCT Glucose 288 (H) 70 - 110 mg/dL   Comprehensive metabolic panel    Collection Time: 06/10/18  6:41 PM   Result Value Ref Range    Sodium 136 136 - 145 mmol/L    Potassium 4.5 3.5 - 5.1 mmol/L    Chloride 93 (L) 95 - 110 mmol/L    CO2 29 23 - 29 mmol/L    Glucose 317 (H) 70 - 110 mg/dL    BUN, Bld 102 (H) 8 - 23 mg/dL    Creatinine 4.0 (H) 0.5 - 1.4 mg/dL    Calcium 8.5 (L) 8.7 - 10.5 mg/dL    Total Protein 6.7 6.0 - 8.4 g/dL    Albumin 3.0 (L) 3.5 - 5.2 g/dL    Total Bilirubin 0.6 0.1 - 1.0 mg/dL    Alkaline Phosphatase 109 55 - 135 U/L    AST 18 10 - 40 U/L     ALT 20 10 - 44 U/L    Anion Gap 14 8 - 16 mmol/L    eGFR if African American 12.3 (A) >60 mL/min/1.73 m^2    eGFR if non African American 10.6 (A) >60 mL/min/1.73 m^2   POCT glucose    Collection Time: 06/10/18  9:25 PM   Result Value Ref Range    POCT Glucose 311 (H) 70 - 110 mg/dL   Magnesium    Collection Time: 06/11/18  5:50 AM   Result Value Ref Range    Magnesium 2.0 1.6 - 2.6 mg/dL   Phosphorus    Collection Time: 06/11/18  5:50 AM   Result Value Ref Range    Phosphorus 3.6 2.7 - 4.5 mg/dL   CBC auto differential    Collection Time: 06/11/18  5:51 AM   Result Value Ref Range    WBC 10.67 3.90 - 12.70 K/uL    RBC 4.20 4.00 - 5.40 M/uL    Hemoglobin 12.6 12.0 - 16.0 g/dL    Hematocrit 38.1 37.0 - 48.5 %    MCV 91 82 - 98 fL    MCH 30.0 27.0 - 31.0 pg    MCHC 33.1 32.0 - 36.0 g/dL    RDW 13.7 11.5 - 14.5 %    Platelets 291 150 - 350 K/uL    MPV 11.7 9.2 - 12.9 fL    Immature Granulocytes 0.7 (H) 0.0 - 0.5 %    Gran # (ANC) 7.2 1.8 - 7.7 K/uL    Immature Grans (Abs) 0.08 (H) 0.00 - 0.04 K/uL    Lymph # 1.7 1.0 - 4.8 K/uL    Mono # 1.5 (H) 0.3 - 1.0 K/uL    Eos # 0.2 0.0 - 0.5 K/uL    Baso # 0.05 0.00 - 0.20 K/uL    nRBC 0 0 /100 WBC    Gran% 67.1 38.0 - 73.0 %    Lymph% 15.6 (L) 18.0 - 48.0 %    Mono% 13.9 4.0 - 15.0 %    Eosinophil% 2.2 0.0 - 8.0 %    Basophil% 0.5 0.0 - 1.9 %    Differential Method Automated    POCT glucose    Collection Time: 06/11/18  7:32 AM   Result Value Ref Range    POCT Glucose 180 (H) 70 - 110 mg/dL   Comprehensive metabolic panel    Collection Time: 06/11/18  8:32 AM   Result Value Ref Range    Sodium 138 136 - 145 mmol/L    Potassium 4.5 3.5 - 5.1 mmol/L    Chloride 93 (L) 95 - 110 mmol/L    CO2 31 (H) 23 - 29 mmol/L    Glucose 248 (H) 70 - 110 mg/dL    BUN, Bld 97 (H) 8 - 23 mg/dL    Creatinine 4.2 (H) 0.5 - 1.4 mg/dL    Calcium 8.8 8.7 - 10.5 mg/dL    Total Protein 7.1 6.0 - 8.4 g/dL    Albumin 3.2 (L) 3.5 - 5.2 g/dL    Total Bilirubin 0.6 0.1 - 1.0 mg/dL    Alkaline Phosphatase 107  55 - 135 U/L    AST 17 10 - 40 U/L    ALT 17 10 - 44 U/L    Anion Gap 14 8 - 16 mmol/L    eGFR if African American 11.6 (A) >60 mL/min/1.73 m^2    eGFR if non African American 10.0 (A) >60 mL/min/1.73 m^2   POCT glucose    Collection Time: 06/11/18 12:11 PM   Result Value Ref Range    POCT Glucose 229 (H) 70 - 110 mg/dL       Recent Labs  Lab 06/09/18  2136 06/10/18  0754 06/10/18  1225 06/10/18  1659 06/10/18  2125 06/11/18  0732   POCTGLUCOSE 225* 142* 240* 288* 311* 180*         Active Hospital Problems    Diagnosis  POA    *NSTEMI (non-ST elevated myocardial infarction) [I21.4]  Yes    Acute hypoxemic respiratory failure [J96.01]  No    Metabolic acidosis [E87.2]  Yes    Hyperphosphatemia [E83.39]  No    Acute blood loss anemia [D62]  No    ASHLEY (acute kidney injury) [N17.9]  No    TACO (transfusion associated circulatory overload) [E87.71]  Unknown    Acute renal failure superimposed on stage 5 chronic kidney disease, not on chronic dialysis [N17.9, N18.5]  Yes    CKD (chronic kidney disease), stage V [N18.5]  Unknown    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 H/o CABG X2 2002, stent RCA, OM1 2003, NSTEMI with total occlusion of LAD in 2012.       Obesity (BMI 30.0-34.9) [E66.9]  Yes    Diabetic polyneuropathy associated with type 2 diabetes mellitus [E11.42]  Yes     Stocking/glove pattern intermittent neuropathy      Hypertension associated with diabetes [E11.59, I10]  Yes     BP controlled on BB, diuretic. ARB  discontinued by Renal due to hyperkalemia        Resolved Hospital Problems    Diagnosis Date Resolved POA   No resolved problems to display.        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 Pain resolved with morphine and nitro paste. Transferred to [facility] ED for higher level of care and cardiology consult. Upon admit, pain free.  Given 325mg ASA and placed on hep gtt. EKG with NSR and lat ST T wave abnormality that are old. Trop .09-->.67-->2.9. She is not currently on HD and doesnt have any other access than fistula which is not mature.      Pt reports that since the recent PTA to LUE AVF on 5/30, she has been experiencing exertional angina like prior to previous stents with exertional and improved with rest and Nitro. Last night was the first time it occurred at rest, so she went to ED.    Admitted to [facility].   Went to cath lab emergently on 6/4, s/p BETHANY to OM1 (Natanael), no complications.   The next day, feeling well except for some fatigue, sitting up in chair all day.  H/H decreased to mid-8 and 27 (from 10 and 31), no active bleed, VSS and no signs of RP hematoma or pseudoaneurysm, just small ecchymosis of groin from track bleed. Discussed with Dr Santoyo. Continued to monitor H/H. At 730pm on 6/5, episode of chest pain radiating to L arm, 7/10, relieved by nitro SL x 1. 6/6 morning, called by RN for low /52,  "so held Imdur. H/H decreased to 8.0/24.8 so transfused 2 u pRBCs for goal H/H 10/30 in setting of ACS. In addition, BUN/Cr worsening so transfusion will help renal perfusion in setting of low BP.  Had low grade temp of 100 immediately after transfusion started, asymptomatic, discussed with RN, no tylenol given in order to not mask a transfusion reaction, re-checked  temp 30 mins later and it normalized to around 98- no further issues, then, as transfusion was finishing, c/o acute severe SOB, acute hypoxemic resp failure with O2 sats decreased to 88% on RA with severe resp distress and wheeze/rales, gave STAT Lasix IV and checked STAT CXR - revealed worsened pulm edema. MICU consulted.  Gave STAT solumedrol 125 and benadryl 50 IV for possible TRALI.  Dx more likely TACO than TRALI - UOP only 100cc after Lasix 80 IV, so gave additional Lasix IV 80 then started Lasix gtt at 20 with Donaldo for strict Is/Os and close monitoring overnight in case transfer to MICU needed.  In addition, she had severe nausea all day, minimal PO intake (one grape, some water), nausea improved after zofran 8mg PO around noon then around 5pm severe nausea returned and vomited x 1. Reglan 5 IV given.    Did not require transfer to MICU overnight. Much improved clinically the next morning 6/7, feels great, absolutely no nausea. On Lasix gtt at 30.  UOP 800cc overnight.  90-92% on RA, sitting up comfortably in chair.  H/h responded well to transfusion, now > 10/30. Despite her feeling better, her BUN/Cr worsened to 90s/4.9 and K+ 5.3. Next day, Net -1025, still on lasix gtt at 30, BUN/Cr worsened to 116/5.5, K+ normalized to 4.3 after kayexalate yesterday, Phos and CO2 improved. Changed to Lasix PO 80 BID, still with excellent UOP of 4L x 24h! Net -2L. Cr finally trended down to 5.1, BUN elevated at 120. Feels okay, just fatigue ("tired") that is unchanged for the past several months.  Continue close monitoring of renal function re: if she will " be able to go home without HD or if she will need inpt HD; the excellent UOP is a good prognostic sign. Great news Anastacio 6/10,  Cr trended down further to 4.8, net -1740, no indication for HD     Assessment and Plan for problems addressed today:   ASHLEY on chronic Kidney Disease Stage V approaching ESRD.  Improved the past 2 days, then slight uptrend to 4.2 today.  Not yet on HD, had LUE AV graft s/p fistulogram, no other ports.  Graft is still maturing.   - may have LETICIA s/p cath for NSTEMI. concern for hypoperfusion from decreased PO intake from nausea as well  - continue watchful waiting  - may or may not need HD during this admit   - f/u nephrology recs  - for hyperkalemia, gave kayexalate 15g PO x 1 and continue renal diet   - for hyperphosphatemia, started sevelamer, defer to nephrology for any dose changes  - for metabolic acidosis, started sodium bicarb, defer to nephrology for any dose changes. Metabolic alkalosis 6/11, so stopped bicarb     NSTEMI. Reason for admit. Trop peaked at 3.728.  KEDAR score of 5. ACS protocol started.  S/p emergent LHC by Dr Santoyo Interventional Cardiology  - home  switched to 81 for now given possible bleed  - continue plavix for now. Avoid switching to brilinta at this time as would need to re-load with antiplatelet, and she has a track bleed. Discussed with Dr Santoyo.  - known RCA stenosis, plan for inpt vs outpt intervention   - f/u Interventional cardiology recs  - home atorva  - home BB metop 100 BID  - no ACE-I or ARB since dynamic renal function  - Added Imdur 30mg QD - HELD for hypotension 6/6  - Nitro and morphine PRN    Profound fatigue x several months. DDx includes CKD-5/uremia. Checked TSH (last checked 2/107)- wnl  - consider decreasing BB (Hr 60s)    Anemia of chronic disease/CKD-5  Acute blood loss anemia, due to track bleed at femoral site. +ecchymosis. +fatigue but VSS. H/H 8.6/27 on 6/5, decreased from 10/31.5. Repeated to ensure not erroneous.   -  transfused 2u pRBCs on 6/6 for H/H 8.0/24, for goal H/H 10/30 in setting of ACS  - monitor    Acute hypoxemic resp failure - resolved for now- multifactorial due to volume overload s/p transfusion (TACO), acute on chronic dCHF, volume overload from ASHLEY on CKD-3.  Gave solumedrol and benadryl IV STAT for possible TRALI. MICU consulted for BiPAP +/- intubation watch.  She improved overnight after Lasix and did not need MICU transfer.   - diuresed with lasix 160 IVP then gtt at 30. diuril 500 IV x 1 on 6/7.  Then lasix 80 PO BID  - decrease lasix to home dose 40 PO BID per nephrology  - strict Is/Os (had Queen for strict Is/Os while critical then d/c-ed queen)  - daily weights, preferably standing   - fluid restrict 1.5L while inpatient  - tele  - keep Mg >2, K >4    N/V - uremia, anginal equivalent, other - resolved at this time. Pt attributes the nausea to cardiac issues, said that she has had nausea when she has voluem overload in the past.  STAT lipase unremarkable at 70 (NOT 3x ULN), CMP unremarkable for biliary issues, KUB with stool but no ileus  - antiemetics PRN    DM-2 uncontrolled. Home meds: lantus 20 qhs  Hyperglycemia likely due to solumedrol x 1.  improved after solumedrol washed out  - continue lantus 20 qhs with SSI low dose  - caution for insulin stacking in low GFR    HTN   - meds    GERD - PPI     aspirin  81 mg Oral Daily    atorvastatin  80 mg Oral Daily    calcitRIOL  0.25 mcg Oral Daily    clopidogrel  75 mg Oral Daily    furosemide  80 mg Oral BID    insulin detemir U-100  20 Units Subcutaneous QHS    metoprolol tartrate  100 mg Oral BID    pantoprazole  40 mg Oral Daily    sevelamer carbonate  800 mg Oral TID WM    sodium bicarbonate  2 tablet Oral TID     sodium chloride, dextrose 50%, dextrose 50%, glucagon (human recombinant), glucose, glucose, HYDROcodone-acetaminophen, insulin aspart U-100, metoclopramide HCl, nitroGLYCERIN, ondansetron    DVT PPx: SCDs; no heparin while  possible active bleed    Discharge plan and follow up: pending clinical condition    Emily Allen MD  Hospital Medicine Staff  [phone] pager

## 2018-06-11 NOTE — SUBJECTIVE & OBJECTIVE
Interval History: Feeling well this morning and looking forward to discharge. Stable on RA. Kidney function improving slowly.     Review of patient's allergies indicates:   Allergen Reactions    Percodan [oxycodone-aspirin] Hives     Welps     Pcn [penicillins] Hives and Swelling     Tolerated Rocephin IM in clinic      Phenergan [promethazine] Other (See Comments)     Altered mental status; jerking of extremities     Current Facility-Administered Medications   Medication Frequency    0.9%  NaCl infusion (for blood administration) Q24H PRN    aspirin EC tablet 81 mg Daily    atorvastatin tablet 80 mg Daily    calcitRIOL capsule 0.25 mcg Daily    clopidogrel tablet 75 mg Daily    dextrose 50% injection 12.5 g PRN    dextrose 50% injection 25 g PRN    furosemide tablet 80 mg BID    glucagon (human recombinant) injection 1 mg PRN    glucose chewable tablet 16 g PRN    glucose chewable tablet 24 g PRN    HYDROcodone-acetaminophen 5-325 mg per tablet 1 tablet Q6H PRN    insulin aspart U-100 pen 0-5 Units QID (AC + HS) PRN    insulin detemir U-100 pen 20 Units QHS    metoclopramide HCl injection 5 mg Q6H PRN    metoprolol tartrate (LOPRESSOR) tablet 100 mg BID    nitroGLYCERIN SL tablet 0.3 mg Q5 Min PRN    ondansetron disintegrating tablet 8 mg Q8H PRN    pantoprazole EC tablet 40 mg Daily    senna tablet 8.6 mg Daily PRN    sevelamer carbonate tablet 800 mg TID WM     Family History     Problem Relation (Age of Onset)    Breast cancer Maternal Aunt    Cancer Mother    Dementia Father    Diabetes Daughter    Heart disease Mother, Father    Hypertension Mother, Father, Sister, Brother    No Known Problems Son    Psoriasis Father    Stroke Sister        Social History Main Topics    Smoking status: Never Smoker    Smokeless tobacco: Never Used    Alcohol use No    Drug use: No    Sexual activity: No     Review of Systems   Constitutional: Negative for activity change, appetite change and  chills.   HENT: Negative for congestion and sore throat.    Eyes: Negative for visual disturbance.   Cardiovascular: Positive for chest pain (resolved). Negative for leg swelling.   Gastrointestinal: Negative for abdominal pain, constipation and diarrhea.   Genitourinary: Negative for decreased urine volume, difficulty urinating, dysuria and hematuria.   Musculoskeletal: Negative for arthralgias.   Neurological: Negative for facial asymmetry and headaches.   Psychiatric/Behavioral: Negative for agitation and confusion.     Objective:     Vital Signs (Most Recent):  Temp: 96.9 °F (36.1 °C) (06/11/18 1429)  Pulse: 75 (06/11/18 1429)  Resp: 18 (06/11/18 1429)  BP: (!) 153/67 (06/11/18 1429)  SpO2: (!) 94 % (06/11/18 1300)  O2 Device (Oxygen Therapy): room air (06/11/18 1429) Vital Signs (24h Range):  Temp:  [96.1 °F (35.6 °C)-98.7 °F (37.1 °C)] 96.9 °F (36.1 °C)  Pulse:  [68-82] 75  Resp:  [8-18] 18  SpO2:  [90 %-99 %] 94 %  BP: (121-185)/(58-82) 153/67     Weight: 76.5 kg (168 lb 10.4 oz) (06/11/18 0600)  Body mass index is 28.86 kg/m².  Body surface area is 1.86 meters squared.    I/O last 3 completed shifts:  In: 2760 [P.O.:2760]  Out: 4700 [Urine:4700]    Physical Exam   Constitutional: She is oriented to person, place, and time. She appears well-developed. No distress.   HENT:   Head: Normocephalic and atraumatic.   Eyes: EOM are normal. Pupils are equal, round, and reactive to light.   Neck: Normal range of motion. Neck supple.   Cardiovascular: Normal rate, regular rhythm and normal heart sounds.    Pulmonary/Chest: Effort normal and breath sounds normal. She has no wheezes.       Abdominal: Soft. Bowel sounds are normal. She exhibits no distension.   Musculoskeletal: Normal range of motion. She exhibits no edema.   Neurological: She is alert and oriented to person, place, and time.   Skin: Skin is warm. She is not diaphoretic.   Psychiatric: She has a normal mood and affect. Her behavior is normal.        Significant Labs:  All labs within the past 24 hours have been reviewed.    Significant Imaging:  reviewed

## 2018-06-11 NOTE — PLAN OF CARE
Problem: Patient Care Overview  Goal: Plan of Care Review  Outcome: Ongoing (interventions implemented as appropriate)  Pt is pending d/c today. No issues overnight. Care plan updated, pt is on po lasix.  Pt reposition herself every 2hrs. Address all concerns throughout shift.

## 2018-06-11 NOTE — NURSING
Resume care from previous nurse, pt voice no complaints, lying in bed w/ bed in lowest position and locked. Ashly connected, ipad at bedside. Call bell within reach, introduce myself to pt. Will continue to monitor pt status

## 2018-06-11 NOTE — PLAN OF CARE
06/11/18 0844   Discharge Reassessment   Assessment Type Discharge Planning Reassessment   Do you have any problems affording any of your prescribed medications? No   Discharge Plan A Home with family

## 2018-06-12 VITALS
TEMPERATURE: 98 F | RESPIRATION RATE: 18 BRPM | BODY MASS INDEX: 28.76 KG/M2 | WEIGHT: 168.44 LBS | HEIGHT: 64 IN | OXYGEN SATURATION: 96 % | SYSTOLIC BLOOD PRESSURE: 137 MMHG | HEART RATE: 72 BPM | DIASTOLIC BLOOD PRESSURE: 74 MMHG

## 2018-06-12 LAB
ALBUMIN SERPL BCP-MCNC: 3.3 G/DL
ALP SERPL-CCNC: 102 U/L
ALT SERPL W/O P-5'-P-CCNC: 15 U/L
ANION GAP SERPL CALC-SCNC: 12 MMOL/L
AST SERPL-CCNC: 18 U/L
BASOPHILS # BLD AUTO: 0.07 K/UL
BASOPHILS NFR BLD: 0.6 %
BILIRUB SERPL-MCNC: 0.6 MG/DL
BUN SERPL-MCNC: 84 MG/DL
CALCIUM SERPL-MCNC: 9.6 MG/DL
CHLORIDE SERPL-SCNC: 95 MMOL/L
CO2 SERPL-SCNC: 32 MMOL/L
CREAT SERPL-MCNC: 3.6 MG/DL
DIFFERENTIAL METHOD: ABNORMAL
EOSINOPHIL # BLD AUTO: 0.2 K/UL
EOSINOPHIL NFR BLD: 1.9 %
ERYTHROCYTE [DISTWIDTH] IN BLOOD BY AUTOMATED COUNT: 13.5 %
EST. GFR  (AFRICAN AMERICAN): 13.9 ML/MIN/1.73 M^2
EST. GFR  (NON AFRICAN AMERICAN): 12.1 ML/MIN/1.73 M^2
GLUCOSE SERPL-MCNC: 172 MG/DL
HCT VFR BLD AUTO: 39.5 %
HGB BLD-MCNC: 13 G/DL
IMM GRANULOCYTES # BLD AUTO: 0.08 K/UL
IMM GRANULOCYTES NFR BLD AUTO: 0.7 %
LYMPHOCYTES # BLD AUTO: 2.1 K/UL
LYMPHOCYTES NFR BLD: 17.8 %
MAGNESIUM SERPL-MCNC: 2 MG/DL
MCH RBC QN AUTO: 29.7 PG
MCHC RBC AUTO-ENTMCNC: 32.9 G/DL
MCV RBC AUTO: 90 FL
MONOCYTES # BLD AUTO: 1.3 K/UL
MONOCYTES NFR BLD: 11.1 %
NEUTROPHILS # BLD AUTO: 7.9 K/UL
NEUTROPHILS NFR BLD: 67.9 %
NRBC BLD-RTO: 0 /100 WBC
PHOSPHATE SERPL-MCNC: 3.8 MG/DL
PLATELET # BLD AUTO: 321 K/UL
PMV BLD AUTO: 11.1 FL
POCT GLUCOSE: 212 MG/DL (ref 70–110)
POCT GLUCOSE: 272 MG/DL (ref 70–110)
POTASSIUM SERPL-SCNC: 4.7 MMOL/L
PROT SERPL-MCNC: 7.1 G/DL
RBC # BLD AUTO: 4.37 M/UL
SODIUM SERPL-SCNC: 139 MMOL/L
WBC # BLD AUTO: 11.57 K/UL

## 2018-06-12 PROCEDURE — 99239 HOSP IP/OBS DSCHRG MGMT >30: CPT | Mod: ,,, | Performed by: HOSPITALIST

## 2018-06-12 PROCEDURE — 80053 COMPREHEN METABOLIC PANEL: CPT

## 2018-06-12 PROCEDURE — 99232 SBSQ HOSP IP/OBS MODERATE 35: CPT | Mod: GC,,, | Performed by: INTERNAL MEDICINE

## 2018-06-12 PROCEDURE — 36415 COLL VENOUS BLD VENIPUNCTURE: CPT

## 2018-06-12 PROCEDURE — 85025 COMPLETE CBC W/AUTO DIFF WBC: CPT

## 2018-06-12 PROCEDURE — 84100 ASSAY OF PHOSPHORUS: CPT

## 2018-06-12 PROCEDURE — 25000003 PHARM REV CODE 250: Performed by: HOSPITALIST

## 2018-06-12 PROCEDURE — 83735 ASSAY OF MAGNESIUM: CPT

## 2018-06-12 PROCEDURE — 25000003 PHARM REV CODE 250: Performed by: INTERNAL MEDICINE

## 2018-06-12 RX ORDER — SEVELAMER CARBONATE 800 MG/1
800 TABLET, FILM COATED ORAL
Qty: 90 TABLET | Refills: 2 | Status: SHIPPED | OUTPATIENT
Start: 2018-06-12 | End: 2018-08-14 | Stop reason: SDUPTHER

## 2018-06-12 RX ORDER — ASPIRIN 81 MG/1
81 TABLET ORAL DAILY
Qty: 90 TABLET | Refills: 3 | Status: SHIPPED | OUTPATIENT
Start: 2018-06-13 | End: 2018-07-23

## 2018-06-12 RX ADMIN — STANDARDIZED SENNA CONCENTRATE AND DOCUSATE SODIUM 1 TABLET: 8.6; 5 TABLET, FILM COATED ORAL at 12:06

## 2018-06-12 RX ADMIN — CALCITRIOL 0.25 MCG: 0.25 CAPSULE, LIQUID FILLED ORAL at 08:06

## 2018-06-12 RX ADMIN — SEVELAMER CARBONATE 800 MG: 800 TABLET, FILM COATED ORAL at 08:06

## 2018-06-12 RX ADMIN — INSULIN ASPART 2 UNITS: 100 INJECTION, SOLUTION INTRAVENOUS; SUBCUTANEOUS at 07:06

## 2018-06-12 RX ADMIN — SEVELAMER CARBONATE 800 MG: 800 TABLET, FILM COATED ORAL at 12:06

## 2018-06-12 RX ADMIN — LACTULOSE 20 G: 20 SOLUTION ORAL at 12:06

## 2018-06-12 RX ADMIN — PANTOPRAZOLE SODIUM 40 MG: 40 TABLET, DELAYED RELEASE ORAL at 08:06

## 2018-06-12 RX ADMIN — METOPROLOL TARTRATE 100 MG: 100 TABLET ORAL at 08:06

## 2018-06-12 RX ADMIN — ASPIRIN 81 MG: 81 TABLET, COATED ORAL at 08:06

## 2018-06-12 RX ADMIN — CLOPIDOGREL 75 MG: 75 TABLET, FILM COATED ORAL at 08:06

## 2018-06-12 RX ADMIN — INSULIN ASPART 2 UNITS: 100 INJECTION, SOLUTION INTRAVENOUS; SUBCUTANEOUS at 12:06

## 2018-06-12 RX ADMIN — ATORVASTATIN CALCIUM 80 MG: 20 TABLET, FILM COATED ORAL at 08:06

## 2018-06-12 RX ADMIN — STANDARDIZED SENNA CONCENTRATE AND DOCUSATE SODIUM 1 TABLET: 8.6; 5 TABLET, FILM COATED ORAL at 08:06

## 2018-06-12 RX ADMIN — FUROSEMIDE 40 MG: 40 TABLET ORAL at 08:06

## 2018-06-12 RX ADMIN — LACTULOSE 20 G: 20 SOLUTION ORAL at 05:06

## 2018-06-12 NOTE — PLAN OF CARE
Problem: Patient Care Overview  Goal: Plan of Care Review  Outcome: Ongoing (interventions implemented as appropriate)  No significant events overnight. VSS. NADN. Pt diuresing well on po lasix. No complaints of CP or SOB. No falls/injuries during shift. Will continue to monitor. Ashu Reyes RN

## 2018-06-12 NOTE — ASSESSMENT & PLAN NOTE
71y/o F pt with PMHX of uncontrolled DM2 with nephropathy, CKD5, HTN here with NSTEMI and ASHLEY on CKD. Suspect ASHLEY due to hypoperfusion/ ischemia in setting of NSTEMI.   Further rise in Cr possibly due drop in Hgb 10-->8 vs LETICIA and then by episode of acute respiratory distress post transfusion, likely ADHF.  --s/p LHC  (6/4/18)  --baseline Cr 1.8-2.2, was 2.7 on 5/30.  --Cr 3.2 on admit-->peaked at 5.5 (6/8), now down-trending 4.2-->3.6  --euvolemic  --transitioned to home lasix 40mg PO BID    Plan:  --continue lasix of 40mg PO BID  --avoid all nephrotoxic agents and renally dose meds  --strict I/O  --will need outpatient follow-up with nephrology  --hold ACE until until seen outpatient  --will sign off

## 2018-06-12 NOTE — DISCHARGE SUMMARY
Discharge Summary  Hospital Medicine    Attending Provider on Discharge: Wilfrid Garcia     Discharging Team:    [facility]  [facility]ROLOGY CONSULT SERVICE     Date of Admission:  6/4/2018         Date of Discharge:  6/12/2018      Diagnoses:     Principal Problem(s):   NSTEMI (non-ST elevated myocardial infarction)     Secondary Problems:  Active Hospital Problems    Diagnosis    *NSTEMI (non-ST elevated myocardial infarction)    Acute hypoxemic respiratory failure    Metabolic acidosis    Hyperphosphatemia    Acute blood loss anemia    ASHLEY (acute kidney injury)    TACO (transfusion associated circulatory overload)    Acute renal failure superimposed on stage 5 chronic kidney disease, not on chronic dialysis    CKD (chronic kidney disease), stage V    Stenosis of arteriovenous dialysis fistula    Hemodialysis access, AV graft     AV graft placed on 4/16/18, no palpable thrill (but does have pulse) and bruit acusculated      Acute on chronic diastolic heart failure     Seen on echo in 2/2017, EF 50%, with class B symptoms      Coronary artery disease involving coronary bypass graft of native heart with unstable angina pectoris     Unstable angina in 2/2017, LHC and PCI on Lcx and OM1 with improvement. H/o CABG X2 2002, stent RCA, OM1 2003, NSTEMI with total occlusion of LAD in 2012.       Obesity (BMI 30.0-34.9)    Diabetic polyneuropathy associated with type 2 diabetes mellitus     Stocking/glove pattern intermittent neuropathy      Hypertension associated with diabetes     BP controlled on BB, diuretic. ARB discontinued by Renal due to hyperkalemia          Hospital Course:      72F with CAD/CABGx1 '02 LIMA-LAD with multiple subsequent PCIs last in 2/2017 with pLIMA-LAD, patent RCA stents, severe ISR of mid LCX s/p 2.75mm BETHANY (Resolute) and severe ISR of OM s/p PCI with 2.5mm BETHANY (Resolute) and distal 90% RCA lesion (had SPECT with lateral ischemia at that time), HTN, HLD, DM-2 uncontrolled  (A1C 8.9) with neuro/ophtho/renal manifestations, CKD-5 approaching ESRD, LUE Brachiocephalic fistula created 4/16/18 s/p PTA for stenosis 5/30/18, chronic dCHF, pAF, h/o stroke, anemia in CKD, and GERD, who presented to [facility] ED for left sided chest pain radiating to left arm that awoke her from sleep last night. Pain resolved with morphine and nitro paste. Transferred to [facility] ED for higher level of care and cardiology consult. Upon admit, pain free.  Given 325mg ASA and placed on hep gtt. EKG with NSR and lat ST T wave abnormality that are old. Trop .09-->.67-->2.9. She is not currently on HD and doesnt have any other access than fistula which is not mature.       Pt reports that since the recent PTA to LUE AVF on 5/30, she has been experiencing exertional angina like prior to previous stents with exertional and improved with rest and Nitro. Last night was the first time it occurred at rest, so she went to ED.     [facility]. Patient went to cath lab emergently on 6/4, s/p BETHANY to OM1, no complications. Course then c/b TACO after pRBC transfusion for anemia. Patient has been followed by renal for initiation of diuretics and monitoring for need for RRT.  Fortunately, patient has not required RRT, but it is likely she will require in future and patient understands this.  On 6/12, patient appeared euvolemic and doing well with regards to renal function on maintenance lasix. Nephrology deemed her stable for d/c with close f/u.  Patient pleased with this plan.  Please see below for further details:    Acute renal failure superimposed on CDKV  - not yet on HD, had LUE AV graft s/p fistulogram, no other ports    - graft is still maturing  - Ddx: may have LETICIA s/p cath for NSTEMI; concern for hypoperfusion from decreased PO intake from nausea as well  - c/w calcitriol  - c/w sevelamer  - avoid nephrotoxic agents  - per nephrology okay for discharge with close f/u with Dr. Iglesias  - c/w lasix for  volume management at recommended dose     NSTEMI  - s/p emergent cath with PCI: BETHANY to OM1  - c/w ASA/plavix/statin/BB  - no ACE-I or ARB since dynamic renal function  - Nitro and morphine PRN  - known RCA stenosis, plan for inpt vs outpt intervention      Anemia of chronic renal disease, CKDV  Acute blood loss anemia, due to track bleed at femoral site.   - transfused 2u pRBCs on 6/6 for H/H 8.0/24  - c/b TACO  - monitor     Acute hypoxemic respiratory failure   - resolved  - DDx: multifactorial due to volume overload s/p transfusion (TACO), acute on chronic dCHF, volume overload from ASHLEY on CKDV  - c/w maintenance lasix  - strict Is/Os (had Queen for strict Is/Os while critical then d/c-ed queen)  - daily weights, preferably standing   - fluid restrict 1.5L while inpatient  - tele  - keep Mg >2, K >4     DM-2 uncontrolled with neuro/ophtho/renal manifestations with long term insulin  - continue lantus 20 qhs with SSI low dose    Significant Diagnostic Studies:   Labs:   Recent Labs      06/12/18   0605   WBC  11.57   RBC  4.37   HGB  13.0   HCT  39.5   PLT  321   MCV  90   MCH  29.7   MCHC  32.9   GRAN  67.9  7.9*   LYMPH  17.8*  2.1   MONO  11.1  1.3*   EOS  0.2      Recent Labs      06/12/18   0605  06/12/18   0814   GLU   --   172*   NA   --   139   K   --   4.7   CL   --   95   CO2   --   32*   BUN   --   84*   CREATININE   --   3.6*   CALCIUM   --   9.6   ANIONGAP   --   12   MG  2.0   --    PHOS  3.8   --       Radiology:   Results for orders placed during the hospital encounter of 06/04/18   X-Ray Chest 1 View    Narrative EXAMINATION:  XR CHEST 1 VIEW    CLINICAL HISTORY:  SOB;    TECHNIQUE:  Single frontal view of the chest was performed.    COMPARISON:  Chest radiograph 06/04/2018    FINDINGS:  Monitoring leads overlie the chest.  Bilateral lung apices are partially obscured by overlying chin soft tissues.  Resolution is limited by body habitus with underpenetration.  Cardiomediastinal silhouette  remains prominent with continued prominence of the central pulmonary vasculature and slight interval increased bilateral nonspecific interstitial coarsening suggesting worsening pulmonary edema/CHF pattern, with interstitial pneumonia not excluded.  There are small bilateral pleural effusions.  Cannot exclude left basilar atelectasis/infiltrate.  No large pneumothorax.  No acute osseous process seen.  PA and lateral views can be obtained.      Impression Findings suggesting worsening pulmonary edema/CHF pattern with small bilateral pleural effusions and probable left basilar atelectasis/infiltrate.  Continued follow-up recommended as warranted.      Electronically signed by: Sharif Ge MD  Date:    06/06/2018  Time:    18:14      Results for orders placed during the hospital encounter of 04/10/18   X-Ray Chest PA And Lateral    Narrative EXAMINATION:  XR CHEST PA AND LATERAL    CLINICAL HISTORY:  Encounter for other preprocedural examination    TECHNIQUE:  PA and lateral views of the chest were performed.    COMPARISON:  None    FINDINGS:  Postoperative changes identified in the thorax.  Mild cardiomegaly.  Calcification in the aorta.  The lungs are clear.  No pleural effusion      Impression See above      Electronically signed by: Felix Boyce MD  Date:    04/10/2018  Time:    16:36     Cardiac Studies: C 6/4, 2D ECHO with CFD 6/5    Significant Treatments/Procedures:   [facility] 6/4 with BETHANY to mOM1    Consults while in Hospital:   Cardiology, Nephrology, Pulmonary/Intensive care and Radiation Oncology    Discharge Medications:      Current Discharge Medication List      START taking these medications    Details   aspirin (ECOTRIN) 81 MG EC tablet Take 1 tablet (81 mg total) by mouth once daily.  Qty: 90 tablet, Refills: 3      sevelamer carbonate (RENVELA) 800 mg Tab Take 1 tablet (800 mg total) by mouth 3 (three) times daily with meals.  Qty: 90 tablet, Refills: 2         CONTINUE these medications which have NOT  CHANGED    Details   atorvastatin (LIPITOR) 80 MG tablet TAKE 1 TABLET EVERY DAY  Qty: 90 tablet, Refills: 3    Associated Diagnoses: Coronary artery disease of bypass graft of native heart with stable angina pectoris; Chronic combined systolic and diastolic congestive heart failure; Uncontrolled type 2 diabetes mellitus with stage 4 chronic kidney disease, with long-term current use of insulin      blood sugar diagnostic Strp 1 strip by Misc.(Non-Drug; Combo Route) route 4 (four) times daily.  Qty: 200 strip, Refills: 11    Associated Diagnoses: Uncontrolled type 2 diabetes mellitus with stage 4 chronic kidney disease, with long-term current use of insulin      calcitRIOL (ROCALTROL) 0.25 MCG Cap Take 1 capsule (0.25 mcg total) by mouth once daily.  Qty: 30 capsule, Refills: 4      clopidogrel (PLAVIX) 75 mg tablet TAKE 1 TABLET EVERY DAY  Qty: 90 tablet, Refills: 3    Associated Diagnoses: Coronary artery disease of bypass graft of native heart with stable angina pectoris; Chronic combined systolic and diastolic congestive heart failure; Uncontrolled type 2 diabetes mellitus with stage 4 chronic kidney disease, with long-term current use of insulin      furosemide (LASIX) 40 MG tablet One tab twice a day.  Qty: 180 tablet, Refills: 3    Associated Diagnoses: Coronary artery disease of bypass graft of native heart with stable angina pectoris; Chronic combined systolic and diastolic congestive heart failure; Uncontrolled type 2 diabetes mellitus with stage 4 chronic kidney disease, with long-term current use of insulin      hydrocodone-acetaminophen 5-325mg (NORCO) 5-325 mg per tablet Take 1 tablet by mouth every 6 (six) hours as needed for Pain.  Qty: 35 tablet, Refills: 0      insulin glargine (LANTUS) 100 unit/mL injection Inject 20 Units into the skin every evening.  Qty: 10 mL, Refills: 3    Associated Diagnoses: CKD stage 4 due to type 2 diabetes mellitus; Coronary artery disease of bypass graft of native  heart with stable angina pectoris; Diabetic polyneuropathy associated with type 2 diabetes mellitus      insulin syringe-needle U-100 1 mL 31 gauge x 5/16 Syrg 1 application by Misc.(Non-Drug; Combo Route) route once daily.  Qty: 100 each, Refills: 3    Associated Diagnoses: CKD stage 4 due to type 2 diabetes mellitus; Hypertension associated with diabetes      KIONEX, WITH SORBITOL, 15-19.3 gram/60 mL Susp TAKE 60 MLS (15 G TOTAL) BY MOUTH ONCE DAILY.  Refills: 0      lancets (ACCU-CHEK SOFTCLIX LANCETS) Misc 1 application by Misc.(Non-Drug; Combo Route) route 4 (four) times daily with meals and nightly.  Qty: 200 each, Refills: 11    Associated Diagnoses: Uncontrolled type 2 diabetes mellitus with stage 4 chronic kidney disease, with long-term current use of insulin      metoprolol tartrate (LOPRESSOR) 100 MG tablet Take 1 tablet (100 mg total) by mouth 2 (two) times daily.  Qty: 180 tablet, Refills: 3    Associated Diagnoses: Renovascular hypertension; Coronary artery disease of bypass graft of native heart with stable angina pectoris; Hypertension associated with diabetes      nitroGLYCERIN (NITROSTAT) 0.3 MG SL tablet Place 1 tablet (0.3 mg total) under the tongue every 5 (five) minutes as needed for Chest pain.  Qty: 36 tablet, Refills: 3    Associated Diagnoses: Coronary artery disease of bypass graft of native heart with stable angina pectoris; Chronic combined systolic and diastolic congestive heart failure; Coronary artery disease involving coronary bypass graft of native heart with angina pectoris      ACCU-CHEK MARI PLUS METER Misc 1 Device by Misc.(Non-Drug; Combo Route) route 2 (two) times daily.  Qty: 1 each, Refills: 0    Comments: Patient's previous glucometer is broken, is requesting a new one  Associated Diagnoses: Uncontrolled type 2 diabetes mellitus with stage 4 chronic kidney disease, with long-term current use of insulin      arm brace Misc 1 application by Misc.(Non-Drug; Combo Route) route  once daily. Carpal tunnel wrist brace for both left and right wrists  Qty: 2 each, Refills: 0    Associated Diagnoses: Wrist pain, acute, unspecified laterality         STOP taking these medications       aspirin 325 MG tablet Comments:   Reason for Stopping:         sodium polystyrene (KAYEXALATE) 15 gram/60 mL Susp Comments:   Reason for Stopping:              Discharge Diet:cardiac diet, diabetic diet: 2000 calorie, renal diet and fluid restriction: 1500 cc     Activity: activity as tolerated    Discharged Condition: Stable    Discharge Disposition: Home or Self Care    Follow-up:   Future Appointments  Date Time Provider Department Center   6/20/2018 7:30 AM Cesia Rosales MD [facility] MED CLN [facility] PCW   6/21/2018 9:30 AM Karla Iglesias DO [facility] NEPHRO [facility]   7/9/2018 8:15 AM Alem Garza OD [facility] OPTOMTY [facility]   8/14/2018 10:30 AM Cesia Rosales MD [facility] MED [facility]N [facility]       Studies/Results pending on discharge:   None    Wilfrid Garcia MD  [facility] Medicine

## 2018-06-12 NOTE — SUBJECTIVE & OBJECTIVE
Interval History: Kidney function improving. Making adequate quantity of urine. Transitioned to home diuretic regimen today. Denies SOB, CP, palpitations, dysuria.    Review of patient's allergies indicates:   Allergen Reactions    Percodan [oxycodone-aspirin] Hives     Welps     Pcn [penicillins] Hives and Swelling     Tolerated Rocephin IM in clinic      Phenergan [promethazine] Other (See Comments)     Altered mental status; jerking of extremities     Current Facility-Administered Medications   Medication Frequency    0.9%  NaCl infusion (for blood administration) Q24H PRN    aspirin EC tablet 81 mg Daily    atorvastatin tablet 80 mg Daily    calcitRIOL capsule 0.25 mcg Daily    clopidogrel tablet 75 mg Daily    dextrose 50% injection 12.5 g PRN    dextrose 50% injection 25 g PRN    furosemide tablet 40 mg BID    glucagon (human recombinant) injection 1 mg PRN    glucose chewable tablet 16 g PRN    glucose chewable tablet 24 g PRN    HYDROcodone-acetaminophen 5-325 mg per tablet 1 tablet Q6H PRN    insulin aspart U-100 pen 0-5 Units QID (AC + HS) PRN    insulin detemir U-100 pen 20 Units QHS    lactulose 20 gram/30 mL solution Soln 20 g Q6H    metoclopramide HCl injection 5 mg Q6H PRN    metoprolol tartrate (LOPRESSOR) tablet 100 mg BID    nitroGLYCERIN SL tablet 0.3 mg Q5 Min PRN    ondansetron disintegrating tablet 8 mg Q8H PRN    pantoprazole EC tablet 40 mg Daily    senna-docusate 8.6-50 mg per tablet 1 tablet BID    sevelamer carbonate tablet 800 mg TID WM     Family History     Problem Relation (Age of Onset)    Breast cancer Maternal Aunt    Cancer Mother    Dementia Father    Diabetes Daughter    Heart disease Mother, Father    Hypertension Mother, Father, Sister, Brother    No Known Problems Son    Psoriasis Father    Stroke Sister        Social History Main Topics    Smoking status: Never Smoker    Smokeless tobacco: Never Used    Alcohol use No    Drug use: No    Sexual  activity: No     Review of Systems   Constitutional: Negative for activity change, appetite change and chills.   HENT: Negative for congestion and sore throat.    Eyes: Negative for visual disturbance.   Cardiovascular: Positive for chest pain (resolved). Negative for leg swelling.   Gastrointestinal: Negative for abdominal pain, constipation and diarrhea.   Genitourinary: Negative for decreased urine volume, difficulty urinating, dysuria and hematuria.   Musculoskeletal: Negative for arthralgias.   Neurological: Negative for facial asymmetry and headaches.   Psychiatric/Behavioral: Negative for agitation and confusion.     Objective:     Vital Signs (Most Recent):  Temp: 98.2 °F (36.8 °C) (06/12/18 0752)  Pulse: 81 (06/12/18 0752)  Resp: 18 (06/12/18 0752)  BP: 137/74 (06/12/18 0752)  SpO2: 96 % (06/12/18 0752)  O2 Device (Oxygen Therapy): room air (06/11/18 2100) Vital Signs (24h Range):  Temp:  [96.9 °F (36.1 °C)-98.2 °F (36.8 °C)] 98.2 °F (36.8 °C)  Pulse:  [69-84] 81  Resp:  [14-18] 18  SpO2:  [90 %-96 %] 96 %  BP: (104-153)/(56-80) 137/74     Weight: 76.4 kg (168 lb 6.9 oz) (06/12/18 0550)  Body mass index is 28.82 kg/m².  Body surface area is 1.86 meters squared.    I/O last 3 completed shifts:  In: 2510 [P.O.:2510]  Out: 3600 [Urine:3600]    Physical Exam   Constitutional: She is oriented to person, place, and time. She appears well-developed. No distress.   HENT:   Head: Normocephalic and atraumatic.   Eyes: EOM are normal. Pupils are equal, round, and reactive to light.   Neck: Normal range of motion. Neck supple.   Cardiovascular: Normal rate, regular rhythm and normal heart sounds.    Pulmonary/Chest: Effort normal and breath sounds normal. She has no wheezes.       Abdominal: Soft. Bowel sounds are normal. She exhibits no distension.   Musculoskeletal: Normal range of motion. She exhibits no edema.   Neurological: She is alert and oriented to person, place, and time.   Skin: Skin is warm. She is not  diaphoretic.   Psychiatric: She has a normal mood and affect. Her behavior is normal.       Significant Labs:  All labs within the past 24 hours have been reviewed.    Significant Imaging:  reviewed

## 2018-06-12 NOTE — TELEPHONE ENCOUNTER
Patient is being discharged from the hospital now. Please reach out to her at the end of the day to make sure she has home health ordered and see how she is doing. We will see her on 6/20.    Thanks,  KJ

## 2018-06-12 NOTE — PROGRESS NOTES
[facility]  Nephrology  Progress Note    Patient Name: Zoey Ham  MRN: [id number]  Admission Date: 6/4/2018  Hospital Length of Stay: 8 days  Attending Provider: Wilfrid Garcia MD   Primary Care Physician: Cesia Rosales MD  Principal Problem:NSTEMI (non-ST elevated myocardial infarction)    Subjective:     HPI: 72F with hx of DM2 uncontrolled (a1c 9.0) with retinopathy and nephropathy CKD V with LUE Brachiocephalic fistula created 4/16/18 s/p PTA for stenosis 5/30/18, renovascular HTN, mixed HLD, extensive CAD s/p CABGx1 2002 GALLEGOS-LAD with multiple subsequent PCI's last in 2/2017 with pLIMA-LAD, patent RCA stents, severe ISR of mid LCX s/p 2.75mm BETHANY and severe ISR of OM s/p PCI with 2.5mm BETHANY and distal 90% RCA lesion (had SPECT with lateral ischemia at that time) admitted 6/4 with NSTEMI s/p PCI. Nephrology consulted for ASHLEY on CKD5 requiring LHC. Baseline Cr 1.8-2.2. On admit Cr was 3.2. Received IVFs prior to and following LHC. Kidney function stable this AM with BUN/Cr= 72/3.1, with good urine output.     Interval History: Kidney function improving. Making adequate quantity of urine. Transitioned to home diuretic regimen today. Denies SOB, CP, palpitations, dysuria.    Review of patient's allergies indicates:   Allergen Reactions    Percodan [oxycodone-aspirin] Hives     Welps     Pcn [penicillins] Hives and Swelling     Tolerated Rocephin IM in clinic      Phenergan [promethazine] Other (See Comments)     Altered mental status; jerking of extremities     Current Facility-Administered Medications   Medication Frequency    0.9%  NaCl infusion (for blood administration) Q24H PRN    aspirin EC tablet 81 mg Daily    atorvastatin tablet 80 mg Daily    calcitRIOL capsule 0.25 mcg Daily    clopidogrel tablet 75 mg Daily    dextrose 50% injection 12.5 g PRN    dextrose 50% injection 25 g PRN    furosemide tablet 40 mg BID    glucagon (human recombinant) injection 1 mg PRN     glucose chewable tablet 16 g PRN    glucose chewable tablet 24 g PRN    HYDROcodone-acetaminophen 5-325 mg per tablet 1 tablet Q6H PRN    insulin aspart U-100 pen 0-5 Units QID (AC + HS) PRN    insulin detemir U-100 pen 20 Units QHS    lactulose 20 gram/30 mL solution Soln 20 g Q6H    metoclopramide HCl injection 5 mg Q6H PRN    metoprolol tartrate (LOPRESSOR) tablet 100 mg BID    nitroGLYCERIN SL tablet 0.3 mg Q5 Min PRN    ondansetron disintegrating tablet 8 mg Q8H PRN    pantoprazole EC tablet 40 mg Daily    senna-docusate 8.6-50 mg per tablet 1 tablet BID    sevelamer carbonate tablet 800 mg TID WM     Family History     Problem Relation (Age of Onset)    Breast cancer Maternal Aunt    Cancer Mother    Dementia Father    Diabetes Daughter    Heart disease Mother, Father    Hypertension Mother, Father, Sister, Brother    No Known Problems Son    Psoriasis Father    Stroke Sister        Social History Main Topics    Smoking status: Never Smoker    Smokeless tobacco: Never Used    Alcohol use No    Drug use: No    Sexual activity: No     Review of Systems   Constitutional: Negative for activity change, appetite change and chills.   HENT: Negative for congestion and sore throat.    Eyes: Negative for visual disturbance.   Cardiovascular: Positive for chest pain (resolved). Negative for leg swelling.   Gastrointestinal: Negative for abdominal pain, constipation and diarrhea.   Genitourinary: Negative for decreased urine volume, difficulty urinating, dysuria and hematuria.   Musculoskeletal: Negative for arthralgias.   Neurological: Negative for facial asymmetry and headaches.   Psychiatric/Behavioral: Negative for agitation and confusion.     Objective:     Vital Signs (Most Recent):  Temp: 98.2 °F (36.8 °C) (06/12/18 0752)  Pulse: 81 (06/12/18 0752)  Resp: 18 (06/12/18 0752)  BP: 137/74 (06/12/18 0752)  SpO2: 96 % (06/12/18 0752)  O2 Device (Oxygen Therapy): room air (06/11/18 2100) Vital Signs  (24h Range):  Temp:  [96.9 °F (36.1 °C)-98.2 °F (36.8 °C)] 98.2 °F (36.8 °C)  Pulse:  [69-84] 81  Resp:  [14-18] 18  SpO2:  [90 %-96 %] 96 %  BP: (104-153)/(56-80) 137/74     Weight: 76.4 kg (168 lb 6.9 oz) (06/12/18 0550)  Body mass index is 28.82 kg/m².  Body surface area is 1.86 meters squared.    I/O last 3 completed shifts:  In: 2510 [P.O.:2510]  Out: 3600 [Urine:3600]    Physical Exam   Constitutional: She is oriented to person, place, and time. She appears well-developed. No distress.   HENT:   Head: Normocephalic and atraumatic.   Eyes: EOM are normal. Pupils are equal, round, and reactive to light.   Neck: Normal range of motion. Neck supple.   Cardiovascular: Normal rate, regular rhythm and normal heart sounds.    Pulmonary/Chest: Effort normal and breath sounds normal. She has no wheezes.       Abdominal: Soft. Bowel sounds are normal. She exhibits no distension.   Musculoskeletal: Normal range of motion. She exhibits no edema.   Neurological: She is alert and oriented to person, place, and time.   Skin: Skin is warm. She is not diaphoretic.   Psychiatric: She has a normal mood and affect. Her behavior is normal.       Significant Labs:  All labs within the past 24 hours have been reviewed.    Significant Imaging:  reviewed    Assessment/Plan:     Acute renal failure superimposed on stage 5 chronic kidney disease, not on chronic dialysis    71y/o F pt with PMHX of uncontrolled DM2 with nephropathy, CKD5, HTN here with NSTEMI and ASHLEY on CKD. Suspect ASHLEY due to hypoperfusion/ ischemia in setting of NSTEMI.   Further rise in Cr possibly due drop in Hgb 10-->8 vs LETICIA and then by episode of acute respiratory distress post transfusion, likely ADHF.  --s/p [facility]  (6/4/18)  --baseline Cr 1.8-2.2, was 2.7 on 5/30.  --Cr 3.2 on admit-->peaked at 5.5 (6/8), now down-trending 4.2-->3.6  --euvolemic  --transitioned to home lasix 40mg PO BID    Plan:  --continue lasix of 40mg PO BID  --avoid all nephrotoxic agents and  renally dose meds  --strict I/O   --scheduled to see neph, Dr. Iglesias on 6/21  --hold ACE until until seen outpatient  --will sign off            Thank you for your consult. I will sign off. Please contact us if you have any additional questions.    Juliann Henderson MD  Nephrology  [facility]    ATTENDING PHYSICIAN ATTESTATION  I have personally interviewed and examined the patient. I thoroughly reviewed the demographic, clinical, laboratorial and imaging information available in medical records. I agree with the assessment and recommendations provided by the subspecialty resident. Dr. Henderson was under my supervision.

## 2018-06-12 NOTE — PROGRESS NOTES
Progress Note  Hospital Medicine    Provider team:    [facility] HOSP MED C  [facility] NEPHROLOGY CONSULT SERVICE  Admit Date: 6/4/2018  Encounter Date: 06/12/2018     SUBJECTIVE:     Follow-up Visit for: NSTEMI (non-ST elevated myocardial infarction)    HPI (See H&P for complete P,F,SHx):  72F with CAD/CABGx1 '02 LIMA-LAD with multiple subsequent PCIs last in 2/2017 with pLIMA-LAD, patent RCA stents, severe ISR of mid LCX s/p 2.75mm BETHANY (Resolute) and severe ISR of OM s/p PCI with 2.5mm BETHANY (Resolute) and distal 90% RCA lesion (had SPECT with lateral ischemia at that time), HTN, HLD, DM-2 uncontrolled (A1C 8.9) with neuro/ophtho/renal manifestations, CKD-5 approaching ESRD, LUE Brachiocephalic fistula created 4/16/18 s/p PTA for stenosis 5/30/18, chronic dCHF, pAF, h/o stroke, anemia in CKD, and GERD, who presented to [facility] ED for left sided chest pain radiating to left arm that awoke her from sleep last night. Pain resolved with morphine and nitro paste. Transferred to [facility] ED for higher level of care and cardiology consult. Upon admit, pain free.  Given 325mg ASA and placed on hep gtt. EKG with NSR and lat ST T wave abnormality that are old. Trop .09-->.67-->2.9. She is not currently on HD and doesnt have any other access than fistula which is not mature.       Pt reports that since the recent PTA to LUE AVF on 5/30, she has been experiencing exertional angina like prior to previous stents with exertional and improved with rest and Nitro. Last night was the first time it occurred at rest, so she went to ED.     Patient was admitted to [facility]. Patient went to cath lab emergently on 6/4, s/p BETHANY to OM1, no complications. Course then c/b TACO after pRBC transfusion for anemia.  Patient has been followed by renal for initiation of diuretics and monitoring for need for RRT.  Fortunately, patient has not required RRT, but it is likely she will require in future and patient understands this.    Interval history: Renal  "evaluated yesterday evening with titration down of diuretics.  Patient feels well and, as below, exam was excellent with euvolemia.  She is pleased with the possibility of going home.    Review of Systems:  Review of Systems   Constitutional: Negative for chills and fever.   HENT: Negative for congestion and sore throat.    Eyes: Negative for photophobia, pain and discharge.   Respiratory: Negative for cough, hemoptysis, sputum production and shortness of breath.    Cardiovascular: Negative for chest pain, palpitations and leg swelling.   Gastrointestinal: Negative for abdominal pain, diarrhea, nausea and vomiting.   Genitourinary: Negative for dysuria and urgency.   Musculoskeletal: Negative for myalgias and neck pain.   Skin: Negative for itching and rash.   Neurological: Negative for sensory change, focal weakness and headaches.   Endo/Heme/Allergies: Negative for polydipsia. Does not bruise/bleed easily.   Psychiatric/Behavioral: Negative for depression and suicidal ideas.       OBJECTIVE:     Intake/Output Summary (Last 24 hours) at 06/12/18 0742  Last data filed at 06/12/18 0550   Gross per 24 hour   Intake             1550 ml   Output             2800 ml   Net            -1250 ml     Vital Signs Range (Last 24H):  Temp:  [96.9 °F (36.1 °C)-98.2 °F (36.8 °C)]   Pulse:  [69-84]   Resp:  [14-18]   BP: (104-153)/(56-80)   SpO2:  [90 %-96 %]   Body mass index is 28.82 kg/m².    Objective:  General Appearance:  Comfortable, well-appearing, in no acute distress and not in pain.    Vital signs: (most recent): Blood pressure 137/74, pulse 81, temperature 98.2 °F (36.8 °C), temperature source Oral, resp. rate 18, height 5' 4.1" (1.628 m), weight 76.4 kg (168 lb 6.9 oz), SpO2 96 %, not currently breastfeeding.  No fever.    Output: Producing urine and producing stool.    HEENT: Normal HEENT exam.    Lungs:  Normal effort.  Breath sounds clear to auscultation.  She is not in respiratory distress.  No rales or wheezes.  "   Heart: Normal rate.  Regular rhythm.  S1 normal and S2 normal.  No murmur.   Chest: Symmetric chest wall expansion.   Abdomen: Abdomen is soft.  Bowel sounds are normal.   There is no abdominal tenderness.     Extremities: Normal range of motion.  There is no deformity, effusion, dependent edema or local swelling.    Pulses: Distal pulses are intact.    Neurological: Patient is alert and oriented to person, place and time.  Normal strength.    Pupils:  Pupils are equal, round, and reactive to light.    Skin:  Warm and dry.      Medications:  Medication list was reviewed in EPIC and changes noted under Assessment/Plan and MAR.    Laboratory:  Recent Labs      06/12/18   0605   WBC  11.57   RBC  4.37   HGB  13.0   HCT  39.5   PLT  321   MCV  90   MCH  29.7   MCHC  32.9   GRAN  67.9  7.9*   LYMPH  17.8*  2.1   MONO  11.1  1.3*   EOS  0.2      Recent Labs      06/11/18   1721  06/12/18   0605   GLU  298*   --    NA  136   --    K  5.0   --    CL  91*   --    CO2  33*   --    BUN  93*   --    CREATININE  4.0*   --    CALCIUM  9.2   --    ANIONGAP  12   --    MG   --   2.0   PHOS   --   3.8       ASSESSMENT/PLAN:     Active Hospital Problems    Diagnosis  POA    *NSTEMI (non-ST elevated myocardial infarction) [I21.4]  Yes    Acute hypoxemic respiratory failure [J96.01]  No    Metabolic acidosis [E87.2]  Yes    Hyperphosphatemia [E83.39]  No    Acute blood loss anemia [D62]  No    ASHLEY (acute kidney injury) [N17.9]  No    TACO (transfusion associated circulatory overload) [E87.71]  Unknown    Acute renal failure superimposed on stage 5 chronic kidney disease, not on chronic dialysis [N17.9, N18.5]  Yes    CKD (chronic kidney disease), stage V [N18.5]  Unknown    Stenosis of arteriovenous dialysis fistula [T82.858A]  Yes    Hemodialysis access, AV graft [Z99.2]  Not Applicable     AV graft placed on 4/16/18, no palpable thrill (but does have pulse) and bruit acusculated      Acute on chronic diastolic heart  failure [I50.33]  Yes     Seen on echo in 2/2017, EF 50%, with class B symptoms      Coronary artery disease involving coronary bypass graft of native heart with unstable angina pectoris [I25.700]  Yes     Unstable angina in 2/2017, LHC and PCI on Lcx and OM1 with improvement. H/o CABG X2 2002, stent RCA, OM1 2003, NSTEMI with total occlusion of LAD in 2012.       Obesity (BMI 30.0-34.9) [E66.9]  Yes    Diabetic polyneuropathy associated with type 2 diabetes mellitus [E11.42]  Yes     Stocking/glove pattern intermittent neuropathy      Hypertension associated with diabetes [E11.59, I10]  Yes     BP controlled on BB, diuretic. ARB discontinued by Renal due to hyperkalemia        Resolved Hospital Problems    Diagnosis Date Resolved POA   No resolved problems to display.      Acute renal failure superimposed on CDKV  - not yet on HD, had LUE AV graft s/p fistulogram, no other ports    - graft is still maturing  - Ddx: may have LETICIA s/p cath for NSTEMI; concern for hypoperfusion from decreased PO intake from nausea as well  - c/w calcitriol  - c/w sevelamer  - avoid nephrotoxic agents  - per nephrology okay for discharge with close f/u with Dr. Iglesias  - c/w lasix for volume management at recommended dose     NSTEMI  - s/p emergent cath with PCI: BETHANY to OM1  - c/w ASA/plavix/statin/BB  - no ACE-I or ARB since dynamic renal function  - Nitro and morphine PRN  - known RCA stenosis, plan for inpt vs outpt intervention      Anemia of chronic renal disease, CKDV  Acute blood loss anemia, due to track bleed at femoral site.   - transfused 2u pRBCs on 6/6 for H/H 8.0/24  - c/b TACO  - monitor     Acute hypoxemic respiratory failure   - resolved  - DDx: multifactorial due to volume overload s/p transfusion (TACO), acute on chronic dCHF, volume overload from ASHLEY on CKDV  - c/w maintenance lasix  - strict Is/Os (had Queen for strict Is/Os while critical then d/c-ed queen)  - daily weights, preferably standing   - fluid  restrict 1.5L while inpatient  - tele  - keep Mg >2, K >4     DM-2 uncontrolled with neuro/ophtho/renal manifestations with long term insulin  - continue lantus 20 qhs with SSI low dose  - caution for insulin stacking in low GFR    GI ppx in place. DVT ppx being held due to acute blood loss anemia.  Cardiac, renal diet with 1500 cc fluid restriction.  Full code.    Anticipated discharge date and disposition:   D/c today with close cardiology and renal f/u. 45 minutes spent on discharge planning and counseling alone.  Wilfrid Garcia MD  [facility] Medicine

## 2018-06-13 ENCOUNTER — TELEPHONE (OUTPATIENT)
Dept: CARDIAC REHAB | Facility: CLINIC | Age: 73
End: 2018-06-13

## 2018-06-13 ENCOUNTER — TELEPHONE (OUTPATIENT)
Dept: INTERNAL MEDICINE | Facility: CLINIC | Age: 73
End: 2018-06-13

## 2018-06-13 ENCOUNTER — PATIENT OUTREACH (OUTPATIENT)
Dept: ADMINISTRATIVE | Facility: CLINIC | Age: 73
End: 2018-06-13

## 2018-06-13 ENCOUNTER — PES CALL (OUTPATIENT)
Dept: ADMINISTRATIVE | Facility: CLINIC | Age: 73
End: 2018-06-13

## 2018-06-13 NOTE — PLAN OF CARE
06/13/18 0720   Final Note   Assessment Type Final Discharge Note   Discharge Disposition Home   Hospital Follow Up  Appt(s) scheduled? Yes

## 2018-06-13 NOTE — PROGRESS NOTES
C3 nurse attempted to contact patient. No answer. The following message was left for the patient to return the call:  Good afternoon  I am a nurse calling on behalf of [facility] from the [facility].  This is a Transitional Care Call for Zoey. When you have a moment please contact us at [phone] or [phone] Monday through Friday, between the hours of 8 am to 4 pm. We look forward to speaking with you. On behalf of [facility] have a nice day.    The patient has a scheduled HOSFU appointment with Cesia Rosales MD on 06/20/18 @ 0730 am. Message sent to Physician staff.

## 2018-06-13 NOTE — TELEPHONE ENCOUNTER
Spoke with patient who stated that she does not need home health because she has moved in with her son and his wife.  Patient also stated that she is feeling great and will keep her appt on 6-20-18.

## 2018-06-13 NOTE — PATIENT INSTRUCTIONS
Discharge Instructions for Catheter Ablation  You have had a procedure called catheter ablation. It was used to treat an abnormal heartbeat (arrhythmia). This procedure destroyed (ablated) the cells in your heart that were causing your heart rhythm problem. During the procedure, the healthcare provider put a thin, flexible wire (catheter) into a blood vessel in your upper thigh. The provider then threaded it up to your heart.  Home care  Here are recommendations for care at home:   · You won't be able to drive yourself home because you had medicine to relax you (sedation) You will need to make arrangements for a ride. Your healthcare provider may tell you not to drive for 24 hours after the procedure.  · You should be able to go back to your normal daily activities in the next 1 to 2 days. These include walking, climbing stairs, and doing household chores.  · Don't do any heavy physical activity for several days after the procedure. This will allow your body to heal.  · Ask your healthcare provider when you can return to work.  · Take your temperature and check your incision for signs of infection every day for a week. Signs of infection include redness, swelling, drainage, or warmth at the incision site. It is normal to have a small bruise or lump where the catheter was inserted.  · Take your medicines exactly as directed. Dont skip doses. You may need to make some changes in your medicines because of the ablation procedure. Be sure to go over your medicine instructions with your healthcare provider before you are discharged.  · Learn to take your own pulse. Keep a record of your results. Ask your healthcare provider which readings mean that you need medical attention.  · Don't lift heavy objects for a period of time after your ablation. Ask your healthcare provider for specific advice.  Follow-up care  Make a follow-up appointment as directed by your healthcare provider. Your provider will check how your  incision site is healing. In many cases, one ablation is enough to treat an arrhythmia. But sometimes the problem comes back or another is found. If this happens, you may need a second procedure.  When to call your healthcare provider  Call your healthcare provider right away if you have any of the following:  · Redness, pain, swelling, bleeding, or drainage from your incision  · Chest pain, shortness of breath, or dizziness  · Temperature of 100.4°F (38.0°C) or higher, or as directed by your healthcare provider   · Sudden coldness, pain, or numbness in the leg or arm with the insertion site  · Nausea or vomiting  Note: Ask your healthcare provider what to expect about your heartbeat. Sometimes the irregularity goes away right after the procedure. Other times it may take longer to go away.   Date Last Reviewed: 10/1/2017  © 1165-0652 [employer]. [address] [place] [address]. All rights reserved. This information is not intended as a substitute for professional medical care. Always follow your healthcare professional's instructions.

## 2018-06-14 ENCOUNTER — TELEPHONE (OUTPATIENT)
Dept: VASCULAR SURGERY | Facility: CLINIC | Age: 73
End: 2018-06-14

## 2018-06-14 NOTE — TELEPHONE ENCOUNTER
Contacted patient's daughter in regards to patient's sutures needing to be removed. Dr. Perry states the patient's sutures may be removed at home and to FU at her convenience. Patient's daughter states she understands and will call tomorrow to schedule a FU.

## 2018-06-18 ENCOUNTER — LAB VISIT (OUTPATIENT)
Dept: LAB | Facility: HOSPITAL | Age: 73
End: 2018-06-18
Payer: MEDICARE

## 2018-06-18 DIAGNOSIS — N18.5 CHRONIC KIDNEY DISEASE, STAGE V: Primary | ICD-10-CM

## 2018-06-18 DIAGNOSIS — N18.5 CHRONIC KIDNEY DISEASE, STAGE V: ICD-10-CM

## 2018-06-18 LAB
ALBUMIN SERPL BCP-MCNC: 3.6 G/DL
ANION GAP SERPL CALC-SCNC: 8 MMOL/L
BUN SERPL-MCNC: 64 MG/DL
CALCIUM SERPL-MCNC: 9.6 MG/DL
CHLORIDE SERPL-SCNC: 100 MMOL/L
CO2 SERPL-SCNC: 30 MMOL/L
CREAT SERPL-MCNC: 3.1 MG/DL
EST. GFR  (AFRICAN AMERICAN): 16.7 ML/MIN/1.73 M^2
EST. GFR  (NON AFRICAN AMERICAN): 14.5 ML/MIN/1.73 M^2
GLUCOSE SERPL-MCNC: 238 MG/DL
PHOSPHATE SERPL-MCNC: 3.5 MG/DL
POTASSIUM SERPL-SCNC: 5.1 MMOL/L
PTH-INTACT SERPL-MCNC: 618 PG/ML
SODIUM SERPL-SCNC: 138 MMOL/L

## 2018-06-18 PROCEDURE — 83970 ASSAY OF PARATHORMONE: CPT

## 2018-06-18 PROCEDURE — 80069 RENAL FUNCTION PANEL: CPT

## 2018-06-18 PROCEDURE — 36415 COLL VENOUS BLD VENIPUNCTURE: CPT

## 2018-06-20 ENCOUNTER — OFFICE VISIT (OUTPATIENT)
Dept: PRIMARY CARE CLINIC | Facility: CLINIC | Age: 73
End: 2018-06-20
Payer: MEDICARE

## 2018-06-20 VITALS
HEART RATE: 68 BPM | WEIGHT: 172.19 LBS | OXYGEN SATURATION: 98 % | HEIGHT: 64 IN | BODY MASS INDEX: 29.4 KG/M2 | SYSTOLIC BLOOD PRESSURE: 110 MMHG | DIASTOLIC BLOOD PRESSURE: 56 MMHG

## 2018-06-20 DIAGNOSIS — I25.2 HISTORY OF NON-ST ELEVATION MYOCARDIAL INFARCTION (NSTEMI): Primary | ICD-10-CM

## 2018-06-20 DIAGNOSIS — R11.0 NAUSEA: Primary | ICD-10-CM

## 2018-06-20 DIAGNOSIS — I25.700 CORONARY ARTERY DISEASE INVOLVING CORONARY BYPASS GRAFT OF NATIVE HEART WITH UNSTABLE ANGINA PECTORIS: ICD-10-CM

## 2018-06-20 DIAGNOSIS — D63.1 ANEMIA OF CHRONIC RENAL FAILURE, STAGE 4 (SEVERE): ICD-10-CM

## 2018-06-20 DIAGNOSIS — M25.539 WRIST PAIN, ACUTE, UNSPECIFIED LATERALITY: ICD-10-CM

## 2018-06-20 DIAGNOSIS — I50.33 ACUTE ON CHRONIC DIASTOLIC HEART FAILURE: ICD-10-CM

## 2018-06-20 DIAGNOSIS — E11.42 DIABETIC POLYNEUROPATHY ASSOCIATED WITH TYPE 2 DIABETES MELLITUS: ICD-10-CM

## 2018-06-20 DIAGNOSIS — N18.4 ANEMIA OF CHRONIC RENAL FAILURE, STAGE 4 (SEVERE): ICD-10-CM

## 2018-06-20 PROBLEM — Z60.4 SOCIAL ISOLATION: Status: RESOLVED | Noted: 2017-01-18 | Resolved: 2018-06-20

## 2018-06-20 PROCEDURE — 99499 UNLISTED E&M SERVICE: CPT | Mod: S$GLB,,, | Performed by: INTERNAL MEDICINE

## 2018-06-20 PROCEDURE — 3045F PR MOST RECENT HEMOGLOBIN A1C LEVEL 7.0-9.0%: CPT | Mod: CPTII,S$GLB,, | Performed by: INTERNAL MEDICINE

## 2018-06-20 PROCEDURE — 99215 OFFICE O/P EST HI 40 MIN: CPT | Mod: S$GLB,,, | Performed by: INTERNAL MEDICINE

## 2018-06-20 RX ORDER — ONDANSETRON HYDROCHLORIDE 8 MG/1
8 TABLET, FILM COATED ORAL EVERY 8 HOURS PRN
Qty: 30 TABLET | Refills: 0 | Status: SHIPPED | OUTPATIENT
Start: 2018-06-20 | End: 2019-05-02

## 2018-06-20 NOTE — PATIENT INSTRUCTIONS
TODAY:  - monitor AM glucose readings   - continue all meds  - appt with Dr Kinney  - PCP to message Jorge Allen and Nirmal about cardiac rehab   - ortho vitals today  - Home health PT/OT   + let them know when and where to go

## 2018-06-20 NOTE — ASSESSMENT & PLAN NOTE
Seen on echo in 6/2018, EF 60%, grade 2 diastolic dysfunction, with class B symptoms, MIs in 2002, 2003, 2012, 2/2017  · Compliant with BB, ARB, DAPT, statin, diuretic  · Improve DM control  · F/U cardiology

## 2018-06-20 NOTE — PROGRESS NOTES
Primary Care Provider Appointment- HOSPITAL DISCHARGE    Subjective:      Patient ID: Zoey Ham is a 72 y.o. female with recent AMI, TACO, CKD4    Chief Complaint: Hospital Follow Up    Patient with recent hospital discharge for catheter ablation. She went to cath lab urgently on 6/4 for BETHANY t OM1 with no complications. Then experienced TACO following a 2 U pRBC transfusion. SHe did not need RRT during hospitalization.     She has a AV graft that is still maturing, with plan for hemodialysis. She was stable for discharge from a renal perspective. She continues to use lasix for volume management.    She was started on sevalamer during hospitalization and is confused about the reason for this.    Her daughter-in-law is confused about the renal diet, because she has been reading online. She is now living with her son. Daughter requesting zofran.    Her glucose was elevated during her hospitalization. Last A1c was 8.9. Her CBG is not being recorded.     Past Surgical History:   Procedure Laterality Date    APPENDECTOMY      CARDIAC SURGERY  2002    CABG    CHOLECYSTECTOMY      CORONARY ANGIOPLASTY  2004    CORONARY ARTERY BYPASS GRAFT      EYE SURGERY      cataracts bilaterally    FRACTURE SURGERY      right ankle    HYSTERECTOMY      TONSILLECTOMY         Past Medical History:   Diagnosis Date    Acute myocardial infarction of anterolateral wall 7/9/2015    Anemia of chronic renal failure, stage 4 (severe) 1/22/2015    Atherosclerosis of aorta 10/3/2012    Benign hypertension with CKD (chronic kidney disease) stage IV 3/11/2016    Chronic diastolic congestive heart failure 10/3/2012    Chronic kidney disease, stage IV (severe) 7/3/2012    Coronary artery disease     s/p 1v CABG 2002 and multiple PCI (last angiogram in 6/2012 with patent LIMA->LAD and patent LCx and RCA stents)    Diabetic polyneuropathy associated with type 2 diabetes mellitus 7/9/2015    Diabetic polyneuropathy associated with  "type 2 diabetes mellitus 7/9/2015    Encounter for blood transfusion     Gastritis without bleeding     Heart attack 09/2012    5-6 events    Hyperlipidemia     Hyperparathyroidism     Nephritis and nephropathy, 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 7/9/2015    NSTEMI (non-ST elevated myocardial infarction)     Occlusion and stenosis of carotid artery with cerebral infarction 7/9/2015    Osteopenia     PAF (paroxysmal atrial fibrillation) 10/3/2012    Pneumonia     Proliferative diabetic retinopathy 10/3/2012    Sepsis due to Escherichia coli with acute renal failure 2/2016    UTI     Type II diabetes mellitus with neurological manifestations 7/9/2015    Type II diabetes mellitus with renal manifestations 7/3/2012       Review of Systems   Constitutional: Positive for fatigue. Negative for activity change, appetite change and unexpected weight change.   Respiratory: Negative for cough and choking.    Cardiovascular: Negative for leg swelling.   Gastrointestinal: Negative for abdominal pain, diarrhea and nausea.   Musculoskeletal: Negative for back pain, gait problem and myalgias.   Neurological: Positive for weakness and numbness. Negative for dizziness, tremors, syncope, facial asymmetry and headaches.   Hematological: Bruises/bleeds easily.   Psychiatric/Behavioral: Positive for confusion. Negative for agitation, behavioral problems and decreased concentration.       Objective:   BP (!) 110/56 (BP Location: Right arm, Patient Position: Standing)   Pulse 68   Ht 5' 4" (1.626 m)   Wt 78.1 kg (172 lb 2.9 oz)   LMP  (LMP Unknown)   SpO2 98%   BMI 29.55 kg/m²     Physical Exam   Constitutional: She is oriented to person, place, and time. She appears well-developed and well-nourished.   HENT:   Head: Normocephalic and atraumatic.   Neck: Normal range of motion.   Cardiovascular:   New AV graft on L forearm (distant thrill palpated, bruit ausculated)   Abdominal: Soft. Bowel sounds are normal. There is " no tenderness. There is no guarding.   Neurological: She is alert and oriented to person, place, and time.   Skin:   Ecchymoses on UE bilaterally   Psychiatric: She has a normal mood and affect. Her behavior is normal. Thought content normal.   Flat affect   Vitals reviewed.      Lab Results   Component Value Date    WBC 11.57 06/12/2018    HGB 13.0 06/12/2018    HCT 39.5 06/12/2018     06/12/2018    CHOL 127 06/05/2018    TRIG 163 (H) 06/05/2018    HDL 28 (L) 06/05/2018    ALT 15 06/12/2018    AST 18 06/12/2018     06/18/2018    K 5.1 06/18/2018     06/18/2018    CREATININE 3.1 (H) 06/18/2018    BUN 64 (H) 06/18/2018    CO2 30 (H) 06/18/2018    TSH 2.801 06/09/2018    INR 0.9 06/04/2018    GLUF 132 (H) 05/03/2012    HGBA1C 8.9 (H) 06/05/2018         Assessment:   72 y.o. female with multiple co-morbid illnesses here to continue work-up of chronic issues notably recent AMI, TACO, CKD4.     Plan:     Problem List Items Addressed This Visit        Neuro    Diabetic polyneuropathy associated with type 2 diabetes mellitus     Stocking/glove pattern intermittent neuropathy  · DM control   · Avoid barefoot walking  · HH PT/OT         Relevant Orders    Ambulatory referral to Home Health       Cardiac/Vascular    Coronary artery disease involving coronary bypass graft of native heart with unstable angina pectoris     6/4/18 BETHANY to OM1. Cath 2/2017, [facility] and PCI on Lcx and OM1 with improvement. H/o CABG X2 2002, stent RCA, OM1 2003, NSTEMI with total occlusion of LAD in 2012.   · Continue APT   · PCP to message Ramee and Effron about cardiac rehab  · Continue BP control, high-dose statin         Relevant Orders    Ambulatory referral to Home Health    Acute on chronic diastolic heart failure     Seen on echo in 6/2018, EF 60%, grade 2 diastolic dysfunction, with class B symptoms, MIs in 2002, 2003, 2012, 2/2017  · Compliant with BB, ARB, DAPT, statin, diuretic  · Improve DM control  · F/U cardiology          Relevant Orders    Ambulatory referral to Home Health       Oncology    Anemia of chronic renal failure, stage 4 (severe)     GFR 23, followed by Nephrology, started on sevalamer  · F/u Nephrology  · Explained purpose of sevelamer  · Advised AGAINST renal diet            Endocrine    Uncontrolled type 2 diabetes mellitus with stage 4 chronic kidney disease, with long-term current use of insulin     Since 12/18/17 to 20U, AV graft placed for future dialysis  · Continue long-acting 20U  · F/U nephro for dialysis initiation         Relevant Orders    Ambulatory referral to Home Health       GI    Nausea - Primary    Relevant Medications    ondansetron (ZOFRAN) 8 MG tablet       Orthopedic    Wrist pain, acute, unspecified laterality     Likely carpal tunnel, bilateral presentation. Significant improvement with splinting  · EMG advised  · Patient prefers to postpone indefinitely  · Increase use of write braces         Relevant Orders    Ambulatory referral to Home Health          Health Maintenance       Date Due Completion Date    Colonoscopy 08/21/1996 ---    Eye Exam 07/06/2018 7/6/2017- SCHEDULED    Influenza Vaccine 08/01/2018 9/5/2017    Override on 8/29/2016: Done    Override on 10/27/2013: Done    Mammogram 11/14/2018 5/14/2018    Hemoglobin A1c 12/05/2018 6/5/2018    Foot Exam 05/14/2019 5/14/2018    Override on 12/6/2016: Done    Lipid Panel 06/05/2019 6/5/2018    DEXA SCAN 07/13/2019 7/13/2017    TETANUS VACCINE 03/03/2026 3/3/2016          Follow-up in about 2 months (around 8/20/2018). . One hour spent with this patient today, half of that in counseling.    Cesia Rosales MD/MPH  Internal Medicine  [facility]  [phone]

## 2018-06-20 NOTE — Clinical Note
Jorge Allen and Warren, Ms Jitendra recently had a complicated hospital course during which she received cardiac cath and an OM1 sten. Is she eligible for cardiac rehab? If so could your staff enroll her?  She also has no cardiology follow-up from her hospital discharge. Could your staff also assist with getting her a hospital discharge follow-up appt soon?  Thanks, SOLEDAD

## 2018-06-20 NOTE — ASSESSMENT & PLAN NOTE
Since 12/18/17 to 20U, AV graft placed for future dialysis  · Continue long-acting 20U  · F/U nephro for dialysis initiation

## 2018-06-20 NOTE — ASSESSMENT & PLAN NOTE
6/4/18 BETHANY to OM1. Cath 2/2017, [facility] and PCI on Lcx and OM1 with improvement. H/o CABG X2 2002, stent RCA, OM1 2003, NSTEMI with total occlusion of LAD in 2012.   · Continue APT   · PCP to message Alejandro and Warren about cardiac rehab  · Continue BP control, high-dose statin

## 2018-06-20 NOTE — ASSESSMENT & PLAN NOTE
Likely carpal tunnel, bilateral presentation. Significant improvement with splinting  · EMG advised  · Patient prefers to postpone indefinitely  · Increase use of write braces

## 2018-06-20 NOTE — ASSESSMENT & PLAN NOTE
GFR 23, followed by Nephrology, started on sevalamer  · F/u Nephrology  · Explained purpose of sevelamer  · Advised AGAINST renal diet

## 2018-06-21 ENCOUNTER — LAB VISIT (OUTPATIENT)
Dept: LAB | Facility: HOSPITAL | Age: 73
End: 2018-06-21
Payer: MEDICARE

## 2018-06-21 ENCOUNTER — TELEPHONE (OUTPATIENT)
Dept: VASCULAR SURGERY | Facility: CLINIC | Age: 73
End: 2018-06-21

## 2018-06-21 ENCOUNTER — OFFICE VISIT (OUTPATIENT)
Dept: NEPHROLOGY | Facility: CLINIC | Age: 73
End: 2018-06-21
Payer: MEDICARE

## 2018-06-21 VITALS
HEART RATE: 68 BPM | OXYGEN SATURATION: 97 % | WEIGHT: 171.06 LBS | DIASTOLIC BLOOD PRESSURE: 60 MMHG | HEIGHT: 64 IN | SYSTOLIC BLOOD PRESSURE: 122 MMHG | BODY MASS INDEX: 29.2 KG/M2

## 2018-06-21 DIAGNOSIS — I10 ESSENTIAL HYPERTENSION: ICD-10-CM

## 2018-06-21 DIAGNOSIS — N18.5 CHRONIC KIDNEY DISEASE, STAGE V: Primary | ICD-10-CM

## 2018-06-21 DIAGNOSIS — N18.5 CHRONIC KIDNEY DISEASE, STAGE V: ICD-10-CM

## 2018-06-21 LAB
CREAT UR-MCNC: 29 MG/DL
PROT UR-MCNC: 50 MG/DL
PROT/CREAT RATIO, UR: 1.72

## 2018-06-21 PROCEDURE — 99499 UNLISTED E&M SERVICE: CPT | Mod: S$GLB,,, | Performed by: INTERNAL MEDICINE

## 2018-06-21 PROCEDURE — 82570 ASSAY OF URINE CREATININE: CPT

## 2018-06-21 PROCEDURE — 99215 OFFICE O/P EST HI 40 MIN: CPT | Mod: S$GLB,,, | Performed by: INTERNAL MEDICINE

## 2018-06-21 PROCEDURE — 99999 PR PBB SHADOW E&M-EST. PATIENT-LVL III: CPT | Mod: PBBFAC,,, | Performed by: INTERNAL MEDICINE

## 2018-06-21 RX ORDER — CALCITRIOL 0.5 UG/1
0.5 CAPSULE ORAL DAILY
Qty: 90 CAPSULE | Refills: 1 | Status: SHIPPED | OUTPATIENT
Start: 2018-06-21 | End: 2020-01-01 | Stop reason: SDUPTHER

## 2018-06-21 NOTE — TELEPHONE ENCOUNTER
Patient's daughter called to schedule FU appt with Dr. Perry and with vascular lab. Appts created. Patient's daughter approves appt times. Appt letter placed in mail.

## 2018-06-21 NOTE — PROGRESS NOTES
Subjective:       Patient ID: Zoey Ham is a 72 y.o. White female who presents for follow-up evaluation of No chief complaint on file.    HPI This is a 72 - year-old  female with longstanding diabetes, hyperparathyroidism, hypertension, and CKD is coming in for followup of these. Her blood pressures are stable at home in the 120's-130's/60-70's . DM is better per the pt.  Her CKD has been stable.  Has proteinuria.  Creatinine stable but low GFR. Recently went in to hospital for cp and had angiogram on June 4 the and had creatinine peaked at 5.5 but did not require HD. Creatinine better at 3.1.    PAST MEDICAL HISTORY: Significant for hypertension for 10 years, diabetes   for 10 years, CABG, lacunar infarcts, hyperlipidemia, hyperparathyroidism,   anemia, CABG, CAD, and diabetic retinopathy.         Review of Systems   Constitutional: Positive for appetite change and fatigue.        Occasional nausea.   Eyes: Negative for discharge.   Respiratory: Negative for cough, shortness of breath and wheezing.    Cardiovascular: Negative for chest pain and palpitations.   Gastrointestinal: Negative for abdominal pain, diarrhea, nausea and vomiting.   Genitourinary: Negative for dysuria, frequency, hematuria and urgency.   Skin: Negative for color change and rash.   Psychiatric/Behavioral: Negative for confusion.   All other systems reviewed and are negative.      Objective:      Physical Exam   Constitutional: She is oriented to person, place, and time. She appears well-developed and well-nourished.   HENT:   Right Ear: External ear normal.   Left Ear: External ear normal.   Nose: Nose normal.   Mouth/Throat: Normal dentition.   Eyes: Conjunctivae, EOM and lids are normal. Pupils are equal, round, and reactive to light.   Neck: Trachea normal. No thyroid mass present.   Cardiovascular: Normal rate and regular rhythm.  Exam reveals no friction rub.    No murmur heard.  Pulmonary/Chest: Effort normal and breath  sounds normal. No respiratory distress. She has no decreased breath sounds. She has no rales.   Abdominal: Soft. Bowel sounds are normal. She exhibits no mass. There is no tenderness. There is no rebound. No hernia.   Musculoskeletal: She exhibits edema.   Trace edema   Neurological: She is alert and oriented to person, place, and time.   Skin: Skin is warm and dry. No rash noted. No erythema.   l neck mole-rec derm f/u.   Psychiatric: She has a normal mood and affect. Judgment normal. Her mood appears not anxious. She does not exhibit a depressed mood.   Oriented to time, place and person.   Vitals reviewed.      Assessment:       No diagnosis found.    Plan:           1.  Renovascular hypertension    2.  Type 2 diabetes mellitus with diabetic nephropathy    3.  Proteinuria    4.  Anemia of chronic renal failure, stage 4 (severe)      Plan:       1. CKD stage 4 with an MDRD GFR of 14 mL/min. Her creatinine of 3.1 with gfr of 14 ml/min. Her CKD is secondary to longstanding hypertension and diabetes. L AV fistula with angioplasy last month and is seeing surgery next month.  Occ nausea.  Will likely start as soon as the fistula is ready.  2. Hypertension. Blood pressures are stable  at home. On cozaar but held for the past few months b/c of hyperkalemia and low GFR. Unable to get her off metoprolol -tried in the past but HR became a problem per the pt and had to go back on it.    3. Diabetes/proteinuria:  off losartan.   proteinuria likely sec poorly controlled DM.   Did not want biopsy in the past.  Losartan has been held for the last few months b/c of low GFR. She likely has  worsening proteinuria with high A1c's and being off losartan.    4. Hyperparathyroidism.  i pth higher on Calcitriol  -change to  0.5 mcg from 0.25 mcg daily.  Ca/phos stable. Low phos and low potassium diet discussed-lists given.  6. Anemia. H&H is stable.      With persistent hyperkalemia despite low K diet and low GFR-will initiate as soon  as fistula is ready to be used.   Discussed about different options of HD.  Would like to do home therapies. Home HD education-will set up.   Return in  1  months with rfp, urine protein and urine creatinine, ipth.

## 2018-07-09 ENCOUNTER — LAB VISIT (OUTPATIENT)
Dept: LAB | Facility: HOSPITAL | Age: 73
End: 2018-07-09
Payer: MEDICARE

## 2018-07-09 ENCOUNTER — OFFICE VISIT (OUTPATIENT)
Dept: OPTOMETRY | Facility: CLINIC | Age: 73
End: 2018-07-09
Payer: MEDICARE

## 2018-07-09 DIAGNOSIS — N18.5 CHRONIC KIDNEY DISEASE, STAGE V: ICD-10-CM

## 2018-07-09 DIAGNOSIS — I10 ESSENTIAL HYPERTENSION: ICD-10-CM

## 2018-07-09 DIAGNOSIS — H52.13 MYOPIA WITH PRESBYOPIA OF BOTH EYES: ICD-10-CM

## 2018-07-09 DIAGNOSIS — H54.50: ICD-10-CM

## 2018-07-09 DIAGNOSIS — H52.4 MYOPIA WITH PRESBYOPIA OF BOTH EYES: ICD-10-CM

## 2018-07-09 DIAGNOSIS — E11.39: ICD-10-CM

## 2018-07-09 DIAGNOSIS — E11.3553 STABLE PROLIFERATIVE DIABETIC RETINOPATHY OF BOTH EYES ASSOCIATED WITH TYPE 2 DIABETES MELLITUS: Primary | ICD-10-CM

## 2018-07-09 LAB
ALBUMIN SERPL BCP-MCNC: 3.6 G/DL
ANION GAP SERPL CALC-SCNC: 10 MMOL/L
BUN SERPL-MCNC: 70 MG/DL
CALCIUM SERPL-MCNC: 9.5 MG/DL
CHLORIDE SERPL-SCNC: 102 MMOL/L
CO2 SERPL-SCNC: 27 MMOL/L
CREAT SERPL-MCNC: 2.6 MG/DL
EST. GFR  (AFRICAN AMERICAN): 20.6 ML/MIN/1.73 M^2
EST. GFR  (NON AFRICAN AMERICAN): 17.9 ML/MIN/1.73 M^2
FERRITIN SERPL-MCNC: 448 NG/ML
GLUCOSE SERPL-MCNC: 194 MG/DL
HCT VFR BLD AUTO: 34.1 %
HGB BLD-MCNC: 10.9 G/DL
IRON SERPL-MCNC: 60 UG/DL
PHOSPHATE SERPL-MCNC: 3.9 MG/DL
POTASSIUM SERPL-SCNC: 4.9 MMOL/L
PTH-INTACT SERPL-MCNC: 323 PG/ML
SATURATED IRON: 19 %
SODIUM SERPL-SCNC: 139 MMOL/L
TOTAL IRON BINDING CAPACITY: 324 UG/DL
TRANSFERRIN SERPL-MCNC: 219 MG/DL

## 2018-07-09 PROCEDURE — 82728 ASSAY OF FERRITIN: CPT

## 2018-07-09 PROCEDURE — 83970 ASSAY OF PARATHORMONE: CPT

## 2018-07-09 PROCEDURE — 85018 HEMOGLOBIN: CPT

## 2018-07-09 PROCEDURE — 92015 DETERMINE REFRACTIVE STATE: CPT | Mod: S$GLB,,, | Performed by: OPTOMETRIST

## 2018-07-09 PROCEDURE — 85014 HEMATOCRIT: CPT

## 2018-07-09 PROCEDURE — 92014 COMPRE OPH EXAM EST PT 1/>: CPT | Mod: S$GLB,,, | Performed by: OPTOMETRIST

## 2018-07-09 PROCEDURE — 80069 RENAL FUNCTION PANEL: CPT

## 2018-07-09 PROCEDURE — 99999 PR PBB SHADOW E&M-EST. PATIENT-LVL II: CPT | Mod: PBBFAC,,, | Performed by: OPTOMETRIST

## 2018-07-09 PROCEDURE — 83540 ASSAY OF IRON: CPT

## 2018-07-09 PROCEDURE — 36415 COLL VENOUS BLD VENIPUNCTURE: CPT

## 2018-07-09 NOTE — LETTER
July 9, 2018      Cesia Rosales MD  [address]  [facility] [address]           [facility] - Optometry  [address]  [facility] [address]  Phone: [phone]  Fax: [phone]          Patient: Zoey Ham   MR Number: [id number]   YOB: 1945   Date of Visit: 7/9/2018       Dear Dr. Cesia Rosales:    Thank you for referring Zoey Ham to me for evaluation. Attached you will find relevant portions of my assessment and plan of care.    If you have questions, please do not hesitate to call me. I look forward to following Zoey Ham along with you.    Sincerely,    Alem Garza, OD    Enclosure  CC:  No Recipients    If you would like to receive this communication electronically, please contact [email or web address] or [phone] to request more information on [employer] Link access.    For providers and/or their staff who would like to refer a patient to [facility], please contact us through our one-stop-shop provider referral line, [facility] , at [phone].    If you feel you have received this communication in error or would no longer like to receive these types of communications, please e-mail [email or web address]

## 2018-07-09 NOTE — PROGRESS NOTES
HPI     Last eye exam was 1/10/17 with Dr. Garza.  Patient states vision isn't as clear with glasses since getting them. Has   been having kidney problems and unsure if that is affecting vision.   Occasionally sees floaters OU.   Patient denies diplopia, headaches, flashes, and pain.    Hemoglobin A1C       Date                     Value               Ref Range             Status                06/05/2018               8.9 (H)             4.0 - 5.6 %           Final                  Last edited by Emily Chaudhari on 7/9/2018  8:05 AM. (History)            Assessment /Plan     For exam results, see Encounter Report.    Stable proliferative diabetic retinopathy of both eyes associated with type 2 diabetes mellitus    Diabetic visual loss: low vision, one eye    Myopia with presbyopia of both eyes            1-2.  Extensive history of laser to both retina.  Vision loss OD secondary to PDR.  No active retinopathy OU.  3. Bifocal rx given.  RTC 2 weeks--glasses rx for sewing.  Asked patient to bring sewing material.  Will give information on Zenni.          RTC 1 year for dm exam.

## 2018-07-10 ENCOUNTER — HOSPITAL ENCOUNTER (OUTPATIENT)
Dept: VASCULAR SURGERY | Facility: CLINIC | Age: 73
Discharge: HOME | End: 2018-07-10
Attending: SURGERY
Payer: MEDICARE

## 2018-07-10 ENCOUNTER — OFFICE VISIT (OUTPATIENT)
Dept: VASCULAR SURGERY | Facility: CLINIC | Age: 73
End: 2018-07-10
Payer: MEDICARE

## 2018-07-10 VITALS
SYSTOLIC BLOOD PRESSURE: 148 MMHG | WEIGHT: 169.75 LBS | HEART RATE: 65 BPM | TEMPERATURE: 98 F | HEIGHT: 64 IN | BODY MASS INDEX: 28.98 KG/M2 | DIASTOLIC BLOOD PRESSURE: 68 MMHG

## 2018-07-10 DIAGNOSIS — Z99.2 ESRD ON DIALYSIS: ICD-10-CM

## 2018-07-10 DIAGNOSIS — N18.5 CKD (CHRONIC KIDNEY DISEASE), STAGE V: Primary | ICD-10-CM

## 2018-07-10 DIAGNOSIS — N18.6 ESRD (END STAGE RENAL DISEASE) ON DIALYSIS: Primary | ICD-10-CM

## 2018-07-10 DIAGNOSIS — N18.6 ESRD ON DIALYSIS: ICD-10-CM

## 2018-07-10 DIAGNOSIS — Z99.2 ESRD (END STAGE RENAL DISEASE) ON DIALYSIS: Primary | ICD-10-CM

## 2018-07-10 DIAGNOSIS — T82.591A: ICD-10-CM

## 2018-07-10 PROCEDURE — 93990 DOPPLER FLOW TESTING: CPT | Mod: S$GLB,,, | Performed by: SURGERY

## 2018-07-10 PROCEDURE — 99024 POSTOP FOLLOW-UP VISIT: CPT | Mod: S$GLB,,, | Performed by: SURGERY

## 2018-07-10 PROCEDURE — 99999 PR PBB SHADOW E&M-EST. PATIENT-LVL III: CPT | Mod: PBBFAC,,, | Performed by: SURGERY

## 2018-07-10 NOTE — PROGRESS NOTES
REFERRING PHYSICIAN:  Karla Iglesias D.O.    HISTORY OF PRESENT ILLNESS:  A 72-year-old female with advanced stage IV chronic   kidney disease with a creatinine of 2.8 and GFR of 16.4, who is sent for   dialysis access.  She is right-handed.  She has no history of central venous   cannulation, AICD or pacemaker placement.    1. PTa, L AVF 5/31/18  2.  L brachio-cephalic AVF 4/16/18.  No c/o    Since the above procedure, she had an MI and needed repeat PCI.   Despite that dye load, had not needed to start HD    PAST MEDICAL HISTORY:  1.  Coronary artery disease, status post MI x5.  Recent MI with PCI  2.  Hypertension.  3.  Diabetes.  4.  Hyperparathyroidism.  5.  Chronic atrial fibrillation.    PAST SURGICAL HISTORY:  1.  Hysterectomy.  2.  Tonsillectomy.  3.  Appendectomy.  4.  Coronary artery bypass.  5.  PCI.  6.  Fracture surgery.    FAMILY HISTORY:  Positive for diabetes and hypertension.    MEDICATIONS:  Include Plavix, aspirin and statin.  See EPIC for full list.    ALLERGIES:  Penicillin, Percodan and Phenergan.    REVIEW OF SYSTEMS:  Denies postprandial pain or DVT.  All other systems including eyes, ENT, respiratory, musculoskeletal, breast,   neurologic, psychiatric, heme, lymph, allergy and immune are negative.    PHYSICAL EXAMINATION:  VITAL SIGNS:  See nursing notes.  GENERAL:  She is in no acute distress.  RESPIRATORY:  Normal effort.  Clear to claudication.  CARDIOVASCULAR:  Regular rhythm.  Nondisplaced PMI, no murmur.  VASCULAR:  L radial not palp  EXTREMITIES:  Incision well healed.   Much better thrill with mild prox pulsatility  NEUROLOGIC:  Cranial nerves VII through XII intact.  5/5 motor strength in all   extremities.    IMAGING:   Residual prox 1/3 stenosis (), but flow volume now 1/3 L (360 prior).    Diameter: prox 5.5, mid 7.4, distal 6.4    PRior fistulagram reviewed    ASSESSMENT:   MArked improvement in AVF  Recent MI    RECOMMENDATION:  1.  May cannulate L AVF if clinically  needed    2. FU 8 wks with repeat quant AVF duplex    JULIO CÉSAR Perry III, MD, FACS  Professor and Chief, Vascular and Endovascular Surgery    CC: Karla Iglesias D.O.

## 2018-07-23 ENCOUNTER — OFFICE VISIT (OUTPATIENT)
Dept: CARDIOLOGY | Facility: CLINIC | Age: 73
End: 2018-07-23
Payer: MEDICARE

## 2018-07-23 ENCOUNTER — OFFICE VISIT (OUTPATIENT)
Dept: OPTOMETRY | Facility: CLINIC | Age: 73
End: 2018-07-23
Payer: MEDICARE

## 2018-07-23 ENCOUNTER — OFFICE VISIT (OUTPATIENT)
Dept: NEPHROLOGY | Facility: CLINIC | Age: 73
End: 2018-07-23
Payer: MEDICARE

## 2018-07-23 VITALS
SYSTOLIC BLOOD PRESSURE: 126 MMHG | HEART RATE: 74 BPM | WEIGHT: 170 LBS | BODY MASS INDEX: 29.02 KG/M2 | DIASTOLIC BLOOD PRESSURE: 68 MMHG | HEIGHT: 64 IN | OXYGEN SATURATION: 96 %

## 2018-07-23 VITALS
SYSTOLIC BLOOD PRESSURE: 152 MMHG | HEIGHT: 64 IN | BODY MASS INDEX: 29.24 KG/M2 | DIASTOLIC BLOOD PRESSURE: 68 MMHG | WEIGHT: 171.31 LBS | HEART RATE: 78 BPM

## 2018-07-23 DIAGNOSIS — N18.4 CHRONIC KIDNEY DISEASE, STAGE IV (SEVERE): ICD-10-CM

## 2018-07-23 DIAGNOSIS — N25.0 RENAL OSTEODYSTROPHY: ICD-10-CM

## 2018-07-23 DIAGNOSIS — H52.13 MYOPIA WITH PRESBYOPIA OF BOTH EYES: Primary | ICD-10-CM

## 2018-07-23 DIAGNOSIS — Z95.5 S/P DRUG ELUTING CORONARY STENT PLACEMENT: Primary | ICD-10-CM

## 2018-07-23 DIAGNOSIS — I10 ESSENTIAL HYPERTENSION: Primary | ICD-10-CM

## 2018-07-23 DIAGNOSIS — I70.0 ATHEROSCLEROSIS OF AORTA: ICD-10-CM

## 2018-07-23 DIAGNOSIS — H52.4 MYOPIA WITH PRESBYOPIA OF BOTH EYES: Primary | ICD-10-CM

## 2018-07-23 DIAGNOSIS — E78.5 TYPE 2 DIABETES MELLITUS WITH HYPERLIPIDEMIA: ICD-10-CM

## 2018-07-23 DIAGNOSIS — N18.4 CKD STAGE 4 DUE TO TYPE 2 DIABETES MELLITUS: ICD-10-CM

## 2018-07-23 DIAGNOSIS — I25.2 HISTORY OF NON-ST ELEVATION MYOCARDIAL INFARCTION (NSTEMI): ICD-10-CM

## 2018-07-23 DIAGNOSIS — E11.69 TYPE 2 DIABETES MELLITUS WITH HYPERLIPIDEMIA: ICD-10-CM

## 2018-07-23 DIAGNOSIS — D64.9 ANEMIA, UNSPECIFIED TYPE: ICD-10-CM

## 2018-07-23 DIAGNOSIS — I50.33 ACUTE ON CHRONIC DIASTOLIC HEART FAILURE: ICD-10-CM

## 2018-07-23 DIAGNOSIS — I48.20 CHRONIC ATRIAL FIBRILLATION: ICD-10-CM

## 2018-07-23 DIAGNOSIS — E11.42 DIABETIC POLYNEUROPATHY ASSOCIATED WITH TYPE 2 DIABETES MELLITUS: ICD-10-CM

## 2018-07-23 DIAGNOSIS — E11.22 CKD STAGE 4 DUE TO TYPE 2 DIABETES MELLITUS: ICD-10-CM

## 2018-07-23 DIAGNOSIS — I15.2 HYPERTENSION ASSOCIATED WITH DIABETES: ICD-10-CM

## 2018-07-23 DIAGNOSIS — E11.59 HYPERTENSION ASSOCIATED WITH DIABETES: ICD-10-CM

## 2018-07-23 PROCEDURE — 3045F PR MOST RECENT HEMOGLOBIN A1C LEVEL 7.0-9.0%: CPT | Mod: CPTII,S$GLB,, | Performed by: INTERNAL MEDICINE

## 2018-07-23 PROCEDURE — 99999 PR PBB SHADOW E&M-EST. PATIENT-LVL III: CPT | Mod: PBBFAC,,, | Performed by: INTERNAL MEDICINE

## 2018-07-23 PROCEDURE — 99214 OFFICE O/P EST MOD 30 MIN: CPT | Mod: S$GLB,,, | Performed by: INTERNAL MEDICINE

## 2018-07-23 PROCEDURE — 99499 UNLISTED E&M SERVICE: CPT | Mod: HCNC,S$GLB,, | Performed by: INTERNAL MEDICINE

## 2018-07-23 PROCEDURE — 99499 UNLISTED E&M SERVICE: CPT | Mod: S$GLB,,, | Performed by: OPTOMETRIST

## 2018-07-23 PROCEDURE — 99999 PR PBB SHADOW E&M-EST. PATIENT-LVL I: CPT | Mod: PBBFAC,,, | Performed by: OPTOMETRIST

## 2018-07-23 PROCEDURE — 99215 OFFICE O/P EST HI 40 MIN: CPT | Mod: S$GLB,,, | Performed by: INTERNAL MEDICINE

## 2018-07-23 RX ORDER — ASPIRIN 325 MG
325 TABLET, DELAYED RELEASE (ENTERIC COATED) ORAL DAILY
COMMUNITY
End: 2018-09-06 | Stop reason: SDUPTHER

## 2018-07-23 NOTE — Clinical Note
Thank you for referring Zoey Ham for follow-up of myocardial infarction. Please see my note for details of this encounter. If you have any questions, please contact me.  Thank you again for the referral.

## 2018-07-23 NOTE — PROGRESS NOTES
Chart has been dictated using voice recognition software.  It is not been reviewed carefully for any transcriptional errors due to this technology.   Subjective:   Patient ID:  Zoey Ham is a 72 y.o. female who presents for follow-up of Coronary Artery Disease      HPI: : Patent with insulin-dependent diabetes, S/P CABG x 1 2002, S/P OM1 stent 03/26/2003, S/P RCA stent 03/26/2003, NSTEMI 2/05, PCI proximal LAD, OM2, OM3, s/ PCI OM1 (Promus) 30-Mar-2012, repeat cath 31-May-2015 showed  LAD with distal vessel supplied by intact LIMA, LVEF preserved, chronic atrial fibrillation, hypertension, dyslipidemia, and obesity.      Patient had a revision of her AV shunt in left upper on 30 May 2018.  Shortly thereafter, the patient started needing nitroglycerin at and was hospitalized 04 June 2018 with an NSTEMI and underwent stent implantation of the OM 1 with a resolute stent.  The patient did not need to go on to dialysis during hospitalization.  However, her hospital course was complicated by transfusion associated circulatory overload (TACO).    Left heart cath (04-Jun-2018):  Diagnostic:        Patient has a right dominant coronary artery.        The coronary vessels have luminal irregularities.      - Left Main Coronary Artery:             The LM has luminal irregularities. There is KEDAR 3 flow.     - Left Anterior Descending Artery:             The proximal LAD is occluded (100). There is KEDAR 0 flow.     - Left Circumflex Artery:             The proximal LCX has luminal irregularities. There is KEDAR 3 flow.     - OM1:             The proximal OM1 has a 60% stenosis. There is KEDAR 3 flow.             The mid OM1 has chronic total occlusion within the stent. There is KEDAR 0 flow. The remaining portion of the vessel is of small caliber.                     Lesion Details:   This is a Type C lesion.  The length is 30mm. The minimum luminal diameter is 0 millimeters. The reference vessel diameter is 2.6  millimeters.     - Right Coronary Artery:             The proximal RCA is patent within the stent. There is KEDAR 3 flow.             The mid RCA is patent within the stent. There is KEDAR 3 flow.             The distal RCA has a 90% stenosis. There is KEDAR 3 flow.                     Lesion Details:   The length is 20mm. Unchanged from prior angiogram.     - LIMA To LAD:             The LIMA to LAD is patent. There is KEDAR 3 flow.  Intervention:       Mid OM1:              The lesion was successfully intervened. Post-stenosis of 0%, post-KEDAR 3 flow and TMP grade 3. Stent   Resolute Rx 3.00x26 (BETHANY).    Echocardiogram (05-Jun-2018):    1 - Normal left ventricular systolic function (EF 60-65%).     2 - Impaired LV relaxation, elevated LAP (grade 2 diastolic dysfunction).     3 - Normal right ventricular systolic function .     4 - Pulmonary hypertension. The estimated PA systolic pressure is 54 mmHg.     5 - Trivial tricuspid regurgitation.     6 - Intermediate central venous pressure.     7 - Concentric remodeling.     8 - No wall motion abnormalities.     Has been feeling good since discharge from the hospital, not using NTG.   Patient denies any dyspnea at rest or on exertion, orthopnea, PND, or edema.  Patient denies any palpitations, lightheadedness, or syncope.       Past Medical History:   Diagnosis Date    Acute myocardial infarction of anterolateral wall 7/9/2015    Anemia of chronic renal failure, stage 4 (severe) 1/22/2015    Atherosclerosis of aorta 10/3/2012    Benign hypertension with CKD (chronic kidney disease) stage IV 3/11/2016    Chronic diastolic congestive heart failure 10/3/2012    Chronic kidney disease, stage IV (severe) 7/3/2012    Coronary artery disease     s/p 1v CABG 2002 and multiple PCI (last angiogram in 6/2012 with patent LIMA->LAD and patent LCx and RCA stents)    Diabetic polyneuropathy associated with type 2 diabetes mellitus 7/9/2015    Diabetic polyneuropathy associated  with type 2 diabetes mellitus 7/9/2015    Encounter for blood transfusion     Gastritis without bleeding     Heart attack 09/2012    5-6 events    Hyperlipidemia     Hyperparathyroidism     Nephritis and nephropathy, 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 7/9/2015    NSTEMI (non-ST elevated myocardial infarction)     Occlusion and stenosis of carotid artery with cerebral infarction 7/9/2015    Osteopenia     PAF (paroxysmal atrial fibrillation) 10/3/2012    Pneumonia     Proliferative diabetic retinopathy 10/3/2012    Sepsis due to Escherichia coli with acute renal failure 2/2016    UTI     Type II diabetes mellitus with neurological manifestations 7/9/2015    Type II diabetes mellitus with renal manifestations 7/3/2012       Outpatient Medications Prior to Visit   Medication Sig Dispense Refill    ACCU-CHEK MARI PLUS METER Misc 1 Device by Misc.(Non-Drug; Combo Route) route 2 (two) times daily. 1 each 0    arm brace Misc 1 application by Misc.(Non-Drug; Combo Route) route once daily. Carpal tunnel wrist brace for both left and right wrists 2 each 0    atorvastatin (LIPITOR) 80 MG tablet TAKE 1 TABLET EVERY DAY (Patient taking differently: TAKE 1 TABLET EVERY DAY, morning) 90 tablet 3    blood sugar diagnostic Strp 1 strip by Misc.(Non-Drug; Combo Route) route 4 (four) times daily. 200 strip 11    calcitRIOL (ROCALTROL) 0.5 MCG Cap Take 1 capsule (0.5 mcg total) by mouth once daily. 90 capsule 1    clopidogrel (PLAVIX) 75 mg tablet TAKE 1 TABLET EVERY DAY (Patient taking differently: TAKE 1 TABLET EVERY DAY, morning) 90 tablet 3    furosemide (LASIX) 40 MG tablet One tab twice a day. 180 tablet 3    insulin glargine (LANTUS) 100 unit/mL injection Inject 20 Units into the skin every evening. (Patient taking differently: Inject 20 Units into the skin every morning. ) 10 mL 3    insulin syringe-needle U-100 1 mL 31 gauge x 5/16 Syrg 1 application by Misc.(Non-Drug; Combo Route) route once daily.  100 each 3    KIONEX, WITH SORBITOL, 15-19.3 gram/60 mL Susp TAKE 60 MLS (15 G TOTAL) BY MOUTH ONCE DAILY. PRN  0    lancets (ACCU-CHEK SOFTCLIX LANCETS) Misc 1 application by Misc.(Non-Drug; Combo Route) route 4 (four) times daily with meals and nightly. 200 each 11    metoprolol tartrate (LOPRESSOR) 100 MG tablet Take 1 tablet (100 mg total) by mouth 2 (two) times daily. 180 tablet 3    nitroGLYCERIN (NITROSTAT) 0.3 MG SL tablet Place 1 tablet (0.3 mg total) under the tongue every 5 (five) minutes as needed for Chest pain. 36 tablet 3    ondansetron (ZOFRAN) 8 MG tablet Take 1 tablet (8 mg total) by mouth every 8 (eight) hours as needed for Nausea. 30 tablet 0    sevelamer carbonate (RENVELA) 800 mg Tab Take 1 tablet (800 mg total) by mouth 3 (three) times daily with meals. 90 tablet 2    aspirin (ECOTRIN) 81 MG EC tablet Take 1 tablet (81 mg total) by mouth once daily. 90 tablet 3    hydrocodone-acetaminophen 5-325mg (NORCO) 5-325 mg per tablet Take 1 tablet by mouth every 6 (six) hours as needed for Pain. 35 tablet 0     No facility-administered medications prior to visit.        Review of Systems   Constitution: Negative for weight gain and weight loss.   HENT: Negative for nosebleeds.    Eyes: Negative for vision loss in left eye and vision loss in right eye.   Cardiovascular: Negative for claudication.        As above   Respiratory: Negative for hemoptysis, shortness of breath, snoring, sputum production and wheezing.    Endocrine: Negative for polydipsia and polyuria.   Hematologic/Lymphatic: Does not bruise/bleed easily.   Musculoskeletal: Negative for myalgias.   Gastrointestinal: Negative for change in bowel habit, hematemesis, hematochezia, melena, nausea and vomiting.   Genitourinary: Negative for hematuria.   Neurological: Positive for numbness (bilateral hand numbeness). Negative for focal weakness.     Objective:   Physical Exam   Constitutional: She is oriented to person, place, and time.  "She appears well-developed and well-nourished.   BP (!) 152/68 (BP Location: Right arm, Patient Position: Sitting, BP Method: Large (Manual))   Pulse 78   Ht 5' 4" (1.626 m)   Wt 77.7 kg (171 lb 4.8 oz)   LMP  (LMP Unknown)   BMI 29.40 kg/m²    Neck: Neck supple. No JVD present. Carotid bruit is present (left, extending to left carotid). No thyromegaly present.   Cardiovascular: Normal rate, S1 normal, S2 normal and intact distal pulses.  Exam reveals no S4 and no friction rub.    No murmur heard.  Pulmonary/Chest: Breath sounds normal. She has no wheezes. She has no rales.   Abdominal: Soft. Bowel sounds are normal. There is no hepatosplenomegaly. There is no tenderness.   Musculoskeletal: She exhibits no edema.   Neurological: She is alert and oriented to person, place, and time. She has normal strength.   Skin: No cyanosis. Nails show no clubbing.         Lab Results   Component Value Date     07/09/2018    K 4.9 07/09/2018    BUN 70 (H) 07/09/2018    CREATININE 2.6 (H) 07/09/2018     (H) 07/09/2018    HGBA1C 8.9 (H) 06/05/2018    CHOL 127 06/05/2018    HDL 28 (L) 06/05/2018    LDLCALC 66.4 06/05/2018    TRIG 163 (H) 06/05/2018    CHOLHDL 22.0 06/05/2018    HGB 10.9 (L) 07/09/2018    HCT 34.1 (L) 07/09/2018     06/12/2018    INR 0.9 06/04/2018       Assessment:     1. S/P drug eluting coronary stent placement    2. History of non-ST elevation myocardial infarction (NSTEMI)    3. Hypertension associated with diabetes    4. Acute on chronic diastolic heart failure    5. Chronic atrial fibrillation    6. Atherosclerosis of aorta    7. Diabetic polyneuropathy associated with type 2 diabetes mellitus    8. CKD stage 4 due to type 2 diabetes mellitus    9. Type 2 diabetes mellitus with hyperlipidemia      The patient is 6 weeks post another non ST elevation myocardial infarction.  She appears to be doing well now without use of nitroglycerin, without symptoms heart failure, without symptoms of " significant arrhythmias.  Patient remains on clopidogrel.  In order to make sure clopidogrel is being effective, a PRU test will be obtained.  Patient has been encouraged to discuss cardiac rehab with the people do the program.  She is concerned because the last time she did the program, she had another NSTEMI.  However, the patient feels very weak because the renal failure.  She is also considering opting towards peritoneal dialysis should the need for dialysis eyes.  At this time, no changes in her medications will be made.  However, the patient will obtain her blood pressure morning and evening for a week at and submit the results to [facility], after which time decisions regarding antihypertensive therapy will be made. Patient should return in 2 months for re-evaluation.  Plan:     Zoey was seen today for coronary artery disease.    Diagnoses and all orders for this visit:    S/P drug eluting coronary stent placement  -     Platelet Response Plavix; Future; Expected date: 07/23/2018    History of non-ST elevation myocardial infarction (NSTEMI)  -     Cardiac Rehab Phase II; Future    Hypertension associated with diabetes    Acute on chronic diastolic heart failure    Chronic atrial fibrillation    Atherosclerosis of aorta    Diabetic polyneuropathy associated with type 2 diabetes mellitus    CKD stage 4 due to type 2 diabetes mellitus    Type 2 diabetes mellitus with hyperlipidemia    Other orders  -     aspirin (ECOTRIN) 325 MG EC tablet; Take 325 mg by mouth once daily.          Elver Kelley MD  Consultative Cardiology

## 2018-07-23 NOTE — PROGRESS NOTES
Subjective:       Patient ID: Zoey Ham is a 72 y.o. White female who presents for follow-up evaluation of No chief complaint on file.    HPI This is a 72 - year-old  female with longstanding diabetes, hyperparathyroidism, hypertension, and CKD is coming in for followup of these. Her blood pressures are stable at home in the 120's-130's/60-70's . DM is better per the pt.  Her CKD has been stable.  Has proteinuria.  Creatinine stable but low GFR. Recently went in to hospital for cp and had angiogram on June 4 the and had creatinine peaked at 5.5 but did not require HD. Creatinine better at 2.6. Nausea resolved.     PAST MEDICAL HISTORY: Significant for hypertension for 10 years, diabetes   for 10 years, CABG, lacunar infarcts, hyperlipidemia, hyperparathyroidism,   anemia, CABG, CAD, and diabetic retinopathy.         Review of Systems   Constitutional: Positive for fatigue. Negative for appetite change.   Eyes: Negative for discharge.   Respiratory: Negative for cough, shortness of breath and wheezing.    Cardiovascular: Negative for chest pain and palpitations.   Gastrointestinal: Negative for abdominal pain, diarrhea, nausea and vomiting.   Genitourinary: Negative for dysuria, frequency, hematuria and urgency.   Skin: Negative for color change and rash.   Psychiatric/Behavioral: Negative for confusion.   All other systems reviewed and are negative.      Objective:      Physical Exam   Constitutional: She is oriented to person, place, and time. She appears well-developed and well-nourished.   HENT:   Right Ear: External ear normal.   Left Ear: External ear normal.   Nose: Nose normal.   Mouth/Throat: Normal dentition.   Eyes: Conjunctivae, EOM and lids are normal. Pupils are equal, round, and reactive to light.   Neck: Trachea normal. No thyroid mass present.   Cardiovascular: Normal rate and regular rhythm.  Exam reveals no friction rub.    No murmur heard.  Pulmonary/Chest: Effort normal and breath  sounds normal. No respiratory distress. She has no decreased breath sounds. She has no rales.   Abdominal: Soft. Bowel sounds are normal. She exhibits no mass. There is no tenderness. There is no rebound. No hernia.   Musculoskeletal: She exhibits edema.   Trace edema   Neurological: She is alert and oriented to person, place, and time.   Skin: Skin is warm and dry. No rash noted. No erythema.   l neck mole-rec derm f/u.   Psychiatric: She has a normal mood and affect. Judgment normal. Her mood appears not anxious. She does not exhibit a depressed mood.   Oriented to time, place and person.   Vitals reviewed.      Assessment:       No diagnosis found.    Plan:           1.  Renovascular hypertension    2.  Type 2 diabetes mellitus with diabetic nephropathy    3.  Proteinuria    4.  Anemia of chronic renal failure, stage 4 (severe)      Plan:       1. CKD stage 4 with an MDRD GFR of 17 mL/min. Her creatinine of 2.6 with gfr of 17 ml/min. Her CKD is secondary to longstanding hypertension and diabetes. L AV fistula with angioplasy and is ready to be used.     2. Hypertension. Blood pressures are stable  at home. On cozaar but held for the past few months b/c of hyperkalemia and low GFR. Unable to get her off metoprolol -tried in the past but HR became a problem per the pt and had to go back on it.    3. Diabetes/proteinuria:  off losartan.   proteinuria likely sec poorly controlled DM.   Did not want biopsy in the past.  Losartan has been held for the last few months b/c of low GFR. She likely has  worsening proteinuria with high A1c's and being off losartan.    4. Hyperparathyroidism.  i pth improving  on Calcitriol  -changed to  0.5 mcg.  Ca/phos stable. Low phos and low potassium diet discussed-lists given.  6. Anemia. H&H is stable.  Iron studies stable.    After TOPPS education, pt would like to do PD-will refer to general surgery. Recommended nepro.   Return in  1-2  months with rfp, urine protein and urine  creatinine, ipth.

## 2018-07-23 NOTE — PROGRESS NOTES
HPI     DSL- 7/9/18     Pt is here for near measurement for Zenni.          Last edited by Darlene Balderas on 7/23/2018  3:22 PM. (History)            Assessment /Plan     For exam results, see Encounter Report.    Myopia with presbyopia of both eyes            1.  Sewing rx given.  RTC as scheduled.

## 2018-07-24 ENCOUNTER — LAB VISIT (OUTPATIENT)
Dept: LAB | Facility: HOSPITAL | Age: 73
End: 2018-07-24
Attending: INTERNAL MEDICINE
Payer: MEDICARE

## 2018-07-24 ENCOUNTER — PES CALL (OUTPATIENT)
Dept: ADMINISTRATIVE | Facility: CLINIC | Age: 73
End: 2018-07-24

## 2018-07-24 ENCOUNTER — PATIENT MESSAGE (OUTPATIENT)
Dept: CARDIOLOGY | Facility: CLINIC | Age: 73
End: 2018-07-24

## 2018-07-24 ENCOUNTER — DOCUMENTATION ONLY (OUTPATIENT)
Dept: CARDIAC REHAB | Facility: CLINIC | Age: 73
End: 2018-07-24

## 2018-07-24 DIAGNOSIS — I21.4 NSTEMI (NON-ST ELEVATED MYOCARDIAL INFARCTION): ICD-10-CM

## 2018-07-24 DIAGNOSIS — I25.10 CORONARY ARTERY DISEASE, ANGINA PRESENCE UNSPECIFIED, UNSPECIFIED VESSEL OR LESION TYPE, UNSPECIFIED WHETHER NATIVE OR TRANSPLANTED HEART: ICD-10-CM

## 2018-07-24 DIAGNOSIS — I25.10 CORONARY ARTERY DISEASE, ANGINA PRESENCE UNSPECIFIED, UNSPECIFIED VESSEL OR LESION TYPE, UNSPECIFIED WHETHER NATIVE OR TRANSPLANTED HEART: Primary | ICD-10-CM

## 2018-07-24 LAB — PLATELET RESPONSE PLAVIX: 162 PRU

## 2018-07-24 PROCEDURE — 36415 COLL VENOUS BLD VENIPUNCTURE: CPT

## 2018-07-24 PROCEDURE — 85576 BLOOD PLATELET AGGREGATION: CPT

## 2018-07-24 RX ORDER — BLOOD SUGAR DIAGNOSTIC
STRIP MISCELLANEOUS
Qty: 350 STRIP | Refills: 4 | Status: SHIPPED | OUTPATIENT
Start: 2018-07-24 | End: 2018-09-06 | Stop reason: SDUPTHER

## 2018-07-24 NOTE — PROGRESS NOTES
I contacted patient re: cardiac rehab program ordered by Dr. Kelley. Patient states she is not sure she can attend exercise 3 times a week for 12 weeks. She states she has extreme fatigue and had felt this way before during exercise program and suffered another MI. Patient also states she has only 17% kidney function and limited resources for transportation to get to cardiac rehab. Patient states she will contact us if she decides to attend.

## 2018-07-31 DIAGNOSIS — E11.42 DIABETIC POLYNEUROPATHY ASSOCIATED WITH TYPE 2 DIABETES MELLITUS: ICD-10-CM

## 2018-07-31 DIAGNOSIS — N18.4 CKD STAGE 4 DUE TO TYPE 2 DIABETES MELLITUS: ICD-10-CM

## 2018-07-31 DIAGNOSIS — E11.22 CKD STAGE 4 DUE TO TYPE 2 DIABETES MELLITUS: ICD-10-CM

## 2018-07-31 DIAGNOSIS — I25.708 CORONARY ARTERY DISEASE OF BYPASS GRAFT OF NATIVE HEART WITH STABLE ANGINA PECTORIS: ICD-10-CM

## 2018-07-31 RX ORDER — INSULIN GLARGINE 100 [IU]/ML
20 INJECTION, SOLUTION SUBCUTANEOUS NIGHTLY
Qty: 10 ML | Refills: 3 | Status: SHIPPED | OUTPATIENT
Start: 2018-07-31 | End: 2018-08-23 | Stop reason: SDUPTHER

## 2018-08-14 ENCOUNTER — TELEPHONE (OUTPATIENT)
Dept: INTERNAL MEDICINE | Facility: CLINIC | Age: 73
End: 2018-08-14

## 2018-08-14 ENCOUNTER — LAB VISIT (OUTPATIENT)
Dept: LAB | Facility: HOSPITAL | Age: 73
End: 2018-08-14
Payer: MEDICARE

## 2018-08-14 ENCOUNTER — OFFICE VISIT (OUTPATIENT)
Dept: SURGERY | Facility: CLINIC | Age: 73
End: 2018-08-14
Payer: MEDICARE

## 2018-08-14 ENCOUNTER — OFFICE VISIT (OUTPATIENT)
Dept: PRIMARY CARE CLINIC | Facility: CLINIC | Age: 73
End: 2018-08-14
Payer: MEDICARE

## 2018-08-14 ENCOUNTER — TELEPHONE (OUTPATIENT)
Dept: NEPHROLOGY | Facility: CLINIC | Age: 73
End: 2018-08-14

## 2018-08-14 VITALS — HEIGHT: 64 IN | TEMPERATURE: 98 F | BODY MASS INDEX: 28.51 KG/M2 | WEIGHT: 167 LBS

## 2018-08-14 VITALS
OXYGEN SATURATION: 98 % | BODY MASS INDEX: 28.91 KG/M2 | DIASTOLIC BLOOD PRESSURE: 50 MMHG | HEIGHT: 64 IN | WEIGHT: 169.31 LBS | HEART RATE: 70 BPM | SYSTOLIC BLOOD PRESSURE: 110 MMHG

## 2018-08-14 DIAGNOSIS — N18.5 ACUTE RENAL FAILURE SUPERIMPOSED ON STAGE 5 CHRONIC KIDNEY DISEASE, NOT ON CHRONIC DIALYSIS, UNSPECIFIED ACUTE RENAL FAILURE TYPE: ICD-10-CM

## 2018-08-14 DIAGNOSIS — I10 ESSENTIAL HYPERTENSION: ICD-10-CM

## 2018-08-14 DIAGNOSIS — N25.0 RENAL OSTEODYSTROPHY: ICD-10-CM

## 2018-08-14 DIAGNOSIS — N18.6 ESRD (END STAGE RENAL DISEASE): Primary | ICD-10-CM

## 2018-08-14 DIAGNOSIS — N17.9 ACUTE RENAL FAILURE SUPERIMPOSED ON STAGE 5 CHRONIC KIDNEY DISEASE, NOT ON CHRONIC DIALYSIS, UNSPECIFIED ACUTE RENAL FAILURE TYPE: ICD-10-CM

## 2018-08-14 DIAGNOSIS — D64.9 ANEMIA, UNSPECIFIED TYPE: ICD-10-CM

## 2018-08-14 DIAGNOSIS — I21.4 NSTEMI (NON-ST ELEVATED MYOCARDIAL INFARCTION): ICD-10-CM

## 2018-08-14 DIAGNOSIS — N18.5 CKD (CHRONIC KIDNEY DISEASE), STAGE V: Primary | ICD-10-CM

## 2018-08-14 DIAGNOSIS — N18.4 CHRONIC KIDNEY DISEASE, STAGE IV (SEVERE): ICD-10-CM

## 2018-08-14 LAB
ALBUMIN SERPL BCP-MCNC: 3.8 G/DL
ANION GAP SERPL CALC-SCNC: 13 MMOL/L
BUN SERPL-MCNC: 98 MG/DL
CALCIUM SERPL-MCNC: 9.9 MG/DL
CHLORIDE SERPL-SCNC: 100 MMOL/L
CO2 SERPL-SCNC: 24 MMOL/L
CREAT SERPL-MCNC: 3.3 MG/DL
EST. GFR  (AFRICAN AMERICAN): 15.5 ML/MIN/1.73 M^2
EST. GFR  (NON AFRICAN AMERICAN): 13.4 ML/MIN/1.73 M^2
GLUCOSE SERPL-MCNC: 201 MG/DL
PHOSPHATE SERPL-MCNC: 5.4 MG/DL
POTASSIUM SERPL-SCNC: 5 MMOL/L
PTH-INTACT SERPL-MCNC: 267 PG/ML
SODIUM SERPL-SCNC: 137 MMOL/L

## 2018-08-14 PROCEDURE — 83970 ASSAY OF PARATHORMONE: CPT

## 2018-08-14 PROCEDURE — 80069 RENAL FUNCTION PANEL: CPT

## 2018-08-14 PROCEDURE — 99203 OFFICE O/P NEW LOW 30 MIN: CPT | Mod: S$GLB,,, | Performed by: SURGERY

## 2018-08-14 PROCEDURE — 36415 COLL VENOUS BLD VENIPUNCTURE: CPT

## 2018-08-14 PROCEDURE — 99999 PR PBB SHADOW E&M-EST. PATIENT-LVL III: CPT | Mod: PBBFAC,,, | Performed by: SURGERY

## 2018-08-14 PROCEDURE — 99499 UNLISTED E&M SERVICE: CPT | Mod: S$GLB,,, | Performed by: INTERNAL MEDICINE

## 2018-08-14 PROCEDURE — 99215 OFFICE O/P EST HI 40 MIN: CPT | Mod: S$GLB,,, | Performed by: INTERNAL MEDICINE

## 2018-08-14 RX ORDER — SEVELAMER CARBONATE 800 MG/1
800 TABLET, FILM COATED ORAL
Qty: 90 TABLET | Refills: 2 | Status: SHIPPED | OUTPATIENT
Start: 2018-08-14 | End: 2018-10-11 | Stop reason: SDUPTHER

## 2018-08-14 NOTE — Clinical Note
Dear Dr Iglesias,Mrs Ham is due for follow-up with you. She has called your office but has been unable to get her appt. Could your staff assist please?SOLEDAD Beth

## 2018-08-14 NOTE — Clinical Note
August 14, 2018      Karla Iglesias DO  [address] [facility] [address]           [facility] - General Surgery  [address]  [place] [address]  Phone: [phone]          Patient: Zoey Ham   MR Number: [id number]   YOB: 1945   Date of Visit: 8/14/2018       Dear Dr. Karla Iglesias:    Thank you for referring Zoey Ham to me for evaluation. Attached you will find relevant portions of my assessment and plan of care.    If you have questions, please do not hesitate to call me. I look forward to following Zoey Ham along with you.    Sincerely,    Quinton Ashley MD    Enclosure  CC:  No Recipients    If you would like to receive this communication electronically, please contact [email or web address] or [phone] to request more information on [email or web address] access.    For providers and/or their staff who would like to refer a patient to [facility], please contact us through our one-stop-shop provider referral line, [facility] , at [phone].    If you feel you have received this communication in error or would no longer like to receive these types of communications, please e-mail [email or web address]

## 2018-08-14 NOTE — LETTER
[facility] - General Surgery  [address]  [place] [address]  Phone: [phone] August 14, 2018      Karla Iglesias,   [address] [facility] [address]    Patient: Zoey Ham   MR Number: [id number]   YOB: 1945   Date of Visit: 8/14/2018     Dear Dr. Borden Recipients:    Thank you for referring Zoey Ham to me for evaluation. Attached you will find relevant portions of my assessment and plan of care.    ASSESSMENT/PLAN:  Diabetic Nephropathy not on dialysis.  She is right handed and her belt line is barely over her navel.  CAD on Plavix and ASA and status post MI in June and redo stent.  Echo from June shows grade 2 diastolic dysfunction and pulmonary htn (pressure over 50).  She is status post appy, hysterectomy and marla.  She is a good candidate for PD 3-6 months after her last MI and with cardiology clearance off Plavix, but ok to take ASA.    If you have questions, please do not hesitate to call me. I look forward to following Zoey Ham along with you.    Sincerely,      Quinton Ashley MD  Section Head - General, Laparoscopic, Bariatric  Acute Care and Oncologic Surgery   - Surgical Weight Loss Program  [facility]    WSR/noemi    CC  Cesia Rosales MD    Enclosure

## 2018-08-14 NOTE — TELEPHONE ENCOUNTER
----- Message from Earl Gonzalez RN sent at 8/14/2018 11:20 AM CDT -----  Yes, if it is a scheduled procedure she can go out for a couple weeks. Earl  ----- Message -----  From: Cesia Rosales MD  Sent: 8/14/2018  10:49 AM  To: Earl Gonzalez RN    Hi Ms Lisa,  If this patietn starts cardiac rehab, can she pause it in order to have her peritoneal dialysis port placed? That would require a 2 week break in cardiac rehab therapy. She would want to restart afterwards.    SOLEDAD Beth

## 2018-08-14 NOTE — ASSESSMENT & PLAN NOTE
MI in 6/2018, has not participated in cardiac rehab. Followed by Dr Kelley  · Encouraged cardiac rehab  · PCP messaged rehab RN about whether patient can start/stop for port placement  · Dr Kelley to perform cardiac clearance

## 2018-08-14 NOTE — PATIENT INSTRUCTIONS
TODAY:  - PCP to contact cardiac rehab to see if you can start/stop/restart if peritoneal dialysis started  - labs today (added labs from Dr Iglesias's note)  - PCP will order HH for PT/OT if you're not eligible for cardiac rehab at this time  - pharmacy patient assistance contacted to assist with renvala   - PCP contacted Dr Iglesias to get an appointment soon    NEXT:  - screening stool cards for colon cancer

## 2018-08-14 NOTE — ASSESSMENT & PLAN NOTE
Plan for peritoneal dialysis. Surgery seen on 8/14/18, needs cardiac clearance  · F/U surgery for port placement  · Labs for nephro today (PCP added iPTH and urine protein/creatinine)  · Encouraged cardiac rehab for optimization of functional status

## 2018-08-14 NOTE — Clinical Note
Could you assist this patient with renvala. She can no longer afford it, is in donut hole.Thanks,SOLEDAD

## 2018-08-14 NOTE — TELEPHONE ENCOUNTER
Please set her up for f/u labs next week and appointment in September/early October as she was suppose to be seen next month.  Thanks.

## 2018-08-14 NOTE — PROGRESS NOTES
"Primary Care Provider Appointment    Subjective:      Patient ID: Zoey Ham is a 72 y.o. female with NSTEMI this year, HTN, DM, CHF    Chief Complaint: Hypertension; Diabetes; and Follow-up (2 month )    Patient states "I'm feeling better. I'm no longer nauseas." She is still fatigued, and associates this with her worsening renal function. She has many objections to cardiac rehab and home health. She states her vision impairs her ability to participate in any exercise program. She states she had a heart attack during her participation in cardiac rehab previously. Last seen by Dr Kelley on 7/23/18. Cardiac rehab RN states patient would participate "3 times a week for 12 weeks." Patient has concerns about starting/stopping/restarting cardiac rehab for her peritoneal port placement.    She was seen by surgery this morning, with plans for peritoneal dialysis. Her surgeon will contact Dr Kelley for cardiac clearance for the procedure.     She has labs today with Neprhology. Per last neprho note she needs the following: rfp, urine protein and urine creatinine, ipth.    She is living with her son and wife at their home in [place].     Her mammograms were changed to annual (previously got them q6 mos).      Past Surgical History:   Procedure Laterality Date    APPENDECTOMY      CARDIAC SURGERY  2002    CABG    CHOLECYSTECTOMY      CORONARY ANGIOPLASTY  2004    CORONARY ARTERY BYPASS GRAFT      EYE SURGERY      cataracts bilaterally    FRACTURE SURGERY      right ankle    HYSTERECTOMY      TONSILLECTOMY         Past Medical History:   Diagnosis Date    Acute myocardial infarction of anterolateral wall 7/9/2015    Anemia of chronic renal failure, stage 4 (severe) 1/22/2015    Atherosclerosis of aorta 10/3/2012    Benign hypertension with CKD (chronic kidney disease) stage IV 3/11/2016    Chronic diastolic congestive heart failure 10/3/2012    Chronic kidney disease, stage IV (severe) 7/3/2012    " "Coronary artery disease     s/p 1v CABG 2002 and multiple PCI (last angiogram in 6/2012 with patent LIMA->LAD and patent LCx and RCA stents)    Diabetic polyneuropathy associated with type 2 diabetes mellitus 7/9/2015    Diabetic polyneuropathy associated with type 2 diabetes mellitus 7/9/2015    Encounter for blood transfusion     Gastritis without bleeding     Heart attack 09/2012    5-6 events    Hyperlipidemia     Hyperparathyroidism     Nephritis and nephropathy, 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 7/9/2015    NSTEMI (non-ST elevated myocardial infarction)     Occlusion and stenosis of carotid artery with cerebral infarction 7/9/2015    Osteopenia     PAF (paroxysmal atrial fibrillation) 10/3/2012    Pneumonia     Proliferative diabetic retinopathy 10/3/2012    Sepsis due to Escherichia coli with acute renal failure 2/2016    UTI     Type II diabetes mellitus with neurological manifestations 7/9/2015    Type II diabetes mellitus with renal manifestations 7/3/2012       Review of Systems   Constitutional: Positive for fatigue. Negative for activity change, appetite change and unexpected weight change.   Respiratory: Negative for cough and choking.    Cardiovascular: Negative for leg swelling.   Gastrointestinal: Negative for abdominal pain, diarrhea and nausea.   Musculoskeletal: Negative for back pain, gait problem and myalgias.   Neurological: Negative for dizziness, tremors, syncope, facial asymmetry, weakness, numbness and headaches.   Hematological: Bruises/bleeds easily.   Psychiatric/Behavioral: Negative for agitation, behavioral problems, confusion and decreased concentration.       Objective:   BP (!) 110/50 (BP Location: Right arm, Patient Position: Sitting, BP Method: Medium (Manual))   Pulse 70   Ht 5' 4" (1.626 m)   Wt 76.8 kg (169 lb 5 oz)   LMP  (LMP Unknown)   SpO2 98%   BMI 29.06 kg/m²     Physical Exam   Constitutional: She is oriented to person, place, and time. She " appears well-developed and well-nourished.   HENT:   Head: Normocephalic and atraumatic.   Neck: Normal range of motion.   Cardiovascular:   AV graft on L forearm (thrill palpated, bruit ausculated)   Abdominal: Soft. Bowel sounds are normal. There is no tenderness. There is no guarding.   Neurological: She is alert and oriented to person, place, and time.   Skin:   Ecchymoses on UE bilaterally   Psychiatric: She has a normal mood and affect. Her behavior is normal. Thought content normal.   Flat affect   Vitals reviewed.      Lab Results   Component Value Date    WBC 11.57 06/12/2018    HGB 10.9 (L) 07/09/2018    HCT 34.1 (L) 07/09/2018     06/12/2018    CHOL 127 06/05/2018    TRIG 163 (H) 06/05/2018    HDL 28 (L) 06/05/2018    ALT 15 06/12/2018    AST 18 06/12/2018     07/09/2018    K 4.9 07/09/2018     07/09/2018    CREATININE 2.6 (H) 07/09/2018    BUN 70 (H) 07/09/2018    CO2 27 07/09/2018    TSH 2.801 06/09/2018    INR 0.9 06/04/2018    GLUF 132 (H) 05/03/2012    HGBA1C 8.9 (H) 06/05/2018         Assessment:   72 y.o. female with multiple co-morbid illnesses here to continue work-up of chronic issues notably NSTEMI this year, HTN, DM, CHF     Plan:     Problem List Items Addressed This Visit        Cardiac/Vascular    NSTEMI (non-ST elevated myocardial infarction)     MI in 6/2018, has not participated in cardiac rehab. Followed by Dr Kelley  · Encouraged cardiac rehab  · PCP messaged rehab RN about whether patient can start/stop for port placement  · Dr Kelley to perform cardiac clearance            Renal/    Acute renal failure superimposed on stage 5 chronic kidney disease, not on chronic dialysis     Plan for peritoneal dialysis. Surgery seen on 8/14/18, needs cardiac clearance  · F/U surgery for port placement  · Labs for nephro today (PCP added iPTH and urine protein/creatinine)  · Encouraged cardiac rehab for optimization of functional status         Relevant Medications    sevelamer  carbonate (RENVELA) 800 mg Tab    Other Relevant Orders    PTH, intact    Protein, Total w/Creat, Random Urine          Health Maintenance       Date Due Completion Date    Colonoscopy 08/21/1964 ---NEXT iFOBT    Influenza Vaccine 08/01/2018 9/6/2017- NEXT    Override on 8/29/2016: Done    Override on 10/27/2013: Done    Mammogram 11/14/2018 5/14/2018- DUE 5/2019    Hemoglobin A1c 12/05/2018 6/5/2018    Foot Exam 05/14/2019 5/14/2018    Override on 12/6/2016: Done    Lipid Panel 06/05/2019 6/5/2018    Eye Exam 07/09/2019 7/9/2018    DEXA SCAN 07/13/2020 7/13/2017    TETANUS VACCINE 03/03/2026 3/3/2016          Follow-up in about 2 months (around 10/14/2018). . One hour spent with this patient today, half of that in counseling.    Cesia Rosales MD/MPH  Internal Medicine  [facility]  [phone]

## 2018-08-14 NOTE — PROGRESS NOTES
Patient ID: Zoey Ham is a 72 y.o. female presenting for PD catheter placement.     Chief Complaint: Consult (PD placement)      HPI:  HPI   Ms. Ham is a 72 year old female with CAD s/p1V CABG 2002, diabetic nephropathy and neuropathy, CKD stage V 2/2 T2DM (on insulin) with brachiocephalic AVF in place, GERD, afib (on metop), pulmonary hypertension (PAP 54), grade II diastolic dysfunction (EF 60-65%) presenting for possible PD placement. Currently, she is not on HD, but has her fistula, placed in 4/2018 that has been cleared for use. She was recently hospitalized for an NSTEMI with emergent cardiac catheterization for BETHANY to OM1 on 6/4 after which she reported no further symptoms of chest pain or shortness of breath. She takes aspirin 325 QD (increased for additional CVA prevention, never on warfarin) and plavix QD x years for presence of multiple cardiac stents. She reports a previous history of a lacunar CVA 2016 with left hemiparesis that has since improved to baseline with PT. At baseline, she is having increasing difficulty with walking up a flight of stairs without stopping due to fatigue, but is able to walk a block without an assistance device. She is right handed and wears her pants just at/above her umbilicus.      PSHx:   Hysterectomy 1976  Appendectomy open - age 13   Cholecystectomy lap - age 70    Review of Systems   Constitutional: Positive for activity change and fatigue. Negative for fever and unexpected weight change.   HENT: Negative for congestion, rhinorrhea and sore throat.    Eyes: Negative for pain and discharge.   Respiratory: Negative for chest tightness and shortness of breath.    Cardiovascular: Negative for chest pain.   Gastrointestinal: Positive for nausea. Negative for abdominal distention, abdominal pain, diarrhea and vomiting.   Endocrine: Negative for polydipsia and polyphagia.   Genitourinary: Negative for difficulty urinating.   Musculoskeletal: Negative for gait  problem.   Skin: Negative for color change and rash.   Neurological: Negative for facial asymmetry and weakness.   Hematological: Bruises/bleeds easily.       Current Outpatient Medications   Medication Sig Dispense Refill    ACCU-CHEK MARI PLUS METER Misc 1 Device by Misc.(Non-Drug; Combo Route) route 2 (two) times daily. 1 each 0    ACCU-CHEK MARI PLUS TEST STRP Strp TEST BLOOD SUGAR FOUR TIMES DAILY 350 strip 4    arm brace Misc 1 application by Misc.(Non-Drug; Combo Route) route once daily. Carpal tunnel wrist brace for both left and right wrists 2 each 0    aspirin (ECOTRIN) 325 MG EC tablet Take 325 mg by mouth once daily.      atorvastatin (LIPITOR) 80 MG tablet TAKE 1 TABLET EVERY DAY (Patient taking differently: TAKE 1 TABLET EVERY DAY, morning) 90 tablet 3    calcitRIOL (ROCALTROL) 0.5 MCG Cap Take 1 capsule (0.5 mcg total) by mouth once daily. 90 capsule 1    clopidogrel (PLAVIX) 75 mg tablet TAKE 1 TABLET EVERY DAY (Patient taking differently: TAKE 1 TABLET EVERY DAY, morning) 90 tablet 3    furosemide (LASIX) 40 MG tablet One tab twice a day. 180 tablet 3    insulin syringe-needle U-100 1 mL 31 gauge x 5/16 Syrg 1 application by Misc.(Non-Drug; Combo Route) route once daily. 100 each 3    KIONEX, WITH SORBITOL, 15-19.3 gram/60 mL Susp TAKE 60 MLS (15 G TOTAL) BY MOUTH ONCE DAILY. PRN  0    lancets (ACCU-CHEK SOFTCLIX LANCETS) Misc 1 application by Misc.(Non-Drug; Combo Route) route 4 (four) times daily with meals and nightly. 200 each 11    LANTUS U-100 INSULIN 100 unit/mL injection INJECT 20 UNITS INTO THE SKIN EVERY EVENING. 10 mL 3    metoprolol tartrate (LOPRESSOR) 100 MG tablet Take 1 tablet (100 mg total) by mouth 2 (two) times daily. 180 tablet 3    nitroGLYCERIN (NITROSTAT) 0.3 MG SL tablet Place 1 tablet (0.3 mg total) under the tongue every 5 (five) minutes as needed for Chest pain. 36 tablet 3    ondansetron (ZOFRAN) 8 MG tablet Take 1 tablet (8 mg total) by mouth every 8  (eight) hours as needed for Nausea. 30 tablet 0    sevelamer carbonate (RENVELA) 800 mg Tab Take 1 tablet (800 mg total) by mouth 3 (three) times daily with meals. 90 tablet 2     No current facility-administered medications for this visit.        Review of patient's allergies indicates:   Allergen Reactions    Percodan [oxycodone-aspirin] Hives     Welps     Pcn [penicillins] Hives and Swelling     Tolerated Rocephin IM in clinic      Phenergan [promethazine] Other (See Comments)     Altered mental status; jerking of extremities       Past Medical History:   Diagnosis Date    Acute myocardial infarction of anterolateral wall 7/9/2015    Anemia of chronic renal failure, stage 4 (severe) 1/22/2015    Atherosclerosis of aorta 10/3/2012    Benign hypertension with CKD (chronic kidney disease) stage IV 3/11/2016    Chronic diastolic congestive heart failure 10/3/2012    Chronic kidney disease, stage IV (severe) 7/3/2012    Coronary artery disease     s/p 1v CABG 2002 and multiple PCI (last angiogram in 6/2012 with patent LIMA->LAD and patent LCx and RCA stents)    Diabetic polyneuropathy associated with type 2 diabetes mellitus 7/9/2015    Diabetic polyneuropathy associated with type 2 diabetes mellitus 7/9/2015    Encounter for blood transfusion     Gastritis without bleeding     Heart attack 09/2012    5-6 events    Hyperlipidemia     Hyperparathyroidism     Nephritis and nephropathy, 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 7/9/2015    NSTEMI (non-ST elevated myocardial infarction)     Occlusion and stenosis of carotid artery with cerebral infarction 7/9/2015    Osteopenia     PAF (paroxysmal atrial fibrillation) 10/3/2012    Pneumonia     Proliferative diabetic retinopathy 10/3/2012    Sepsis due to Escherichia coli with acute renal failure 2/2016    UTI     Type II diabetes mellitus with neurological manifestations 7/9/2015    Type II diabetes mellitus with renal manifestations 7/3/2012        Past Surgical History:   Procedure Laterality Date    APPENDECTOMY      CARDIAC SURGERY  2002    CABG    CHOLECYSTECTOMY      CORONARY ANGIOPLASTY  2004    CORONARY ARTERY BYPASS GRAFT      EYE SURGERY      cataracts bilaterally    FRACTURE SURGERY      right ankle    HYSTERECTOMY      TONSILLECTOMY         Family History   Problem Relation Age of Onset    Heart disease Mother     Hypertension Mother     Cancer Mother         lung    Heart disease Father     Hypertension Father     Psoriasis Father     Dementia Father     Hypertension Sister     Stroke Sister     Hypertension Brother     Diabetes Daughter     No Known Problems Son     Breast cancer Maternal Aunt     Melanoma Neg Hx     Lupus Neg Hx     Eczema Neg Hx     Amblyopia Neg Hx     Blindness Neg Hx     Cataracts Neg Hx     Glaucoma Neg Hx     Macular degeneration Neg Hx     Retinal detachment Neg Hx     Strabismus Neg Hx     Thyroid disease Neg Hx        Social History     Socioeconomic History    Marital status: Single     Spouse name: Not on file    Number of children: Not on file    Years of education: Not on file    Highest education level: Not on file   Social Needs    Financial resource strain: Not on file    Food insecurity - worry: Not on file    Food insecurity - inability: Not on file    Transportation needs - medical: Not on file    Transportation needs - non-medical: Not on file   Occupational History    Occupation: Homemaker   Tobacco Use    Smoking status: Never Smoker    Smokeless tobacco: Never Used   Substance and Sexual Activity    Alcohol use: No    Drug use: No    Sexual activity: No   Other Topics Concern    Are you pregnant or think you may be? No    Breast-feeding No   Social History Narrative    2 biological kids, 1 adopted9 grandkids (1 grandaucaleb lives with her while she 's attending pharmacy school at [employer])3 great[place]grandkidsGoing to [place] in [place]  [place] 1 month       Vitals:    08/14/18 0809   Temp: 98.3 °F (36.8 °C)       Physical Exam   Constitutional: She is oriented to person, place, and time. She appears well-developed and well-nourished.   HENT:   Head: Normocephalic and atraumatic.   Eyes: EOM are normal.   Neck: Normal range of motion. No JVD present.   Cardiovascular: Normal rate.   Pulmonary/Chest: Effort normal.   Abdominal: Soft. There is no rebound and no guarding.   Soft nontender to palpation, previous lap incision sites and transverse appy incision c/d/i and well-healed    Musculoskeletal: Normal range of motion.   LUE AVF thrill palpable, 2+ radial and ulnar pulses bilaterally   Neurological: She is alert and oriented to person, place, and time.   Skin: Skin is warm and dry.   Psychiatric: She has a normal mood and affect. Thought content normal.       Assessment & Plan:    Ms. Ham is a 72 year old female with CAD s/p1V CABG 2002, diabetic nephropathy and neuropathy, CKD stage V 2/2 T2DM (on insulin) with brachiocephalic AVF in place, GERD, afib (on metop), pulmonary hypertension (PAP 54), grade II diastolic dysfunction (EF 60-65%) presenting for possible elective PD placement.    - defer OR until after 6 month window post NSTEMI   - return to clinic for discussion and pre-op for PD catheter placement   - will require cardiology clearance   - continue with plans for HD via brachiocephalic AVF in the interim

## 2018-08-14 NOTE — PROGRESS NOTES
I have seen the patient, reviewed the Resident's history and physical, assessment and plan. I have personally interviewed and examined the patient at bedside and: agree with the findings.     Diabetic nephropathy not on dialysis.  She is right handed and her belt line is barely over her navel.  CAD on plavix and asa and s/p MI in June and redo stent.  Echo from June shows grade 2 diastolic dysfunction and pulmonary htn (pressure over 50).  She is s/p appy, hysterectomy and marla.  She is a good candidate for PD 3-6 months after her last MI and with cardiology clearance off plavix but ok to take ASA.

## 2018-08-15 ENCOUNTER — TELEPHONE (OUTPATIENT)
Dept: NEPHROLOGY | Facility: CLINIC | Age: 73
End: 2018-08-15

## 2018-08-15 NOTE — TELEPHONE ENCOUNTER
Please see my tel note from yesterday.  I called the pt as she had labs done yesterday-bun and creatinine worse-pt has no symptoms.  I asked her to decrease lasix from bid to once a day and check labs next Wednesday.  [facility]  Thanks.

## 2018-08-16 ENCOUNTER — TELEPHONE (OUTPATIENT)
Dept: NEPHROLOGY | Facility: CLINIC | Age: 73
End: 2018-08-16

## 2018-08-16 DIAGNOSIS — N18.4 CHRONIC KIDNEY DISEASE, STAGE IV (SEVERE): Primary | ICD-10-CM

## 2018-08-17 DIAGNOSIS — E11.9 DIABETES MELLITUS WITHOUT COMPLICATION: ICD-10-CM

## 2018-08-21 ENCOUNTER — LAB VISIT (OUTPATIENT)
Dept: LAB | Facility: HOSPITAL | Age: 73
End: 2018-08-21
Payer: MEDICARE

## 2018-08-21 ENCOUNTER — TELEPHONE (OUTPATIENT)
Dept: NEPHROLOGY | Facility: CLINIC | Age: 73
End: 2018-08-21

## 2018-08-21 DIAGNOSIS — N18.4 CHRONIC KIDNEY DISEASE, STAGE IV (SEVERE): Primary | ICD-10-CM

## 2018-08-21 DIAGNOSIS — I25.708 CORONARY ARTERY DISEASE OF BYPASS GRAFT OF NATIVE HEART WITH STABLE ANGINA PECTORIS: ICD-10-CM

## 2018-08-21 DIAGNOSIS — I25.709 CORONARY ARTERY DISEASE INVOLVING CORONARY BYPASS GRAFT OF NATIVE HEART WITH ANGINA PECTORIS: ICD-10-CM

## 2018-08-21 DIAGNOSIS — I50.42 CHRONIC COMBINED SYSTOLIC AND DIASTOLIC CONGESTIVE HEART FAILURE: ICD-10-CM

## 2018-08-21 DIAGNOSIS — N18.4 CKD STAGE 4 DUE TO TYPE 2 DIABETES MELLITUS: ICD-10-CM

## 2018-08-21 DIAGNOSIS — E11.22 CKD STAGE 4 DUE TO TYPE 2 DIABETES MELLITUS: ICD-10-CM

## 2018-08-21 DIAGNOSIS — N18.6 ESRD (END STAGE RENAL DISEASE): Primary | ICD-10-CM

## 2018-08-21 DIAGNOSIS — E11.42 DIABETIC POLYNEUROPATHY ASSOCIATED WITH TYPE 2 DIABETES MELLITUS: ICD-10-CM

## 2018-08-21 DIAGNOSIS — N18.4 CHRONIC KIDNEY DISEASE, STAGE IV (SEVERE): ICD-10-CM

## 2018-08-21 LAB
ALBUMIN SERPL BCP-MCNC: 3.8 G/DL
ANION GAP SERPL CALC-SCNC: 13 MMOL/L
BUN SERPL-MCNC: 118 MG/DL
CALCIUM SERPL-MCNC: 9.8 MG/DL
CHLORIDE SERPL-SCNC: 98 MMOL/L
CO2 SERPL-SCNC: 24 MMOL/L
CREAT SERPL-MCNC: 3.5 MG/DL
EST. GFR  (AFRICAN AMERICAN): 14.3 ML/MIN/1.73 M^2
EST. GFR  (NON AFRICAN AMERICAN): 12.4 ML/MIN/1.73 M^2
GLUCOSE SERPL-MCNC: 236 MG/DL
PHOSPHATE SERPL-MCNC: 4.9 MG/DL
POTASSIUM SERPL-SCNC: 4.6 MMOL/L
SODIUM SERPL-SCNC: 135 MMOL/L

## 2018-08-21 PROCEDURE — 36415 COLL VENOUS BLD VENIPUNCTURE: CPT

## 2018-08-21 PROCEDURE — 80069 RENAL FUNCTION PANEL: CPT

## 2018-08-21 NOTE — TELEPHONE ENCOUNTER
Hello.  I read your recent note about PD catheter placement  and cards clearance. Can you please let us know when you can place the PD catheter.  We are trying to co-ordinate her initiation.  Thanks.

## 2018-08-21 NOTE — TELEPHONE ENCOUNTER
Kathie , can you please order hepatitis labs and I ordered the cxray for tomorrow.  she needs these tomorrow at [facility]-please let the pt know when they are set up and she needs to start at [facility] for HD  ASAP this week-please get the paperwork going. I discussed this with the pt.

## 2018-08-22 ENCOUNTER — HOSPITAL ENCOUNTER (OUTPATIENT)
Dept: RADIOLOGY | Facility: HOSPITAL | Age: 73
Discharge: HOME | End: 2018-08-22
Attending: INTERNAL MEDICINE
Payer: MEDICARE

## 2018-08-22 ENCOUNTER — PATIENT MESSAGE (OUTPATIENT)
Dept: NEPHROLOGY | Facility: CLINIC | Age: 73
End: 2018-08-22

## 2018-08-22 ENCOUNTER — PATIENT MESSAGE (OUTPATIENT)
Dept: CARDIOLOGY | Facility: CLINIC | Age: 73
End: 2018-08-22

## 2018-08-22 DIAGNOSIS — N18.4 CHRONIC KIDNEY DISEASE, STAGE IV (SEVERE): ICD-10-CM

## 2018-08-22 PROCEDURE — 71046 X-RAY EXAM CHEST 2 VIEWS: CPT | Mod: TC

## 2018-08-22 PROCEDURE — 71046 X-RAY EXAM CHEST 2 VIEWS: CPT | Mod: 26,,, | Performed by: RADIOLOGY

## 2018-08-23 ENCOUNTER — OFFICE VISIT (OUTPATIENT)
Dept: NEPHROLOGY | Facility: CLINIC | Age: 73
End: 2018-08-23
Payer: MEDICARE

## 2018-08-23 ENCOUNTER — TELEPHONE (OUTPATIENT)
Dept: CARDIOLOGY | Facility: CLINIC | Age: 73
End: 2018-08-23

## 2018-08-23 VITALS
DIASTOLIC BLOOD PRESSURE: 58 MMHG | HEIGHT: 64 IN | OXYGEN SATURATION: 98 % | SYSTOLIC BLOOD PRESSURE: 118 MMHG | HEART RATE: 61 BPM | WEIGHT: 168 LBS | BODY MASS INDEX: 28.68 KG/M2

## 2018-08-23 DIAGNOSIS — I10 ESSENTIAL HYPERTENSION: ICD-10-CM

## 2018-08-23 DIAGNOSIS — N18.4 CKD STAGE 4 DUE TO TYPE 2 DIABETES MELLITUS: ICD-10-CM

## 2018-08-23 DIAGNOSIS — N25.0 RENAL OSTEODYSTROPHY: ICD-10-CM

## 2018-08-23 DIAGNOSIS — I25.709 CORONARY ARTERY DISEASE INVOLVING CORONARY BYPASS GRAFT OF NATIVE HEART WITH ANGINA PECTORIS: ICD-10-CM

## 2018-08-23 DIAGNOSIS — E11.22 CKD STAGE 4 DUE TO TYPE 2 DIABETES MELLITUS: ICD-10-CM

## 2018-08-23 DIAGNOSIS — I25.708 CORONARY ARTERY DISEASE OF BYPASS GRAFT OF NATIVE HEART WITH STABLE ANGINA PECTORIS: ICD-10-CM

## 2018-08-23 DIAGNOSIS — E11.42 DIABETIC POLYNEUROPATHY ASSOCIATED WITH TYPE 2 DIABETES MELLITUS: ICD-10-CM

## 2018-08-23 DIAGNOSIS — N18.4 CHRONIC KIDNEY DISEASE, STAGE IV (SEVERE): Primary | ICD-10-CM

## 2018-08-23 DIAGNOSIS — I50.42 CHRONIC COMBINED SYSTOLIC AND DIASTOLIC CONGESTIVE HEART FAILURE: ICD-10-CM

## 2018-08-23 PROCEDURE — 99499 UNLISTED E&M SERVICE: CPT | Mod: HCNC,S$GLB,, | Performed by: INTERNAL MEDICINE

## 2018-08-23 PROCEDURE — 99999 PR PBB SHADOW E&M-EST. PATIENT-LVL III: CPT | Mod: PBBFAC,,, | Performed by: INTERNAL MEDICINE

## 2018-08-23 PROCEDURE — 99215 OFFICE O/P EST HI 40 MIN: CPT | Mod: S$GLB,,, | Performed by: INTERNAL MEDICINE

## 2018-08-23 NOTE — PROGRESS NOTES
Subjective:       Patient ID: Zoey Ham is a 73 y.o. White female who presents for follow-up evaluation of No chief complaint on file.    HPI This is a 73 - year-old  female with longstanding diabetes, hyperparathyroidism, hypertension, and CKD is coming in for followup of these. Her blood pressures are stable at home . DM is stable  per the pt.  CKD  with very low GFR.  Has proteinuria.   Recently went in to hospital for cp and had angiogram on June 4 the and had creatinine peaked at 5.5 but did not require HD. Creatinine better at 3.5 with BUn elevated despite decreasing lasix. Nausea this morning and has fatigue and low apetite.    PAST MEDICAL HISTORY: Significant for hypertension for 10 years, diabetes   for 10 years, CABG, lacunar infarcts, hyperlipidemia, hyperparathyroidism,   anemia, CABG, CAD, and diabetic retinopathy.         Review of Systems   Constitutional: Positive for appetite change and fatigue.   Eyes: Negative for discharge.   Respiratory: Negative for cough, shortness of breath and wheezing.    Cardiovascular: Negative for chest pain and palpitations.   Gastrointestinal: Positive for nausea. Negative for abdominal pain, diarrhea and vomiting.   Genitourinary: Negative for dysuria, frequency, hematuria and urgency.   Skin: Negative for color change and rash.   Psychiatric/Behavioral: Negative for confusion.   All other systems reviewed and are negative.      Objective:      Physical Exam   Constitutional: She is oriented to person, place, and time. She appears well-developed and well-nourished.   HENT:   Right Ear: External ear normal.   Left Ear: External ear normal.   Nose: Nose normal.   Mouth/Throat: Normal dentition.   Eyes: Conjunctivae, EOM and lids are normal. Pupils are equal, round, and reactive to light.   Neck: Trachea normal. No thyroid mass present.   Cardiovascular: Normal rate and regular rhythm. Exam reveals no friction rub.   No murmur heard.  Pulmonary/Chest:  Effort normal and breath sounds normal. No respiratory distress. She has no decreased breath sounds. She has no rales.   Abdominal: Soft. Bowel sounds are normal. She exhibits no mass. There is no tenderness. There is no rebound. No hernia.   Musculoskeletal: She exhibits no edema.   Neurological: She is alert and oriented to person, place, and time.   Skin: Skin is warm and dry. No rash noted. No erythema.   l neck mole-rec derm f/u.   Psychiatric: She has a normal mood and affect. Judgment normal. Her mood appears not anxious. She does not exhibit a depressed mood.   Oriented to time, place and person.   Vitals reviewed.      Assessment:       1. Chronic kidney disease, stage IV (severe)    2. Essential hypertension    3. Renal osteodystrophy        Plan:           1.  Renovascular hypertension    2.  Type 2 diabetes mellitus with diabetic nephropathy    3.  Proteinuria    4.  Anemia of chronic renal failure, stage 4 (severe)      Plan:       1. CKD stage 4 with an MDRD GFR of 12 mL/min. Her creatinine of 3.5 with gfr of 12 ml/min and bun increased despite lowering lasix. Her CKD is secondary to longstanding hypertension and diabetes. L AV fistula with angioplasy and is ready to be used. Has low apetite, nauseated this morning and fatigue.  Rec initiating HD. Discussed extensively with family.     2. Hypertension. Blood pressures are stable  at home. On cozaar but held for the past few months b/c of hyperkalemia and low GFR. Unable to get her off metoprolol -tried in the past but HR became a problem per the pt and had to go back on it.    3. Diabetes/proteinuria:  off losartan.   proteinuria likely sec poorly controlled DM.   Did not want biopsy in the past.  Losartan has been held for the last few months b/c of low GFR. She likely has  worsening proteinuria with high A1c's and being off losartan.    4. Hyperparathyroidism.  i pth improving  on Calcitriol  -changed to  0.5 mcg.  Ca/phos stable. Low phos and low  potassium diet discussed-lists given.  6. Anemia. H&H is stable.  Iron studies stable.    On Nepro but uses it as a meal for breakfast -I asked her to use it as a supplement.  She will start HD and transition to PD when she can get the PD cathter.   Return in  1-2  months with rfp, urine protein and urine creatinine, ipth.

## 2018-08-24 RX ORDER — NITROGLYCERIN 0.3 MG/1
0.3 TABLET SUBLINGUAL EVERY 5 MIN PRN
Qty: 36 TABLET | Refills: 3 | Status: ON HOLD | OUTPATIENT
Start: 2018-08-24 | End: 2019-06-27 | Stop reason: HOSPADM

## 2018-08-24 RX ORDER — NITROGLYCERIN 0.3 MG/1
0.3 TABLET SUBLINGUAL EVERY 5 MIN PRN
Qty: 36 TABLET | Refills: 3 | Status: SHIPPED | OUTPATIENT
Start: 2018-08-24 | End: 2019-05-02 | Stop reason: SDUPTHER

## 2018-08-24 RX ORDER — INSULIN GLARGINE 100 [IU]/ML
20 INJECTION, SOLUTION SUBCUTANEOUS NIGHTLY
Qty: 10 ML | Refills: 6 | Status: SHIPPED | OUTPATIENT
Start: 2018-08-24 | End: 2018-08-29 | Stop reason: SDUPTHER

## 2018-08-24 RX ORDER — INSULIN GLARGINE 100 [IU]/ML
20 INJECTION, SOLUTION SUBCUTANEOUS NIGHTLY
Qty: 10 ML | Refills: 3 | Status: SHIPPED | OUTPATIENT
Start: 2018-08-24 | End: 2018-08-28 | Stop reason: SDUPTHER

## 2018-08-28 ENCOUNTER — TELEPHONE (OUTPATIENT)
Dept: VASCULAR SURGERY | Facility: CLINIC | Age: 73
End: 2018-08-28

## 2018-08-28 DIAGNOSIS — I25.708 CORONARY ARTERY DISEASE OF BYPASS GRAFT OF NATIVE HEART WITH STABLE ANGINA PECTORIS: ICD-10-CM

## 2018-08-28 DIAGNOSIS — E11.42 DIABETIC POLYNEUROPATHY ASSOCIATED WITH TYPE 2 DIABETES MELLITUS: ICD-10-CM

## 2018-08-28 DIAGNOSIS — N18.4 CKD STAGE 4 DUE TO TYPE 2 DIABETES MELLITUS: ICD-10-CM

## 2018-08-28 DIAGNOSIS — E11.22 CKD STAGE 4 DUE TO TYPE 2 DIABETES MELLITUS: ICD-10-CM

## 2018-08-28 NOTE — TELEPHONE ENCOUNTER
Patient called stating she needed to reschedule her appt with Dr. Perry. Appts rescheduled. Appt letter placed in mail.

## 2018-08-28 NOTE — TELEPHONE ENCOUNTER
PT will like the vials. Pt also said she will not be able to make her appt because she goes to dialysis on mondays wednsdays and Friday

## 2018-08-28 NOTE — TELEPHONE ENCOUNTER
Please check with patient and see whether she wants the vials or pens for her insulin.    SOLEDAD Moe

## 2018-08-29 ENCOUNTER — PES CALL (OUTPATIENT)
Dept: ADMINISTRATIVE | Facility: CLINIC | Age: 73
End: 2018-08-29

## 2018-08-29 RX ORDER — INSULIN GLARGINE 100 [IU]/ML
20 INJECTION, SOLUTION SUBCUTANEOUS NIGHTLY
Qty: 10 ML | Refills: 3 | Status: SHIPPED | OUTPATIENT
Start: 2018-08-29 | End: 2018-09-06 | Stop reason: SDUPTHER

## 2018-08-29 NOTE — TELEPHONE ENCOUNTER
Refill for insulin vials sent to [facility] in [place]    Thanks,  KJ

## 2018-09-04 ENCOUNTER — PATIENT MESSAGE (OUTPATIENT)
Dept: PRIMARY CARE CLINIC | Facility: CLINIC | Age: 73
End: 2018-09-04

## 2018-09-06 DIAGNOSIS — I25.708 CORONARY ARTERY DISEASE OF BYPASS GRAFT OF NATIVE HEART WITH STABLE ANGINA PECTORIS: ICD-10-CM

## 2018-09-06 DIAGNOSIS — E11.42 DIABETIC POLYNEUROPATHY ASSOCIATED WITH TYPE 2 DIABETES MELLITUS: ICD-10-CM

## 2018-09-06 DIAGNOSIS — I15.2 HYPERTENSION ASSOCIATED WITH DIABETES: ICD-10-CM

## 2018-09-06 DIAGNOSIS — I50.42 CHRONIC COMBINED SYSTOLIC AND DIASTOLIC CONGESTIVE HEART FAILURE: ICD-10-CM

## 2018-09-06 DIAGNOSIS — E11.22 CKD STAGE 4 DUE TO TYPE 2 DIABETES MELLITUS: ICD-10-CM

## 2018-09-06 DIAGNOSIS — N18.4 CKD STAGE 4 DUE TO TYPE 2 DIABETES MELLITUS: ICD-10-CM

## 2018-09-06 DIAGNOSIS — E11.59 HYPERTENSION ASSOCIATED WITH DIABETES: ICD-10-CM

## 2018-09-06 DIAGNOSIS — I15.0 RENOVASCULAR HYPERTENSION: ICD-10-CM

## 2018-09-06 RX ORDER — SYRINGE,SAFETY WITH NEEDLE,1ML 25GX1"
1 SYRINGE (EA) MISCELLANEOUS DAILY
Qty: 100 EACH | Refills: 3 | Status: SHIPPED | OUTPATIENT
Start: 2018-09-06 | End: 2019-02-13 | Stop reason: DRUGHIGH

## 2018-09-06 RX ORDER — INSULIN GLARGINE 100 [IU]/ML
20 INJECTION, SOLUTION SUBCUTANEOUS NIGHTLY
Qty: 10 ML | Refills: 3 | Status: SHIPPED | OUTPATIENT
Start: 2018-09-06 | End: 2019-01-14 | Stop reason: SDUPTHER

## 2018-09-06 RX ORDER — METOPROLOL TARTRATE 100 MG/1
100 TABLET ORAL 2 TIMES DAILY
Qty: 180 TABLET | Refills: 3 | Status: SHIPPED | OUTPATIENT
Start: 2018-09-06 | End: 2019-05-02 | Stop reason: SDUPTHER

## 2018-09-06 RX ORDER — CLOPIDOGREL BISULFATE 75 MG/1
75 TABLET ORAL DAILY
Qty: 90 TABLET | Refills: 3 | Status: SHIPPED | OUTPATIENT
Start: 2018-09-06 | End: 2019-05-02 | Stop reason: SDUPTHER

## 2018-09-06 RX ORDER — FUROSEMIDE 40 MG/1
TABLET ORAL
Qty: 180 TABLET | Refills: 3 | Status: SHIPPED | OUTPATIENT
Start: 2018-09-06 | End: 2019-04-05

## 2018-09-06 RX ORDER — ATORVASTATIN CALCIUM 80 MG/1
80 TABLET, FILM COATED ORAL DAILY
Qty: 90 TABLET | Refills: 3 | Status: SHIPPED | OUTPATIENT
Start: 2018-09-06 | End: 2019-05-02 | Stop reason: SDUPTHER

## 2018-09-06 RX ORDER — ASPIRIN 325 MG
325 TABLET, DELAYED RELEASE (ENTERIC COATED) ORAL DAILY
COMMUNITY
Start: 2018-09-06 | End: 2020-01-01 | Stop reason: SDUPTHER

## 2018-09-10 ENCOUNTER — TELEPHONE (OUTPATIENT)
Dept: SURGERY | Facility: CLINIC | Age: 73
End: 2018-09-10

## 2018-09-10 NOTE — TELEPHONE ENCOUNTER
Spoke with pt. Pt not wanting to proceed with PD cath placement at this time.  She will call back if/when she is ready.

## 2018-09-14 ENCOUNTER — OFFICE VISIT (OUTPATIENT)
Dept: VASCULAR SURGERY | Facility: CLINIC | Age: 73
End: 2018-09-14
Payer: MEDICARE

## 2018-09-14 ENCOUNTER — HOSPITAL ENCOUNTER (OUTPATIENT)
Dept: VASCULAR SURGERY | Facility: CLINIC | Age: 73
Discharge: HOME | End: 2018-09-14
Attending: SURGERY
Payer: MEDICARE

## 2018-09-14 VITALS
SYSTOLIC BLOOD PRESSURE: 131 MMHG | HEIGHT: 64 IN | HEART RATE: 70 BPM | WEIGHT: 165.38 LBS | DIASTOLIC BLOOD PRESSURE: 62 MMHG | TEMPERATURE: 98 F | BODY MASS INDEX: 28.24 KG/M2

## 2018-09-14 DIAGNOSIS — T82.858D STENOSIS OF ARTERIOVENOUS DIALYSIS FISTULA, SUBSEQUENT ENCOUNTER: Primary | ICD-10-CM

## 2018-09-14 DIAGNOSIS — Z01.818 PRE-OP EVALUATION: Primary | ICD-10-CM

## 2018-09-14 DIAGNOSIS — Z99.2 ESRD (END STAGE RENAL DISEASE) ON DIALYSIS: ICD-10-CM

## 2018-09-14 DIAGNOSIS — N18.6 ESRD (END STAGE RENAL DISEASE) ON DIALYSIS: ICD-10-CM

## 2018-09-14 DIAGNOSIS — T82.590A DIALYSIS AV FISTULA MALFUNCTION: ICD-10-CM

## 2018-09-14 PROCEDURE — 1101F PT FALLS ASSESS-DOCD LE1/YR: CPT | Mod: CPTII,,, | Performed by: SURGERY

## 2018-09-14 PROCEDURE — 99999 PR PBB SHADOW E&M-EST. PATIENT-LVL III: CPT | Mod: PBBFAC,,, | Performed by: SURGERY

## 2018-09-14 PROCEDURE — 99215 OFFICE O/P EST HI 40 MIN: CPT | Mod: S$PBB,,, | Performed by: SURGERY

## 2018-09-14 PROCEDURE — 93990 DOPPLER FLOW TESTING: CPT | Mod: 26,S$PBB,, | Performed by: SURGERY

## 2018-09-14 PROCEDURE — 99213 OFFICE O/P EST LOW 20 MIN: CPT | Mod: PBBFAC,25 | Performed by: SURGERY

## 2018-09-14 PROCEDURE — 93990 DOPPLER FLOW TESTING: CPT | Mod: PBBFAC | Performed by: SURGERY

## 2018-09-14 RX ORDER — MUPIROCIN 20 MG/G
OINTMENT TOPICAL
Status: CANCELLED | OUTPATIENT
Start: 2018-09-14

## 2018-09-14 RX ORDER — SODIUM CHLORIDE 0.9 % (FLUSH) 0.9 %
5 SYRINGE (ML) INJECTION
Status: CANCELLED | OUTPATIENT
Start: 2018-09-14

## 2018-09-14 RX ORDER — LIDOCAINE HYDROCHLORIDE 10 MG/ML
1 INJECTION, SOLUTION EPIDURAL; INFILTRATION; INTRACAUDAL; PERINEURAL ONCE
Status: CANCELLED | OUTPATIENT
Start: 2018-09-14 | End: 2018-09-14

## 2018-09-14 NOTE — H&P (VIEW-ONLY)
REFERRING PHYSICIAN:  Karla Iglesias D.O.    HISTORY OF PRESENT ILLNESS:  A 72-year-old female with advanced stage IV chronic   kidney disease with a creatinine of 2.8 and GFR of 16.4, who is sent for   dialysis access.  She is right-handed.  She has no history of central venous   cannulation, AICD or pacemaker placement.    1. PTa, L AVF 5/31/18  2.  L brachio-cephalic AVF 4/16/18.  No c/o    Now using the AVF x ~4 wks.  Intermittent issues with cannulation    PAST MEDICAL HISTORY:  1.  Coronary artery disease, status post MI x5.  Recent MI with PCI  2.  Hypertension.  3.  Diabetes.  4.  Hyperparathyroidism.  5.  Chronic atrial fibrillation.    PAST SURGICAL HISTORY:  1.  Hysterectomy.  2.  Tonsillectomy.  3.  Appendectomy.  4.  Coronary artery bypass.  5.  PCI.  6.  Fracture surgery.    FAMILY HISTORY:  Positive for diabetes and hypertension.    MEDICATIONS:  Include Plavix, aspirin and statin.  See EPIC for full list.    ALLERGIES:  Penicillin, Percodan and Phenergan.    REVIEW OF SYSTEMS:  Denies postprandial pain or DVT.  All other systems including eyes, ENT, respiratory, musculoskeletal, breast,   neurologic, psychiatric, heme, lymph, allergy and immune are negative.    PHYSICAL EXAMINATION:  VITAL SIGNS:  See nursing notes.  GENERAL:  She is in no acute distress.  RESPIRATORY:  Normal effort.  Clear to claudication.  CARDIOVASCULAR:  Regular rhythm.  Nondisplaced PMI, no murmur.  VASCULAR:  L radial not palp  EXTREMITIES:  Incision well healed.   Signif proximal pulsatility with abrupt change to pure thill ~4-5 cm up from anast. + ecchymosis  NEUROLOGIC:  Cranial nerves VII through XII intact.  5/5 motor strength in all   extremities.    IMAGING:  Recurrent AVF stenosis (652), flow 1.4 L    PRior fistulagram reviewed    ASSESSMENT:  Recurrent prox AVF stenosis with cannulation difficulties  Recent MI    RECOMMENDATION:  1. L fistualgram (access near anast) and intervention , poss DCB 9/20/18    I have  explained the risks, benefits and alternatives for this procedure in detail.  The patients voices understanding and all questions have be answered, and agrees to proceed with the procedure.    JULIO CÉSAR Perry III, MD, FACS  Professor and Chief, Vascular and Endovascular Surgery    CC: Karla Iglesias D.O.

## 2018-09-18 ENCOUNTER — PATIENT MESSAGE (OUTPATIENT)
Dept: SURGERY | Facility: HOSPITAL | Age: 73
End: 2018-09-18

## 2018-09-19 ENCOUNTER — TELEPHONE (OUTPATIENT)
Dept: VASCULAR SURGERY | Facility: CLINIC | Age: 73
End: 2018-09-19

## 2018-09-19 NOTE — PRE-PROCEDURE INSTRUCTIONS
"Spoke with Patient.  NPO, medication, and pre-op instructions reviewed.  Had an isolated incident when her Cardiac stent was placed at [facility] where "I stopped breathing."  Asked that we "be careful not to give me too much Anesthesia."  Has had surgeries since without incident.  Her morning glucose readings have been .  Will occasionally wake up at night with symptoms of Hypoglycemia.  Instructed that she can have 4 ounces of a clear juice or liquid if her glucose drops during the night.  Verbalized understanding of instructions.    "

## 2018-09-20 ENCOUNTER — ANESTHESIA (OUTPATIENT)
Dept: SURGERY | Facility: HOSPITAL | Age: 73
End: 2018-09-20
Payer: MEDICARE

## 2018-09-20 ENCOUNTER — ANESTHESIA EVENT (OUTPATIENT)
Dept: SURGERY | Facility: HOSPITAL | Age: 73
End: 2018-09-20
Payer: MEDICARE

## 2018-09-20 ENCOUNTER — TELEPHONE (OUTPATIENT)
Dept: VASCULAR SURGERY | Facility: CLINIC | Age: 73
End: 2018-09-20

## 2018-09-20 ENCOUNTER — HOSPITAL ENCOUNTER (OUTPATIENT)
Facility: HOSPITAL | Age: 73
Discharge: HOME | End: 2018-09-20
Attending: SURGERY | Admitting: SURGERY
Payer: MEDICARE

## 2018-09-20 VITALS
TEMPERATURE: 97 F | HEIGHT: 64 IN | DIASTOLIC BLOOD PRESSURE: 71 MMHG | WEIGHT: 165 LBS | OXYGEN SATURATION: 100 % | HEART RATE: 63 BPM | BODY MASS INDEX: 28.17 KG/M2 | RESPIRATION RATE: 18 BRPM | SYSTOLIC BLOOD PRESSURE: 165 MMHG

## 2018-09-20 DIAGNOSIS — Z99.2 ESRD (END STAGE RENAL DISEASE) ON DIALYSIS: Primary | ICD-10-CM

## 2018-09-20 DIAGNOSIS — N18.6 ESRD (END STAGE RENAL DISEASE) ON DIALYSIS: Primary | ICD-10-CM

## 2018-09-20 DIAGNOSIS — T82.590A DIALYSIS AV FISTULA MALFUNCTION: ICD-10-CM

## 2018-09-20 LAB
POCT GLUCOSE: 109 MG/DL (ref 70–110)
POCT GLUCOSE: 96 MG/DL (ref 70–110)

## 2018-09-20 PROCEDURE — 63600175 PHARM REV CODE 636 W HCPCS: Performed by: STUDENT IN AN ORGANIZED HEALTH CARE EDUCATION/TRAINING PROGRAM

## 2018-09-20 PROCEDURE — 37000008 HC ANESTHESIA 1ST 15 MINUTES: Performed by: SURGERY

## 2018-09-20 PROCEDURE — 63600175 PHARM REV CODE 636 W HCPCS: Performed by: SURGERY

## 2018-09-20 PROCEDURE — 36000707: Performed by: SURGERY

## 2018-09-20 PROCEDURE — C2623 CATH, TRANSLUMIN, DRUG-COAT: HCPCS | Performed by: SURGERY

## 2018-09-20 PROCEDURE — 82962 GLUCOSE BLOOD TEST: CPT | Mod: 91 | Performed by: SURGERY

## 2018-09-20 PROCEDURE — 36902 INTRO CATH DIALYSIS CIRCUIT: CPT | Mod: ,,, | Performed by: SURGERY

## 2018-09-20 PROCEDURE — 25500020 PHARM REV CODE 255: Performed by: SURGERY

## 2018-09-20 PROCEDURE — D9220A PRA ANESTHESIA: Mod: CRNA,,, | Performed by: NURSE ANESTHETIST, CERTIFIED REGISTERED

## 2018-09-20 PROCEDURE — 63600175 PHARM REV CODE 636 W HCPCS: Performed by: NURSE ANESTHETIST, CERTIFIED REGISTERED

## 2018-09-20 PROCEDURE — 37000009 HC ANESTHESIA EA ADD 15 MINS: Performed by: SURGERY

## 2018-09-20 PROCEDURE — 82962 GLUCOSE BLOOD TEST: CPT | Performed by: SURGERY

## 2018-09-20 PROCEDURE — 36000706: Performed by: SURGERY

## 2018-09-20 PROCEDURE — D9220A PRA ANESTHESIA: Mod: ANES,,, | Performed by: ANESTHESIOLOGY

## 2018-09-20 PROCEDURE — 25000003 PHARM REV CODE 250: Performed by: SURGERY

## 2018-09-20 PROCEDURE — 27201423 OPTIME MED/SURG SUP & DEVICES STERILE SUPPLY: Performed by: SURGERY

## 2018-09-20 PROCEDURE — C1894 INTRO/SHEATH, NON-LASER: HCPCS | Performed by: SURGERY

## 2018-09-20 PROCEDURE — C1769 GUIDE WIRE: HCPCS | Performed by: SURGERY

## 2018-09-20 PROCEDURE — 71000015 HC POSTOP RECOV 1ST HR: Performed by: SURGERY

## 2018-09-20 PROCEDURE — 25000003 PHARM REV CODE 250: Performed by: NURSE ANESTHETIST, CERTIFIED REGISTERED

## 2018-09-20 PROCEDURE — 25000003 PHARM REV CODE 250: Performed by: STUDENT IN AN ORGANIZED HEALTH CARE EDUCATION/TRAINING PROGRAM

## 2018-09-20 PROCEDURE — C1725 CATH, TRANSLUMIN NON-LASER: HCPCS | Performed by: SURGERY

## 2018-09-20 PROCEDURE — 71000044 HC DOSC ROUTINE RECOVERY FIRST HOUR: Performed by: SURGERY

## 2018-09-20 RX ORDER — SODIUM CHLORIDE 0.9 % (FLUSH) 0.9 %
5 SYRINGE (ML) INJECTION
Status: DISCONTINUED | OUTPATIENT
Start: 2018-09-20 | End: 2018-09-20 | Stop reason: HOSPADM

## 2018-09-20 RX ORDER — VANCOMYCIN HCL IN 5 % DEXTROSE 1G/250ML
1000 PLASTIC BAG, INJECTION (ML) INTRAVENOUS
Status: COMPLETED | OUTPATIENT
Start: 2018-09-20 | End: 2018-09-20

## 2018-09-20 RX ORDER — LIDOCAINE HYDROCHLORIDE 10 MG/ML
INJECTION, SOLUTION EPIDURAL; INFILTRATION; INTRACAUDAL; PERINEURAL
Status: DISCONTINUED | OUTPATIENT
Start: 2018-09-20 | End: 2018-09-20 | Stop reason: HOSPADM

## 2018-09-20 RX ORDER — LIDOCAINE HYDROCHLORIDE 10 MG/ML
1 INJECTION, SOLUTION EPIDURAL; INFILTRATION; INTRACAUDAL; PERINEURAL ONCE
Status: COMPLETED | OUTPATIENT
Start: 2018-09-20 | End: 2018-09-20

## 2018-09-20 RX ORDER — HEPARIN SODIUM 1000 [USP'U]/ML
INJECTION, SOLUTION INTRAVENOUS; SUBCUTANEOUS
Status: DISCONTINUED | OUTPATIENT
Start: 2018-09-20 | End: 2018-09-20 | Stop reason: HOSPADM

## 2018-09-20 RX ORDER — MIDAZOLAM HYDROCHLORIDE 1 MG/ML
INJECTION, SOLUTION INTRAMUSCULAR; INTRAVENOUS
Status: DISCONTINUED | OUTPATIENT
Start: 2018-09-20 | End: 2018-09-20

## 2018-09-20 RX ORDER — IODIXANOL 320 MG/ML
INJECTION, SOLUTION INTRAVASCULAR
Status: DISCONTINUED | OUTPATIENT
Start: 2018-09-20 | End: 2018-09-20 | Stop reason: HOSPADM

## 2018-09-20 RX ORDER — SODIUM CHLORIDE 9 MG/ML
INJECTION, SOLUTION INTRAVENOUS CONTINUOUS PRN
Status: DISCONTINUED | OUTPATIENT
Start: 2018-09-20 | End: 2018-09-20

## 2018-09-20 RX ORDER — FENTANYL CITRATE 50 UG/ML
25 INJECTION, SOLUTION INTRAMUSCULAR; INTRAVENOUS EVERY 5 MIN PRN
Status: DISCONTINUED | OUTPATIENT
Start: 2018-09-20 | End: 2018-09-20 | Stop reason: HOSPADM

## 2018-09-20 RX ORDER — PROPOFOL 10 MG/ML
VIAL (ML) INTRAVENOUS
Status: DISCONTINUED | OUTPATIENT
Start: 2018-09-20 | End: 2018-09-20

## 2018-09-20 RX ORDER — PROPOFOL 10 MG/ML
VIAL (ML) INTRAVENOUS CONTINUOUS PRN
Status: DISCONTINUED | OUTPATIENT
Start: 2018-09-20 | End: 2018-09-20

## 2018-09-20 RX ORDER — MUPIROCIN 20 MG/G
OINTMENT TOPICAL
Status: DISCONTINUED | OUTPATIENT
Start: 2018-09-20 | End: 2018-09-20 | Stop reason: HOSPADM

## 2018-09-20 RX ORDER — SODIUM CHLORIDE 0.9 % (FLUSH) 0.9 %
3 SYRINGE (ML) INJECTION
Status: DISCONTINUED | OUTPATIENT
Start: 2018-09-20 | End: 2018-09-20 | Stop reason: HOSPADM

## 2018-09-20 RX ADMIN — VANCOMYCIN HYDROCHLORIDE 1000 MG: 1 INJECTION, POWDER, LYOPHILIZED, FOR SOLUTION INTRAVENOUS at 10:09

## 2018-09-20 RX ADMIN — LIDOCAINE HYDROCHLORIDE 1 MG: 10 INJECTION, SOLUTION EPIDURAL; INFILTRATION; INTRACAUDAL at 10:09

## 2018-09-20 RX ADMIN — PROPOFOL 125 MCG/KG/MIN: 10 INJECTION, EMULSION INTRAVENOUS at 01:09

## 2018-09-20 RX ADMIN — MIDAZOLAM HYDROCHLORIDE 1 MG: 1 INJECTION, SOLUTION INTRAMUSCULAR; INTRAVENOUS at 12:09

## 2018-09-20 RX ADMIN — MUPIROCIN: 20 OINTMENT TOPICAL at 10:09

## 2018-09-20 RX ADMIN — PROPOFOL 25 MG: 10 INJECTION, EMULSION INTRAVENOUS at 01:09

## 2018-09-20 RX ADMIN — SODIUM CHLORIDE: 0.9 INJECTION, SOLUTION INTRAVENOUS at 12:09

## 2018-09-20 NOTE — PLAN OF CARE
Problem: Patient Care Overview  Goal: Plan of Care Review  Outcome: Outcome(s) achieved Date Met: 09/20/18  Awake and alert. VSS. Denies pain or nausea. Tolerating liquids well.  DC instructions given to patient and family and they verbalize understanding.

## 2018-09-20 NOTE — OP NOTE
Date: 09/20/2018    Surgeon(s) and Role:   YAMEL Perry III, MD - Primary  Joan Nieves MD - Assisting     Pre-op Diagnosis:  Stenosis of arteriovenous dialysis fistula, subsequent encounter [T82.858D]     Post-op Diagnosis:  Post-Op Diagnosis Codes:     * Stenosis of arteriovenous dialysis fistula, subsequent encounter [T82.858D]    Procedure(s):   1. Drug-coated PTA, L AVF (7x60 Lutonix)  2. L fistulagram    Anesthesia: Local MAC    Findings/Key Components:   recurrent > 50% proximal stenosis, no residual after PTA    EBL: Minimal    PROCEDURE IN DETAIL:  The patient was brought to the Cath Lab, placed in supine position. Her L arm was prepped and draped in the standard surgical fashion. Under ultrasound guidance, the proximal aspect of the fistula was accessed with a micropuncture needle; ultrasound confirmed vessel patency, followed by placement of 4/3-Thai micropuncture dilator. Fistulogram was performed and revealed a recurrent proximal stenosis. Through this, an 0.035-inch wire was placed, followed by a the short 6-Thai sheath. The 0.035-inch Glidewire was placed through the high-grade stenosis. It was treated with a 7x60 dorado balloon (10 dustin 2 min). Resolution of the stenosis was noted. Strong thrill could be felt. Due to the recurrent nature of the lesion, a 7x60 lutonix balloon was then used (14 dustin 2 min). The sheath was removed, and a 3-0 monocryl stitch was placed with good hemostasis.    Joan Nieves MD   Fellow, Vascular/Endovascular Surgery

## 2018-09-20 NOTE — BRIEF OP NOTE
[facility]  Brief Operative Note     SUMMARY     Surgery Date: 9/20/2018     Surgeon(s) and Role:     * YAMEL Perry III, MD - Primary     * Joan Nieves MD - Resident - Assisting        Pre-op Diagnosis:  Stenosis of arteriovenous dialysis fistula, subsequent encounter [T82.858D]    Post-op Diagnosis:  Post-Op Diagnosis Codes:     * Stenosis of arteriovenous dialysis fistula, subsequent encounter [T82.858D]    PROCEDURES:    1. Drug-coated PTA, L AVF (7x60 Lutonix)  2. L fistulagram    Anesthesia: Local MAC    Description of the findings of the procedure: recurrent > 50% proximal stenosis, no residual after PTA    Findings/Key Components: as above    Estimated Blood Loss: <3cc         Specimens:   Specimen (12h ago, onward)    None          Discharge Note    SUMMARY     Admit Date: 9/20/2018    Discharge Date and Time: 9/20/18    Hospital Course (synopsis of major diagnoses, care, treatment, and services provided during the course of the hospital stay): successful outpatient procedure    Final Diagnosis: Post-Op Diagnosis Codes:     * Stenosis of arteriovenous dialysis fistula, subsequent encounter [T82.858D]    Disposition: home    Follow Up/Patient Instructions: Diet: renal  Act: ad marissa  FU: 5 days with AVF duplex     Medications: pre-op

## 2018-09-20 NOTE — DISCHARGE INSTRUCTIONS
Arteriovenous (AV) Fistula for Dialysis  An AV fistula is a connection between an artery and a vein. For this procedure, an AV fistula is surgically created using an artery and a vein in your arm. (Your healthcare provider will let you know if another site is to be used.) When the artery and vein are joined, blood flow increases from the artery into the vein. As a result, the vein gets bigger over time. The enlarged vein provides easier access to the blood for a treatment for kidney failure (dialysis). This sheet explains the procedure and what to expect.     An AV fistula increases blood flow from the artery into the vein. Over time, the vein becomes stronger and enlarged.   Preparing for the procedure  Prepare as you have been told. In addition:  · Tell your healthcare provider about all the medicines you take. This includes all over-the-counter and prescription medicines, and street drugs. It also includes herbs, vitamins, and other supplements. You may need to stop taking some or all of them before the procedure.  · Follow any directions youre given for not eating or drinking before the procedure.  · Do not allow anyone to draw blood from or take blood pressure on the arm that will have the fistula before the procedure.  The day of the procedure  The procedure takes about 1 to 2 hours. Youll likely go home the same day.  Before the procedure begins:  · An IV (intravenous) line is put into a vein in the arm or hand not being used for the procedure. This line supplies fluids and medicines.  · To keep you free of pain during the procedure, youre given general anesthesia. This medicine puts you into a state like a deep sleep through the procedure. Or a nerve block may be used. This medicine numbs the arm. With it, you may also be given medicine that makes you relaxed and drowsy through the procedure.  During the procedure:  · The skin over your arm may be injected with numbing medicine.  · One or more small  cuts (incisions) are then made through the numbed skin. This depends on the size of your arm and the depth of the vein in your arm.  · The vein is attached to the selected artery.  · Any incisions made are then closed with stitches (sutures), staples, surgical glue, or strips of surgical tape.  After the procedure:  · Youll be asked to keep your arm raised (elevated) as often as possible for at least a week after the procedure.  · Youll be given medicines to manage pain as needed.  · Your arm and hand will be checked to make sure blood is flowing through the fistula properly. The feeling of blood rushing through the fistula is called a thrill. It is somewhat similar to the purring of a cat. Youll be taught how to check for this feeling each day to make sure there are no problems with your fistula. Youll also be taught how to care for your fistula at home.  · When its time for you to leave the hospital, have an adult family member or friend ready to drive you home.  Recovering at home  Once at home, follow all of the instructions youve been given. Be sure to:  · Take all medicines as directed.  · Care for your incision as instructed.  · Check for signs of infection at the incision site (see below).  · Avoid heavy lifting and strenuous activities as directed.  · Monitor and care for your fistula as instructed.  · Do your hand and arm exercises as instructed. This usually involves squeezing a ball in your hand for a few minutes each hour.  Call your healthcare provider if you have any of the following:  · Fever of 100.4°F (38°C) or higher  · Signs of infection at the incision site, such as increased redness or swelling, warmth, worsening pain, bleeding, or bad-smelling drainage  · You cant feel a thrill (the vibration of blood going through your arm)  · Pain or numbness in your fingers, hand, or arm  · Bleeding, redness, or warmth around your fistula  · Sudden bulging of the fistula (more than usual; a slight  bulge is normal)   Follow-Up  Your healthcare provider will check your fistula within 1 to 2 weeks after the procedure. It will likely take about 6 to 8 weeks for the fistula to enlarge enough to start dialysis. After that, make sure the fistula is checked each time you have dialysis. Your healthcare provider may also suggest checkups every 6 months.  Risks and possible complications include:  · The fistula not working properly  · Long wait before the fistula is ready (up to 6 months)  · Coldness or numbness in the hand (due to blood flowing away from the hand and into the fistula)  · An unsightly bump under the skin (due to enlargement of the fistula)  · Prolonged bleeding from the fistula after dialysis  · Narrowing or weakening of the blood vessels used for the fistula  · Formation of blood clots in the blood vessels used for the fistula  · Risks of anesthesia or any other medicines used during the procedure   Living with an AV Fistula  A problem, such as a narrowing (stricture) of the vein or an infection, can make the fistula unusable. If this happens, you may need other treatments to repair or make a new fistula. To protect your fistula, follow these and any other guidelines youre given:  · Check your fistula as often as your healthcare provider says. If you cant feel your thrill, let your provider know right away.  · Make sure your fistula is checked before each dialysis treatment.  · Dont let anyone draw blood from or take blood pressure on the arm that has the fistula.  · Wash your hands often and keep the area around your fistula clean.  · Dont sleep on the arm that has the fistula.  · Dont wear tight jewelry or a watch on the arm with your fistula.  · Protect your fistula from cuts, scrapes, or blows.  Date Last Reviewed: 1/1/2017  © 8341-9709 [employer]. [address] [place] [address], [place] [address]. All rights reserved. This information is not intended as a substitute for professional  medical care. Always follow your healthcare professional's instructions.

## 2018-09-20 NOTE — TRANSFER OF CARE
"Anesthesia Transfer of Care Note    Patient: Zoey Ham    Procedure(s) Performed: Procedure(s) (LRB):  Fistulogram (Left)  PTA (ANGIOPLASTY, PERCUTANEOUS, TRANSLUMINAL) (N/A)    Patient location: PACU    Anesthesia Type: general    Transport from OR: Transported from OR on 6-10 L/min O2 by face mask with adequate spontaneous ventilation    Post pain: adequate analgesia    Post assessment: no apparent anesthetic complications    Post vital signs: stable    Level of consciousness: sedated    Nausea/Vomiting: no nausea/vomiting    Complications: none    Transfer of care protocol was followed      Last vitals:   Visit Vitals  BP (!) 163/72 (BP Location: Right arm, Patient Position: Lying)   Pulse 70   Temp 36.6 °C (97.9 °F) (Oral)   Resp 18   Ht 5' 4" (1.626 m)   Wt 74.8 kg (165 lb)   LMP  (LMP Unknown)   SpO2 97%   Breastfeeding? No   BMI 28.32 kg/m²     "

## 2018-09-20 NOTE — ANESTHESIA PREPROCEDURE EVALUATION
09/20/2018  Zoey Ham is a 73 y.o., female.    Active Ambulatory Problems     Diagnosis Date Noted    Hypertension associated with diabetes 07/03/2012    Proliferative diabetic retinopathy 10/03/2012    Atherosclerosis of aorta 10/03/2012    Anemia of chronic renal failure, stage 4 (severe) 01/22/2015    Nausea 01/24/2015    Diabetic polyneuropathy associated with type 2 diabetes mellitus 07/09/2015    CKD stage 4 due to type 2 diabetes mellitus 03/11/2016    Uncontrolled type 2 diabetes mellitus with stage 4 chronic kidney disease, with long-term current use of insulin 12/06/2016    Obesity (BMI 30.0-34.9) 12/06/2016    Hyperparathyroidism 12/06/2016    Chronic atrial fibrillation 12/06/2016    Coronary artery disease involving coronary bypass graft of native heart with unstable angina pectoris 01/18/2017    Senile purpura 01/18/2017    Osteopenia of multiple sites 01/18/2017    Type 2 diabetes mellitus with hyperlipidemia 02/08/2017    History of non-ST elevation myocardial infarction (NSTEMI) 02/09/2017    S/P drug eluting coronary stent placement 02/20/2017    Acute on chronic diastolic heart failure 04/04/2017    Acute gastritis without hemorrhage 07/06/2017    Breast calcification, left 07/06/2017    Hypovitaminosis D 07/06/2017    Wrist pain, acute, unspecified laterality 11/30/2017    ESRD (end stage renal disease) on dialysis     Preop examination     Abnormal mammogram 05/14/2018    Hemodialysis access, AV graft 05/14/2018    Stenosis of arteriovenous dialysis fistula 05/22/2018    AV shunt malfunction 05/30/2018    NSTEMI (non-ST elevated myocardial infarction) 06/04/2018    CKD (chronic kidney disease), stage V 06/04/2018    Acute renal failure superimposed on stage 5 chronic kidney disease, not on chronic dialysis 06/05/2018    TACO (transfusion associated  circulatory overload) 06/06/2018    Acute hypoxemic respiratory failure 06/07/2018    Metabolic acidosis 06/07/2018    Hyperphosphatemia 06/07/2018    Acute blood loss anemia 06/07/2018    ASHLEY (acute kidney injury) 06/07/2018     Resolved Ambulatory Problems     Diagnosis Date Noted    Chronic combined systolic and diastolic congestive heart failure 10/03/2012    Cervicalgia 10/03/2012    Acute bronchitis 04/15/2014    Leukocytosis 04/16/2014    Cholelithiasis 01/19/2015    Flank pain 01/22/2015    Intractable abdominal pain 01/22/2015    UTI (urinary tract infection) 01/24/2015    Acute subendocardial infarction, initial episode of care 07/09/2015    Urinary tract infection with hematuria 02/28/2016    Sepsis 02/28/2016    Social isolation 01/18/2017    Unstable angina 02/08/2017    Acute chest pain 02/08/2017     Past Medical History:   Diagnosis Date    Acute myocardial infarction of anterolateral wall 7/9/2015    Anemia of chronic renal failure, stage 4 (severe) 1/22/2015    Atherosclerosis of aorta 10/3/2012    AV shunt malfunction     Benign hypertension with CKD (chronic kidney disease) stage IV 3/11/2016    Chronic diastolic congestive heart failure 10/3/2012    Chronic kidney disease, stage IV (severe) 7/3/2012    Coronary artery disease     Diabetic polyneuropathy associated with type 2 diabetes mellitus 7/9/2015    Diabetic polyneuropathy associated with type 2 diabetes mellitus 7/9/2015    Dialysis patient     Encounter for blood transfusion     Gastritis without bleeding     Heart attack 09/2012    Hyperlipidemia     Hyperparathyroidism     Metabolic acidosis     Nephritis and nephropathy, 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 7/9/2015    NSTEMI (non-ST elevated myocardial infarction)     NSTEMI (non-ST elevated myocardial infarction)     Occlusion and stenosis of carotid artery with cerebral infarction 7/9/2015    Osteopenia     PAF (paroxysmal atrial  fibrillation) 10/3/2012    Pneumonia     Proliferative diabetic retinopathy 10/3/2012    Sepsis due to Escherichia coli with acute renal failure 2/2016    TACO (transfusion associated circulatory overload)     Type II diabetes mellitus with neurological manifestations 7/9/2015    Type II diabetes mellitus with renal manifestations 7/3/2012     TTE 2018  CONCLUSIONS     1 - Normal left ventricular systolic function (EF 60-65%).     2 - Impaired LV relaxation, elevated LAP (grade 2 diastolic dysfunction).     3 - Normal right ventricular systolic function .     4 - Pulmonary hypertension. The estimated PA systolic pressure is 54 mmHg.     5 - Trivial tricuspid regurgitation.     6 - Intermediate central venous pressure.     7 - Concentric remodeling.     8 - No wall motion abnormalities.     Anesthesia Evaluation    I have reviewed the Patient Summary Reports.    I have reviewed the Nursing Notes.   I have reviewed the Medications.     Review of Systems  Anesthesia Hx:  Denies Hx of Anesthetic complications    Social:  No Alcohol Use, Non-Smoker    Cardiovascular:   Hypertension Past MI CAD asymptomatic   Denies Angina. CHF NSTEMI 5/2018 complicated by transfusion associated circulatory overload (TACO).    S/P CABG x 1 2002, S/P OM1 stent 03/26/2003, S/P RCA stent 03/26/2003, NSTEMI 2/05, PCI proximal LAD, OM2, OM3, s/ PCI OM1    Pulmonary:   Pneumonia    Renal/:   Chronic Renal Disease, CRI    Neurological:   Neuromuscular Disease,    Endocrine:   Diabetes, well controlled, type 2        Physical Exam  General:  Well nourished, Obesity    Airway/Jaw/Neck:  Airway Findings: Mouth Opening: Normal Tongue: Normal  General Airway Assessment: Adult  Mallampati: III  Improves to II with phonation.  TM Distance: Normal, at least 6 cm  Jaw/Neck Findings:  Neck ROM: Normal ROM      Dental:  Dental Findings: In tact   Chest/Lungs:  Chest/Lungs Findings: Clear to auscultation, Normal Respiratory Rate      Heart/Vascular:  Heart Findings: Rate: Normal  Rhythm: Regular Rhythm        Mental Status:  Mental Status Findings:  Cooperative, Alert and Oriented         Anesthesia Plan  Type of Anesthesia, risks & benefits discussed:  Anesthesia Type:  general  Patient's Preference:   Intra-op Monitoring Plan: standard ASA monitors  Intra-op Monitoring Plan Comments:   Post Op Pain Control Plan: multimodal analgesia, IV/PO Opioids PRN and per primary service following discharge from PACU  Post Op Pain Control Plan Comments:   Induction:   IV  Beta Blocker:  Patient is not currently on a Beta-Blocker (No further documentation required).       Informed Consent: Patient understands risks and agrees with Anesthesia plan.  Questions answered. Anesthesia consent signed with patient.  ASA Score: 3     Day of Surgery Review of History & Physical:    H&P update referred to the surgeon.         Ready For Surgery From Anesthesia Perspective.

## 2018-09-20 NOTE — ANESTHESIA POSTPROCEDURE EVALUATION
"Anesthesia Post Evaluation    Patient: Zoey Ham    Procedure(s) Performed: Procedure(s) (LRB):  Fistulogram (Left)  PTA (ANGIOPLASTY, PERCUTANEOUS, TRANSLUMINAL) (N/A)    Final Anesthesia Type: general  Patient location during evaluation: PACU  Patient participation: Yes- Able to Participate  Level of consciousness: awake and alert, awake and oriented  Post-procedure vital signs: reviewed and stable  Pain management: adequate  Airway patency: patent  PONV status at discharge: No PONV  Anesthetic complications: no      Cardiovascular status: blood pressure returned to baseline, stable and hemodynamically stable  Respiratory status: unassisted, spontaneous ventilation and room air  Hydration status: euvolemic  Follow-up not needed.        Visit Vitals  BP (!) 165/71   Pulse 63   Temp 36 °C (96.8 °F) (Temporal)   Resp 18   Ht 5' 4" (1.626 m)   Wt 74.8 kg (165 lb)   LMP  (LMP Unknown)   SpO2 100%   Breastfeeding? No   BMI 28.32 kg/m²       Pain/Claude Score: Pain Assessment Performed: Yes (9/20/2018  3:15 PM)  Presence of Pain: denies (9/20/2018  3:15 PM)  Claude Score: 10 (9/20/2018  2:30 PM)        "

## 2018-09-25 ENCOUNTER — HOSPITAL ENCOUNTER (OUTPATIENT)
Dept: VASCULAR SURGERY | Facility: CLINIC | Age: 73
Discharge: HOME | End: 2018-09-25
Attending: SURGERY
Payer: MEDICARE

## 2018-09-25 ENCOUNTER — OFFICE VISIT (OUTPATIENT)
Dept: VASCULAR SURGERY | Facility: CLINIC | Age: 73
End: 2018-09-25
Payer: MEDICARE

## 2018-09-25 VITALS
WEIGHT: 169.75 LBS | BODY MASS INDEX: 28.98 KG/M2 | HEIGHT: 64 IN | TEMPERATURE: 99 F | HEART RATE: 73 BPM | DIASTOLIC BLOOD PRESSURE: 57 MMHG | SYSTOLIC BLOOD PRESSURE: 121 MMHG

## 2018-09-25 DIAGNOSIS — Z99.2 ESRD (END STAGE RENAL DISEASE) ON DIALYSIS: ICD-10-CM

## 2018-09-25 DIAGNOSIS — Z99.2 ESRD (END STAGE RENAL DISEASE) ON DIALYSIS: Primary | ICD-10-CM

## 2018-09-25 DIAGNOSIS — N18.6 ESRD (END STAGE RENAL DISEASE) ON DIALYSIS: Primary | ICD-10-CM

## 2018-09-25 DIAGNOSIS — N18.6 ESRD (END STAGE RENAL DISEASE) ON DIALYSIS: ICD-10-CM

## 2018-09-25 PROBLEM — N17.9 AKI (ACUTE KIDNEY INJURY): Status: RESOLVED | Noted: 2018-06-07 | Resolved: 2018-09-25

## 2018-09-25 PROCEDURE — 93990 DOPPLER FLOW TESTING: CPT | Mod: PBBFAC | Performed by: SURGERY

## 2018-09-25 PROCEDURE — 99214 OFFICE O/P EST MOD 30 MIN: CPT | Mod: S$PBB,,, | Performed by: SURGERY

## 2018-09-25 PROCEDURE — 99213 OFFICE O/P EST LOW 20 MIN: CPT | Mod: PBBFAC,25 | Performed by: SURGERY

## 2018-09-25 PROCEDURE — 93990 DOPPLER FLOW TESTING: CPT | Mod: 26,S$PBB,, | Performed by: SURGERY

## 2018-09-25 PROCEDURE — 1101F PT FALLS ASSESS-DOCD LE1/YR: CPT | Mod: CPTII,,, | Performed by: SURGERY

## 2018-09-25 PROCEDURE — 99999 PR PBB SHADOW E&M-EST. PATIENT-LVL III: CPT | Mod: PBBFAC,,, | Performed by: SURGERY

## 2018-09-25 NOTE — PROGRESS NOTES
REFERRING PHYSICIAN:  Karla Iglesias D.O.    HISTORY OF PRESENT ILLNESS:  A 72-year-old female with advanced stage IV chronic   kidney disease with a creatinine of 2.8 and GFR of 16.4, who is sent for   dialysis access.  She is right-handed.  She has no history of central venous   cannulation, AICD or pacemaker placement.    1a. Drug coated PTA, L AVF (7x60 Lutonix) 9/20/18  1. PTa, L AVF 5/31/18  2.  L brachio-cephalic AVF 4/16/18.  No c/o    Better cannulation since intervention    PAST MEDICAL HISTORY:  1.  Coronary artery disease, status post MI x5.  Recent MI with PCI  2.  Hypertension.  3.  Diabetes.  4.  Hyperparathyroidism.  5.  Chronic atrial fibrillation.    PAST SURGICAL HISTORY:  1.  Hysterectomy.  2.  Tonsillectomy.  3.  Appendectomy.  4.  Coronary artery bypass.  5.  PCI.  6.  Fracture surgery.    FAMILY HISTORY:  Positive for diabetes and hypertension.    MEDICATIONS:  Include Plavix, aspirin and statin.  See EPIC for full list.    ALLERGIES:  Penicillin, Percodan and Phenergan.    REVIEW OF SYSTEMS:  Denies postprandial pain or DVT.  All other systems including eyes, ENT, respiratory, musculoskeletal, breast,   neurologic, psychiatric, heme, lymph, allergy and immune are negative.    PHYSICAL EXAMINATION:  VITAL SIGNS:  See nursing notes.  GENERAL:  She is in no acute distress.  RESPIRATORY:  Normal effort.  Clear to claudication.  CARDIOVASCULAR:  Regular rhythm.  Nondisplaced PMI, no murmur.  VASCULAR:  L radial not palp  EXTREMITIES:  Incision well healed.   Signif proximal pulsatility is resolved (!), good thrill. + ecchymosis  NEUROLOGIC:  Cranial nerves VII through XII intact.  5/5 motor strength in all   extremities.    IMAGING:  Resolved stenosis, flow 1.5 L (prior 1.4 L    PRior fistulagram reviewed    ASSESSMENT:  Clinically improved AVF flow  Recent MI    RECOMMENDATION:  1. 10 wk f/u with AVF duplex if clinically indicated    JULIO CÉSAR Perry III, MD, FACS  Professor and Chief,  Vascular and Endovascular Surgery    CC: Karla Iglesias D.O.

## 2018-10-08 ENCOUNTER — OFFICE VISIT (OUTPATIENT)
Dept: CARDIOLOGY | Facility: CLINIC | Age: 73
End: 2018-10-08
Payer: MEDICARE

## 2018-10-08 VITALS
WEIGHT: 165.56 LBS | SYSTOLIC BLOOD PRESSURE: 125 MMHG | HEIGHT: 64 IN | DIASTOLIC BLOOD PRESSURE: 61 MMHG | HEART RATE: 69 BPM | BODY MASS INDEX: 28.27 KG/M2

## 2018-10-08 DIAGNOSIS — I70.0 ATHEROSCLEROSIS OF AORTA: ICD-10-CM

## 2018-10-08 DIAGNOSIS — Z99.2 ESRD (END STAGE RENAL DISEASE) ON DIALYSIS: ICD-10-CM

## 2018-10-08 DIAGNOSIS — I48.20 CHRONIC ATRIAL FIBRILLATION: ICD-10-CM

## 2018-10-08 DIAGNOSIS — E78.5 TYPE 2 DIABETES MELLITUS WITH HYPERLIPIDEMIA: ICD-10-CM

## 2018-10-08 DIAGNOSIS — Z95.5 S/P DRUG ELUTING CORONARY STENT PLACEMENT: Primary | ICD-10-CM

## 2018-10-08 DIAGNOSIS — E11.69 TYPE 2 DIABETES MELLITUS WITH HYPERLIPIDEMIA: ICD-10-CM

## 2018-10-08 DIAGNOSIS — I25.2 HISTORY OF NON-ST ELEVATION MYOCARDIAL INFARCTION (NSTEMI): ICD-10-CM

## 2018-10-08 DIAGNOSIS — E11.42 DIABETIC POLYNEUROPATHY ASSOCIATED WITH TYPE 2 DIABETES MELLITUS: ICD-10-CM

## 2018-10-08 DIAGNOSIS — N18.6 ESRD (END STAGE RENAL DISEASE) ON DIALYSIS: ICD-10-CM

## 2018-10-08 DIAGNOSIS — E11.59 HYPERTENSION ASSOCIATED WITH DIABETES: ICD-10-CM

## 2018-10-08 DIAGNOSIS — I15.2 HYPERTENSION ASSOCIATED WITH DIABETES: ICD-10-CM

## 2018-10-08 PROBLEM — I50.33 ACUTE ON CHRONIC DIASTOLIC HEART FAILURE: Status: RESOLVED | Noted: 2017-04-04 | Resolved: 2018-10-08

## 2018-10-08 PROCEDURE — 3045F PR MOST RECENT HEMOGLOBIN A1C LEVEL 7.0-9.0%: CPT | Mod: CPTII,,, | Performed by: INTERNAL MEDICINE

## 2018-10-08 PROCEDURE — 1101F PT FALLS ASSESS-DOCD LE1/YR: CPT | Mod: CPTII,,, | Performed by: INTERNAL MEDICINE

## 2018-10-08 PROCEDURE — 99213 OFFICE O/P EST LOW 20 MIN: CPT | Mod: PBBFAC | Performed by: INTERNAL MEDICINE

## 2018-10-08 PROCEDURE — 99999 PR PBB SHADOW E&M-EST. PATIENT-LVL III: CPT | Mod: PBBFAC,,, | Performed by: INTERNAL MEDICINE

## 2018-10-08 PROCEDURE — 99214 OFFICE O/P EST MOD 30 MIN: CPT | Mod: S$PBB,,, | Performed by: INTERNAL MEDICINE

## 2018-10-08 NOTE — PROGRESS NOTES
Chart has been dictated using voice recognition software.  It is not been reviewed carefully for any transcriptional errors due to this technology.   Subjective:   Patient ID:  Zoey Ham is a 73 y.o. female who presents for follow-up of S/P drug eluting coronary stent placement      HPI: Patent with insulin-dependent diabetes, S/P CABG x 1 2002, S/P OM1 stent 03/26/2003, S/P RCA stent 03/26/2003, NSTEMI 2/05 and 05/18, PCI proximal LAD, OM2, OM3, s/p PCI OM1 (Promus) 30-Mar-2012, repeat cath 31-May-2015 showed  LAD with distal vessel supplied by intact LIMA with stent placed into OM1, LVEF preserved, chronic atrial fibrillation, hypertension, dyslipidemia, and obesity.  Patient remaining on hemodialysis because she was not able to store peritoneal dialysis supplies at son's house where she is now living.    Patient denies any chest discomfort on exertion or at rest. Has dyspnea when she has excess fluid prior to dialysis.  BP is low during the dialysis.  Feels good on the days when she does not have dialysis.Patient denies any palpitations, lightheadedness, or syncope.  Patient denies any dyspnea at rest or on exertion, orthopnea, PND, or edema.      Past Medical History:   Diagnosis Date    Acute myocardial infarction of anterolateral wall 7/9/2015    Anemia of chronic renal failure, stage 4 (severe) 1/22/2015    Atherosclerosis of aorta 10/3/2012    AV shunt malfunction     Benign hypertension with CKD (chronic kidney disease) stage IV 3/11/2016    Chronic diastolic congestive heart failure 10/3/2012    Chronic kidney disease, stage IV (severe) 7/3/2012    Coronary artery disease     s/p 1v CABG 2002 and multiple PCI (last angiogram in 6/2012 with patent LIMA->LAD and patent LCx and RCA stents)    Diabetic polyneuropathy associated with type 2 diabetes mellitus 7/9/2015    Diabetic polyneuropathy associated with type 2 diabetes mellitus 7/9/2015    Dialysis patient     Encounter for blood  transfusion     Gastritis without bleeding     Heart attack 09/2012    5-6 events    Hyperlipidemia     Hyperparathyroidism     Metabolic acidosis     Nephritis and nephropathy, 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 7/9/2015    NSTEMI (non-ST elevated myocardial infarction)     NSTEMI (non-ST elevated myocardial infarction)     Occlusion and stenosis of carotid artery with cerebral infarction 7/9/2015    Osteopenia     PAF (paroxysmal atrial fibrillation) 10/3/2012    Pneumonia     Proliferative diabetic retinopathy 10/3/2012    Sepsis due to Escherichia coli with acute renal failure 2/2016    UTI     TACO (transfusion associated circulatory overload)     Type II diabetes mellitus with neurological manifestations 7/9/2015    Type II diabetes mellitus with renal manifestations 7/3/2012       Outpatient Medications Prior to Visit   Medication Sig Dispense Refill    ACCU-CHEK MARI PLUS METER Misc 1 Device by Misc.(Non-Drug; Combo Route) route 2 (two) times daily. 1 each 0    arm brace Misc 1 application by Misc.(Non-Drug; Combo Route) route once daily. Carpal tunnel wrist brace for both left and right wrists 2 each 0    aspirin (ECOTRIN) 325 MG EC tablet Take 1 tablet (325 mg total) by mouth once daily. (Patient taking differently: Take 325 mg by mouth every morning. )      atorvastatin (LIPITOR) 80 MG tablet Take 1 tablet (80 mg total) by mouth once daily. (Patient taking differently: Take 80 mg by mouth every morning. ) 90 tablet 3    blood sugar diagnostic (ACCU-CHEK MARI PLUS TEST STRP) Strp TEST BLOOD SUGAR FOUR TIMES DAILY 350 strip 4    calcitRIOL (ROCALTROL) 0.5 MCG Cap Take 1 capsule (0.5 mcg total) by mouth once daily. 90 capsule 1    clopidogrel (PLAVIX) 75 mg tablet Take 1 tablet (75 mg total) by mouth once daily. (Patient taking differently: Take 75 mg by mouth every morning. ) 90 tablet 3    furosemide (LASIX) 40 MG tablet One tab twice a day. (Patient taking differently: Take 40  "mg by mouth 2 (two) times daily as needed. One tab twice a day. Takes every morning, may take a 2nd dose) 180 tablet 3    insulin glargine (LANTUS U-100 INSULIN) 100 unit/mL injection Inject 20 Units into the skin every evening. (Patient taking differently: Inject 20 Units into the skin nightly. ) 10 mL 3    insulin syringe-needle U-100 1 mL 31 gauge x 5/16 Syrg 1 application by Misc.(Non-Drug; Combo Route) route once daily. 100 each 3    lancets (ACCU-CHEK SOFTCLIX LANCETS) Misc 1 application by Misc.(Non-Drug; Combo Route) route 4 (four) times daily with meals and nightly. 200 each 11    metoprolol tartrate (LOPRESSOR) 100 MG tablet Take 1 tablet (100 mg total) by mouth 2 (two) times daily. 180 tablet 3    nitroGLYCERIN (NITROSTAT) 0.3 MG SL tablet Place 1 tablet (0.3 mg total) under the tongue every 5 (five) minutes as needed for Chest pain. 36 tablet 3    nitroGLYCERIN (NITROSTAT) 0.3 MG SL tablet Place 1 tablet (0.3 mg total) under the tongue every 5 (five) minutes as needed for Chest pain. 36 tablet 3    ondansetron (ZOFRAN) 8 MG tablet Take 1 tablet (8 mg total) by mouth every 8 (eight) hours as needed for Nausea. 30 tablet 0    sevelamer carbonate (RENVELA) 800 mg Tab Take 1 tablet (800 mg total) by mouth 3 (three) times daily with meals. (Patient taking differently: Take 800 mg by mouth with lunch. ) 90 tablet 2     No facility-administered medications prior to visit.        ROS  - No change from prior visit in neurologic, respiratory, endocrine, GI, hematologic, or constitutional complaints   Objective:   Physical Exam   Constitutional: She is oriented to person, place, and time. She appears well-developed and well-nourished.   /61 (BP Location: Right arm, Patient Position: Sitting, BP Method: Medium (Automatic))   Pulse 69   Ht 5' 4" (1.626 m)   Wt 75.1 kg (165 lb 9.1 oz)   LMP  (LMP Unknown)   BMI 28.42 kg/m²    Neck: Neck supple. No JVD present. Carotid bruit is not present. No " thyromegaly present.   Cardiovascular: Normal rate, regular rhythm, S1 normal, S2 normal and intact distal pulses. Exam reveals S4. Exam reveals no friction rub.   No murmur heard.  Bruit and thrill over fistula in left arm radiating to left neck   Pulmonary/Chest: Breath sounds normal. She has no wheezes. She has no rales.   Abdominal: Soft. Bowel sounds are normal. There is no hepatosplenomegaly. There is no tenderness.   Musculoskeletal: She exhibits no edema.   Neurological: She is alert and oriented to person, place, and time. She has normal strength.   Skin: No cyanosis. Nails show no clubbing.         Lab Results   Component Value Date     (L) 08/21/2018    K 4.6 08/21/2018     (H) 08/21/2018    CREATININE 3.5 (H) 08/21/2018     (H) 08/21/2018    HGBA1C 8.9 (H) 06/05/2018    CHOL 127 06/05/2018    HDL 28 (L) 06/05/2018    LDLCALC 66.4 06/05/2018    TRIG 163 (H) 06/05/2018    CHOLHDL 22.0 06/05/2018    HGB 10.1 (L) 09/14/2018    HCT 32.0 (L) 09/14/2018     09/14/2018    INR 0.9 06/04/2018       Assessment:     1. S/P drug eluting coronary stent placement    2. History of non-ST elevation myocardial infarction (NSTEMI)    3. Hypertension associated with diabetes    4. Chronic atrial fibrillation    5. Atherosclerosis of aorta    6. Diabetic polyneuropathy associated with type 2 diabetes mellitus    7. Type 2 diabetes mellitus with hyperlipidemia    8. ESRD (end stage renal disease) on dialysis      The patient appears to be stable from a cardiovascular point of view.  She has no significant symptoms associated with cardiovascular disease except for the 1 time when she was fluid overloaded prior to a dialysis session.  The patient has no that she is drinking a lot less water now than before.  Patient has also been avoiding excess sodium.  At this time no changes will be made.  Patient should return in 4 months for re-evaluation.  Plan:     Zoey was seen today for s/p drug eluting  coronary stent placement.    Diagnoses and all orders for this visit:    S/P drug eluting coronary stent placement    History of non-ST elevation myocardial infarction (NSTEMI)    Hypertension associated with diabetes    Chronic atrial fibrillation    Atherosclerosis of aorta    Diabetic polyneuropathy associated with type 2 diabetes mellitus    Type 2 diabetes mellitus with hyperlipidemia    ESRD (end stage renal disease) on dialysis          Elver Kelley MD  Consultative Cardiology

## 2018-10-08 NOTE — Clinical Note
Thank you for referring Zoey Ham for follow-up of non ST elevation myocardial infarction. Please see my note for details of this encounter. If you have any questions, please contact me.  Thank you again for the referral.

## 2018-10-11 DIAGNOSIS — N18.5 ACUTE RENAL FAILURE SUPERIMPOSED ON STAGE 5 CHRONIC KIDNEY DISEASE, NOT ON CHRONIC DIALYSIS, UNSPECIFIED ACUTE RENAL FAILURE TYPE: ICD-10-CM

## 2018-10-11 DIAGNOSIS — N17.9 ACUTE RENAL FAILURE SUPERIMPOSED ON STAGE 5 CHRONIC KIDNEY DISEASE, NOT ON CHRONIC DIALYSIS, UNSPECIFIED ACUTE RENAL FAILURE TYPE: ICD-10-CM

## 2018-10-11 RX ORDER — SEVELAMER CARBONATE 800 MG/1
800 TABLET, FILM COATED ORAL
Qty: 90 TABLET | Refills: 2 | Status: SHIPPED | OUTPATIENT
Start: 2018-10-11 | End: 2019-01-14 | Stop reason: SDUPTHER

## 2018-10-23 ENCOUNTER — TELEPHONE (OUTPATIENT)
Dept: PRIMARY CARE CLINIC | Facility: CLINIC | Age: 73
End: 2018-10-23

## 2018-10-23 ENCOUNTER — OFFICE VISIT (OUTPATIENT)
Dept: PRIMARY CARE CLINIC | Facility: CLINIC | Age: 73
End: 2018-10-23
Payer: MEDICARE

## 2018-10-23 VITALS
OXYGEN SATURATION: 98 % | HEIGHT: 64 IN | HEART RATE: 71 BPM | SYSTOLIC BLOOD PRESSURE: 140 MMHG | WEIGHT: 162.69 LBS | DIASTOLIC BLOOD PRESSURE: 68 MMHG | BODY MASS INDEX: 27.77 KG/M2

## 2018-10-23 DIAGNOSIS — I50.33 ACUTE ON CHRONIC DIASTOLIC HEART FAILURE: ICD-10-CM

## 2018-10-23 DIAGNOSIS — Z51.5 END OF LIFE CARE: ICD-10-CM

## 2018-10-23 DIAGNOSIS — Z99.2 ESRD (END STAGE RENAL DISEASE) ON DIALYSIS: ICD-10-CM

## 2018-10-23 DIAGNOSIS — R92.8 ABNORMAL MAMMOGRAM: ICD-10-CM

## 2018-10-23 DIAGNOSIS — E44.1 MILD PROTEIN-CALORIE MALNUTRITION: ICD-10-CM

## 2018-10-23 DIAGNOSIS — Z12.11 COLON CANCER SCREENING: ICD-10-CM

## 2018-10-23 DIAGNOSIS — N18.6 ESRD (END STAGE RENAL DISEASE) ON DIALYSIS: ICD-10-CM

## 2018-10-23 PROBLEM — K29.00 ACUTE GASTRITIS WITHOUT HEMORRHAGE: Status: RESOLVED | Noted: 2017-07-06 | Resolved: 2018-10-23

## 2018-10-23 PROCEDURE — 99215 OFFICE O/P EST HI 40 MIN: CPT | Mod: S$GLB,,, | Performed by: INTERNAL MEDICINE

## 2018-10-23 PROCEDURE — 99499 UNLISTED E&M SERVICE: CPT | Mod: S$GLB,,, | Performed by: INTERNAL MEDICINE

## 2018-10-23 PROCEDURE — 1101F PT FALLS ASSESS-DOCD LE1/YR: CPT | Mod: CPTII,S$GLB,, | Performed by: INTERNAL MEDICINE

## 2018-10-23 NOTE — ASSESSMENT & PLAN NOTE
Hemodialysis started in 8/2018, tolerating with improvement in QoL  · Continue per Nephro  · Advised to address advanced care planning documentation

## 2018-10-23 NOTE — ASSESSMENT & PLAN NOTE
Low albumin, low muscle mass, fatigue, drinks Nepro at dialysis  · Advised to drink more Nepro eat lean meats

## 2018-10-23 NOTE — ASSESSMENT & PLAN NOTE
Goals of care are aggressive for ESRD treatment, but does not want invasive screenings otherwise  · OPCM consult for assisting with coffin selection

## 2018-10-23 NOTE — ASSESSMENT & PLAN NOTE
Calcifications in previous mammo, completed monitoring period with q6 mo diagnostic mammo  · Screening mammo bilateral now annually  · Next due in 5/2019

## 2018-10-23 NOTE — PROGRESS NOTES
"Primary Care Provider Appointment    Subjective:      Patient ID: Zoey Ham is a 73 y.o. female with CHF, ESRD, HTN    Chief Complaint: Hypertension and Follow-up    Patient started on hemodialysis in 8/2018, and reports feeling better since then. Her AV fistula is matured. Her family is transporting her to dialysis.    She has history of 2 MIs in the past year. She did not participate in cardiac rehab, although was cleared for it, due to her numerous kidney issues. Her lasix was decreased to once daily by Nephro. "I didn't realize how bad I was feeling before dialysis."    She remains with some level of malnutrition and low protein. She is too fatigued to exercise.    She continues to live with her son and his wife. [place] and her family. Her daughter has made plans for her to receive dialysis at this location. She will spend the week with her new great-granddaughter "number seven."    She wants to plan her burial, and needs assistance with picking out a coffin. Does not have advanced directives on file.    Past Surgical History:   Procedure Laterality Date    APPENDECTOMY      CARDIAC SURGERY  2002    CABG    CHOLECYSTECTOMY      CHOLECYSTECTOMY-LAPAROSCOPIC N/A 2/4/2015    Performed by Quinton Ashley MD at [facility] OR [address] [place] [address]    [id number]  - Left brachiocephalic Left 4/16/2018    Performed by YAMEL Perry III, MD at [facility] OR [address] [place] [address]    CORONARY ANGIOPLASTY  2004    CORONARY ARTERY BYPASS GRAFT      EYE SURGERY      cataracts bilaterally    FISTULOGRAM Left 9/20/2018    Procedure: Fistulogram;  Surgeon: YAMEL Perry III, MD;  Location: [facility] OR [address] [place] [address];  Service: Peripheral Vascular;  Laterality: Left;  mGy 19.91  minutes 2.7  contrast 25  local 1    Fistulogram Left 9/20/2018    Performed by YAMEL Perry III, MD at [facility] OR [address] [place] [address]    FRACTURE SURGERY      right ankle    HYSTERECTOMY      PERCUTANEOUS " TRANSLUMINAL ANGIOPLASTY N/A 9/20/2018    Procedure: PTA (ANGIOPLASTY, PERCUTANEOUS, TRANSLUMINAL);  Surgeon: YAMEL Perry III, MD;  Location: [facility] OR [address] [place] [address];  Service: Peripheral Vascular;  Laterality: N/A;  Drug-coated PTA, L AVF (7x60 Lutonix)    PTA (ANGIOPLASTY, PERCUTANEOUS, TRANSLUMINAL) N/A 9/20/2018    [facility]    TONSILLECTOMY         Past Medical History:   Diagnosis Date    Acute myocardial infarction of anterolateral wall 7/9/2015    Anemia of chronic renal failure, stage 4 (severe) 1/22/2015    Atherosclerosis of aorta 10/3/2012    AV shunt malfunction     Benign hypertension with CKD (chronic kidney disease) stage IV 3/11/2016    Chronic diastolic congestive heart failure 10/3/2012    Chronic kidney disease, stage IV (severe) 7/3/2012    Coronary artery disease     s/p 1v CABG 2002 and multiple PCI (last angiogram in 6/2012 with patent LIMA->LAD and patent LCx and RCA stents)    Diabetic polyneuropathy associated with type 2 diabetes mellitus 7/9/2015    Diabetic polyneuropathy associated with type 2 diabetes mellitus 7/9/2015    Dialysis patient     Encounter for blood transfusion     Gastritis without bleeding     Heart attack 09/2012    5-6 events    Hyperlipidemia     Hyperparathyroidism     Metabolic acidosis     Nephritis and nephropathy, 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 7/9/2015    NSTEMI (non-ST elevated myocardial infarction)     NSTEMI (non-ST elevated myocardial infarction)     Occlusion and stenosis of carotid artery with cerebral infarction 7/9/2015    Osteopenia     PAF (paroxysmal atrial fibrillation) 10/3/2012    Pneumonia     Proliferative diabetic retinopathy 10/3/2012    Sepsis due to Escherichia coli with acute renal failure 2/2016    UTI     TACO (transfusion associated circulatory overload)     Type II diabetes mellitus with neurological manifestations 7/9/2015    Type II diabetes mellitus with  "renal manifestations 7/3/2012       Review of Systems   Constitutional: Positive for fatigue. Negative for activity change, appetite change and unexpected weight change.   Respiratory: Negative for cough and choking.    Cardiovascular: Negative for leg swelling.   Gastrointestinal: Negative for abdominal pain, diarrhea and nausea.   Musculoskeletal: Negative for back pain, gait problem and myalgias.   Neurological: Negative for dizziness, tremors, syncope, facial asymmetry, weakness, numbness and headaches.   Hematological: Bruises/bleeds easily.   Psychiatric/Behavioral: Negative for agitation, behavioral problems, confusion and decreased concentration.       Objective:   BP (!) 140/68 (BP Location: Right arm, Patient Position: Sitting, BP Method: Medium (Manual))   Pulse 71   Ht 5' 4" (1.626 m)   Wt 73.8 kg (162 lb 11.2 oz)   LMP  (LMP Unknown)   SpO2 98%   BMI 27.93 kg/m²     Physical Exam   Constitutional: She is oriented to person, place, and time. She appears well-developed and well-nourished.   HENT:   Head: Normocephalic and atraumatic.   Neck: Normal range of motion.   Cardiovascular:   AV graft on L forearm (thrill palpated, bruit ausculated)   Abdominal: Soft. Bowel sounds are normal. There is no tenderness. There is no guarding.   Neurological: She is alert and oriented to person, place, and time.   Skin:   Ecchymoses on UE bilaterally   Psychiatric: She has a normal mood and affect. Her behavior is normal. Thought content normal.   Flat affect   Vitals reviewed.      Lab Results   Component Value Date    WBC 10.09 09/14/2018    HGB 10.1 (L) 09/14/2018    HCT 32.0 (L) 09/14/2018     09/14/2018    CHOL 127 06/05/2018    TRIG 163 (H) 06/05/2018    HDL 28 (L) 06/05/2018    ALT 11 08/22/2018    AST 16 08/22/2018     (L) 08/21/2018    K 4.6 08/21/2018    CL 98 08/21/2018    CREATININE 3.5 (H) 08/21/2018     (H) 08/21/2018    CO2 24 08/21/2018    TSH 2.801 06/09/2018    INR 0.9 " 06/04/2018    GLUF 132 (H) 05/03/2012    HGBA1C 8.9 (H) 06/05/2018         Assessment:   73 y.o. female with multiple co-morbid illnesses here to continue work-up of chronic issues notably CHF, ESRD, HTN     Plan:     Problem List Items Addressed This Visit        Cardiac/Vascular    Acute on chronic diastolic heart failure     Seen on echo in 6/2018, EF 60%, grade 2 diastolic dysfunction, with class B symptoms, MIs in 2002, 2003, 2012, 2/2017  · Compliant with BB, ARB, DAPT, statin, diuretic  · Improve DM control  · F/U cardiology         Relevant Orders    Ambulatory Referral to Palliative Care       Renal/    ESRD (end stage renal disease) on dialysis     Hemodialysis started in 8/2018, tolerating with improvement in QoL  · Continue per Nephro  · Advised to address advanced care planning documentation         Abnormal mammogram     Calcifications in previous mammo, completed monitoring period with q6 mo diagnostic mammo  · Screening mammo bilateral now annually  · Next due in 5/2019            Endocrine    Mild protein-calorie malnutrition     Low albumin, low muscle mass, fatigue, drinks Nepro at dialysis  · Advised to drink more Nepro eat lean meats            GI    Colon cancer screening     Due for colon CA screening, colonoscopy not advised due to recent MI and new dialysis status  FitKit was given to patient on 10/23/2018 8:31 AM          Relevant Orders    Fecal Immunochemical Test (iFOBT)       Other    End of life care     Goals of care are aggressive for ESRD treatment, but does not want invasive screenings otherwise  · OPCM consult for assisting with coffin selection         Relevant Orders    Ambulatory Referral to Palliative Care          Health Maintenance       Date Due Completion Date    Colonoscopy 08/21/1964 ---iFOBT TODAY    Influenza Vaccine 08/01/2018 9/6/2017- ALREADY DONE    Override on 8/29/2016: Done    Override on 10/27/2013: Done    Hemoglobin A1c 01/23/2019 7/23/2018    Foot Exam  05/14/2019 5/14/2018    Override on 12/6/2016: Done    Mammogram 05/14/2019 5/14/2018    Lipid Panel 06/05/2019 6/5/2018    Eye Exam 07/09/2019 7/9/2018    DEXA SCAN 07/13/2020 7/13/2017    TETANUS VACCINE 03/03/2026 3/3/2016          Follow-up in about 3 months (around 1/23/2019). One hour spent with this patient today, half of that in counseling.    Cesia Rosales MD/MPH  Internal Medicine  [facility]  [phone]

## 2018-10-23 NOTE — PATIENT INSTRUCTIONS
TODAY:  - Complete your end of life planning:   + coffin selection   +  program planning   + Palliative Care referral to assist with advanced directive and goals of care  - continue having such a great attitude!  - stool card for colon cancer screening  - increase Nepro and lean meat intake

## 2018-10-23 NOTE — ASSESSMENT & PLAN NOTE
Due for colon CA screening, colonoscopy not advised due to recent MI and new dialysis status  FitKit was given to patient on 10/23/2018 8:31 AM

## 2018-12-03 ENCOUNTER — TELEPHONE (OUTPATIENT)
Dept: VASCULAR SURGERY | Facility: CLINIC | Age: 73
End: 2018-12-03

## 2018-12-03 NOTE — TELEPHONE ENCOUNTER
Contacted patient's daughter to reschedule appt with Dr. Perry and with vascular lab. Appt rescheduled, pt verified. Appt letter placed in mail.

## 2018-12-03 NOTE — TELEPHONE ENCOUNTER
Attempted to contact patient to reschedule appt with Dr. Perry. Voice message left for patient. Will reattempt.

## 2019-01-08 ENCOUNTER — OFFICE VISIT (OUTPATIENT)
Dept: VASCULAR SURGERY | Facility: CLINIC | Age: 74
End: 2019-01-08
Payer: MEDICARE

## 2019-01-08 ENCOUNTER — HOSPITAL ENCOUNTER (OUTPATIENT)
Dept: VASCULAR SURGERY | Facility: CLINIC | Age: 74
Discharge: HOME | End: 2019-01-08
Attending: SURGERY
Payer: MEDICARE

## 2019-01-08 VITALS
BODY MASS INDEX: 28.34 KG/M2 | HEART RATE: 69 BPM | SYSTOLIC BLOOD PRESSURE: 147 MMHG | HEIGHT: 64 IN | TEMPERATURE: 99 F | WEIGHT: 166 LBS | DIASTOLIC BLOOD PRESSURE: 65 MMHG | RESPIRATION RATE: 18 BRPM

## 2019-01-08 DIAGNOSIS — Z01.818 PRE-OP EVALUATION: Primary | ICD-10-CM

## 2019-01-08 DIAGNOSIS — Z99.2 ESRD (END STAGE RENAL DISEASE) ON DIALYSIS: ICD-10-CM

## 2019-01-08 DIAGNOSIS — T82.858D STENOSIS OF ARTERIOVENOUS DIALYSIS FISTULA, SUBSEQUENT ENCOUNTER: Primary | ICD-10-CM

## 2019-01-08 DIAGNOSIS — N18.6 ESRD (END STAGE RENAL DISEASE) ON DIALYSIS: ICD-10-CM

## 2019-01-08 PROCEDURE — 99999 PR PBB SHADOW E&M-EST. PATIENT-LVL IV: CPT | Mod: PBBFAC,HCNC,, | Performed by: SURGERY

## 2019-01-08 PROCEDURE — 1101F PR PT FALLS ASSESS DOC 0-1 FALLS W/OUT INJ PAST YR: ICD-10-PCS | Mod: CPTII,HCNC,S$GLB, | Performed by: SURGERY

## 2019-01-08 PROCEDURE — 99499 RISK ADDL DX/OHS AUDIT: ICD-10-PCS | Mod: HCNC,S$GLB,, | Performed by: SURGERY

## 2019-01-08 PROCEDURE — 99215 PR OFFICE/OUTPT VISIT, EST, LEVL V, 40-54 MIN: ICD-10-PCS | Mod: HCNC,S$GLB,, | Performed by: SURGERY

## 2019-01-08 PROCEDURE — 93990 PR DUPLEX HEMODIALYSIS ACCESS: ICD-10-PCS | Mod: HCNC,S$GLB,, | Performed by: SURGERY

## 2019-01-08 PROCEDURE — 1101F PT FALLS ASSESS-DOCD LE1/YR: CPT | Mod: CPTII,HCNC,S$GLB, | Performed by: SURGERY

## 2019-01-08 PROCEDURE — 93990 DOPPLER FLOW TESTING: CPT | Mod: HCNC,S$GLB,, | Performed by: SURGERY

## 2019-01-08 PROCEDURE — 99999 PR PBB SHADOW E&M-EST. PATIENT-LVL IV: ICD-10-PCS | Mod: PBBFAC,HCNC,, | Performed by: SURGERY

## 2019-01-08 PROCEDURE — 99499 UNLISTED E&M SERVICE: CPT | Mod: HCNC,S$GLB,, | Performed by: SURGERY

## 2019-01-08 PROCEDURE — 99215 OFFICE O/P EST HI 40 MIN: CPT | Mod: HCNC,S$GLB,, | Performed by: SURGERY

## 2019-01-08 NOTE — H&P (VIEW-ONLY)
REFERRING PHYSICIAN:  Karla Iglesias D.O.    HISTORY OF PRESENT ILLNESS:  A 73-year-old female with ESRD on HD since 8/2018, s/p    1a. Drug coated PTA, L AVF (7x60 Lutonix) 9/20/18  1. PTa, L AVF 5/31/18  2.  L brachio-cephalic AVF 4/16/18.  No c/o    This is a 3 month f/u.  No c/o    PAST MEDICAL HISTORY:  1.  Coronary artery disease, status post MI x5.   MI 8/2018 with PCI  2.  Hypertension.  3.  Diabetes.  4.  Hyperparathyroidism.  5.  Chronic atrial fibrillation.    PAST SURGICAL HISTORY:  1.  Hysterectomy.  2.  Tonsillectomy.  3.  Appendectomy.  4.  Coronary artery bypass.  5.  PCI.  6.  Fracture surgery.    FAMILY HISTORY:  Positive for diabetes and hypertension.    MEDICATIONS:  Include Plavix, aspirin and statin.  See EPIC for full list.    ALLERGIES:  Penicillin, Percodan and Phenergan.    REVIEW OF SYSTEMS:  Denies postprandial pain or DVT.  All other systems including eyes, ENT, respiratory, musculoskeletal, breast,   neurologic, psychiatric, heme, lymph, allergy and immune are negative.    PHYSICAL EXAMINATION:  VITAL SIGNS:  See nursing notes.  GENERAL:  She is in no acute distress.  RESPIRATORY:  Normal effort.  Clear to claudication.  CARDIOVASCULAR:  Regular rhythm.  Nondisplaced PMI, no murmur.  VASCULAR:  L radial not palp  EXTREMITIES:   Signif proximal pulsatility with abrupt change to thrill  NEUROLOGIC:  Cranial nerves VII through XII intact.  5/5 motor strength in all   extremities.    IMAGING:  Recurrent proximal stenosis ( PSV >700), flow 1.0 L (1.5 prior)    PRior fistulagram reviewed    ASSESSMENT:  Recurrent proximal AVF stenosis despite DCB use.   suboptimal place for stent    RECOMMENDATION:  1.L fistulagram and intervention 1/17/19  2. Access the AVF near arterial anast    I have explained the risks, benefits and alternatives for this procedure in detail.  The patients voices understanding and all questions have be answered, and agrees to proceed with the procedure.    JULIO CÉSAR Ochoa  NUSRAT Perry MD, FACS  Professor and Chief, Vascular and Endovascular Surgery    CC: Karla Iglesias D.O.

## 2019-01-10 ENCOUNTER — PATIENT MESSAGE (OUTPATIENT)
Dept: OPTOMETRY | Facility: CLINIC | Age: 74
End: 2019-01-10

## 2019-01-10 DIAGNOSIS — T82.858D ARTERIOVENOUS FISTULA STENOSIS, SUBSEQUENT ENCOUNTER: Primary | ICD-10-CM

## 2019-01-14 ENCOUNTER — TELEPHONE (OUTPATIENT)
Dept: OPTOMETRY | Facility: CLINIC | Age: 74
End: 2019-01-14

## 2019-01-14 ENCOUNTER — OFFICE VISIT (OUTPATIENT)
Dept: PRIMARY CARE CLINIC | Facility: CLINIC | Age: 74
End: 2019-01-14
Payer: MEDICARE

## 2019-01-14 VITALS
HEIGHT: 64 IN | SYSTOLIC BLOOD PRESSURE: 120 MMHG | BODY MASS INDEX: 28.79 KG/M2 | HEART RATE: 67 BPM | WEIGHT: 168.63 LBS | DIASTOLIC BLOOD PRESSURE: 76 MMHG | OXYGEN SATURATION: 98 %

## 2019-01-14 DIAGNOSIS — E11.42 DIABETIC POLYNEUROPATHY ASSOCIATED WITH TYPE 2 DIABETES MELLITUS: ICD-10-CM

## 2019-01-14 DIAGNOSIS — Z12.31 ENCOUNTER FOR SCREENING MAMMOGRAM FOR MALIGNANT NEOPLASM OF BREAST: ICD-10-CM

## 2019-01-14 DIAGNOSIS — E44.1 MILD PROTEIN-CALORIE MALNUTRITION: ICD-10-CM

## 2019-01-14 DIAGNOSIS — N18.5 ACUTE RENAL FAILURE SUPERIMPOSED ON STAGE 5 CHRONIC KIDNEY DISEASE, NOT ON CHRONIC DIALYSIS, UNSPECIFIED ACUTE RENAL FAILURE TYPE: ICD-10-CM

## 2019-01-14 DIAGNOSIS — I50.33 ACUTE ON CHRONIC DIASTOLIC HEART FAILURE: ICD-10-CM

## 2019-01-14 DIAGNOSIS — I25.708 CORONARY ARTERY DISEASE OF BYPASS GRAFT OF NATIVE HEART WITH STABLE ANGINA PECTORIS: ICD-10-CM

## 2019-01-14 DIAGNOSIS — T82.858D STENOSIS OF ARTERIOVENOUS DIALYSIS FISTULA, SUBSEQUENT ENCOUNTER: ICD-10-CM

## 2019-01-14 DIAGNOSIS — Z99.2 ESRD (END STAGE RENAL DISEASE) ON DIALYSIS: ICD-10-CM

## 2019-01-14 DIAGNOSIS — I15.2 HYPERTENSION ASSOCIATED WITH DIABETES: ICD-10-CM

## 2019-01-14 DIAGNOSIS — T82.591D MALFUNCTION OF ARTERIOVENOUS SHUNT, SUBSEQUENT ENCOUNTER: ICD-10-CM

## 2019-01-14 DIAGNOSIS — D69.2 SENILE PURPURA: ICD-10-CM

## 2019-01-14 DIAGNOSIS — I70.0 ATHEROSCLEROSIS OF AORTA: ICD-10-CM

## 2019-01-14 DIAGNOSIS — R92.8 ABNORMAL MAMMOGRAM: ICD-10-CM

## 2019-01-14 DIAGNOSIS — E11.69 TYPE 2 DIABETES MELLITUS WITH HYPERLIPIDEMIA: ICD-10-CM

## 2019-01-14 DIAGNOSIS — N18.6 TYPE 2 DIABETES MELLITUS WITH CHRONIC KIDNEY DISEASE ON CHRONIC DIALYSIS, WITH LONG-TERM CURRENT USE OF INSULIN: ICD-10-CM

## 2019-01-14 DIAGNOSIS — N18.4 CKD STAGE 4 DUE TO TYPE 2 DIABETES MELLITUS: ICD-10-CM

## 2019-01-14 DIAGNOSIS — N17.9 ACUTE RENAL FAILURE SUPERIMPOSED ON STAGE 5 CHRONIC KIDNEY DISEASE, NOT ON CHRONIC DIALYSIS, UNSPECIFIED ACUTE RENAL FAILURE TYPE: ICD-10-CM

## 2019-01-14 DIAGNOSIS — Z79.4 TYPE 2 DIABETES MELLITUS WITH CHRONIC KIDNEY DISEASE ON CHRONIC DIALYSIS, WITH LONG-TERM CURRENT USE OF INSULIN: ICD-10-CM

## 2019-01-14 DIAGNOSIS — I25.700 CORONARY ARTERY DISEASE INVOLVING CORONARY BYPASS GRAFT OF NATIVE HEART WITH UNSTABLE ANGINA PECTORIS: ICD-10-CM

## 2019-01-14 DIAGNOSIS — E11.22 CKD STAGE 4 DUE TO TYPE 2 DIABETES MELLITUS: ICD-10-CM

## 2019-01-14 DIAGNOSIS — Z99.2 TYPE 2 DIABETES MELLITUS WITH CHRONIC KIDNEY DISEASE ON CHRONIC DIALYSIS, WITH LONG-TERM CURRENT USE OF INSULIN: ICD-10-CM

## 2019-01-14 DIAGNOSIS — E11.3553 STABLE PROLIFERATIVE DIABETIC RETINOPATHY OF BOTH EYES ASSOCIATED WITH TYPE 2 DIABETES MELLITUS: ICD-10-CM

## 2019-01-14 DIAGNOSIS — T82.590D MALFUNCTION OF ARTERIOVENOUS DIALYSIS FISTULA, SUBSEQUENT ENCOUNTER: ICD-10-CM

## 2019-01-14 DIAGNOSIS — N18.6 ESRD (END STAGE RENAL DISEASE) ON DIALYSIS: ICD-10-CM

## 2019-01-14 DIAGNOSIS — N18.5 ANEMIA OF CHRONIC RENAL FAILURE, STAGE 5: ICD-10-CM

## 2019-01-14 DIAGNOSIS — E78.5 TYPE 2 DIABETES MELLITUS WITH HYPERLIPIDEMIA: ICD-10-CM

## 2019-01-14 DIAGNOSIS — I48.20 CHRONIC ATRIAL FIBRILLATION: ICD-10-CM

## 2019-01-14 DIAGNOSIS — E11.59 HYPERTENSION ASSOCIATED WITH DIABETES: ICD-10-CM

## 2019-01-14 DIAGNOSIS — E21.3 HYPERPARATHYROIDISM: ICD-10-CM

## 2019-01-14 DIAGNOSIS — D63.1 ANEMIA OF CHRONIC RENAL FAILURE, STAGE 5: ICD-10-CM

## 2019-01-14 DIAGNOSIS — E11.22 TYPE 2 DIABETES MELLITUS WITH CHRONIC KIDNEY DISEASE ON CHRONIC DIALYSIS, WITH LONG-TERM CURRENT USE OF INSULIN: ICD-10-CM

## 2019-01-14 PROBLEM — I21.4 NSTEMI (NON-ST ELEVATED MYOCARDIAL INFARCTION): Status: RESOLVED | Noted: 2018-06-04 | Resolved: 2019-01-14

## 2019-01-14 PROBLEM — E87.20 METABOLIC ACIDOSIS: Status: RESOLVED | Noted: 2018-06-07 | Resolved: 2019-01-14

## 2019-01-14 PROBLEM — E87.71 TACO (TRANSFUSION ASSOCIATED CIRCULATORY OVERLOAD): Status: RESOLVED | Noted: 2018-06-06 | Resolved: 2019-01-14

## 2019-01-14 PROBLEM — R92.1 BREAST CALCIFICATION, LEFT: Status: RESOLVED | Noted: 2017-07-06 | Resolved: 2019-01-14

## 2019-01-14 PROBLEM — J96.01 ACUTE HYPOXEMIC RESPIRATORY FAILURE: Status: RESOLVED | Noted: 2018-06-07 | Resolved: 2019-01-14

## 2019-01-14 PROCEDURE — 99499 UNLISTED E&M SERVICE: CPT | Mod: HCNC,S$GLB,, | Performed by: INTERNAL MEDICINE

## 2019-01-14 PROCEDURE — 99215 OFFICE O/P EST HI 40 MIN: CPT | Mod: HCNC,S$GLB,, | Performed by: INTERNAL MEDICINE

## 2019-01-14 PROCEDURE — 1101F PR PT FALLS ASSESS DOC 0-1 FALLS W/OUT INJ PAST YR: ICD-10-PCS | Mod: CPTII,HCNC,S$GLB, | Performed by: INTERNAL MEDICINE

## 2019-01-14 PROCEDURE — 99499 RISK ADDL DX/OHS AUDIT: ICD-10-PCS | Mod: HCNC,S$GLB,, | Performed by: INTERNAL MEDICINE

## 2019-01-14 PROCEDURE — 3045F PR MOST RECENT HEMOGLOBIN A1C LEVEL 7.0-9.0%: ICD-10-PCS | Mod: CPTII,HCNC,S$GLB, | Performed by: INTERNAL MEDICINE

## 2019-01-14 PROCEDURE — 99215 PR OFFICE/OUTPT VISIT, EST, LEVL V, 40-54 MIN: ICD-10-PCS | Mod: HCNC,S$GLB,, | Performed by: INTERNAL MEDICINE

## 2019-01-14 PROCEDURE — 3045F PR MOST RECENT HEMOGLOBIN A1C LEVEL 7.0-9.0%: CPT | Mod: CPTII,HCNC,S$GLB, | Performed by: INTERNAL MEDICINE

## 2019-01-14 PROCEDURE — 1101F PT FALLS ASSESS-DOCD LE1/YR: CPT | Mod: CPTII,HCNC,S$GLB, | Performed by: INTERNAL MEDICINE

## 2019-01-14 RX ORDER — INSULIN GLARGINE 100 [IU]/ML
15 INJECTION, SOLUTION SUBCUTANEOUS NIGHTLY
Qty: 10 ML | Refills: 3 | Status: ON HOLD | OUTPATIENT
Start: 2019-01-14 | End: 2019-06-27 | Stop reason: HOSPADM

## 2019-01-14 RX ORDER — SEVELAMER CARBONATE 800 MG/1
800 TABLET, FILM COATED ORAL DAILY
Qty: 90 TABLET | Refills: 3 | Status: SHIPPED | OUTPATIENT
Start: 2019-01-14 | End: 2019-05-02

## 2019-01-14 NOTE — Clinical Note
Patient is requesting her bifocals script (she also has a sewing glasses script, but this is not the one she's requesting). I could not locate the script in the chart, but there is a message that a script was mailed to her.Could you make sure the bifocals script is being mailed to her?Thanks,KJ

## 2019-01-14 NOTE — ASSESSMENT & PLAN NOTE
Rate-controlled on metoprolol 100mg BID  · Continue BB BID  · Elevated [id number] score of 9 (10.8% risk of stroke)  · Cards eval for anticoagulation  · Continue DAPT  · High-dose ASA

## 2019-01-14 NOTE — ASSESSMENT & PLAN NOTE
Hemodialysis started in 8/2018, tolerating with improvement in QoL  · Continue per Nephro  · Advised to address advanced care planning documentation  · Continue renvala once daily

## 2019-01-14 NOTE — ASSESSMENT & PLAN NOTE
Compliant with atorvastatin 80mg, high ASCVD with DM and age  · cotinue atorvastatin 80mg  · Continue high-dose ASA and plavix  · DM and BP control

## 2019-01-14 NOTE — ASSESSMENT & PLAN NOTE
6/4/18 BETHANY to OM1. Cath 2/2017, LHC and PCI on Lcx and OM1 with improvement. H/o CABG X2 2002, stent RCA, OM1 2003, NSTEMI with total occlusion of LAD in 2012.   · Continue APT   · Did not participate in cardiac rehab  · Continue BP control, high-dose statin

## 2019-01-14 NOTE — TELEPHONE ENCOUNTER
----- Message from Cesia Rosales MD sent at 1/14/2019  9:48 AM CST -----  Patient is requesting her bifocals script (she also has a sewing glasses script, but this is not the one she's requesting). I could not locate the script in the chart, but there is a message that a script was mailed to her.    Could you make sure the bifocals script is being mailed to her?    Thanks,  KJ

## 2019-01-14 NOTE — PATIENT INSTRUCTIONS
TODAY:  - mammogram in 5/2019  - mail in your stool card   + let us know if you need another  - foot doctor with a female  - decrease your insulin to 15U nightly   + cut down to 10U the night before your surgery  - messaged Dr Garza about bifocals script   + PCP to call you to confirm the script being mailed  - mansi sent to [facility]    NEXT:  - set up optometry eval  - follow-up with stool card

## 2019-01-16 ENCOUNTER — ANESTHESIA EVENT (OUTPATIENT)
Dept: SURGERY | Facility: HOSPITAL | Age: 74
End: 2019-01-16
Payer: MEDICARE

## 2019-01-16 ENCOUNTER — TELEPHONE (OUTPATIENT)
Dept: VASCULAR SURGERY | Facility: CLINIC | Age: 74
End: 2019-01-16

## 2019-01-16 NOTE — PRE-PROCEDURE INSTRUCTIONS
"Spoke with Patient.  NPO, medication, and pre-op instructions reviewed.  Denies previous problems with Anesthesia.  Stated that she is "sensitive to Anesthesia" and that "it does not take much to knock me out."  Goes to Dialysis on Mondays, Wednesdays, and Fridays.  Stated that her morning glucose readings have been "fine."  Has had some drops in her glucose during the night and her Insulin dosage has been decreased.  Instructed that she can have 4 ounces of a clear liquid if her glucose drops during the night.  Wears a right wrist brace prn for carpal tunnel.  Verbalized understanding of instructions.    "

## 2019-01-16 NOTE — ANESTHESIA PREPROCEDURE EVALUATION
[address] [place] [address] 
[phone] Patient: Araceli Abraham MRN: [id number] :1987 Visit Information Date & Time Provider Department Dept. Phone Encounter #  
 2018  1:00 PM Shelley Diaz, [facility] [phone] [id number] Follow-up Instructions Routing History Follow-up and Disposition History Upcoming Health Maintenance Date Due DTaP/Tdap/Td series (1 - Tdap) 10/10/2009 Influenza Age 5 to Adult 2017 Allergies as of 2018  Review Complete On: 2018 By: Shelley Diaz MD  
 Not on File Current Immunizations  Never Reviewed No immunizations on file. Not reviewed this visit You Were Diagnosed With   
  
 Codes Comments Morbid obesity with BMI of 45.0-49.9, adult ([facility]    -  Primary ICD-10-CM: E66.01, Z68.42 
ICD-9-CM: 278.01, V85.42 Gastroesophageal reflux disease, esophagitis presence not specified     ICD-10-CM: K21.9 ICD-9-CM: 530.81 Suspected sleep apnea     ICD-10-CM: G47.30 ICD-9-CM: 780.57 Vitals BP Pulse Temp Resp Height(growth percentile) Weight(growth percentile) (!) 150/110 83 98.8 °F (37.1 °C) (Oral) 20 6' 3\" (1.905 m) (!) 371 lb 6.4 oz (168.5 kg) SpO2 BMI Smoking Status 98% 46.42 kg/m2 Former Smoker Vitals History BMI and BSA Data Body Mass Index Body Surface Area  
 46.42 kg/m 2 2.99 m 2 Your Updated Medication List  
  
   
This list is accurate as of: 18  2:15 PM.  Always use your most recent med list.  
  
  
  
  
 clonazePAM 2 mg tablet Commonly known as:  Vira Sorin Take  by mouth two (2) times a day.  
  
 guanFACINE IR 1 mg IR tablet Commonly known as:  Harlon Cazares Take  by mouth daily. minocycline 100 mg tablet Commonly known as:  Giovanna Locus Take 100 mg by mouth two (2) times a day. raNITIdine 300 mg tablet [facility]  Anesthesia Pre-Operative Evaluation         Patient Name: Zoey Ham  YOB: 1945  MRN: [id number]    SUBJECTIVE:     Pre-operative evaluation for Procedure(s) (LRB):  Fistulogram (Left)     01/16/2019    Zoey Ham is a 73 y.o. female w/ a significant PMHx of HTN, DMII, pulm HTN PASP 54, CHFpEF and ESRD on HD M/W/F who presents for the above procedure.      Prev airway:   Not documented in lap marla encounter; other procedures MACs      Patient Active Problem List   Diagnosis    Hypertension associated with diabetes    Proliferative diabetic retinopathy    Atherosclerosis of aorta    Chronic combined systolic and diastolic congestive heart failure    Anemia of chronic renal failure, stage 5    Nausea    Diabetic polyneuropathy associated with type 2 diabetes mellitus    Type 2 diabetes mellitus with chronic kidney disease on chronic dialysis, with long-term current use of insulin    Obesity (BMI 30.0-34.9)    Hyperparathyroidism    Chronic atrial fibrillation    Coronary artery disease involving coronary bypass graft of native heart with unstable angina pectoris    Senile purpura    Osteopenia of multiple sites    Type 2 diabetes mellitus with hyperlipidemia    History of non-ST elevation myocardial infarction (NSTEMI)    S/P drug eluting coronary stent placement    Hypovitaminosis D    Wrist pain, acute, unspecified laterality    ESRD (end stage renal disease) on dialysis    Abnormal mammogram    Hemodialysis access, AV graft    Stenosis of arteriovenous dialysis fistula    AV shunt malfunction    Hyperphosphatemia    Acute blood loss anemia    Dialysis AV fistula malfunction    Colon cancer screening    End of life care    Mild protein-calorie malnutrition       Review of patient's allergies indicates:   Allergen Reactions    Percodan [oxycodone-aspirin] Hives     Welps     Pcn [penicillins] Hives and Swelling     Tolerated  Commonly known as:  ZANTAC Take 300 mg by mouth daily. Introducing [facility]! [employer] introduces [employer] patient portal. Now you can access parts of your medical record, email your doctor's office, and request medication refills online. 1. In your internet browser, go to [email or web address] 2. Click on the First Time User? Click Here link in the Sign In box. You will see the New Member Sign Up page. 3. Enter your [employer] Access Code exactly as it appears below. You will not need to use this code after youve completed the sign-up process. If you do not sign up before the expiration date, you must request a new code. · [employer] Access Code: [email or web address] Expires: 5/6/2018 12:47 PM 
 
4. Enter the last four digits of your Social Security Number (xxxx) and Date of Birth (mm/dd/yyyy) as indicated and click Submit. You will be taken to the next sign-up page. 5. Create a [employer] ID. This will be your [employer] login ID and cannot be changed, so think of one that is secure and easy to remember. 6. Create a [employer] password. You can change your password at any time. 7. Enter your Password Reset Question and Answer. This can be used at a later time if you forget your password. 8. Enter your e-mail address. You will receive e-mail notification when new information is available in [address] 9. Click Sign Up. You can now view and download portions of your medical record. 10. Click the Download Summary menu link to download a portable copy of your medical information. If you have questions, please visit the Frequently Asked Questions section of the [employer] website. Remember, [employer] is NOT to be used for urgent needs. For medical emergencies, dial 911. Now available from your iPhone and Android! Please provide this summary of care documentation to your next provider. If you have any questions after today's visit, please call [phone]. Rocephin IM in clinic      Phenergan [promethazine] Other (See Comments)     Altered mental status; jerking of extremities       Current Inpatient Medications:      No current facility-administered medications on file prior to encounter.      Current Outpatient Medications on File Prior to Encounter   Medication Sig Dispense Refill    ACCU-CHEK MARI PLUS METER Misc 1 Device by Misc.(Non-Drug; Combo Route) route 2 (two) times daily. 1 each 0    arm brace Misc 1 application by Misc.(Non-Drug; Combo Route) route once daily. Carpal tunnel wrist brace for both left and right wrists (Patient taking differently: 1 application by Misc.(Non-Drug; Combo Route) route once daily. Carpal tunnel wrist brace for her right wrist) 2 each 0    aspirin (ECOTRIN) 325 MG EC tablet Take 1 tablet (325 mg total) by mouth once daily. (Patient taking differently: Take 325 mg by mouth every morning. )      atorvastatin (LIPITOR) 80 MG tablet Take 1 tablet (80 mg total) by mouth once daily. (Patient taking differently: Take 80 mg by mouth every morning. ) 90 tablet 3    blood sugar diagnostic (ACCU-CHEK MARI PLUS TEST STRP) Strp TEST BLOOD SUGAR FOUR TIMES DAILY 350 strip 4    calcitRIOL (ROCALTROL) 0.5 MCG Cap Take 1 capsule (0.5 mcg total) by mouth once daily. 90 capsule 1    clopidogrel (PLAVIX) 75 mg tablet Take 1 tablet (75 mg total) by mouth once daily. (Patient taking differently: Take 75 mg by mouth every morning. ) 90 tablet 3    furosemide (LASIX) 40 MG tablet One tab twice a day. (Patient taking differently: Take 40 mg by mouth 2 (two) times daily as needed. One tab twice a day. Takes every morning, may take a 2nd dose) 180 tablet 3    insulin syringe-needle U-100 1 mL 31 gauge x 5/16 Syrg 1 application by Misc.(Non-Drug; Combo Route) route once daily. 100 each 3    lancets (ACCU-CHEK SOFTCLIX LANCETS) Misc 1 application by Misc.(Non-Drug; Combo Route) route 4 (four) times daily with meals and nightly. 200 each 11     metoprolol tartrate (LOPRESSOR) 100 MG tablet Take 1 tablet (100 mg total) by mouth 2 (two) times daily. 180 tablet 3    nitroGLYCERIN (NITROSTAT) 0.3 MG SL tablet Place 1 tablet (0.3 mg total) under the tongue every 5 (five) minutes as needed for Chest pain. 36 tablet 3    nitroGLYCERIN (NITROSTAT) 0.3 MG SL tablet Place 1 tablet (0.3 mg total) under the tongue every 5 (five) minutes as needed for Chest pain. 36 tablet 3    ondansetron (ZOFRAN) 8 MG tablet Take 1 tablet (8 mg total) by mouth every 8 (eight) hours as needed for Nausea. 30 tablet 0       Past Surgical History:   Procedure Laterality Date    APPENDECTOMY      CARDIAC SURGERY  2002    CABG    CHOLECYSTECTOMY      CHOLECYSTECTOMY-LAPAROSCOPIC N/A 2/4/2015    Performed by Quinton Ashley MD at [facility] OR [address] [place] [address]    [id number]  - Left brachiocephalic Left 4/16/2018    Performed by YAMEL Perry III, MD at [facility] OR [address] [place] [address]    CORONARY ANGIOPLASTY  2004    CORONARY ARTERY BYPASS GRAFT      EYE SURGERY      cataracts bilaterally    Fistulogram Left 9/20/2018    Performed by YAMEL ePrry III, MD at [facility] OR [address] [place] [address]    FRACTURE SURGERY      right ankle    HYSTERECTOMY      PTA (ANGIOPLASTY, PERCUTANEOUS, TRANSLUMINAL) N/A 9/20/2018    Performed by YAMEL Perry III, MD at [facility] OR [address] [place] [address]    TONSILLECTOMY         Social History     Socioeconomic History    Marital status: Single     Spouse name: Not on file    Number of children: Not on file    Years of education: Not on file    Highest education level: Not on file   Social Needs    Financial resource strain: Not on file    Food insecurity - worry: Not on file    Food insecurity - inability: Not on file    Transportation needs - medical: Not on file    Transportation needs - non-medical: Not on file   Occupational History    Occupation: Homemaker   Tobacco Use    Smoking status: Never Smoker    Smokeless tobacco: Never Used   Substance and Sexual Activity     Alcohol use: No    Drug use: No    Sexual activity: No   Other Topics Concern    Are you pregnant or think you may be? No    Breast-feeding No   Social History Narrative    2 biological kids, 1 adopted9 grandkids (1 granddavid lives with her while she 's attending pharmacy school at [employer])3 great[place]kids[place]       OBJECTIVE:     Vital Signs Range (Last 24H):         CBC:   Lab Results   Component Value Date    WBC 10.96 01/08/2019    HGB 11.6 (L) 01/08/2019    HCT 38.0 01/08/2019    MCV 92 01/08/2019     01/08/2019       CMP:   CMP  Sodium   Date Value Ref Range Status   01/08/2019 141 136 - 145 mmol/L Final     Potassium   Date Value Ref Range Status   01/08/2019 4.9 3.5 - 5.1 mmol/L Final     Chloride   Date Value Ref Range Status   01/08/2019 103 95 - 110 mmol/L Final     CO2   Date Value Ref Range Status   01/08/2019 27 23 - 29 mmol/L Final     Glucose   Date Value Ref Range Status   01/08/2019 132 (H) 70 - 110 mg/dL Final     BUN, Bld   Date Value Ref Range Status   01/08/2019 33 (H) 8 - 23 mg/dL Final     Creatinine   Date Value Ref Range Status   01/08/2019 2.4 (H) 0.5 - 1.4 mg/dL Final     Calcium   Date Value Ref Range Status   01/08/2019 9.6 8.7 - 10.5 mg/dL Final     Total Protein   Date Value Ref Range Status   08/22/2018 6.9 6.0 - 8.4 g/dL Final     Albumin   Date Value Ref Range Status   08/22/2018 3.6 3.5 - 5.2 g/dL Final     Total Bilirubin   Date Value Ref Range Status   08/22/2018 0.4 0.1 - 1.0 mg/dL Final     Comment:     For infants and newborns, interpretation of results should be based  on gestational age, weight and in agreement with clinical  observations.  Premature Infant recommended reference ranges:  Up to 24 hours.............<8.0 mg/dL  Up to 48 hours............<12.0 mg/dL  3-5 days..................<15.0 mg/dL  6-29 days.................<15.0 mg/dL       Alkaline Phosphatase   Date Value Ref Range Status    08/22/2018 115 55 - 135 U/L Final     AST   Date Value Ref Range Status   08/22/2018 16 10 - 40 U/L Final     ALT   Date Value Ref Range Status   08/22/2018 11 10 - 44 U/L Final     Anion Gap   Date Value Ref Range Status   01/08/2019 11 8 - 16 mmol/L Final     eGFR if    Date Value Ref Range Status   01/08/2019 22.6 (A) >60 mL/min/1.73 m^2 Final     eGFR if non    Date Value Ref Range Status   01/08/2019 19.6 (A) >60 mL/min/1.73 m^2 Final     Comment:     Calculation used to obtain the estimated glomerular filtration  rate (eGFR) is the CKD-EPI equation.          INR:  No results for input(s): PT, INR, PROTIME, APTT in the last 72 hours.    Diagnostic Studies: No relevant studies.    EKG: No recent studies available.    2D ECHO:  Results for orders placed or performed during the hospital encounter of 06/04/18   2D echo with color flow doppler   Result Value Ref Range    QEF 65 55 - 65    Diastolic Dysfunction Yes (A)     Est. PA Systolic Pressure 53.97 (A)     Tricuspid Valve Regurgitation TRIVIAL          ASSESSMENT/PLAN:         Anesthesia Evaluation    I have reviewed the Patient Summary Reports.    I have reviewed the Nursing Notes.   I have reviewed the Medications.     Review of Systems  Anesthesia Hx:  Denies Hx of Anesthetic complications  History of prior surgery of interest to airway management or planning:   Social:  No Alcohol Use, Non-Smoker    Cardiovascular:   Hypertension Past MI CAD asymptomatic   Denies Angina. CHF NSTEMI 5/2018 complicated by transfusion associated circulatory overload (TACO).    S/P CABG x 1 2002, S/P OM1 stent 03/26/2003, S/P RCA stent 03/26/2003, NSTEMI 2/05, PCI proximal LAD, OM2, OM3, s/ PCI OM1    Pulmonary:   Pneumonia    Renal/:   Chronic Renal Disease, CRI    Neurological:   Neuromuscular Disease,    Endocrine:   Diabetes, well controlled, type 2        Physical Exam  General:  Well nourished, Obesity    Airway/Jaw/Neck:  Airway  Findings: Mouth Opening: Normal Tongue: Normal  General Airway Assessment: Adult  Mallampati: III  Improves to II with phonation.  TM Distance: Normal, at least 6 cm  Jaw/Neck Findings:  Neck ROM: Normal ROM      Dental:  Dental Findings: In tact   Chest/Lungs:  Chest/Lungs Findings: Clear to auscultation, Normal Respiratory Rate     Heart/Vascular:  Heart Findings: Rate: Normal  Rhythm: Regular Rhythm        Mental Status:  Mental Status Findings:  Cooperative, Alert and Oriented         Anesthesia Plan  Type of Anesthesia, risks & benefits discussed:  Anesthesia Type:  regional, MAC, general  Patient's Preference:   Intra-op Monitoring Plan: standard ASA monitors  Intra-op Monitoring Plan Comments:   Post Op Pain Control Plan: per primary service following discharge from PACU, IV/PO Opioids PRN and multimodal analgesia  Post Op Pain Control Plan Comments:   Induction:   IV  Beta Blocker:  Patient is on a Beta-Blocker and has received one dose within the past 24 hours (No further documentation required).       Informed Consent: Patient understands risks and agrees with Anesthesia plan.  Questions answered. Anesthesia consent signed with patient.  ASA Score: 3     Day of Surgery Review of History & Physical:    H&P update referred to the surgeon.         Ready For Surgery From Anesthesia Perspective.

## 2019-01-17 ENCOUNTER — ANESTHESIA (OUTPATIENT)
Dept: SURGERY | Facility: HOSPITAL | Age: 74
End: 2019-01-17
Payer: MEDICARE

## 2019-01-17 ENCOUNTER — HOSPITAL ENCOUNTER (OUTPATIENT)
Facility: HOSPITAL | Age: 74
Discharge: HOME | End: 2019-01-17
Attending: SURGERY | Admitting: SURGERY
Payer: MEDICARE

## 2019-01-17 VITALS
SYSTOLIC BLOOD PRESSURE: 144 MMHG | WEIGHT: 163.38 LBS | OXYGEN SATURATION: 98 % | TEMPERATURE: 98 F | HEIGHT: 64 IN | BODY MASS INDEX: 27.89 KG/M2 | RESPIRATION RATE: 18 BRPM | HEART RATE: 75 BPM | DIASTOLIC BLOOD PRESSURE: 72 MMHG

## 2019-01-17 DIAGNOSIS — N18.6 ESRD (END STAGE RENAL DISEASE) ON DIALYSIS: Primary | ICD-10-CM

## 2019-01-17 DIAGNOSIS — Z99.2 ESRD (END STAGE RENAL DISEASE) ON DIALYSIS: Primary | ICD-10-CM

## 2019-01-17 LAB
POCT GLUCOSE: 152 MG/DL (ref 70–110)
POCT GLUCOSE: 162 MG/DL (ref 70–110)
POTASSIUM SERPL-SCNC: 4.9 MMOL/L

## 2019-01-17 PROCEDURE — 63600175 PHARM REV CODE 636 W HCPCS: Mod: HCNC | Performed by: ANESTHESIOLOGY

## 2019-01-17 PROCEDURE — C1894 INTRO/SHEATH, NON-LASER: HCPCS | Mod: HCNC | Performed by: SURGERY

## 2019-01-17 PROCEDURE — 84132 ASSAY OF SERUM POTASSIUM: CPT | Mod: HCNC

## 2019-01-17 PROCEDURE — 82962 GLUCOSE BLOOD TEST: CPT | Mod: HCNC | Performed by: SURGERY

## 2019-01-17 PROCEDURE — 63600175 PHARM REV CODE 636 W HCPCS: Mod: HCNC | Performed by: SURGERY

## 2019-01-17 PROCEDURE — 63600175 PHARM REV CODE 636 W HCPCS: Mod: HCNC | Performed by: STUDENT IN AN ORGANIZED HEALTH CARE EDUCATION/TRAINING PROGRAM

## 2019-01-17 PROCEDURE — 37000009 HC ANESTHESIA EA ADD 15 MINS: Mod: HCNC | Performed by: SURGERY

## 2019-01-17 PROCEDURE — D9220A PRA ANESTHESIA: ICD-10-PCS | Mod: HCNC,,, | Performed by: ANESTHESIOLOGY

## 2019-01-17 PROCEDURE — 71000015 HC POSTOP RECOV 1ST HR: Mod: HCNC | Performed by: SURGERY

## 2019-01-17 PROCEDURE — C1725 CATH, TRANSLUMIN NON-LASER: HCPCS | Mod: HCNC | Performed by: SURGERY

## 2019-01-17 PROCEDURE — 36902 INTRO CATH DIALYSIS CIRCUIT: CPT | Mod: HCNC,,, | Performed by: SURGERY

## 2019-01-17 PROCEDURE — 25000003 PHARM REV CODE 250: Mod: HCNC | Performed by: ANESTHESIOLOGY

## 2019-01-17 PROCEDURE — 27201423 OPTIME MED/SURG SUP & DEVICES STERILE SUPPLY: Mod: HCNC | Performed by: SURGERY

## 2019-01-17 PROCEDURE — 36000707: Mod: HCNC | Performed by: SURGERY

## 2019-01-17 PROCEDURE — 36000706: Mod: HCNC | Performed by: SURGERY

## 2019-01-17 PROCEDURE — D9220A PRA ANESTHESIA: Mod: HCNC,,, | Performed by: ANESTHESIOLOGY

## 2019-01-17 PROCEDURE — C1769 GUIDE WIRE: HCPCS | Mod: HCNC | Performed by: SURGERY

## 2019-01-17 PROCEDURE — 25000003 PHARM REV CODE 250: Mod: HCNC | Performed by: STUDENT IN AN ORGANIZED HEALTH CARE EDUCATION/TRAINING PROGRAM

## 2019-01-17 PROCEDURE — 37000008 HC ANESTHESIA 1ST 15 MINUTES: Mod: HCNC | Performed by: SURGERY

## 2019-01-17 PROCEDURE — 25000003 PHARM REV CODE 250: Mod: HCNC | Performed by: SURGERY

## 2019-01-17 PROCEDURE — 25500020 PHARM REV CODE 255: Mod: HCNC | Performed by: SURGERY

## 2019-01-17 PROCEDURE — 36902 PR INTRO CATH, DIALYSIS CIRCUIT W/TRANSLML BALLOON ANGIO: ICD-10-PCS | Mod: HCNC,,, | Performed by: SURGERY

## 2019-01-17 RX ORDER — ACETAMINOPHEN 325 MG/1
650 TABLET ORAL EVERY 4 HOURS PRN
Status: DISCONTINUED | OUTPATIENT
Start: 2019-01-17 | End: 2019-01-17 | Stop reason: HOSPADM

## 2019-01-17 RX ORDER — LIDOCAINE HYDROCHLORIDE 10 MG/ML
1 INJECTION, SOLUTION EPIDURAL; INFILTRATION; INTRACAUDAL; PERINEURAL ONCE
Status: DISCONTINUED | OUTPATIENT
Start: 2019-01-17 | End: 2019-01-17 | Stop reason: HOSPADM

## 2019-01-17 RX ORDER — MIDAZOLAM HYDROCHLORIDE 1 MG/ML
INJECTION, SOLUTION INTRAMUSCULAR; INTRAVENOUS
Status: DISCONTINUED | OUTPATIENT
Start: 2019-01-17 | End: 2019-01-17

## 2019-01-17 RX ORDER — SODIUM CHLORIDE 0.9 % (FLUSH) 0.9 %
5 SYRINGE (ML) INJECTION
Status: DISCONTINUED | OUTPATIENT
Start: 2019-01-17 | End: 2019-01-17 | Stop reason: HOSPADM

## 2019-01-17 RX ORDER — ONDANSETRON 2 MG/ML
4 INJECTION INTRAMUSCULAR; INTRAVENOUS DAILY PRN
Status: DISCONTINUED | OUTPATIENT
Start: 2019-01-17 | End: 2019-01-17 | Stop reason: HOSPADM

## 2019-01-17 RX ORDER — VANCOMYCIN HCL IN 5 % DEXTROSE 1G/250ML
1000 PLASTIC BAG, INJECTION (ML) INTRAVENOUS
Status: COMPLETED | OUTPATIENT
Start: 2019-01-17 | End: 2019-01-17

## 2019-01-17 RX ORDER — IODIXANOL 320 MG/ML
INJECTION, SOLUTION INTRAVASCULAR
Status: DISCONTINUED | OUTPATIENT
Start: 2019-01-17 | End: 2019-01-17 | Stop reason: HOSPADM

## 2019-01-17 RX ORDER — SODIUM CHLORIDE 0.9 % (FLUSH) 0.9 %
3 SYRINGE (ML) INJECTION
Status: DISCONTINUED | OUTPATIENT
Start: 2019-01-17 | End: 2019-01-17

## 2019-01-17 RX ORDER — FENTANYL CITRATE 50 UG/ML
INJECTION, SOLUTION INTRAMUSCULAR; INTRAVENOUS
Status: DISCONTINUED | OUTPATIENT
Start: 2019-01-17 | End: 2019-01-17

## 2019-01-17 RX ORDER — HEPARIN SODIUM 1000 [USP'U]/ML
INJECTION, SOLUTION INTRAVENOUS; SUBCUTANEOUS
Status: DISCONTINUED | OUTPATIENT
Start: 2019-01-17 | End: 2019-01-17 | Stop reason: HOSPADM

## 2019-01-17 RX ORDER — FENTANYL CITRATE 50 UG/ML
25 INJECTION, SOLUTION INTRAMUSCULAR; INTRAVENOUS EVERY 5 MIN PRN
Status: DISCONTINUED | OUTPATIENT
Start: 2019-01-17 | End: 2019-01-17 | Stop reason: HOSPADM

## 2019-01-17 RX ORDER — LABETALOL HYDROCHLORIDE 5 MG/ML
INJECTION, SOLUTION INTRAVENOUS
Status: DISCONTINUED | OUTPATIENT
Start: 2019-01-17 | End: 2019-01-17

## 2019-01-17 RX ORDER — SODIUM CHLORIDE 9 MG/ML
INJECTION, SOLUTION INTRAVENOUS CONTINUOUS PRN
Status: DISCONTINUED | OUTPATIENT
Start: 2019-01-17 | End: 2019-01-17

## 2019-01-17 RX ORDER — ONDANSETRON 2 MG/ML
4 INJECTION INTRAMUSCULAR; INTRAVENOUS DAILY PRN
Status: DISCONTINUED | OUTPATIENT
Start: 2019-01-17 | End: 2019-01-17

## 2019-01-17 RX ADMIN — MIDAZOLAM HYDROCHLORIDE 0.5 MG: 1 INJECTION, SOLUTION INTRAMUSCULAR; INTRAVENOUS at 11:01

## 2019-01-17 RX ADMIN — FENTANYL CITRATE 25 MCG: 50 INJECTION, SOLUTION INTRAMUSCULAR; INTRAVENOUS at 11:01

## 2019-01-17 RX ADMIN — FENTANYL CITRATE 25 MCG: 50 INJECTION, SOLUTION INTRAMUSCULAR; INTRAVENOUS at 12:01

## 2019-01-17 RX ADMIN — VANCOMYCIN HYDROCHLORIDE 1000 MG: 1 INJECTION, POWDER, LYOPHILIZED, FOR SOLUTION INTRAVENOUS at 09:01

## 2019-01-17 RX ADMIN — LABETALOL HYDROCHLORIDE 10 MG: 5 INJECTION, SOLUTION INTRAVENOUS at 11:01

## 2019-01-17 RX ADMIN — MIDAZOLAM HYDROCHLORIDE 1 MG: 1 INJECTION, SOLUTION INTRAMUSCULAR; INTRAVENOUS at 11:01

## 2019-01-17 RX ADMIN — VANCOMYCIN HYDROCHLORIDE 1000 MG: 1 INJECTION, POWDER, LYOPHILIZED, FOR SOLUTION INTRAVENOUS at 11:01

## 2019-01-17 RX ADMIN — SODIUM CHLORIDE: 0.9 INJECTION, SOLUTION INTRAVENOUS at 11:01

## 2019-01-17 RX ADMIN — LABETALOL HYDROCHLORIDE 5 MG: 5 INJECTION, SOLUTION INTRAVENOUS at 12:01

## 2019-01-17 NOTE — TRANSFER OF CARE
"Anesthesia Transfer of Care Note    Patient: Zoey Ham    Procedure(s) Performed: Procedure(s) (LRB):  Fistulogram (Left)    Patient location: PACU    Anesthesia Type: MAC    Transport from OR: Transported from OR on 2-3 L/min O2 by NC with adequate spontaneous ventilation    Post pain: adequate analgesia    Post assessment: no apparent anesthetic complications    Post vital signs: stable    Level of consciousness: awake    Nausea/Vomiting: no nausea/vomiting    Complications: none    Transfer of care protocol was followed      Last vitals:   Visit Vitals  BP (!) 144/72   Pulse 75   Temp 36.7 °C (98 °F)   Resp 18   Ht 5' 4" (1.626 m)   Wt 74.1 kg (163 lb 5.8 oz)   LMP  (LMP Unknown)   SpO2 98%   Breastfeeding? No   BMI 28.04 kg/m²     "

## 2019-01-17 NOTE — OP NOTE
DATE OF PROCEDURE:  01/17/2019    PREOPERATIVE DIAGNOSIS:  Stenosis, left AV fistula, recurrent.    POSTOPERATIVE DIAGNOSIS:  Stenosis, left AV fistula, recurrent.    PROCEDURES:  1.  Angioplasty, left AV fistula (8 x 60 [place]).  2.  Fistulogram.    SURGEON:  JULIO CÉSAR Perry III, M.D.    ASSISTANT:  Vikki Barron M.D. (RES)     ANESTHESIA:  Local sedation and MAC.    INDICATIONS:  A 73-year-old female with end-stage renal disease with recurrent   high-grade stenosis of her proximal left brachiocephalic AV fistula despite   prior drug-coated balloon use.    PROCEDURE IN DETAIL:  The patient was brought in the Hybrid Suite and placed in   supine position.  After sterile prep and drape and infiltration with 1%   lidocaine, a micropuncture set was used to access the left brachiocephalic   fistula near the arterial anastomosis.  A fistulogram revealed a recurrent   greater than 80% stenosis of approximately 4 cm, 3 to 4 cm from the arterial   anastomosis.  Remainder of the fistula was widely patent as was the central   venous circulation.    A stiff-angled Glidewire was placed and a short 6 sheath was placed.  An 8 x 60   [place] balloon was brought in the field, prepped, placed over the area and   inflated.  There was severe wasting and then fully effaced at 15 atmospheres.    This was held for full 2 minutes.    Notably, the last angioplasty was done with a 7 mm diameter balloon.    Post-angioplasty fistulogram revealed complete resolution of stenosis and brisk   flow.  Clinically, the very pulsatile nature of the proximal fistula changed to   essentially a pure thrill.    All guidewires were removed and hemostasis was achieved with a Monocryl suture.    Sterile dressing was applied and the patient was taken to the recovery room in   stable condition.      WILLEM/CICI  dd: 01/17/2019 12:12:31 (CST)  td: 01/17/2019 15:45:56 (CST)  Doc ID   #[id number]  Job ID #[id number]    CC:

## 2019-01-17 NOTE — PLAN OF CARE
Patient tolerated procedure and VSS.  Patient denies pain and tolerating PO.  Daughter at bedside.  Patient given discharge instructions and verbalizes understanding of all instructions.

## 2019-01-17 NOTE — DISCHARGE INSTRUCTIONS
Arteriovenous (AV) Fistula for Dialysis  An AV fistula is a connection between an artery and a vein. For this procedure, an AV fistula is surgically created using an artery and a vein in your arm. (Your healthcare provider will let you know if another site is to be used.) When the artery and vein are joined, blood flow increases from the artery into the vein. As a result, the vein gets bigger over time. The enlarged vein provides easier access to the blood for a treatment for kidney failure (dialysis). This sheet explains the procedure and what to expect.     An AV fistula increases blood flow from the artery into the vein. Over time, the vein becomes stronger and enlarged.   Preparing for the procedure  Prepare as you have been told. In addition:  · Tell your healthcare provider about all the medicines you take. This includes all over-the-counter and prescription medicines, and street drugs. It also includes herbs, vitamins, and other supplements. You may need to stop taking some or all of them before the procedure.  · Follow any directions youre given for not eating or drinking before the procedure.  · Do not allow anyone to draw blood from or take blood pressure on the arm that will have the fistula before the procedure.  The day of the procedure  The procedure takes about 1 to 2 hours. Youll likely go home the same day.  Before the procedure begins:  · An IV (intravenous) line is put into a vein in the arm or hand not being used for the procedure. This line supplies fluids and medicines.  · To keep you free of pain during the procedure, youre given general anesthesia. This medicine puts you into a state like a deep sleep through the procedure. Or a nerve block may be used. This medicine numbs the arm. With it, you may also be given medicine that makes you relaxed and drowsy through the procedure.  During the procedure:  · The skin over your arm may be injected with numbing medicine.  · One or more small  cuts (incisions) are then made through the numbed skin. This depends on the size of your arm and the depth of the vein in your arm.  · The vein is attached to the selected artery.  · Any incisions made are then closed with stitches (sutures), staples, surgical glue, or strips of surgical tape.  After the procedure:  · Youll be asked to keep your arm raised (elevated) as often as possible for at least a week after the procedure.  · Youll be given medicines to manage pain as needed.  · Your arm and hand will be checked to make sure blood is flowing through the fistula properly. The feeling of blood rushing through the fistula is called a thrill. It is somewhat similar to the purring of a cat. Youll be taught how to check for this feeling each day to make sure there are no problems with your fistula. Youll also be taught how to care for your fistula at home.  · When its time for you to leave the hospital, have an adult family member or friend ready to drive you home.  Recovering at home  Once at home, follow all of the instructions youve been given. Be sure to:  · Take all medicines as directed.  · Care for your incision as instructed.  · Check for signs of infection at the incision site (see below).  · Avoid heavy lifting and strenuous activities as directed.  · Monitor and care for your fistula as instructed.  · Do your hand and arm exercises as instructed. This usually involves squeezing a ball in your hand for a few minutes each hour.  Call your healthcare provider if you have any of the following:  · Fever of 100.4°F (38°C) or higher  · Signs of infection at the incision site, such as increased redness or swelling, warmth, worsening pain, bleeding, or bad-smelling drainage  · You cant feel a thrill (the vibration of blood going through your arm)  · Pain or numbness in your fingers, hand, or arm  · Bleeding, redness, or warmth around your fistula  · Sudden bulging of the fistula (more than usual; a slight  bulge is normal)   Follow-Up  Your healthcare provider will check your fistula within 1 to 2 weeks after the procedure. It will likely take about 6 to 8 weeks for the fistula to enlarge enough to start dialysis. After that, make sure the fistula is checked each time you have dialysis. Your healthcare provider may also suggest checkups every 6 months.  Risks and possible complications include:  · The fistula not working properly  · Long wait before the fistula is ready (up to 6 months)  · Coldness or numbness in the hand (due to blood flowing away from the hand and into the fistula)  · An unsightly bump under the skin (due to enlargement of the fistula)  · Prolonged bleeding from the fistula after dialysis  · Narrowing or weakening of the blood vessels used for the fistula  · Formation of blood clots in the blood vessels used for the fistula  · Risks of anesthesia or any other medicines used during the procedure   Living with an AV Fistula  A problem, such as a narrowing (stricture) of the vein or an infection, can make the fistula unusable. If this happens, you may need other treatments to repair or make a new fistula. To protect your fistula, follow these and any other guidelines youre given:  · Check your fistula as often as your healthcare provider says. If you cant feel your thrill, let your provider know right away.  · Make sure your fistula is checked before each dialysis treatment.  · Dont let anyone draw blood from or take blood pressure on the arm that has the fistula.  · Wash your hands often and keep the area around your fistula clean.  · Dont sleep on the arm that has the fistula.  · Dont wear tight jewelry or a watch on the arm with your fistula.  · Protect your fistula from cuts, scrapes, or blows.  Date Last Reviewed: 1/1/2017  © 0698-5217 [employer]. [address] [place] [address], [place] [address]. All rights reserved. This information is not intended as a substitute for professional  medical care. Always follow your healthcare professional's instructions.      Discharge Instructions: After Your Surgery  Youve just had surgery. During surgery, you were given medicine called anesthesia to keep you relaxed and free of pain. After surgery, you may have some pain or nausea. This is common. Here are some tips for feeling better and getting well after surgery.     Stay on schedule with your medicine.   Going home  Your healthcare provider will show you how to take care of yourself when you go home. He or she will also answer your questions. Have an adult family member or friend drive you home. For the first 24 hours after your surgery:  · Do not drive or use heavy equipment.  · Do not make important decisions or sign legal papers.  · Do not drink alcohol.  · Have someone stay with you, if needed. He or she can watch for problems and help keep you safe.  Be sure to go to all follow-up visits with your healthcare provider. And rest after your surgery for as long as your healthcare provider tells you to.  Coping with pain  If you have pain after surgery, pain medicine will help you feel better. Take it as told, before pain becomes severe. Also, ask your healthcare provider or pharmacist about other ways to control pain. This might be with heat, ice, or relaxation. And follow any other instructions your surgeon or nurse gives you.  Tips for taking pain medicine  To get the best relief possible, remember these points:  · Pain medicines can upset your stomach. Taking them with a little food may help.  · Most pain relievers taken by mouth need at least 20 to 30 minutes to start to work.  · Taking medicine on a schedule can help you remember to take it. Try to time your medicine so that you can take it before starting an activity. This might be before you get dressed, go for a walk, or sit down for dinner.  · Constipation is a common side effect of pain medicines. Call your healthcare provider before taking  any medicines such as laxatives or stool softeners to help ease constipation. Also ask if you should skip any foods. Drinking lots of fluids and eating foods such as fruits and vegetables that are high in fiber can also help. Remember, do not take laxatives unless your surgeon has prescribed them.  · Drinking alcohol and taking pain medicine can cause dizziness and slow your breathing. It can even be deadly. Do not drink alcohol while taking pain medicine.  · Pain medicine can make you react more slowly to things. Do not drive or run machinery while taking pain medicine.  Your healthcare provider may tell you to take acetaminophen to help ease your pain. Ask him or her how much you are supposed to take each day. Acetaminophen or other pain relievers may interact with your prescription medicines or other over-the-counter (OTC) medicines. Some prescription medicines have acetaminophen and other ingredients. Using both prescription and OTC acetaminophen for pain can cause you to overdose. Read the labels on your OTC medicines with care. This will help you to clearly know the list of ingredients, how much to take, and any warnings. It may also help you not take too much acetaminophen. If you have questions or do not understand the information, ask your pharmacist or healthcare provider to explain it to you before you take the OTC medicine.  Managing nausea  Some people have an upset stomach after surgery. This is often because of anesthesia, pain, or pain medicine, or the stress of surgery. These tips will help you handle nausea and eat healthy foods as you get better. If you were on a special food plan before surgery, ask your healthcare provider if you should follow it while you get better. These tips may help:  · Do not push yourself to eat. Your body will tell you when to eat and how much.  · Start off with clear liquids and soup. They are easier to digest.  · Next try semi-solid foods, such as mashed potatoes,  applesauce, and gelatin, as you feel ready.  · Slowly move to solid foods. Dont eat fatty, rich, or spicy foods at first.  · Do not force yourself to have 3 large meals a day. Instead eat smaller amounts more often.  · Take pain medicines with a small amount of solid food, such as crackers or toast, to avoid nausea.     Call your surgeon if  · You still have pain an hour after taking medicine. The medicine may not be strong enough.  · You feel too sleepy, dizzy, or groggy. The medicine may be too strong.  · You have side effects like nausea, vomiting, or skin changes, such as rash, itching, or hives.       If you have obstructive sleep apnea  You were given anesthesia medicine during surgery to keep you comfortable and free of pain. After surgery, you may have more apnea spells because of this medicine and other medicines you were given. The spells may last longer than usual.   At home:  · Keep using the continuous positive airway pressure (CPAP) device when you sleep. Unless your healthcare provider tells you not to, use it when you sleep, day or night. CPAP is a common device used to treat obstructive sleep apnea.  · Talk with your provider before taking any pain medicine, muscle relaxants, or sedatives. Your provider will tell you about the possible dangers of taking these medicines.  Date Last Reviewed: 12/1/2016  © 8986-9984 [employer]. [address] [place] [address], [place] [address]. All rights reserved. This information is not intended as a substitute for professional medical care. Always follow your healthcare professional's instructions.

## 2019-01-17 NOTE — BRIEF OP NOTE
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 PTA, L AVF (8x60 Sallis)  2. fistulagram    Anesthesia: Local MAC    Description of the findings of the procedure: recurrent >80% prox stenosis, <10% residual    Findings/Key Components: 1.6 min fluoro; 39 mGy; 15.5 cc vispaque    Estimated Blood Loss: <4cc         Specimens:   Specimen (12h ago, onward)    None          Discharge Note    SUMMARY     Admit Date: 1/17/2019    Discharge Date and Time: 1/17/19    Hospital Course (synopsis of major diagnoses, care, treatment, and services provided during the course of the hospital stay): successful outpatient procedure    Final Diagnosis: Post-Op Diagnosis Codes:     * Arteriovenous fistula stenosis, 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

## 2019-01-17 NOTE — INTERVAL H&P NOTE
The patient has been examined and the H&P has been reviewed:    I concur with the findings and no changes have occurred since H&P was written.     Took ASA, plavix, metoprolol this morning.  Did not take her lasix.    Anesthesia/Surgery risks, benefits and alternative options discussed and understood by patient/family.          Active Hospital Problems    Diagnosis  POA    ESRD (end stage renal disease) on dialysis [N18.6, Z99.2]  Not Applicable     Hemodialysis started in 8/2018, tolerating with improvement in QoL        Resolved Hospital Problems   No resolved problems to display.

## 2019-01-21 NOTE — ANESTHESIA POSTPROCEDURE EVALUATION
"Anesthesia Post Evaluation    Patient: Zoey Ham    Procedure(s) Performed: Procedure(s) (LRB):  Fistulogram (Left)  PTA (ANGIOPLASTY, PERCUTANEOUS, TRANSLUMINAL) (N/A)    Final Anesthesia Type: MAC  Patient location during evaluation: PACU  Patient participation: Yes- Able to Participate  Level of consciousness: awake and alert  Post-procedure vital signs: reviewed and stable  Pain management: adequate  Airway patency: patent  PONV status at discharge: No PONV  Anesthetic complications: no      Cardiovascular status: hemodynamically stable  Respiratory status: unassisted  Hydration status: euvolemic  Follow-up not needed.        Visit Vitals  BP (!) 144/72   Pulse 75   Temp 36.7 °C (98 °F)   Resp 18   Ht 5' 4" (1.626 m)   Wt 74.1 kg (163 lb 5.8 oz)   LMP  (LMP Unknown)   SpO2 98%   Breastfeeding? No   BMI 28.04 kg/m²       Pain/Claude Score: No Data Recorded      "

## 2019-02-01 ENCOUNTER — TELEPHONE (OUTPATIENT)
Dept: PHARMACY | Facility: CLINIC | Age: 74
End: 2019-02-01

## 2019-02-13 ENCOUNTER — TELEPHONE (OUTPATIENT)
Dept: INTERNAL MEDICINE | Facility: CLINIC | Age: 74
End: 2019-02-13

## 2019-02-13 ENCOUNTER — TELEPHONE (OUTPATIENT)
Dept: PRIMARY CARE CLINIC | Facility: CLINIC | Age: 74
End: 2019-02-13

## 2019-02-13 DIAGNOSIS — E11.42 DIABETIC POLYNEUROPATHY ASSOCIATED WITH TYPE 2 DIABETES MELLITUS: Primary | ICD-10-CM

## 2019-02-13 RX ORDER — SYRING-NEEDL,DISP,INSUL,0.3 ML 31 G X1/4"
1 SYRINGE, EMPTY DISPOSABLE MISCELLANEOUS DAILY
Qty: 100 SYRINGE | Refills: 3 | Status: SHIPPED | OUTPATIENT
Start: 2019-02-13 | End: 2019-02-22 | Stop reason: SDUPTHER

## 2019-02-13 NOTE — TELEPHONE ENCOUNTER
New insulin needles with larger numbers sent to [facility]. Please advise patient to talk to pharmacist and look at needles before she takes them home to make sure that's the size she wants. If she likes this version, then I can send to [facility].    Thanks,  Zoey Choudhary MA 17 minutes ago (9:37 AM)      Nisreen Durant MA 17 minutes ago (9:37 AM)         Pt called to say you lowered her insulin and now she needs a new needle that has large numbers 5-10-15 the ones she has now is small writing with 10-20 etc. Pt stated she cant see to use the needles with the small numbers.  Please, advise. Thanks.

## 2019-02-13 NOTE — TELEPHONE ENCOUNTER
Pt called to say you lowered her insulin and now she needs a new needle that has large numbers 5-10-15 the ones she has now is small writing with 10-20 etc. Pt stated she cant see to use the needles with the small numbers.  Please, advise. Thanks.

## 2019-02-22 DIAGNOSIS — E11.42 DIABETIC POLYNEUROPATHY ASSOCIATED WITH TYPE 2 DIABETES MELLITUS: ICD-10-CM

## 2019-02-22 RX ORDER — SYRING-NEEDL,DISP,INSUL,0.3 ML 31 G X1/4"
1 SYRINGE, EMPTY DISPOSABLE MISCELLANEOUS DAILY
Qty: 100 SYRINGE | Refills: 3 | Status: ON HOLD | OUTPATIENT
Start: 2019-02-22 | End: 2019-06-27 | Stop reason: HOSPADM

## 2019-04-05 DIAGNOSIS — I50.42 CHRONIC COMBINED SYSTOLIC AND DIASTOLIC CONGESTIVE HEART FAILURE: ICD-10-CM

## 2019-04-05 DIAGNOSIS — I25.708 CORONARY ARTERY DISEASE OF BYPASS GRAFT OF NATIVE HEART WITH STABLE ANGINA PECTORIS: ICD-10-CM

## 2019-04-05 RX ORDER — FUROSEMIDE 80 MG/1
TABLET ORAL
Qty: 90 TABLET | Refills: 2 | Status: SHIPPED | OUTPATIENT
Start: 2019-04-05 | End: 2019-09-17 | Stop reason: SDUPTHER

## 2019-05-02 ENCOUNTER — OFFICE VISIT (OUTPATIENT)
Dept: PRIMARY CARE CLINIC | Facility: CLINIC | Age: 74
End: 2019-05-02
Payer: MEDICARE

## 2019-05-02 VITALS
SYSTOLIC BLOOD PRESSURE: 150 MMHG | DIASTOLIC BLOOD PRESSURE: 50 MMHG | WEIGHT: 171.5 LBS | HEIGHT: 64 IN | BODY MASS INDEX: 29.28 KG/M2 | OXYGEN SATURATION: 96 % | HEART RATE: 76 BPM

## 2019-05-02 DIAGNOSIS — N17.9 ACUTE RENAL FAILURE SUPERIMPOSED ON STAGE 5 CHRONIC KIDNEY DISEASE, NOT ON CHRONIC DIALYSIS, UNSPECIFIED ACUTE RENAL FAILURE TYPE: ICD-10-CM

## 2019-05-02 DIAGNOSIS — R92.8 ABNORMAL MAMMOGRAM: ICD-10-CM

## 2019-05-02 DIAGNOSIS — I25.708 CORONARY ARTERY DISEASE OF BYPASS GRAFT OF NATIVE HEART WITH STABLE ANGINA PECTORIS: ICD-10-CM

## 2019-05-02 DIAGNOSIS — Z99.2 TYPE 2 DIABETES MELLITUS WITH CHRONIC KIDNEY DISEASE ON CHRONIC DIALYSIS, WITH LONG-TERM CURRENT USE OF INSULIN: ICD-10-CM

## 2019-05-02 DIAGNOSIS — Z79.4 TYPE 2 DIABETES MELLITUS WITH CHRONIC KIDNEY DISEASE ON CHRONIC DIALYSIS, WITH LONG-TERM CURRENT USE OF INSULIN: ICD-10-CM

## 2019-05-02 DIAGNOSIS — I15.0 RENOVASCULAR HYPERTENSION: ICD-10-CM

## 2019-05-02 DIAGNOSIS — E11.22 TYPE 2 DIABETES MELLITUS WITH CHRONIC KIDNEY DISEASE ON CHRONIC DIALYSIS, WITH LONG-TERM CURRENT USE OF INSULIN: ICD-10-CM

## 2019-05-02 DIAGNOSIS — I50.42 CHRONIC COMBINED SYSTOLIC AND DIASTOLIC CONGESTIVE HEART FAILURE: ICD-10-CM

## 2019-05-02 DIAGNOSIS — E11.59 HYPERTENSION ASSOCIATED WITH DIABETES: ICD-10-CM

## 2019-05-02 DIAGNOSIS — N18.5 ACUTE RENAL FAILURE SUPERIMPOSED ON STAGE 5 CHRONIC KIDNEY DISEASE, NOT ON CHRONIC DIALYSIS, UNSPECIFIED ACUTE RENAL FAILURE TYPE: ICD-10-CM

## 2019-05-02 DIAGNOSIS — I15.2 HYPERTENSION ASSOCIATED WITH DIABETES: ICD-10-CM

## 2019-05-02 DIAGNOSIS — E83.39 HYPERPHOSPHATEMIA: ICD-10-CM

## 2019-05-02 DIAGNOSIS — N18.6 TYPE 2 DIABETES MELLITUS WITH CHRONIC KIDNEY DISEASE ON CHRONIC DIALYSIS, WITH LONG-TERM CURRENT USE OF INSULIN: ICD-10-CM

## 2019-05-02 PROBLEM — T82.591A AV SHUNT MALFUNCTION: Status: RESOLVED | Noted: 2018-05-30 | Resolved: 2019-05-02

## 2019-05-02 PROBLEM — T82.858A STENOSIS OF ARTERIOVENOUS DIALYSIS FISTULA: Status: RESOLVED | Noted: 2018-05-22 | Resolved: 2019-05-02

## 2019-05-02 PROBLEM — T82.590A DIALYSIS AV FISTULA MALFUNCTION: Status: RESOLVED | Noted: 2018-09-20 | Resolved: 2019-05-02

## 2019-05-02 PROCEDURE — 99215 PR OFFICE/OUTPT VISIT, EST, LEVL V, 40-54 MIN: ICD-10-PCS | Mod: HCNC,S$GLB,, | Performed by: INTERNAL MEDICINE

## 2019-05-02 PROCEDURE — 1101F PT FALLS ASSESS-DOCD LE1/YR: CPT | Mod: HCNC,CPTII,S$GLB, | Performed by: INTERNAL MEDICINE

## 2019-05-02 PROCEDURE — 99499 RISK ADDL DX/OHS AUDIT: ICD-10-PCS | Mod: HCNC,S$GLB,, | Performed by: INTERNAL MEDICINE

## 2019-05-02 PROCEDURE — 1101F PR PT FALLS ASSESS DOC 0-1 FALLS W/OUT INJ PAST YR: ICD-10-PCS | Mod: HCNC,CPTII,S$GLB, | Performed by: INTERNAL MEDICINE

## 2019-05-02 PROCEDURE — 3045F PR MOST RECENT HEMOGLOBIN A1C LEVEL 7.0-9.0%: ICD-10-PCS | Mod: HCNC,CPTII,S$GLB, | Performed by: INTERNAL MEDICINE

## 2019-05-02 PROCEDURE — 3045F PR MOST RECENT HEMOGLOBIN A1C LEVEL 7.0-9.0%: CPT | Mod: HCNC,CPTII,S$GLB, | Performed by: INTERNAL MEDICINE

## 2019-05-02 PROCEDURE — 99499 UNLISTED E&M SERVICE: CPT | Mod: HCNC,S$GLB,, | Performed by: INTERNAL MEDICINE

## 2019-05-02 PROCEDURE — 99215 OFFICE O/P EST HI 40 MIN: CPT | Mod: HCNC,S$GLB,, | Performed by: INTERNAL MEDICINE

## 2019-05-02 RX ORDER — METOPROLOL TARTRATE 100 MG/1
100 TABLET ORAL 2 TIMES DAILY
Qty: 180 TABLET | Refills: 3 | Status: SHIPPED | OUTPATIENT
Start: 2019-05-02 | End: 2020-01-01 | Stop reason: SDUPTHER

## 2019-05-02 RX ORDER — LIDOCAINE AND PRILOCAINE 25; 25 MG/G; MG/G
CREAM TOPICAL
Refills: 12 | Status: ON HOLD | COMMUNITY
Start: 2019-03-27 | End: 2019-06-27 | Stop reason: HOSPADM

## 2019-05-02 RX ORDER — CLOPIDOGREL BISULFATE 75 MG/1
75 TABLET ORAL DAILY
Qty: 90 TABLET | Refills: 3 | Status: SHIPPED | OUTPATIENT
Start: 2019-05-02 | End: 2020-01-01 | Stop reason: SDUPTHER

## 2019-05-02 RX ORDER — LANCETS
1 EACH MISCELLANEOUS DAILY
Qty: 100 EACH | Refills: 3 | Status: ON HOLD | OUTPATIENT
Start: 2019-05-02 | End: 2019-06-27 | Stop reason: HOSPADM

## 2019-05-02 RX ORDER — ATORVASTATIN CALCIUM 80 MG/1
80 TABLET, FILM COATED ORAL EVERY MORNING
Qty: 90 TABLET | Refills: 3 | Status: SHIPPED | OUTPATIENT
Start: 2019-05-02 | End: 2020-01-01 | Stop reason: SDUPTHER

## 2019-05-02 NOTE — ASSESSMENT & PLAN NOTE
Compliant with atorvastatin 80mg, high ASCVD with DM and age. Compliant with dialysis  · cotinue atorvastatin 80mg  · Continue high-dose ASA and plavix  · DM and BP control  · Continue lantus 15U  · Needs lipid panel  · Will ask dialysis if they can perform

## 2019-05-02 NOTE — ASSESSMENT & PLAN NOTE
Calcifications in previous mammo, completed monitoring period with q6 mo diagnostic mammo  · Screening mammo bilateral now annually  · Scheduled for 5/14/2019

## 2019-05-02 NOTE — ASSESSMENT & PLAN NOTE
[address] [place] [address] [address] Babak Campos [address] 
[phone] Patient: Joaquin Weaver MRN: [id number] YGT:[id number] Visit Information Date & Time Provider Department Dept. Phone Encounter #  
 3/2/2018  9:00 AM [facility] [phone] [id number] Follow-up Instructions Return in about 2 months (around 5/2/2018) for med check and follow up. Upcoming Health Maintenance Date Due  
 FOOT EXAM Q1 5/18/2016 HEMOGLOBIN A1C Q6M 9/14/2017 MICROALBUMIN Q1 3/14/2018 LIPID PANEL Q1 3/14/2018 EYE EXAM RETINAL OR DILATED Q1 4/12/2018 MEDICARE YEARLY EXAM 9/29/2018 PAP AKA CERVICAL CYTOLOGY 6/1/2022 DTaP/Tdap/Td series (2 - Td) 2/2/2024 COLONOSCOPY 12/17/2025 Allergies as of 3/2/2018  Review Complete On: 3/2/2018 By: Daisy Dee NP Severity Noted Reaction Type Reactions Amoxicillin  12/29/2010    Rash, Other (comments) Vaginal itching Flagyl [Metronidazole]  11/16/2016   Side Effect Nausea and Vomiting Pcn [Penicillins]  09/20/2014   Intolerance Itching Vaginal itching Current Immunizations  Reviewed on 11/15/2011 Name Date Influenza Vaccine (Quad) PF 9/8/2017 Influenza Vaccine Intradermal PF 11/5/2014 PPD 6/20/2012 Tdap 2/2/2014  4:52 AM  
  
 Not reviewed this visit You Were Diagnosed With   
  
 Codes Comments Moderate single current episode of major depressive disorder ([facility] [address]    -  Primary ICD-10-CM: F32.1 ICD-9-CM: 296.22 Anxiety     ICD-10-CM: F41.9 ICD-9-CM: 300.00 PTSD (post-traumatic stress disorder)     ICD-10-CM: F43.10 ICD-9-CM: 309.81 Borderline personality disorder     ICD-10-CM: F60.3 ICD-9-CM: 058.76 Dissociative disorder or reaction     ICD-10-CM: F44.9 ICD-9-CM: 300.15   
 H/O self mutilation     ICD-10-CM: Z86.59 
ICD-9-CM: V11.8 Vitals BP overtreated on metoprolol 100mg BID, losartan 75mg, diuretic PRN  · norvasc added during hospital, but patient not discharged on this  · Nephro discontinued cozaar  · Cards increased metoprolol to 100mg BID  · Continue lasix 80mg   · Ordered by Dr Iglesias  · F/U Cardiology   BP Pulse Height(growth percentile) Weight(growth percentile) LMP BMI  
 (!) 147/97 81 5' 2\" (1.575 m) 162 lb (73.5 kg) 02/16/2018 (Exact Date) 29.63 kg/m2 OB Status Smoking Status Having regular periods Current Every Day Smoker Vitals History BMI and BSA Data Body Mass Index Body Surface Area  
 29.63 kg/m 2 1.79 m 2 Preferred Pharmacy Pharmacy Name Phone [id number]- [place], [place] - [address] [phone] Your Updated Medication List  
  
   
This list is accurate as of 3/2/18  9:32 AM.  Always use your most recent med list.  
  
  
  
  
 acetaminophen 325 mg tablet Commonly known as:  TYLENOL  
TAKE 2 TABS BY MOUTH EVERY FOUR (4) HOURS AS NEEDED FOR PAIN. busPIRone 10 mg tablet Commonly known as:  BUSPAR Take 1 Tab by mouth two (2) times a day. cetirizine 10 mg tablet Commonly known as:  ZYRTEC Take 1 Tab by mouth daily. Indications: Allergic Rhinitis  
  
 fluconazole 150 mg tablet Commonly known as:  DIFLUCAN Take 1 Tab by mouth daily for 1 day. FDA advises cautious prescribing of oral fluconazole in pregnancy. naproxen 500 mg tablet Commonly known as:  NAPROSYN Take 1 Tab by mouth two (2) times daily as needed for Pain for up to 10 days. With food OLANZapine 5 mg tablet Commonly known as:  ZyPREXA Take 1 Tab by mouth nightly. ondansetron 8 mg disintegrating tablet Commonly known as:  ZOFRAN ODT Take 1 Tab by mouth every eight (8) hours as needed for Nausea. PRENATAL PLUS (CALCIUM CARB) 27 mg iron- 1 mg Tab Generic drug:  prenatal vit-calcium-iron-fa TAKE 1 TABLET EVERY DAY AS DIRECTED  
  
 promethazine-codeine 6.25-10 mg/5 mL syrup Commonly known as:  PHENERGAN with CODEINE Take 5 mL by mouth four (4) times daily as needed for Cough. Max Daily Amount: 20 mL. raNITIdine 150 mg tablet Commonly known as:  ZANTAC Take 1 Tab by mouth two (2) times a day. Indications: HEARTBURN  
  
 sertraline 50 mg tablet Commonly known as:  ZOLOFT Take 1 Tab by mouth daily. Prescriptions Sent to Pharmacy Refills  
 busPIRone (BUSPAR) 10 mg tablet 1 Sig: Take 1 Tab by mouth two (2) times a day. Class: Normal  
 Pharmacy: [facility]/pharmacy [address] [place] [address] [address] Ph #: [phone] Route: Oral  
 OLANZapine (ZYPREXA) 5 mg tablet 1 Sig: Take 1 Tab by mouth nightly. Class: Normal  
 Pharmacy: [facility]/pharmacy [address] [place] [address] [address] Ph #: [phone] Route: Oral  
 sertraline (ZOLOFT) 50 mg tablet 1 Sig: Take 1 Tab by mouth daily. Class: Normal  
 Pharmacy: [facility]/pharmacy [address] [place] [address] [address] Ph #: [phone] Route: Oral  
  
We Performed the Following 5-DRUG SCREEN, UR [id number] Custom] CBC WITH AUTOMATED DIFF [14123 CPT(R)] METABOLIC PANEL, COMPREHENSIVE [53844 CPT(R)] Follow-up Instructions Return in about 2 months (around 5/2/2018) for med check and follow up. Patient Instructions Sleep Tips What to avoid   
· Do not have drinks with caffeine, such as coffee or black tea, for 8 hours before bed. · Do not smoke or use other types of tobacco near bedtime. Nicotine is a stimulant and can keep you awake. · Avoid drinking alcohol late in the evening, because it can cause you to wake in the middle of the night. · Do not eat a big meal close to bedtime. If you are hungry, eat a light snack. · Do not drink a lot of water close to bedtime, because the need to urinate may wake you up during the night. · Do not read or watch TV in bed. Use the bed only for sleeping and sexual activity.  
What to try   
· Go to bed at the same time every night, and wake up at the same time every morning. Do not take naps during the day. · Keep your bedroom quiet, dark, and cool. · Get regular exercise, but not within 3 to 4 hours of your bedtime. · Sleep on a comfortable pillow and mattress. · If watching the clock makes you anxious, turn it facing away from you so you cannot see the time. · If you worry when you lie down, start a worry book. Well before bedtime, write down your worries, and then set the book and your concerns aside. · Try meditation or other relaxation techniques before you go to bed. · If you cannot fall asleep, get up and go to another room until you feel sleepy. Do something relaxing. Repeat your bedtime routine before you go to bed again. Make your house quiet and calm about an hour before bedtime. Turn down the lights, turn off the TV, log off the computer, and turn down the volume on music. This can help you relax after a busy day. Learning About Borderline Personality Disorder What is borderline personality disorder? Borderline personality disorder is a mental health condition. It causes intense mood swings, impulsive behaviors, and severe problems with self-worth. It can lead to troubled relationships in every area of your life. Experts don't know exactly what causes the disorder. It may be linked to a problem with the parts of the brain that control reactions to emotions. The disorder also seems to run in families. Often, people who get it faced some kind of childhood trauma such as abuse, neglect, or the death of a parent. Most of the time, signs of the disorder first appear in childhood. But problems often don't start until the early adult years. It's important to know that the disorder can be treated. Treatment can be hard, and getting better can take years. But with treatment, most people with borderline personality disorder do get better over time. What are the symptoms? Everyone has problems with emotions or behaviors sometimes.  But if you have borderline personality disorder, the problems are severe. They repeat over a long time and disrupt your life. The most common symptoms include: 
Intense emotions and mood swings. Harmful, impulsive behaviors. These may include substance abuse, binge eating, out-of-control spending, risky sexual behavior, and reckless driving. Hurting yourself. This may include cutting or burning yourself or attempting suicide. Trouble with relationships. You may see others as either \"good\" or \"bad. \" And your view may suddenly shift from one to the other, for minor reasons. Feeling worthless or empty inside. A frantic fear of being left alone (abandoned). This fear may lead to desperate attempts to hold on to those around you. Or it may cause you to reject others before they can reject you. Problems with anger. These may include violent temper tantrums and aggressive behavior. How is it treated? It may seem that there is no one on your side. You might have been told that there is no hope. But just because you've heard this, that doesn't mean it's true. Getting medical treatment and taking care of yourself can really help. Medical treatment When you start treatment, you will find health professionals who will help you. You will also meet others who have gone through what you are going through. Long-term treatment can reduce symptoms and harmful behaviors. It can also help you manage your emotions better. Treatment may include: 
Counseling and therapy. It's important to find a counselor you can build a stable relationship with. This can be hard. Your condition may cause you to see your counselor as caring one minute and cruel the next. This can happen especially when he or she asks you to try to change a behavior. Try to find a counselor who has special training in dialectical behavioral therapy. It's a type of therapy that is often used to treat people with this disorder. Medicines. Examples are antidepressants, mood stabilizers, and antipsychotics. They may be helpful when you use them along with therapy. Self-care There are things you can do to help yourself. Here are some tips: 
Keep a regular daily schedule. Do not drink alcohol or use drugs. If your doctor prescribed medicines for you, take them exactly as directed. Get a healthy amount of sleep. Try to be active during daylight hours, and go to sleep and wake up at the same time each day. Eat healthy foods like fruits and vegetables; whole-grain breads and cereals; and lean meats, fish, and poultry. Practice mindfulness or other meditation. To be mindful means to pay attention to and accept the things that are happening right now, in the present moment. Keep to your treatment plan, even when it's hard. Keep the numbers for these national suicide hotlines: 6-659-077-TALK (7-959.297.8617) and 6-424-WEFBZFY (7-838.775.5737). If you or someone you know talks about suicide or about feeling hopeless, get help right away. When should you call for help? Call 911 anytime you think you may need emergency care. For example, call if: You feel you cannot stop from hurting yourself or someone else. Call your doctor now or seek immediate medical care if: You feel much more depressed. You hear voices. Watch closely for changes in your health, and be sure to contact your doctor if: 
You have a new crisis you can't handle. Follow-up care is a key part of your treatment and safety. Be sure to make and go to all appointments, and call your doctor if you are having problems. It's also a good idea to know your test results and keep a list of the medicines you take. Where can you learn more? Go to http://star-arvin.info/. Enter R895 in the search box to learn more about \"Learning About Borderline Personality Disorder. \" Current as of: May 12, 2017 Content Version: 11.4 © 3576-6910 Healthwise, [employer]. Care instructions adapted under license by [employer] (which disclaims liability or warranty for this information). If you have questions about a medical condition or this instruction, always ask your healthcare professional. [address]  any warranty or liability for your use of this information. Dialectical Behavior Therapy (DBT) [facility] [phone] [facility] [place] 
[phone] 
[email or web address]/ Therapist:  Ayan Campos [facility] (Dr Mirta Ferris, Dr Jesus Manuel Carmen) [employer] [employer] [facility], [address] [place] [address] & HEALTH SERVICES! Dear Loree Enriquez: Thank you for requesting a [employer] account. Our records indicate that you already have an active [employer] account. You can access your account anytime at [email or web address] Did you know that you can access your hospital and ER discharge instructions at any time in [employer]? You can also review all of your test results from your hospital stay or ER visit. Additional Information If you have questions, please visit the Frequently Asked Questions section of the [employer] website at [email or web address] Remember, [employer] is NOT to be used for urgent needs. For medical emergencies, dial 911. Now available from your iPhone and Android! Please provide this summary of care documentation to your next provider. Your primary care clinician is listed as PIPO Martinez. If you have any questions after today's visit, please call [phone].

## 2019-05-02 NOTE — PROGRESS NOTES
"Primary Care Provider Appointment    Subjective:      Patient ID: Zoey Ham is a 73 y.o. female with ESRD dialysis, a fib, NSTEMI, diabetes, HTN    Chief Complaint: Follow-up; Chronic Kidney Disease; and Atrial Fibrillation    Patient is tolerating dialysis. She has "tired days" during which she goes "straight to bed and I wake up the next day."    She had dialysis AVF  dysfunction earlier this year, which is resolved now after balloonplasty.    She is not taking her renvela due to cost and constipation. She is compliant with dialysis M/W/F.    Has had "9 heart attacks" most recent in 6/2018. She has been on plavix/ASA for numerous years. Compliant with statin. Due for Cardiology appt.    She is eating healthier, A1c is no longer reliable due to dialysis status. But has been historically elevated. She reports her home glucose readings are in low 100s.    Her BP is elevated today, but she reports low readings during dialysis.    She went to her youngest grandson's baseball game, and really enjoyed it. She has numerous great-grandchildren.     Past Surgical History:   Procedure Laterality Date    APPENDECTOMY      CARDIAC SURGERY  2002    CABG    CHOLECYSTECTOMY      CHOLECYSTECTOMY-LAPAROSCOPIC N/A 2/4/2015    [facility] 2ND FLR    [id number]  - Left brachiocephalic Left 4/16/2018    Performed by YAMEL Perry III, [facility] at [facility] 2ND FLR    CORONARY ANGIOPLASTY  2004    CORONARY ARTERY BYPASS GRAFT      EYE SURGERY      cataracts bilaterally    Fistulogram Left 1/17/2019    Performed by [facility]EL [facility], [facility] at [facility] 2ND [facility]LR    Fistulogram Left 9/20/2018    Performed by [facility], [facility] at [facility]    FRACTURE SURGERY      right ankle    HYSTERECTOMY      PTA (ANGIOPLASTY, PERCUTANEOUS, TRANSLUMINAL) N/A 1/17/2019    Performed by [facility], [facility] at [facility] 2ND FLR    PTA (ANGIOPLASTY, PERCUTANEOUS, TRANSLUMINAL) N/A " 9/20/2018    Performed by Y[facility]EL [facility] III, MD at [facility] OR 2ND FLR    TONSILLECTOMY         Past Medical History:   Diagnosis Date    Acute myocardial infarction of anterolateral wall 7/9/2015    Anemia of chronic renal failure, stage 4 (severe) 1/22/2015    Atherosclerosis of aorta 10/3/2012    AV shunt malfunction     Benign hypertension with CKD (chronic kidney disease) stage IV 3/11/2016    Hypertension associated with Diabetes    Carpal tunnel syndrome, right     Chronic diastolic congestive heart failure 10/3/2012    Combined systolic and diastolic CHF    Chronic kidney disease, stage IV (severe) 7/3/2012    Coronary artery disease     s/p 1v CABG 2002 and multiple PCI (last angiogram in 6/2012 with patent LIMA->LAD and patent LCx and RCA stents)    Diabetic polyneuropathy associated with type 2 diabetes mellitus 7/9/2015    Diabetic polyneuropathy associated with type 2 diabetes mellitus 7/9/2015    Dialysis patient     Encounter for blood transfusion     Gastritis without bleeding     Heart attack 09/2012    5-6 events    Hyperlipidemia     Hyperparathyroidism     Hyperphosphatemia     Metabolic acidosis     Nephritis and nephropathy, 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 7/9/2015    NSTEMI (non-ST elevated myocardial infarction)     NSTEMI (non-ST elevated myocardial infarction)     NSTEMI (non-ST elevated myocardial infarction)     Occlusion and stenosis of carotid artery with cerebral infarction 7/9/2015    Osteopenia     PAF (paroxysmal atrial fibrillation) 10/3/2012    Pneumonia     Proliferative diabetic retinopathy 10/3/2012    S/P drug eluting coronary stent placement     Sepsis due to Escherichia coli with acute renal failure 2/2016    UTI     TACO (transfusion associated circulatory overload)     Type II diabetes mellitus with neurological manifestations 7/9/2015    Type II diabetes mellitus with renal manifestations 7/3/2012       Social History     Socioeconomic  History    Marital status: Single     Spouse name: Not on file    Number of children: Not on file    Years of education: Not on file    Highest education level: Not on file   Occupational History    Occupation: Homemaker   Social Needs    Financial resource strain: Not on file    Food insecurity:     Worry: Not on file     Inability: Not on file    Transportation needs:     Medical: Not on file     Non-medical: Not on file   Tobacco Use    Smoking status: Never Smoker    Smokeless tobacco: Never Used   Substance and Sexual Activity    Alcohol use: No    Drug use: No    Sexual activity: Never   Lifestyle    Physical activity:     Days per week: Not on file     Minutes per session: Not on file    Stress: Not on file   Relationships    Social connections:     Talks on phone: Not on file     Gets together: Not on file     Attends Jewish service: Not on file     Active member of club or organization: Not on file     Attends meetings of clubs or organizations: Not on file     Relationship status: Not on file   Other Topics Concern    Are you pregnant or think you may be? No    Breast-feeding No   Social History Narrative    2 biological kids, 1 adopted9 grandkids (1 grandgagandeepter lives with her while she 's attending pharmacy school at [employer])3 great-grandkidsGoing to [place] in [place] for 1 month       Review of Systems   Constitutional: Positive for fatigue. Negative for activity change, appetite change and unexpected weight change.   Respiratory: Negative for cough and choking.    Cardiovascular: Negative for leg swelling.   Gastrointestinal: Negative for abdominal pain, diarrhea and nausea.   Musculoskeletal: Negative for back pain, gait problem and myalgias.   Neurological: Negative for dizziness, tremors, syncope, facial asymmetry, weakness, numbness and headaches.   Hematological: Bruises/bleeds easily.   Psychiatric/Behavioral: Negative for agitation, behavioral problems,  "confusion and decreased concentration.       Objective:   BP (!) 150/50 (BP Location: Right arm, Patient Position: Sitting, BP Method: Medium (Manual))   Pulse 76   Ht 5' 4" (1.626 m)   Wt 77.8 kg (171 lb 8.3 oz)   LMP  (LMP Unknown)   SpO2 96%   BMI 29.44 kg/m²     Physical Exam   Constitutional: She is oriented to person, place, and time. She appears well-developed and well-nourished.   HENT:   Head: Normocephalic and atraumatic.   Neck: Normal range of motion.   Cardiovascular:   AV graft on L forearm (thrill palpated, bruit ausculated)   Abdominal: Soft. Bowel sounds are normal. There is no tenderness. There is no guarding.   Neurological: She is alert and oriented to person, place, and time.   Skin:   Ecchymoses on UE bilaterally   Psychiatric: She has a normal mood and affect. Her behavior is normal. Thought content normal.   Flat affect   Vitals reviewed.      Lab Results   Component Value Date    WBC 10.96 01/08/2019    HGB 11.6 (L) 01/08/2019    HCT 38.0 01/08/2019     01/08/2019    CHOL 127 06/05/2018    TRIG 163 (H) 06/05/2018    HDL 28 (L) 06/05/2018    ALT 11 08/22/2018    AST 16 08/22/2018     01/08/2019    K 4.9 01/17/2019     01/08/2019    CREATININE 2.4 (H) 01/08/2019    BUN 33 (H) 01/08/2019    CO2 27 01/08/2019    TSH 2.801 06/09/2018    INR 0.9 06/04/2018    GLUF 132 (H) 05/03/2012    HGBA1C 8.9 (H) 06/05/2018       RESULTS: Reviewed labs and images today    Assessment:   73 y.o. female with multiple co-morbid illnesses here to continue work-up of chronic issues notably with ESRD dialysis, a fib, NSTEMI, diabetes, HTN     Plan:     Problem List Items Addressed This Visit        Cardiac/Vascular    Hypertension associated with diabetes     BP overtreated on metoprolol 100mg BID, losartan 75mg, diuretic PRN  · norvasc added during hospital, but patient not discharged on this  · Nephro discontinued cozaar  · Cards increased metoprolol to 100mg BID  · Continue lasix 80mg "   · Ordered by Dr Iglesias  · F/U Cardiology         Relevant Medications    metoprolol tartrate (LOPRESSOR) 100 MG tablet    Chronic combined systolic and diastolic congestive heart failure    Relevant Medications    atorvastatin (LIPITOR) 80 MG tablet    clopidogrel (PLAVIX) 75 mg tablet       Renal/    Abnormal mammogram     Calcifications in previous mammo, completed monitoring period with q6 mo diagnostic mammo  · Screening mammo bilateral now annually  · Scheduled for 5/14/2019         Hyperphosphatemia     Not compliant with renvela due to cost and constipation. Labs normal  · Monitor phos            Endocrine    Type 2 diabetes mellitus with chronic kidney disease on chronic dialysis, with long-term current use of insulin     Compliant with atorvastatin 80mg, high ASCVD with DM and age. Compliant with dialysis  · cotinue atorvastatin 80mg  · Continue high-dose ASA and plavix  · DM and BP control  · Continue lantus 15U  · Needs lipid panel  · Will ask dialysis if they can perform         Relevant Medications    lancets (ACCU-CHEK SOFTCLIX LANCETS) Misc    atorvastatin (LIPITOR) 80 MG tablet    clopidogrel (PLAVIX) 75 mg tablet      Other Visit Diagnoses     Acute renal failure superimposed on stage 5 chronic kidney disease, not on chronic dialysis, unspecified acute renal failure type        Renovascular hypertension        Relevant Medications    metoprolol tartrate (LOPRESSOR) 100 MG tablet    Coronary artery disease of bypass graft of native heart with stable angina pectoris        Relevant Medications    metoprolol tartrate (LOPRESSOR) 100 MG tablet    atorvastatin (LIPITOR) 80 MG tablet    clopidogrel (PLAVIX) 75 mg tablet    Uncontrolled type 2 diabetes mellitus with stage 4 chronic kidney disease, with long-term current use of insulin        Relevant Medications    lancets (ACCU-CHEK SOFTCLIX LANCETS) Misc    atorvastatin (LIPITOR) 80 MG tablet    clopidogrel (PLAVIX) 75 mg tablet          Health  Maintenance       Date Due Completion Date    Fecal Occult Blood Test (FOBT)/[employer] 1946 --- ALREADY HAS AT HOME    Shingles Vaccine (1 of 2) 01/06/2011 ---    Foot Exam 05/14/2019 5/14/2018- SCHEDULED    Override on 12/6/2016: Done    Mammogram 05/14/2019 5/14/2018- SCHEDULED    Colonoscopy 01/14/2020 (Originally 8/21/1963) ---    Lipid Panel 06/05/2019 6/5/2018- ATTEMPT AT DIALYSIS    Eye Exam 07/09/2019 7/9/2018    Influenza Vaccine 08/01/2019 8/30/2018 (Done)    Override on 8/30/2018: Done (done at dialysis)    Override on 8/29/2016: Done    Override on 10/27/2013: Done    DEXA SCAN 07/13/2020 7/13/2017    TETANUS VACCINE 03/03/2026 3/3/2016          Follow up in about 3 months (around 8/2/2019). Total face-to-face time was 60 min, 50% of this was spent on counseling and coordination of care. The following issues were discussed: with ESRD dialysis, a fib, NSTEMI, diabetes, HTN    Cesia Rosales MD/MPH  Internal Medicine  [facility]  [phone]

## 2019-05-02 NOTE — PATIENT INSTRUCTIONS
TODAY:  - cardiology appt in 6/2019 (Dr Kelley)  - refill meds  - continue insulin 15U daily   + check glucose every morning before you eat  - order ASA 325mg from [employer] [facility]  - mammogram and podiatry appointments on 5/14/19  - ask dialysis if they can collect lipid panel at next labs and fax to me  - mail in stool card for colon cancer screening  - optometry appt in 7/2019 (Dr Garza)

## 2019-05-14 ENCOUNTER — OFFICE VISIT (OUTPATIENT)
Dept: PODIATRY | Facility: CLINIC | Age: 74
End: 2019-05-14
Payer: MEDICARE

## 2019-05-14 VITALS
HEIGHT: 64 IN | BODY MASS INDEX: 29.02 KG/M2 | SYSTOLIC BLOOD PRESSURE: 139 MMHG | HEART RATE: 68 BPM | RESPIRATION RATE: 18 BRPM | WEIGHT: 170 LBS | DIASTOLIC BLOOD PRESSURE: 66 MMHG

## 2019-05-14 DIAGNOSIS — N18.6 TYPE 2 DIABETES MELLITUS WITH CHRONIC KIDNEY DISEASE ON CHRONIC DIALYSIS, WITH LONG-TERM CURRENT USE OF INSULIN: Primary | ICD-10-CM

## 2019-05-14 DIAGNOSIS — Z99.2 TYPE 2 DIABETES MELLITUS WITH CHRONIC KIDNEY DISEASE ON CHRONIC DIALYSIS, WITH LONG-TERM CURRENT USE OF INSULIN: Primary | ICD-10-CM

## 2019-05-14 DIAGNOSIS — E11.22 TYPE 2 DIABETES MELLITUS WITH CHRONIC KIDNEY DISEASE ON CHRONIC DIALYSIS, WITH LONG-TERM CURRENT USE OF INSULIN: Primary | ICD-10-CM

## 2019-05-14 DIAGNOSIS — Z79.4 TYPE 2 DIABETES MELLITUS WITH CHRONIC KIDNEY DISEASE ON CHRONIC DIALYSIS, WITH LONG-TERM CURRENT USE OF INSULIN: Primary | ICD-10-CM

## 2019-05-14 PROCEDURE — 3045F PR MOST RECENT HEMOGLOBIN A1C LEVEL 7.0-9.0%: ICD-10-PCS | Mod: HCNC,CPTII,S$GLB, | Performed by: PODIATRIST

## 2019-05-14 PROCEDURE — 1101F PR PT FALLS ASSESS DOC 0-1 FALLS W/OUT INJ PAST YR: ICD-10-PCS | Mod: HCNC,CPTII,S$GLB, | Performed by: PODIATRIST

## 2019-05-14 PROCEDURE — 99203 OFFICE O/P NEW LOW 30 MIN: CPT | Mod: HCNC,S$GLB,, | Performed by: PODIATRIST

## 2019-05-14 PROCEDURE — 99203 PR OFFICE/OUTPT VISIT, NEW, LEVL III, 30-44 MIN: ICD-10-PCS | Mod: HCNC,S$GLB,, | Performed by: PODIATRIST

## 2019-05-14 PROCEDURE — 1101F PT FALLS ASSESS-DOCD LE1/YR: CPT | Mod: HCNC,CPTII,S$GLB, | Performed by: PODIATRIST

## 2019-05-14 PROCEDURE — 99999 PR PBB SHADOW E&M-EST. PATIENT-LVL III: ICD-10-PCS | Mod: PBBFAC,HCNC,, | Performed by: PODIATRIST

## 2019-05-14 PROCEDURE — 99999 PR PBB SHADOW E&M-EST. PATIENT-LVL III: CPT | Mod: PBBFAC,HCNC,, | Performed by: PODIATRIST

## 2019-05-14 PROCEDURE — 3045F PR MOST RECENT HEMOGLOBIN A1C LEVEL 7.0-9.0%: CPT | Mod: HCNC,CPTII,S$GLB, | Performed by: PODIATRIST

## 2019-05-14 NOTE — LETTER
May 14, 2019      Cesia Rosales MD  [address]  [facility] [address]           [facility] - Podiatry  [address]  [facility] [address]  Phone: [phone]          Patient: Zoey Ham   MR Number: [id number]   YOB: 1945   Date of Visit: 5/14/2019       Dear Dr. Cesia Rosales:    Thank you for referring Zoey Ham to me for evaluation. Attached you will find relevant portions of my assessment and plan of care.    If you have questions, please do not hesitate to call me. I look forward to following Zoey Ham along with you.    Sincerely,    Rubina Araujo, RAMÓN    Enclosure  CC:  No Recipients    If you would like to receive this communication electronically, please contact [email or web address] or [phone] to request more information on [employer] access.    For providers and/or their staff who would like to refer a patient to [facility], please contact us through our one-stop-shop provider referral line, [facility], at [phone].    If you feel you have received this communication in error or would no longer like to receive these types of communications, please e-mail [email or web address]

## 2019-05-14 NOTE — PROGRESS NOTES
Subjective:      Patient ID: Zoey Ham is a 73 y.o. female.    Chief Complaint: PCP (Cesia Rosales MD 5/02/19); Diabetic Foot Exam; and Peripheral Neuropathy    Zoey is a 73 y.o. female who presents to the clinic upon referral from Dr. Rosales  for evaluation and treatment of diabetic feet. Zoey has a past medical history of Acute myocardial infarction of anterolateral wall (7/9/2015), Anemia of chronic renal failure, stage 4 (severe) (1/22/2015), Atherosclerosis of aorta (10/3/2012), AV shunt malfunction, Benign hypertension with CKD (chronic kidney disease) stage IV (3/11/2016), Carpal tunnel syndrome, right, Chronic diastolic congestive heart failure (10/3/2012), Chronic kidney disease, stage IV (severe) (7/3/2012), Coronary artery disease, Diabetic polyneuropathy associated with type 2 diabetes mellitus (7/9/2015), Diabetic polyneuropathy associated with type 2 diabetes mellitus (7/9/2015), Dialysis patient, Encounter for blood transfusion, Gastritis without bleeding, Heart attack (09/2012), Hyperlipidemia, Hyperparathyroidism, Hyperphosphatemia, Metabolic acidosis, Nephritis and nephropathy, 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 (7/9/2015), NSTEMI (non-ST elevated myocardial infarction), NSTEMI (non-ST elevated myocardial infarction), NSTEMI (non-ST elevated myocardial infarction), Occlusion and stenosis of carotid artery with cerebral infarction (7/9/2015), Osteopenia, PAF (paroxysmal atrial fibrillation) (10/3/2012), Pneumonia, Proliferative diabetic retinopathy (10/3/2012), S/P drug eluting coronary stent placement, Sepsis due to Escherichia coli with acute renal failure (2/2016), TACO (transfusion associated circulatory overload), Type II diabetes mellitus with neurological manifestations (7/9/2015), and Type II diabetes mellitus with renal manifestations (7/3/2012). Patient relates no major problem with feet. Only complaints today consist of yearly comprehensive diabetic  foot  examination  .    PCP: Cesia Rosales MD    Date Last Seen by PCP:   Chief Complaint   Patient presents with    PCP     Cesia Rosales MD 5/02/19    Diabetic Foot Exam    Peripheral Neuropathy         Current shoe gear: Casual shoes    Hemoglobin A1C   Date Value Ref Range Status   06/05/2018 8.9 (H) 4.0 - 5.6 % Final     Comment:     ADA Screening Guidelines:  5.7-6.4%  Consistent with prediabetes  >or=6.5%  Consistent with diabetes  High levels of fetal hemoglobin interfere with the HbA1C  assay. Heterozygous hemoglobin variants (HbS, HgC, etc)do  not significantly interfere with this assay.   However, presence of multiple variants may affect accuracy.     05/14/2018 9.0 (H) 4.0 - 5.6 % Final     Comment:     According to ADA guidelines, hemoglobin A1c <7.0% represents  optimal control in non-pregnant diabetic patients. Different  metrics may apply to specific patient populations.   Standards of Medical Care in Diabetes-2016.  For the purpose of screening for the presence of diabetes:  <5.7%     Consistent with the absence of diabetes  5.7-6.4%  Consistent with increasing risk for diabetes   (prediabetes)  >or=6.5%  Consistent with diabetes  Currently, no consensus exists for use of hemoglobin A1c  for diagnosis of diabetes for children.  This Hemoglobin A1c assay has significant interference with fetal   hemoglobin   (HbF). The results are invalid for patients with abnormal amounts of   HbF,   including those with known Hereditary Persistence   of Fetal Hemoglobin. Heterozygous hemoglobin variants (HbAS, HbAC,   HbAD, HbAE, HbA2) do not significantly interfere with this assay;   however, presence of multiple variants in a sample may impact the %   interference.     12/05/2017 10.2 (H) 4.0 - 5.6 % Final     Comment:     According to ADA guidelines, hemoglobin A1c <7.0% represents  optimal control in non-pregnant diabetic patients. Different  metrics may apply to specific patient populations.  "  Standards of Medical Care in Diabetes-2016.  For the purpose of screening for the presence of diabetes:  <5.7%     Consistent with the absence of diabetes  5.7-6.4%  Consistent with increasing risk for diabetes   (prediabetes)  >or=6.5%  Consistent with diabetes  Currently, no consensus exists for use of hemoglobin A1c  for diagnosis of diabetes for children.  This Hemoglobin A1c assay has significant interference with fetal   hemoglobin   (HbF). The results are invalid for patients with abnormal amounts of   HbF,   including those with known Hereditary Persistence   of Fetal Hemoglobin. Heterozygous hemoglobin variants (HbAS, HbAC,   HbAD, HbAE, HbA2) do not significantly interfere with this assay;   however, presence of multiple variants in a sample may impact the %   interference.             Review of Systems   Constitution: Negative for chills, decreased appetite and fever.   Cardiovascular: Negative for leg swelling.   Skin: Positive for dry skin and nail changes.   Musculoskeletal: Negative for arthritis, joint pain, joint swelling and myalgias.   Gastrointestinal: Negative for nausea and vomiting.   Neurological: Positive for paresthesias. Negative for loss of balance and numbness.           Objective:       Vitals:    05/14/19 0910   BP: 139/66   Pulse: 68   Resp: 18   Weight: 77.1 kg (170 lb)   Height: 5' 4" (1.626 m)   PainSc: 0-No pain        Physical Exam   Constitutional: She appears well-developed and well-nourished.  Non-toxic appearance. She does not have a sickly appearance. No distress.   alert and oriented x 3.    Cardiovascular:   Pulses:       Dorsalis pedis pulses are 2+ on the right side, and 2+ on the left side.        Posterior tibial pulses are 2+ on the right side, and 2+ on the left side.    Capillary refill time is within normal limits. Digital hair present.    Pulmonary/Chest: No respiratory distress.   Musculoskeletal: She exhibits no deformity.        Right ankle: No tenderness. No " lateral malleolus, no medial malleolus, no AITFL, no CF ligament and no posterior TFL tenderness found. Achilles tendon exhibits no pain, no defect and normal Harmon's test results.        Left ankle: No tenderness. No lateral malleolus, no medial malleolus, no AITFL, no CF ligament and no posterior TFL tenderness found. Achilles tendon exhibits no pain, no defect and normal Harmon's test results.        Right foot: There is no tenderness and no bony tenderness.        Left foot: There is no tenderness and no bony tenderness.   Adequate joint range of motion without pain, limitation, nor crepitation Bilateral feet and ankle joints. Muscle strength is 5/5 in all groups bilaterally.           Feet:   Right Foot:   Protective Sensation: 5 sites tested. 4 sites sensed.   Left Foot:   Protective Sensation: 5 sites tested. 4 sites sensed.   Lymphadenopathy:   No lymphatic streaking     Neurological: She displays no atrophy. No sensory deficit.   Light touch present     Skin: Skin is warm, dry and intact. No rash noted. She is not diaphoretic. No cyanosis. No pallor. Nails show no clubbing.   Skin is of normal turgor.   Normal temperature gradient.  Examination of the skin reveals no evidence of significant rashes, open lesions, suspicious appearing nevi or other concerning lesions.      Toenails 1-5 bilaterally are neatly trimmed; of normal color and thickness         Psychiatric: Her mood appears not anxious. Her affect is not inappropriate. Her speech is not slurred. She is not combative. She is communicative. She is attentive.   Nursing note and vitals reviewed.            Assessment:       Encounter Diagnosis   Name Primary?    Type 2 diabetes mellitus with chronic kidney disease on chronic dialysis, with long-term current use of insulin Yes         Plan:       Zoey was seen today for pcp, diabetic foot exam and peripheral neuropathy.    Diagnoses and all orders for this visit:    Type 2 diabetes mellitus with  chronic kidney disease on chronic dialysis, with long-term current use of insulin      I counseled the patient on her conditions, their implications and medical management.      - Shoe inspection. Diabetic Foot Education. Patient reminded of the importance of good nutrition and blood sugar control to help prevent podiatric complications of diabetes. Patient instructed on proper foot hygeine. We discussed wearing proper shoe gear, daily foot inspections, never walking without protective shoe gear, caution putting sharp instruments to feet     - Discussed DM foot care:  Wear comfortable, proper fitting shoes. Wash feet daily. Dry well. After drying, apply moisturizer to feet (no lotion to webspaces). Inspect feet daily for skin breaks, blisters, swelling, or redness. Wear cotton socks (preferably white)  Change socks every day. Do NOT walk barefoot. Do NOT use heating pads or warm/hot water soaks     - Discussed importance of daily moisturizer to the feet such as Gold bonds diabetic foot cream    - Patient is low risk for developing lower extremity issues secondary to diabetes. I recommend continued yearly diabetic foot examinations.     - Patients PCP can perform yearly foot checks . Currently  patient has no pedal manifestations of DM    - Decreased sensation distal hallux b/l     - Patients with out pedal manifestations of DM, do not qualify for nail/callus trimming     - RTC in  PRN     .

## 2019-05-29 ENCOUNTER — TELEPHONE (OUTPATIENT)
Dept: VASCULAR SURGERY | Facility: CLINIC | Age: 74
End: 2019-05-29

## 2019-05-29 DIAGNOSIS — Z99.2 ESRD (END STAGE RENAL DISEASE) ON DIALYSIS: Primary | ICD-10-CM

## 2019-05-29 DIAGNOSIS — N18.6 ESRD (END STAGE RENAL DISEASE) ON DIALYSIS: Primary | ICD-10-CM

## 2019-05-29 NOTE — TELEPHONE ENCOUNTER
Contacted patient in response to voicemessage. States she has been having prolonged bleeding after HD treatment for past several treatments. Appt scheduled for [facility] and clinic visit, pt verified appts. Appt letters placed in mail.

## 2019-06-04 ENCOUNTER — HOSPITAL ENCOUNTER (OUTPATIENT)
Dept: VASCULAR SURGERY | Facility: CLINIC | Age: 74
Discharge: HOME | End: 2019-06-04
Attending: SURGERY
Payer: MEDICARE

## 2019-06-04 DIAGNOSIS — Z99.2 ESRD (END STAGE RENAL DISEASE) ON DIALYSIS: ICD-10-CM

## 2019-06-04 DIAGNOSIS — N18.6 ESRD (END STAGE RENAL DISEASE) ON DIALYSIS: ICD-10-CM

## 2019-06-04 PROCEDURE — 93990 PR DUPLEX HEMODIALYSIS ACCESS: ICD-10-PCS | Mod: HCNC,S$GLB,, | Performed by: SURGERY

## 2019-06-04 PROCEDURE — 93990 DOPPLER FLOW TESTING: CPT | Mod: HCNC,S$GLB,, | Performed by: SURGERY

## 2019-06-11 ENCOUNTER — OFFICE VISIT (OUTPATIENT)
Dept: VASCULAR SURGERY | Facility: CLINIC | Age: 74
End: 2019-06-11
Payer: MEDICARE

## 2019-06-11 VITALS
BODY MASS INDEX: 28.98 KG/M2 | TEMPERATURE: 98 F | SYSTOLIC BLOOD PRESSURE: 195 MMHG | WEIGHT: 169.75 LBS | HEIGHT: 64 IN | DIASTOLIC BLOOD PRESSURE: 77 MMHG | HEART RATE: 70 BPM

## 2019-06-11 DIAGNOSIS — Z99.2 ESRD (END STAGE RENAL DISEASE) ON DIALYSIS: Primary | ICD-10-CM

## 2019-06-11 DIAGNOSIS — N18.6 ESRD (END STAGE RENAL DISEASE) ON DIALYSIS: Primary | ICD-10-CM

## 2019-06-11 DIAGNOSIS — Z01.818 PRE-OP EVALUATION: Primary | ICD-10-CM

## 2019-06-11 DIAGNOSIS — N18.6 ESRD (END STAGE RENAL DISEASE): Primary | ICD-10-CM

## 2019-06-11 DIAGNOSIS — T82.858D STENOSIS OF ARTERIOVENOUS DIALYSIS FISTULA, SUBSEQUENT ENCOUNTER: ICD-10-CM

## 2019-06-11 PROCEDURE — 99999 PR PBB SHADOW E&M-EST. PATIENT-LVL IV: CPT | Mod: PBBFAC,HCNC,, | Performed by: SURGERY

## 2019-06-11 PROCEDURE — 99214 OFFICE O/P EST MOD 30 MIN: CPT | Mod: HCNC,S$GLB,, | Performed by: SURGERY

## 2019-06-11 PROCEDURE — 1101F PR PT FALLS ASSESS DOC 0-1 FALLS W/OUT INJ PAST YR: ICD-10-PCS | Mod: HCNC,CPTII,S$GLB, | Performed by: SURGERY

## 2019-06-11 PROCEDURE — 99999 PR PBB SHADOW E&M-EST. PATIENT-LVL IV: ICD-10-PCS | Mod: PBBFAC,HCNC,, | Performed by: SURGERY

## 2019-06-11 PROCEDURE — 1101F PT FALLS ASSESS-DOCD LE1/YR: CPT | Mod: HCNC,CPTII,S$GLB, | Performed by: SURGERY

## 2019-06-11 PROCEDURE — 99214 PR OFFICE/OUTPT VISIT, EST, LEVL IV, 30-39 MIN: ICD-10-PCS | Mod: HCNC,S$GLB,, | Performed by: SURGERY

## 2019-06-11 NOTE — PROGRESS NOTES
REFERRING PHYSICIAN:  Karla Iglesias D.O.    HISTORY OF PRESENT ILLNESS:  A 73-year-old female with ESRD on HD since 8/2018, M/W/F, s/p    1. PTA, L AVG 1/17/19  2. Drug coated PTA, L AVF (7x60 Lutonix) 9/20/18  3. PTa, L AVF 5/31/18  4.  L brachio-cephalic AVF 4/16/18.  No c/o    This is a 5 month f/u, now having some increased bleeding    PAST MEDICAL HISTORY:  1.  Coronary artery disease, status post MI x5.   MI 8/2018 with PCI  2.  Hypertension.  3.  Diabetes.  4.  Hyperparathyroidism.  5.  Chronic atrial fibrillation.    PAST SURGICAL HISTORY:  1.  Hysterectomy.  2.  Tonsillectomy.  3.  Appendectomy.  4.  Coronary artery bypass.  5.  PCI.  6.  Fracture surgery.    FAMILY HISTORY:  Positive for diabetes and hypertension.    MEDICATIONS:  Include Plavix, aspirin and statin.  See EPIC for full list.    ALLERGIES:  Penicillin, Percodan and Phenergan.    REVIEW OF SYSTEMS:  Denies postprandial pain or DVT.  All other systems including eyes, ENT, respiratory, musculoskeletal, breast,   neurologic, psychiatric, heme, lymph, allergy and immune are negative.    PHYSICAL EXAMINATION:  VITAL SIGNS:  See nursing notes.  GENERAL:  She is in no acute distress.  RESPIRATORY:  Normal effort.  Clear to claudication.  CARDIOVASCULAR:  Regular rhythm.  Nondisplaced PMI, no murmur.  VASCULAR:  L radial not palp  EXTREMITIES:   Good thrill proximally with mild pulsatility,  Worse pusatility in upper arm  NEUROLOGIC:  Cranial nerves VII through XII intact.  5/5 motor strength in all   extremities.    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 Access the AVF near arterial anast    I have explained the risks, benefits and alternatives for this procedure in detail.  The patients voices understanding and all questions have be answered, and agrees to proceed with the procedure.    JULIO CÉSAR Perry III, MD, FACS  Professor and  Chief, Vascular and Endovascular Surgery    CC: Karla Iglesias D.O.

## 2019-06-18 ENCOUNTER — ANESTHESIA EVENT (OUTPATIENT)
Dept: SURGERY | Facility: HOSPITAL | Age: 74
DRG: 166 | End: 2019-06-18
Payer: MEDICARE

## 2019-06-18 NOTE — ANESTHESIA PREPROCEDURE EVALUATION
[facility]  Anesthesia Pre-Operative Evaluation         Patient Name: Zoey Ham  YOB: 1945  MRN: [id number]    SUBJECTIVE:     Pre-operative evaluation for Procedure(s) (LRB):  Fistulogram (Left)     06/20/2019    Zoey Ham is a 73 y.o. female w/ a significant PMHx of HTN, DMII, pulm HTN PASP 54, CHFpEF and ESRD on HD M/W/F who presents for the above procedure.      Prev airway:   Not documented in lap marla encounter; other procedures MACs      Patient Active Problem List   Diagnosis    Hypertension associated with diabetes    Proliferative diabetic retinopathy    Atherosclerosis of aorta    Chronic combined systolic and diastolic congestive heart failure    Anemia of chronic renal failure, stage 5    Nausea    Diabetic polyneuropathy associated with type 2 diabetes mellitus    Type 2 diabetes mellitus with chronic kidney disease on chronic dialysis, with long-term current use of insulin    Obesity (BMI 30.0-34.9)    Hyperparathyroidism    Chronic atrial fibrillation    Coronary artery disease involving coronary bypass graft of native heart with unstable angina pectoris    Senile purpura    Osteopenia of multiple sites    Type 2 diabetes mellitus with hyperlipidemia    History of non-ST elevation myocardial infarction (NSTEMI)    S/P drug eluting coronary stent placement    Hypovitaminosis D    Wrist pain, acute, unspecified laterality    ESRD (end stage renal disease) on dialysis    Abnormal mammogram    Hemodialysis access, AV graft    Stenosis of arteriovenous dialysis fistula    Hyperphosphatemia    Acute blood loss anemia    Colon cancer screening    End of life care    Mild protein-calorie malnutrition    ESRD (end stage renal disease)       Review of patient's allergies indicates:   Allergen Reactions    Percodan [oxycodone-aspirin] Hives     Welps     Pcn [penicillins] Hives and Swelling     Tolerated Rocephin IM in clinic       "Phenergan [promethazine] Other (See Comments)     Altered mental status; jerking of extremities       Current Inpatient Medications:      Current Facility-Administered Medications on File Prior to Visit   Medication Dose Route Frequency Provider Last Rate Last Dose    mupirocin 2 % ointment   Nasal On Call Procedure Lidya Rodriguez MD        sodium chloride 0.9% flush 10 mL  10 mL Intravenous PRN Lidya Rodriguez MD        vancomycin in dextrose 5 % 1 gram/250 mL IVPB 1,000 mg  1,000 mg Intravenous On Call Procedure Lidya Rodriguez MD   1,000 mg at 06/20/19 0713     Current Outpatient Medications on File Prior to Visit   Medication Sig Dispense Refill    ACCU-CHEK MARI PLUS METER Misc 1 Device by Misc.(Non-Drug; Combo Route) route 2 (two) times daily. 1 each 0    aspirin (ECOTRIN) 325 MG EC tablet Take 1 tablet (325 mg total) by mouth once daily. (Patient taking differently: Take 325 mg by mouth every morning. )      atorvastatin (LIPITOR) 80 MG tablet Take 1 tablet (80 mg total) by mouth every morning. 90 tablet 3    blood sugar diagnostic (ACCU-CHEK MARI PLUS TEST STRP) Strp TEST BLOOD SUGAR FOUR TIMES DAILY 350 strip 4    calcitRIOL (ROCALTROL) 0.5 MCG Cap Take 1 capsule (0.5 mcg total) by mouth once daily. 90 capsule 1    clopidogrel (PLAVIX) 75 mg tablet Take 1 tablet (75 mg total) by mouth once daily. 90 tablet 3    furosemide (LASIX) 80 MG tablet One 80 mg  tab once a day. 90 tablet 2    insulin glargine (LANTUS U-100 INSULIN) 100 unit/mL injection Inject 15 Units into the skin every evening. 10 mL 3    insulin syringe-needle U-100 0.3 mL 31 gauge x 1/4" Syrg 1 application by Misc.(Non-Drug; Combo Route) route once daily. 100 Syringe 3    lancets (ACCU-CHEK SOFTCLIX LANCETS) Misc 1 application by Misc.(Non-Drug; Combo Route) route once daily. 100 each 3    lidocaine-prilocaine (EMLA) cream APPLY SMALL AMOUNT TO ACCESS SITE 1-2 HOURS BEFORE TREATMENT. COVER AREA WITH " AN OCCLUSIVE DRESSING AS DIRECTED.  12    metoprolol tartrate (LOPRESSOR) 100 MG tablet Take 1 tablet (100 mg total) by mouth 2 (two) times daily. 180 tablet 3    nitroGLYCERIN (NITROSTAT) 0.3 MG SL tablet Place 1 tablet (0.3 mg total) under the tongue every 5 (five) minutes as needed for Chest pain. 36 tablet 3       Past Surgical History:   Procedure Laterality Date    APPENDECTOMY      CARDIAC SURGERY  2002    CABG    CHOLECYSTECTOMY      CHOLECYSTECTOMY-LAPAROSCOPIC N/A 2/4/2015    Performed by Quinton Ashley MD at [facility] OR [address] [place] [address]    [id number]  - Left brachiocephalic Left 4/16/2018    Performed by YAMEL Perry III, MD at [facility] OR [address] [place] [address]    CORONARY ANGIOPLASTY  2004    CORONARY ARTERY BYPASS GRAFT      EYE SURGERY      cataracts bilaterally    Fistulogram Left 1/17/2019    Performed by YAMEL Perry III, MD at [facility] OR [address] [place] [address]    Fistulogram Left 9/20/2018    Performed by YAEML Perry III, MD at [facility] OR [address] [place] [address]    FRACTURE SURGERY      right ankle    HYSTERECTOMY      PTA (ANGIOPLASTY, PERCUTANEOUS, TRANSLUMINAL) N/A 1/17/2019    Performed by YAMEL Perry III, MD at [facility] OR [address] [place] [address]    PTA (ANGIOPLASTY, PERCUTANEOUS, TRANSLUMINAL) N/A 9/20/2018    Performed by YAMEL Perry III, MD at [facility] OR [address] [place] [address]    TONSILLECTOMY         Social History     Socioeconomic History    Marital status: Single     Spouse name: Not on file    Number of children: Not on file    Years of education: Not on file    Highest education level: Not on file   Occupational History    Occupation: Homemaker   Social Needs    Financial resource strain: Not on file    Food insecurity:     Worry: Not on file     Inability: Not on file    Transportation needs:     Medical: Not on file     Non-medical: Not on file   Tobacco Use    Smoking status: Never Smoker    Smokeless tobacco: Never Used   Substance and Sexual Activity    Alcohol use: No    Drug use: No    Sexual activity: Never    Lifestyle    Physical activity:     Days per week: Not on file     Minutes per session: Not on file    Stress: Not on file   Relationships    Social connections:     Talks on phone: Not on file     Gets together: Not on file     Attends Yazidi service: Not on file     Active member of club or organization: Not on file     Attends meetings of clubs or organizations: Not on file     Relationship status: Not on file   Other Topics Concern    Are you pregnant or think you may be? No    Breast-feeding No   Social History Narrative    2 biological kids, 1 adopted9 grandkids (1 granddavid lives with her while she 's attending pharmacy school at [employer])3 great-grandkidsGoing to [place]       OBJECTIVE:     Vital Signs Range (Last 24H):  Temp:  [36.4 °C (97.6 °F)]   Pulse:  [63]   Resp:  [16]   BP: (144)/(66)   SpO2:  [96 %]       CBC:   Lab Results   Component Value Date    WBC 13.47 (H) 06/11/2019    HGB 12.6 06/11/2019    HCT 39.6 06/11/2019     (H) 06/11/2019     06/11/2019       CMP:   CMP  Sodium   Date Value Ref Range Status   06/11/2019 139 136 - 145 mmol/L Final     Potassium   Date Value Ref Range Status   06/11/2019 4.6 3.5 - 5.1 mmol/L Final     Chloride   Date Value Ref Range Status   06/11/2019 102 95 - 110 mmol/L Final     CO2   Date Value Ref Range Status   06/11/2019 28 23 - 29 mmol/L Final     Glucose   Date Value Ref Range Status   06/11/2019 260 (H) 70 - 110 mg/dL Final     BUN, Bld   Date Value Ref Range Status   06/11/2019 42 (H) 8 - 23 mg/dL Final     Creatinine   Date Value Ref Range Status   06/11/2019 2.7 (H) 0.5 - 1.4 mg/dL Final     Calcium   Date Value Ref Range Status   06/11/2019 9.6 8.7 - 10.5 mg/dL Final     Total Protein   Date Value Ref Range Status   08/22/2018 6.9 6.0 - 8.4 g/dL Final     Albumin   Date Value Ref Range Status   08/22/2018 3.6 3.5 - 5.2 g/dL Final     Total Bilirubin   Date Value Ref Range Status    08/22/2018 0.4 0.1 - 1.0 mg/dL Final     Comment:     For infants and newborns, interpretation of results should be based  on gestational age, weight and in agreement with clinical  observations.  Premature Infant recommended reference ranges:  Up to 24 hours.............<8.0 mg/dL  Up to 48 hours............<12.0 mg/dL  3-5 days..................<15.0 mg/dL  6-29 days.................<15.0 mg/dL       Alkaline Phosphatase   Date Value Ref Range Status   08/22/2018 115 55 - 135 U/L Final     AST   Date Value Ref Range Status   08/22/2018 16 10 - 40 U/L Final     ALT   Date Value Ref Range Status   08/22/2018 11 10 - 44 U/L Final     Anion Gap   Date Value Ref Range Status   06/11/2019 9 8 - 16 mmol/L Final     eGFR if    Date Value Ref Range Status   06/11/2019 19.6 (A) >60 mL/min/1.73 m^2 Final     eGFR if non    Date Value Ref Range Status   06/11/2019 17.0 (A) >60 mL/min/1.73 m^2 Final     Comment:     Calculation used to obtain the estimated glomerular filtration  rate (eGFR) is the CKD-EPI equation.          INR:  No results for input(s): PT, INR, PROTIME, APTT in the last 72 hours.    Diagnostic Studies: No relevant studies.    EKG: No recent studies available.    2D ECHO:  Results for orders placed or performed during the hospital encounter of 06/04/18   2D echo with color flow doppler   Result Value Ref Range    QEF 65 55 - 65    Diastolic Dysfunction Yes (A)     Est. PA Systolic Pressure 53.97 (A)     Tricuspid Valve Regurgitation TRIVIAL          ASSESSMENT/PLAN:         Anesthesia Evaluation    I have reviewed the Patient Summary Reports.    I have reviewed the Nursing Notes.   I have reviewed the Medications.     Review of Systems  Anesthesia Hx:  Denies Hx of Anesthetic complications  History of prior surgery of interest to airway management or planning:   Social:  No Alcohol Use, Non-Smoker    Cardiovascular:   Hypertension Past MI CAD asymptomatic   Denies Angina. CHF  NSTEMI 5/2018 complicated by transfusion associated circulatory overload (TACO).    S/P CABG x 1 2002, S/P OM1 stent 03/26/2003, S/P RCA stent 03/26/2003, NSTEMI 2/05, PCI proximal LAD, OM2, OM3, s/ PCI OM1    Pulmonary:   Pneumonia    Renal/:   Chronic Renal Disease, CRI    Neurological:   Neuromuscular Disease,    Endocrine:   Diabetes, well controlled, type 2        Physical Exam  General:  Well nourished, Obesity    Airway/Jaw/Neck:  Airway Findings: Mouth Opening: Normal Tongue: Normal  General Airway Assessment: Adult  Mallampati: III  Improves to II with phonation.  TM Distance: Normal, at least 6 cm  Jaw/Neck Findings:  Neck ROM: Normal ROM      Dental:  Dental Findings: In tact   Chest/Lungs:  Chest/Lungs Findings: Clear to auscultation, Normal Respiratory Rate     Heart/Vascular:  Heart Findings: Rate: Normal  Rhythm: Regular Rhythm        Mental Status:  Mental Status Findings:  Cooperative, Alert and Oriented         Anesthesia Plan  Type of Anesthesia, risks & benefits discussed:  Anesthesia Type:  regional, MAC, general  Patient's Preference:   Intra-op Monitoring Plan: standard ASA monitors  Intra-op Monitoring Plan Comments:   Post Op Pain Control Plan: per primary service following discharge from PACU, IV/PO Opioids PRN and multimodal analgesia  Post Op Pain Control Plan Comments:   Induction:   IV  Beta Blocker:  Patient is on a Beta-Blocker and has received one dose within the past 24 hours (No further documentation required).       Informed Consent: Patient understands risks and agrees with Anesthesia plan.  Questions answered. Anesthesia consent signed with patient.  ASA Score: 3     Day of Surgery Review of History & Physical:    H&P update referred to the surgeon.         Ready For Surgery From Anesthesia Perspective.                                                                                                                  06/18/2019  Zoey Ham is a 73 y.o.,  "female.    Anesthesia Evaluation         Review of Systems  Anesthesia Hx:  Personal Hx of Anesthesia complications          Anesthesia Plan  Type of Anesthesia, risks & benefits discussed:  Anesthesia Type:  MAC  Patient's Preference:   Intra-op Monitoring Plan: standard ASA monitors  Intra-op Monitoring Plan Comments:   Post Op Pain Control Plan: per primary service following discharge from PACU  Post Op Pain Control Plan Comments:   Induction:   IV  Beta Blocker:  Patient is not currently on a Beta-Blocker (No further documentation required).       Informed Consent: Patient understands risks and agrees with Anesthesia plan.  Questions answered. Anesthesia consent signed with patient.  ASA Score: 3     Day of Surgery Review of History & Physical:    H&P update referred to the surgeon.         Ready For Surgery From Anesthesia Perspective.          Stated that about 5 years ago when she was getting a Cardiac Stent with Anesthesia at another Facility, she "stopped breathing."  She has had procedures since without incident.  Stated that she is "very sensitive to drugs."  Stated that she has "had problems with a high potassium."    "

## 2019-06-19 ENCOUNTER — TELEPHONE (OUTPATIENT)
Dept: VASCULAR SURGERY | Facility: CLINIC | Age: 74
End: 2019-06-19

## 2019-06-20 ENCOUNTER — ANESTHESIA (OUTPATIENT)
Dept: SURGERY | Facility: HOSPITAL | Age: 74
DRG: 166 | End: 2019-06-20
Payer: MEDICARE

## 2019-06-20 PROBLEM — N18.6 ESRD (END STAGE RENAL DISEASE): Status: ACTIVE | Noted: 2019-06-20

## 2019-06-20 PROCEDURE — D9220A PRA ANESTHESIA: Mod: HCNC,ANES,, | Performed by: ANESTHESIOLOGY

## 2019-06-20 PROCEDURE — 25000003 PHARM REV CODE 250: Mod: HCNC | Performed by: NURSE ANESTHETIST, CERTIFIED REGISTERED

## 2019-06-20 PROCEDURE — 63600175 PHARM REV CODE 636 W HCPCS: Mod: HCNC | Performed by: NURSE ANESTHETIST, CERTIFIED REGISTERED

## 2019-06-20 PROCEDURE — D9220A PRA ANESTHESIA: ICD-10-PCS | Mod: HCNC,ANES,, | Performed by: ANESTHESIOLOGY

## 2019-06-20 RX ORDER — FENTANYL CITRATE 50 UG/ML
INJECTION, SOLUTION INTRAMUSCULAR; INTRAVENOUS
Status: DISCONTINUED | OUTPATIENT
Start: 2019-06-20 | End: 2019-06-20

## 2019-06-20 RX ORDER — SODIUM CHLORIDE 9 MG/ML
INJECTION, SOLUTION INTRAVENOUS CONTINUOUS PRN
Status: DISCONTINUED | OUTPATIENT
Start: 2019-06-20 | End: 2019-06-20

## 2019-06-20 RX ORDER — MIDAZOLAM HYDROCHLORIDE 1 MG/ML
INJECTION, SOLUTION INTRAMUSCULAR; INTRAVENOUS
Status: DISCONTINUED | OUTPATIENT
Start: 2019-06-20 | End: 2019-06-20

## 2019-06-20 RX ADMIN — MIDAZOLAM HYDROCHLORIDE 1 MG: 1 INJECTION, SOLUTION INTRAMUSCULAR; INTRAVENOUS at 08:06

## 2019-06-20 RX ADMIN — FENTANYL CITRATE 50 MCG: 50 INJECTION, SOLUTION INTRAMUSCULAR; INTRAVENOUS at 08:06

## 2019-06-20 RX ADMIN — SODIUM CHLORIDE: 0.9 INJECTION, SOLUTION INTRAVENOUS at 08:06

## 2019-06-20 RX ADMIN — FENTANYL CITRATE 25 MCG: 50 INJECTION, SOLUTION INTRAMUSCULAR; INTRAVENOUS at 08:06

## 2019-06-20 NOTE — ANESTHESIA POSTPROCEDURE EVALUATION
Anesthesia Post Evaluation    Patient: Zoey Ham    Procedure(s) Performed: Procedure(s) (LRB):  Fistulogram left arm (Left)  STENT, FISTULA (Left)    Final Anesthesia Type: general  Patient location during evaluation: PACU  Patient participation: Yes- Able to Participate  Level of consciousness: awake and alert and oriented  Post-procedure vital signs: reviewed and stable  Pain management: adequate  Airway patency: patent  PONV status at discharge: No PONV  Anesthetic complications: no      Cardiovascular status: blood pressure returned to baseline, hemodynamically stable and stable  Respiratory status: unassisted, room air and spontaneous ventilation  Hydration status: euvolemic  Follow-up not needed.          Vitals Value Taken Time   /65 6/20/2019  9:23 AM   Temp 36.2 °C (97.2 °F) 6/20/2019  9:08 AM   Pulse 62 6/20/2019  9:23 AM   Resp 16 6/20/2019  9:23 AM   SpO2 99 % 6/20/2019  9:23 AM         No case tracking events are documented in the log.      Pain/Claude Score: No data recorded

## 2019-06-20 NOTE — TRANSFER OF CARE
"Anesthesia Transfer of Care Note    Patient: Zoey Ham    Procedure(s) Performed: Procedure(s) (LRB):  Fistulogram left arm (Left)  STENT, FISTULA (Left)    [facility]    Anesthesia Type: MAC    Transport from OR: Transported from OR on room air with adequate spontaneous ventilation    Post pain: adequate analgesia    Post assessment: no apparent anesthetic complications    Post vital signs: stable    Level of consciousness: awake, alert and oriented    Nausea/Vomiting: no nausea/vomiting    Complications: none    Transfer of care protocol was followed      Last vitals:   Visit Vitals  BP (!) 178/71   Pulse 65   Temp 36.2 °C (97.2 °F) (Temporal)   Resp 16   Ht 5' 4" (1.626 m)   Wt 76.2 kg (168 lb)   LMP  (LMP Unknown)   SpO2 99%   Breastfeeding? No   BMI 28.84 kg/m²     "

## 2019-06-22 ENCOUNTER — HOSPITAL ENCOUNTER (INPATIENT)
Facility: HOSPITAL | Age: 74
LOS: 5 days | Discharge: NURSING, REHAB OR LONG-TERM CARE | DRG: 166 | End: 2019-06-27
Attending: EMERGENCY MEDICINE | Admitting: EMERGENCY MEDICINE
Payer: MEDICARE

## 2019-06-22 DIAGNOSIS — S32.2XXA CLOSED FRACTURE OF SACRUM AND COCCYX, INITIAL ENCOUNTER: ICD-10-CM

## 2019-06-22 DIAGNOSIS — Z99.2 ESRD (END STAGE RENAL DISEASE) ON DIALYSIS: ICD-10-CM

## 2019-06-22 DIAGNOSIS — E11.59 HYPERTENSION ASSOCIATED WITH DIABETES: ICD-10-CM

## 2019-06-22 DIAGNOSIS — N18.6 ESRD (END STAGE RENAL DISEASE) ON DIALYSIS: ICD-10-CM

## 2019-06-22 DIAGNOSIS — E11.3599: ICD-10-CM

## 2019-06-22 DIAGNOSIS — Z99.2 TYPE 2 DIABETES MELLITUS WITH CHRONIC KIDNEY DISEASE ON CHRONIC DIALYSIS, WITH LONG-TERM CURRENT USE OF INSULIN: ICD-10-CM

## 2019-06-22 DIAGNOSIS — S32.509A: Primary | ICD-10-CM

## 2019-06-22 DIAGNOSIS — J81.1 CHRONIC PULMONARY EDEMA: ICD-10-CM

## 2019-06-22 DIAGNOSIS — S32.592A BILATERAL PUBIC RAMI FRACTURES, CLOSED, INITIAL ENCOUNTER: ICD-10-CM

## 2019-06-22 DIAGNOSIS — S22.42XA FRACTURE OF MULTIPLE RIBS OF LEFT SIDE: ICD-10-CM

## 2019-06-22 DIAGNOSIS — D63.1 ANEMIA OF CHRONIC RENAL FAILURE, STAGE 5: ICD-10-CM

## 2019-06-22 DIAGNOSIS — M25.559 HIP PAIN: ICD-10-CM

## 2019-06-22 DIAGNOSIS — I48.20 CHRONIC ATRIAL FIBRILLATION: ICD-10-CM

## 2019-06-22 DIAGNOSIS — R52 UNCONTROLLED PAIN: ICD-10-CM

## 2019-06-22 DIAGNOSIS — S32.10XA CLOSED FRACTURE OF SACRUM AND COCCYX, INITIAL ENCOUNTER: ICD-10-CM

## 2019-06-22 DIAGNOSIS — I15.2 HYPERTENSION ASSOCIATED WITH DIABETES: ICD-10-CM

## 2019-06-22 DIAGNOSIS — N18.5 ANEMIA OF CHRONIC RENAL FAILURE, STAGE 5: ICD-10-CM

## 2019-06-22 DIAGNOSIS — E11.22 TYPE 2 DIABETES MELLITUS WITH CHRONIC KIDNEY DISEASE ON CHRONIC DIALYSIS, WITH LONG-TERM CURRENT USE OF INSULIN: ICD-10-CM

## 2019-06-22 DIAGNOSIS — S32.591A BILATERAL PUBIC RAMI FRACTURES, CLOSED, INITIAL ENCOUNTER: ICD-10-CM

## 2019-06-22 DIAGNOSIS — D72.829 LEUKOCYTOSIS: ICD-10-CM

## 2019-06-22 DIAGNOSIS — R79.89 TROPONIN LEVEL ELEVATED: ICD-10-CM

## 2019-06-22 DIAGNOSIS — R07.9 CHEST PAIN: ICD-10-CM

## 2019-06-22 DIAGNOSIS — I10 HYPERTENSION: ICD-10-CM

## 2019-06-22 DIAGNOSIS — I25.700 CORONARY ARTERY DISEASE INVOLVING CORONARY BYPASS GRAFT OF NATIVE HEART WITH UNSTABLE ANGINA PECTORIS: ICD-10-CM

## 2019-06-22 DIAGNOSIS — N18.6 TYPE 2 DIABETES MELLITUS WITH CHRONIC KIDNEY DISEASE ON CHRONIC DIALYSIS, WITH LONG-TERM CURRENT USE OF INSULIN: ICD-10-CM

## 2019-06-22 DIAGNOSIS — Z79.4 TYPE 2 DIABETES MELLITUS WITH CHRONIC KIDNEY DISEASE ON CHRONIC DIALYSIS, WITH LONG-TERM CURRENT USE OF INSULIN: ICD-10-CM

## 2019-06-22 DIAGNOSIS — R07.81 RIB PAIN ON LEFT SIDE: ICD-10-CM

## 2019-06-22 DIAGNOSIS — S27.321A CONTUSION OF LEFT LUNG, INITIAL ENCOUNTER: ICD-10-CM

## 2019-06-22 DIAGNOSIS — R59.9 ENLARGED LYMPH NODE: ICD-10-CM

## 2019-06-22 DIAGNOSIS — S22.42XA CLOSED FRACTURE OF MULTIPLE RIBS OF LEFT SIDE, INITIAL ENCOUNTER: ICD-10-CM

## 2019-06-22 DIAGNOSIS — R09.02 HYPOXIA: ICD-10-CM

## 2019-06-22 PROCEDURE — 99285 EMERGENCY DEPT VISIT HI MDM: CPT | Mod: 25,HCNC

## 2019-06-22 PROCEDURE — 12000002 HC ACUTE/MED SURGE SEMI-PRIVATE ROOM: Mod: HCNC

## 2019-06-23 PROBLEM — R59.9 ENLARGED LYMPH NODE: Status: ACTIVE | Noted: 2019-06-23

## 2019-06-23 PROBLEM — S32.810B: Status: ACTIVE | Noted: 2019-06-23

## 2019-06-23 PROBLEM — S32.509A: Status: ACTIVE | Noted: 2019-06-23

## 2019-06-23 PROBLEM — S22.42XA FRACTURE OF MULTIPLE RIBS OF LEFT SIDE: Status: ACTIVE | Noted: 2019-06-23

## 2019-06-23 PROBLEM — R09.02 HYPOXIA: Status: ACTIVE | Noted: 2019-06-23

## 2019-06-23 PROBLEM — R79.89 TROPONIN LEVEL ELEVATED: Status: ACTIVE | Noted: 2017-02-09

## 2019-06-23 LAB
ALBUMIN SERPL BCP-MCNC: 3.9 G/DL (ref 3.5–5.2)
ALP SERPL-CCNC: 258 U/L (ref 55–135)
ALT SERPL W/O P-5'-P-CCNC: 20 U/L (ref 10–44)
ANION GAP SERPL CALC-SCNC: 14 MMOL/L (ref 8–16)
AST SERPL-CCNC: 20 U/L (ref 10–40)
BACTERIA #/AREA URNS HPF: ABNORMAL /HPF
BASOPHILS # BLD AUTO: 0.02 K/UL (ref 0–0.2)
BASOPHILS NFR BLD: 0.1 % (ref 0–1.9)
BILIRUB SERPL-MCNC: 0.4 MG/DL (ref 0.1–1)
BILIRUB UR QL STRIP: NEGATIVE
BNP SERPL-MCNC: 766 PG/ML (ref 0–99)
BUN SERPL-MCNC: 57 MG/DL (ref 8–23)
CALCIUM SERPL-MCNC: 9.1 MG/DL (ref 8.7–10.5)
CHLORIDE SERPL-SCNC: 102 MMOL/L (ref 95–110)
CLARITY UR: CLEAR
CO2 SERPL-SCNC: 22 MMOL/L (ref 23–29)
COLOR UR: YELLOW
CREAT SERPL-MCNC: 3.7 MG/DL (ref 0.5–1.4)
DIFFERENTIAL METHOD: ABNORMAL
EOSINOPHIL # BLD AUTO: 0.2 K/UL (ref 0–0.5)
EOSINOPHIL NFR BLD: 1.2 % (ref 0–8)
ERYTHROCYTE [DISTWIDTH] IN BLOOD BY AUTOMATED COUNT: 14.4 % (ref 11.5–14.5)
EST. GFR  (AFRICAN AMERICAN): 13 ML/MIN/1.73 M^2
EST. GFR  (NON AFRICAN AMERICAN): 12 ML/MIN/1.73 M^2
ESTIMATED AVG GLUCOSE: 203 MG/DL (ref 68–131)
GLUCOSE SERPL-MCNC: 442 MG/DL (ref 70–110)
GLUCOSE UR QL STRIP: ABNORMAL
HBA1C MFR BLD HPLC: 8.7 % (ref 4–5.6)
HCT VFR BLD AUTO: 33.8 % (ref 37–48.5)
HGB BLD-MCNC: 11 G/DL (ref 12–16)
HGB UR QL STRIP: ABNORMAL
HYALINE CASTS #/AREA URNS LPF: ABNORMAL /LPF
INR PPP: 0.9 (ref 0.8–1.2)
KETONES UR QL STRIP: NEGATIVE
LEUKOCYTE ESTERASE UR QL STRIP: NEGATIVE
LYMPHOCYTES # BLD AUTO: 1.4 K/UL (ref 1–4.8)
LYMPHOCYTES NFR BLD: 9.4 % (ref 18–48)
MCH RBC QN AUTO: 32.3 PG (ref 27–31)
MCHC RBC AUTO-ENTMCNC: 32.5 G/DL (ref 32–36)
MCV RBC AUTO: 99 FL (ref 82–98)
MICROSCOPIC COMMENT: ABNORMAL
MONOCYTES # BLD AUTO: 0.9 K/UL (ref 0.3–1)
MONOCYTES NFR BLD: 6.5 % (ref 4–15)
NEUTROPHILS # BLD AUTO: 12 K/UL (ref 1.8–7.7)
NEUTROPHILS NFR BLD: 82.8 % (ref 38–73)
NITRITE UR QL STRIP: NEGATIVE
PH UR STRIP: 5 [PH] (ref 5–8)
PLATELET # BLD AUTO: 263 K/UL (ref 150–350)
PMV BLD AUTO: 11.7 FL (ref 9.2–12.9)
POCT GLUCOSE: 117 MG/DL (ref 70–110)
POCT GLUCOSE: 126 MG/DL (ref 70–110)
POCT GLUCOSE: 316 MG/DL (ref 70–110)
POTASSIUM SERPL-SCNC: 5.2 MMOL/L (ref 3.5–5.1)
PROT SERPL-MCNC: 7.3 G/DL (ref 6–8.4)
PROT UR QL STRIP: ABNORMAL
PROTHROMBIN TIME: 9.9 SEC (ref 9–12.5)
RBC # BLD AUTO: 3.41 M/UL (ref 4–5.4)
RBC #/AREA URNS HPF: 4 /HPF (ref 0–4)
SODIUM SERPL-SCNC: 138 MMOL/L (ref 136–145)
SP GR UR STRIP: 1.01 (ref 1–1.03)
TROPONIN I SERPL DL<=0.01 NG/ML-MCNC: 0.18 NG/ML (ref 0–0.03)
URN SPEC COLLECT METH UR: ABNORMAL
UROBILINOGEN UR STRIP-ACNC: NEGATIVE EU/DL
WBC # BLD AUTO: 14.49 K/UL (ref 3.9–12.7)
WBC #/AREA URNS HPF: 1 /HPF (ref 0–5)
YEAST URNS QL MICRO: ABNORMAL

## 2019-06-23 PROCEDURE — 99223 PR INITIAL HOSPITAL CARE,LEVL III: ICD-10-PCS | Mod: HCNC,,, | Performed by: INTERNAL MEDICINE

## 2019-06-23 PROCEDURE — 80074 ACUTE HEPATITIS PANEL: CPT | Mod: HCNC

## 2019-06-23 PROCEDURE — 87340 HEPATITIS B SURFACE AG IA: CPT | Mod: HCNC

## 2019-06-23 PROCEDURE — 25000003 PHARM REV CODE 250: Mod: HCNC | Performed by: NURSE PRACTITIONER

## 2019-06-23 PROCEDURE — S5571 INSULIN DISPOS PEN 3 ML: HCPCS | Mod: HCNC | Performed by: NURSE PRACTITIONER

## 2019-06-23 PROCEDURE — 27000221 HC OXYGEN, UP TO 24 HOURS: Mod: HCNC

## 2019-06-23 PROCEDURE — 63600175 PHARM REV CODE 636 W HCPCS: Mod: HCNC | Performed by: EMERGENCY MEDICINE

## 2019-06-23 PROCEDURE — 85610 PROTHROMBIN TIME: CPT | Mod: HCNC

## 2019-06-23 PROCEDURE — 96376 TX/PRO/DX INJ SAME DRUG ADON: CPT

## 2019-06-23 PROCEDURE — 94640 AIRWAY INHALATION TREATMENT: CPT | Mod: HCNC

## 2019-06-23 PROCEDURE — 25000003 PHARM REV CODE 250: Mod: HCNC | Performed by: INTERNAL MEDICINE

## 2019-06-23 PROCEDURE — 81000 URINALYSIS NONAUTO W/SCOPE: CPT | Mod: HCNC

## 2019-06-23 PROCEDURE — 86704 HEP B CORE ANTIBODY TOTAL: CPT | Mod: HCNC

## 2019-06-23 PROCEDURE — 25000003 PHARM REV CODE 250: Mod: HCNC | Performed by: EMERGENCY MEDICINE

## 2019-06-23 PROCEDURE — 99223 1ST HOSP IP/OBS HIGH 75: CPT | Mod: HCNC,,, | Performed by: INTERNAL MEDICINE

## 2019-06-23 PROCEDURE — 83880 ASSAY OF NATRIURETIC PEPTIDE: CPT | Mod: HCNC

## 2019-06-23 PROCEDURE — 86709 HEPATITIS A IGM ANTIBODY: CPT | Mod: HCNC

## 2019-06-23 PROCEDURE — 84484 ASSAY OF TROPONIN QUANT: CPT | Mod: HCNC

## 2019-06-23 PROCEDURE — 93005 ELECTROCARDIOGRAM TRACING: CPT | Mod: HCNC

## 2019-06-23 PROCEDURE — 25000242 PHARM REV CODE 250 ALT 637 W/ HCPCS: Mod: HCNC | Performed by: EMERGENCY MEDICINE

## 2019-06-23 PROCEDURE — 85025 COMPLETE CBC W/AUTO DIFF WBC: CPT | Mod: HCNC

## 2019-06-23 PROCEDURE — 96372 THER/PROPH/DIAG INJ SC/IM: CPT | Mod: 59

## 2019-06-23 PROCEDURE — 21400001 HC TELEMETRY ROOM: Mod: HCNC

## 2019-06-23 PROCEDURE — 90935 HEMODIALYSIS ONE EVALUATION: CPT | Mod: HCNC

## 2019-06-23 PROCEDURE — 36415 COLL VENOUS BLD VENIPUNCTURE: CPT | Mod: HCNC

## 2019-06-23 PROCEDURE — 51702 INSERT TEMP BLADDER CATH: CPT | Mod: HCNC

## 2019-06-23 PROCEDURE — 96375 TX/PRO/DX INJ NEW DRUG ADDON: CPT | Mod: HCNC

## 2019-06-23 PROCEDURE — 96374 THER/PROPH/DIAG INJ IV PUSH: CPT | Mod: HCNC

## 2019-06-23 PROCEDURE — 83036 HEMOGLOBIN GLYCOSYLATED A1C: CPT | Mod: HCNC

## 2019-06-23 PROCEDURE — 86706 HEP B SURFACE ANTIBODY: CPT | Mod: HCNC

## 2019-06-23 PROCEDURE — 80053 COMPREHEN METABOLIC PANEL: CPT | Mod: HCNC

## 2019-06-23 PROCEDURE — 86706 HEP B SURFACE ANTIBODY: CPT | Mod: 91,HCNC

## 2019-06-23 PROCEDURE — 63600175 PHARM REV CODE 636 W HCPCS: Mod: HCNC | Performed by: NURSE PRACTITIONER

## 2019-06-23 RX ORDER — DIPHENHYDRAMINE HCL 12.5MG/5ML
12.5 LIQUID (ML) ORAL EVERY 6 HOURS PRN
Status: DISCONTINUED | OUTPATIENT
Start: 2019-06-23 | End: 2019-06-27 | Stop reason: HOSPADM

## 2019-06-23 RX ORDER — HYDROMORPHONE HYDROCHLORIDE 2 MG/ML
1 INJECTION, SOLUTION INTRAMUSCULAR; INTRAVENOUS; SUBCUTANEOUS
Status: COMPLETED | OUTPATIENT
Start: 2019-06-23 | End: 2019-06-23

## 2019-06-23 RX ORDER — GLUCAGON 1 MG
1 KIT INJECTION
Status: DISCONTINUED | OUTPATIENT
Start: 2019-06-23 | End: 2019-06-23

## 2019-06-23 RX ORDER — SODIUM CHLORIDE 9 MG/ML
INJECTION, SOLUTION INTRAVENOUS ONCE
Status: COMPLETED | OUTPATIENT
Start: 2019-06-23 | End: 2019-06-23

## 2019-06-23 RX ORDER — FUROSEMIDE 40 MG/1
80 TABLET ORAL DAILY
Status: DISCONTINUED | OUTPATIENT
Start: 2019-06-23 | End: 2019-06-27 | Stop reason: HOSPADM

## 2019-06-23 RX ORDER — IBUPROFEN 200 MG
24 TABLET ORAL
Status: DISCONTINUED | OUTPATIENT
Start: 2019-06-23 | End: 2019-06-23

## 2019-06-23 RX ORDER — FENTANYL CITRATE 50 UG/ML
75 INJECTION, SOLUTION INTRAMUSCULAR; INTRAVENOUS
Status: COMPLETED | OUTPATIENT
Start: 2019-06-23 | End: 2019-06-23

## 2019-06-23 RX ORDER — SODIUM CHLORIDE 9 MG/ML
INJECTION, SOLUTION INTRAVENOUS
Status: DISCONTINUED | OUTPATIENT
Start: 2019-06-23 | End: 2019-06-27 | Stop reason: HOSPADM

## 2019-06-23 RX ORDER — HYDRALAZINE HYDROCHLORIDE 20 MG/ML
20 INJECTION INTRAMUSCULAR; INTRAVENOUS
Status: COMPLETED | OUTPATIENT
Start: 2019-06-23 | End: 2019-06-23

## 2019-06-23 RX ORDER — ONDANSETRON 2 MG/ML
4 INJECTION INTRAMUSCULAR; INTRAVENOUS
Status: COMPLETED | OUTPATIENT
Start: 2019-06-23 | End: 2019-06-23

## 2019-06-23 RX ORDER — FUROSEMIDE 10 MG/ML
80 INJECTION INTRAMUSCULAR; INTRAVENOUS
Status: COMPLETED | OUTPATIENT
Start: 2019-06-23 | End: 2019-06-23

## 2019-06-23 RX ORDER — IBUPROFEN 200 MG
24 TABLET ORAL
Status: DISCONTINUED | OUTPATIENT
Start: 2019-06-23 | End: 2019-06-27 | Stop reason: HOSPADM

## 2019-06-23 RX ORDER — IPRATROPIUM BROMIDE AND ALBUTEROL SULFATE 2.5; .5 MG/3ML; MG/3ML
3 SOLUTION RESPIRATORY (INHALATION)
Status: COMPLETED | OUTPATIENT
Start: 2019-06-23 | End: 2019-06-23

## 2019-06-23 RX ORDER — ONDANSETRON 2 MG/ML
4 INJECTION INTRAMUSCULAR; INTRAVENOUS EVERY 8 HOURS PRN
Status: DISCONTINUED | OUTPATIENT
Start: 2019-06-23 | End: 2019-06-23

## 2019-06-23 RX ORDER — INSULIN ASPART 100 [IU]/ML
0-5 INJECTION, SOLUTION INTRAVENOUS; SUBCUTANEOUS
Status: DISCONTINUED | OUTPATIENT
Start: 2019-06-23 | End: 2019-06-23

## 2019-06-23 RX ORDER — CLOPIDOGREL BISULFATE 75 MG/1
75 TABLET ORAL DAILY
Status: DISCONTINUED | OUTPATIENT
Start: 2019-06-23 | End: 2019-06-23

## 2019-06-23 RX ORDER — DIPHENHYDRAMINE HYDROCHLORIDE 50 MG/ML
12.5 INJECTION INTRAMUSCULAR; INTRAVENOUS EVERY 4 HOURS PRN
Status: DISCONTINUED | OUTPATIENT
Start: 2019-06-23 | End: 2019-06-27 | Stop reason: HOSPADM

## 2019-06-23 RX ORDER — MUPIROCIN 20 MG/G
OINTMENT TOPICAL 2 TIMES DAILY
Status: DISCONTINUED | OUTPATIENT
Start: 2019-06-23 | End: 2019-06-27 | Stop reason: HOSPADM

## 2019-06-23 RX ORDER — HYDROMORPHONE HYDROCHLORIDE 2 MG/ML
1 INJECTION, SOLUTION INTRAMUSCULAR; INTRAVENOUS; SUBCUTANEOUS EVERY 4 HOURS PRN
Status: DISCONTINUED | OUTPATIENT
Start: 2019-06-23 | End: 2019-06-24

## 2019-06-23 RX ORDER — METHYLPREDNISOLONE SOD SUCC 125 MG
80 VIAL (EA) INJECTION
Status: COMPLETED | OUTPATIENT
Start: 2019-06-23 | End: 2019-06-24

## 2019-06-23 RX ORDER — IBUPROFEN 200 MG
16 TABLET ORAL
Status: DISCONTINUED | OUTPATIENT
Start: 2019-06-23 | End: 2019-06-27 | Stop reason: HOSPADM

## 2019-06-23 RX ORDER — HYDRALAZINE HYDROCHLORIDE 20 MG/ML
10 INJECTION INTRAMUSCULAR; INTRAVENOUS EVERY 6 HOURS PRN
Status: DISCONTINUED | OUTPATIENT
Start: 2019-06-23 | End: 2019-06-27 | Stop reason: HOSPADM

## 2019-06-23 RX ORDER — TRAMADOL HYDROCHLORIDE 50 MG/1
50 TABLET ORAL EVERY 6 HOURS
Status: COMPLETED | OUTPATIENT
Start: 2019-06-23 | End: 2019-06-24

## 2019-06-23 RX ORDER — METOPROLOL TARTRATE 50 MG/1
100 TABLET ORAL 2 TIMES DAILY
Status: DISCONTINUED | OUTPATIENT
Start: 2019-06-23 | End: 2019-06-27 | Stop reason: HOSPADM

## 2019-06-23 RX ORDER — ATORVASTATIN CALCIUM 40 MG/1
40 TABLET, FILM COATED ORAL DAILY
Status: DISCONTINUED | OUTPATIENT
Start: 2019-06-23 | End: 2019-06-27 | Stop reason: HOSPADM

## 2019-06-23 RX ORDER — IBUPROFEN 200 MG
16 TABLET ORAL
Status: DISCONTINUED | OUTPATIENT
Start: 2019-06-23 | End: 2019-06-23

## 2019-06-23 RX ORDER — ASPIRIN 325 MG
325 TABLET, DELAYED RELEASE (ENTERIC COATED) ORAL DAILY
Status: DISCONTINUED | OUTPATIENT
Start: 2019-06-23 | End: 2019-06-27 | Stop reason: HOSPADM

## 2019-06-23 RX ORDER — INSULIN ASPART 100 [IU]/ML
1-10 INJECTION, SOLUTION INTRAVENOUS; SUBCUTANEOUS
Status: DISCONTINUED | OUTPATIENT
Start: 2019-06-23 | End: 2019-06-27 | Stop reason: HOSPADM

## 2019-06-23 RX ORDER — ONDANSETRON 2 MG/ML
8 INJECTION INTRAMUSCULAR; INTRAVENOUS EVERY 8 HOURS PRN
Status: DISCONTINUED | OUTPATIENT
Start: 2019-06-23 | End: 2019-06-27 | Stop reason: HOSPADM

## 2019-06-23 RX ORDER — GLUCAGON 1 MG
1 KIT INJECTION
Status: DISCONTINUED | OUTPATIENT
Start: 2019-06-23 | End: 2019-06-27 | Stop reason: HOSPADM

## 2019-06-23 RX ORDER — HYDROMORPHONE HYDROCHLORIDE 2 MG/ML
0.5 INJECTION, SOLUTION INTRAMUSCULAR; INTRAVENOUS; SUBCUTANEOUS EVERY 4 HOURS PRN
Status: DISCONTINUED | OUTPATIENT
Start: 2019-06-23 | End: 2019-06-23

## 2019-06-23 RX ORDER — HYDROMORPHONE HYDROCHLORIDE 2 MG/ML
1 INJECTION, SOLUTION INTRAMUSCULAR; INTRAVENOUS; SUBCUTANEOUS EVERY 6 HOURS
Status: DISCONTINUED | OUTPATIENT
Start: 2019-06-23 | End: 2019-06-23

## 2019-06-23 RX ORDER — SODIUM CHLORIDE 0.9 % (FLUSH) 0.9 %
10 SYRINGE (ML) INJECTION
Status: DISCONTINUED | OUTPATIENT
Start: 2019-06-23 | End: 2019-06-27 | Stop reason: HOSPADM

## 2019-06-23 RX ORDER — HEPARIN SODIUM 5000 [USP'U]/ML
5000 INJECTION, SOLUTION INTRAVENOUS; SUBCUTANEOUS EVERY 8 HOURS
Status: DISCONTINUED | OUTPATIENT
Start: 2019-06-23 | End: 2019-06-27 | Stop reason: HOSPADM

## 2019-06-23 RX ADMIN — INSULIN DETEMIR 15 UNITS: 100 INJECTION, SOLUTION SUBCUTANEOUS at 03:06

## 2019-06-23 RX ADMIN — IPRATROPIUM BROMIDE AND ALBUTEROL SULFATE 3 ML: .5; 3 SOLUTION RESPIRATORY (INHALATION) at 09:06

## 2019-06-23 RX ADMIN — ONDANSETRON 4 MG: 2 INJECTION INTRAMUSCULAR; INTRAVENOUS at 05:06

## 2019-06-23 RX ADMIN — ATORVASTATIN CALCIUM 40 MG: 40 TABLET, FILM COATED ORAL at 03:06

## 2019-06-23 RX ADMIN — INSULIN ASPART 4 UNITS: 100 INJECTION, SOLUTION INTRAVENOUS; SUBCUTANEOUS at 08:06

## 2019-06-23 RX ADMIN — FUROSEMIDE 80 MG: 40 TABLET ORAL at 03:06

## 2019-06-23 RX ADMIN — HYDROMORPHONE HYDROCHLORIDE 1 MG: 2 INJECTION, SOLUTION INTRAMUSCULAR; INTRAVENOUS; SUBCUTANEOUS at 03:06

## 2019-06-23 RX ADMIN — HEPARIN SODIUM 5000 UNITS: 5000 INJECTION, SOLUTION INTRAVENOUS; SUBCUTANEOUS at 03:06

## 2019-06-23 RX ADMIN — FUROSEMIDE 80 MG: 10 INJECTION, SOLUTION INTRAVENOUS at 03:06

## 2019-06-23 RX ADMIN — ONDANSETRON 4 MG: 2 INJECTION INTRAMUSCULAR; INTRAVENOUS at 12:06

## 2019-06-23 RX ADMIN — SODIUM CHLORIDE: 0.9 INJECTION, SOLUTION INTRAVENOUS at 11:06

## 2019-06-23 RX ADMIN — HYDRALAZINE HYDROCHLORIDE 20 MG: 20 INJECTION INTRAMUSCULAR; INTRAVENOUS at 03:06

## 2019-06-23 RX ADMIN — METOPROLOL TARTRATE 100 MG: 50 TABLET ORAL at 03:06

## 2019-06-23 RX ADMIN — METHYLPREDNISOLONE SODIUM SUCCINATE 80 MG: 125 INJECTION, POWDER, FOR SOLUTION INTRAMUSCULAR; INTRAVENOUS at 05:06

## 2019-06-23 RX ADMIN — METOPROLOL TARTRATE 100 MG: 50 TABLET ORAL at 09:06

## 2019-06-23 RX ADMIN — MUPIROCIN: 20 OINTMENT TOPICAL at 08:06

## 2019-06-23 RX ADMIN — TRAMADOL HYDROCHLORIDE 50 MG: 50 TABLET, FILM COATED ORAL at 05:06

## 2019-06-23 RX ADMIN — HYDROMORPHONE HYDROCHLORIDE 1 MG: 2 INJECTION, SOLUTION INTRAMUSCULAR; INTRAVENOUS; SUBCUTANEOUS at 08:06

## 2019-06-23 RX ADMIN — ASPIRIN 325 MG: 325 TABLET, DELAYED RELEASE ORAL at 03:06

## 2019-06-23 RX ADMIN — HEPARIN SODIUM 5000 UNITS: 5000 INJECTION, SOLUTION INTRAVENOUS; SUBCUTANEOUS at 09:06

## 2019-06-23 RX ADMIN — FENTANYL CITRATE 75 MCG: 50 INJECTION INTRAMUSCULAR; INTRAVENOUS at 12:06

## 2019-06-23 RX ADMIN — MUPIROCIN: 20 OINTMENT TOPICAL at 09:06

## 2019-06-23 RX ADMIN — NEPHROCAP 1 CAPSULE: 1 CAP ORAL at 03:06

## 2019-06-23 RX ADMIN — HYDROMORPHONE HYDROCHLORIDE 1 MG: 2 INJECTION, SOLUTION INTRAMUSCULAR; INTRAVENOUS; SUBCUTANEOUS at 01:06

## 2019-06-23 NOTE — ASSESSMENT & PLAN NOTE
Patient came in with acute chest pain after a fall.  She does have significant history of coronary artery disease with multiple revascularization and troponins are mildly elevated, however she is unable to tell whether this chest pain is similar to her prior anginal pain or is more related to her fall.  Considering the fact that it is left-sided and her chest wall is tender, it is likely related to her fall.  Trend troponins.  If the troponins rise significantly, then cardiac catheterization in a.m..  If troponins stay mildly elevated and flat then consider ischemic workup with a stress test.  Continue aspirin, Plavix, atorvastatin.

## 2019-06-23 NOTE — ASSESSMENT & PLAN NOTE
Patient has anemia chronic disease H&H shows no indication of transfusion at this time ---no complaint of overt bleeding--- follow closely  -CBC in a.m.

## 2019-06-23 NOTE — PLAN OF CARE
06/23/19 1159   Discharge Assessment   Assessment Type Discharge Planning Assessment   Confirmed/corrected address and phone number on facesheet? Yes   Assessment information obtained from? Medical Record   U     06/23/19 1159   Discharge Assessment   Assessment Type Discharge Planning Assessment   Confirmed/corrected address and phone number on facesheet? Yes   Assessment information obtained from? Medical Record   Unable to complete discharge assessment; patient is off the unit in HD; CM will follow-up

## 2019-06-23 NOTE — ASSESSMENT & PLAN NOTE
Patient's blood pressure at the time of presentation was 187/77 has been as high as 200/87 likely secondary to pain syndrome as the patient has 6 fractures 2 displaced (3 rib fractures and 3 hip fractures total bilaterally)  Restart patient's home blood pressure meds and monitor closely adjust as warranted  Cardiology has been consulted and will be following along  Have p.r.n. hydralazine on board for systolic blood pressure greater than 180

## 2019-06-23 NOTE — ASSESSMENT & PLAN NOTE
CT pelvis shows 3 pubic fractures;   -acute minimally displaced fracture of the superior pubic ramus on the right  -acute nondisplaced transverse fracture of the inferior year pubic ramus on the left  -acute nondisplaced linear fracture through the lateral margin of the left sacral ala  -consult Orthopedic surgery  -Pain support  -bed rest until seen by Ortho surgery  -PT OT after recommendations from Ortho

## 2019-06-23 NOTE — H&P
[facility] Medicine  History & Physical    Patient Name: Zoey Ham  MRN: [id number]  Admission Date: 6/22/2019  Attending Physician: Boby Solorio MD   Primary Care Provider: Cesia Rosales MD         Patient information was obtained from patient and ER records.     Subjective:     Principal Problem:Troponin level elevated    Chief Complaint:   Chief Complaint   Patient presents with    Hip Pain     pt states she slipped and fell at home and has pain to right hip and right back,        HPI:   Zoey Ham is a 73 y.o. unfortunate elderly female patient with extensive cardiac and renal history as below - including but not limited to CAD (CABG 2002, PCI 2012), ESRD-HD (Monday Wednesday Friday/still makes urine), HTN, and gastritis with bleeding history.  Per patient she was in her usual state of health up until 1 day ago.  She reports that she usually dialyzes in [place]  but her daughter lives in [place].  She reports that she dialyzed in a [place] [facility]  on Friday  as she had been visiting her and the environment was somewhat unfamiliar.  Patient reports that she encountered a mechanical fall and a very small bathroom when she attempted to close the door.  She denies any loss of consciousness, dizziness, lightheadedness before the episode.  She denies chest pain, fever chills, upper respiratory infection, headache, focal deficits or weaknesses, or numbness or tingling in either extremity prior to or after the event.    Upon presentation the patient was noted on CT chest to have 3 left rib fractures 7 and 8 with mild displacement involving the left 7th and lesser extent 8th rib.  CT pelvis showed acute displaced comminuted right fracture of the pelvis as well as an acute nondisplaced transverse fracture of the inferior pubic ramus on the left.  Additionally abnormal CBC and chemistry significant for leukocytosis = 14 K, mild hypokalemia = 5.2, other  findings consistent with end-stage renal disease decreased creatinine and GFR.  Additionally patient's glucose level was found to be 442, alk phos 258.  Lastly patient was noted to have bump in troponin 1 level that has trended she has usual chronic troponinemia =Results for TAY COVARRUBIAS (MRN [id number]) as of 6/23/2019 08:06  Results for TAY COVARRUBIAS (MRN [id number]) as of 6/23/2019 16:37   Ref. Range 6/23/2019 01:20   Troponin I Latest Ref Range: 0.000 - 0.026 ng/mL 0.176 (H)       Cardiologist at [facility] ([place]    Past Medical History:   Diagnosis Date    Acute myocardial infarction of anterolateral wall 7/9/2015    Anemia of chronic renal failure, stage 4 (severe) 1/22/2015    Anticoagulant long-term use     ASA and plavix    Atherosclerosis of aorta 10/3/2012    AV shunt malfunction     Benign hypertension with CKD (chronic kidney disease) stage IV 3/11/2016    Hypertension associated with Diabetes    Carpal tunnel syndrome, right     Chronic diastolic congestive heart failure 10/3/2012    Combined systolic and diastolic CHF    Chronic kidney disease, stage IV (severe) 7/3/2012    Coronary artery disease     s/p 1v CABG 2002 and multiple PCI (last angiogram in 6/2012 with patent LIMA->LAD and patent LCx and RCA stents)    Diabetic polyneuropathy associated with type 2 diabetes mellitus 7/9/2015    Diabetic polyneuropathy associated with type 2 diabetes mellitus 7/9/2015    Dialysis patient     Encounter for blood transfusion     Gastritis without bleeding     Heart attack 09/2012    5-6 events    Hyperlipidemia     Hyperparathyroidism     Hyperphosphatemia     Metabolic acidosis     Nephritis and nephropathy, 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 7/9/2015    NSTEMI (non-ST elevated myocardial infarction)     NSTEMI (non-ST elevated myocardial infarction)     NSTEMI (non-ST elevated myocardial infarction)     Occlusion and stenosis of carotid artery with cerebral infarction  7/9/2015    Osteopenia     PAF (paroxysmal atrial fibrillation) 10/3/2012    Pneumonia     Proliferative diabetic retinopathy 10/3/2012    S/P drug eluting coronary stent placement     Sepsis due to Escherichia coli with acute renal failure 2/2016    UTI     TACO (transfusion associated circulatory overload)     Type II diabetes mellitus with neurological manifestations 7/9/2015    Type II diabetes mellitus with renal manifestations 7/3/2012       Past Surgical History:   Procedure Laterality Date    APPENDECTOMY      CARDIAC SURGERY  2002    CABG    CHOLECYSTECTOMY      CHOLECYSTECTOMY-LAPAROSCOPIC N/A 2/4/2015    Performed by Quinton Ashley MD at [facility] OR [address] [place] [address]    [id number]  - Left brachiocephalic Left 4/16/2018    Performed by YAMEL Perry III, MD at [facility] OR [address] [place] [address]    CORONARY ANGIOPLASTY  2004    CORONARY ARTERY BYPASS GRAFT      EYE SURGERY      cataracts bilaterally    Fistulogram Left 1/17/2019    Performed by YAMEL Perry III, MD at [facility] OR [address] [place] [address]    Fistulogram Left 9/20/2018    Performed by YAMEL Perry III, MD at [facility] OR [address] [place] [address]    Fistulogram left arm Left 6/20/2019    Performed by YAMEL Perry III, MD at [facility] OR [address] [place] [address]    FRACTURE SURGERY      right ankle    HYSTERECTOMY      PTA (ANGIOPLASTY, PERCUTANEOUS, TRANSLUMINAL) N/A 6/20/2019    Performed by YAMEL Perry III, MD at [facility] OR [address] [place] [address]    PTA (ANGIOPLASTY, PERCUTANEOUS, TRANSLUMINAL) N/A 1/17/2019    Performed by YAMEL Perry III, MD at [facility] OR [address] [place] [address]    PTA (ANGIOPLASTY, PERCUTANEOUS, TRANSLUMINAL) N/A 9/20/2018    Performed by YAMEL Perry III, MD at [facility] OR [address] [place] [address]    STENT, FISTULA Left 6/20/2019    Performed by YAMEL Perry III, MD at [facility] OR [address] [place] [address]    TONSILLECTOMY         Review of patient's allergies indicates:   Allergen Reactions    Percodan [oxycodone-aspirin] Hives     Welps     Pcn [penicillins] Hives and Swelling     Tolerated Rocephin IM in clinic       "Phenergan [promethazine] Other (See Comments)     Altered mental status; jerking of extremities       No current facility-administered medications on file prior to encounter.      Current Outpatient Medications on File Prior to Encounter   Medication Sig    ACCU-CHEK MARI PLUS METER Misc 1 Device by Misc.(Non-Drug; Combo Route) route 2 (two) times daily.    aspirin (ECOTRIN) 325 MG EC tablet Take 1 tablet (325 mg total) by mouth once daily. (Patient taking differently: Take 325 mg by mouth every morning. )    atorvastatin (LIPITOR) 80 MG tablet Take 1 tablet (80 mg total) by mouth every morning.    blood sugar diagnostic (ACCU-CHEK MARI PLUS TEST STRP) Strp TEST BLOOD SUGAR FOUR TIMES DAILY    calcitRIOL (ROCALTROL) 0.5 MCG Cap Take 1 capsule (0.5 mcg total) by mouth once daily.    clopidogrel (PLAVIX) 75 mg tablet Take 1 tablet (75 mg total) by mouth once daily.    furosemide (LASIX) 80 MG tablet One 80 mg  tab once a day.    insulin glargine (LANTUS U-100 INSULIN) 100 unit/mL injection Inject 15 Units into the skin every evening.    insulin syringe-needle U-100 0.3 mL 31 gauge x 1/4" Syrg 1 application by Misc.(Non-Drug; Combo Route) route once daily.    lancets (ACCU-CHEK SOFTCLIX LANCETS) Misc 1 application by Misc.(Non-Drug; Combo Route) route once daily.    lidocaine-prilocaine (EMLA) cream APPLY SMALL AMOUNT TO ACCESS SITE 1-2 HOURS BEFORE TREATMENT. COVER AREA WITH AN OCCLUSIVE DRESSING AS DIRECTED.    metoprolol tartrate (LOPRESSOR) 100 MG tablet Take 1 tablet (100 mg total) by mouth 2 (two) times daily.    nitroGLYCERIN (NITROSTAT) 0.3 MG SL tablet Place 1 tablet (0.3 mg total) under the tongue every 5 (five) minutes as needed for Chest pain.     Family History     Problem Relation (Age of Onset)    Breast cancer Maternal Aunt    Cancer Mother    Dementia Father    Diabetes Daughter    Heart disease Mother, Father    Hypertension Mother, Father, Sister, Brother    No Known Problems Son    " Psoriasis Father    Stroke Sister        Tobacco Use    Smoking status: Never Smoker    Smokeless tobacco: Never Used   Substance and Sexual Activity    Alcohol use: No    Drug use: No    Sexual activity: Never     Review of Systems   Constitutional: Positive for activity change, appetite change and fatigue. Negative for chills, diaphoresis and fever.   HENT: Negative for congestion, hearing loss, sore throat, tinnitus and trouble swallowing.    Eyes: Negative for photophobia, discharge, itching and visual disturbance.   Respiratory: Positive for chest tightness and shortness of breath. Negative for apnea, cough, wheezing and stridor.    Cardiovascular: Positive for leg swelling. Negative for chest pain and palpitations.   Gastrointestinal: Negative for abdominal distention, abdominal pain, blood in stool, constipation, diarrhea and nausea.   Endocrine: Negative for polydipsia, polyphagia and polyuria.   Genitourinary: Negative for difficulty urinating, dysuria, flank pain and frequency.   Musculoskeletal: Negative for arthralgias, joint swelling and neck stiffness.   Skin: Negative for color change, rash and wound.   Neurological: Positive for weakness. Negative for dizziness, tremors, seizures, light-headedness, numbness and headaches.   Hematological: Negative for adenopathy.   Psychiatric/Behavioral: Negative for hallucinations and self-injury.     Objective:     Vital Signs (Most Recent):  Temp: 97.9 °F (36.6 °C) (06/23/19 0740)  Pulse: 80 (06/23/19 0740)  Resp: 16 (06/23/19 0740)  BP: (!) 154/69 (06/23/19 0740)  SpO2: 95 % (06/23/19 0740) Vital Signs (24h Range):  Temp:  [97.9 °F (36.6 °C)-98.4 °F (36.9 °C)] 97.9 °F (36.6 °C)  Pulse:  [78-87] 80  Resp:  [16-37] 16  SpO2:  [87 %-98 %] 95 %  BP: (154-200)/(69-88) 154/69     Weight: 73.3 kg (161 lb 9.6 oz)  Body mass index is 27.74 kg/m².    Physical Exam   Constitutional: She is oriented to person, place, and time. She appears well-developed and  well-nourished. She is cooperative.   HENT:   Head: Normocephalic and atraumatic.   Eyes: Pupils are equal, round, and reactive to light. Conjunctivae, EOM and lids are normal.   Neck: Full passive range of motion without pain. Neck supple. No JVD present. No edema present. No thyroid mass present.   Cardiovascular: Normal rate, S1 normal, S2 normal and intact distal pulses.   No murmur heard.  Pulmonary/Chest: Effort normal.   Abdominal: Soft. Bowel sounds are normal. She exhibits no distension and no abdominal bruit. There is no splenomegaly or hepatomegaly. There is no tenderness. There is no CVA tenderness.   Musculoskeletal: Normal range of motion.   Lymphadenopathy:     She has no cervical adenopathy.     She has no axillary adenopathy.   Neurological: She is alert and oriented to person, place, and time. She has normal reflexes. She displays no tremor. She displays no seizure activity.   Skin: Skin is warm, dry and intact.   Psychiatric: She has a normal mood and affect. Her speech is normal. Thought content normal. Cognition and memory are normal.         CRANIAL NERVES     CN III, IV, VI   Pupils are equal, round, and reactive to light.  Extraocular motions are normal.        Significant Labs:   CBC:   Recent Labs   Lab 06/23/19  0050   WBC 14.49*   HGB 11.0*   HCT 33.8*        CMP:   Recent Labs   Lab 06/23/19  0120      K 5.2*      CO2 22*   *   BUN 57*   CREATININE 3.7*   CALCIUM 9.1   PROT 7.3   ALBUMIN 3.9   BILITOT 0.4   ALKPHOS 258*   AST 20   ALT 20   ANIONGAP 14   EGFRNONAA 12*     Cardiac Markers:   Recent Labs   Lab 06/23/19  0120   *   Troponin:   Recent Labs   Lab 06/23/19  0120   TROPONINI 0.176*     Significant Imaging:   Imaging Results          CT Pelvis Without Contrast (Final result)  Result time 06/23/19 02:52:10    Final result by Prince Alanis MD (06/23/19 02:52:10)                 Impression:      Acute minimally displaced comminuted fracture of  the superior pubic ramus on the right.    Acute nondisplaced transverse fracture of the inferior pubic ramus on the left.    Acute nondisplaced linear fracture through the lateral margin of the left sacral ala.      Electronically signed by: Prince Alanis MD  Date:    06/23/2019  Time:    02:52             Narrative:    EXAMINATION:  CT PELVIS WITHOUT CONTRAST    CLINICAL HISTORY:  Pelvis trauma, fx known or suspected, xray insufficient;right hip pain, right pubic rami fracture;    TECHNIQUE:  Axial 1.25-mm images of the pelvis obtained without intravenous contrast.  Data submitted for coronal and sagittal reformats.    COMPARISON:  None    FINDINGS:  Acute minimally displaced comminuted fracture of the superior pubic ramus on the right.  Acute nondisplaced transverse fracture of the inferior pubic ramus on the left.  Acute nondisplaced linear fracture through the lateral margin of the left sacral ala.  No additional fracture identified.  No dislocation.                                CT Chest Without Contrast (Final result)  Result time 06/23/19 03:01:00    Final result by Lalit Best MD (06/23/19 03:01:00)                 Impression:      There are left-sided rib fractures noted, there is underlying mild pulmonary contusive change, as discussed above.    Bilateral pattern of interstitial and alveolar edema/infiltrate, with peribronchial thickening, and minimal pleural fluid bilaterally, the overall pattern is thought to potentially represent a general pattern of interstitial/alveolar infiltrate potentially pulmonary edema, clinical and historical correlation is needed.    Prominent paratracheal lymph node for which clinical and historical correlation and follow-up is recommended.    This report was flagged in Epic as abnormal.      Electronically signed by: Lalit Best  Date:    06/23/2019  Time:    03:01             Narrative:    EXAMINATION:  CT CHEST WITHOUT CONTRAST    CLINICAL HISTORY:  Chest  trauma, blunt;    TECHNIQUE:  Low dose axial images, sagittal and coronal reformations were obtained from the thoracic inlet to the lung bases. Contrast was not administered.    COMPARISON:  None.    FINDINGS:  CT examination of the chest was performed.  Intravenous contrast was not utilized, this diminishes the sensitivity of the exam, diminishing sensitivity for detection of acute posttraumatic injury including solid organ and vascular injury.  Axial imaging, sagittal and coronal reconstruction imaging is submitted, there is no prior examination available for comparison.    Left-sided rib fractures are noted, there is fracture along the posterior peripheral left 6th, 7th and 8th ribs, with mild displacement involving the left 7th and to lesser extent 8th rib.  There is mild air density seen between the 6th and 7th rib interspace however this is not thought to be within the pleural space rather is thought to be within the soft tissues of the left chest wall between the 6th and 7th rib.  There is no additional evidence for pneumothorax.  There is minimal left pleural effusion noted there is also minimal right pleural effusion noted.  There is appearance of subpleural thickening along the lung parenchyma underlying the rib fractures, this may relate to mild pulmonary contusive change.  There is no additional evidence for extrapulmonary air, no additional evidence for pneumothorax.  There is no hyperdense pleural fluid to suggest hemothorax.    Postoperative cardiothoracic changes noted, there is a prominent right paratracheal lymph node noted measuring approximately up to 1.6 cm in size.  Evaluation for adenopathy is otherwise limited.  There is no pericardial effusion and no hemopericardium.  Atherosclerotic change of the coronary arterial vasculature and the aorta is noted.  There is no thoracic aortic aneurysmal dilatation seen.    The lungs demonstrate appearance of interstitial thickening this may relate to  interstitial edema/infiltrate, there is also bilateral pattern of ground-glass infiltrate and areas of patchy and slightly nodular infiltrate, with some areas of greater confluence however without dense consolidation.  There is peribronchial thickening noted as well.  These findings are seen diffusely bilaterally, the peribronchial thickening is greatest at the lung bases, this is not atypical pattern for pulmonary contusion, this may relate to an underlying edematous pulmonary process, clinical and historical correlation is needed.    Limited imaging of the upper abdomen demonstrates no evidence for acute upper abdominal process.  The remainder of the osseous structures demonstrate chronic change.                               X-Ray Hip 2 View Right (Final result)  Result time 06/23/19 01:28:29    Final result by Terrie Kauffman MD (06/23/19 01:28:29)                 Impression:      Suspected acute fracture of the right superior pubic ramus.      Electronically signed by: Terrie Kauffman MD  Date:    06/23/2019  Time:    01:28             Narrative:    EXAMINATION:  XR HIP 2 VIEW RIGHT    CLINICAL HISTORY:  Pain in unspecified hip    TECHNIQUE:  AP view of the pelvis and frog leg lateral view of the right hip were performed.    COMPARISON:  None    FINDINGS:  Cortical irregularity is seen likely reflecting acute fracture of the right superior pubic ramus.  No additional fractures or dislocation seen.  Mild degenerative changes are seen involving the bilateral hips.                               X-Ray Chest 1 View (Final result)  Result time 06/23/19 01:23:59    Final result by Terrie Kauffman MD (06/23/19 01:23:59)                 Impression:      See above.      Electronically signed by: Terrie Kauffman MD  Date:    06/23/2019  Time:    01:23             Narrative:    EXAMINATION:  XR CHEST 1 VIEW; XR RIBS 2 VIEW LEFT    CLINICAL HISTORY:  Pleurodynia    TECHNIQUE:  Single frontal view of the chest was  performed.  Left ribs two views.    COMPARISON:  August 2018.    FINDINGS:  Heart is mildly enlarged but stable in size.  Postsurgical changes and sternotomy wires are seen.  There is prominence of central pulmonary vasculature bilateral perihilar opacity diffuse increased interstitial attenuation.  Overall pattern is most suggestive for mild to moderate pulmonary edema.  Atypical pneumonia could have similar appearance.  No evidence of pneumothorax or significant pleural effusion.  Left subclavian vascular stent is seen.    No definite acute displaced left-sided rib fractures are identified, noting evaluation is somewhat obscured by superimposed increased pulmonary attenuation as well as multiple overlying monitoring leads and wires.                               X-Ray Ribs 2 View Left (Final result)  Result time 06/23/19 01:23:59    Final result by Terrie Kauffman MD (06/23/19 01:23:59)                 Impression:      See above.      Electronically signed by: Terrie Kauffman MD  Date:    06/23/2019  Time:    01:23             Narrative:    EXAMINATION:  XR CHEST 1 VIEW; XR RIBS 2 VIEW LEFT    CLINICAL HISTORY:  Pleurodynia    TECHNIQUE:  Single frontal view of the chest was performed.  Left ribs two views.    COMPARISON:  August 2018.    FINDINGS:  Heart is mildly enlarged but stable in size.  Postsurgical changes and sternotomy wires are seen.  There is prominence of central pulmonary vasculature bilateral perihilar opacity diffuse increased interstitial attenuation.  Overall pattern is most suggestive for mild to moderate pulmonary edema.  Atypical pneumonia could have similar appearance.  No evidence of pneumothorax or significant pleural effusion.  Left subclavian vascular stent is seen.    No definite acute displaced left-sided rib fractures are identified, noting evaluation is somewhat obscured by superimposed increased pulmonary attenuation as well as multiple overlying monitoring leads and wires.                                   Assessment/Plan:     * Fracture of multiple ribs of left side  Patient has traumatic rib fractures x3 1 displaced which is the 8th, she has poor inspiratory and expiratory effort oxygen sat decline when off oxygen likely secondary to pain. She recovers fine on 2 L oxygen, and appears comfortable when not moving.  -consult Ortho surgery  -supportive care for pain control  -incentive spirometer q.4 hours    Displaced fracture of pubis  CT pelvis shows 3 pubic fractures;   -acute minimally displaced fracture of the superior pubic ramus on the right  -acute nondisplaced transverse fracture of the inferior year pubic ramus on the left  -acute nondisplaced linear fracture through the lateral margin of the left sacral ala  -consult Orthopedic surgery  -Pain support  -bed rest until seen by Ortho surgery  -PT OT after recommendations from Ortho      Hypoxia  Patient's oxygen sat appears to be stable at 95% on 2 L oxygen however there is very poor inspiratory and expiratory effort given her new left rib fractures.  Patient is at risk for acute decline given her end-stage renal disease and extensive CAD  -respiratory to provide IS to the bedside  -supplemental oxygen 2 L      ESRD (end stage renal disease) on dialysis  Patient in stage renal disease usually dialyzes in [place] last on Friday 06/21/2019 in [place]; some fluid volume overload likely secondary to 3 rib fractures 1 displaced and poor expiratory effort.  She has seen Nephrology already this morning and is due to have an emergent dialysis today  -nephrology following  -strict I&Os  -renal diet      Troponin level elevated  Patient has a history of CABG in 2002 and PCI in 2012 with stents----  Patient has chronic troponinemia, however her admitting troponin was  Results for TAY COVARRUBIAS (MRN [id number]) as of 6/23/2019 09:31   Ref. Range 6/23/2019 01:20   Troponin I Latest Ref Range: 0.000 - 0.026 ng/mL 0.176 (H)    At this time  she denies chest pain at this time --  at the time of admit.  Her last 2D echo 06/05/2019 at this facility showed LVEF 60-65% with grade 2 diastolic dysfunction pulmonary hypertension with PA pressure = 54  -Continue aspirin, Plavix and atorvastatin  -continue home Lasix 80 mg daily, and beta blocker Lopressor 100 mg b.i.d.  -continuous cardiac monitoring  -consult Cardiology  -blood pressure controlling pain support      Type 2 diabetes mellitus with chronic kidney disease on chronic dialysis, with long-term current use of insulin  She takes insulin at home Lantus--- while hospitalized will use combined insulin therapy with basal and prandial insulin coverage, POCT glucose checks, hypoglycemic protocol and correction scale - HgA1c      Hypertension associated with diabetes  Patient's blood pressure at the time of presentation was 187/77 has been as high as 200/87 likely secondary to pain syndrome as the patient has 6 fractures 2 displaced (3 rib fractures and 3 hip fractures total bilaterally)  Restart patient's home blood pressure meds and monitor closely adjust as warranted  Cardiology has been consulted and will be following along  Have p.r.n. hydralazine on board for systolic blood pressure greater than 180      Anemia of chronic renal failure, stage 5  Patient has anemia chronic disease H&H shows no indication of transfusion at this time ---no complaint of overt bleeding--- follow closely  -CBC in a.m.      Leukocytosis  White count 14.5 K; no fever or dysuria, UA revealing; patient at risk for lower respiratory infection given her 3 rib fractures 1 displaced and poor expiratory effort --- monitor closely  -incentive spirometer to bedside  -follow CBC    -patient's records show that she has history of acute GI bleed but she denies this history -- she has had blood transfusion before but states that was because of her dialysis catheter.    VTE Risk Mitigation (From admission, onward)        Ordered      heparin (porcine) injection 5,000 Units  Every 8 hours      06/23/19 0908     IP VTE HIGH RISK PATIENT  Once      06/23/19 0680

## 2019-06-23 NOTE — HPI
Patient is a 79-year-old lady with a past medical history of coronary artery disease status post CABG and multiple PCIs.  She tripped and fell.  After the fall she had some musculoskeletal past chest pain.  X-ray showed rib fractures.  She has bruises on her body.  Also complained of some chest pain.  Troponin was checked and the 1st set is 0.176.  Early denies any anginal sounding chest pain, orthopnea, PND, shortness on breath.    PCI 2018:    Patient has a right dominant coronary artery.        The coronary vessels have luminal irregularities.        - Left Main Coronary Artery:             The LM has luminal irregularities. There is KEDAR 3 flow.     - Left Anterior Descending Artery:             The proximal LAD is occluded (100). There is KEDAR 0 flow.     - Left Circumflex Artery:             The proximal LCX has luminal irregularities. There is KEDAR 3 flow.     - OM1:             The proximal OM1 has a 60% stenosis. There is KEDAR 3 flow.             The mid OM1 has chronic total occlusion within the stent. There is KEDAR 0 flow. The remaining portion of the vessel is of small caliber.                     Lesion Details:   This is a Type C lesion.  The length is 30mm. The minimum luminal diameter is 0 millimeters. The reference vessel diameter is 2.6 millimeters.     - Right Coronary Artery:             The proximal RCA is patent within the stent. There is KEDAR 3 flow.             The mid RCA is patent within the stent. There is KEDAR 3 flow.             The distal RCA has a 90% stenosis. There is KEDAR 3 flow.                     Lesion Details:   The length is 20mm. Unchanged from prior angiogram.     - LIMA To LAD:             The LIMA to LAD is patent. There is KEDAR 3 flow.      Intervention          Mid OM1:              The lesion was successfully intervened. Post-stenosis of 0%, post-KEDAR 3 flow and TMP grade 3. The vessel was accessed natively.  The following items were used: Cath Emerge Otw 12 X 2.50,  2.5X20 TREK NC Coronary Dilation Catheter and Stent   Resolute Rx 3.00x26 (BETHANY).    2d echo 2017:    CONCLUSIONS     1 - Normal left ventricular systolic function (EF 55-60%).  No diagnostic regional wall motion abnormalities.     2 - Left ventricular diastolic dysfunction.     3 - Mild mitral regurgitation.     4 - Trivial to mild tricuspid regurgitation.     5 - The estimated PA systolic pressure is 39 mmHg.

## 2019-06-23 NOTE — ASSESSMENT & PLAN NOTE
White count 14.5 K; no fever or dysuria, UA revealing; patient at risk for lower respiratory infection given her 3 rib fractures 1 displaced and poor expiratory effort --- monitor closely  -incentive spirometer to bedside  -follow CBC

## 2019-06-23 NOTE — SUBJECTIVE & OBJECTIVE
Past Medical History:   Diagnosis Date    Acute myocardial infarction of anterolateral wall 7/9/2015    Anemia of chronic renal failure, stage 4 (severe) 1/22/2015    Anticoagulant long-term use     ASA and plavix    Atherosclerosis of aorta 10/3/2012    AV shunt malfunction     Benign hypertension with CKD (chronic kidney disease) stage IV 3/11/2016    Hypertension associated with Diabetes    Carpal tunnel syndrome, right     Chronic diastolic congestive heart failure 10/3/2012    Combined systolic and diastolic CHF    Chronic kidney disease, stage IV (severe) 7/3/2012    Coronary artery disease     s/p 1v CABG 2002 and multiple PCI (last angiogram in 6/2012 with patent LIMA->LAD and patent LCx and RCA stents)    Diabetic polyneuropathy associated with type 2 diabetes mellitus 7/9/2015    Diabetic polyneuropathy associated with type 2 diabetes mellitus 7/9/2015    Dialysis patient     Encounter for blood transfusion     Gastritis without bleeding     Heart attack 09/2012    5-6 events    Hyperlipidemia     Hyperparathyroidism     Hyperphosphatemia     Metabolic acidosis     Nephritis and nephropathy, 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 7/9/2015    NSTEMI (non-ST elevated myocardial infarction)     NSTEMI (non-ST elevated myocardial infarction)     NSTEMI (non-ST elevated myocardial infarction)     Occlusion and stenosis of carotid artery with cerebral infarction 7/9/2015    Osteopenia     PAF (paroxysmal atrial fibrillation) 10/3/2012    Pneumonia     Proliferative diabetic retinopathy 10/3/2012    S/P drug eluting coronary stent placement     Sepsis due to Escherichia coli with acute renal failure 2/2016    UTI     TACO (transfusion associated circulatory overload)     Type II diabetes mellitus with neurological manifestations 7/9/2015    Type II diabetes mellitus with renal manifestations 7/3/2012       Past Surgical History:   Procedure Laterality Date    APPENDECTOMY       CARDIAC SURGERY  2002    CABG    CHOLECYSTECTOMY      CHOLECYSTECTOMY-LAPAROSCOPIC N/A 2/4/2015    Performed by Quinton Ashley MD at [facility] OR [address] [place] [address]    [id number]  - Left brachiocephalic Left 4/16/2018    [facility]    CORONARY ANGIOPLASTY  2004    CORONARY ARTERY BYPASS GRAFT      EYE SURGERY      cataracts bilaterally    Fistulogram Left 1/17/2019    [facility]    Fistulogram Left 9/20/2018    Performed by YAMEL Perry III, MD at [facility] OR [address] [place] [address]    Fistulogram left arm Left 6/20/2019    Performed by YAMEL Perry III, MD at [facility] OR [address] [place] [address]    FRACTURE SURGERY      right ankle    HYSTERECTOMY      PTA (ANGIOPLASTY, PERCUTANEOUS, TRANSLUMINAL) N/A 6/20/2019    [facility]    PTA (ANGIOPLASTY, PERCUTANEOUS, TRANSLUMINAL) N/A 1/17/2019    Performed by YAMEL Perry III, MD at [facility] OR [address] [place] [address]    PTA (ANGIOPLASTY, PERCUTANEOUS, TRANSLUMINAL) N/A 9/20/2018    Performed by YAMEL Perry III, MD at [facility] OR [address] [place] [address]    STENT, FISTULA Left 6/20/2019    Performed by YAMEL Perry III, MD at [facility] OR [address] [place] [address]    TONSILLECTOMY         Review of patient's allergies indicates:   Allergen Reactions    Percodan [oxycodone-aspirin] Hives     Welps     Pcn [penicillins] Hives and Swelling     Tolerated Rocephin IM in clinic      Phenergan [promethazine] Other (See Comments)     Altered mental status; jerking of extremities       No current facility-administered medications on file prior to encounter.      Current Outpatient Medications on File Prior to Encounter   Medication Sig    ACCU-CHEK MARI PLUS METER Misc 1 Device by Misc.(Non-Drug; Combo Route) route 2 (two) times daily.    aspirin (ECOTRIN) 325 MG EC tablet Take 1 tablet (325 mg total) by mouth once daily. (Patient taking differently: Take 325 mg by mouth every morning. )    atorvastatin  "(LIPITOR) 80 MG tablet Take 1 tablet (80 mg total) by mouth every morning.    blood sugar diagnostic (ACCU-CHEK MARI PLUS TEST STRP) Strp TEST BLOOD SUGAR FOUR TIMES DAILY    calcitRIOL (ROCALTROL) 0.5 MCG Cap Take 1 capsule (0.5 mcg total) by mouth once daily.    clopidogrel (PLAVIX) 75 mg tablet Take 1 tablet (75 mg total) by mouth once daily.    furosemide (LASIX) 80 MG tablet One 80 mg  tab once a day.    insulin glargine (LANTUS U-100 INSULIN) 100 unit/mL injection Inject 15 Units into the skin every evening.    insulin syringe-needle U-100 0.3 mL 31 gauge x 1/4" Syrg 1 application by Misc.(Non-Drug; Combo Route) route once daily.    lancets (ACCU-CHEK SOFTCLIX LANCETS) Misc 1 application by Misc.(Non-Drug; Combo Route) route once daily.    lidocaine-prilocaine (EMLA) cream APPLY SMALL AMOUNT TO ACCESS SITE 1-2 HOURS BEFORE TREATMENT. COVER AREA WITH AN OCCLUSIVE DRESSING AS DIRECTED.    metoprolol tartrate (LOPRESSOR) 100 MG tablet Take 1 tablet (100 mg total) by mouth 2 (two) times daily.    nitroGLYCERIN (NITROSTAT) 0.3 MG SL tablet Place 1 tablet (0.3 mg total) under the tongue every 5 (five) minutes as needed for Chest pain.     Family History     Problem Relation (Age of Onset)    Breast cancer Maternal Aunt    Cancer Mother    Dementia Father    Diabetes Daughter    Heart disease Mother, Father    Hypertension Mother, Father, Sister, Brother    No Known Problems Son    Psoriasis Father    Stroke Sister        Tobacco Use    Smoking status: Never Smoker    Smokeless tobacco: Never Used   Substance and Sexual Activity    Alcohol use: No    Drug use: No    Sexual activity: Never     Review of Systems   Constitutional: Positive for activity change, appetite change and fatigue. Negative for chills, diaphoresis and fever.   HENT: Negative for congestion, hearing loss, sore throat, tinnitus and trouble swallowing.    Eyes: Negative for photophobia, discharge, itching and visual disturbance. "   Respiratory: Positive for chest tightness and shortness of breath. Negative for apnea, cough, wheezing and stridor.    Cardiovascular: Positive for leg swelling. Negative for chest pain and palpitations.   Gastrointestinal: Negative for abdominal distention, abdominal pain, blood in stool, constipation, diarrhea and nausea.   Endocrine: Negative for polydipsia, polyphagia and polyuria.   Genitourinary: Negative for difficulty urinating, dysuria, flank pain and frequency.   Musculoskeletal: Negative for arthralgias, joint swelling and neck stiffness.   Skin: Negative for color change, rash and wound.   Neurological: Positive for weakness. Negative for dizziness, tremors, seizures, light-headedness, numbness and headaches.   Hematological: Negative for adenopathy.   Psychiatric/Behavioral: Negative for hallucinations and self-injury.     Objective:     Vital Signs (Most Recent):  Temp: 97.9 °F (36.6 °C) (06/23/19 0740)  Pulse: 80 (06/23/19 0740)  Resp: 16 (06/23/19 0740)  BP: (!) 154/69 (06/23/19 0740)  SpO2: 95 % (06/23/19 0740) Vital Signs (24h Range):  Temp:  [97.9 °F (36.6 °C)-98.4 °F (36.9 °C)] 97.9 °F (36.6 °C)  Pulse:  [78-87] 80  Resp:  [16-37] 16  SpO2:  [87 %-98 %] 95 %  BP: (154-200)/(69-88) 154/69     Weight: 73.3 kg (161 lb 9.6 oz)  Body mass index is 27.74 kg/m².    Physical Exam   Constitutional: She is oriented to person, place, and time. She appears well-developed and well-nourished. She is cooperative.   HENT:   Head: Normocephalic and atraumatic.   Eyes: Pupils are equal, round, and reactive to light. Conjunctivae, EOM and lids are normal.   Neck: Full passive range of motion without pain. Neck supple. No JVD present. No edema present. No thyroid mass present.   Cardiovascular: Normal rate, S1 normal, S2 normal and intact distal pulses.   No murmur heard.  Pulmonary/Chest: Effort normal.   Abdominal: Soft. Bowel sounds are normal. She exhibits no distension and no abdominal bruit. There is no  splenomegaly or hepatomegaly. There is no tenderness. There is no CVA tenderness.   Musculoskeletal: Normal range of motion.   Lymphadenopathy:     She has no cervical adenopathy.     She has no axillary adenopathy.   Neurological: She is alert and oriented to person, place, and time. She has normal reflexes. She displays no tremor. She displays no seizure activity.   Skin: Skin is warm, dry and intact.   Psychiatric: She has a normal mood and affect. Her speech is normal. Thought content normal. Cognition and memory are normal.         CRANIAL NERVES     CN III, IV, VI   Pupils are equal, round, and reactive to light.  Extraocular motions are normal.        Significant Labs:   CBC:   Recent Labs   Lab 06/23/19  0050   WBC 14.49*   HGB 11.0*   HCT 33.8*        CMP:   Recent Labs   Lab 06/23/19  0120      K 5.2*      CO2 22*   *   BUN 57*   CREATININE 3.7*   CALCIUM 9.1   PROT 7.3   ALBUMIN 3.9   BILITOT 0.4   ALKPHOS 258*   AST 20   ALT 20   ANIONGAP 14   EGFRNONAA 12*     Cardiac Markers:   Recent Labs   Lab 06/23/19  0120   *   Troponin:   Recent Labs   Lab 06/23/19  0120   TROPONINI 0.176*     Significant Imaging:   Imaging Results          CT Pelvis Without Contrast (Final result)  Result time 06/23/19 02:52:10    Final result by Prince Alanis MD (06/23/19 02:52:10)                 Impression:      Acute minimally displaced comminuted fracture of the superior pubic ramus on the right.    Acute nondisplaced transverse fracture of the inferior pubic ramus on the left.    Acute nondisplaced linear fracture through the lateral margin of the left sacral ala.      Electronically signed by: Prince Alanis MD  Date:    06/23/2019  Time:    02:52             Narrative:    EXAMINATION:  CT PELVIS WITHOUT CONTRAST    CLINICAL HISTORY:  Pelvis trauma, fx known or suspected, xray insufficient;right hip pain, right pubic rami fracture;    TECHNIQUE:  Axial 1.25-mm images of the pelvis  obtained without intravenous contrast.  Data submitted for coronal and sagittal reformats.    COMPARISON:  None    FINDINGS:  Acute minimally displaced comminuted fracture of the superior pubic ramus on the right.  Acute nondisplaced transverse fracture of the inferior pubic ramus on the left.  Acute nondisplaced linear fracture through the lateral margin of the left sacral ala.  No additional fracture identified.  No dislocation.                                CT Chest Without Contrast (Final result)  Result time 06/23/19 03:01:00    Final result by Lalit Best MD (06/23/19 03:01:00)                 Impression:      There are left-sided rib fractures noted, there is underlying mild pulmonary contusive change, as discussed above.    Bilateral pattern of interstitial and alveolar edema/infiltrate, with peribronchial thickening, and minimal pleural fluid bilaterally, the overall pattern is thought to potentially represent a general pattern of interstitial/alveolar infiltrate potentially pulmonary edema, clinical and historical correlation is needed.    Prominent paratracheal lymph node for which clinical and historical correlation and follow-up is recommended.    This report was flagged in Epic as abnormal.      Electronically signed by: Lalit Best  Date:    06/23/2019  Time:    03:01             Narrative:    EXAMINATION:  CT CHEST WITHOUT CONTRAST    CLINICAL HISTORY:  Chest trauma, blunt;    TECHNIQUE:  Low dose axial images, sagittal and coronal reformations were obtained from the thoracic inlet to the lung bases. Contrast was not administered.    COMPARISON:  None.    FINDINGS:  CT examination of the chest was performed.  Intravenous contrast was not utilized, this diminishes the sensitivity of the exam, diminishing sensitivity for detection of acute posttraumatic injury including solid organ and vascular injury.  Axial imaging, sagittal and coronal reconstruction imaging is submitted, there is no  prior examination available for comparison.    Left-sided rib fractures are noted, there is fracture along the posterior peripheral left 6th, 7th and 8th ribs, with mild displacement involving the left 7th and to lesser extent 8th rib.  There is mild air density seen between the 6th and 7th rib interspace however this is not thought to be within the pleural space rather is thought to be within the soft tissues of the left chest wall between the 6th and 7th rib.  There is no additional evidence for pneumothorax.  There is minimal left pleural effusion noted there is also minimal right pleural effusion noted.  There is appearance of subpleural thickening along the lung parenchyma underlying the rib fractures, this may relate to mild pulmonary contusive change.  There is no additional evidence for extrapulmonary air, no additional evidence for pneumothorax.  There is no hyperdense pleural fluid to suggest hemothorax.    Postoperative cardiothoracic changes noted, there is a prominent right paratracheal lymph node noted measuring approximately up to 1.6 cm in size.  Evaluation for adenopathy is otherwise limited.  There is no pericardial effusion and no hemopericardium.  Atherosclerotic change of the coronary arterial vasculature and the aorta is noted.  There is no thoracic aortic aneurysmal dilatation seen.    The lungs demonstrate appearance of interstitial thickening this may relate to interstitial edema/infiltrate, there is also bilateral pattern of ground-glass infiltrate and areas of patchy and slightly nodular infiltrate, with some areas of greater confluence however without dense consolidation.  There is peribronchial thickening noted as well.  These findings are seen diffusely bilaterally, the peribronchial thickening is greatest at the lung bases, this is not atypical pattern for pulmonary contusion, this may relate to an underlying edematous pulmonary process, clinical and historical correlation is  needed.    Limited imaging of the upper abdomen demonstrates no evidence for acute upper abdominal process.  The remainder of the osseous structures demonstrate chronic change.                               X-Ray Hip 2 View Right (Final result)  Result time 06/23/19 01:28:29    Final result by Terrie Kauffman MD (06/23/19 01:28:29)                 Impression:      Suspected acute fracture of the right superior pubic ramus.      Electronically signed by: Terrie Kauffman MD  Date:    06/23/2019  Time:    01:28             Narrative:    EXAMINATION:  XR HIP 2 VIEW RIGHT    CLINICAL HISTORY:  Pain in unspecified hip    TECHNIQUE:  AP view of the pelvis and frog leg lateral view of the right hip were performed.    COMPARISON:  None    FINDINGS:  Cortical irregularity is seen likely reflecting acute fracture of the right superior pubic ramus.  No additional fractures or dislocation seen.  Mild degenerative changes are seen involving the bilateral hips.                               X-Ray Chest 1 View (Final result)  Result time 06/23/19 01:23:59    Final result by Terrie Kauffman MD (06/23/19 01:23:59)                 Impression:      See above.      Electronically signed by: Terrie Kauffman MD  Date:    06/23/2019  Time:    01:23             Narrative:    EXAMINATION:  XR CHEST 1 VIEW; XR RIBS 2 VIEW LEFT    CLINICAL HISTORY:  Pleurodynia    TECHNIQUE:  Single frontal view of the chest was performed.  Left ribs two views.    COMPARISON:  August 2018.    FINDINGS:  Heart is mildly enlarged but stable in size.  Postsurgical changes and sternotomy wires are seen.  There is prominence of central pulmonary vasculature bilateral perihilar opacity diffuse increased interstitial attenuation.  Overall pattern is most suggestive for mild to moderate pulmonary edema.  Atypical pneumonia could have similar appearance.  No evidence of pneumothorax or significant pleural effusion.  Left subclavian vascular stent is seen.    No  definite acute displaced left-sided rib fractures are identified, noting evaluation is somewhat obscured by superimposed increased pulmonary attenuation as well as multiple overlying monitoring leads and wires.                               X-Ray Ribs 2 View Left (Final result)  Result time 06/23/19 01:23:59    Final result by Terrie Kauffman MD (06/23/19 01:23:59)                 Impression:      See above.      Electronically signed by: Terrie Kauffman MD  Date:    06/23/2019  Time:    01:23             Narrative:    EXAMINATION:  XR CHEST 1 VIEW; XR RIBS 2 VIEW LEFT    CLINICAL HISTORY:  Pleurodynia    TECHNIQUE:  Single frontal view of the chest was performed.  Left ribs two views.    COMPARISON:  August 2018.    FINDINGS:  Heart is mildly enlarged but stable in size.  Postsurgical changes and sternotomy wires are seen.  There is prominence of central pulmonary vasculature bilateral perihilar opacity diffuse increased interstitial attenuation.  Overall pattern is most suggestive for mild to moderate pulmonary edema.  Atypical pneumonia could have similar appearance.  No evidence of pneumothorax or significant pleural effusion.  Left subclavian vascular stent is seen.    No definite acute displaced left-sided rib fractures are identified, noting evaluation is somewhat obscured by superimposed increased pulmonary attenuation as well as multiple overlying monitoring leads and wires.

## 2019-06-23 NOTE — HPI
Zoey Covarrubias is a 73 y.o. unfortunate elderly female patient with extensive cardiac and renal history as below - including but not limited to CAD (CABG 2002, PCI 2012), ESRD-HD (Monday Wednesday Friday/still makes urine), HTN, and gastritis with bleeding history.  Per patient she was in her usual state of health up until 1 day ago.  She reports that she usually dialyzes in [facility]  but her daughter lives in [place].  She reports that she dialyzed in a [place] on [facility]  on Friday  as she had been visiting her and the environment was somewhat unfamiliar.  Patient reports that she encountered a mechanical fall and a very small bathroom when she attempted to close the door.  She denies any loss of consciousness, dizziness, lightheadedness before the episode.  She denies chest pain, fever chills, upper respiratory infection, headache, focal deficits or weaknesses, or numbness or tingling in either extremity prior to or after the event.    Upon presentation the patient was noted on CT chest to have 3 left rib fractures 7 and 8 with mild displacement involving the left 7th and lesser extent 8th rib.  CT pelvis showed acute displaced comminuted right fracture of the pelvis as well as an acute nondisplaced transverse fracture of the inferior pubic ramus on the left.  Additionally abnormal CBC and chemistry significant for leukocytosis = 14 K, mild hypokalemia = 5.2, other findings consistent with end-stage renal disease decreased creatinine and GFR.  Additionally patient's glucose level was found to be 442, alk phos 258.  Lastly patient was noted to have bump in troponin 1 level that has trended she has usual chronic troponinemia =Results for ZOEY COVARRUBIAS (MRN [id number]) as of 6/23/2019 08:06  Results for ZOEY COVARRUBIAS (MRN [id number]) as of 6/23/2019 16:37   Ref. Range 6/23/2019 01:20   Troponin I Latest Ref Range: 0.000 - 0.026 ng/mL 0.176 (H)       Cardiologist at [facility] ([place]?)

## 2019-06-23 NOTE — PT/OT/SLP PROGRESS
Occupational Therapy      Patient Name:  Zoey Ham   MRN:  [id number]    Patient not seen today secondary to Unavailable (Comment), Dialysis. Will follow-up as able    Rupal Mcknight OT  6/23/2019

## 2019-06-23 NOTE — CONSULTS
Date of Admit: 6/22/2019  Length of Stay: 0   Days    Consulting MD: MD Zenaida    Date of Consult: 6/23/2019    Reason for Consultation     dialysis needs, pulmonary edema    Subjective:      History of Present Illness:  Zoey Ham is a 73 y.o. year old female with a past medical history significant for esrd who presented to [facility] with a fall and multiple fx. Pt with sob and elevated k now. Pt's nephrologist Dr. Iglesias doesn't come.  Pt hd at [facility] MWF. Pt not sure of her dry wt. PT with hypoxia and pulm edema on cxr.  Pt with hip and rib fx.    Past Medical History:  Past Medical History:   Diagnosis Date    Acute myocardial infarction of anterolateral wall 7/9/2015    Anemia of chronic renal failure, stage 4 (severe) 1/22/2015    Anticoagulant long-term use     ASA and plavix    Atherosclerosis of aorta 10/3/2012    AV shunt malfunction     Benign hypertension with CKD (chronic kidney disease) stage IV 3/11/2016    Hypertension associated with Diabetes    Carpal tunnel syndrome, right     Chronic diastolic congestive heart failure 10/3/2012    Combined systolic and diastolic CHF    Chronic kidney disease, stage IV (severe) 7/3/2012    Coronary artery disease     s/p 1v CABG 2002 and multiple PCI (last angiogram in 6/2012 with patent LIMA->LAD and patent LCx and RCA stents)    Diabetic polyneuropathy associated with type 2 diabetes mellitus 7/9/2015    Diabetic polyneuropathy associated with type 2 diabetes mellitus 7/9/2015    Dialysis patient     Encounter for blood transfusion     Gastritis without bleeding     Heart attack 09/2012    5-6 events    Hyperlipidemia     Hyperparathyroidism     Hyperphosphatemia     Metabolic acidosis     Nephritis and nephropathy, 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 7/9/2015    NSTEMI (non-ST elevated myocardial infarction)     NSTEMI (non-ST elevated myocardial infarction)     NSTEMI (non-ST elevated myocardial infarction)     Occlusion and  stenosis of carotid artery with cerebral infarction 7/9/2015    Osteopenia     PAF (paroxysmal atrial fibrillation) 10/3/2012    Pneumonia     Proliferative diabetic retinopathy 10/3/2012    S/P drug eluting coronary stent placement     Sepsis due to Escherichia coli with acute renal failure 2/2016    UTI     TACO (transfusion associated circulatory overload)     Type II diabetes mellitus with neurological manifestations 7/9/2015    Type II diabetes mellitus with renal manifestations 7/3/2012       Past Surgical History:  Past Surgical History:   Procedure Laterality Date    APPENDECTOMY      CARDIAC SURGERY  2002    CABG    CHOLECYSTECTOMY      CHOLECYSTECTOMY-LAPAROSCOPIC N/A 2/4/2015    Performed by Quinton Ashley MD at [facility] [place] [address] [place] [address]    [id number]  - Left brachiocephalic Left 4/16/2018    Performed by YAMEL Perry III, MD at [facility] OR [address] [place] [address]    CORONARY ANGIOPLASTY  2004    CORONARY ARTERY BYPASS GRAFT      EYE SURGERY      cataracts bilaterally    Fistulogram Left 1/17/2019    [facility]    Fistulogram Left 9/20/2018    Performed by YAMEL Perry III, MD at [facility] [place] [address] [place] [address]    Fistulogram left arm Left 6/20/2019    Performed by YAMEL Perry III, MD at [facility] [address] [place] [address]    FRACTURE SURGERY      right ankle    HYSTERECTOMY      PTA (ANGIOPLASTY, PERCUTANEOUS, TRANSLUMINAL) N/A 6/20/2019    Performed by YAMEL ePrry III, [facility]    PTA (ANGIOPLASTY, PERCUTANEOUS, TRANSLUMINAL) N/A 1/17/2019    Per[facility]EL Perry [facility]    PTA (ANGIOPLASTY, PERCUTANEOUS, TRANSLUMINAL) N/A 9/20/2018    Performed by YAMEL Perry III, MD at [facility] [place] [address] [place] [address]    STENT, FISTULA Left 6/20/2019    Performed by YAMEL Perry III, MD at [facility] [place] [address] [place] [address]    TONSILLECTOMY         Allergies:  Review of patient's allergies indicates:   Allergen Reactions    Percodan [oxycodone-aspirin] Hives      "Welps     Pcn [penicillins] Hives and Swelling     Tolerated Rocephin IM in clinic      Phenergan [promethazine] Other (See Comments)     Altered mental status; jerking of extremities       Home Medications:    No current facility-administered medications on file prior to encounter.      Current Outpatient Medications on File Prior to Encounter   Medication Sig Dispense Refill    ACCU-CHEK MARI PLUS METER Misc 1 Device by Misc.(Non-Drug; Combo Route) route 2 (two) times daily. 1 each 0    aspirin (ECOTRIN) 325 MG EC tablet Take 1 tablet (325 mg total) by mouth once daily. (Patient taking differently: Take 325 mg by mouth every morning. )      atorvastatin (LIPITOR) 80 MG tablet Take 1 tablet (80 mg total) by mouth every morning. 90 tablet 3    blood sugar diagnostic (ACCU-CHEK MARI PLUS TEST STRP) Strp TEST BLOOD SUGAR FOUR TIMES DAILY 350 strip 4    calcitRIOL (ROCALTROL) 0.5 MCG Cap Take 1 capsule (0.5 mcg total) by mouth once daily. 90 capsule 1    clopidogrel (PLAVIX) 75 mg tablet Take 1 tablet (75 mg total) by mouth once daily. 90 tablet 3    furosemide (LASIX) 80 MG tablet One 80 mg  tab once a day. 90 tablet 2    insulin glargine (LANTUS U-100 INSULIN) 100 unit/mL injection Inject 15 Units into the skin every evening. 10 mL 3    insulin syringe-needle U-100 0.3 mL 31 gauge x 1/4" Syrg 1 application by Misc.(Non-Drug; Combo Route) route once daily. 100 Syringe 3    lancets (ACCU-CHEK SOFTCLIX LANCETS) Misc 1 application by Misc.(Non-Drug; Combo Route) route once daily. 100 each 3    lidocaine-prilocaine (EMLA) cream APPLY SMALL AMOUNT TO ACCESS SITE 1-2 HOURS BEFORE TREATMENT. COVER AREA WITH AN OCCLUSIVE DRESSING AS DIRECTED.  12    metoprolol tartrate (LOPRESSOR) 100 MG tablet Take 1 tablet (100 mg total) by mouth 2 (two) times daily. 180 tablet 3    nitroGLYCERIN (NITROSTAT) 0.3 MG SL tablet Place 1 tablet (0.3 mg total) under the tongue every 5 (five) minutes as needed for Chest pain. 36 " "tablet 3       Family History:  Family History   Problem Relation Age of Onset    Heart disease Mother     Hypertension Mother     Cancer Mother         lung    Heart disease Father     Hypertension Father     Psoriasis Father     Dementia Father     Hypertension Sister     Stroke Sister     Hypertension Brother     Diabetes Daughter     No Known Problems Son     Breast cancer Maternal Aunt     Melanoma Neg Hx     Lupus Neg Hx     Eczema Neg Hx     Amblyopia Neg Hx     Blindness Neg Hx     Cataracts Neg Hx     Glaucoma Neg Hx     Macular degeneration Neg Hx     Retinal detachment Neg Hx     Strabismus Neg Hx     Thyroid disease Neg Hx        Social History:  Social History     Tobacco Use    Smoking status: Never Smoker    Smokeless tobacco: Never Used   Substance Use Topics    Alcohol use: No    Drug use: No       Review of Systems:    10 pt :+pain, sob, fall     Objective:     Scheduled Meds:   sodium chloride 0.9%   Intravenous Once    albuterol-ipratropium  3 mL Nebulization ED 1 Time    aspirin  325 mg Oral Daily    furosemide  80 mg Oral Daily    insulin detemir U-100  15 Units Subcutaneous QHS    metoprolol tartrate  100 mg Oral BID    mupirocin   Nasal BID    vitamin renal formula (B-complex-vitamin c-folic acid)  1 capsule Oral Daily     Continuous Infusions:  PRN Meds:.sodium chloride 0.9%, dextrose 50%, dextrose 50%, glucagon (human recombinant), glucose, glucose, hydrALAZINE, HYDROmorphone, HYDROmorphone, insulin aspart U-100, ondansetron, sodium chloride 0.9%      Physical Examination:    Vitals: BP (!) 161/70 (BP Location: Right arm, Patient Position: Lying)   Pulse 87   Temp 98.1 °F (36.7 °C) (Oral)   Resp 20   Ht 5' 4" (1.626 m)   Wt 73.3 kg (161 lb 9.6 oz)   LMP  (LMP Unknown)   SpO2 (!) 91%   Breastfeeding? No   BMI 27.74 kg/m²    I/O last 3 completed shifts:  In: -   Out: 600 [Urine:600]  No intake/output data recorded.      General: No acute distress "   Head: Normocephalic, atraumatic  Eyes: No jaundice  Neck: Supple  Cardiovascular: s1s2 regular  Chest: decrease bs   Abdomen: Soft, nd  Extremities: No edema of legs, avf of left arm-suture  Neurologic: AO    Laboratory:  Recent Results (from the past 24 hour(s))   CBC auto differential    Collection Time: 06/23/19 12:50 AM   Result Value Ref Range    WBC 14.49 (H) 3.90 - 12.70 K/uL    RBC 3.41 (L) 4.00 - 5.40 M/uL    Hemoglobin 11.0 (L) 12.0 - 16.0 g/dL    Hematocrit 33.8 (L) 37.0 - 48.5 %    Mean Corpuscular Volume 99 (H) 82 - 98 fL    Mean Corpuscular Hemoglobin 32.3 (H) 27.0 - 31.0 pg    Mean Corpuscular Hemoglobin Conc 32.5 32.0 - 36.0 g/dL    RDW 14.4 11.5 - 14.5 %    Platelets 263 150 - 350 K/uL    MPV 11.7 9.2 - 12.9 fL    Gran # (ANC) 12.0 (H) 1.8 - 7.7 K/uL    Lymph # 1.4 1.0 - 4.8 K/uL    Mono # 0.9 0.3 - 1.0 K/uL    Eos # 0.2 0.0 - 0.5 K/uL    Baso # 0.02 0.00 - 0.20 K/uL    Gran% 82.8 (H) 38.0 - 73.0 %    Lymph% 9.4 (L) 18.0 - 48.0 %    Mono% 6.5 4.0 - 15.0 %    Eosinophil% 1.2 0.0 - 8.0 %    Basophil% 0.1 0.0 - 1.9 %    Differential Method Automated    Comprehensive metabolic panel    Collection Time: 06/23/19  1:20 AM   Result Value Ref Range    Sodium 138 136 - 145 mmol/L    Potassium 5.2 (H) 3.5 - 5.1 mmol/L    Chloride 102 95 - 110 mmol/L    CO2 22 (L) 23 - 29 mmol/L    Glucose 442 (H) 70 - 110 mg/dL    BUN, Bld 57 (H) 8 - 23 mg/dL    Creatinine 3.7 (H) 0.5 - 1.4 mg/dL    Calcium 9.1 8.7 - 10.5 mg/dL    Total Protein 7.3 6.0 - 8.4 g/dL    Albumin 3.9 3.5 - 5.2 g/dL    Total Bilirubin 0.4 0.1 - 1.0 mg/dL    Alkaline Phosphatase 258 (H) 55 - 135 U/L    AST 20 10 - 40 U/L    ALT 20 10 - 44 U/L    Anion Gap 14 8 - 16 mmol/L    eGFR if African American 13 (A) >60 mL/min/1.73 m^2    eGFR if non African American 12 (A) >60 mL/min/1.73 m^2   Protime-INR    Collection Time: 06/23/19  1:20 AM   Result Value Ref Range    Prothrombin Time 9.9 9.0 - 12.5 sec    INR 0.9 0.8 - 1.2   Brain natriuretic peptide     Collection Time: 06/23/19  1:20 AM   Result Value Ref Range     (H) 0 - 99 pg/mL   Troponin I    Collection Time: 06/23/19  1:20 AM   Result Value Ref Range    Troponin I 0.176 (H) 0.000 - 0.026 ng/mL   Urinalysis, Reflex to Urine Culture Urine, Clean Catch    Collection Time: 06/23/19  3:44 AM   Result Value Ref Range    Specimen UA Urine, Catheterized     Color, UA Yellow Yellow, Straw, Priyanka    Appearance, UA Clear Clear    pH, UA 5.0 5.0 - 8.0    Specific Gravity, UA 1.010 1.005 - 1.030    Protein, UA 2+ (A) Negative    Glucose, UA 3+ (A) Negative    Ketones, UA Negative Negative    Bilirubin (UA) Negative Negative    Occult Blood UA 1+ (A) Negative    Nitrite, UA Negative Negative    Urobilinogen, UA Negative <2.0 EU/dL    Leukocytes, UA Negative Negative   Urinalysis Microscopic    Collection Time: 06/23/19  3:44 AM   Result Value Ref Range    RBC, UA 4 0 - 4 /hpf    WBC, UA 1 0 - 5 /hpf    Bacteria Moderate (A) None-Occ /hpf    Yeast, UA None None    Hyaline Casts, UA none 0-1/lpf /lpf    Microscopic Comment SEE COMMENT            Diagnostic Tests:     xr-pulm edema  xr-hip fx bilateral  xr-left rib fx         Assessment:     Zoey Ham is a 73 y.o. female admitted with:  esrd  htn  Anemia 2nd to ckd  Rib fx  Hip fx  pulm edema  Hyperkalemia.  dm2  Plan:   1.HD emergent today.  2.2k  Bath  3.uf with hd.  4.follow sugars.  5.no heparin with hd.  6.hold epo. Should be getting micera at her unit.  7.ck phos.      [place]

## 2019-06-23 NOTE — PLAN OF CARE
06/23/19 1506   Discharge Assessment   Assessment Type Discharge Planning Assessment   Confirmed/corrected address and phone number on facesheet? Yes   Assessment information obtained from? Patient   Communicated expected length of stay with patient/caregiver no   Prior to hospitilization cognitive status: Alert/Oriented   Prior to hospitalization functional status: Independent   Current cognitive status: Alert/Oriented   Current Functional Status: Independent   Facility Arrived From: Home   Lives With child(chava), adult   Able to Return to Prior Arrangements yes   Is patient able to care for self after discharge? Yes   Who are your caregiver(s) and their phone number(s)? Felix-son: [phone]; Ana Cristina-daughter: [phone]   Patient's perception of discharge disposition other (comments)  (TBD: Unsure of discharge needs; depending on PT/OT assessments; patient expressed some concerns about current status due to pain)   Readmission Within the Last 30 Days no previous admission in last 30 days   Patient currently being followed by outpatient case management? No   Patient currently receives any other outside agency services? No   Equipment Currently Used at Home none   Do you have any problems affording any of your prescribed medications? No   Is the patient taking medications as prescribed? yes   Does the patient have transportation home? Yes   Transportation Anticipated family or friend will provide   Does the patient receive services at the [facility] Clinic? No   Discharge Plan A   (TBD: HH vs. higher level of care; dependent on medical team assessments)   Discharge Plan B Other  (TBD)   DME Needed Upon Discharge  other (see comments)  (TBD)   Patient/Family in Agreement with Plan yes   SW Role explained to patient; two patient identifiers recognized; SW contact information placed on Communication board. Discussed patient managing health care at home; determined who would be helping patient at home with recovery:  Son-Felix, daughter in law, family will help with recovery at home.    PCP: Cesia Rosales MD [address] [facility] [address] Prefers morning appointments    Extended Emergency Contact Information  Primary Emergency Contact: Ana Cristina Tamayo  Address: [address] [place] [id number] [facility]  Mobile Phone: [phone]  Relation: Daughter  Secondary Emergency Contact: marcel miguel  Mobile Phone: [phone]  Relation: Son     [id number] [place] - [address]  [address]  [facility] [address]  Phone: [phone] Fax: [phone]    [facility] - [facility] [address] [facility]  [address] [facility] [address]  Phone: [phone] Fax: [phone]    Payor: [employer] MANAGED MEDICARE / Plan: HUMAN MEDICARE HMO / Product Type: Capitation /

## 2019-06-23 NOTE — CONSULTS
Ortho Consult    Chief Complaint   Patient presents with    Hip Pain     pt states she slipped and fell at home and has pain to right hip and right back,       History of present illness:    A 73-year-old who sustained a fall last night at home.  She fell onto her side. She complains of pelvic pain and left chest pain.    Past Medical History:   Diagnosis Date    Acute myocardial infarction of anterolateral wall 7/9/2015    Anemia of chronic renal failure, stage 4 (severe) 1/22/2015    Anticoagulant long-term use     ASA and plavix    Atherosclerosis of aorta 10/3/2012    AV shunt malfunction     Benign hypertension with CKD (chronic kidney disease) stage IV 3/11/2016    Hypertension associated with Diabetes    Carpal tunnel syndrome, right     Chronic diastolic congestive heart failure 10/3/2012    Combined systolic and diastolic CHF    Chronic kidney disease, stage IV (severe) 7/3/2012    Coronary artery disease     s/p 1v CABG 2002 and multiple PCI (last angiogram in 6/2012 with patent LIMA->LAD and patent LCx and RCA stents)    Diabetic polyneuropathy associated with type 2 diabetes mellitus 7/9/2015    Diabetic polyneuropathy associated with type 2 diabetes mellitus 7/9/2015    Dialysis patient     Encounter for blood transfusion     Gastritis without bleeding     Heart attack 09/2012    5-6 events    Hyperlipidemia     Hyperparathyroidism     Hyperphosphatemia     Metabolic acidosis     Nephritis and nephropathy, 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 7/9/2015    NSTEMI (non-ST elevated myocardial infarction)     NSTEMI (non-ST elevated myocardial infarction)     NSTEMI (non-ST elevated myocardial infarction)     Occlusion and stenosis of carotid artery with cerebral infarction 7/9/2015    Osteopenia     PAF (paroxysmal atrial fibrillation) 10/3/2012    Pneumonia     Proliferative diabetic retinopathy 10/3/2012    S/P drug eluting coronary stent placement     Sepsis due to  Escherichia coli with acute renal failure 2/2016    UTI     TACO (transfusion associated circulatory overload)     Type II diabetes mellitus with neurological manifestations 7/9/2015    Type II diabetes mellitus with renal manifestations 7/3/2012       Past Surgical History:   Procedure Laterality Date    APPENDECTOMY      CARDIAC SURGERY  2002    CABG    CHOLECYSTECTOMY      CHOLECYSTECTOMY-LAPAROSCOPIC N/A 2/4/2015    [facility]    [id number]-[id number]-DR  - Left brachiocephalic Left 4/16/2018    Performed by YAMEL Perry III, MD at [facility] OR [address] [place] [address]    CORONARY ANGIOPLASTY  2004    CORONARY ARTERY BYPASS GRAFT      EYE SURGERY      cataracts bilaterally    Fistulogram Left 1/17/2019    [facility]    Fistulogram Left 9/20/2018    Performed by YAMEL Perry III, MD at [facility] [place] [address] [place] [address]    Fistulogram left arm Left 6/20/2019    Performed by YAMEL Perry III, MD at [facility] [place] [address] [place] [address]    FRACTURE SURGERY      right ankle    HYSTERECTOMY      PTA (ANGIOPLASTY, PERCUTANEOUS, TRANSLUMINAL) N/A 6/20/2019    [facility]    PTA (ANGIOPLASTY, PERCUTANEOUS, TRANSLUMINAL) N/A 1/17/2019    [facility]    PTA (ANGIOPLASTY, PERCUTANEOUS, TRANSLUMINAL) N/A 9/20/2018    Performed by YAMEL Perry III, MD at [facility] [place] [address] [place] [address]    STENT, FISTULA Left 6/20/2019    Performed by YAMEL Perry III, MD at [facility] [place] [address] [place] [address]    TONSILLECTOMY         Review of patient's allergies indicates:   Allergen Reactions    Percodan [oxycodone-aspirin] Hives     Welps     Pcn [penicillins] Hives and Swelling     Tolerated Rocephin IM in clinic      Phenergan [promethazine] Other (See Comments)     Altered mental status; jerking of extremities       Prior to Admission medications    Medication Sig Start Date End Date Taking? Authorizing Provider  "  ACCU-CHEK MARI PLUS METER Misc 1 Device by Misc.(Non-Drug; Combo Route) route 2 (two) times daily. 12/8/17   Cesia Rosales MD   aspirin (ECOTRIN) 325 MG EC tablet Take 1 tablet (325 mg total) by mouth once daily.  Patient taking differently: Take 325 mg by mouth every morning.  9/6/18   Cesia Rosales MD   atorvastatin (LIPITOR) 80 MG tablet Take 1 tablet (80 mg total) by mouth every morning. 5/2/19   Cesia Rosales MD   blood sugar diagnostic (ACCU-CHEK MARI PLUS TEST STRP) Strp TEST BLOOD SUGAR FOUR TIMES DAILY 9/6/18   Cesia Rosales MD   calcitRIOL (ROCALTROL) 0.5 MCG Cap Take 1 capsule (0.5 mcg total) by mouth once daily. 6/21/18   Karla Iglesias DO   clopidogrel (PLAVIX) 75 mg tablet Take 1 tablet (75 mg total) by mouth once daily. 5/2/19   Cesia Rosales MD   furosemide (LASIX) 80 MG tablet One 80 mg  tab once a day. 4/5/19   Karla Iglesias DO   insulin glargine (LANTUS U-100 INSULIN) 100 unit/mL injection Inject 15 Units into the skin every evening. 1/14/19 1/14/20  Cesia Rosales MD   insulin syringe-needle U-100 0.3 mL 31 gauge x 1/4" Syrg 1 application by Misc.(Non-Drug; Combo Route) route once daily. 2/22/19   Cesia Rosales MD   lancets (ACCU-CHEK SOFTCLIX LANCETS) Misc 1 application by Misc.(Non-Drug; Combo Route) route once daily. 5/2/19   Cesia Rosales MD   lidocaine-prilocaine (EMLA) cream APPLY SMALL AMOUNT TO ACCESS SITE 1-2 HOURS BEFORE TREATMENT. COVER AREA WITH AN OCCLUSIVE DRESSING AS DIRECTED. 3/27/19   Historical Provider, MD   metoprolol tartrate (LOPRESSOR) 100 MG tablet Take 1 tablet (100 mg total) by mouth 2 (two) times daily. 5/2/19 5/1/20  Cesia Rosales MD   nitroGLYCERIN (NITROSTAT) 0.3 MG SL tablet Place 1 tablet (0.3 mg total) under the tongue every 5 (five) minutes as needed for Chest pain. 8/24/18 8/24/19  Cesia Rosales MD       Social History     Occupational History    Occupation: Homemaker " "  Tobacco Use    Smoking status: Never Smoker    Smokeless tobacco: Never Used   Substance and Sexual Activity    Alcohol use: No    Drug use: No    Sexual activity: Never       Temp:  [97.9 °F (36.6 °C)-98.4 °F (36.9 °C)] 97.9 °F (36.6 °C)  Pulse:  [78-89] 89  Resp:  [16-37] 22  SpO2:  [87 %-98 %] 97 %  BP: (154-200)/(69-88) 154/69  Height: 5' 4" (162.6 cm)  Weight: 75.9 kg (167 lb 5.3 oz)(Before hemodialysis tx.)  BMI (Calculated): 27.8    Physical examination:    An awake female in bed on dialysis.  She is comfortable resting.  Head and neck examination is unremarkable  Left side of her chest is tender to palpation.  There is no crepitus.  Both arms are nontender  Abdomen is soft and nontender  Pelvis is stable on compression testing.  Her pubis is not particularly tender.  She has some pain with rotation of either hip slightly worse on the right.  She is nontender about her thighs and her knees and her feet.  She is able to flex extend her toes.  Sensibility to light touch is present.      Recent Labs   Lab 06/23/19  0050   WBC 14.49*   RBC 3.41*   HGB 11.0*   HCT 33.8*      MCV 99*   MCH 32.3*   MCHC 32.5     Recent Labs   Lab 06/23/19  0120   CALCIUM 9.1   PROT 7.3      K 5.2*   CO2 22*      BUN 57*   CREATININE 3.7*   ALKPHOS 258*   ALT 20   AST 20   BILITOT 0.4     Recent Labs   Lab 06/23/19  0120   INR 0.9       Imaging Results          CT Pelvis Without Contrast (Final result)  Result time 06/23/19 02:52:10    Final result by Prince Alanis MD (06/23/19 02:52:10)                 Impression:      Acute minimally displaced comminuted fracture of the superior pubic ramus on the right.    Acute nondisplaced transverse fracture of the inferior pubic ramus on the left.    Acute nondisplaced linear fracture through the lateral margin of the left sacral ala.      Electronically signed by: Prince Alanis MD  Date:    06/23/2019  Time:    02:52             Narrative:    EXAMINATION:  CT " PELVIS WITHOUT CONTRAST    CLINICAL HISTORY:  Pelvis trauma, fx known or suspected, xray insufficient;right hip pain, right pubic rami fracture;    TECHNIQUE:  Axial 1.25-mm images of the pelvis obtained without intravenous contrast.  Data submitted for coronal and sagittal reformats.    COMPARISON:  None    FINDINGS:  Acute minimally displaced comminuted fracture of the superior pubic ramus on the right.  Acute nondisplaced transverse fracture of the inferior pubic ramus on the left.  Acute nondisplaced linear fracture through the lateral margin of the left sacral ala.  No additional fracture identified.  No dislocation.                                CT Chest Without Contrast (Final result)  Result time 06/23/19 03:01:00    Final result by Lalit Best MD (06/23/19 03:01:00)                 Impression:      There are left-sided rib fractures noted, there is underlying mild pulmonary contusive change, as discussed above.    Bilateral pattern of interstitial and alveolar edema/infiltrate, with peribronchial thickening, and minimal pleural fluid bilaterally, the overall pattern is thought to potentially represent a general pattern of interstitial/alveolar infiltrate potentially pulmonary edema, clinical and historical correlation is needed.    Prominent paratracheal lymph node for which clinical and historical correlation and follow-up is recommended.    This report was flagged in Epic as abnormal.      Electronically signed by: Lalit Best  Date:    06/23/2019  Time:    03:01             Narrative:    EXAMINATION:  CT CHEST WITHOUT CONTRAST    CLINICAL HISTORY:  Chest trauma, blunt;    TECHNIQUE:  Low dose axial images, sagittal and coronal reformations were obtained from the thoracic inlet to the lung bases. Contrast was not administered.    COMPARISON:  None.    FINDINGS:  CT examination of the chest was performed.  Intravenous contrast was not utilized, this diminishes the sensitivity of the exam,  diminishing sensitivity for detection of acute posttraumatic injury including solid organ and vascular injury.  Axial imaging, sagittal and coronal reconstruction imaging is submitted, there is no prior examination available for comparison.    Left-sided rib fractures are noted, there is fracture along the posterior peripheral left 6th, 7th and 8th ribs, with mild displacement involving the left 7th and to lesser extent 8th rib.  There is mild air density seen between the 6th and 7th rib interspace however this is not thought to be within the pleural space rather is thought to be within the soft tissues of the left chest wall between the 6th and 7th rib.  There is no additional evidence for pneumothorax.  There is minimal left pleural effusion noted there is also minimal right pleural effusion noted.  There is appearance of subpleural thickening along the lung parenchyma underlying the rib fractures, this may relate to mild pulmonary contusive change.  There is no additional evidence for extrapulmonary air, no additional evidence for pneumothorax.  There is no hyperdense pleural fluid to suggest hemothorax.    Postoperative cardiothoracic changes noted, there is a prominent right paratracheal lymph node noted measuring approximately up to 1.6 cm in size.  Evaluation for adenopathy is otherwise limited.  There is no pericardial effusion and no hemopericardium.  Atherosclerotic change of the coronary arterial vasculature and the aorta is noted.  There is no thoracic aortic aneurysmal dilatation seen.    The lungs demonstrate appearance of interstitial thickening this may relate to interstitial edema/infiltrate, there is also bilateral pattern of ground-glass infiltrate and areas of patchy and slightly nodular infiltrate, with some areas of greater confluence however without dense consolidation.  There is peribronchial thickening noted as well.  These findings are seen diffusely bilaterally, the peribronchial  thickening is greatest at the lung bases, this is not atypical pattern for pulmonary contusion, this may relate to an underlying edematous pulmonary process, clinical and historical correlation is needed.    Limited imaging of the upper abdomen demonstrates no evidence for acute upper abdominal process.  The remainder of the osseous structures demonstrate chronic change.                               X-Ray Hip 2 View Right (Final result)  Result time 06/23/19 01:28:29    Final result by Terrie Kauffman MD (06/23/19 01:28:29)                 Impression:      Suspected acute fracture of the right superior pubic ramus.      Electronically signed by: Terrie Kauffman MD  Date:    06/23/2019  Time:    01:28             Narrative:    EXAMINATION:  XR HIP 2 VIEW RIGHT    CLINICAL HISTORY:  Pain in unspecified hip    TECHNIQUE:  AP view of the pelvis and frog leg lateral view of the right hip were performed.    COMPARISON:  None    FINDINGS:  Cortical irregularity is seen likely reflecting acute fracture of the right superior pubic ramus.  No additional fractures or dislocation seen.  Mild degenerative changes are seen involving the bilateral hips.                               X-Ray Chest 1 View (Final result)  Result time 06/23/19 01:23:59    Final result by Terrie Kauffman MD (06/23/19 01:23:59)                 Impression:      See above.      Electronically signed by: Terrie Kauffman MD  Date:    06/23/2019  Time:    01:23             Narrative:    EXAMINATION:  XR CHEST 1 VIEW; XR RIBS 2 VIEW LEFT    CLINICAL HISTORY:  Pleurodynia    TECHNIQUE:  Single frontal view of the chest was performed.  Left ribs two views.    COMPARISON:  August 2018.    FINDINGS:  Heart is mildly enlarged but stable in size.  Postsurgical changes and sternotomy wires are seen.  There is prominence of central pulmonary vasculature bilateral perihilar opacity diffuse increased interstitial attenuation.  Overall pattern is most suggestive for  mild to moderate pulmonary edema.  Atypical pneumonia could have similar appearance.  No evidence of pneumothorax or significant pleural effusion.  Left subclavian vascular stent is seen.    No definite acute displaced left-sided rib fractures are identified, noting evaluation is somewhat obscured by superimposed increased pulmonary attenuation as well as multiple overlying monitoring leads and wires.                               X-Ray Ribs 2 View Left (Final result)  Result time 06/23/19 01:23:59    Final result by Terrie Kauffman MD (06/23/19 01:23:59)                 Impression:      See above.      Electronically signed by: Terrie Kauffman MD  Date:    06/23/2019  Time:    01:23             Narrative:    EXAMINATION:  XR CHEST 1 VIEW; XR RIBS 2 VIEW LEFT    CLINICAL HISTORY:  Pleurodynia    TECHNIQUE:  Single frontal view of the chest was performed.  Left ribs two views.    COMPARISON:  August 2018.    FINDINGS:  Heart is mildly enlarged but stable in size.  Postsurgical changes and sternotomy wires are seen.  There is prominence of central pulmonary vasculature bilateral perihilar opacity diffuse increased interstitial attenuation.  Overall pattern is most suggestive for mild to moderate pulmonary edema.  Atypical pneumonia could have similar appearance.  No evidence of pneumothorax or significant pleural effusion.  Left subclavian vascular stent is seen.    No definite acute displaced left-sided rib fractures are identified, noting evaluation is somewhat obscured by superimposed increased pulmonary attenuation as well as multiple overlying monitoring leads and wires.                                Assessment and Plan:    73-year-old female status post fall    rib fractures on left side rib 7 and 8.  Plan treat with rest and pain control.  I do not recommend a compressive bandage.  Ice pack should be used against the chest wall.  Encourage regular breathing.    She has a pelvic fracture involving the right  sacral ala and bilateral pubic rami.  We will plan to treat her pelvic fracture closed.  She is stable for weight-bearing as tolerated on her right-sided partial weight-bearing on the left side.    We will start physical therapy.    Pain control.    DVT prophylaxis-on subcu heparin

## 2019-06-23 NOTE — CONSULTS
[facility]  Cardiology  Consult Note    Patient Name: Zoey Ham  MRN: [id number]  Admission Date: 6/22/2019  Hospital Length of Stay: 0 days  Code Status: Full Code   Attending Provider: Boby Solorio MD   Consulting Provider: Stewart Barragan MD  Primary Care Physician: Cesia Rosales MD  Principal Problem:Fracture of multiple ribs of left side    Patient information was obtained from patient and ER records.     Inpatient consult to Cardiology  Consult performed by: Stewart Barragan MD  Consult ordered by: JONO Gaxiola        Subjective:     Chief Complaint:  Chest pain     HPI:   Patient is a 79-year-old lady with a past medical history of coronary artery disease status post CABG and multiple PCIs.  She tripped and fell.  After the fall she had some musculoskeletal past chest pain.  X-ray showed rib fractures.  She has bruises on her body.  Also complained of some chest pain.  Troponin was checked and the 1st set is 0.176.  Early denies any anginal sounding chest pain, orthopnea, PND, shortness on breath.    PCI 2018:    Patient has a right dominant coronary artery.        The coronary vessels have luminal irregularities.        - Left Main Coronary Artery:             The LM has luminal irregularities. There is KEDAR 3 flow.     - Left Anterior Descending Artery:             The proximal LAD is occluded (100). There is KEDAR 0 flow.     - Left Circumflex Artery:             The proximal LCX has luminal irregularities. There is KEDAR 3 flow.     - OM1:             The proximal OM1 has a 60% stenosis. There is KEDAR 3 flow.             The mid OM1 has chronic total occlusion within the stent. There is KEDAR 0 flow. The remaining portion of the vessel is of small caliber.                     Lesion Details:   This is a Type C lesion.  The length is 30mm. The minimum luminal diameter is 0 millimeters. The reference vessel diameter is 2.6 millimeters.     - Right Coronary  Artery:             The proximal RCA is patent within the stent. There is KEDAR 3 flow.             The mid RCA is patent within the stent. There is KEDAR 3 flow.             The distal RCA has a 90% stenosis. There is KEDAR 3 flow.                     Lesion Details:   The length is 20mm. Unchanged from prior angiogram.     - LIMA To LAD:             The LIMA to LAD is patent. There is KEDAR 3 flow.      Intervention          Mid OM1:              The lesion was successfully intervened. Post-stenosis of 0%, post-KEDAR 3 flow and TMP grade 3. The vessel was accessed natively.  The following items were used: Cath Emerge Otw 12 X 2.50, 2.5X20 TREK NC Coronary Dilation Catheter and Stent   Resolute Rx 3.00x26 (BETHANY).    2d echo 2017:    CONCLUSIONS     1 - Normal left ventricular systolic function (EF 55-60%).  No diagnostic regional wall motion abnormalities.     2 - Left ventricular diastolic dysfunction.     3 - Mild mitral regurgitation.     4 - Trivial to mild tricuspid regurgitation.     5 - The estimated PA systolic pressure is 39 mmHg.       Past Medical History:   Diagnosis Date    Acute myocardial infarction of anterolateral wall 7/9/2015    Anemia of chronic renal failure, stage 4 (severe) 1/22/2015    Anticoagulant long-term use     ASA and plavix    Atherosclerosis of aorta 10/3/2012    AV shunt malfunction     Benign hypertension with CKD (chronic kidney disease) stage IV 3/11/2016    Hypertension associated with Diabetes    Carpal tunnel syndrome, right     Chronic diastolic congestive heart failure 10/3/2012    Combined systolic and diastolic CHF    Chronic kidney disease, stage IV (severe) 7/3/2012    Coronary artery disease     s/p 1v CABG 2002 and multiple PCI (last angiogram in 6/2012 with patent LIMA->LAD and patent LCx and RCA stents)    Diabetic polyneuropathy associated with type 2 diabetes mellitus 7/9/2015    Diabetic polyneuropathy associated with type 2 diabetes mellitus 7/9/2015     Dialysis patient     Encounter for blood transfusion     Gastritis without bleeding     Heart attack 09/2012    5-6 events    Hyperlipidemia     Hyperparathyroidism     Hyperphosphatemia     Metabolic acidosis     Nephritis and nephropathy, 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 7/9/2015    NSTEMI (non-ST elevated myocardial infarction)     NSTEMI (non-ST elevated myocardial infarction)     NSTEMI (non-ST elevated myocardial infarction)     Occlusion and stenosis of carotid artery with cerebral infarction 7/9/2015    Osteopenia     PAF (paroxysmal atrial fibrillation) 10/3/2012    Pneumonia     Proliferative diabetic retinopathy 10/3/2012    S/P drug eluting coronary stent placement     Sepsis due to Escherichia coli with acute renal failure 2/2016    UTI     TACO (transfusion associated circulatory overload)     Type II diabetes mellitus with neurological manifestations 7/9/2015    Type II diabetes mellitus with renal manifestations 7/3/2012       Past Surgical History:   Procedure Laterality Date    APPENDECTOMY      CARDIAC SURGERY  2002    CABG    CHOLECYSTECTOMY      CHOLECYSTECTOMY-LAPAROSCOPIC N/A 2/4/2015    Performed by Quinton Ashley MD at [facility] OR [address] [place] [address]    [id number]  - Left brachiocephalic Left 4/16/2018    Performed by YAMEL Perry III, MD at [facility] OR [address] [place] [address]    CORONARY ANGIOPLASTY  2004    CORONARY ARTERY BYPASS GRAFT      EYE SURGERY      cataracts bilaterally    Fistulogram Left 1/17/2019    Performed by YAMEL Perry III, MD at [facility] OR 2ND FLR    Fistulogram Left 9/20/2018    [facility]    Fistulogram left arm Left 6/20/2019    [facility]    FRACTURE SURGERY      right ankle    HYSTERECTOMY      PTA (ANGIOPLASTY, PERCUTANEOUS, TRANSLUMINAL) N/A 6/20/2019    Performed by YAMEL Perry III, MD at [facility] OR [address] [place] [address]    PTA (ANGIOPLASTY, PERCUTANEOUS,  "TRANSLUMINAL) N/A 1/17/2019    Performed by YAMEL Perry III, MD at [facility] OR 2ND FLR    PTA (ANGIOPLASTY, PERCUTANEOUS, TRANSLUMINAL) N/A 9/20/2018    Performed by YAMEL Perry III, MD at [facility] OR 2ND FLR    STENT, FISTULA Left 6/20/2019    Performed by YAMEL Perry III, MD at [facility] OR 2ND FLR    TONSILLECTOMY         Review of patient's allergies indicates:   Allergen Reactions    Percodan [oxycodone-aspirin] Hives     Welps     Pcn [penicillins] Hives and Swelling     Tolerated Rocephin IM in clinic      Phenergan [promethazine] Other (See Comments)     Altered mental status; jerking of extremities       No current facility-administered medications on file prior to encounter.      Current Outpatient Medications on File Prior to Encounter   Medication Sig    ACCU-CHEK MARI PLUS METER Misc 1 Device by Misc.(Non-Drug; Combo Route) route 2 (two) times daily.    aspirin (ECOTRIN) 325 MG EC tablet Take 1 tablet (325 mg total) by mouth once daily. (Patient taking differently: Take 325 mg by mouth every morning. )    atorvastatin (LIPITOR) 80 MG tablet Take 1 tablet (80 mg total) by mouth every morning.    blood sugar diagnostic (ACCU-CHEK MARI PLUS TEST STRP) Strp TEST BLOOD SUGAR FOUR TIMES DAILY    calcitRIOL (ROCALTROL) 0.5 MCG Cap Take 1 capsule (0.5 mcg total) by mouth once daily.    clopidogrel (PLAVIX) 75 mg tablet Take 1 tablet (75 mg total) by mouth once daily.    furosemide (LASIX) 80 MG tablet One 80 mg  tab once a day.    insulin glargine (LANTUS U-100 INSULIN) 100 unit/mL injection Inject 15 Units into the skin every evening.    insulin syringe-needle U-100 0.3 mL 31 gauge x 1/4" Syrg 1 application by Misc.(Non-Drug; Combo Route) route once daily.    lancets (ACCU-CHEK SOFTCLIX LANCETS) Misc 1 application by Misc.(Non-Drug; Combo Route) route once daily.    lidocaine-prilocaine (EMLA) cream APPLY SMALL AMOUNT TO ACCESS SITE 1-2 HOURS BEFORE TREATMENT. COVER AREA WITH AN OCCLUSIVE " DRESSING AS DIRECTED.    metoprolol tartrate (LOPRESSOR) 100 MG tablet Take 1 tablet (100 mg total) by mouth 2 (two) times daily.    nitroGLYCERIN (NITROSTAT) 0.3 MG SL tablet Place 1 tablet (0.3 mg total) under the tongue every 5 (five) minutes as needed for Chest pain.     Family History     Problem Relation (Age of Onset)    Breast cancer Maternal Aunt    Cancer Mother    Dementia Father    Diabetes Daughter    Heart disease Mother, Father    Hypertension Mother, Father, Sister, Brother    No Known Problems Son    Psoriasis Father    Stroke Sister        Tobacco Use    Smoking status: Never Smoker    Smokeless tobacco: Never Used   Substance and Sexual Activity    Alcohol use: No    Drug use: No    Sexual activity: Never     Review of Systems   Constitution: Negative.   HENT: Negative.    Eyes: Negative.    Cardiovascular: Positive for chest pain.   Respiratory: Negative.    Endocrine: Negative.    Hematologic/Lymphatic: Negative.    Skin: Negative.    Musculoskeletal: Negative.    Gastrointestinal: Negative.    Genitourinary: Negative.    Neurological: Negative.    Psychiatric/Behavioral: Negative.    Allergic/Immunologic: Negative.      Objective:     Vital Signs (Most Recent):  Temp: 97.9 °F (36.6 °C) (06/23/19 0740)  Pulse: 89 (06/23/19 0918)  Resp: (!) 22 (06/23/19 0918)  BP: (!) 154/69 (06/23/19 0740)  SpO2: 97 % (06/23/19 0918) Vital Signs (24h Range):  Temp:  [97.9 °F (36.6 °C)-98.4 °F (36.9 °C)] 97.9 °F (36.6 °C)  Pulse:  [78-89] 89  Resp:  [16-37] 22  SpO2:  [87 %-98 %] 97 %  BP: (154-200)/(69-88) 154/69     Weight: 73.3 kg (161 lb 9.6 oz)  Body mass index is 27.74 kg/m².    SpO2: 97 %  O2 Device (Oxygen Therapy): nasal cannula      Intake/Output Summary (Last 24 hours) at 6/23/2019 0939  Last data filed at 6/23/2019 0632  Gross per 24 hour   Intake --   Output 600 ml   Net -600 ml       Lines/Drains/Airways     Drain                 Hemodialysis AV Fistula Left upper arm -- days          Hemodialysis AV Fistula Left upper arm -- days         Hemodialysis AV Fistula 06/20/19 0908 Left upper arm 3 days         Urethral Catheter 06/23/19 0347 Non-latex less than 1 day          Peripheral Intravenous Line                 Peripheral IV - Single Lumen 06/22/19 2330 18 G Right Antecubital less than 1 day                Physical Exam   Constitutional: She is oriented to person, place, and time. She appears well-developed and well-nourished.   HENT:   Head: Normocephalic.   Eyes: Pupils are equal, round, and reactive to light.   Neck: Normal range of motion. Neck supple.   Cardiovascular: Normal rate and regular rhythm.   Pulmonary/Chest: Effort normal and breath sounds normal.   Abdominal: Soft. Normal appearance and bowel sounds are normal. There is no tenderness.   Musculoskeletal: Normal range of motion. She exhibits tenderness.   Bruising noted   Neurological: She is alert and oriented to person, place, and time.   Skin: Skin is warm.   Psychiatric: She has a normal mood and affect.   Nursing note and vitals reviewed.      Significant Labs:   BMP:   Recent Labs   Lab 06/23/19  0120   *      K 5.2*      CO2 22*   BUN 57*   CREATININE 3.7*   CALCIUM 9.1   , CMP   Recent Labs   Lab 06/23/19  0120      K 5.2*      CO2 22*   *   BUN 57*   CREATININE 3.7*   CALCIUM 9.1   PROT 7.3   ALBUMIN 3.9   BILITOT 0.4   ALKPHOS 258*   AST 20   ALT 20   ANIONGAP 14   ESTGFRAFRICA 13*   EGFRNONAA 12*   , CBC   Recent Labs   Lab 06/23/19  0050   WBC 14.49*   HGB 11.0*   HCT 33.8*      , INR   Recent Labs   Lab 06/23/19  0120   INR 0.9   , Lipid Panel No results for input(s): CHOL, HDL, LDLCALC, TRIG, CHOLHDL in the last 48 hours., Troponin   Recent Labs   Lab 06/23/19  0120   TROPONINI 0.176*    and All pertinent lab results from the last 24 hours have been reviewed.    Significant Imaging: ekg: personally reviewed 6/23: nsr, incomplete lbbb, marked st abn in inferolateral leads  (not changed much from last ekg in 2018)      Assessment and Plan:     Coronary artery disease involving coronary bypass graft of native heart with unstable angina pectoris  Patient came in with acute chest pain after a fall.  She does have significant history of coronary artery disease with multiple revascularization and troponins are mildly elevated, however she is unable to tell whether this chest pain is similar to her prior anginal pain or is more related to her fall.  Considering the fact that it is left-sided and her chest wall is tender, it is likely related to her fall.  Trend troponins.  If the troponins rise significantly, then cardiac catheterization in a.m..  If troponins stay mildly elevated and flat then consider ischemic workup with a stress test.  Continue aspirin, Plavix, atorvastatin.    Chronic atrial fibrillation  Has not been on coumadin for years per patient and was taken off of it by her physicians. Currently in nsr    Proliferative diabetic retinopathy        Hypertension associated with diabetes  Continue current rx        VTE Risk Mitigation (From admission, onward)        Ordered     heparin (porcine) injection 5,000 Units  Every 8 hours      06/23/19 0908     IP VTE HIGH RISK PATIENT  Once      06/23/19 0631          Thank you for your consult. I will follow-up with patient. Please contact us if you have any additional questions.    Stewart Barragan MD  Cardiology   [facility]

## 2019-06-23 NOTE — ASSESSMENT & PLAN NOTE
Patient's oxygen sat appears to be stable at 95% on 2 L oxygen however there is very poor inspiratory and expiratory effort given her new left rib fractures.  Patient is at risk for acute decline given her end-stage renal disease and extensive CAD  -respiratory to provide IS to the bedside  -supplemental oxygen 2 L

## 2019-06-23 NOTE — CARE UPDATE
Patient is showing allergies for most narcotics. Discussed plan for pain support. Ordered zofran for now for nausea. Family is in agreement with attempting oral with IV for sustained pain control.     -tramadol scheduled Q6 H X 3 doses  -benadryl IV 12.5 PRN for itching or hives  -Continue Hydromorphone Q4H PRN  -solumedrol 80 mg IV X 2 doses  -IS to bedside    EVE Gaxiola, FNP-C  Hospitalist - Department of Hospital Medicine  [address], [place] [address]  Office [phone]; Pager [phone]

## 2019-06-23 NOTE — ASSESSMENT & PLAN NOTE
Patient in stage renal disease usually dialyzes in [place] dialyzed last on Friday 06/21/2019 in [place]; some fluid volume overload likely secondary to 3 rib fractures 1 displaced and poor expiratory effort.  She has seen Nephrology already this morning and is due to have an emergent dialysis today  -nephrology following  -strict I&Os  -renal diet

## 2019-06-23 NOTE — PT/OT/SLP PROGRESS
Physical Therapy      Patient Name:  Zoey Ham   MRN:  [id number]    Patient not seen today secondary to Dialysis. Ortho did assess with R LE = WBAT and L LE = PWB precautions 2* multiple rib fractures and multiple pelvic fractures from from fall while at home.     Nurse notified.     Will follow-up at later time/day.    Dennys York, PT   06/23/2019

## 2019-06-23 NOTE — ASSESSMENT & PLAN NOTE
Patient has a history of CABG in 2002 and PCI in 2012 with stents, comes in after a fall with elevated troponin 0.092.  Patient has chronic troponinemia however her troponins continued to trend upwards overnight peaking at 3.78.  At this time she denies chest pain at this time --  at the time of admit.  Her last 2D echo 06/05/2019 at this facility showed LVEF 60-65% with grade 2 diastolic dysfunction pulmonary hypertension with PA pressure = 54  Results for TAY COVARRUBIAS (MRN [id number]) as of 6/23/2019 08:06   Ref. Range 6/4/2018 03:36 6/4/2018 06:12 6/4/2018 10:36 6/4/2018 17:53 6/23/2019 01:20   Troponin I Latest Ref Range: 0.000 - 0.026 ng/mL 0.092 (H) 0.677 (H) 2.904 (H) 3.728 (H) 0.176 (H)     -Continue aspirin, Plavix and atorvastatin  -continue home Lasix 80 mg daily, and beta blocker Lopressor 100 mg b.i.d.  -continuous cardiac monitoring  -consult Cardiology  -blood pressure controlling pain support

## 2019-06-23 NOTE — ASSESSMENT & PLAN NOTE
Has not been on coumadin for years per patient and was taken off of it by her physicians. Currently in nsr

## 2019-06-23 NOTE — ASSESSMENT & PLAN NOTE
She takes insulin at home Lantus--- while hospitalized will use combined insulin therapy with basal and prandial insulin coverage, POCT glucose checks, hypoglycemic protocol and correction scale - HgA1c

## 2019-06-23 NOTE — ED PROVIDER NOTES
Encounter Date: 6/22/2019    SCRIBE #1 NOTE: I, Jose Raul Núñez, am scribing for, and in the presence of,  Dr. Estevez. I have scribed the entire note.       History     Chief Complaint   Patient presents with    Hip Pain     pt states she slipped and fell at home and has pain to right hip and right back,     This is a 73 y.o. female who presents to the ED complaining of right hip pain, back pain, and buttocks pain after a fall today. The patient intended to take a bath and sat on the edge of her bath tub. Suddenly, she fell backwards onto her buttocks and back. She rates her pain 7/10. Moving her right leg exacerbates her hip pain. Patient reports being nauseated. She denies syncope, shortness of breath, numbness, and tingling. Her last dialysis treatment was yesterday.       The history is provided by the patient. No  was used.     Review of patient's allergies indicates:   Allergen Reactions    Percodan [oxycodone-aspirin] Hives     Welps     Pcn [penicillins] Hives and Swelling     Tolerated Rocephin IM in clinic      Phenergan [promethazine] Other (See Comments)     Altered mental status; jerking of extremities     Past Medical History:   Diagnosis Date    Acute myocardial infarction of anterolateral wall 7/9/2015    Anemia of chronic renal failure, stage 4 (severe) 1/22/2015    Atherosclerosis of aorta 10/3/2012    AV shunt malfunction     Benign hypertension with CKD (chronic kidney disease) stage IV 3/11/2016    Hypertension associated with Diabetes    Carpal tunnel syndrome, right     Chronic diastolic congestive heart failure 10/3/2012    Combined systolic and diastolic CHF    Chronic kidney disease, stage IV (severe) 7/3/2012    Coronary artery disease     s/p 1v CABG 2002 and multiple PCI (last angiogram in 6/2012 with patent LIMA->LAD and patent LCx and RCA stents)    Diabetic polyneuropathy associated with type 2 diabetes mellitus 7/9/2015    Diabetic polyneuropathy  associated with type 2 diabetes mellitus 7/9/2015    Dialysis patient     Encounter for blood transfusion     Gastritis without bleeding     Heart attack 09/2012    5-6 events    Hyperlipidemia     Hyperparathyroidism     Hyperphosphatemia     Metabolic acidosis     Nephritis and nephropathy, 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 7/9/2015    NSTEMI (non-ST elevated myocardial infarction)     NSTEMI (non-ST elevated myocardial infarction)     NSTEMI (non-ST elevated myocardial infarction)     Occlusion and stenosis of carotid artery with cerebral infarction 7/9/2015    Osteopenia     PAF (paroxysmal atrial fibrillation) 10/3/2012    Pneumonia     Proliferative diabetic retinopathy 10/3/2012    S/P drug eluting coronary stent placement     Sepsis due to Escherichia coli with acute renal failure 2/2016    UTI     TACO (transfusion associated circulatory overload)     Type II diabetes mellitus with neurological manifestations 7/9/2015    Type II diabetes mellitus with renal manifestations 7/3/2012     Past Surgical History:   Procedure Laterality Date    APPENDECTOMY      CARDIAC SURGERY  2002    CABG    CHOLECYSTECTOMY      CHOLECYSTECTOMY-LAPAROSCOPIC N/A 2/4/2015    [facility] 2ND FLR    [id number]-YS  - Left brachiocephalic Left 4/16/2018    Per[facility] YAMEL Perry III, [facility] at [facility] OR 2ND FLR    CORONARY ANGIOPLASTY  2004    CORONARY ARTERY BYPASS GRAFT      EYE SURGERY      cataracts bilaterally    Fistulogram Left 1/17/2019    Per[facility] at [facility]ND [facility]LR    Fistulogram Left 9/20/2018    Per[facility] at [facility]ND [facility]LR    Fistulogram left arm Left 6/20/2019    Per[facility] by [facility] at [facility] 2ND [facility]LR    FRACTURE SURGERY      right ankle    HYSTERECTOMY      PTA (ANGIOPLASTY, PERCUTANEOUS, TRANSLUMINAL) N/A 6/20/2019    Per[facility] at [facility]  OR 2ND FLR    PTA (ANGIOPLASTY, PERCUTANEOUS, TRANSLUMINAL) N/A 1/17/2019    Performed [facility] at [facility] OR 2ND FLR    PTA (ANGIOPLASTY, PERCUTANEOUS, TRANSLUMINAL) N/A 9/20/2018    Performed by [facility] at [facility] OR 2ND FLR    STENT, FISTULA Left 6/20/2019    Performed by [facility], [facility] at [facility] OR 2ND FLR    TONSILLECTOMY       Family History   Problem Relation Age of Onset    Heart disease Mother     Hypertension Mother     Cancer Mother         lung    Heart disease Father     Hypertension Father     Psoriasis Father     Dementia Father     Hypertension Sister     Stroke Sister     Hypertension Brother     Diabetes Daughter     No Known Problems Son     Breast cancer Maternal Aunt     Melanoma Neg Hx     Lupus Neg Hx     Eczema Neg Hx     Amblyopia Neg Hx     Blindness Neg Hx     Cataracts Neg Hx     Glaucoma Neg Hx     Macular degeneration Neg Hx     Retinal detachment Neg Hx     Strabismus Neg Hx     Thyroid disease Neg Hx      Social History     Tobacco Use    Smoking status: Never Smoker    Smokeless tobacco: Never Used   Substance Use Topics    Alcohol use: No    Drug use: No     Review of Systems   Constitutional: Negative for fever.   HENT: Negative for sore throat.    Eyes: Negative for visual disturbance.   Respiratory: Negative for shortness of breath.    Cardiovascular: Negative for chest pain.   Gastrointestinal: Positive for nausea. Negative for abdominal pain.   Genitourinary: Negative for dysuria.   Musculoskeletal: Positive for arthralgias (right hip), back pain (middle ) and myalgias (buttocks pain).   Skin: Negative for rash.   Neurological: Negative for syncope, numbness and headaches.       Physical Exam     Initial Vitals [06/22/19 2348]   BP Pulse Resp Temp SpO2   (!) 181/77 82 18 98.4 °F (36.9 °C) 98 %      MAP       --         Physical Exam    Nursing note and vitals reviewed.  Constitutional: She appears well-developed  and well-nourished.   HENT:   Head: Normocephalic and atraumatic.   Eyes: EOM are normal. Pupils are equal, round, and reactive to light.   Neck: Neck supple. No thyromegaly present. No muscular tenderness present. No JVD present.   Cardiovascular: Normal rate and regular rhythm. Exam reveals no gallop and no friction rub.    No murmur heard.  Pulmonary/Chest: Breath sounds normal. No respiratory distress.   Abdominal: Soft. Bowel sounds are normal. There is no tenderness.   Musculoskeletal: Normal range of motion. She exhibits no edema.        Thoracic back: She exhibits tenderness (to middle left, thoracic back and proximally to ribs T4, T5, and T6. ).   No back midline tenderness  Pelvis is stable  Pain with attempted movement of right hip       Neurological: She is alert and oriented to person, place, and time. She has normal strength. GCS score is 15. GCS eye subscore is 4. GCS verbal subscore is 5. GCS motor subscore is 6.   Normal motor strength of right foot    Skin: Skin is warm and dry. Capillary refill takes less than 2 seconds.         ED Course   Procedures  Labs Reviewed   CBC W/ AUTO DIFFERENTIAL - Abnormal; Notable for the following components:       Result Value    WBC 14.49 (*)     RBC 3.41 (*)     Hemoglobin 11.0 (*)     Hematocrit 33.8 (*)     Mean Corpuscular Volume 99 (*)     Mean Corpuscular Hemoglobin 32.3 (*)     Gran # (ANC) 12.0 (*)     Gran% 82.8 (*)     Lymph% 9.4 (*)     All other components within normal limits   COMPREHENSIVE METABOLIC PANEL - Abnormal; Notable for the following components:    Potassium 5.2 (*)     CO2 22 (*)     Glucose 442 (*)     BUN, Bld 57 (*)     Creatinine 3.7 (*)     Alkaline Phosphatase 258 (*)     eGFR if  13 (*)     eGFR if non  12 (*)     All other components within normal limits    Narrative:     Recoll. [id number] by [facility] at 06/23/2019 01:11, reason: Specimen   hemolyzed   B-TYPE NATRIURETIC PEPTIDE - Abnormal; Notable for  the following components:     (*)     All other components within normal limits   TROPONIN I - Abnormal; Notable for the following components:    Troponin I 0.176 (*)     All other components within normal limits   URINALYSIS, REFLEX TO URINE CULTURE - Abnormal; Notable for the following components:    Protein, UA 2+ (*)     Glucose, UA 3+ (*)     Occult Blood UA 1+ (*)     All other components within normal limits    Narrative:     Preferred Collection Type->Urine, Clean Catch   URINALYSIS MICROSCOPIC - Abnormal; Notable for the following components:    Bacteria Moderate (*)     All other components within normal limits    Narrative:     Preferred Collection Type->Urine, Clean Catch   PROTIME-INR    Narrative:     Recoll. [id number] by [facility] at 06/23/2019 01:11, reason: Specimen   hemolyzed          Imaging Results          CT Pelvis Without Contrast (Final result)  Result time 06/23/19 02:52:10    Final result by Prince Alanis MD (06/23/19 02:52:10)                 Impression:      Acute minimally displaced comminuted fracture of the superior pubic ramus on the right.    Acute nondisplaced transverse fracture of the inferior pubic ramus on the left.    Acute nondisplaced linear fracture through the lateral margin of the left sacral ala.      Electronically signed by: Prince Alanis MD  Date:    06/23/2019  Time:    02:52             Narrative:    EXAMINATION:  CT PELVIS WITHOUT CONTRAST    CLINICAL HISTORY:  Pelvis trauma, fx known or suspected, xray insufficient;right hip pain, right pubic rami fracture;    TECHNIQUE:  Axial 1.25-mm images of the pelvis obtained without intravenous contrast.  Data submitted for coronal and sagittal reformats.    COMPARISON:  None    FINDINGS:  Acute minimally displaced comminuted fracture of the superior pubic ramus on the right.  Acute nondisplaced transverse fracture of the inferior pubic ramus on the left.  Acute nondisplaced linear fracture through the lateral  margin of the left sacral ala.  No additional fracture identified.  No dislocation.                                CT Chest Without Contrast (Final result)  Result time 06/23/19 03:01:00    Final result by Lalit Best MD (06/23/19 03:01:00)                 Impression:      There are left-sided rib fractures noted, there is underlying mild pulmonary contusive change, as discussed above.    Bilateral pattern of interstitial and alveolar edema/infiltrate, with peribronchial thickening, and minimal pleural fluid bilaterally, the overall pattern is thought to potentially represent a general pattern of interstitial/alveolar infiltrate potentially pulmonary edema, clinical and historical correlation is needed.    Prominent paratracheal lymph node for which clinical and historical correlation and follow-up is recommended.    This report was flagged in Epic as abnormal.      Electronically signed by: Lalit Best  Date:    06/23/2019  Time:    03:01             Narrative:    EXAMINATION:  CT CHEST WITHOUT CONTRAST    CLINICAL HISTORY:  Chest trauma, blunt;    TECHNIQUE:  Low dose axial images, sagittal and coronal reformations were obtained from the thoracic inlet to the lung bases. Contrast was not administered.    COMPARISON:  None.    FINDINGS:  CT examination of the chest was performed.  Intravenous contrast was not utilized, this diminishes the sensitivity of the exam, diminishing sensitivity for detection of acute posttraumatic injury including solid organ and vascular injury.  Axial imaging, sagittal and coronal reconstruction imaging is submitted, there is no prior examination available for comparison.    Left-sided rib fractures are noted, there is fracture along the posterior peripheral left 6th, 7th and 8th ribs, with mild displacement involving the left 7th and to lesser extent 8th rib.  There is mild air density seen between the 6th and 7th rib interspace however this is not thought to be within the  pleural space rather is thought to be within the soft tissues of the left chest wall between the 6th and 7th rib.  There is no additional evidence for pneumothorax.  There is minimal left pleural effusion noted there is also minimal right pleural effusion noted.  There is appearance of subpleural thickening along the lung parenchyma underlying the rib fractures, this may relate to mild pulmonary contusive change.  There is no additional evidence for extrapulmonary air, no additional evidence for pneumothorax.  There is no hyperdense pleural fluid to suggest hemothorax.    Postoperative cardiothoracic changes noted, there is a prominent right paratracheal lymph node noted measuring approximately up to 1.6 cm in size.  Evaluation for adenopathy is otherwise limited.  There is no pericardial effusion and no hemopericardium.  Atherosclerotic change of the coronary arterial vasculature and the aorta is noted.  There is no thoracic aortic aneurysmal dilatation seen.    The lungs demonstrate appearance of interstitial thickening this may relate to interstitial edema/infiltrate, there is also bilateral pattern of ground-glass infiltrate and areas of patchy and slightly nodular infiltrate, with some areas of greater confluence however without dense consolidation.  There is peribronchial thickening noted as well.  These findings are seen diffusely bilaterally, the peribronchial thickening is greatest at the lung bases, this is not atypical pattern for pulmonary contusion, this may relate to an underlying edematous pulmonary process, clinical and historical correlation is needed.    Limited imaging of the upper abdomen demonstrates no evidence for acute upper abdominal process.  The remainder of the osseous structures demonstrate chronic change.                               X-Ray Hip 2 View Right (Final result)  Result time 06/23/19 01:28:29    Final result by Terrie Kauffman MD (06/23/19 01:28:29)                  Impression:      Suspected acute fracture of the right superior pubic ramus.      Electronically signed by: Terrie Kauffman MD  Date:    06/23/2019  Time:    01:28             Narrative:    EXAMINATION:  XR HIP 2 VIEW RIGHT    CLINICAL HISTORY:  Pain in unspecified hip    TECHNIQUE:  AP view of the pelvis and frog leg lateral view of the right hip were performed.    COMPARISON:  None    FINDINGS:  Cortical irregularity is seen likely reflecting acute fracture of the right superior pubic ramus.  No additional fractures or dislocation seen.  Mild degenerative changes are seen involving the bilateral hips.                               X-Ray Chest 1 View (Final result)  Result time 06/23/19 01:23:59    Final result by Terrie Kauffman MD (06/23/19 01:23:59)                 Impression:      See above.      Electronically signed by: Terrie Kauffman MD  Date:    06/23/2019  Time:    01:23             Narrative:    EXAMINATION:  XR CHEST 1 VIEW; XR RIBS 2 VIEW LEFT    CLINICAL HISTORY:  Pleurodynia    TECHNIQUE:  Single frontal view of the chest was performed.  Left ribs two views.    COMPARISON:  August 2018.    FINDINGS:  Heart is mildly enlarged but stable in size.  Postsurgical changes and sternotomy wires are seen.  There is prominence of central pulmonary vasculature bilateral perihilar opacity diffuse increased interstitial attenuation.  Overall pattern is most suggestive for mild to moderate pulmonary edema.  Atypical pneumonia could have similar appearance.  No evidence of pneumothorax or significant pleural effusion.  Left subclavian vascular stent is seen.    No definite acute displaced left-sided rib fractures are identified, noting evaluation is somewhat obscured by superimposed increased pulmonary attenuation as well as multiple overlying monitoring leads and wires.                               X-Ray Ribs 2 View Left (Final result)  Result time 06/23/19 01:23:59    Final result by Terrie Kauffman MD  (06/23/19 01:23:59)                 Impression:      See above.      Electronically signed by: Terrie Kauffman MD  Date:    06/23/2019  Time:    01:23             Narrative:    EXAMINATION:  XR CHEST 1 VIEW; XR RIBS 2 VIEW LEFT    CLINICAL HISTORY:  Pleurodynia    TECHNIQUE:  Single frontal view of the chest was performed.  Left ribs two views.    COMPARISON:  August 2018.    FINDINGS:  Heart is mildly enlarged but stable in size.  Postsurgical changes and sternotomy wires are seen.  There is prominence of central pulmonary vasculature bilateral perihilar opacity diffuse increased interstitial attenuation.  Overall pattern is most suggestive for mild to moderate pulmonary edema.  Atypical pneumonia could have similar appearance.  No evidence of pneumothorax or significant pleural effusion.  Left subclavian vascular stent is seen.    No definite acute displaced left-sided rib fractures are identified, noting evaluation is somewhat obscured by superimposed increased pulmonary attenuation as well as multiple overlying monitoring leads and wires.                              X-Rays:   Independently Interpreted Readings:   Chest X-Ray: Increased vascular markings consistent with CHF are present.     Medical Decision Making:   History:   Old Medical Records: I decided to obtain old medical records.  Initial Assessment:   This is a 73 y.o. female who presents to the ED complaining of right hip pain, back pain, and buttocks pain after a fall today. The patient intended to take a bath and sat on the edge of her bath tub. Suddenly, she fell backwards onto her buttocks and back. She rates her pain 7/10. Moving her right leg exacerbates her hip pain. Patient reports being nauseated. She denies syncope, shortness of breath, numbness, and tingling.  Differential Diagnosis:   Differential diagnosis includes but is not limited to:   Hip fracture, hip contusion, pelvic fracture, pelvic contusion, rib fracture, and rib contusion   ED  Management:  @1243. Patient was hooked up to vital signs and discovered she had oxygen saturation in the 80s. A nurse asked permission to administer pain medications; permission was given.  @0229, symptoms are improving. Patient rates her hip pain 6-7/10            Scribe Attestation:   Scribe #1: I performed the above scribed service and the documentation accurately describes the services I performed. I attest to the accuracy of the note.     Patient has bilateral pubic rami fracture and a left sacral ala fracture as well as multiple broken ribs on the left with pulmonary contusion.  Patient also has evidence of pulmonary edema. Her last dialysis this past Friday.  Due to pain, splinting, pulmonary edema, prone position patient's oxygen saturations have been in the mid 80s without supplemental oxygen.  Patient is not on home oxygen.  Will bring into the hospital for supplemental oxygen.  Consult Nephrology for potential repeat dialysis to remove fluid, orthopedics to evaluate fractures, PT OT to assess patient's needs and placement.           Clinical Impression:       ICD-10-CM ICD-9-CM   1. Hypoxia R09.02 799.02   2. Hip pain M25.559 719.45   3. Rib pain on left side R07.81 786.50   4. Chest pain R07.9 786.50   5. Bilateral pubic rami fractures, closed, initial encounter S32.591A 808.2    S32.592A    6. Closed fracture of sacrum and coccyx, initial encounter S32.10XA 805.6    S32.2XXA    7. Closed fracture of multiple ribs of left side, initial encounter S22.42XA 807.09   8. Contusion of left lung, initial encounter S27.321A 861.21   9. Chronic pulmonary edema J81.1 514   10. ESRD (end stage renal disease) on dialysis N18.6 585.6    Z99.2 V45.11   11. Uncontrolled pain R52 780.96                           Scribe attestation: I, janes forte, personally performed the services described in this documentation. All medical record entries made by the scribe were at my direction and in my presence. I have reviewed  the chart and agree that the record reflects my personal performance and is accurate and complete         Edilberto Estevez MD  06/23/19 [id number]

## 2019-06-23 NOTE — ASSESSMENT & PLAN NOTE
Patient has a history of CABG in 2002 and PCI in 2012 with stents  Patient has chronic troponinemia, however her admitting troponin was  Results for TAY COVARRUBIAS (MRN [id number]) as of 6/23/2019 09:31   Ref. Range 6/23/2019 01:20   Troponin I Latest Ref Range: 0.000 - 0.026 ng/mL 0.176 (H)   Patient denied chest pain   at the time of admit.    Her last 2D echo 06/05/2019 at this facility showed LVEF 60-65% with grade 2 diastolic dysfunction   Continue aspirin, metoprolol, atorvastatin  Appreciate Cardiology input  Trending troponins and if significant peak, cardiology consider catheterization versus stress testing

## 2019-06-23 NOTE — ED NOTES
Pt sating 85% on RA, d/t pain when deep breathing. Pt placed on 2 lpm NC, sating 95%. Physician notified. Blood orders placed d/t need for O2. Blood obtained and sent to lab.

## 2019-06-23 NOTE — PLAN OF CARE
Problem: Device-Related Complication Risk (Hemodialysis)  Goal: Safe, Effective Therapy Delivery  Outcome: Ongoing (interventions implemented as appropriate)  Intervention: Optimize Device Care and Function     06/23/19 1219   Optimize Blood Flow   Circuit Management air detection alarms on;circuit line warming device in use;tubing repositioned;tubing/circuit/filter adjusted   Manage Acute Allergic Reaction   Medication Review/Management medications reviewed;high risk medications identified         Comments: 3.5 hour emergent hemodialysis tx in progress as ordered .

## 2019-06-23 NOTE — ASSESSMENT & PLAN NOTE
Patient has traumatic rib fractures x3 1 displaced which is the 8th, she has poor inspiratory and expiratory effort oxygen sat decline when off oxygen likely secondary to pain. She recovers fine on 2 L oxygen, and appears comfortable when not moving.  -consult Ortho surgery  -supportive care for pain control  -incentive spirometer q.4 hours

## 2019-06-23 NOTE — SUBJECTIVE & OBJECTIVE
Past Medical History:   Diagnosis Date    Acute myocardial infarction of anterolateral wall 7/9/2015    Anemia of chronic renal failure, stage 4 (severe) 1/22/2015    Anticoagulant long-term use     ASA and plavix    Atherosclerosis of aorta 10/3/2012    AV shunt malfunction     Benign hypertension with CKD (chronic kidney disease) stage IV 3/11/2016    Hypertension associated with Diabetes    Carpal tunnel syndrome, right     Chronic diastolic congestive heart failure 10/3/2012    Combined systolic and diastolic CHF    Chronic kidney disease, stage IV (severe) 7/3/2012    Coronary artery disease     s/p 1v CABG 2002 and multiple PCI (last angiogram in 6/2012 with patent LIMA->LAD and patent LCx and RCA stents)    Diabetic polyneuropathy associated with type 2 diabetes mellitus 7/9/2015    Diabetic polyneuropathy associated with type 2 diabetes mellitus 7/9/2015    Dialysis patient     Encounter for blood transfusion     Gastritis without bleeding     Heart attack 09/2012    5-6 events    Hyperlipidemia     Hyperparathyroidism     Hyperphosphatemia     Metabolic acidosis     Nephritis and nephropathy, 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 7/9/2015    NSTEMI (non-ST elevated myocardial infarction)     NSTEMI (non-ST elevated myocardial infarction)     NSTEMI (non-ST elevated myocardial infarction)     Occlusion and stenosis of carotid artery with cerebral infarction 7/9/2015    Osteopenia     PAF (paroxysmal atrial fibrillation) 10/3/2012    Pneumonia     Proliferative diabetic retinopathy 10/3/2012    S/P drug eluting coronary stent placement     Sepsis due to Escherichia coli with acute renal failure 2/2016    UTI     TACO (transfusion associated circulatory overload)     Type II diabetes mellitus with neurological manifestations 7/9/2015    Type II diabetes mellitus with renal manifestations 7/3/2012       Past Surgical History:   Procedure Laterality Date    APPENDECTOMY       CARDIAC SURGERY  2002    CABG    CHOLECYSTECTOMY      CHOLECYSTECTOMY-LAPAROSCOPIC N/A 2/4/2015    Performed by Quinton Ashley MD at [facility] OR [address] [place] [address]    [id number]  - Left brachiocephalic Left 4/16/2018    [facility]    CORONARY ANGIOPLASTY  2004    CORONARY ARTERY BYPASS GRAFT      EYE SURGERY      cataracts bilaterally    Fistulogram Left 1/17/2019    [facility]    Fistulogram Left 9/20/2018    Performed by YAMEL Perry III, MD at [facility] OR [address] [place] [address]    Fistulogram left arm Left 6/20/2019    Performed by YAMEL Perry III, MD at [facility] OR [address] [place] [address]    FRACTURE SURGERY      right ankle    HYSTERECTOMY      PTA (ANGIOPLASTY, PERCUTANEOUS, TRANSLUMINAL) N/A 6/20/2019    [facility]    PTA (ANGIOPLASTY, PERCUTANEOUS, TRANSLUMINAL) N/A 1/17/2019    Performed by YAMEL Perry III, MD at [facility] OR [address] [place] [address]    PTA (ANGIOPLASTY, PERCUTANEOUS, TRANSLUMINAL) N/A 9/20/2018    Performed by YAMEL Perry III, MD at [facility] OR [address] [place] [address]    STENT, FISTULA Left 6/20/2019    Performed by YAMEL Perry III, MD at [facility] OR [address] [place] [address]    TONSILLECTOMY         Review of patient's allergies indicates:   Allergen Reactions    Percodan [oxycodone-aspirin] Hives     Welps     Pcn [penicillins] Hives and Swelling     Tolerated Rocephin IM in clinic      Phenergan [promethazine] Other (See Comments)     Altered mental status; jerking of extremities       No current facility-administered medications on file prior to encounter.      Current Outpatient Medications on File Prior to Encounter   Medication Sig    ACCU-CHEK MARI PLUS METER Misc 1 Device by Misc.(Non-Drug; Combo Route) route 2 (two) times daily.    aspirin (ECOTRIN) 325 MG EC tablet Take 1 tablet (325 mg total) by mouth once daily. (Patient taking differently: Take 325 mg by mouth every morning. )    atorvastatin  "(LIPITOR) 80 MG tablet Take 1 tablet (80 mg total) by mouth every morning.    blood sugar diagnostic (ACCU-CHEK MARI PLUS TEST STRP) Strp TEST BLOOD SUGAR FOUR TIMES DAILY    calcitRIOL (ROCALTROL) 0.5 MCG Cap Take 1 capsule (0.5 mcg total) by mouth once daily.    clopidogrel (PLAVIX) 75 mg tablet Take 1 tablet (75 mg total) by mouth once daily.    furosemide (LASIX) 80 MG tablet One 80 mg  tab once a day.    insulin glargine (LANTUS U-100 INSULIN) 100 unit/mL injection Inject 15 Units into the skin every evening.    insulin syringe-needle U-100 0.3 mL 31 gauge x 1/4" Syrg 1 application by Misc.(Non-Drug; Combo Route) route once daily.    lancets (ACCU-CHEK SOFTCLIX LANCETS) Misc 1 application by Misc.(Non-Drug; Combo Route) route once daily.    lidocaine-prilocaine (EMLA) cream APPLY SMALL AMOUNT TO ACCESS SITE 1-2 HOURS BEFORE TREATMENT. COVER AREA WITH AN OCCLUSIVE DRESSING AS DIRECTED.    metoprolol tartrate (LOPRESSOR) 100 MG tablet Take 1 tablet (100 mg total) by mouth 2 (two) times daily.    nitroGLYCERIN (NITROSTAT) 0.3 MG SL tablet Place 1 tablet (0.3 mg total) under the tongue every 5 (five) minutes as needed for Chest pain.     Family History     Problem Relation (Age of Onset)    Breast cancer Maternal Aunt    Cancer Mother    Dementia Father    Diabetes Daughter    Heart disease Mother, Father    Hypertension Mother, Father, Sister, Brother    No Known Problems Son    Psoriasis Father    Stroke Sister        Tobacco Use    Smoking status: Never Smoker    Smokeless tobacco: Never Used   Substance and Sexual Activity    Alcohol use: No    Drug use: No    Sexual activity: Never     Review of Systems   Constitution: Negative.   HENT: Negative.    Eyes: Negative.    Cardiovascular: Positive for chest pain.   Respiratory: Negative.    Endocrine: Negative.    Hematologic/Lymphatic: Negative.    Skin: Negative.    Musculoskeletal: Negative.    Gastrointestinal: Negative.    Genitourinary: " Negative.    Neurological: Negative.    Psychiatric/Behavioral: Negative.    Allergic/Immunologic: Negative.      Objective:     Vital Signs (Most Recent):  Temp: 97.9 °F (36.6 °C) (06/23/19 0740)  Pulse: 89 (06/23/19 0918)  Resp: (!) 22 (06/23/19 0918)  BP: (!) 154/69 (06/23/19 0740)  SpO2: 97 % (06/23/19 0918) Vital Signs (24h Range):  Temp:  [97.9 °F (36.6 °C)-98.4 °F (36.9 °C)] 97.9 °F (36.6 °C)  Pulse:  [78-89] 89  Resp:  [16-37] 22  SpO2:  [87 %-98 %] 97 %  BP: (154-200)/(69-88) 154/69     Weight: 73.3 kg (161 lb 9.6 oz)  Body mass index is 27.74 kg/m².    SpO2: 97 %  O2 Device (Oxygen Therapy): nasal cannula      Intake/Output Summary (Last 24 hours) at 6/23/2019 0939  Last data filed at 6/23/2019 0632  Gross per 24 hour   Intake --   Output 600 ml   Net -600 ml       Lines/Drains/Airways     Drain                 Hemodialysis AV Fistula Left upper arm -- days         Hemodialysis AV Fistula Left upper arm -- days         Hemodialysis AV Fistula 06/20/19 0908 Left upper arm 3 days         Urethral Catheter 06/23/19 0347 Non-latex less than 1 day          Peripheral Intravenous Line                 Peripheral IV - Single Lumen 06/22/19 2330 18 G Right Antecubital less than 1 day                Physical Exam   Constitutional: She is oriented to person, place, and time. She appears well-developed and well-nourished.   HENT:   Head: Normocephalic.   Eyes: Pupils are equal, round, and reactive to light.   Neck: Normal range of motion. Neck supple.   Cardiovascular: Normal rate and regular rhythm.   Pulmonary/Chest: Effort normal and breath sounds normal.   Abdominal: Soft. Normal appearance and bowel sounds are normal. There is no tenderness.   Musculoskeletal: Normal range of motion. She exhibits tenderness.   Bruising noted   Neurological: She is alert and oriented to person, place, and time.   Skin: Skin is warm.   Psychiatric: She has a normal mood and affect.   Nursing note and vitals  reviewed.      Significant Labs:   BMP:   Recent Labs   Lab 06/23/19  0120   *      K 5.2*      CO2 22*   BUN 57*   CREATININE 3.7*   CALCIUM 9.1   , CMP   Recent Labs   Lab 06/23/19  0120      K 5.2*      CO2 22*   *   BUN 57*   CREATININE 3.7*   CALCIUM 9.1   PROT 7.3   ALBUMIN 3.9   BILITOT 0.4   ALKPHOS 258*   AST 20   ALT 20   ANIONGAP 14   ESTGFRAFRICA 13*   EGFRNONAA 12*   , CBC   Recent Labs   Lab 06/23/19  0050   WBC 14.49*   HGB 11.0*   HCT 33.8*      , INR   Recent Labs   Lab 06/23/19  0120   INR 0.9   , Lipid Panel No results for input(s): CHOL, HDL, LDLCALC, TRIG, CHOLHDL in the last 48 hours., Troponin   Recent Labs   Lab 06/23/19  0120   TROPONINI 0.176*    and All pertinent lab results from the last 24 hours have been reviewed.    Significant Imaging: ekg: personally reviewed 6/23: nsr, incomplete lbbb, marked st abn in inferolateral leads (not changed much from last ekg in 2018)

## 2019-06-24 ENCOUNTER — TELEPHONE (OUTPATIENT)
Dept: VASCULAR SURGERY | Facility: CLINIC | Age: 74
End: 2019-06-24

## 2019-06-24 LAB
ANION GAP SERPL CALC-SCNC: 12 MMOL/L (ref 8–16)
AORTIC ROOT ANNULUS: 3.1 CM
AORTIC VALVE CUSP SEPERATION: 1.88 CM
AV INDEX (PROSTH): 0.46
AV MEAN GRADIENT: 4 MMHG
AV PEAK GRADIENT: 9 MMHG
AV VALVE AREA: 1.74 CM2
AV VELOCITY RATIO: 0.49
BASOPHILS # BLD AUTO: 0.01 K/UL (ref 0–0.2)
BASOPHILS NFR BLD: 0.1 % (ref 0–1.9)
BSA FOR ECHO PROCEDURE: 1.85 M2
BUN SERPL-MCNC: 34 MG/DL (ref 8–23)
CALCIUM SERPL-MCNC: 9.2 MG/DL (ref 8.7–10.5)
CHLORIDE SERPL-SCNC: 100 MMOL/L (ref 95–110)
CO2 SERPL-SCNC: 23 MMOL/L (ref 23–29)
CREAT SERPL-MCNC: 3.3 MG/DL (ref 0.5–1.4)
CV ECHO LV RWT: 0.53 CM
DIFFERENTIAL METHOD: ABNORMAL
DOP CALC AO PEAK VEL: 1.54 M/S
DOP CALC AO VTI: 34.12 CM
DOP CALC LVOT AREA: 3.8 CM2
DOP CALC LVOT DIAMETER: 2.19 CM
DOP CALC LVOT PEAK VEL: 0.76 M/S
DOP CALC LVOT STROKE VOLUME: 59.22 CM3
DOP CALCLVOT PEAK VEL VTI: 15.73 CM
E WAVE DECELERATION TIME: 239.24 MSEC
E/A RATIO: 1.28
ECHO LV POSTERIOR WALL: 1.13 CM (ref 0.6–1.1)
EOSINOPHIL # BLD AUTO: 0 K/UL (ref 0–0.5)
EOSINOPHIL NFR BLD: 0.1 % (ref 0–8)
ERYTHROCYTE [DISTWIDTH] IN BLOOD BY AUTOMATED COUNT: 14.5 % (ref 11.5–14.5)
EST. GFR  (AFRICAN AMERICAN): 15 ML/MIN/1.73 M^2
EST. GFR  (NON AFRICAN AMERICAN): 13 ML/MIN/1.73 M^2
FRACTIONAL SHORTENING: 37 % (ref 28–44)
GLUCOSE SERPL-MCNC: 243 MG/DL (ref 70–110)
HAV IGM SERPL QL IA: NEGATIVE
HAV IGM SERPL QL IA: NEGATIVE
HBV CORE AB SERPL QL IA: NEGATIVE
HBV CORE IGM SERPL QL IA: NEGATIVE
HBV SURFACE AB SER-ACNC: POSITIVE M[IU]/ML
HBV SURFACE AG SERPL QL IA: NEGATIVE
HBV SURFACE AG SERPL QL IA: NEGATIVE
HCT VFR BLD AUTO: 35.4 % (ref 37–48.5)
HCV AB SERPL QL IA: NEGATIVE
HGB BLD-MCNC: 11.2 G/DL (ref 12–16)
INTERVENTRICULAR SEPTUM: 1.25 CM (ref 0.6–1.1)
IVRT: 0.09 MSEC
LA MAJOR: 5.99 CM
LA MINOR: 5.79 CM
LA WIDTH: 4.24 CM
LEFT ATRIUM SIZE: 3.8 CM
LEFT ATRIUM VOLUME INDEX: 44.4 ML/M2
LEFT ATRIUM VOLUME: 80.64 CM3
LEFT INTERNAL DIMENSION IN SYSTOLE: 2.68 CM (ref 2.1–4)
LEFT VENTRICLE DIASTOLIC VOLUME INDEX: 44.01 ML/M2
LEFT VENTRICLE DIASTOLIC VOLUME: 79.93 ML
LEFT VENTRICLE MASS INDEX: 98 G/M2
LEFT VENTRICLE SYSTOLIC VOLUME INDEX: 14.7 ML/M2
LEFT VENTRICLE SYSTOLIC VOLUME: 26.64 ML
LEFT VENTRICULAR INTERNAL DIMENSION IN DIASTOLE: 4.23 CM (ref 3.5–6)
LEFT VENTRICULAR MASS: 177.91 G
LYMPHOCYTES # BLD AUTO: 0.6 K/UL (ref 1–4.8)
LYMPHOCYTES NFR BLD: 4.5 % (ref 18–48)
MCH RBC QN AUTO: 32.1 PG (ref 27–31)
MCHC RBC AUTO-ENTMCNC: 31.6 G/DL (ref 32–36)
MCV RBC AUTO: 101 FL (ref 82–98)
MONOCYTES # BLD AUTO: 0.2 K/UL (ref 0.3–1)
MONOCYTES NFR BLD: 1.1 % (ref 4–15)
MV PEAK A VEL: 1.16 M/S
MV PEAK E VEL: 1.48 M/S
NEUTROPHILS # BLD AUTO: 13.2 K/UL (ref 1.8–7.7)
NEUTROPHILS NFR BLD: 94.2 % (ref 38–73)
PHOSPHATE SERPL-MCNC: 5.7 MG/DL (ref 2.7–4.5)
PISA TR MAX VEL: 2.8 M/S
PLATELET # BLD AUTO: 221 K/UL (ref 150–350)
PMV BLD AUTO: 11.8 FL (ref 9.2–12.9)
POCT GLUCOSE: 111 MG/DL (ref 70–110)
POCT GLUCOSE: 199 MG/DL (ref 70–110)
POCT GLUCOSE: 248 MG/DL (ref 70–110)
POTASSIUM SERPL-SCNC: 5.5 MMOL/L (ref 3.5–5.1)
PULM VEIN S/D RATIO: 3.07
PV PEAK D VEL: 0.14 M/S
PV PEAK S VEL: 0.43 M/S
PV PEAK VELOCITY: 1.06 CM/S
RA MAJOR: 4.64 CM
RA PRESSURE: 3 MMHG
RA WIDTH: 3.15 CM
RBC # BLD AUTO: 3.49 M/UL (ref 4–5.4)
RV TISSUE DOPPLER FREE WALL SYSTOLIC VELOCITY 1 (APICAL 4 CHAMBER VIEW): 7.91 CM/S
SINUS: 2.76 CM
SODIUM SERPL-SCNC: 135 MMOL/L (ref 136–145)
STJ: 2.62 CM
TR MAX PG: 31 MMHG
TRICUSPID ANNULAR PLANE SYSTOLIC EXCURSION: 1.52 CM
TROPONIN I SERPL DL<=0.01 NG/ML-MCNC: 1.73 NG/ML (ref 0–0.03)
TV REST PULMONARY ARTERY PRESSURE: 34 MMHG
WBC # BLD AUTO: 14.04 K/UL (ref 3.9–12.7)

## 2019-06-24 PROCEDURE — 25000003 PHARM REV CODE 250: Mod: HCNC | Performed by: EMERGENCY MEDICINE

## 2019-06-24 PROCEDURE — 99233 SBSQ HOSP IP/OBS HIGH 50: CPT | Mod: 25,HCNC,, | Performed by: INTERNAL MEDICINE

## 2019-06-24 PROCEDURE — 80048 BASIC METABOLIC PNL TOTAL CA: CPT | Mod: HCNC

## 2019-06-24 PROCEDURE — 21400001 HC TELEMETRY ROOM: Mod: HCNC

## 2019-06-24 PROCEDURE — 25000003 PHARM REV CODE 250: Mod: HCNC | Performed by: NURSE PRACTITIONER

## 2019-06-24 PROCEDURE — 85025 COMPLETE CBC W/AUTO DIFF WBC: CPT | Mod: HCNC

## 2019-06-24 PROCEDURE — S5571 INSULIN DISPOS PEN 3 ML: HCPCS | Mod: HCNC | Performed by: NURSE PRACTITIONER

## 2019-06-24 PROCEDURE — 84100 ASSAY OF PHOSPHORUS: CPT | Mod: HCNC

## 2019-06-24 PROCEDURE — 25000003 PHARM REV CODE 250: Mod: HCNC | Performed by: INTERNAL MEDICINE

## 2019-06-24 PROCEDURE — 36415 COLL VENOUS BLD VENIPUNCTURE: CPT | Mod: HCNC

## 2019-06-24 PROCEDURE — 99233 PR SUBSEQUENT HOSPITAL CARE,LEVL III: ICD-10-PCS | Mod: 25,HCNC,, | Performed by: INTERNAL MEDICINE

## 2019-06-24 PROCEDURE — 84484 ASSAY OF TROPONIN QUANT: CPT | Mod: HCNC

## 2019-06-24 PROCEDURE — 63600175 PHARM REV CODE 636 W HCPCS: Mod: HCNC | Performed by: NURSE PRACTITIONER

## 2019-06-24 RX ORDER — SODIUM CHLORIDE 9 MG/ML
INJECTION, SOLUTION INTRAVENOUS ONCE
Status: DISCONTINUED | OUTPATIENT
Start: 2019-06-24 | End: 2019-06-27 | Stop reason: HOSPADM

## 2019-06-24 RX ORDER — SODIUM CHLORIDE 9 MG/ML
INJECTION, SOLUTION INTRAVENOUS
Status: DISCONTINUED | OUTPATIENT
Start: 2019-06-24 | End: 2019-06-27 | Stop reason: HOSPADM

## 2019-06-24 RX ORDER — HYDROMORPHONE HYDROCHLORIDE 2 MG/ML
1 INJECTION, SOLUTION INTRAMUSCULAR; INTRAVENOUS; SUBCUTANEOUS EVERY 6 HOURS PRN
Status: DISCONTINUED | OUTPATIENT
Start: 2019-06-24 | End: 2019-06-26

## 2019-06-24 RX ORDER — TRAMADOL HYDROCHLORIDE 50 MG/1
50 TABLET ORAL EVERY 8 HOURS PRN
Status: DISCONTINUED | OUTPATIENT
Start: 2019-06-24 | End: 2019-06-27 | Stop reason: HOSPADM

## 2019-06-24 RX ADMIN — ONDANSETRON 8 MG: 2 INJECTION INTRAMUSCULAR; INTRAVENOUS at 09:06

## 2019-06-24 RX ADMIN — MUPIROCIN: 20 OINTMENT TOPICAL at 09:06

## 2019-06-24 RX ADMIN — FUROSEMIDE 80 MG: 40 TABLET ORAL at 09:06

## 2019-06-24 RX ADMIN — METOPROLOL TARTRATE 100 MG: 50 TABLET ORAL at 09:06

## 2019-06-24 RX ADMIN — TRAMADOL HYDROCHLORIDE 50 MG: 50 TABLET, FILM COATED ORAL at 12:06

## 2019-06-24 RX ADMIN — HEPARIN SODIUM 5000 UNITS: 5000 INJECTION, SOLUTION INTRAVENOUS; SUBCUTANEOUS at 05:06

## 2019-06-24 RX ADMIN — INSULIN DETEMIR 15 UNITS: 100 INJECTION, SOLUTION SUBCUTANEOUS at 09:06

## 2019-06-24 RX ADMIN — ATORVASTATIN CALCIUM 40 MG: 40 TABLET, FILM COATED ORAL at 09:06

## 2019-06-24 RX ADMIN — METHYLPREDNISOLONE SODIUM SUCCINATE 80 MG: 125 INJECTION, POWDER, FOR SOLUTION INTRAMUSCULAR; INTRAVENOUS at 01:06

## 2019-06-24 RX ADMIN — INSULIN ASPART 4 UNITS: 100 INJECTION, SOLUTION INTRAVENOUS; SUBCUTANEOUS at 09:06

## 2019-06-24 RX ADMIN — ASPIRIN 325 MG: 325 TABLET, DELAYED RELEASE ORAL at 09:06

## 2019-06-24 RX ADMIN — INSULIN ASPART 1 UNITS: 100 INJECTION, SOLUTION INTRAVENOUS; SUBCUTANEOUS at 10:06

## 2019-06-24 RX ADMIN — NEPHROCAP 1 CAPSULE: 1 CAP ORAL at 09:06

## 2019-06-24 RX ADMIN — HEPARIN SODIUM 5000 UNITS: 5000 INJECTION, SOLUTION INTRAVENOUS; SUBCUTANEOUS at 09:06

## 2019-06-24 RX ADMIN — MUPIROCIN: 20 OINTMENT TOPICAL at 10:06

## 2019-06-24 RX ADMIN — TRAMADOL HYDROCHLORIDE 50 MG: 50 TABLET, FILM COATED ORAL at 05:06

## 2019-06-24 RX ADMIN — HYDROMORPHONE HYDROCHLORIDE 1 MG: 2 INJECTION, SOLUTION INTRAMUSCULAR; INTRAVENOUS; SUBCUTANEOUS at 09:06

## 2019-06-24 NOTE — PLAN OF CARE
Problem: Pain Acute  Goal: Optimal Pain Control    Intervention: Develop Pain Management Plan     06/24/19 0453   Prevent or Manage Pain   Pain Management Interventions care clustered;pain management plan reviewed with patient/caregiver;position adjusted;pillow support provided;relaxation techniques promoted;quiet environment facilitated;around-the-clock dosing utilized     Intervention: Prevent or Manage Pain     06/24/19 0453   Prevent or Manage Pain   Sensory Stimulation Regulation quiet environment promoted   Sleep/Rest Enhancement awakenings minimized;relaxation techniques promoted;regular sleep/rest pattern promoted     Intervention: Optimize Psychosocial Wellbeing     06/24/19 0453   Promote Anxiety Reduction   Supportive Measures active listening utilized;verbalization of feelings encouraged

## 2019-06-24 NOTE — NURSING
Bedside report received from MADELINE Ruth. Patient sitting up in bed. NAD noted at this time. All safety precautions in place. Will continue to monitor.

## 2019-06-24 NOTE — TELEPHONE ENCOUNTER
Patient's daughter states patient will not be able to make it to appt with Dr. Perry on 6/25/19 due to a fall at home on 6/22/19. [facility] patient is admitted at [facility] and will likely not be able to return for 6 weeks or so. Appts rescheduled, daughter verified. Appt letter placed in mail.

## 2019-06-24 NOTE — NURSING
End of shift bedside report given to  MADELINE Ruth. Patient in no apparent distress.     12 hour chart check complete.

## 2019-06-24 NOTE — PROGRESS NOTES
The patient is in dialysis.  She reports she is feeling better.        Temp:  [97.5 °F (36.4 °C)-98.8 °F (37.1 °C)] 97.8 °F (36.6 °C)  Pulse:  [68-86] 68  Resp:  [16-19] 19  SpO2:  [95 %-99 %] 97 %  BP: (102-174)/(50-95) 114/57        PE:    Left ribs are tender.  Her pelvis is stable and nontender.  She is able to flex extend her toes.      Recent Labs   Lab 06/24/19  0430   WBC 14.04*   RBC 3.49*   HGB 11.2*   HCT 35.4*      *   MCH 32.1*   MCHC 31.6*         Current Facility-Administered Medications:     0.9%  NaCl infusion, , Intravenous, PRN, La Berumen MD    0.9%  NaCl infusion, , Intravenous, PRN, Judy Boothe MD    0.9%  NaCl infusion, , Intravenous, Once, Judy Boothe MD    aspirin EC tablet 325 mg, 325 mg, Oral, Daily, Edilberto Estevez MD, 325 mg at 06/24/19 0903    atorvastatin tablet 40 mg, 40 mg, Oral, Daily, Beatrice JOSEFINA Soto, FNP, 40 mg at 06/24/19 0903    dextrose 50% injection 12.5 g, 12.5 g, Intravenous, PRN, Beatrice LUCIUS. Charles, FNP    dextrose 50% injection 25 g, 25 g, Intravenous, PRN, Beatrice VReece Soto, FNP    diphenhydrAMINE 12.5 mg/5 mL liquid 12.5 mg, 12.5 mg, Oral, Q6H PRN, Beatrice VReece Soto, FNP    diphenhydrAMINE injection 12.5 mg, 12.5 mg, Intravenous, Q4H PRN, Beatrice VReece Soto, FNP    furosemide tablet 80 mg, 80 mg, Oral, Daily, Edilberto Estevez MD, 80 mg at 06/24/19 0903    glucagon (human recombinant) injection 1 mg, 1 mg, Intramuscular, PRN, Beatrice JOSEFINA Soto, FNP    glucose chewable tablet 16 g, 16 g, Oral, PRN, Beatrice VReece Soto, FNP    glucose chewable tablet 24 g, 24 g, Oral, PRN, Beatrice Soto, ALEXANDREP    heparin (porcine) injection 5,000 Units, 5,000 Units, Subcutaneous, Q8H, Beatrice Soto, ALEXANDREP, 5,000 Units at 06/24/19 0559    hydrALAZINE injection 10 mg, 10 mg, Intravenous, Q6H PRN, Edilberto Estevez MD    hydromorphone (PF) injection 1 mg, 1 mg, Intravenous, Q6H PRN, Beatrice Soto, ALEXANDREP    insulin aspart U-100 pen 1-10 Units, 1-10 Units,  Subcutaneous, QID (AC + HS) PRN, JONO Gaxiola, 4 Units at 06/24/19 0905    insulin detemir U-100 pen 15 Units, 15 Units, Subcutaneous, Daily, ALEXANDRE GaixolaP, 15 Units at 06/24/19 0903    metoprolol tartrate (LOPRESSOR) tablet 100 mg, 100 mg, Oral, BID, Edilberto Estevez MD, 100 mg at 06/24/19 0903    mupirocin 2 % ointment, , Nasal, BID, La Berumen MD    ondansetron injection 8 mg, 8 mg, Intravenous, Q8H PRN, JONO Gaxiola    sodium chloride 0.9% flush 10 mL, 10 mL, Intravenous, PRN, Edilberto Estevez MD    traMADol tablet 50 mg, 50 mg, Oral, Q8H PRN, JONO Gaxiola    vitamin renal formula (B-complex-vitamin c-folic acid) 1 mg per capsule 1 capsule, 1 capsule, Oral, Daily, La Berumen MD, 1 capsule at 06/24/19 0903      A/P:    73-year-old female status post fall     rib fractures on left side rib 7 and 8.  Plan treat with rest and pain control.  I do not recommend a compressive bandage.  Ice pack should be used against the chest wall.  Encourage regular breathing.     She has a pelvic fracture involving the right sacral ala and bilateral pubic rami.  We will plan to treat her pelvic fracture closed.  She is stable for weight-bearing as tolerated on her right-sided partial weight-bearing on the left side.     physical therapy.     Pain controlled     DVT prophylaxis-on subcu heparin    Will follow up with Dr. Lquue in 2 weeks for new set of x-rays.

## 2019-06-24 NOTE — PROGRESS NOTES
Zoey Ham is a 73 y.o. female patient.    Follow for ESRD, dialysis    No new c/o, feeling better  Comfortable    Scheduled Meds:   aspirin  325 mg Oral Daily    atorvastatin  40 mg Oral Daily    furosemide  80 mg Oral Daily    heparin (porcine)  5,000 Units Subcutaneous Q8H    insulin detemir U-100  15 Units Subcutaneous Daily    metoprolol tartrate  100 mg Oral BID    mupirocin   Nasal BID    vitamin renal formula (B-complex-vitamin c-folic acid)  1 capsule Oral Daily       Review of patient's allergies indicates:   Allergen Reactions    Percodan [oxycodone-aspirin] Hives     Welps     Pcn [penicillins] Hives and Swelling     Tolerated Rocephin IM in clinic      Phenergan [promethazine] Other (See Comments)     Altered mental status; jerking of extremities         Vital Signs Range (Last 24H):  Temp:  [97.5 °F (36.4 °C)-98.8 °F (37.1 °C)]   Pulse:  [70-88]   Resp:  [16-22]   BP: (109-174)/(33-95)   SpO2:  [95 %-99 %]     I & O (Last 24H):    Intake/Output Summary (Last 24 hours) at 6/24/2019 0936  Last data filed at 6/24/2019 0600  Gross per 24 hour   Intake 800 ml   Output 2783 ml   Net -1983 ml           Physical Exam:  General appearance: well developed, well nourished, no distress  Lungs:  clear to auscultation bilaterally and normal respiratory effort  Heart: regular rate and rhythm  Abdomen: soft, non-tender non-distented; bowel sounds normal; no masses,  no organomegaly  Extremities: edema trace    Laboratory:  CBC:   Recent Labs   Lab 06/24/19  0430   WBC 14.04*   RBC 3.49*   HGB 11.2*   HCT 35.4*      *   MCH 32.1*   MCHC 31.6*     CMP:   Recent Labs   Lab 06/23/19  0120 06/24/19  0430   * 243*   CALCIUM 9.1 9.2   ALBUMIN 3.9  --    PROT 7.3  --     135*   K 5.2* 5.5*   CO2 22* 23    100   BUN 57* 34*   CREATININE 3.7* 3.3*   ALKPHOS 258*  --    ALT 20  --    AST 20  --    BILITOT 0.4  --        Imp/Plan    ESRD   Hyperkalemia  Pulmonary edema -  improves with dialysis  S/p fall with hip/rib fracture  HTN  DM type 2  Anemia of CKD    Resume HD q MWF  We'll continue to follow for dialysis      Judy Boothe  6/24/2019

## 2019-06-24 NOTE — PT/OT/SLP PROGRESS
Physical Therapy      Patient Name:  Zoey Ham   MRN:  [id number]    1109 Patient not seen at this time for PT eval secondary to Dialysis. Will follow-up as able.    2830 Patient not seen at this time for PT eval secondary to Dialysis. Will follow-up as able.    Yamileth Deshpande, PT

## 2019-06-24 NOTE — NURSING
Report received from MADELINE Bella. Patient awake and alert. NAD noted. Safety maintained. Will continue to monitor.

## 2019-06-25 LAB
ANION GAP SERPL CALC-SCNC: 12 MMOL/L (ref 8–16)
BASOPHILS # BLD AUTO: 0.02 K/UL (ref 0–0.2)
BASOPHILS NFR BLD: 0.1 % (ref 0–1.9)
BUN SERPL-MCNC: 33 MG/DL (ref 8–23)
CALCIUM SERPL-MCNC: 9.1 MG/DL (ref 8.7–10.5)
CHLORIDE SERPL-SCNC: 97 MMOL/L (ref 95–110)
CO2 SERPL-SCNC: 26 MMOL/L (ref 23–29)
CREAT SERPL-MCNC: 3.1 MG/DL (ref 0.5–1.4)
DIFFERENTIAL METHOD: ABNORMAL
EOSINOPHIL # BLD AUTO: 0.3 K/UL (ref 0–0.5)
EOSINOPHIL NFR BLD: 2.3 % (ref 0–8)
ERYTHROCYTE [DISTWIDTH] IN BLOOD BY AUTOMATED COUNT: 14.1 % (ref 11.5–14.5)
EST. GFR  (AFRICAN AMERICAN): 17 ML/MIN/1.73 M^2
EST. GFR  (NON AFRICAN AMERICAN): 14 ML/MIN/1.73 M^2
GLUCOSE SERPL-MCNC: 101 MG/DL (ref 70–110)
HBV SURFACE AB SER QL IA: POSITIVE
HBV SURFACE AB SERPL IA-ACNC: 47 MIU/ML
HCT VFR BLD AUTO: 35.2 % (ref 37–48.5)
HGB BLD-MCNC: 11 G/DL (ref 12–16)
LYMPHOCYTES # BLD AUTO: 1.2 K/UL (ref 1–4.8)
LYMPHOCYTES NFR BLD: 8.5 % (ref 18–48)
MCH RBC QN AUTO: 31.5 PG (ref 27–31)
MCHC RBC AUTO-ENTMCNC: 31.3 G/DL (ref 32–36)
MCV RBC AUTO: 101 FL (ref 82–98)
MONOCYTES # BLD AUTO: 1.4 K/UL (ref 0.3–1)
MONOCYTES NFR BLD: 9.6 % (ref 4–15)
NEUTROPHILS # BLD AUTO: 11.5 K/UL (ref 1.8–7.7)
NEUTROPHILS NFR BLD: 79.8 % (ref 38–73)
PLATELET # BLD AUTO: 226 K/UL (ref 150–350)
PMV BLD AUTO: 11.4 FL (ref 9.2–12.9)
POCT GLUCOSE: 100 MG/DL (ref 70–110)
POCT GLUCOSE: 106 MG/DL (ref 70–110)
POCT GLUCOSE: 123 MG/DL (ref 70–110)
POCT GLUCOSE: 152 MG/DL (ref 70–110)
POTASSIUM SERPL-SCNC: 4.7 MMOL/L (ref 3.5–5.1)
RBC # BLD AUTO: 3.49 M/UL (ref 4–5.4)
SODIUM SERPL-SCNC: 135 MMOL/L (ref 136–145)
TROPONIN I SERPL DL<=0.01 NG/ML-MCNC: 1.99 NG/ML (ref 0–0.03)
WBC # BLD AUTO: 14.5 K/UL (ref 3.9–12.7)

## 2019-06-25 PROCEDURE — 25000003 PHARM REV CODE 250: Mod: HCNC | Performed by: EMERGENCY MEDICINE

## 2019-06-25 PROCEDURE — 97166 OT EVAL MOD COMPLEX 45 MIN: CPT | Mod: HCNC

## 2019-06-25 PROCEDURE — 80048 BASIC METABOLIC PNL TOTAL CA: CPT | Mod: HCNC

## 2019-06-25 PROCEDURE — 21400001 HC TELEMETRY ROOM: Mod: HCNC

## 2019-06-25 PROCEDURE — 85025 COMPLETE CBC W/AUTO DIFF WBC: CPT | Mod: HCNC

## 2019-06-25 PROCEDURE — 97530 THERAPEUTIC ACTIVITIES: CPT | Mod: HCNC

## 2019-06-25 PROCEDURE — 99233 PR SUBSEQUENT HOSPITAL CARE,LEVL III: ICD-10-PCS | Mod: HCNC,,, | Performed by: INTERNAL MEDICINE

## 2019-06-25 PROCEDURE — 63600175 PHARM REV CODE 636 W HCPCS: Mod: HCNC | Performed by: NURSE PRACTITIONER

## 2019-06-25 PROCEDURE — 99233 SBSQ HOSP IP/OBS HIGH 50: CPT | Mod: HCNC,,, | Performed by: INTERNAL MEDICINE

## 2019-06-25 PROCEDURE — 84484 ASSAY OF TROPONIN QUANT: CPT | Mod: HCNC

## 2019-06-25 PROCEDURE — 36415 COLL VENOUS BLD VENIPUNCTURE: CPT | Mod: HCNC

## 2019-06-25 PROCEDURE — 97162 PT EVAL MOD COMPLEX 30 MIN: CPT | Mod: HCNC

## 2019-06-25 RX ADMIN — HYDROMORPHONE HYDROCHLORIDE 1 MG: 2 INJECTION, SOLUTION INTRAMUSCULAR; INTRAVENOUS; SUBCUTANEOUS at 09:06

## 2019-06-25 RX ADMIN — METOPROLOL TARTRATE 100 MG: 50 TABLET ORAL at 08:06

## 2019-06-25 RX ADMIN — INSULIN ASPART 1 UNITS: 100 INJECTION, SOLUTION INTRAVENOUS; SUBCUTANEOUS at 08:06

## 2019-06-25 RX ADMIN — HYDROMORPHONE HYDROCHLORIDE 1 MG: 2 INJECTION, SOLUTION INTRAMUSCULAR; INTRAVENOUS; SUBCUTANEOUS at 08:06

## 2019-06-25 RX ADMIN — HEPARIN SODIUM 5000 UNITS: 5000 INJECTION, SOLUTION INTRAVENOUS; SUBCUTANEOUS at 09:06

## 2019-06-25 RX ADMIN — MUPIROCIN: 20 OINTMENT TOPICAL at 08:06

## 2019-06-25 RX ADMIN — HEPARIN SODIUM 5000 UNITS: 5000 INJECTION, SOLUTION INTRAVENOUS; SUBCUTANEOUS at 06:06

## 2019-06-25 RX ADMIN — ONDANSETRON 8 MG: 2 INJECTION INTRAMUSCULAR; INTRAVENOUS at 09:06

## 2019-06-25 NOTE — ASSESSMENT & PLAN NOTE
Patient came in with acute chest pain after a fall.  She does have significant history of coronary artery disease with multiple revascularization and troponins are mildly elevated, however she is unable to tell whether this chest pain is similar to her prior anginal pain or is more related to her fall.  Considering the fact that it is left-sided and her chest wall is tender, it is likely related to her fall.  Trend troponins. (repeat troponin is pending).  If the troponins rise significantly, then cardiac catheterization in a.m..  If troponins stay mildly elevated and flat then consider ischemic workup with a stress test.  Continue aspirin, Plavix, atorvastatin.

## 2019-06-25 NOTE — SUBJECTIVE & OBJECTIVE
Interval History:  No anginal sounding chest pain.  Continues to have muscular skeletal pain at the rib fractures        Past Medical History:   Diagnosis Date    Acute myocardial infarction of anterolateral wall 7/9/2015    Anemia of chronic renal failure, stage 4 (severe) 1/22/2015    Anticoagulant long-term use     ASA and plavix    Atherosclerosis of aorta 10/3/2012    AV shunt malfunction     Benign hypertension with CKD (chronic kidney disease) stage IV 3/11/2016    Hypertension associated with Diabetes    Carpal tunnel syndrome, right     Chronic diastolic congestive heart failure 10/3/2012    Combined systolic and diastolic CHF    Chronic kidney disease, stage IV (severe) 7/3/2012    Coronary artery disease     s/p 1v CABG 2002 and multiple PCI (last angiogram in 6/2012 with patent LIMA->LAD and patent LCx and RCA stents)    Diabetic polyneuropathy associated with type 2 diabetes mellitus 7/9/2015    Diabetic polyneuropathy associated with type 2 diabetes mellitus 7/9/2015    Dialysis patient     Encounter for blood transfusion     Gastritis without bleeding     Heart attack 09/2012    5-6 events    Hyperlipidemia     Hyperparathyroidism     Hyperphosphatemia     Metabolic acidosis     Nephritis and nephropathy, 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 7/9/2015    NSTEMI (non-ST elevated myocardial infarction)     NSTEMI (non-ST elevated myocardial infarction)     NSTEMI (non-ST elevated myocardial infarction)     Occlusion and stenosis of carotid artery with cerebral infarction 7/9/2015    Osteopenia     PAF (paroxysmal atrial fibrillation) 10/3/2012    Pneumonia     Proliferative diabetic retinopathy 10/3/2012    S/P drug eluting coronary stent placement     Sepsis due to Escherichia coli with acute renal failure 2/2016    UTI     TACO (transfusion associated circulatory overload)     Type II diabetes mellitus with neurological manifestations 7/9/2015    Type II diabetes  mellitus with renal manifestations 7/3/2012       Past Surgical History:   Procedure Laterality Date    APPENDECTOMY      CARDIAC SURGERY  2002    CABG    CHOLECYSTECTOMY      CHOLECYSTECTOMY-LAPAROSCOPIC N/A 2/4/2015    [facility] Quinton [facility] [place] [address] [place] [address]    [id number]  - Left brachiocephalic Left 4/16/2018    Performed by YAMEL Perry III, MD at [facility] [place] [address] [place] [address]    CORONARY ANGIOPLASTY  2004    CORONARY ARTERY BYPASS GRAFT      EYE SURGERY      cataracts bilaterally    Fistulogram Left 1/17/2019    Performed by YAMEL Perry III, [facility] at [facility] [place] [address] [place] [address]    Fistulogram Left 9/20/2018    Performed by YAMEL Perry III, [facility] at [facility] [place] [address] [place] [address]    Fistulogram left arm Left 6/20/2019    Performed by YAMEL Perry III, [facility] at [facility] [place] [address] [place] [address]    FRACTURE SURGERY      right ankle    HYSTERECTOMY      PTA (ANGIOPLASTY, PERCUTANEOUS, TRANSLUMINAL) N/A 6/20/2019    Performed by YAMEL Perry III, [facility] at [facility] [place] [place] [address] [place] [address]    PTA (ANGIOPLASTY, PERCUTANEOUS, TRANSLUMINAL) N/A 1/17/2019    Performed by YAMEL Perry III[facility] at [facility] [place] [place] [address] [place] [address]    PTA (ANGIOPLASTY, PERCUTANEOUS, TRANSLUMINAL) N/A 9/20/2018    Performed by YAMEL Perry III, [facility] at [facility] [place] [address] [place] [address]    STENT, FISTULA Left 6/20/2019    Performed by YAMEL Perry III[facility] at [facility] [place] [place] [address] [place] [address]    TONSILLECTOMY         Review of patient's allergies indicates:   Allergen Reactions    Percodan [oxycodone-aspirin] Hives     Welps     Pcn [penicillins] Hives and Swelling     Tolerated Rocephin IM in clinic      Phenergan [promethazine] Other (See Comments)     Altered mental status; jerking of extremities       No current facility-administered medications on file prior to encounter.      Current Outpatient Medications on File Prior to Encounter   Medication Sig    ACCU-CHEK MARI PLUS METER Misc 1 Device by Misc.(Non-Drug; Combo Route) route 2 (two) times daily.    aspirin (ECOTRIN) 325 MG EC tablet Take 1 tablet  "(325 mg total) by mouth once daily. (Patient taking differently: Take 325 mg by mouth every morning. )    atorvastatin (LIPITOR) 80 MG tablet Take 1 tablet (80 mg total) by mouth every morning.    blood sugar diagnostic (ACCU-CHEK MARI PLUS TEST STRP) Strp TEST BLOOD SUGAR FOUR TIMES DAILY    calcitRIOL (ROCALTROL) 0.5 MCG Cap Take 1 capsule (0.5 mcg total) by mouth once daily.    clopidogrel (PLAVIX) 75 mg tablet Take 1 tablet (75 mg total) by mouth once daily.    furosemide (LASIX) 80 MG tablet One 80 mg  tab once a day.    insulin glargine (LANTUS U-100 INSULIN) 100 unit/mL injection Inject 15 Units into the skin every evening.    insulin syringe-needle U-100 0.3 mL 31 gauge x 1/4" Syrg 1 application by Misc.(Non-Drug; Combo Route) route once daily.    lancets (ACCU-CHEK SOFTCLIX LANCETS) Misc 1 application by Misc.(Non-Drug; Combo Route) route once daily.    lidocaine-prilocaine (EMLA) cream APPLY SMALL AMOUNT TO ACCESS SITE 1-2 HOURS BEFORE TREATMENT. COVER AREA WITH AN OCCLUSIVE DRESSING AS DIRECTED.    metoprolol tartrate (LOPRESSOR) 100 MG tablet Take 1 tablet (100 mg total) by mouth 2 (two) times daily.    nitroGLYCERIN (NITROSTAT) 0.3 MG SL tablet Place 1 tablet (0.3 mg total) under the tongue every 5 (five) minutes as needed for Chest pain.     Family History     Problem Relation (Age of Onset)    Breast cancer Maternal Aunt    Cancer Mother    Dementia Father    Diabetes Daughter    Heart disease Mother, Father    Hypertension Mother, Father, Sister, Brother    No Known Problems Son    Psoriasis Father    Stroke Sister        Tobacco Use    Smoking status: Never Smoker    Smokeless tobacco: Never Used   Substance and Sexual Activity    Alcohol use: No    Drug use: No    Sexual activity: Never     Review of Systems   Constitution: Negative.   HENT: Negative.    Eyes: Negative.    Cardiovascular: Positive for chest pain.   Respiratory: Negative.    Endocrine: Negative.  "   Hematologic/Lymphatic: Negative.    Skin: Negative.    Musculoskeletal: Negative.    Gastrointestinal: Negative.    Genitourinary: Negative.    Neurological: Negative.    Psychiatric/Behavioral: Negative.    Allergic/Immunologic: Negative.      Objective:     Vital Signs (Most Recent):  Temp: 98 °F (36.7 °C) (06/25/19 1558)  Pulse: 76 (06/25/19 1558)  Resp: 18 (06/25/19 1558)  BP: (!) 121/59 (06/25/19 1558)  SpO2: 96 % (06/25/19 1558) Vital Signs (24h Range):  Temp:  [97.3 °F (36.3 °C)-98.2 °F (36.8 °C)] 98 °F (36.7 °C)  Pulse:  [67-76] 76  Resp:  [17-18] 18  SpO2:  [96 %-99 %] 96 %  BP: ()/(58-65) 121/59     Weight: 76.2 kg (168 lb)  Body mass index is 28.84 kg/m².    SpO2: 96 %  O2 Device (Oxygen Therapy): nasal cannula      Intake/Output Summary (Last 24 hours) at 6/25/2019 1635  Last data filed at 6/24/2019 1730  Gross per 24 hour   Intake 200 ml   Output --   Net 200 ml       Lines/Drains/Airways     Drain                 Hemodialysis AV Fistula Left upper arm -- days         Hemodialysis AV Fistula Left upper arm -- days         Hemodialysis AV Fistula 06/20/19 0908 Left upper arm 5 days          Peripheral Intravenous Line                 Peripheral IV - Single Lumen 06/22/19 2330 18 G Right Antecubital 2 days                Physical Exam   Constitutional: She is oriented to person, place, and time. She appears well-developed and well-nourished.   HENT:   Head: Normocephalic.   Eyes: Pupils are equal, round, and reactive to light.   Neck: Normal range of motion. Neck supple.   Cardiovascular: Normal rate and regular rhythm.   Pulmonary/Chest: Effort normal and breath sounds normal.   Abdominal: Soft. Normal appearance and bowel sounds are normal. There is no tenderness.   Musculoskeletal: Normal range of motion. She exhibits tenderness.   Bruising noted   Neurological: She is alert and oriented to person, place, and time.   Skin: Skin is warm.   Psychiatric: She has a normal mood and affect.   Nursing  note and vitals reviewed.      Significant Labs:   BMP:   Recent Labs   Lab 06/24/19  0430 06/25/19  0432   * 101   * 135*   K 5.5* 4.7    97   CO2 23 26   BUN 34* 33*   CREATININE 3.3* 3.1*   CALCIUM 9.2 9.1   , CMP   Recent Labs   Lab 06/24/19  0430 06/25/19  0432   * 135*   K 5.5* 4.7    97   CO2 23 26   * 101   BUN 34* 33*   CREATININE 3.3* 3.1*   CALCIUM 9.2 9.1   ANIONGAP 12 12   ESTGFRAFRICA 15* 17*   EGFRNONAA 13* 14*   , CBC   Recent Labs   Lab 06/24/19 0430 06/25/19  0432   WBC 14.04* 14.50*   HGB 11.2* 11.0*   HCT 35.4* 35.2*    226   , INR   No results for input(s): INR, PROTIME in the last 48 hours., Lipid Panel No results for input(s): CHOL, HDL, LDLCALC, TRIG, CHOLHDL in the last 48 hours., Troponin   Recent Labs   Lab 06/24/19  1919 06/25/19  0808   TROPONINI 1.725* 1.989*    and All pertinent lab results from the last 24 hours have been reviewed.    Significant Imaging: ekg: personally reviewed 6/23: nsr, incomplete lbbb, marked st abn in inferolateral leads (not changed much from last ekg in 2018)

## 2019-06-25 NOTE — PT/OT/SLP EVAL
Physical Therapy Evaluation    Patient Name:  Zoey Ham   MRN:  [id number]    Recommendations:     Discharge Recommendations:  rehabilitation facility   Discharge Equipment Recommendations: (TBD)   Barriers to discharge home: Pt with decreased mobility.    Assessment:     Zoey Ham is a 73 y.o. female admitted with a medical diagnosis of Fracture of multiple ribs of left side.  She presents with the following impairments/functional limitations:  weakness, impaired endurance, impaired sensation, impaired functional mobilty, gait instability, impaired balance, decreased upper extremity function, decreased lower extremity function, decreased safety awareness, pain, impaired cardiopulmonary response to activity, orthopedic precautions.    Rehab Prognosis: Fair+; patient would benefit from acute skilled PT services to address these deficits and reach maximum level of function.    Recent Surgery: * No surgery found *   Pt with rib fractures on left side rib 7 and 8.  Pt has a pelvic fracture involving the left sacral ala and bilateral pubic rami.    Plan:     During this hospitalization, patient to be seen daily to address the identified rehab impairments via gait training, therapeutic activities, therapeutic exercises, wheelchair management/training and progress toward the following goals:    · Plan of Care Expires:  07/09/19    Subjective     Chief Complaint: pain  Patient/Family Comments/goals: to get OOB  Pain/Comfort:  · Pain Rating 1: 6/10  · Location - Side 1: Left  · Location 1: rib(s)(L hip)  · Pain Addressed 1: Pre-medicate for activity, Reposition, Distraction, Cessation of Activity, Nurse notified   · Pt c/o 9/10 pain to B hip and L ribs after tx.        Living Environment:  Pt lives with son in a 2 story house with 1 step through the garage.  Pt's bedroom/full bathroom are on the 1st floor.   Prior to admission, patients level of function was independent.  Pt stopped driving ~1 year ago 2* visual  impairment.  Equipment used at home: cane, straight.  Upon discharge, patient will have assistance from family.    Objective:     Communicated with nurse Caballero prior to session.  Patient found HOB elevated with peripheral IV, oxygen 3L, telemetry upon PT entry to room.    General Precautions: Standard, fall, diabetic, respiratory, vision impaired, ESRD on HD with LUE access  Orthopedic Precautions:LLE partial weight bearing   Braces: N/A     Exams:  · Cognitive Exam:  Patient was able to follow multiple commands.   · Gross Motor Coordination:  impaired 2* pain and weakness  · Postural Exam:  Patient presented with the following abnormalities:    · -       Rounded shoulders  · Sensation:    · -       Intact light/touch BLE, except pt c/o DM neuropathy to B great toes  · Skin Integrity/Edema:      · -       Skin integrity: Visible skin intact  · -       Edema: None noted BLE  · RLE ROM: WFL  · RLE Strength: WFL  · LLE ROM: WFL however with 5/10 hip pain with ROM  · LLE Strength: WFL    Functional Mobility:  · Bed Mobility:     · Scooting: minimum assistance to scoot EOB  · Supine to Sit: minimum assistance with HOB elevated; Pt with posterior LOB while sitting EOB, required min A to maintain static sit balance.     · Transfers:     · Sit to Stand:  moderate assistance and of 2 persons with rolling walker; Pt was able to maintain LLE PWB with static standing.    · Bed to Chair: maximal assistance and of 2 persons with  rolling walker  using  Squat Pivot  · Gait: Pt ambulated ~3 sidesteps along bedside with mod A of 2 persons using RW/3L O2 NC and LLE PWB.  Pt with decreased weight shifting, foot clearance, step length and patel.  Pt required mod VC's to maintain LLE PWB during gait training.  Pt easily fatigued with decreased safety awareness.  Pt reported no chest pain, some SOB 2* ribs fx.    · Balance: Pt with fair/fair- balance.       Therapeutic Activities and Exercises:  BLE supine therex: AP    Pt/family  educated on acute skilled PT services/goals.  Pt/family educated on LLE PWB and use of RW at this time.  Pt/family verbalized good understanding.       AM-PAC 6 CLICK MOBILITY  Total Score:13     Patient left up in chair on seat cushion reclined with BLE elevated on pillow with all lines intact, call button in reach, nurse Ada notified and daughter present.    GOALS:   Multidisciplinary Problems     Physical Therapy Goals        Problem: Physical Therapy Goal    Goal Priority Disciplines Outcome Goal Variances Interventions   Physical Therapy Goal     PT, PT/OT      Description:  Goals to be met by: 19    Patient will increase functional independence with mobility by performin. Supine to sit with Stand-by Assistance  2. Rolling to Left and Right with Stand-by Assistance  3. Sit to stand transfer with Minimal Assistance using RW  4. Bed to chair transfer with Minimal Assistance using Rolling Walker  5. Gait  x20-30 feet with Minimal Assistance using Rolling Walker and LLE PWB  6. Wheelchair propulsion x100 feet with Stand-by Assistance using bilateral upper extremities  7. Lower extremity exercise program (seated/supine) 3 sets x10 reps per handout, with independence                      History:     Past Medical History:   Diagnosis Date    Acute myocardial infarction of anterolateral wall 2015    Anemia of chronic renal failure, stage 4 (severe) 2015    Anticoagulant long-term use     ASA and plavix    Atherosclerosis of aorta 10/3/2012    AV shunt malfunction     Benign hypertension with CKD (chronic kidney disease) stage IV 3/11/2016    Hypertension associated with Diabetes    Carpal tunnel syndrome, right     Chronic diastolic congestive heart failure 10/3/2012    Combined systolic and diastolic CHF    Chronic kidney disease, stage IV (severe) 7/3/2012    Coronary artery disease     s/p 1v CABG  and multiple PCI (last angiogram in 2012 with patent LIMA->LAD and patent LCx  and RCA stents)    Diabetic polyneuropathy associated with type 2 diabetes mellitus 7/9/2015    Diabetic polyneuropathy associated with type 2 diabetes mellitus 7/9/2015    Dialysis patient     Encounter for blood transfusion     Gastritis without bleeding     Heart attack 09/2012    5-6 events    Hyperlipidemia     Hyperparathyroidism     Hyperphosphatemia     Metabolic acidosis     Nephritis and nephropathy, 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 7/9/2015    NSTEMI (non-ST elevated myocardial infarction)     NSTEMI (non-ST elevated myocardial infarction)     NSTEMI (non-ST elevated myocardial infarction)     Occlusion and stenosis of carotid artery with cerebral infarction 7/9/2015    Osteopenia     PAF (paroxysmal atrial fibrillation) 10/3/2012    Pneumonia     Proliferative diabetic retinopathy 10/3/2012    S/P drug eluting coronary stent placement     Sepsis due to Escherichia coli with acute renal failure 2/2016    UTI     TACO (transfusion associated circulatory overload)     Type II diabetes mellitus with neurological manifestations 7/9/2015    Type II diabetes mellitus with renal manifestations 7/3/2012       Past Surgical History:   Procedure Laterality Date    APPENDECTOMY      CARDIAC SURGERY  2002    CABG    CHOLECYSTECTOMY      CHOLECYSTECTOMY-LAPAROSCOPIC N/A 2/4/2015    [facility]    [id number]  - Left brachiocephalic Left 4/16/2018    Performed by YAMEL Perry III, [facility] at [facility]    CORONARY ANGIOPLASTY  2004    CORONARY ARTERY BYPASS GRAFT      EYE SURGERY      cataracts bilaterally    Fistulogram Left 1/17/2019    Performed by [facility], [facility] at [facility]    Fistulogram Left 9/20/2018    Performed [facility]    Fistulogram left arm Left 6/20/2019    Performed by [facility] at [facility]    FRACTURE SURGERY      right ankle     HYSTERECTOMY      PTA (ANGIOPLASTY, PERCUTANEOUS, TRANSLUMINAL) N/A 6/20/2019    Performed by [facility] at [facility]    PTA (ANGIOPLASTY, PERCUTANEOUS, TRANSLUMINAL) N/A 1/17/2019    Performed by Y[facility]EL [facility] III, MD at [facility]    PTA (ANGIOPLASTY, PERCUTANEOUS, TRANSLUMINAL) N/A 9/20/2018    Performed by Y[facility]EL Perry III, MD at [facility]    STENT, FISTULA Left 6/20/2019    Performed by Y[facility]EL [facility] III, MD at [facility]    TONSILLECTOMY         Time Tracking:     PT Received On: 06/25/19  PT Start Time: 1039     PT Stop Time: 1105  PT Total Time (min): 26 min     Billable Minutes: Evaluation 16 min and 10 min 1 TA co-eval with OT    [id number]   Pt tolerated ~1.5 hour up in bedside chair.  Pt was max A of 2 persons for chair>bed transfers with squat pivot.  Pt was max for sit>supine.  Pt was able to scoot/bridge to reposition HOB with SBA.  Pt left with HOB elevated and BLE elevated on pillow, call light within reach, bed alarm on, nurse Ada and daughter present.     Yamileth Deshpande, PT  06/25/2019

## 2019-06-25 NOTE — ASSESSMENT & PLAN NOTE
Patient in stage renal disease usually dialyzes in [place], dialyzed last on Friday 06/21/2019 in [place]  Status pursue emergent dialysis yesterday with improvement with volume status  Patient ongoing regular scheduled dialysis again today  As per Nephrology

## 2019-06-25 NOTE — ASSESSMENT & PLAN NOTE
Patient came in with acute chest pain after a fall.  She does have significant history of coronary artery disease with multiple revascularization and troponins are mildly elevated, however she is unable to tell whether this chest pain is similar to her prior anginal pain or is more related to her fall.  Considering the fact that it is left-sided and her chest wall is tender, it is likely related to her fall.    Her troponins are elevated.  Peak troponin is 1.9.  She does not have any anginal sounding chest pain.  I had a detailed discussion with the patient and her family about various options.  We discussed the coronary angiogram, stress testing versus continued medical management.  Patient is and the family state that will be very difficult for the patient to lay down for any of these testing because of her significant musculoskeletal injuries.  .  she states that she would like to continue medical management because she is concerned about the pain she will experience if she is not on a comfortable bed.  Considering the fact that she has mostly musculoskeletal pain, and it will be difficult for to tolerate any kind of testing at the current time, we will continue medical management for now and proceed with ischemic workup if she has a anginal sounding chest pains.  Continue aspirin, Plavix, aggressive risk factor modification

## 2019-06-25 NOTE — ASSESSMENT & PLAN NOTE
Patient has traumatic rib fractures x 3   1 displaced which is the 8th,   Poor inspiratory and expiratory effort oxygen sat decline when off oxygen likely secondary to pain  No need for surgical intervention  Continue expended incentive spirometry and pain control

## 2019-06-25 NOTE — PLAN OF CARE
06/25/19 1434   Post-Acute Status   Post-Acute Authorization Placement   Post-Acute Placement Status Referrals Sent   Discharge Delays None known at this time     SW to pt's room to discuss d/c planning. Pt's goal for d/c is rehab where she can get stronger and build strength. Pt explained she was independent, and lived with her son, in a two story home before she fall by bath tub.     PT/OT recommended rehab and pt determined to get stronger. Pt's preference is [facility], SW faxed face sheet, progress note, ortho note, and PT/OT notes to [facility]. SW waiting to determine if services will be provided.

## 2019-06-25 NOTE — PROGRESS NOTES
[facility] Hospital Medicine  Progress Note    Patient Name: Zoey Ham  MRN: [id number]  Patient Class: IP- Inpatient   Admission Date: 6/22/2019  Length of Stay: 1 days  Attending Physician: Sheryl Brewster MD  Primary Care Provider: Cesia Rosales MD        Subjective:     Principal Problem:Fracture of multiple ribs of left side      HPI:  Zoey Ham is a 73 y.o. unfortunate elderly female patient with extensive cardiac and renal history as below - including but not limited to CAD (CABG 2002, PCI 2012), ESRD-HD (Monday Wednesday Friday/still makes urine), HTN, and gastritis with bleeding history.  Per patient she was in her usual state of health up until 1 day ago.  She reports that she usually dialyzes in [facility]  but her daughter lives in [place].  She reports that she dialyzed in a [place] on [address]  on Friday  as she had been visiting her and the environment was somewhat unfamiliar.  Patient reports that she encountered a mechanical fall and a very small bathroom when she attempted to close the door.  She denies any loss of consciousness, dizziness, lightheadedness before the episode.  She denies chest pain, fever chills, upper respiratory infection, headache, focal deficits or weaknesses, or numbness or tingling in either extremity prior to or after the event.    Upon presentation the patient was noted on CT chest to have 3 left rib fractures 7 and 8 with mild displacement involving the left 7th and lesser extent 8th rib.  CT pelvis showed acute displaced comminuted right fracture of the pelvis as well as an acute nondisplaced transverse fracture of the inferior pubic ramus on the left.  Additionally abnormal CBC and chemistry significant for leukocytosis = 14 K, mild hypokalemia = 5.2, other findings consistent with end-stage renal disease decreased creatinine and GFR.  Additionally patient's glucose level was found to be 442, alk phos 258.  Lastly patient was noted to  have bump in troponin 1 level that has trended she has usual chronic troponinemia =Results for TAY COVARRUBIAS (MRN [id number]) as of 6/23/2019 08:06  Results for TAY COVARRUBIAS (MRN [id number]) as of 6/23/2019 16:37   Ref. Range 6/23/2019 01:20   Troponin I Latest Ref Range: 0.000 - 0.026 ng/mL 0.176 (H)       Cardiologist at [facility] ([place]    Overview/Hospital Course:  No notes on file    Interval History:  Patient seen on dialysis, reports feeling much better and is no longer short of breath.  Reports she has been doing the incentive spirometry and able to keep the ball high on the meter.  She did not have a chance to work with physical therapy today.    Review of Systems   Constitutional: Negative for chills and fever.   Respiratory: Negative for shortness of breath.    Cardiovascular: Negative for chest pain.   Musculoskeletal: Positive for arthralgias.     Objective:     Vital Signs (Most Recent):  Temp: 97.3 °F (36.3 °C) (06/24/19 1958)  Pulse: 71 (06/24/19 1958)  Resp: 17 (06/24/19 1958)  BP: 137/65 (06/24/19 1958)  SpO2: 97 % (06/24/19 1958) Vital Signs (24h Range):  Temp:  [97.3 °F (36.3 °C)-98.8 °F (37.1 °C)] 97.3 °F (36.3 °C)  Pulse:  [64-78] 71  Resp:  [17-19] 17  SpO2:  [95 %-99 %] 97 %  BP: (102-174)/(48-77) 137/65     Weight: 76.2 kg (168 lb)  Body mass index is 28.84 kg/m².    Intake/Output Summary (Last 24 hours) at 6/24/2019 2050  Last data filed at 6/24/2019 1730  Gross per 24 hour   Intake 900 ml   Output 1675 ml   Net -775 ml      Physical Exam   Constitutional: She appears well-developed and well-nourished. No distress.   HENT:   Head: Normocephalic and atraumatic.   Eyes: Pupils are equal, round, and reactive to light.   Neck: Normal range of motion. Neck supple.   Cardiovascular: Normal rate and regular rhythm.   Pulmonary/Chest: Breath sounds normal.   Increased pain with deep inspiration or movement on the left side of chest wall   Abdominal: Soft. She exhibits no distension and  no mass. There is no tenderness. There is no guarding.   Musculoskeletal: She exhibits no edema.   Neurological: She is alert.   Pain increased with moving of the hips   Skin: Skin is warm and dry. Capillary refill takes less than 2 seconds. She is not diaphoretic.   Psychiatric: She has a normal mood and affect. Her behavior is normal. Thought content normal.       Significant Labs: All pertinent labs within the past 24 hours have been reviewed.    Significant Imaging: I have reviewed and interpreted all pertinent imaging results/findings within the past 24 hours.      Assessment/Plan:      * Fracture of multiple ribs of left side  Patient has traumatic rib fractures x 3   1 displaced which is the 8th,   Poor inspiratory and expiratory effort oxygen sat decline when off oxygen likely secondary to pain  No need for surgical intervention  Continue expended incentive spirometry and pain control    Displaced fracture of pubis  CT pelvis shows 3 pubic fractures:  -acute minimally displaced fracture of the superior pubic ramus on the right  -acute nondisplaced transverse fracture of the inferior year pubic ramus on the left  -acute nondisplaced linear fracture through the lateral margin of the left sacral ala  Appreciate Orthopedic evaluation and recommendation  Conservative treatment with no need for surgical intervention  Physical therapy and occupational therapy and pain control  Patient will likely need skilled versus rehab placement    Troponin level elevated  Patient has a history of CABG in 2002 and PCI in 2012 with stents  Patient has chronic troponinemia, however her admitting troponin was  Results for TAY COVARRUBIAS (MRN [id number]) as of 6/23/2019 09:31   Ref. Range 6/23/2019 01:20   Troponin I Latest Ref Range: 0.000 - 0.026 ng/mL 0.176 (H)   Patient denied chest pain   at the time of admit.    Her last 2D echo 06/05/2019 at this facility showed LVEF 60-65% with grade 2 diastolic dysfunction   Continue  aspirin, metoprolol, atorvastatin  Appreciate Cardiology input  Trending troponins and if significant peak, cardiology consider catheterization versus stress testing    Hypoxia  Multifactorial secondary to some pulmonary edema and decreased respiratory effort secondary to rib fractures  Improved with dialysis with volume correction  Continue incentive spirometry  Oxygen to be weaned as tolerated    ESRD (end stage renal disease) on dialysis  Patient in stage renal disease usually dialyzes in [place], dialyzed last on Friday 06/21/2019 in [place]  Status pursue emergent dialysis yesterday with improvement with volume status  Patient ongoing regular scheduled dialysis again today  As per Nephrology    Coronary artery disease involving coronary bypass graft of native heart with unstable angina pectoris  Continue metoprolol, aspirin, atorvastatin    Chronic atrial fibrillation  Rate controlled on metoprolol  Patient has not been on anticoagulation for a long time    Type 2 diabetes mellitus with chronic kidney disease on chronic dialysis, with long-term current use of insulin  Continue basal,/prandial insulin along with sliding scale insulin  Hemoglobin A1c is 8.7 which is not at target  Glucose goal during hospitalization between 140-180  Will make further adjustments as necessary    Anemia of chronic renal failure, stage 5  Patient has anemia of chronic disease  H&H consistent with previous levels  Will continue to monitor    Leukocytosis  Elevated white cell count likely secondary to leukemoid reaction secondary to pain  No obvious source of infection noted in any imaging on studies, and she remains afebrile  Continue Spiriva spirometry  Will continue to monitor repeated CBC    Proliferative diabetic retinopathy   Tight glucose control and outpatient ophthalmology follow-up    Hypertension associated with diabetes  Patient has 6 fractures 2 displaced (3 rib fractures and 3 hip fractures total bilaterally)  Volumes  status corrected with blood pressure improved along with control of pain  Continue metoprolol and patient is on Lasix at 80 mg daily  P.r.n. hydralazine       VTE Risk Mitigation (From admission, onward)        Ordered     heparin (porcine) injection 5,000 Units  Every 8 hours      06/23/19 0908     IP VTE HIGH RISK PATIENT  Once      06/23/19 0631                Sheryl Brewster MD  Department of Hospital Medicine   [facility]

## 2019-06-25 NOTE — PLAN OF CARE
Problem: Occupational Therapy Goal  Goal: Occupational Therapy Goal  Goals to be met by: 07/09/19     Patient will increase functional independence with ADLs by performing:    UE Dressing with Supervision.  LE Dressing with Minimal Assistance.  Grooming while seated with Supervision.  Toileting from bedside commode with Moderate Assistance for hygiene and clothing management.   Sitting at edge of bed x15 minutes with Supervision.  Supine to sit with Supervision.  Step transfer with Minimal Assistance  Upper extremity exercise program x10 reps per handout, with independence.    Outcome: Ongoing (interventions implemented as appropriate)  Pt will continue to benefit from acute OT. Pt was MAX A x2 for bed>chair transfer.  LLE PWB, RLE WBAT. OT rec. Inpatient rehab at d/c.

## 2019-06-25 NOTE — NURSING
Bedside report received from MADELINE Ruth. Patient sitting up in bed. NAD noted at this time. All safety precautions in place. Will continue to monitor

## 2019-06-25 NOTE — PLAN OF CARE
Problem: Skin Injury Risk Increased  Goal: Skin Health and Integrity    Intervention: Optimize Skin Protection     06/25/19 0545   Prevent Additional Skin Injury   Head of Bed (HOB) HOB elevated   Pressure Reduction Techniques weight shift assistance provided   Monitor and Manage Hypervolemia   Skin Protection tubing/devices free from skin contact;incontinence pads utilized         Problem: Pain Acute  Goal: Optimal Pain Control    Intervention: Develop Pain Management Plan     06/25/19 0545   Prevent or Manage Pain   Pain Management Interventions care clustered;pain management plan reviewed with patient/caregiver;position adjusted;pillow support provided;quiet environment facilitated     Intervention: Prevent or Manage Pain     06/25/19 0545   Prevent or Manage Pain   Sensory Stimulation Regulation quiet environment promoted   Sleep/Rest Enhancement awakenings minimized;regular sleep/rest pattern promoted     Intervention: Optimize Psychosocial Wellbeing     06/25/19 0545   Promote Anxiety Reduction   Supportive Measures active listening utilized;verbalization of feelings encouraged

## 2019-06-25 NOTE — SUBJECTIVE & OBJECTIVE
Interval History: States feels better. No anginal sounding CP. Has rib tenderness.      Past Medical History:   Diagnosis Date    Acute myocardial infarction of anterolateral wall 7/9/2015    Anemia of chronic renal failure, stage 4 (severe) 1/22/2015    Anticoagulant long-term use     ASA and plavix    Atherosclerosis of aorta 10/3/2012    AV shunt malfunction     Benign hypertension with CKD (chronic kidney disease) stage IV 3/11/2016    Hypertension associated with Diabetes    Carpal tunnel syndrome, right     Chronic diastolic congestive heart failure 10/3/2012    Combined systolic and diastolic CHF    Chronic kidney disease, stage IV (severe) 7/3/2012    Coronary artery disease     s/p 1v CABG 2002 and multiple PCI (last angiogram in 6/2012 with patent LIMA->LAD and patent LCx and RCA stents)    Diabetic polyneuropathy associated with type 2 diabetes mellitus 7/9/2015    Diabetic polyneuropathy associated with type 2 diabetes mellitus 7/9/2015    Dialysis patient     Encounter for blood transfusion     Gastritis without bleeding     Heart attack 09/2012    5-6 events    Hyperlipidemia     Hyperparathyroidism     Hyperphosphatemia     Metabolic acidosis     Nephritis and nephropathy, 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 7/9/2015    NSTEMI (non-ST elevated myocardial infarction)     NSTEMI (non-ST elevated myocardial infarction)     NSTEMI (non-ST elevated myocardial infarction)     Occlusion and stenosis of carotid artery with cerebral infarction 7/9/2015    Osteopenia     PAF (paroxysmal atrial fibrillation) 10/3/2012    Pneumonia     Proliferative diabetic retinopathy 10/3/2012    S/P drug eluting coronary stent placement     Sepsis due to Escherichia coli with acute renal failure 2/2016    UTI     TACO (transfusion associated circulatory overload)     Type II diabetes mellitus with neurological manifestations 7/9/2015    Type II diabetes mellitus with renal manifestations  7/3/2012       Past Surgical History:   Procedure Laterality Date    APPENDECTOMY      CARDIAC SURGERY  2002    CABG    CHOLECYSTECTOMY      CHOLECYSTECTOMY-LAPAROSCOPIC N/A 2/4/2015    Performed by Quinton Ashley MD at [facility] OR [address] [place] [address]    [id number]  - Left brachiocephalic Left 4/16/2018    Performed by YAMEL Perry III, MD at [facility] OR [address] [place] [address]    CORONARY ANGIOPLASTY  2004    CORONARY ARTERY BYPASS GRAFT      EYE SURGERY      cataracts bilaterally    Fistulogram Left 1/17/2019    Performed by YAMEL Perry III, MD at [facility] OR [address] [place] [address]    Fistulogram Left 9/20/2018    Performed by YAMEL Perry III, MD at [facility] OR [address] [place] [address]    Fistulogram left arm Left 6/20/2019    Performed by YAMEL Perry III, MD at [facility] OR [address] [place] [address]    FRACTURE SURGERY      right ankle    HYSTERECTOMY      PTA (ANGIOPLASTY, PERCUTANEOUS, TRANSLUMINAL) N/A 6/20/2019    [facility]    PTA (ANGIOPLASTY, PERCUTANEOUS, TRANSLUMINAL) N/A 1/17/2019    Performed by YAMEL Perry III, MD at [facility] OR [address] [place] [address]    PTA (ANGIOPLASTY, PERCUTANEOUS, TRANSLUMINAL) N/A 9/20/2018    Performed by YAMEL Perry III, MD at [facility] OR [address] [place] [address]    STENT, FISTULA Left 6/20/2019    Performed by YAMEL Perry III, MD at [facility] OR [address] [place] [address]    TONSILLECTOMY         Review of patient's allergies indicates:   Allergen Reactions    Percodan [oxycodone-aspirin] Hives     Welps     Pcn [penicillins] Hives and Swelling     Tolerated Rocephin IM in clinic      Phenergan [promethazine] Other (See Comments)     Altered mental status; jerking of extremities       No current facility-administered medications on file prior to encounter.      Current Outpatient Medications on File Prior to Encounter   Medication Sig    ACCU-CHEK MARI PLUS METER Misc 1 Device by Misc.(Non-Drug; Combo Route) route 2 (two) times daily.    aspirin (ECOTRIN) 325 MG EC tablet Take 1 tablet (325 mg total) by mouth once daily.  "(Patient taking differently: Take 325 mg by mouth every morning. )    atorvastatin (LIPITOR) 80 MG tablet Take 1 tablet (80 mg total) by mouth every morning.    blood sugar diagnostic (ACCU-CHEK MARI PLUS TEST STRP) Strp TEST BLOOD SUGAR FOUR TIMES DAILY    calcitRIOL (ROCALTROL) 0.5 MCG Cap Take 1 capsule (0.5 mcg total) by mouth once daily.    clopidogrel (PLAVIX) 75 mg tablet Take 1 tablet (75 mg total) by mouth once daily.    furosemide (LASIX) 80 MG tablet One 80 mg  tab once a day.    insulin glargine (LANTUS U-100 INSULIN) 100 unit/mL injection Inject 15 Units into the skin every evening.    insulin syringe-needle U-100 0.3 mL 31 gauge x 1/4" Syrg 1 application by Misc.(Non-Drug; Combo Route) route once daily.    lancets (ACCU-CHEK SOFTCLIX LANCETS) Misc 1 application by Misc.(Non-Drug; Combo Route) route once daily.    lidocaine-prilocaine (EMLA) cream APPLY SMALL AMOUNT TO ACCESS SITE 1-2 HOURS BEFORE TREATMENT. COVER AREA WITH AN OCCLUSIVE DRESSING AS DIRECTED.    metoprolol tartrate (LOPRESSOR) 100 MG tablet Take 1 tablet (100 mg total) by mouth 2 (two) times daily.    nitroGLYCERIN (NITROSTAT) 0.3 MG SL tablet Place 1 tablet (0.3 mg total) under the tongue every 5 (five) minutes as needed for Chest pain.     Family History     Problem Relation (Age of Onset)    Breast cancer Maternal Aunt    Cancer Mother    Dementia Father    Diabetes Daughter    Heart disease Mother, Father    Hypertension Mother, Father, Sister, Brother    No Known Problems Son    Psoriasis Father    Stroke Sister        Tobacco Use    Smoking status: Never Smoker    Smokeless tobacco: Never Used   Substance and Sexual Activity    Alcohol use: No    Drug use: No    Sexual activity: Never     Review of Systems   Constitution: Negative.   HENT: Negative.    Eyes: Negative.    Cardiovascular: Positive for chest pain.   Respiratory: Negative.    Endocrine: Negative.    Hematologic/Lymphatic: Negative.    Skin: Negative.  "   Musculoskeletal: Negative.    Gastrointestinal: Negative.    Genitourinary: Negative.    Neurological: Negative.    Psychiatric/Behavioral: Negative.    Allergic/Immunologic: Negative.      Objective:     Vital Signs (Most Recent):  Temp: 98.3 °F (36.8 °C) (06/24/19 1607)  Pulse: 64 (06/24/19 1607)  Resp: 19 (06/24/19 1607)  BP: 131/60 (06/24/19 1607)  SpO2: 99 % (06/24/19 1607) Vital Signs (24h Range):  Temp:  [97.6 °F (36.4 °C)-98.8 °F (37.1 °C)] 98.3 °F (36.8 °C)  Pulse:  [64-78] 64  Resp:  [17-19] 19  SpO2:  [95 %-99 %] 99 %  BP: (102-174)/(48-77) 131/60     Weight: 76.2 kg (168 lb)  Body mass index is 28.84 kg/m².    SpO2: 99 %  O2 Device (Oxygen Therapy): nasal cannula      Intake/Output Summary (Last 24 hours) at 6/24/2019 1908  Last data filed at 6/24/2019 1500  Gross per 24 hour   Intake 700 ml   Output 1675 ml   Net -975 ml       Lines/Drains/Airways     Drain                 Hemodialysis AV Fistula Left upper arm -- days         Hemodialysis AV Fistula Left upper arm -- days         Hemodialysis AV Fistula 06/20/19 0908 Left upper arm 4 days          Peripheral Intravenous Line                 Peripheral IV - Single Lumen 06/22/19 2330 18 G Right Antecubital 1 day                Physical Exam   Constitutional: She is oriented to person, place, and time. She appears well-developed and well-nourished.   HENT:   Head: Normocephalic.   Eyes: Pupils are equal, round, and reactive to light.   Neck: Normal range of motion. Neck supple.   Cardiovascular: Normal rate and regular rhythm.   Pulmonary/Chest: Effort normal and breath sounds normal.   Abdominal: Soft. Normal appearance and bowel sounds are normal. There is no tenderness.   Musculoskeletal: Normal range of motion. She exhibits tenderness.   Bruising noted   Neurological: She is alert and oriented to person, place, and time.   Skin: Skin is warm.   Psychiatric: She has a normal mood and affect.   Nursing note and vitals reviewed.      Significant Labs:    BMP:   Recent Labs   Lab 06/23/19  0120 06/24/19  0430   * 243*    135*   K 5.2* 5.5*    100   CO2 22* 23   BUN 57* 34*   CREATININE 3.7* 3.3*   CALCIUM 9.1 9.2   , CMP   Recent Labs   Lab 06/23/19  0120 06/24/19  0430    135*   K 5.2* 5.5*    100   CO2 22* 23   * 243*   BUN 57* 34*   CREATININE 3.7* 3.3*   CALCIUM 9.1 9.2   PROT 7.3  --    ALBUMIN 3.9  --    BILITOT 0.4  --    ALKPHOS 258*  --    AST 20  --    ALT 20  --    ANIONGAP 14 12   ESTGFRAFRICA 13* 15*   EGFRNONAA 12* 13*   , CBC   Recent Labs   Lab 06/23/19  0050 06/24/19  0430   WBC 14.49* 14.04*   HGB 11.0* 11.2*   HCT 33.8* 35.4*    221   , INR   Recent Labs   Lab 06/23/19  0120   INR 0.9   , Lipid Panel No results for input(s): CHOL, HDL, LDLCALC, TRIG, CHOLHDL in the last 48 hours., Troponin   Recent Labs   Lab 06/23/19 0120   TROPONINI 0.176*    and All pertinent lab results from the last 24 hours have been reviewed.    Significant Imaging: ekg: personally reviewed 6/23: nsr, incomplete lbbb, marked st abn in inferolateral leads (not changed much from last ekg in 2018)

## 2019-06-25 NOTE — PT/OT/SLP EVAL
Occupational Therapy   Evaluation    Name: Zoey Ham  MRN: [id number]  Admitting Diagnosis:  Fracture of multiple ribs of left side      Recommendations:     Discharge Recommendations: rehabilitation facility  Discharge Equipment Recommendations:  (TBD)  Barriers to discharge:  (pt requires increased caregiver support)    Assessment:     Zoey Ham is a 73 y.o. female with a medical diagnosis of Fracture of multiple ribs of left side.  She presents with increased pain with any mobility. Pt demo's decreased functional activity tolerance with decreased I with ADLs and functional transfers. Pt has strong family support with motivation to participate. Performance deficits affecting function: weakness, impaired functional mobilty, impaired balance, decreased upper extremity function, decreased safety awareness, impaired coordination, impaired cardiopulmonary response to activity, orthopedic precautions, impaired joint extensibility, pain, decreased lower extremity function, gait instability, impaired self care skills, impaired endurance.  Pt was I PTA and now requires max x2 A for bed>chair transfer. Pt will benefit from the intense therapy inpatient rehab will provide to obtain greater independence.     Rehab Prognosis: Good; patient would benefit from acute skilled OT services to address these deficits and reach maximum level of function.       Plan:     Patient to be seen 5 x/week to address the above listed problems via self-care/home management, therapeutic activities, therapeutic exercises  · Plan of Care Expires: 07/09/19  · Plan of Care Reviewed with: patient, family    Subjective     Chief Complaint: pain  Patient/Family Comments/goals: to regain PLOF    Occupational Profile:  Living Environment: Pt lives with her son in a 2SH with one step to enter from the garage. Bathroom set-up: tub. Pt's bed/bath are on the first floor.   Previous level of function: Pt was I with ADLs and functional mobility PTA.  Pt decided to stop driving 1 year ago d/t impaired vision.   Roles and Routines: sewing, time with family   Equipment Used at Home:  cane, straight  Assistance upon Discharge: family     Pain/Comfort:  · Pain Rating 1: 6/10  · Location - Side 1: Left  · Location 1: rib(s)(L hip)  · Pain Addressed 1: Pre-medicate for activity, Distraction, Nurse notified, Reposition, Cessation of Activity  · Pain Rating Post-Intervention 1: (remained through session)    Patients cultural, spiritual, Scientologist conflicts given the current situation: no    Objective:     Communicated with: nurseAda, prior to session.  Patient found HOB elevated with peripheral IV, bed alarm, oxygen, telemetry(HD AV fistula LUE) upon OT entry to room.    General Precautions: Standard, fall, diabetic, respiratory, vision impaired   Orthopedic Precautions:LLE partial weight bearing, RLE weight bearing as tolerated   Braces: N/A     Occupational Performance:    Bed Mobility:    · Patient completed Scooting/Bridging with minimum assistance  · Patient completed Supine to Sit with moderate assistance  · Patient completed Sit to Supine with maximal assistance and 2 persons   · Pt demo'ed posterior lean sitting EOB with mod v.c. For hand/foot placement to provide better support.     Functional Mobility/Transfers:  · Patient completed Sit <> Stand Transfer with moderate assistance and of 2 persons  with  rolling walker   · Patient completed Bed <> Chair Transfer using Stand Pivot technique with maximal assistance and of 2 persons with hand-held assist  · Functional Mobility: Pt attempted 2-3 sidesteps at EOB with RW and mod Ax2 with mod v.c. For L PWB implementation. Pt became very fatigued with this and required a seated rest break before transferring to the chair. Pt required min A during this seated break d/t lean to the L with mod v.c./t.c. For self correction 2 times. Nursing was educated on stand pivot transfer during second visit to increase pt's  time up in the chair with all staff in a safe way. Pt tolerated sitting up in the chair for ~1.5 hour.    Activities of Daily Living:  · Upper Body Dressing: minimum assistance with back hospital gown  · Lower Body Dressing: total assistance with B/L socks. Pt attempted, but unable d/t pain    Cognitive/Visual Perceptual:  Cognitive/Psychosocial Skills:     -       Oriented to: Person, Place, Time and Situation   -       Follows Commands/attention:Follows multistep  commands  -       Communication: clear/fluent  -       Memory: No Deficits noted  -       Safety awareness/insight to disability: impaired   -       Mood/Affect/Coping skills/emotional control: Appropriate to situation  Visual/Perceptual:      -Impaired  glasses      Physical Exam:  Balance:    -       fair/fair-  Postural examination/scapula alignment:    -       Rounded shoulders  -       Forward head  Skin integrity: Visible skin intact  Edema:  no BUE edema noted  Sensation:    -       Intact  light/touch BUE  Dominant hand:    -       Right  Upper Extremity Range of Motion:     -       Right Upper Extremity: WFL  -       Left Upper Extremity: WFL  Upper Extremity Strength:    -       Right Upper Extremity: WFL  -       Left Upper Extremity: WFL   Strength:    -       Right Upper Extremity: WFL  -       Left Upper Extremity: WFL  Fine Motor Coordination:    -       Impaired  Left hand, manipulation of objects   and Right hand, manipulation of objects    Gross motor coordination:   impaired secondary to pain    AMPAC 6 Click ADL:  AMPAC Total Score: 15    Treatment & Education:  Pt educated on OT role/POC.   Importance of OOB activity with staff assistance.  Safety during functional t/f and mobility   Pursed lip breathing  White board updated   Multiple self-care tasks/functional mobility completed- assistance level noted above   Edu. Nurse on safety and body mechanics with stand pivot transfer at second visit  Pt demo'd fair carryover of body   set-up for functional transfer at second visit   All questions/concerns answered within OT scope of practice     Education:    Patient left HOB elevated with all lines intact, call button in reach, bed alarm on and nurse, Ada, present    GOALS:   Multidisciplinary Problems     Occupational Therapy Goals        Problem: Occupational Therapy Goal    Goal Priority Disciplines Outcome Interventions   Occupational Therapy Goal     OT, PT/OT Ongoing (interventions implemented as appropriate)    Description:  Goals to be met by: 07/09/19     Patient will increase functional independence with ADLs by performing:    UE Dressing with Supervision.  LE Dressing with Minimal Assistance.  Grooming while seated with Supervision.  Toileting from bedside commode with Moderate Assistance for hygiene and clothing management.   Sitting at edge of bed x15 minutes with Supervision.  Supine to sit with Supervision.  Step transfer with Minimal Assistance  Upper extremity exercise program x10 reps per handout, with independence.                      History:     Past Medical History:   Diagnosis Date    Acute myocardial infarction of anterolateral wall 7/9/2015    Anemia of chronic renal failure, stage 4 (severe) 1/22/2015    Anticoagulant long-term use     ASA and plavix    Atherosclerosis of aorta 10/3/2012    AV shunt malfunction     Benign hypertension with CKD (chronic kidney disease) stage IV 3/11/2016    Hypertension associated with Diabetes    Carpal tunnel syndrome, right     Chronic diastolic congestive heart failure 10/3/2012    Combined systolic and diastolic CHF    Chronic kidney disease, stage IV (severe) 7/3/2012    Coronary artery disease     s/p 1v CABG 2002 and multiple PCI (last angiogram in 6/2012 with patent LIMA->LAD and patent LCx and RCA stents)    Diabetic polyneuropathy associated with type 2 diabetes mellitus 7/9/2015    Diabetic polyneuropathy associated with type 2 diabetes mellitus  7/9/2015    Dialysis patient     Encounter for blood transfusion     Gastritis without bleeding     Heart attack 09/2012    5-6 events    Hyperlipidemia     Hyperparathyroidism     Hyperphosphatemia     Metabolic acidosis     Nephritis and nephropathy, 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 7/9/2015    NSTEMI (non-ST elevated myocardial infarction)     NSTEMI (non-ST elevated myocardial infarction)     NSTEMI (non-ST elevated myocardial infarction)     Occlusion and stenosis of carotid artery with cerebral infarction 7/9/2015    Osteopenia     PAF (paroxysmal atrial fibrillation) 10/3/2012    Pneumonia     Proliferative diabetic retinopathy 10/3/2012    S/P drug eluting coronary stent placement     Sepsis due to Escherichia coli with acute renal failure 2/2016    UTI     TACO (transfusion associated circulatory overload)     Type II diabetes mellitus with neurological manifestations 7/9/2015    Type II diabetes mellitus with renal manifestations 7/3/2012       Past Surgical History:   Procedure Laterality Date    APPENDECTOMY      CARDIAC SURGERY  2002    CABG    CHOLECYSTECTOMY      CHOLECYSTECTOMY-LAPAROSCOPIC N/A 2/4/2015    Performed by Quinton Ashley MD at [facility] OR 2ND FLR    [id number]  - Left brachiocephalic Left 4/16/2018    Performed by YAMEL Perry III, MD at [facility] OR 2ND FLR    CORONARY ANGIOPLASTY  2004    CORONARY ARTERY BYPASS GRAFT      EYE SURGERY      cataracts bilaterally    Fistulogram Left 1/17/2019    Performed by YAMEL Perry III, MD at [facility] OR 2ND FLR    Fistulogram Left 9/20/2018    Performed by YAMEL Perry III, MD at [facility] OR 2ND FLR    Fistulogram left arm Left 6/20/2019    Performed by YAMEL Perry III, MD at [facility] OR 2ND FLR    FRACTURE SURGERY      right ankle    HYSTERECTOMY      PTA (ANGIOPLASTY, PERCUTANEOUS, TRANSLUMINAL) N/A 6/20/2019    Performed by YAMEL Perry III, MD at [facility] OR 2ND FLR    PTA (ANGIOPLASTY,  PERCUTANEOUS, TRANSLUMINAL) N/A 1/17/2019    Performed by YAMEL Perry III, MD at [facility] OR 2ND FLR    PTA (ANGIOPLASTY, PERCUTANEOUS, TRANSLUMINAL) N/A 9/20/2018    Performed by YAMEL Perry III, MD at [facility] OR 2ND FLR    STENT, FISTULA Left 6/20/2019    Performed by YAMEL Perry III, MD at [facility] OR 2ND FLR    TONSILLECTOMY         Time Tracking:     OT Date of Treatment: 06/25/19  First visit:  OT Start Time: 1039  OT Stop Time: 1105  OT Total Time (min): 26 min     Second visit:   OT Start Time: 1230  OT Stop Time: 1238  OT Total Time (min): 8 min     Billable Minutes:Evaluation 16 min  Therapeutic Activity 8 min  Total Time 34 min  (co-eval with PT)     Ayleen Spencer, OT  6/25/2019

## 2019-06-25 NOTE — PLAN OF CARE
Problem: Physical Therapy Goal  Goal: Physical Therapy Goal  Goals to be met by: 19    Patient will increase functional independence with mobility by performin. Supine to sit with Stand-by Assistance  2. Rolling to Left and Right with Stand-by Assistance  3. Sit to stand transfer with Minimal Assistance using RW  4. Bed to chair transfer with Minimal Assistance using Rolling Walker  5. Gait  x20-30 feet with Minimal Assistance using Rolling Walker and LLE PWB  6. Wheelchair propulsion x100 feet with Stand-by Assistance using bilateral upper extremities  7. Lower extremity exercise program (seated/supine) 3 sets x10 reps per handout, with independence    Pt OOB>chair with max A of 2 persons, LLE PWB.

## 2019-06-25 NOTE — ASSESSMENT & PLAN NOTE
Elevated white cell count likely secondary to leukemoid reaction secondary to pain  No obvious source of infection noted in any imaging on studies, and she remains afebrile  Continue Spiriva spirometry  Will continue to monitor repeated CBC

## 2019-06-25 NOTE — ASSESSMENT & PLAN NOTE
Multifactorial secondary to some pulmonary edema and decreased respiratory effort secondary to rib fractures  Improved with dialysis with volume correction  Continue incentive spirometry  Oxygen to be weaned as tolerated

## 2019-06-25 NOTE — NURSING
Report received from CLIF Caballero. Patient awake and alert. NAD noted. Safety maintained with family at bedside. Will continue to monitor.

## 2019-06-25 NOTE — PROGRESS NOTES
[facility]  Cardiology  Progress Note    Patient Name: Zoey Ham  MRN: [id number]  Admission Date: 6/22/2019  Hospital Length of Stay: 1 days  Code Status: Full Code   Attending Physician: Sheryl Brewster MD   Primary Care Physician: Cesia Rosales MD  Expected Discharge Date:   Principal Problem:Fracture of multiple ribs of left side    Subjective:       Interval History: States feels better. No anginal sounding CP. Has rib tenderness.      Past Medical History:   Diagnosis Date    Acute myocardial infarction of anterolateral wall 7/9/2015    Anemia of chronic renal failure, stage 4 (severe) 1/22/2015    Anticoagulant long-term use     ASA and plavix    Atherosclerosis of aorta 10/3/2012    AV shunt malfunction     Benign hypertension with CKD (chronic kidney disease) stage IV 3/11/2016    Hypertension associated with Diabetes    Carpal tunnel syndrome, right     Chronic diastolic congestive heart failure 10/3/2012    Combined systolic and diastolic CHF    Chronic kidney disease, stage IV (severe) 7/3/2012    Coronary artery disease     s/p 1v CABG 2002 and multiple PCI (last angiogram in 6/2012 with patent LIMA->LAD and patent LCx and RCA stents)    Diabetic polyneuropathy associated with type 2 diabetes mellitus 7/9/2015    Diabetic polyneuropathy associated with type 2 diabetes mellitus 7/9/2015    Dialysis patient     Encounter for blood transfusion     Gastritis without bleeding     Heart attack 09/2012    5-6 events    Hyperlipidemia     Hyperparathyroidism     Hyperphosphatemia     Metabolic acidosis     Nephritis and nephropathy, 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 7/9/2015    NSTEMI (non-ST elevated myocardial infarction)     NSTEMI (non-ST elevated myocardial infarction)     NSTEMI (non-ST elevated myocardial infarction)     Occlusion and stenosis of carotid artery with cerebral infarction 7/9/2015    Osteopenia     PAF (paroxysmal atrial fibrillation)  10/3/2012    Pneumonia     Proliferative diabetic retinopathy 10/3/2012    S/P drug eluting coronary stent placement     Sepsis due to Escherichia coli with acute renal failure 2/2016    UTI     TACO (transfusion associated circulatory overload)     Type II diabetes mellitus with neurological manifestations 7/9/2015    Type II diabetes mellitus with renal manifestations 7/3/2012       Past Surgical History:   Procedure Laterality Date    APPENDECTOMY      CARDIAC SURGERY  2002    CABG    CHOLECYSTECTOMY      CHOLECYSTECTOMY-LAPAROSCOPIC N/A 2/4/2015    Performed by [facility]inton [facility] at [facility] OR [address] [place] [address]    [id number]  - Left brachiocephalic Left 4/16/2018    Performed by YAMEL Perry III, MD at [facility] OR [address] [place] [address]    CORONARY ANGIOPLASTY  2004    CORONARY ARTERY BYPASS GRAFT      EYE SURGERY      cataracts bilaterally    Fistulogram Left 1/17/2019    Performed by YAMEL Perry III, MD at [facility] OR [address] [place] [address]    Fistulogram Left 9/20/2018    Performed by YAMEL Perry III, MD at [facility] OR [address] [place] [address]    Fistulogram left arm Left 6/20/2019    Performed by YAMEL Perry III, MD at [facility] OR [address] [place] [address]    FRACTURE SURGERY      right ankle    HYSTERECTOMY      PTA (ANGIOPLASTY, PERCUTANEOUS, TRANSLUMINAL) N/A 6/20/2019    [facility]    PTA (ANGIOPLASTY, PERCUTANEOUS, TRANSLUMINAL) N/A 1/17/2019    [facility]    PTA (ANGIOPLASTY, PERCUTANEOUS, TRANSLUMINAL) N/A 9/20/2018    Performed by YAMEL Perry III, MD at [facility] OR [address] [place] [address]    STENT, FISTULA Left 6/20/2019    Performed by YAMEL Perry III, MD at [facility] OR [address] [place] [address]    TONSILLECTOMY         Review of patient's allergies indicates:   Allergen Reactions    Percodan [oxycodone-aspirin] Hives     Welps     Pcn [penicillins] Hives and Swelling     Tolerated Rocephin IM in clinic      Phenergan [promethazine] Other (See Comments)     Altered mental  "status; jerking of extremities       No current facility-administered medications on file prior to encounter.      Current Outpatient Medications on File Prior to Encounter   Medication Sig    ACCU-CHEK MARI PLUS METER Misc 1 Device by Misc.(Non-Drug; Combo Route) route 2 (two) times daily.    aspirin (ECOTRIN) 325 MG EC tablet Take 1 tablet (325 mg total) by mouth once daily. (Patient taking differently: Take 325 mg by mouth every morning. )    atorvastatin (LIPITOR) 80 MG tablet Take 1 tablet (80 mg total) by mouth every morning.    blood sugar diagnostic (ACCU-CHEK MARI PLUS TEST STRP) Strp TEST BLOOD SUGAR FOUR TIMES DAILY    calcitRIOL (ROCALTROL) 0.5 MCG Cap Take 1 capsule (0.5 mcg total) by mouth once daily.    clopidogrel (PLAVIX) 75 mg tablet Take 1 tablet (75 mg total) by mouth once daily.    furosemide (LASIX) 80 MG tablet One 80 mg  tab once a day.    insulin glargine (LANTUS U-100 INSULIN) 100 unit/mL injection Inject 15 Units into the skin every evening.    insulin syringe-needle U-100 0.3 mL 31 gauge x 1/4" Syrg 1 application by Misc.(Non-Drug; Combo Route) route once daily.    lancets (ACCU-CHEK SOFTCLIX LANCETS) Misc 1 application by Misc.(Non-Drug; Combo Route) route once daily.    lidocaine-prilocaine (EMLA) cream APPLY SMALL AMOUNT TO ACCESS SITE 1-2 HOURS BEFORE TREATMENT. COVER AREA WITH AN OCCLUSIVE DRESSING AS DIRECTED.    metoprolol tartrate (LOPRESSOR) 100 MG tablet Take 1 tablet (100 mg total) by mouth 2 (two) times daily.    nitroGLYCERIN (NITROSTAT) 0.3 MG SL tablet Place 1 tablet (0.3 mg total) under the tongue every 5 (five) minutes as needed for Chest pain.     Family History     Problem Relation (Age of Onset)    Breast cancer Maternal Aunt    Cancer Mother    Dementia Father    Diabetes Daughter    Heart disease Mother, Father    Hypertension Mother, Father, Sister, Brother    No Known Problems Son    Psoriasis Father    Stroke Sister        Tobacco Use    Smoking " status: Never Smoker    Smokeless tobacco: Never Used   Substance and Sexual Activity    Alcohol use: No    Drug use: No    Sexual activity: Never     Review of Systems   Constitution: Negative.   HENT: Negative.    Eyes: Negative.    Cardiovascular: Positive for chest pain.   Respiratory: Negative.    Endocrine: Negative.    Hematologic/Lymphatic: Negative.    Skin: Negative.    Musculoskeletal: Negative.    Gastrointestinal: Negative.    Genitourinary: Negative.    Neurological: Negative.    Psychiatric/Behavioral: Negative.    Allergic/Immunologic: Negative.      Objective:     Vital Signs (Most Recent):  Temp: 98.3 °F (36.8 °C) (06/24/19 1607)  Pulse: 64 (06/24/19 1607)  Resp: 19 (06/24/19 1607)  BP: 131/60 (06/24/19 1607)  SpO2: 99 % (06/24/19 1607) Vital Signs (24h Range):  Temp:  [97.6 °F (36.4 °C)-98.8 °F (37.1 °C)] 98.3 °F (36.8 °C)  Pulse:  [64-78] 64  Resp:  [17-19] 19  SpO2:  [95 %-99 %] 99 %  BP: (102-174)/(48-77) 131/60     Weight: 76.2 kg (168 lb)  Body mass index is 28.84 kg/m².    SpO2: 99 %  O2 Device (Oxygen Therapy): nasal cannula      Intake/Output Summary (Last 24 hours) at 6/24/2019 1908  Last data filed at 6/24/2019 1500  Gross per 24 hour   Intake 700 ml   Output 1675 ml   Net -975 ml       Lines/Drains/Airways     Drain                 Hemodialysis AV Fistula Left upper arm -- days         Hemodialysis AV Fistula Left upper arm -- days         Hemodialysis AV Fistula 06/20/19 0908 Left upper arm 4 days          Peripheral Intravenous Line                 Peripheral IV - Single Lumen 06/22/19 2330 18 G Right Antecubital 1 day                Physical Exam   Constitutional: She is oriented to person, place, and time. She appears well-developed and well-nourished.   HENT:   Head: Normocephalic.   Eyes: Pupils are equal, round, and reactive to light.   Neck: Normal range of motion. Neck supple.   Cardiovascular: Normal rate and regular rhythm.   Pulmonary/Chest: Effort normal and breath  sounds normal.   Abdominal: Soft. Normal appearance and bowel sounds are normal. There is no tenderness.   Musculoskeletal: Normal range of motion. She exhibits tenderness.   Bruising noted   Neurological: She is alert and oriented to person, place, and time.   Skin: Skin is warm.   Psychiatric: She has a normal mood and affect.   Nursing note and vitals reviewed.      Significant Labs:   BMP:   Recent Labs   Lab 06/23/19  0120 06/24/19  0430   * 243*    135*   K 5.2* 5.5*    100   CO2 22* 23   BUN 57* 34*   CREATININE 3.7* 3.3*   CALCIUM 9.1 9.2   , CMP   Recent Labs   Lab 06/23/19  0120 06/24/19  0430    135*   K 5.2* 5.5*    100   CO2 22* 23   * 243*   BUN 57* 34*   CREATININE 3.7* 3.3*   CALCIUM 9.1 9.2   PROT 7.3  --    ALBUMIN 3.9  --    BILITOT 0.4  --    ALKPHOS 258*  --    AST 20  --    ALT 20  --    ANIONGAP 14 12   ESTGFRAFRICA 13* 15*   EGFRNONAA 12* 13*   , CBC   Recent Labs   Lab 06/23/19  0050 06/24/19  0430   WBC 14.49* 14.04*   HGB 11.0* 11.2*   HCT 33.8* 35.4*    221   , INR   Recent Labs   Lab 06/23/19  0120   INR 0.9   , Lipid Panel No results for input(s): CHOL, HDL, LDLCALC, TRIG, CHOLHDL in the last 48 hours., Troponin   Recent Labs   Lab 06/23/19 0120   TROPONINI 0.176*    and All pertinent lab results from the last 24 hours have been reviewed.    Significant Imaging: ekg: personally reviewed 6/23: nsr, incomplete lbbb, marked st abn in inferolateral leads (not changed much from last ekg in 2018)        Assessment and Plan:     Coronary artery disease involving coronary bypass graft of native heart with unstable angina pectoris  Patient came in with acute chest pain after a fall.  She does have significant history of coronary artery disease with multiple revascularization and troponins are mildly elevated, however she is unable to tell whether this chest pain is similar to her prior anginal pain or is more related to her fall.  Considering the  fact that it is left-sided and her chest wall is tender, it is likely related to her fall.  Trend troponins. (repeat troponin is pending).  If the troponins rise significantly, then cardiac catheterization in a.m..  If troponins stay mildly elevated and flat then consider ischemic workup with a stress test.  Continue aspirin, Plavix, atorvastatin.    Chronic atrial fibrillation  Has not been on coumadin for years per patient and was taken off of it by her physicians. Currently in nsr    Proliferative diabetic retinopathy        Hypertension associated with diabetes  Continue current rx        VTE Risk Mitigation (From admission, onward)        Ordered     heparin (porcine) injection 5,000 Units  Every 8 hours      06/23/19 0908     IP VTE HIGH RISK PATIENT  Once      06/23/19 0631          Stewart Barragan MD  Cardiology  [facility]

## 2019-06-25 NOTE — NURSING
Bedside report given to MADELINE Ruth. Patient sitting up in bed. NAD noted at this time. All safety precautions in place.   12 hr chart check complete

## 2019-06-25 NOTE — SUBJECTIVE & OBJECTIVE
Interval History:  Patient seen on dialysis, reports feeling much better and is no longer short of breath.  Reports she has been doing the incentive spirometry and able to keep the ball high on the meter.  She did not have a chance to work with physical therapy today.    Review of Systems   Constitutional: Negative for chills and fever.   Respiratory: Negative for shortness of breath.    Cardiovascular: Negative for chest pain.   Musculoskeletal: Positive for arthralgias.     Objective:     Vital Signs (Most Recent):  Temp: 97.3 °F (36.3 °C) (06/24/19 1958)  Pulse: 71 (06/24/19 1958)  Resp: 17 (06/24/19 1958)  BP: 137/65 (06/24/19 1958)  SpO2: 97 % (06/24/19 1958) Vital Signs (24h Range):  Temp:  [97.3 °F (36.3 °C)-98.8 °F (37.1 °C)] 97.3 °F (36.3 °C)  Pulse:  [64-78] 71  Resp:  [17-19] 17  SpO2:  [95 %-99 %] 97 %  BP: (102-174)/(48-77) 137/65     Weight: 76.2 kg (168 lb)  Body mass index is 28.84 kg/m².    Intake/Output Summary (Last 24 hours) at 6/24/2019 2050  Last data filed at 6/24/2019 1730  Gross per 24 hour   Intake 900 ml   Output 1675 ml   Net -775 ml      Physical Exam   Constitutional: She appears well-developed and well-nourished. No distress.   HENT:   Head: Normocephalic and atraumatic.   Eyes: Pupils are equal, round, and reactive to light.   Neck: Normal range of motion. Neck supple.   Cardiovascular: Normal rate and regular rhythm.   Pulmonary/Chest: Breath sounds normal.   Increased pain with deep inspiration or movement on the left side of chest wall   Abdominal: Soft. She exhibits no distension and no mass. There is no tenderness. There is no guarding.   Musculoskeletal: She exhibits no edema.   Neurological: She is alert.   Pain increased with moving of the hips   Skin: Skin is warm and dry. Capillary refill takes less than 2 seconds. She is not diaphoretic.   Psychiatric: She has a normal mood and affect. Her behavior is normal. Thought content normal.       Significant Labs: All pertinent  labs within the past 24 hours have been reviewed.    Significant Imaging: I have reviewed and interpreted all pertinent imaging results/findings within the past 24 hours.

## 2019-06-25 NOTE — NURSING
"Patient does not want any cardiac tests done tomorrow despite elevated troponin. She expressed just focusing on her fractures because it's "just too much." Dr. Barragan is aware and will d/w patient's family in detail risks/benefits tomorrow. Patient kept NPO for now.    "

## 2019-06-25 NOTE — ASSESSMENT & PLAN NOTE
CT pelvis shows 3 pubic fractures:  -acute minimally displaced fracture of the superior pubic ramus on the right  -acute nondisplaced transverse fracture of the inferior year pubic ramus on the left  -acute nondisplaced linear fracture through the lateral margin of the left sacral ala  Appreciate Orthopedic evaluation and recommendation  Conservative treatment with no need for surgical intervention  Physical therapy and occupational therapy and pain control  Patient will likely need skilled versus rehab placement

## 2019-06-25 NOTE — PROGRESS NOTES
[facility]  Cardiology  Progress Note    Patient Name: Zoey Ham  MRN: [id number]  Admission Date: 6/22/2019  Hospital Length of Stay: 2 days  Code Status: Full Code   Attending Physician: Sheryl Brewster MD   Primary Care Physician: Cesia Rosales MD  Expected Discharge Date:   Principal Problem:Fracture of multiple ribs of left side    Subjective:       Interval History:  No anginal sounding chest pain.  Continues to have muscular skeletal pain at the rib fractures        Past Medical History:   Diagnosis Date    Acute myocardial infarction of anterolateral wall 7/9/2015    Anemia of chronic renal failure, stage 4 (severe) 1/22/2015    Anticoagulant long-term use     ASA and plavix    Atherosclerosis of aorta 10/3/2012    AV shunt malfunction     Benign hypertension with CKD (chronic kidney disease) stage IV 3/11/2016    Hypertension associated with Diabetes    Carpal tunnel syndrome, right     Chronic diastolic congestive heart failure 10/3/2012    Combined systolic and diastolic CHF    Chronic kidney disease, stage IV (severe) 7/3/2012    Coronary artery disease     s/p 1v CABG 2002 and multiple PCI (last angiogram in 6/2012 with patent LIMA->LAD and patent LCx and RCA stents)    Diabetic polyneuropathy associated with type 2 diabetes mellitus 7/9/2015    Diabetic polyneuropathy associated with type 2 diabetes mellitus 7/9/2015    Dialysis patient     Encounter for blood transfusion     Gastritis without bleeding     Heart attack 09/2012    5-6 events    Hyperlipidemia     Hyperparathyroidism     Hyperphosphatemia     Metabolic acidosis     Nephritis and nephropathy, 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 7/9/2015    NSTEMI (non-ST elevated myocardial infarction)     NSTEMI (non-ST elevated myocardial infarction)     NSTEMI (non-ST elevated myocardial infarction)     Occlusion and stenosis of carotid artery with cerebral infarction 7/9/2015    Osteopenia     PAF  (paroxysmal atrial fibrillation) 10/3/2012    Pneumonia     Proliferative diabetic retinopathy 10/3/2012    S/P drug eluting coronary stent placement     Sepsis due to Escherichia coli with acute renal failure 2/2016    UTI     TACO (transfusion associated circulatory overload)     Type II diabetes mellitus with neurological manifestations 7/9/2015    Type II diabetes mellitus with renal manifestations 7/3/2012       Past Surgical History:   Procedure Laterality Date    APPENDECTOMY      CARDIAC SURGERY  2002    CABG    CHOLECYSTECTOMY      CHOLECYSTECTOMY-LAPAROSCOPIC N/A 2/4/2015    Performed by Quinton Ashley MD at [facility] OR [address] [place] [address]    JTITRNYSIPKR-YBSLINU-IS  - Left brachiocephalic Left 4/16/2018    Performed by YAMEL Perry III, MD at [facility] OR [address] [place] [address]    CORONARY ANGIOPLASTY  2004    CORONARY ARTERY BYPASS GRAFT      EYE SURGERY      cataracts bilaterally    Fistulogram Left 1/17/2019    Performed by YAMEL Perry III, MD at [facility] OR [address] [place] [address]    Fistulogram Left 9/20/2018    Performed by YAMEL Perry III, MD at [facility] OR [address] [place] [address]    Fistulogram left arm Left 6/20/2019    Performed by YAMEL Perry III, MD at [facility] OR [address] [place] [address]    FRACTURE SURGERY      right ankle    HYSTERECTOMY      PTA (ANGIOPLASTY, PERCUTANEOUS, TRANSLUMINAL) N/A 6/20/2019    [facility]    PTA (ANGIOPLASTY, PERCUTANEOUS, TRANSLUMINAL) N/A 1/17/2019    Performed by YAMEL Perry III, MD at [facility] OR [address] [place] [address]    PTA (ANGIOPLASTY, PERCUTANEOUS, TRANSLUMINAL) N/A 9/20/2018    Performed by YAMEL Perry III, MD at [facility] OR [address] [place] [address]    STENT, FISTULA Left 6/20/2019    Performed by YAMEL Perry III, MD at [facility] OR [address] [place] [address]    TONSILLECTOMY         Review of patient's allergies indicates:   Allergen Reactions    Percodan [oxycodone-aspirin] Hives     Welps     Pcn [penicillins] Hives and Swelling     Tolerated Rocephin IM in clinic      Phenergan [promethazine] Other  "(See Comments)     Altered mental status; jerking of extremities       No current facility-administered medications on file prior to encounter.      Current Outpatient Medications on File Prior to Encounter   Medication Sig    ACCU-CHEK MARI PLUS METER Misc 1 Device by Misc.(Non-Drug; Combo Route) route 2 (two) times daily.    aspirin (ECOTRIN) 325 MG EC tablet Take 1 tablet (325 mg total) by mouth once daily. (Patient taking differently: Take 325 mg by mouth every morning. )    atorvastatin (LIPITOR) 80 MG tablet Take 1 tablet (80 mg total) by mouth every morning.    blood sugar diagnostic (ACCU-CHEK MARI PLUS TEST STRP) Strp TEST BLOOD SUGAR FOUR TIMES DAILY    calcitRIOL (ROCALTROL) 0.5 MCG Cap Take 1 capsule (0.5 mcg total) by mouth once daily.    clopidogrel (PLAVIX) 75 mg tablet Take 1 tablet (75 mg total) by mouth once daily.    furosemide (LASIX) 80 MG tablet One 80 mg  tab once a day.    insulin glargine (LANTUS U-100 INSULIN) 100 unit/mL injection Inject 15 Units into the skin every evening.    insulin syringe-needle U-100 0.3 mL 31 gauge x 1/4" Syrg 1 application by Misc.(Non-Drug; Combo Route) route once daily.    lancets (ACCU-CHEK SOFTCLIX LANCETS) Misc 1 application by Misc.(Non-Drug; Combo Route) route once daily.    lidocaine-prilocaine (EMLA) cream APPLY SMALL AMOUNT TO ACCESS SITE 1-2 HOURS BEFORE TREATMENT. COVER AREA WITH AN OCCLUSIVE DRESSING AS DIRECTED.    metoprolol tartrate (LOPRESSOR) 100 MG tablet Take 1 tablet (100 mg total) by mouth 2 (two) times daily.    nitroGLYCERIN (NITROSTAT) 0.3 MG SL tablet Place 1 tablet (0.3 mg total) under the tongue every 5 (five) minutes as needed for Chest pain.     Family History     Problem Relation (Age of Onset)    Breast cancer Maternal Aunt    Cancer Mother    Dementia Father    Diabetes Daughter    Heart disease Mother, Father    Hypertension Mother, Father, Sister, Brother    No Known Problems Son    Psoriasis Father    Stroke Sister "        Tobacco Use    Smoking status: Never Smoker    Smokeless tobacco: Never Used   Substance and Sexual Activity    Alcohol use: No    Drug use: No    Sexual activity: Never     Review of Systems   Constitution: Negative.   HENT: Negative.    Eyes: Negative.    Cardiovascular: Positive for chest pain.   Respiratory: Negative.    Endocrine: Negative.    Hematologic/Lymphatic: Negative.    Skin: Negative.    Musculoskeletal: Negative.    Gastrointestinal: Negative.    Genitourinary: Negative.    Neurological: Negative.    Psychiatric/Behavioral: Negative.    Allergic/Immunologic: Negative.      Objective:     Vital Signs (Most Recent):  Temp: 98 °F (36.7 °C) (06/25/19 1558)  Pulse: 76 (06/25/19 1558)  Resp: 18 (06/25/19 1558)  BP: (!) 121/59 (06/25/19 1558)  SpO2: 96 % (06/25/19 1558) Vital Signs (24h Range):  Temp:  [97.3 °F (36.3 °C)-98.2 °F (36.8 °C)] 98 °F (36.7 °C)  Pulse:  [67-76] 76  Resp:  [17-18] 18  SpO2:  [96 %-99 %] 96 %  BP: ()/(58-65) 121/59     Weight: 76.2 kg (168 lb)  Body mass index is 28.84 kg/m².    SpO2: 96 %  O2 Device (Oxygen Therapy): nasal cannula      Intake/Output Summary (Last 24 hours) at 6/25/2019 1635  Last data filed at 6/24/2019 1730  Gross per 24 hour   Intake 200 ml   Output --   Net 200 ml       Lines/Drains/Airways     Drain                 Hemodialysis AV Fistula Left upper arm -- days         Hemodialysis AV Fistula Left upper arm -- days         Hemodialysis AV Fistula 06/20/19 0908 Left upper arm 5 days          Peripheral Intravenous Line                 Peripheral IV - Single Lumen 06/22/19 2330 18 G Right Antecubital 2 days                Physical Exam   Constitutional: She is oriented to person, place, and time. She appears well-developed and well-nourished.   HENT:   Head: Normocephalic.   Eyes: Pupils are equal, round, and reactive to light.   Neck: Normal range of motion. Neck supple.   Cardiovascular: Normal rate and regular rhythm.   Pulmonary/Chest:  Effort normal and breath sounds normal.   Abdominal: Soft. Normal appearance and bowel sounds are normal. There is no tenderness.   Musculoskeletal: Normal range of motion. She exhibits tenderness.   Bruising noted   Neurological: She is alert and oriented to person, place, and time.   Skin: Skin is warm.   Psychiatric: She has a normal mood and affect.   Nursing note and vitals reviewed.      Significant Labs:   BMP:   Recent Labs   Lab 06/24/19 0430 06/25/19  0432   * 101   * 135*   K 5.5* 4.7    97   CO2 23 26   BUN 34* 33*   CREATININE 3.3* 3.1*   CALCIUM 9.2 9.1   , CMP   Recent Labs   Lab 06/24/19  0430 06/25/19  0432   * 135*   K 5.5* 4.7    97   CO2 23 26   * 101   BUN 34* 33*   CREATININE 3.3* 3.1*   CALCIUM 9.2 9.1   ANIONGAP 12 12   ESTGFRAFRICA 15* 17*   EGFRNONAA 13* 14*   , CBC   Recent Labs   Lab 06/24/19 0430 06/25/19  0432   WBC 14.04* 14.50*   HGB 11.2* 11.0*   HCT 35.4* 35.2*    226   , INR   No results for input(s): INR, PROTIME in the last 48 hours., Lipid Panel No results for input(s): CHOL, HDL, LDLCALC, TRIG, CHOLHDL in the last 48 hours., Troponin   Recent Labs   Lab 06/24/19  1919 06/25/19  0808   TROPONINI 1.725* 1.989*    and All pertinent lab results from the last 24 hours have been reviewed.    Significant Imaging: ekg: personally reviewed 6/23: nsr, incomplete lbbb, marked st abn in inferolateral leads (not changed much from last ekg in 2018)        Assessment and Plan:         Coronary artery disease involving coronary bypass graft of native heart with unstable angina pectoris  Patient came in with acute chest pain after a fall.  She does have significant history of coronary artery disease with multiple revascularization and troponins are mildly elevated, however she is unable to tell whether this chest pain is similar to her prior anginal pain or is more related to her fall.  Considering the fact that it is left-sided and her chest wall  is tender, it is likely related to her fall.    Her troponins are elevated.  Peak troponin is 1.9.  She does not have any anginal sounding chest pain.  I had a detailed discussion with the patient and her family about various options.  We discussed the coronary angiogram, stress testing versus continued medical management.  Patient is and the family state that will be very difficult for the patient to lay down for any of these testing because of her significant musculoskeletal injuries.  .  she states that she would like to continue medical management because she is concerned about the pain she will experience if she is not on a comfortable bed.  Considering the fact that she has mostly musculoskeletal pain, and it will be difficult for to tolerate any kind of testing at the current time, we will continue medical management for now and proceed with ischemic workup if she has a anginal sounding chest pains.  Continue aspirin, Plavix, aggressive risk factor modification    Chronic atrial fibrillation  Has not been on coumadin for years per patient and was taken off of it by her physicians. Currently in nsr    Proliferative diabetic retinopathy        Hypertension associated with diabetes  Continue current rx        VTE Risk Mitigation (From admission, onward)        Ordered     heparin (porcine) injection 5,000 Units  Every 8 hours      06/23/19 0908     IP VTE HIGH RISK PATIENT  Once      06/23/19 0631       I will sign off.  Please re-consult if needed.    Stewart Barragan MD  Cardiology  [facility] [place]

## 2019-06-25 NOTE — ASSESSMENT & PLAN NOTE
Patient has 6 fractures 2 displaced (3 rib fractures and 3 hip fractures total bilaterally)  Volumes status corrected with blood pressure improved along with control of pain  Continue metoprolol and patient is on Lasix at 80 mg daily  P.r.n. hydralazine

## 2019-06-25 NOTE — ASSESSMENT & PLAN NOTE
Continue basal,/prandial insulin along with sliding scale insulin  Hemoglobin A1c is 8.7 which is not at target  Glucose goal during hospitalization between 140-180  Will make further adjustments as necessary

## 2019-06-25 NOTE — ASSESSMENT & PLAN NOTE
Patient has anemia of chronic disease  H&H consistent with previous levels  Will continue to monitor

## 2019-06-26 LAB
ANION GAP SERPL CALC-SCNC: 12 MMOL/L (ref 8–16)
BASOPHILS # BLD AUTO: 0.03 K/UL (ref 0–0.2)
BASOPHILS NFR BLD: 0.2 % (ref 0–1.9)
BUN SERPL-MCNC: 65 MG/DL (ref 8–23)
CALCIUM SERPL-MCNC: 8.6 MG/DL (ref 8.7–10.5)
CHLORIDE SERPL-SCNC: 96 MMOL/L (ref 95–110)
CO2 SERPL-SCNC: 25 MMOL/L (ref 23–29)
CREAT SERPL-MCNC: 4.6 MG/DL (ref 0.5–1.4)
DIFFERENTIAL METHOD: ABNORMAL
EOSINOPHIL # BLD AUTO: 0.3 K/UL (ref 0–0.5)
EOSINOPHIL NFR BLD: 2.1 % (ref 0–8)
ERYTHROCYTE [DISTWIDTH] IN BLOOD BY AUTOMATED COUNT: 14.1 % (ref 11.5–14.5)
EST. GFR  (AFRICAN AMERICAN): 10 ML/MIN/1.73 M^2
EST. GFR  (NON AFRICAN AMERICAN): 9 ML/MIN/1.73 M^2
GLUCOSE SERPL-MCNC: 152 MG/DL (ref 70–110)
HCT VFR BLD AUTO: 33 % (ref 37–48.5)
HGB BLD-MCNC: 10.5 G/DL (ref 12–16)
LYMPHOCYTES # BLD AUTO: 1.1 K/UL (ref 1–4.8)
LYMPHOCYTES NFR BLD: 9.4 % (ref 18–48)
MCH RBC QN AUTO: 31.5 PG (ref 27–31)
MCHC RBC AUTO-ENTMCNC: 31.8 G/DL (ref 32–36)
MCV RBC AUTO: 99 FL (ref 82–98)
MONOCYTES # BLD AUTO: 1.5 K/UL (ref 0.3–1)
MONOCYTES NFR BLD: 12.5 % (ref 4–15)
NEUTROPHILS # BLD AUTO: 9.1 K/UL (ref 1.8–7.7)
NEUTROPHILS NFR BLD: 76.2 % (ref 38–73)
PLATELET # BLD AUTO: 210 K/UL (ref 150–350)
PMV BLD AUTO: 11.1 FL (ref 9.2–12.9)
POCT GLUCOSE: 112 MG/DL (ref 70–110)
POCT GLUCOSE: 174 MG/DL (ref 70–110)
POCT GLUCOSE: 181 MG/DL (ref 70–110)
POCT GLUCOSE: 232 MG/DL (ref 70–110)
POTASSIUM SERPL-SCNC: 4.7 MMOL/L (ref 3.5–5.1)
RBC # BLD AUTO: 3.33 M/UL (ref 4–5.4)
SODIUM SERPL-SCNC: 133 MMOL/L (ref 136–145)
WBC # BLD AUTO: 12.04 K/UL (ref 3.9–12.7)

## 2019-06-26 PROCEDURE — 63600175 PHARM REV CODE 636 W HCPCS: Mod: HCNC | Performed by: NURSE PRACTITIONER

## 2019-06-26 PROCEDURE — 85025 COMPLETE CBC W/AUTO DIFF WBC: CPT | Mod: HCNC

## 2019-06-26 PROCEDURE — 25000003 PHARM REV CODE 250: Mod: HCNC | Performed by: NURSE PRACTITIONER

## 2019-06-26 PROCEDURE — 36415 COLL VENOUS BLD VENIPUNCTURE: CPT | Mod: HCNC

## 2019-06-26 PROCEDURE — 25000003 PHARM REV CODE 250: Mod: HCNC | Performed by: EMERGENCY MEDICINE

## 2019-06-26 PROCEDURE — 80100016 HC MAINTENANCE HEMODIALYSIS: Mod: HCNC

## 2019-06-26 PROCEDURE — 97110 THERAPEUTIC EXERCISES: CPT | Mod: HCNC

## 2019-06-26 PROCEDURE — 80048 BASIC METABOLIC PNL TOTAL CA: CPT | Mod: HCNC

## 2019-06-26 PROCEDURE — 21400001 HC TELEMETRY ROOM: Mod: HCNC

## 2019-06-26 PROCEDURE — 97530 THERAPEUTIC ACTIVITIES: CPT | Mod: HCNC

## 2019-06-26 RX ORDER — MIRTAZAPINE 7.5 MG/1
7.5 TABLET, FILM COATED ORAL NIGHTLY PRN
Status: DISCONTINUED | OUTPATIENT
Start: 2019-06-26 | End: 2019-06-27 | Stop reason: HOSPADM

## 2019-06-26 RX ORDER — ACETAMINOPHEN 325 MG/1
650 TABLET ORAL EVERY 6 HOURS PRN
Status: DISCONTINUED | OUTPATIENT
Start: 2019-06-26 | End: 2019-06-27 | Stop reason: HOSPADM

## 2019-06-26 RX ORDER — POLYETHYLENE GLYCOL 3350 17 G/17G
17 POWDER, FOR SOLUTION ORAL 2 TIMES DAILY PRN
Status: DISCONTINUED | OUTPATIENT
Start: 2019-06-26 | End: 2019-06-27 | Stop reason: HOSPADM

## 2019-06-26 RX ORDER — HYDROMORPHONE HYDROCHLORIDE 2 MG/ML
0.5 INJECTION, SOLUTION INTRAMUSCULAR; INTRAVENOUS; SUBCUTANEOUS EVERY 6 HOURS PRN
Status: DISCONTINUED | OUTPATIENT
Start: 2019-06-26 | End: 2019-06-27

## 2019-06-26 RX ORDER — DOCUSATE SODIUM 100 MG/1
100 CAPSULE, LIQUID FILLED ORAL DAILY
Status: DISCONTINUED | OUTPATIENT
Start: 2019-06-27 | End: 2019-06-27 | Stop reason: HOSPADM

## 2019-06-26 RX ADMIN — HEPARIN SODIUM 5000 UNITS: 5000 INJECTION, SOLUTION INTRAVENOUS; SUBCUTANEOUS at 09:06

## 2019-06-26 RX ADMIN — TRAMADOL HYDROCHLORIDE 50 MG: 50 TABLET, FILM COATED ORAL at 07:06

## 2019-06-26 RX ADMIN — MUPIROCIN: 20 OINTMENT TOPICAL at 09:06

## 2019-06-26 RX ADMIN — HYDROMORPHONE HYDROCHLORIDE 1 MG: 2 INJECTION, SOLUTION INTRAMUSCULAR; INTRAVENOUS; SUBCUTANEOUS at 06:06

## 2019-06-26 RX ADMIN — INSULIN ASPART 1 UNITS: 100 INJECTION, SOLUTION INTRAVENOUS; SUBCUTANEOUS at 09:06

## 2019-06-26 RX ADMIN — HEPARIN SODIUM 5000 UNITS: 5000 INJECTION, SOLUTION INTRAVENOUS; SUBCUTANEOUS at 03:06

## 2019-06-26 RX ADMIN — METOPROLOL TARTRATE 100 MG: 50 TABLET ORAL at 09:06

## 2019-06-26 RX ADMIN — HEPARIN SODIUM 5000 UNITS: 5000 INJECTION, SOLUTION INTRAVENOUS; SUBCUTANEOUS at 06:06

## 2019-06-26 NOTE — ASSESSMENT & PLAN NOTE
Patient has a history of CABG in 2002 and PCI in 2012 with stents  Patient has chronic troponinemia, however her admitting troponin was  Results for TAY COVARRUBIAS (MRN [id number]) as of 6/23/2019 09:31   Ref. Range 6/23/2019 01:20   Troponin I Latest Ref Range: 0.000 - 0.026 ng/mL 0.176 (H)   Patient denied chest pain   at the time of admit.    Her last 2D echo 06/05/2019 at this facility showed LVEF 60-65% with grade 2 diastolic dysfunction   Continue aspirin, metoprolol, atorvastatin  Appreciate Cardiology input  Trending troponins  Cardiology recommended conservative treatment with medical management and aggressive risk factor modification

## 2019-06-26 NOTE — NURSING
Bedside report given to MADELINE Chau. Patient sitting up in bed. NAD noted at this time. All safety precautions in place.   12 hr chart check complete

## 2019-06-26 NOTE — SUBJECTIVE & OBJECTIVE
Interval History:  Patient reports feeling much better and is no longer short of breath.  Reports she has been doing the incentive spirometry and has had increased improvement    Review of Systems   Constitutional: Negative for chills and fever.   Respiratory: Negative for shortness of breath.    Cardiovascular: Negative for chest pain.   Musculoskeletal: Positive for arthralgias.     Objective:     Vital Signs (Most Recent):  Temp: 98.2 °F (36.8 °C) (06/25/19 2020)  Pulse: 80 (06/25/19 2052)  Resp: 18 (06/25/19 2020)  BP: (!) 150/63 (06/25/19 2052)  SpO2: 96 % (06/25/19 2020) Vital Signs (24h Range):  Temp:  [97.5 °F (36.4 °C)-98.2 °F (36.8 °C)] 98.2 °F (36.8 °C)  Pulse:  [67-80] 80  Resp:  [17-18] 18  SpO2:  [96 %-99 %] 96 %  BP: ()/(58-67) 150/63     Weight: 76.2 kg (168 lb)  Body mass index is 28.84 kg/m².    Intake/Output Summary (Last 24 hours) at 6/25/2019 2201  Last data filed at 6/25/2019 1730  Gross per 24 hour   Intake 240 ml   Output --   Net 240 ml      Physical Exam   Constitutional: She appears well-developed and well-nourished. No distress.   HENT:   Head: Normocephalic and atraumatic.   Eyes: Pupils are equal, round, and reactive to light.   Neck: Normal range of motion. Neck supple.   Cardiovascular: Normal rate and regular rhythm.   Pulmonary/Chest: Breath sounds normal.   Increased pain with deep inspiration or movement on the left side of chest wall   Abdominal: Soft. She exhibits no distension and no mass. There is no tenderness. There is no guarding.   Musculoskeletal: She exhibits no edema.   Neurological: She is alert.   Pain increased with moving of the hips   Skin: Skin is warm and dry. Capillary refill takes less than 2 seconds. She is not diaphoretic.   Psychiatric: She has a normal mood and affect. Her behavior is normal. Thought content normal.       Significant Labs: All pertinent labs within the past 24 hours have been reviewed.    Significant Imaging: I have reviewed and  interpreted all pertinent imaging results/findings within the past 24 hours.

## 2019-06-26 NOTE — PROGRESS NOTES
Zoey Ham is a 73 y.o. female patient.    Follow for ESRD, dialysis    Patient seen while on dialysis  No new c/o, comfortable    Scheduled Meds:   sodium chloride 0.9%   Intravenous Once    aspirin  325 mg Oral Daily    atorvastatin  40 mg Oral Daily    furosemide  80 mg Oral Daily    heparin (porcine)  5,000 Units Subcutaneous Q8H    insulin detemir U-100  15 Units Subcutaneous Daily    metoprolol tartrate  100 mg Oral BID    mupirocin   Nasal BID    vitamin renal formula (B-complex-vitamin c-folic acid)  1 capsule Oral Daily       Review of patient's allergies indicates:   Allergen Reactions    Percodan [oxycodone-aspirin] Hives     Welps     Pcn [penicillins] Hives and Swelling     Tolerated Rocephin IM in clinic      Phenergan [promethazine] Other (See Comments)     Altered mental status; jerking of extremities         Vital Signs Range (Last 24H):  Temp:  [97.5 °F (36.4 °C)-98.2 °F (36.8 °C)]   Pulse:  [70-80]   Resp:  [17-18]   BP: (105-157)/(58-70)   SpO2:  [92 %-99 %]     I & O (Last 24H):    Intake/Output Summary (Last 24 hours) at 6/26/2019 0959  Last data filed at 6/26/2019 0300  Gross per 24 hour   Intake 240 ml   Output 200 ml   Net 40 ml           Physical Exam:  General appearance: well developed, well nourished, no distress  Lungs:  clear to auscultation bilaterally and normal respiratory effort  Heart: regular rate and rhythm  Abdomen: soft, non-tender non-distented; bowel sounds normal; no masses,  no organomegaly  Extremities: no cyanosis or edema, or clubbing    Laboratory:  CBC:   Recent Labs   Lab 06/26/19  0550   WBC 12.04   RBC 3.33*   HGB 10.5*   HCT 33.0*      MCV 99*   MCH 31.5*   MCHC 31.8*     CMP:   Recent Labs   Lab 06/23/19  0120  06/26/19  0550   *   < > 152*   CALCIUM 9.1   < > 8.6*   ALBUMIN 3.9  --   --    PROT 7.3  --   --       < > 133*   K 5.2*   < > 4.7   CO2 22*   < > 25      < > 96   BUN 57*   < > 65*   CREATININE 3.7*   <  > 4.6*   ALKPHOS 258*  --   --    ALT 20  --   --    AST 20  --   --    BILITOT 0.4  --   --     < > = values in this interval not displayed.       Imp/Plan    ESRD - stable on dialysis, tolerated well  S/p fall w/ hip and rib fracture  Pulmonary edema - resolved  HTN  DM type 2  Anemia of CKD    Continue present Rx  HD q MWF  We'll follow for dialysis        Judy Boothe  6/26/2019

## 2019-06-26 NOTE — ASSESSMENT & PLAN NOTE
CT pelvis shows 3 pubic fractures:  -acute minimally displaced fracture of the superior pubic ramus on the right  -acute nondisplaced transverse fracture of the inferior year pubic ramus on the left  -acute nondisplaced linear fracture through the lateral margin of the left sacral ala  Appreciate Orthopedic evaluation and recommendation  Conservative treatment with no need for surgical intervention  Physical therapy and occupational therapy and pain control  Patient will likely need rehab placement

## 2019-06-26 NOTE — NURSING
Spoke with patient daughter Ana Cristina informed her that she is in dialysis and will return around noon. Will call her at that time.

## 2019-06-26 NOTE — PLAN OF CARE
Problem: Device-Related Complication Risk (Hemodialysis)  Goal: Safe, Effective Therapy Delivery  Outcome: Ongoing (interventions implemented as appropriate)  Intervention: Optimize Device Care and Function     06/26/19 1005   Optimize Blood Flow   Circuit Management air detection alarms on;circuit line warming device in use;tubing repositioned;tubing/circuit/filter adjusted   Manage Acute Allergic Reaction   Medication Review/Management medications reviewed;high risk medications identified         Comments: 3.5 hour intermittent Hemodialysis tx in progress as ordered.

## 2019-06-26 NOTE — NURSING
Bedside report received from MADELINE Chau. Patient awake and alert. NAD noted at this time. All safety precautions in place. Will continue to monitor

## 2019-06-26 NOTE — PROGRESS NOTES
SW received a call from [employer]) explaining after review, pt does not meet for rehab services, and have asked if doctor will be willing to do a peer to peer. Dr. Brewster agreed to peer to peer, meeting has been arranged for 2:30PM on 6/27. SW will continue to follow.     Pt has been accepted by [facility].

## 2019-06-26 NOTE — PT/OT/SLP PROGRESS
Physical Therapy Treatment    Patient Name:  Zoey Ham   MRN:  [id number]    Recommendations:     Discharge Recommendations:  rehabilitation facility   Discharge Equipment Recommendations: (TBD)   Barriers to discharge: pt with decreased mobility     Assessment:     Zoey Ham is a 73 y.o. female admitted with a medical diagnosis of Fracture of multiple ribs of left side.  She presents with the following impairments/functional limitations:  weakness, impaired self care skills, impaired functional mobilty, gait instability, decreased coordination, decreased upper extremity function, decreased lower extremity function, impaired balance, pain, decreased ROM, decreased safety awareness, impaired fine motor, orthopedic precautions, edema . Limited  with therapy activities today 2* pt just returned from dialysis .      Rehab Prognosis: Fair +; patient would benefit from acute skilled PT services to address these deficits and reach maximum level of function.    Recent Surgery: * No surgery found *      Plan:     During this hospitalization, patient to be seen daily to address the identified rehab impairments via gait training, therapeutic activities, therapeutic exercises, wheelchair management/training and progress toward the following goals:    · Plan of Care Expires:  07/09/19    Subjective     Chief Complaint: fatigue and L ribs pain   Patient/Family Comments/goals: to get stronger   Pain/Comfort:  · Pain Rating 1: 8/10  · Location - Side 1: Left  · Location 1: rib(s)  · Pain Addressed 1: Pre-medicate for activity, Nurse notified  · Pain Rating Post-Intervention 1: 8/10      Objective:     Communicated with nurse  prior to session.  Patient found HOB elevated with bed alarm, oxygen, telemetry upon PT entry to room.     General Precautions: Standard, fall, respiratory   Orthopedic Precautions:LLE partial weight bearing   Braces: N/A     Functional Mobility:  · Bed Mobility:     · Rolling Left:  minimum  assistance  · Scooting: contact guard assistance  · Supine to Sit: moderate assistance, HOB elevated, bedside rail   · Sit to Supine: moderate assistance and of 2 persons  · Transfers:     · Sit to Stand:   maximal assistance and of 2 persons with no AD and rolling walker. V/T cues for safety technique and walker management,.   · Gait:Pt ambulated ~ 4-5 sidesteps along bedside with max A X  2 persons using RW/3L O2 NC and LLE PWB.  Pt with decreased weight shifting, foot clearance, step length and patel.  Pt required mod/MAX  VC's to maintain LLE PWB during gait training. V/T cues for safety technique and walker management.   ·  Balance: fair with sitting, poor with standing,       AM-PAC 6 CLICK MOBILITY  Turning over in bed (including adjusting bedclothes, sheets and blankets)?: 3  Sitting down on and standing up from a chair with arms (e.g., wheelchair, bedside commode, etc.): 2  Moving from lying on back to sitting on the side of the bed?: 2  Moving to and from a bed to a chair (including a wheelchair)?: 2  Need to walk in hospital room?: 2  Climbing 3-5 steps with a railing?: 1  Basic Mobility Total Score: 12       Therapeutic Activities and Exercises:   pt performed bed mobility and transfer as above.    Patient left HOB elevated with all lines intact, call button in reach, bed alarm on and nurse notified..    GOALS:   Multidisciplinary Problems     Physical Therapy Goals        Problem: Physical Therapy Goal    Goal Priority Disciplines Outcome Goal Variances Interventions   Physical Therapy Goal     PT, PT/OT Ongoing (interventions implemented as appropriate)     Description:  Goals to be met by: 19    Patient will increase functional independence with mobility by performin. Supine to sit with Stand-by Assistance  2. Rolling to Left and Right with Stand-by Assistance  3. Sit to stand transfer with Minimal Assistance using RW  4. Bed to chair transfer with Minimal Assistance using Rolling  Walker  5. Gait  x20-30 feet with Minimal Assistance using Rolling Walker and LLE PWB  6. Wheelchair propulsion x100 feet with Stand-by Assistance using bilateral upper extremities  7. Lower extremity exercise program (seated/supine) 3 sets x10 reps per handout, with independence                      Time Tracking:     PT Received On: 06/26/19  PT Start Time: 1417     PT Stop Time: 1440  PT Total Time (min): 23 min     Billable Minutes: Therapeutic Activity 11 and Total Time 23 co-treat with OT      Treatment Type: Treatment  PT/PTA: PTA     PTA Visit Number: 1     Erika Gama, PTA  06/26/2019

## 2019-06-26 NOTE — PROGRESS NOTES
[facility] Hospital Medicine  Progress Note    Patient Name: Zoey Ham  MRN: [id number]  Patient Class: IP- Inpatient   Admission Date: 6/22/2019  Length of Stay: 2 days  Attending Physician: Sheryl Brewster MD  Primary Care Provider: Cesia Rosales MD        Subjective:     Principal Problem:Fracture of multiple ribs of left side      HPI:  Zoey Ham is a 73 y.o. unfortunate elderly female patient with extensive cardiac and renal history as below - including but not limited to CAD (CABG 2002, PCI 2012), ESRD-HD (Monday Wednesday Friday/still makes urine), HTN, and gastritis with bleeding history.  Per patient she was in her usual state of health up until 1 day ago.  She reports that she usually dialyzes in [facility]  but her daughter lives in [place].  She reports that she dialyzed in a [place] on [address]  on Friday  as she had been visiting her and the environment was somewhat unfamiliar.  Patient reports that she encountered a mechanical fall and a very small bathroom when she attempted to close the door.  She denies any loss of consciousness, dizziness, lightheadedness before the episode.  She denies chest pain, fever chills, upper respiratory infection, headache, focal deficits or weaknesses, or numbness or tingling in either extremity prior to or after the event.    Upon presentation the patient was noted on CT chest to have 3 left rib fractures 7 and 8 with mild displacement involving the left 7th and lesser extent 8th rib.  CT pelvis showed acute displaced comminuted right fracture of the pelvis as well as an acute nondisplaced transverse fracture of the inferior pubic ramus on the left.  Additionally abnormal CBC and chemistry significant for leukocytosis = 14 K, mild hypokalemia = 5.2, other findings consistent with end-stage renal disease decreased creatinine and GFR.  Additionally patient's glucose level was found to be 442, alk phos 258.  Lastly patient was noted to  have bump in troponin 1 level that has trended she has usual chronic troponinemia =Results for TAY COVARRUBIAS (MRN [id number]) as of 6/23/2019 08:06  Results for TAY COVARRUBIAS (MRN [id number]) as of 6/23/2019 16:37   Ref. Range 6/23/2019 01:20   Troponin I Latest Ref Range: 0.000 - 0.026 ng/mL 0.176 (H)       Cardiologist at [facility] ([place]?)    Overview/Hospital Course:  No notes on file    Interval History:  Patient reports feeling much better and is no longer short of breath.  Reports she has been doing the incentive spirometry and has had increased improvement    Review of Systems   Constitutional: Negative for chills and fever.   Respiratory: Negative for shortness of breath.    Cardiovascular: Negative for chest pain.   Musculoskeletal: Positive for arthralgias.     Objective:     Vital Signs (Most Recent):  Temp: 98.2 °F (36.8 °C) (06/25/19 2020)  Pulse: 80 (06/25/19 2052)  Resp: 18 (06/25/19 2020)  BP: (!) 150/63 (06/25/19 2052)  SpO2: 96 % (06/25/19 2020) Vital Signs (24h Range):  Temp:  [97.5 °F (36.4 °C)-98.2 °F (36.8 °C)] 98.2 °F (36.8 °C)  Pulse:  [67-80] 80  Resp:  [17-18] 18  SpO2:  [96 %-99 %] 96 %  BP: ()/(58-67) 150/63     Weight: 76.2 kg (168 lb)  Body mass index is 28.84 kg/m².    Intake/Output Summary (Last 24 hours) at 6/25/2019 2201  Last data filed at 6/25/2019 1730  Gross per 24 hour   Intake 240 ml   Output --   Net 240 ml      Physical Exam   Constitutional: She appears well-developed and well-nourished. No distress.   HENT:   Head: Normocephalic and atraumatic.   Eyes: Pupils are equal, round, and reactive to light.   Neck: Normal range of motion. Neck supple.   Cardiovascular: Normal rate and regular rhythm.   Pulmonary/Chest: Breath sounds normal.   Increased pain with deep inspiration or movement on the left side of chest wall   Abdominal: Soft. She exhibits no distension and no mass. There is no tenderness. There is no guarding.   Musculoskeletal: She exhibits no edema.    Neurological: She is alert.   Pain increased with moving of the hips   Skin: Skin is warm and dry. Capillary refill takes less than 2 seconds. She is not diaphoretic.   Psychiatric: She has a normal mood and affect. Her behavior is normal. Thought content normal.       Significant Labs: All pertinent labs within the past 24 hours have been reviewed.    Significant Imaging: I have reviewed and interpreted all pertinent imaging results/findings within the past 24 hours.      Assessment/Plan:      * Fracture of multiple ribs of left side  Patient has traumatic rib fractures x 3   1 displaced which is the 8th,   Poor inspiratory and expiratory effort oxygen sat decline when off oxygen likely secondary to pain  No need for surgical intervention  Continue expended incentive spirometry and pain control    Displaced fracture of pubis  CT pelvis shows 3 pubic fractures:  -acute minimally displaced fracture of the superior pubic ramus on the right  -acute nondisplaced transverse fracture of the inferior year pubic ramus on the left  -acute nondisplaced linear fracture through the lateral margin of the left sacral ala  Appreciate Orthopedic evaluation and recommendation  Conservative treatment with no need for surgical intervention  Physical therapy and occupational therapy and pain control  Patient will likely need rehab placement      Troponin level elevated  Patient has a history of CABG in 2002 and PCI in 2012 with stents  Patient has chronic troponinemia, however her admitting troponin was  Results for TAY COVARRUBIAS (MRN [id number]) as of 6/23/2019 09:31   Ref. Range 6/23/2019 01:20   Troponin I Latest Ref Range: 0.000 - 0.026 ng/mL 0.176 (H)   Patient denied chest pain   at the time of admit.    Her last 2D echo 06/05/2019 at this facility showed LVEF 60-65% with grade 2 diastolic dysfunction   Continue aspirin, metoprolol, atorvastatin  Appreciate Cardiology input  Trending troponins  Cardiology recommended  conservative treatment with medical management and aggressive risk factor modification    Hypoxia  Multifactorial secondary to some pulmonary edema and decreased respiratory effort secondary to rib fractures  Improved with dialysis with volume correction  Continue incentive spirometry  Oxygen to be weaned as tolerated    ESRD (end stage renal disease) on dialysis  Patient in stage renal disease usually dialyzes in [place], dialyzed last on Friday 06/21/2019 in [place]  Status pursue emergent dialysis yesterday with improvement with volume status  Patient ongoing regular scheduled dialysis again today  As per Nephrology    Coronary artery disease involving coronary bypass graft of native heart with unstable angina pectoris  Continue metoprolol, aspirin, atorvastatin    Chronic atrial fibrillation  Rate controlled on metoprolol  Patient has not been on anticoagulation for a long time    Type 2 diabetes mellitus with chronic kidney disease on chronic dialysis, with long-term current use of insulin  Continue basal,/prandial insulin along with sliding scale insulin  Hemoglobin A1c is 8.7 which is not at target  Glucose goal during hospitalization between 140-180  Will make further adjustments as necessary      Anemia of chronic renal failure, stage 5  Patient has anemia of chronic disease  H&H consistent with previous levels  Will continue to monitor    Leukocytosis  Elevated white cell count likely secondary to leukemoid reaction secondary to pain  No obvious source of infection noted in any imaging on studies, and she remains afebrile  Continue Spiriva spirometry  Will continue to monitor repeated CBC    Proliferative diabetic retinopathy   Tight glucose control and outpatient ophthalmology follow-up    Hypertension associated with diabetes  Patient has 6 fractures 2 displaced (3 rib fractures and 3 hip fractures total bilaterally)  Volumes status corrected with blood pressure improved along with control of  pain  Continue metoprolol and patient is on Lasix at 80 mg daily  P.r.n. hydralazine       VTE Risk Mitigation (From admission, onward)        Ordered     heparin (porcine) injection 5,000 Units  Every 8 hours      06/23/19 0908     IP VTE HIGH RISK PATIENT  Once      06/23/19 0631                Sheryl Brewster MD  Department of Hospital Medicine   [facility]

## 2019-06-26 NOTE — PT/OT/SLP PROGRESS
Occupational Therapy   Treatment    Name: Zoey Ham  MRN: [id number]  Admitting Diagnosis:  Fracture of multiple ribs of left side       Recommendations:     Discharge Recommendations: rehabilitation facility  Discharge Equipment Recommendations:  (TBD)  Barriers to discharge:  (pt requires increased caregiver support)    Assessment:     Zoey Ham is a 73 y.o. female with a medical diagnosis of Fracture of multiple ribs of left side.  Nurse notified OT ahead of time that after dialysis, pt HD port was bleeding. Nurse checked port and bandage and gave the clear for therapy to work with her with no restrictions. Nurse reported pt confused after pain meds this morning and requested pt back to bed after therapy d/t long morning with dialysis. Pt tolerated session well and motivated to participate despite pain. Pt demo'd improved standing tolerance compared to session on 06/25/19, as she was able to tolerate taking ~3-4 sidesteps to HOB with MAX A x2 with RW. Performance deficits affecting function are weakness, impaired self care skills, impaired balance, decreased safety awareness, impaired skin, impaired endurance, impaired functional mobilty, pain, decreased upper extremity function, gait instability, impaired cardiopulmonary response to activity, orthopedic precautions. Pt will benefit from the intense therapy that inpatient rehab will provide in order to attain a more independent quality of life and regain PLOF.     Rehab Prognosis:  Good; patient would benefit from acute skilled OT services to address these deficits and reach maximum level of function.       Plan:     Patient to be seen 5 x/week to address the above listed problems via self-care/home management, therapeutic activities, therapeutic exercises  · Plan of Care Expires: 07/09/19  · Plan of Care Reviewed with: patient    Subjective     Pain/Comfort:  · Pain Rating 1: 8/10  · Location - Side 1: Left  · Location - Orientation 1:  generalized  · Location 1: rib(s)(and L hip)  · Pain Addressed 1: Pre-medicate for activity, Cessation of Activity, Reposition, Nurse notified, Distraction  · Pain Rating Post-Intervention 1: (remained throughout session)    Objective:     Communicated with: nurseAda, prior to session.  Patient found HOB elevated with peripheral IV, bed alarm, oxygen, telemetry(HD AV fistula LUE) upon OT entry to room.    General Precautions: Standard, fall, diabetic, respiratory, vision impaired   Orthopedic Precautions:LLE partial weight bearing, RLE weight bearing as tolerated   Braces: N/A     Occupational Performance:     Bed Mobility:    · Patient completed Scooting/Bridging with contact guard assistance  · Patient completed Supine to Sit with moderate assistance and HOB elevated  · Patient completed Sit to Supine with moderate assistance and 2 persons     Functional Mobility/Transfers:  · Patient completed Sit <> Stand Transfer with maximal assistance and of 2 persons  with  rolling walker   · Functional Mobility: Pt took ~3-4 sidesteps to HOB with RW and max A x2 with mod v.c. And mod t.c. To sequence steps with RW and implement PWB on LLE.     Activities of Daily Living:  · Lower Body Dressing: total assistance with socks      [facility] 6 Click ADL: 15    Treatment & Education:  Pt re-educated on OT role/POC.   Importance of OOB activity with staff assistance.  Safety during functional t/f and mobility- pt required mod v.c./t.c. For safe hand placement with transfers  Pt demo'd fair+ sitting tolerance at EOB to complete exercises. After OT demo and with cueing, pt completed 1x10: shoulder flexion/extension, shoulder ab/dduction, and scapular elevation/depression in unsupported sit. Pt educated to complete these 2-3x/day.    White board updated   Multiple self-care tasks/functional mobility completed- assistance level noted above   All questions/concerns answered within OT scope of practice       Patient left HOB elevated  with all lines intact, call button in reach, bed alarm on and nurseAda, notifiedEducation:      GOALS:   Multidisciplinary Problems     Occupational Therapy Goals        Problem: Occupational Therapy Goal    Goal Priority Disciplines Outcome Interventions   Occupational Therapy Goal     OT, PT/OT Ongoing (interventions implemented as appropriate)    Description:  Goals to be met by: 07/09/19     Patient will increase functional independence with ADLs by performing:    UE Dressing with Supervision.  LE Dressing with Minimal Assistance.  Grooming while seated with Supervision.  Toileting from bedside commode with Moderate Assistance for hygiene and clothing management.   Sitting at edge of bed x15 minutes with Supervision.  Supine to sit with Supervision.  Step transfer with Minimal Assistance  Upper extremity exercise program x10 reps per handout, with independence.                      Time Tracking:     OT Date of Treatment: 06/26/19  OT Start Time: 1417  OT Stop Time: 1440  OT Total Time (min): 23 min    Billable Minutes:Therapeutic Exercise 11 min  Total Time 23 min (co-treat with PTA)    Ayleen Spencer OT  6/26/2019

## 2019-06-26 NOTE — PT/OT/SLP PROGRESS
Occupational Therapy      Patient Name:  Zoey Ham   MRN:  [id number]    Patient not seen today secondary to Dialysis. Will follow-up this afternoon.    Ayleen Spencer OT  6/26/2019

## 2019-06-27 VITALS
SYSTOLIC BLOOD PRESSURE: 171 MMHG | HEIGHT: 64 IN | HEART RATE: 62 BPM | DIASTOLIC BLOOD PRESSURE: 72 MMHG | OXYGEN SATURATION: 91 % | BODY MASS INDEX: 28.56 KG/M2 | WEIGHT: 167.31 LBS | TEMPERATURE: 98 F | RESPIRATION RATE: 17 BRPM

## 2019-06-27 LAB
ANION GAP SERPL CALC-SCNC: 9 MMOL/L (ref 8–16)
BASOPHILS # BLD AUTO: 0.02 K/UL (ref 0–0.2)
BASOPHILS NFR BLD: 0.2 % (ref 0–1.9)
BUN SERPL-MCNC: 45 MG/DL (ref 8–23)
CALCIUM SERPL-MCNC: 8.8 MG/DL (ref 8.7–10.5)
CHLORIDE SERPL-SCNC: 100 MMOL/L (ref 95–110)
CO2 SERPL-SCNC: 30 MMOL/L (ref 23–29)
CREAT SERPL-MCNC: 3.4 MG/DL (ref 0.5–1.4)
DIFFERENTIAL METHOD: ABNORMAL
EOSINOPHIL # BLD AUTO: 0.3 K/UL (ref 0–0.5)
EOSINOPHIL NFR BLD: 2.8 % (ref 0–8)
ERYTHROCYTE [DISTWIDTH] IN BLOOD BY AUTOMATED COUNT: 14.1 % (ref 11.5–14.5)
EST. GFR  (AFRICAN AMERICAN): 15 ML/MIN/1.73 M^2
EST. GFR  (NON AFRICAN AMERICAN): 13 ML/MIN/1.73 M^2
GLUCOSE SERPL-MCNC: 169 MG/DL (ref 70–110)
HCT VFR BLD AUTO: 30.8 % (ref 37–48.5)
HGB BLD-MCNC: 9.9 G/DL (ref 12–16)
LYMPHOCYTES # BLD AUTO: 1.3 K/UL (ref 1–4.8)
LYMPHOCYTES NFR BLD: 13.6 % (ref 18–48)
MCH RBC QN AUTO: 31.8 PG (ref 27–31)
MCHC RBC AUTO-ENTMCNC: 32.1 G/DL (ref 32–36)
MCV RBC AUTO: 99 FL (ref 82–98)
MONOCYTES # BLD AUTO: 1.3 K/UL (ref 0.3–1)
MONOCYTES NFR BLD: 13.9 % (ref 4–15)
NEUTROPHILS # BLD AUTO: 6.6 K/UL (ref 1.8–7.7)
NEUTROPHILS NFR BLD: 69.9 % (ref 38–73)
PLATELET # BLD AUTO: 207 K/UL (ref 150–350)
PMV BLD AUTO: 11.2 FL (ref 9.2–12.9)
POCT GLUCOSE: 128 MG/DL (ref 70–110)
POCT GLUCOSE: 221 MG/DL (ref 70–110)
POCT GLUCOSE: 258 MG/DL (ref 70–110)
POTASSIUM SERPL-SCNC: 4.1 MMOL/L (ref 3.5–5.1)
RBC # BLD AUTO: 3.11 M/UL (ref 4–5.4)
SODIUM SERPL-SCNC: 139 MMOL/L (ref 136–145)
WBC # BLD AUTO: 9.44 K/UL (ref 3.9–12.7)

## 2019-06-27 PROCEDURE — 97110 THERAPEUTIC EXERCISES: CPT | Mod: HCNC

## 2019-06-27 PROCEDURE — 63600175 PHARM REV CODE 636 W HCPCS: Mod: HCNC | Performed by: NURSE PRACTITIONER

## 2019-06-27 PROCEDURE — 25000003 PHARM REV CODE 250: Mod: HCNC | Performed by: NURSE PRACTITIONER

## 2019-06-27 PROCEDURE — 25000003 PHARM REV CODE 250: Mod: HCNC | Performed by: EMERGENCY MEDICINE

## 2019-06-27 PROCEDURE — 80048 BASIC METABOLIC PNL TOTAL CA: CPT | Mod: HCNC

## 2019-06-27 PROCEDURE — 85025 COMPLETE CBC W/AUTO DIFF WBC: CPT | Mod: HCNC

## 2019-06-27 PROCEDURE — 97535 SELF CARE MNGMENT TRAINING: CPT | Mod: HCNC

## 2019-06-27 PROCEDURE — 97530 THERAPEUTIC ACTIVITIES: CPT | Mod: HCNC

## 2019-06-27 PROCEDURE — 36415 COLL VENOUS BLD VENIPUNCTURE: CPT | Mod: HCNC

## 2019-06-27 PROCEDURE — 25000003 PHARM REV CODE 250: Mod: HCNC | Performed by: INTERNAL MEDICINE

## 2019-06-27 PROCEDURE — 25000003 PHARM REV CODE 250: Mod: HCNC | Performed by: HOSPITALIST

## 2019-06-27 PROCEDURE — 97542 WHEELCHAIR MNGMENT TRAINING: CPT | Mod: HCNC

## 2019-06-27 RX ORDER — INSULIN ASPART 100 [IU]/ML
1-10 INJECTION, SOLUTION INTRAVENOUS; SUBCUTANEOUS
Refills: 0
Start: 2019-06-27 | End: 2019-07-23

## 2019-06-27 RX ORDER — TRAMADOL HYDROCHLORIDE 50 MG/1
50 TABLET ORAL EVERY 8 HOURS PRN
Start: 2019-06-27 | End: 2020-01-01

## 2019-06-27 RX ORDER — POLYETHYLENE GLYCOL 3350 17 G/17G
17 POWDER, FOR SOLUTION ORAL 2 TIMES DAILY PRN
Refills: 0 | Status: ON HOLD
Start: 2019-06-27 | End: 2021-01-01

## 2019-06-27 RX ORDER — IBUPROFEN 200 MG
16 TABLET ORAL
Refills: 12 | COMMUNITY
Start: 2019-06-27 | End: 2020-01-01

## 2019-06-27 RX ORDER — ACETAMINOPHEN 325 MG/1
650 TABLET ORAL EVERY 6 HOURS PRN
Refills: 0 | Status: ON HOLD | COMMUNITY
Start: 2019-06-27 | End: 2019-07-16 | Stop reason: HOSPADM

## 2019-06-27 RX ORDER — HEPARIN SODIUM 5000 [USP'U]/ML
5000 INJECTION, SOLUTION INTRAVENOUS; SUBCUTANEOUS EVERY 8 HOURS
Status: ON HOLD
Start: 2019-06-27 | End: 2019-07-16 | Stop reason: HOSPADM

## 2019-06-27 RX ORDER — IBUPROFEN 200 MG
24 TABLET ORAL
Refills: 12 | COMMUNITY
Start: 2019-06-27 | End: 2020-01-01

## 2019-06-27 RX ADMIN — ATORVASTATIN CALCIUM 40 MG: 40 TABLET, FILM COATED ORAL at 09:06

## 2019-06-27 RX ADMIN — METOPROLOL TARTRATE 100 MG: 50 TABLET ORAL at 09:06

## 2019-06-27 RX ADMIN — HEPARIN SODIUM 5000 UNITS: 5000 INJECTION, SOLUTION INTRAVENOUS; SUBCUTANEOUS at 05:06

## 2019-06-27 RX ADMIN — MUPIROCIN: 20 OINTMENT TOPICAL at 09:06

## 2019-06-27 RX ADMIN — ASPIRIN 325 MG: 325 TABLET, DELAYED RELEASE ORAL at 09:06

## 2019-06-27 RX ADMIN — HEPARIN SODIUM 5000 UNITS: 5000 INJECTION, SOLUTION INTRAVENOUS; SUBCUTANEOUS at 02:06

## 2019-06-27 RX ADMIN — INSULIN ASPART 4 UNITS: 100 INJECTION, SOLUTION INTRAVENOUS; SUBCUTANEOUS at 09:06

## 2019-06-27 RX ADMIN — NEPHROCAP 1 CAPSULE: 1 CAP ORAL at 09:06

## 2019-06-27 RX ADMIN — INSULIN DETEMIR 15 UNITS: 100 INJECTION, SOLUTION SUBCUTANEOUS at 09:06

## 2019-06-27 RX ADMIN — TRAMADOL HYDROCHLORIDE 50 MG: 50 TABLET, FILM COATED ORAL at 05:06

## 2019-06-27 RX ADMIN — INSULIN ASPART 6 UNITS: 100 INJECTION, SOLUTION INTRAVENOUS; SUBCUTANEOUS at 12:06

## 2019-06-27 RX ADMIN — DOCUSATE SODIUM 100 MG: 100 CAPSULE, LIQUID FILLED ORAL at 09:06

## 2019-06-27 RX ADMIN — FUROSEMIDE 80 MG: 40 TABLET ORAL at 09:06

## 2019-06-27 NOTE — NURSING
Informed patient daughter Ana Cristina that patient has returned to room from dialysis. Patient noted to have some confusion. Informed Ana Cristina and she said that is something that happens when she has dialysis sometimes. Dr Brewster notified as well. NAD noted at this time. Bed bath being completed by PCTs. All safety precautions in place. Will continue to monitor.

## 2019-06-27 NOTE — NURSING
Bedside report given to MADELINE Chau. Patient awake and alert. NAD noted at this time. All safety precautions in place.  12 hr chart check complete

## 2019-06-27 NOTE — PT/OT/SLP PROGRESS
Physical Therapy Treatment    Patient Name:  Zoey Ham   MRN:  [id number]    Recommendations:     Discharge Recommendations:  rehabilitation facility   Discharge Equipment Recommendations: (TBD)   Barriers to discharge: pt with decreased mobility     Assessment:     Zoey Ham is a 73 y.o. female admitted with a medical diagnosis of Fracture of multiple ribs of left side.  She presents with the following impairments/functional limitations:  weakness, impaired endurance, impaired functional mobilty, gait instability, impaired self care skills, impaired balance, decreased coordination, decreased upper extremity function, decreased lower extremity function, impaired coordination, decreased safety awareness, pain, decreased ROM, edema, impaired joint extensibility, impaired muscle length, orthopedic precautions, impaired cardiopulmonary response to activity .    Rehab Prognosis: Good; patient would benefit from acute skilled PT services to address these deficits and reach maximum level of function.    Recent Surgery: * No surgery found *      Plan:     During this hospitalization, patient to be seen daily to address the identified rehab impairments via gait training, therapeutic activities, therapeutic exercises, wheelchair management/training and progress toward the following goals:    · Plan of Care Expires:  07/09/19    Subjective     Chief Complaint: none   Patient/Family Comments/goals: to get stronger   Pain/Comfort:  · Location - Side 1: Left  · Location 1: rib(s)  · Pain Addressed 1: Pre-medicate for activity, Cessation of Activity, Nurse notified      Objective:     Communicated with nurse Silver prior to session.  Patient found HOB elevated with bed alarm, telemetry, oxygen upon PT entry to room.     General Precautions: Standard, fall, respiratory   Orthopedic Precautions:LLE partial weight bearing   Braces: N/A     Functional Mobility:  · Bed Mobility:     · Rolling Left:  minimum  assistance  · Scooting: contact guard assistance  · Supine to Sit: moderate assistance, HOB elevated, bedside rail.   · Transfers:     · Sit to Stand: 1 trial from bed and 2 trials from w/c maximal assistance with rolling walker. V/T cues for safety technique and for LLE partial WB precautions.   · Bed to wheelchair: moderate assistance and maximal assistance with  no AD  using  Squat Pivot. V/T cues for safety technique and LLE partial WB precautions.   · Gait: Pt ambulated ~ 3-4 steps  max A X  2 persons using RW and LLE PWB.  Pt with decreased weight shifting, foot clearance, step length and patel.  Pt required mod/MAX  VC's to maintain LLE PWB during gait training. V/T cues for safety technique and walker management. VC's for pursed lip breathing technique (Pt was on 1L upon entry and O2 97%. Pt completed seated exercises on room air with O2 saturation levels remaining at 97%. While ambulating, O2 levels decreased to 83% on room air. Pt was guided back to chair immediately, donned NC, and O2 levels increased to 98% with max cueing for implementing pursed lip breathing. Nurse Adrianne notified)  · Wheelchair Propulsion:  Pt propelled Standard wheelchair x 30  feet on Level tile with  Bilateral upper extremity and Bilateral lower extremity with Stand-by Assistance and Contact Guard Assistance. V/T cues for safety technique and w/c management.       AM-PAC 6 CLICK MOBILITY  Turning over in bed (including adjusting bedclothes, sheets and blankets)?: 3  Sitting down on and standing up from a chair with arms (e.g., wheelchair, bedside commode, etc.): 2  Moving from lying on back to sitting on the side of the bed?: 2  Moving to and from a bed to a chair (including a wheelchair)?: 2  Need to walk in hospital room?: 2  Climbing 3-5 steps with a railing?: 1  Basic Mobility Total Score: 12       Therapeutic Activities and Exercises:   pt performed bed mobility, transfer and gait training as above.  Pt performed seated BLE  x 10 reps x 2 : AP, LAQ, hip flexion(AAROM LLE), GS. VC's for proper and sequence.     Patient left up in chair with all lines intact, call button in reach, chair alarm on and nurse Adrianne notified..    GOALS:   Multidisciplinary Problems     Physical Therapy Goals        Problem: Physical Therapy Goal    Goal Priority Disciplines Outcome Goal Variances Interventions   Physical Therapy Goal     PT, PT/OT Ongoing (interventions implemented as appropriate)     Description:  Goals to be met by: 19    Patient will increase functional independence with mobility by performin. Supine to sit with Stand-by Assistance  2. Rolling to Left and Right with Stand-by Assistance  3. Sit to stand transfer with Minimal Assistance using RW  4. Bed to chair transfer with Minimal Assistance using Rolling Walker  5. Gait  x20-30 feet with Minimal Assistance using Rolling Walker and LLE PWB  6. Wheelchair propulsion x100 feet with Stand-by Assistance using bilateral upper extremities  7. Lower extremity exercise program (seated/supine) 3 sets x10 reps per handout, with independence                      Time Tracking:     PT Received On: 19  PT Start Time: 1122     PT Stop Time: 1215  PT Total Time (min): 53 min     Billable Minutes: Therapeutic Activity 15 and Train/Wheelchair Management 12 co-treat with OT     Treatment Type: Treatment  PT/PTA: PTA     PTA Visit Number: 2     [id number] : 13 min, 1 TA.   Pt found seated in chair , pt is ready to return to bed. Pt performed BLE AP, QS while awaiting for bed lines to be changed. Performed chair>bed transfer with no AD , squat pivot transfer MOD A . V/T cues for safety technique and LLE partial WB precautions.  Sit>supine : mod A . Scooting : mod A to HOB . Pt left in HOB elevated, B heels offload on pillow, all lines intact , call button in reach , bed alarm on, nurse notified and daughter present.    Erika Gama, PTA     2019

## 2019-06-27 NOTE — PROGRESS NOTES
ANDREY contacted Nicole ([employer]) @ [phone] ext: [phone] to determine if SW will receive authorization today. Nicole did not answer, ANDREY, left a message. ANDREY sent message to Myriam ([facility]) to determine if they are able to accept pt. ANDREY waiting response.     4:30PM  Orders received and ANDREY notified Myriam ( [facility]). According to Myriam, pt can be transferred to facility and transportation can be arranged. SW to pt's room to notify she will d/c today, pt asking transportation be arranged for 7:00PM. ANDREY notified Jennifer (CM Manager).     4:35PM  ADT 30 order placed for  Transportation.  ETA: 7:00PM  If transportation does not arrive at ETA time nurse will be instructed to follow protocol for transportation below:  How can I get in touch directly with dispatch, if needed?                 Non-emergent dispatch: [phone]                                    Emergent dispatch: [phone]  Non-emergent (stretcher): [phone]  Escalation Needs (PFC Lead): [phone]    ANDREY provided pt's nurseAdrianne, with travel packet, call report information, and transport ETA.

## 2019-06-27 NOTE — PLAN OF CARE
06/27/19 1629   Medicare Message   Important Message from Medicare regarding Discharge Appeal Rights Given to patient/caregiver;Explained to patient/caregiver;Signed/date by patient/caregiver   Date IMM was signed 06/27/19

## 2019-06-27 NOTE — SUBJECTIVE & OBJECTIVE
Interval History:  Patient reports pain controlled, nurse reported patient stepped a lot through the day after getting pain meds    Review of Systems   Constitutional: Negative for chills and fever.   Respiratory: Negative for shortness of breath.    Cardiovascular: Negative for chest pain.   Musculoskeletal: Positive for arthralgias.     Objective:     Vital Signs (Most Recent):  Temp: 98.1 °F (36.7 °C) (06/26/19 1906)  Pulse: 82 (06/26/19 2143)  Resp: 18 (06/26/19 1906)  BP: (!) 176/75 (06/26/19 2143)  SpO2: (!) 94 % (06/26/19 1906) Vital Signs (24h Range):  Temp:  [97.9 °F (36.6 °C)-98.5 °F (36.9 °C)] 98.1 °F (36.7 °C)  Pulse:  [] 82  Resp:  [16-22] 18  SpO2:  [91 %-99 %] 94 %  BP: (112-176)/(28-85) 176/75     Weight: 76.4 kg (168 lb 6.2 oz)(post hd tx )  Body mass index is 28.9 kg/m².    Intake/Output Summary (Last 24 hours) at 6/26/2019 2226  Last data filed at 6/26/2019 1730  Gross per 24 hour   Intake 1000 ml   Output 3521 ml   Net -2521 ml      Physical Exam   Constitutional: She appears well-developed and well-nourished. No distress.   HENT:   Head: Normocephalic and atraumatic.   Eyes: Pupils are equal, round, and reactive to light.   Neck: Normal range of motion. Neck supple.   Cardiovascular: Normal rate and regular rhythm.   Pulmonary/Chest: Breath sounds normal.   Increased pain with deep inspiration or movement on the left side of chest wall   Abdominal: Soft. She exhibits no distension and no mass. There is no tenderness. There is no guarding.   Musculoskeletal: She exhibits no edema.   Neurological: She is alert.   Pain increased with moving of the hips   Skin: Skin is warm and dry. Capillary refill takes less than 2 seconds. She is not diaphoretic.   Psychiatric: She has a normal mood and affect. Her behavior is normal. Thought content normal.       Significant Labs: All pertinent labs within the past 24 hours have been reviewed.    Significant Imaging: I have reviewed and interpreted all  pertinent imaging results/findings within the past 24 hours.

## 2019-06-27 NOTE — PLAN OF CARE
Problem: Physical Therapy Goal  Goal: Physical Therapy Goal  Goals to be met by: 19    Patient will increase functional independence with mobility by performin. Supine to sit with Stand-by Assistance  2. Rolling to Left and Right with Stand-by Assistance  3. Sit to stand transfer with Minimal Assistance using RW  4. Bed to chair transfer with Minimal Assistance using Rolling Walker  5. Gait  x20-30 feet with Minimal Assistance using Rolling Walker and LLE PWB  6. Wheelchair propulsion x100 feet with Stand-by Assistance using bilateral upper extremities  7. Lower extremity exercise program (seated/supine) 3 sets x10 reps per handout, with independence     Outcome: Ongoing (interventions implemented as appropriate)  Pt will benefit from further IP therapy in order to get back to PLOF.

## 2019-06-27 NOTE — NURSING
Bedside shift report received from MADELINE Chau. Communication board has been updated. NAD noted. Will continue to monitor. Pt is asleep but easily aroused. Pt is in no pain at this time.

## 2019-06-27 NOTE — PLAN OF CARE
06/27/19 1636   Final Note   Assessment Type Final Discharge Note   Anticipated Discharge Disposition Rehab   What phone number can be called within the next 1-3 days to see how you are doing after discharge? [phone]   Hospital Follow Up  Appt(s) scheduled? Yes   Discharge plans and expectations educations in teach back method with documentation complete? Yes   Right Care Referral Info   Post Acute Recommendation IRF   Referral Type IFR   Facility Name [facility] [address]

## 2019-06-27 NOTE — ASSESSMENT & PLAN NOTE
Elevated white cell count likely secondary to leukemoid reaction secondary to pain  No obvious source of infection noted in any imaging on studies, and she remains afebrile  Continue Spiriva spirometry  Resolved

## 2019-06-27 NOTE — PLAN OF CARE
[facility]    FACILITY TRANSFER ORDERS      Patient Name: Zoey Ham  YOB: 1945    PCP: Cesia Rosales MD   PCP Address: [facility]– [address] / [place] [address]  PCP Phone Number: [phone]  PCP Fax: [phone]    Encounter Date: 06/27/2019    Admit to: [facility]    Vital Signs:  Routine    Diagnoses:   Active Hospital Problems    Diagnosis  POA    *Fracture of multiple ribs of left side [S22.42XA]  Yes     Priority: 1 - High    Displaced fracture of pubis [S32.509A]  Yes     Priority: 2     Troponin level elevated [R74.8]  Yes     Priority: 3     Hypoxia [R09.02]  Yes    Enlarged lymph node [R59.9]  Yes    ESRD (end stage renal disease) on dialysis [N18.6, Z99.2]  Not Applicable     Hemodialysis started in 8/2018, tolerating with improvement in QoL      Coronary artery disease involving coronary bypass graft of native heart with unstable angina pectoris [I25.700]  Yes     6/4/18 [facility] to OM1. Cath 2/2017, LHC and PCI on Lcx and OM1 with improvement. H/o CABG X2 2002, stent RCA, OM1 2003, NSTEMI with total occlusion of LAD in 2012.       Type 2 diabetes mellitus with chronic kidney disease on chronic dialysis, with long-term current use of insulin [E11.22, N18.6, Z99.2, Z79.4]  Not Applicable     Compliant lantus 15U, Compliant with dialysis      Chronic atrial fibrillation [I48.2]  Yes     Rate-controlled on BB, [id number] score of 9, compliant with DAPT      Anemia of chronic renal failure, stage 5 [N18.5, D63.1]  Yes     Compliant with dialysis, followed by Nephrology      Leukocytosis [D72.829]  Yes    Proliferative diabetic retinopathy [E11.3599]  Yes     Eye complaints in 12/17, stable DR      Hypertension associated with diabetes [E11.59, I10]  Yes     BP controlled on BB, diuretic. ARB discontinued by Renal due to hyperkalemia        Resolved Hospital Problems   No resolved problems to display.       Allergies:  Review of patient's allergies  indicates:   Allergen Reactions    Percodan [oxycodone-aspirin] Hives     Welps     Pcn [penicillins] Hives and Swelling     Tolerated Rocephin IM in clinic      Phenergan [promethazine] Other (See Comments)     Altered mental status; jerking of extremities       Diet: cardiac diet, diabetic diet: 1800 calorie and renal diet    Activities: Activity as tolerated    Nursing: As per facility protocol     Labs: CBC and BMP  Qweek    CONSULTS:    Physical Therapy to evaluate and treat.  and Occupational Therapy to evaluate and treat.    Consult to Nephrology for Dialysis: Monday, Wednesday, and Friday      MISCELLANEOUS CARE:  Diabetes Care:   SN to perform and educate Diabetic management with blood glucose monitoring:, Fingerstick blood sugar AC and HS and Report CBG < 60 or > 350 to physician.  Insulin Sliding Scale:     Monitor blood sugar before meals and at bedtime.  Call physician if blood sugar < 60 or > 350 for two consecutive readings.          Glucose  Novolog Insulin Subcutaneous        0 - 60   Orange juice or glucose tablet, hold insulin      No insulin   201-250  2 units   251-300  4 units   301-350  6 units   351-400  8 units   >400   10 units then call physician     Oxygen:  2L via NC as needed to keep sats > 92%      Medications: Review discharge medications with patient and family and provide education.      Current Discharge Medication List      START taking these medications    Details   acetaminophen (TYLENOL) 325 MG tablet Take 2 tablets (650 mg total) by mouth every 6 (six) hours as needed.  Refills: 0      !! glucose 4 GM chewable tablet Take 4 tablets (16 g total) by mouth as needed.  Refills: 12      !! glucose 4 GM chewable tablet Take 6 tablets (24 g total) by mouth as needed.  Refills: 12      heparin sodium,porcine (HEPARIN, PORCINE,) 5,000 unit/mL injection Inject 1 mL (5,000 Units total) into the skin every 8 (eight) hours.      insulin aspart U-100 (NOVOLOG) 100 unit/mL (3 mL)  InPn pen Inject 1-10 Units into the skin before meals and at bedtime as needed (Hyperglycemia).  Refills: 0      insulin detemir U-100 (LEVEMIR FLEXTOUCH) 100 unit/mL (3 mL) SubQ InPn pen Inject 15 Units into the skin once daily.  Refills: 0      polyethylene glycol (GLYCOLAX) 17 gram PwPk Take 17 g by mouth 2 (two) times daily as needed.  Refills: 0      traMADol (ULTRAM) 50 mg tablet Take 1 tablet (50 mg total) by mouth every 8 (eight) hours as needed.      vitamin renal formula, B-complex-vitamin c-folic acid, (NEPHROCAPS) 1 mg Cap Take 1 capsule by mouth once daily.  Refills: 0       !! - Potential duplicate medications found. Please discuss with provider.      CONTINUE these medications which have NOT CHANGED    Details   aspirin (ECOTRIN) 325 MG EC tablet Take 1 tablet (325 mg total) by mouth once daily.      atorvastatin (LIPITOR) 80 MG tablet Take 1 tablet (80 mg total) by mouth every morning.  Qty: 90 tablet, Refills: 3    Associated Diagnoses: Coronary artery disease of bypass graft of native heart with stable angina pectoris; Chronic combined systolic and diastolic congestive heart failure; Uncontrolled type 2 diabetes mellitus with stage 4 chronic kidney disease, with long-term current use of insulin      calcitRIOL (ROCALTROL) 0.5 MCG Cap Take 1 capsule (0.5 mcg total) by mouth once daily.  Qty: 90 capsule, Refills: 1      clopidogrel (PLAVIX) 75 mg tablet Take 1 tablet (75 mg total) by mouth once daily.  Qty: 90 tablet, Refills: 3    Associated Diagnoses: Coronary artery disease of bypass graft of native heart with stable angina pectoris; Chronic combined systolic and diastolic congestive heart failure; Uncontrolled type 2 diabetes mellitus with stage 4 chronic kidney disease, with long-term current use of insulin      furosemide (LASIX) 80 MG tablet One 80 mg  tab once a day.  Qty: 90 tablet, Refills: 2    Associated Diagnoses: Coronary artery disease of bypass graft of native heart with stable angina  "pectoris; Chronic combined systolic and diastolic congestive heart failure; Uncontrolled type 2 diabetes mellitus with stage 4 chronic kidney disease, with long-term current use of insulin      metoprolol tartrate (LOPRESSOR) 100 MG tablet Take 1 tablet (100 mg total) by mouth 2 (two) times daily.  Qty: 180 tablet, Refills: 3    Associated Diagnoses: Renovascular hypertension; Coronary artery disease of bypass graft of native heart with stable angina pectoris; Hypertension associated with diabetes         STOP taking these medications       ACCU-CHEK MARI PLUS METER Misc Comments:   Reason for Stopping:         blood sugar diagnostic (ACCU-CHEK MARI PLUS TEST STRP) Strp Comments:   Reason for Stopping:         insulin glargine (LANTUS U-100 INSULIN) 100 unit/mL injection Comments:   Reason for Stopping:         insulin syringe-needle U-100 0.3 mL 31 gauge x 1/4" Syrg Comments:   Reason for Stopping:         lancets (ACCU-CHEK SOFTCLIX LANCETS) Misc Comments:   Reason for Stopping:         lidocaine-prilocaine (EMLA) cream Comments:   Reason for Stopping:         nitroGLYCERIN (NITROSTAT) 0.3 MG SL tablet Comments:   Reason for Stopping:                _________________________________  Sheryl Brewster MD  06/27/2019            "

## 2019-06-27 NOTE — ASSESSMENT & PLAN NOTE
Patient has a history of CABG in 2002 and PCI in 2012 with stents  Patient has chronic troponinemia, however her admitting troponin was  Results for TAY COVARRUBIAS (MRN [id number]) as of 6/23/2019 09:31   Ref. Range 6/23/2019 01:20   Troponin I Latest Ref Range: 0.000 - 0.026 ng/mL 0.176 (H)   Patient denied chest pain   at the time of admit.    Her last 2D echo 06/05/2019 at this facility showed LVEF 60-65% with grade 2 diastolic dysfunction   Continue aspirin, metoprolol, atorvastatin  Appreciate Cardiology input  Trended troponins  Cardiology recommended conservative treatment with medical management and aggressive risk factor modification

## 2019-06-27 NOTE — PROGRESS NOTES
[facility] Hospital Medicine  Progress Note    Patient Name: Zoey Ham  MRN: [id number]  Patient Class: IP- Inpatient   Admission Date: 6/22/2019  Length of Stay: 3 days  Attending Physician: Sheryl Brewster MD  Primary Care Provider: Cesia Rosales MD        Subjective:     Principal Problem:Fracture of multiple ribs of left side      HPI:  Zoey Ham is a 73 y.o. unfortunate elderly female patient with extensive cardiac and renal history as below - including but not limited to CAD (CABG 2002, PCI 2012), ESRD-HD (Monday Wednesday Friday/still makes urine), HTN, and gastritis with bleeding history.  Per patient she was in her usual state of health up until 1 day ago.  She reports that she usually dialyzes in [facility]  but her daughter lives in [place].  She reports that she dialyzed in a [place] on [facility]  on Friday  as she had been visiting her and the environment was somewhat unfamiliar.  Patient reports that she encountered a mechanical fall and a very small bathroom when she attempted to close the door.  She denies any loss of consciousness, dizziness, lightheadedness before the episode.  She denies chest pain, fever chills, upper respiratory infection, headache, focal deficits or weaknesses, or numbness or tingling in either extremity prior to or after the event.    Upon presentation the patient was noted on CT chest to have 3 left rib fractures 7 and 8 with mild displacement involving the left 7th and lesser extent 8th rib.  CT pelvis showed acute displaced comminuted right fracture of the pelvis as well as an acute nondisplaced transverse fracture of the inferior pubic ramus on the left.  Additionally abnormal CBC and chemistry significant for leukocytosis = 14 K, mild hypokalemia = 5.2, other findings consistent with end-stage renal disease decreased creatinine and GFR.  Additionally patient's glucose level was found to be 442, alk phos 258.  Lastly patient was noted to  have bump in troponin 1 level that has trended she has usual chronic troponinemia =Results for TAY COVARRUBIAS (MRN [id number]) as of 6/23/2019 08:06  Results for TAY COVARRUBIAS (MRN [id number]) as of 6/23/2019 16:37   Ref. Range 6/23/2019 01:20   Troponin I Latest Ref Range: 0.000 - 0.026 ng/mL 0.176 (H)       Cardiologist at [facility] ([place]?)    Overview/Hospital Course:  No notes on file    Interval History:  Patient reports pain controlled, nurse reported patient stepped a lot through the day after getting pain meds    Review of Systems   Constitutional: Negative for chills and fever.   Respiratory: Negative for shortness of breath.    Cardiovascular: Negative for chest pain.   Musculoskeletal: Positive for arthralgias.     Objective:     Vital Signs (Most Recent):  Temp: 98.1 °F (36.7 °C) (06/26/19 1906)  Pulse: 82 (06/26/19 2143)  Resp: 18 (06/26/19 1906)  BP: (!) 176/75 (06/26/19 2143)  SpO2: (!) 94 % (06/26/19 1906) Vital Signs (24h Range):  Temp:  [97.9 °F (36.6 °C)-98.5 °F (36.9 °C)] 98.1 °F (36.7 °C)  Pulse:  [] 82  Resp:  [16-22] 18  SpO2:  [91 %-99 %] 94 %  BP: (112-176)/(28-85) 176/75     Weight: 76.4 kg (168 lb 6.2 oz)(post hd tx )  Body mass index is 28.9 kg/m².    Intake/Output Summary (Last 24 hours) at 6/26/2019 2226  Last data filed at 6/26/2019 1730  Gross per 24 hour   Intake 1000 ml   Output 3521 ml   Net -2521 ml      Physical Exam   Constitutional: She appears well-developed and well-nourished. No distress.   HENT:   Head: Normocephalic and atraumatic.   Eyes: Pupils are equal, round, and reactive to light.   Neck: Normal range of motion. Neck supple.   Cardiovascular: Normal rate and regular rhythm.   Pulmonary/Chest: Breath sounds normal.   Increased pain with deep inspiration or movement on the left side of chest wall   Abdominal: Soft. She exhibits no distension and no mass. There is no tenderness. There is no guarding.   Musculoskeletal: She exhibits no edema.    Neurological: She is alert.   Pain increased with moving of the hips   Skin: Skin is warm and dry. Capillary refill takes less than 2 seconds. She is not diaphoretic.   Psychiatric: She has a normal mood and affect. Her behavior is normal. Thought content normal.       Significant Labs: All pertinent labs within the past 24 hours have been reviewed.    Significant Imaging: I have reviewed and interpreted all pertinent imaging results/findings within the past 24 hours.      Assessment/Plan:      * Fracture of multiple ribs of left side  Patient has traumatic rib fractures x 3   1 displaced which is the 8th,   Poor inspiratory and expiratory effort oxygen sat decline when off oxygen likely secondary to pain  No need for surgical intervention  Continue expended incentive spirometry   Will decrease Dilaudid to 0.5 mg at p.r.n. and had Tylenol for mild pain to minimize sedation    Displaced fracture of pubis  CT pelvis shows 3 pubic fractures:  -acute minimally displaced fracture of the superior pubic ramus on the right  -acute nondisplaced transverse fracture of the inferior year pubic ramus on the left  -acute nondisplaced linear fracture through the lateral margin of the left sacral ala  Appreciate Orthopedic evaluation and recommendation  Conservative treatment with no need for surgical intervention  Physical therapy and occupational therapy a  Pain medications adjusted down as discussed above  Patient will need rehab placement  Social service consulted for rehab placement    Troponin level elevated  Patient has a history of CABG in 2002 and PCI in 2012 with stents  Patient has chronic troponinemia, however her admitting troponin was  Results for MARCI TAY R (MRN [id number]) as of 6/23/2019 09:31   Ref. Range 6/23/2019 01:20   Troponin I Latest Ref Range: 0.000 - 0.026 ng/mL 0.176 (H)   Patient denied chest pain   at the time of admit.    Her last 2D echo 06/05/2019 at this facility showed LVEF 60-65%  with grade 2 diastolic dysfunction   Continue aspirin, metoprolol, atorvastatin  Appreciate Cardiology input  Trended troponins  Cardiology recommended conservative treatment with medical management and aggressive risk factor modification    Hypoxia  Multifactorial secondary to some pulmonary edema and decreased respiratory effort secondary to rib fractures  Improved with dialysis with volume correction  Continue incentive spirometry  Oxygen to be weaned as tolerated    ESRD (end stage renal disease) on dialysis  Patient in stage renal disease usually dialyzes in [place], dialyzed last on Friday 06/21/2019 in [place]  Status pursue emergent dialysis yesterday with improvement with volume status  Patient ongoing regular scheduled dialysis again today  As per Nephrology    Coronary artery disease involving coronary bypass graft of native heart with unstable angina pectoris  Continue metoprolol, aspirin, atorvastatin    Chronic atrial fibrillation  Rate controlled on metoprolol  Patient has not been on anticoagulation for a long time    Type 2 diabetes mellitus with chronic kidney disease on chronic dialysis, with long-term current use of insulin  Continue basal,/prandial insulin along with sliding scale insulin  Hemoglobin A1c is 8.7 which is not at target  Glucose goal during hospitalization between 140-180  Will make further adjustments as necessary    Anemia of chronic renal failure, stage 5  Patient has anemia of chronic disease  H&H consistent with previous levels  Will continue to monitor    Leukocytosis  Elevated white cell count likely secondary to leukemoid reaction secondary to pain  No obvious source of infection noted in any imaging on studies, and she remains afebrile  Continue Spiriva spirometry  Resolved    Proliferative diabetic retinopathy   Tight glucose control and outpatient ophthalmology follow-up    Hypertension associated with diabetes  Patient has 6 fractures 2 displaced (3 rib fractures and 3  hip fractures total bilaterally)  Volumes status corrected with blood pressure improved along with control of pain  Continue metoprolol and patient is on Lasix at 80 mg daily  P.r.n. hydralazine       VTE Risk Mitigation (From admission, onward)        Ordered     heparin (porcine) injection 5,000 Units  Every 8 hours      06/23/19 0908     IP VTE HIGH RISK PATIENT  Once      06/23/19 0631                Sheryl Brewster MD  Department of Hospital Medicine   [facility]

## 2019-06-27 NOTE — ASSESSMENT & PLAN NOTE
CT pelvis shows 3 pubic fractures:  -acute minimally displaced fracture of the superior pubic ramus on the right  -acute nondisplaced transverse fracture of the inferior year pubic ramus on the left  -acute nondisplaced linear fracture through the lateral margin of the left sacral ala  Appreciate Orthopedic evaluation and recommendation  Conservative treatment with no need for surgical intervention  Physical therapy and occupational therapy a  Pain medications adjusted down as discussed above  Patient will need rehab placement  Social service consulted for rehab placement

## 2019-06-27 NOTE — PLAN OF CARE
Problem: Occupational Therapy Goal  Goal: Occupational Therapy Goal  Goals to be met by: 07/09/19     Patient will increase functional independence with ADLs by performing:    UE Dressing with Supervision.  LE Dressing with Minimal Assistance.  Grooming while seated with Supervision.  Toileting from bedside commode with Moderate Assistance for hygiene and clothing management.   Sitting at edge of bed x15 minutes with Supervision.  Supine to sit with Supervision.  Step transfer with Minimal Assistance  Upper extremity exercise program x10 reps per handout, with independence.     Outcome: Ongoing (interventions implemented as appropriate)  Pt progressing towards goals and will continue to benefit from acute OT. OT rec. Inpatient rehab at d/c.

## 2019-06-27 NOTE — NURSING
Per Dr. Narcisa santana to remove single stitch in left AC from previous procedure to access site. Area cleaned and 1 intact stitch removed. No blood or drainage noted to site. Patient tolerated well.

## 2019-06-27 NOTE — PT/OT/SLP PROGRESS
Occupational Therapy   Treatment    Name: Zoey Ham  MRN: [id number]  Admitting Diagnosis:  Fracture of multiple ribs of left side       Recommendations:     Discharge Recommendations: rehabilitation facility  Discharge Equipment Recommendations:  (TBD pending progress)  Barriers to discharge:  (pt requires increased caregiver support)    Assessment:     Zoey Ham is a 73 y.o. female with a medical diagnosis of Fracture of multiple ribs of left side.  She presents with motivation to participate. Pt recalled PWB status and demo'd good carryover of hand placement with functional transfers, but still required mod t.c./v.c. for implementing PWB status upon ambulating. Pt demo's decreased functional activity tolerance, with increased rest breaks required during session. Performance deficits affecting function are weakness, impaired functional mobilty, impaired balance, decreased upper extremity function, decreased safety awareness, impaired cardiopulmonary response to activity, impaired endurance, impaired self care skills, gait instability, pain, orthopedic precautions. Pt was I PTA and now requires MAX A with functional transfer/ MOD A for bed mobility/ increased A for ADLs. Pt will benefit from the intense therapy that inpatient rehab can provide to enhance pt's quality of life, reduce risk of further falls, and increase pt's ability to return to PLOF.     Rehab Prognosis:  Good; patient would benefit from acute skilled OT services to address these deficits and reach maximum level of function.       Plan:     Patient to be seen 5 x/week to address the above listed problems via self-care/home management, therapeutic activities, therapeutic exercises  · Plan of Care Expires: 07/09/19  · Plan of Care Reviewed with: patient    Subjective     Pain/Comfort:  · Pain Rating 1: (c/o generalized pain- did not rate)    Objective:     Communicated with: nurseAdrianne, prior to session.  Patient found HOB elevated with  bed alarm, oxygen, telemetry, peripheral IV upon OT entry to room.    General Precautions: Standard, fall, respiratory   Orthopedic Precautions:LLE partial weight bearing   Braces: N/A     Occupational Performance:     Bed Mobility:    · Patient completed Rolling/Turning to Left with  minimum assistance, with side rail and HOB elevated  · Patient completed Scooting/Bridging with contact guard assistance  · Patient completed Supine to Sit with moderate assistance and HOB elevated     Functional Mobility/Transfers:  · Patient completed Sit <> Stand Transfer with maximal assistance and of 2 persons  with  rolling walker   · Patient completed Bed <> Wheelchair Transfer using Stand Pivot technique with maximal assistance and of 2 persons with hand-held assist  · Functional Mobility: Pt ambulated ~2-3 steps with MAX A with RW and wheelchair following. Pt required constant t.c. And mod v.c to maintain PWB status. Pt required mod v.c. For pursed lip breathing while on RA.     VITALS: Pt was on 1L upon entry and O2 97%. Pt completed seated exercises on room air with O2 saturation levels remaining at 97%. While ambulating, O2 levels decreased to 83% on room air. Pt was guided back to chair immediately, donned NC, and O2 levels increased to 98% with max cueing for implementing pursed lip breathing. Nurse Adrianne notified.     Activities of Daily Living:  · Grooming: set-up assistance to wash face     · Upper Body Dressing: minimum assistance to help tony back hospital gown  · Lower Body Dressing: contact guard assistance with socks at EOB (increased time required with mod v.c.)      [facility] 6 Click ADL: 17    Treatment & Education:  Pt educated on OT role/POC.   Importance of OOB activity with staff assistance.  Safety during functional t/f and mobility   Handout on energy conservation techniques   Handout on UE HEP with yellow theraband. Pt required increased time to demo correct hand placement/body mechanics with UE exercises.  In unsupported sit, pt completed 1x10 shoulder horizontal ab/dduction and elbow extension. Pt required increased time in between sets d/t fatigue.   Pt required increased time with functional transfers and bed mobility to rest after standing d/t fatigue  After practicing wheelchair transfer and standing tolerance with RW, OT demo'd other UE exercises and pt demo'ing back to improve carryover: external rotation, elbow flexion, and scaption. Pt required mod v.c./t.c. for correct carryover.   White board updated   Multiple self-care tasks/functional mobility completed- assistance level noted above   All questions/concerns answered within OT scope of practice       Patient left up in chair with all lines intact, call button in reach, chair alarm on and nurse, Adrianne, notifiedEducation:      GOALS:   Multidisciplinary Problems     Occupational Therapy Goals        Problem: Occupational Therapy Goal    Goal Priority Disciplines Outcome Interventions   Occupational Therapy Goal     OT, PT/OT Ongoing (interventions implemented as appropriate)    Description:  Goals to be met by: 07/09/19     Patient will increase functional independence with ADLs by performing:    UE Dressing with Supervision.  LE Dressing with Minimal Assistance.  Grooming while seated with Supervision.  Toileting from bedside commode with Moderate Assistance for hygiene and clothing management.   Sitting at edge of bed x15 minutes with Supervision.  Supine to sit with Supervision.  Step transfer with Minimal Assistance  Upper extremity exercise program x10 reps per handout, with independence.                      Time Tracking:     OT Date of Treatment: 06/27/19  OT Start Time: 1122  OT Stop Time: 1215  OT Total Time (min): 53 min    Billable Minutes:Self Care/Home Management 13 min  Therapeutic Exercise 13 min  Total Time 26 min (co-treat with PTA)    Ayleen Spencer OT  6/27/2019

## 2019-06-27 NOTE — ASSESSMENT & PLAN NOTE
Patient has traumatic rib fractures x 3   1 displaced which is the 8th,   Poor inspiratory and expiratory effort oxygen sat decline when off oxygen likely secondary to pain  No need for surgical intervention  Continue expended incentive spirometry   Will decrease Dilaudid to 0.5 mg at p.r.n. and had Tylenol for mild pain to minimize sedation

## 2019-06-27 NOTE — PROGRESS NOTES
Pt in bed reports tired from dialysis.  Took few steps with PT    PE:    B LE good alignment.  Some pain with ROM R hip. Able to move toes    A/P    73-year-old female status post fall     Rib fractures on left side rib 7 and 8.       pelvic fracture involving the left sacral ala and bilateral pubic rami.       physical therapy     Pain controlled     DVT prophylaxis-on subcu heparin, asa     follow up with Dr. Luque in 2 weeks for new set of x-rays.

## 2019-06-28 NOTE — DISCHARGE SUMMARY
[facility] Hospital Medicine  Discharge Summary      Patient Name: Zoey Ham  MRN: [id number]  Admission Date: 6/22/2019  Hospital Length of Stay: 4 days  Discharge Date and Time: 6/27/2019  7:19 PM  Attending Physician:  Sheryl Brewster MD  Discharging Provider: Sheryl Brewster MD  Primary Care Provider: Cesia Rosales MD      HPI:   Zoey Ham is a 73 y.o. unfortunate elderly female patient with extensive cardiac and renal history as below - including but not limited to CAD (CABG 2002, PCI 2012), ESRD-HD (Monday Wednesday Friday/still makes urine), HTN, and gastritis with bleeding history.  Per patient she was in her usual state of health up until 1 day ago.  She reports that she usually dialyzes in [facility]  but her daughter lives in [place].  She reports that she dialyzed in a [place] [address] [facility]  on Friday  as she had been visiting her and the environment was somewhat unfamiliar.  Patient reports that she encountered a mechanical fall and a very small bathroom when she attempted to close the door.  She denies any loss of consciousness, dizziness, lightheadedness before the episode.  She denies chest pain, fever chills, upper respiratory infection, headache, focal deficits or weaknesses, or numbness or tingling in either extremity prior to or after the event.    Upon presentation the patient was noted on CT chest to have 3 left rib fractures 7 and 8 with mild displacement involving the left 7th and lesser extent 8th rib.  CT pelvis showed acute displaced comminuted right fracture of the pelvis as well as an acute nondisplaced transverse fracture of the inferior pubic ramus on the left.  Additionally abnormal CBC and chemistry significant for leukocytosis = 14 K, mild hypokalemia = 5.2, other findings consistent with end-stage renal disease decreased creatinine and GFR.  Additionally patient's glucose level was found to be 442, alk phos 258.  Lastly patient was noted to have  bump in troponin 1 level that has trended she has usual chronic troponinemia =Results for TAY COVARRUBIAS (MRN [id number]) as of 6/23/2019 08:06  Results for TAY COVARRUBIAS (MRN [id number]) as of 6/23/2019 16:37   Ref. Range 6/23/2019 01:20   Troponin I Latest Ref Range: 0.000 - 0.026 ng/mL 0.176 (H)       Cardiologist at [facility] ([place]?)    * No surgery found *      Hospital Course:   Ms. Covarrubias presented after sustaining a fall with resultant 3 rib fractures on the left side and acute displaced comminuted right fracture of the pelvis as well as an acute nondisplaced transverse fracture of the inferior pubic ramus on the left.  She was also noted to have an elevated troponin level which was higher than her baseline and was hypoxic with pulmonary edema. Cardiology, Nephrology and Orthopedics were consulted.  She underwent emergent dialysis on the day of admission to correct her volume overload state with great improvement in her respiratory status followed by her routine scheduled dialysis the very next day.  Her troponins were trended and she was continue maximal medical management with aspirin, metoprolol, and atorvastatin.  Cardiology felt the elevated troponins not consistent with ACS and only recommended aggressive risk mild ossification with medical management alone.  Her rib fractures contributed to her respiratory compromise given that she had poor respiratory effort due to pain. Treatment was rendered with incentive spirometry along with adequate pain control. The rib fractures did not require any surgical intervention.  For her pelvic fractures, which were minimally displaced, orthopedics felt contributed treatment was the best option and no surgery was needed.  She was managed with pain control which were adjusted down to minimize sedation, and treated with physical therapy and occupational therapy.  Patient continued to progress with rehab services recommending inpatient rehab.  She was initially  denied for inpatient treatment but after a peer to peer review, it was approved.  She was transferred to [facility] inpatient rehab for continued treatment.     Consults:   Consults (From admission, onward)        Status Ordering Provider     Inpatient consult to Cardiology  Once     Provider:  Stewart Barragan MD    Completed RAYMUNDO RAE     Inpatient consult to Nephrology  Once     Provider:  Judy Boothe MD    Completed [facility]     Inpatient consult to Orthopedic Surgery  Once     Provider:  Jayson Acevedo MD    Completed [facility]     Inpatient consult to Social Work  Once     Provider:  (Not yet assigned)    Completed [facility]        Service: Hospital Medicine    Final Active Diagnoses:    Diagnosis Date Noted POA    PRINCIPAL PROBLEM:  Fracture of multiple ribs of left side [S22.42XA] 06/23/2019 Yes    Displaced fracture of pubis [S32.509A] 06/23/2019 Yes    Troponin level elevated [R74.8] 02/09/2017 Yes    Hypoxia [R09.02] 06/23/2019 Yes    Enlarged lymph node [R59.9] 06/23/2019 Yes    ESRD (end stage renal disease) on dialysis [N18.6, Z99.2]  Not Applicable    Coronary artery disease involving coronary bypass graft of native heart with unstable angina pectoris [I25.700] 01/18/2017 Yes    Type 2 diabetes mellitus with chronic kidney disease on chronic dialysis, with long-term current use of insulin [E11.22, N18.6, Z99.2, Z79.4] 12/06/2016 Not Applicable    Chronic atrial fibrillation [I48.2] 12/06/2016 Yes    Anemia of chronic renal failure, stage 5 [N18.5, D63.1] 01/22/2015 Yes    Leukocytosis [D72.829] 04/16/2014 Yes    Proliferative diabetic retinopathy [E11.3599] 10/03/2012 Yes    Hypertension associated with diabetes [E11.59, I10] 07/03/2012 Yes      Problems Resolved During this Admission:       Discharged Condition: good    Disposition: Rehab Facility    Follow Up:  Follow-up Information     MD at rehab.               Patient Instructions:       Diet renal     Diet Cardiac     Diet diabetic     Activity as tolerated       Significant Diagnostic Studies:     Pending Diagnostic Studies:     None         Medications:  Reconciled Home Medications:      Medication List      START taking these medications    acetaminophen 325 MG tablet  Commonly known as:  TYLENOL  Take 2 tablets (650 mg total) by mouth every 6 (six) hours as needed.     * glucose 4 GM chewable tablet  Take 4 tablets (16 g total) by mouth as needed.     * glucose 4 GM chewable tablet  Take 6 tablets (24 g total) by mouth as needed.     heparin (porcine) 5,000 unit/mL injection  Inject 1 mL (5,000 Units total) into the skin every 8 (eight) hours.     insulin aspart U-100 100 unit/mL (3 mL) Inpn pen  Commonly known as:  NovoLOG  Inject 1-10 Units into the skin before meals and at bedtime as needed (Hyperglycemia).     insulin detemir U-100 100 unit/mL (3 mL) Inpn pen  Commonly known as:  LEVEMIR FLEXTOUCH  Inject 15 Units into the skin once daily.  Replaces:  insulin glargine 100 unit/mL injection     polyethylene glycol 17 gram Pwpk  Commonly known as:  GLYCOLAX  Take 17 g by mouth 2 (two) times daily as needed.     traMADol 50 mg tablet  Commonly known as:  ULTRAM  Take 1 tablet (50 mg total) by mouth every 8 (eight) hours as needed.     vitamin renal formula (B-complex-vitamin c-folic acid) 1 mg Cap  Commonly known as:  NEPHROCAPS  Take 1 capsule by mouth once daily.         * This list has 2 medication(s) that are the same as other medications prescribed for you. Read the directions carefully, and ask your doctor or other care provider to review them with you.            CHANGE how you take these medications    aspirin 325 MG EC tablet  Commonly known as:  ECOTRIN  Take 1 tablet (325 mg total) by mouth once daily.  What changed:  when to take this        CONTINUE taking these medications    atorvastatin 80 MG tablet  Commonly known as:  LIPITOR  Take 1 tablet (80 mg total) by mouth every  "morning.     calcitRIOL 0.5 MCG Cap  Commonly known as:  ROCALTROL  Take 1 capsule (0.5 mcg total) by mouth once daily.     clopidogrel 75 mg tablet  Commonly known as:  PLAVIX  Take 1 tablet (75 mg total) by mouth once daily.     furosemide 80 MG tablet  Commonly known as:  LASIX  One 80 mg  tab once a day.     metoprolol tartrate 100 MG tablet  Commonly known as:  LOPRESSOR  Take 1 tablet (100 mg total) by mouth 2 (two) times daily.        STOP taking these medications    ACCU-CHEK MARI PLUS METER Misc  Generic drug:  blood-glucose meter     blood sugar diagnostic Strp  Commonly known as:  ACCU-CHEK MARI PLUS TEST STRP     insulin glargine 100 unit/mL injection  Commonly known as:  LANTUS U-100 INSULIN  Replaced by:  insulin detemir U-100 100 unit/mL (3 mL) Inpn pen     insulin syringe-needle U-100 0.3 mL 31 gauge x 1/4" Syrg     lancets Misc  Commonly known as:  ACCU-CHEK SOFTCLIX LANCETS     lidocaine-prilocaine cream  Commonly known as:  EMLA     nitroGLYCERIN 0.3 MG SL tablet  Commonly known as:  NITROSTAT            Indwelling Lines/Drains at time of discharge:   Lines/Drains/Airways     Drain                 Hemodialysis AV Fistula Left upper arm -- days         Hemodialysis AV Fistula Left upper arm -- days         Hemodialysis AV Fistula 06/20/19 0908 Left upper arm 7 days                Time spent on the discharge of patient: 40 minutes  Patient was seen and examined on the date of discharge and determined to be suitable for discharge.         Sheryl Brewster MD  Department of Hospital Medicine  [facility]  "

## 2019-06-28 NOTE — PT/OT/SLP DISCHARGE
Occupational Therapy Discharge Summary    Zoey Ham  MRN: [id number]   Principal Problem: Fracture of multiple ribs of left side      Patient Discharged from acute Occupational Therapy on 06/27/19.  Please refer to prior OT notes for functional status.    Assessment:      Patient appropriate for care in another setting.    Objective:     GOALS:   Multidisciplinary Problems     Occupational Therapy Goals        Problem: Occupational Therapy Goal    Goal Priority Disciplines Outcome Interventions   Occupational Therapy Goal     OT, PT/OT Ongoing (interventions implemented as appropriate)    Description:  Goals to be met by: 07/09/19     Patient will increase functional independence with ADLs by performing:    UE Dressing with Supervision.  LE Dressing with Minimal Assistance.  Grooming while seated with Supervision.  Toileting from bedside commode with Moderate Assistance for hygiene and clothing management.   Sitting at edge of bed x15 minutes with Supervision.  Supine to sit with Supervision.  Step transfer with Minimal Assistance  Upper extremity exercise program x10 reps per handout, with independence.                      Reasons for Discontinuation of Therapy Services  Transfer to alternate level of care.      Plan:     Patient Discharged to: Inpatient Rehab    Ayleen Spencer OT  6/28/2019

## 2019-06-28 NOTE — HOSPITAL COURSE
Ms. Ham presented after sustaining a fall with resultant 3 rib fractures on the left side and acute displaced comminuted right fracture of the pelvis as well as an acute nondisplaced transverse fracture of the inferior pubic ramus on the left.  She was also noted to have an elevated troponin level which was higher than her baseline and was hypoxic with pulmonary edema. Cardiology, Nephrology and Orthopedics were consulted.  She underwent emergent dialysis on the day of admission to correct her volume overload state with great improvement in her respiratory status followed by her routine scheduled dialysis the very next day.  Her troponins were trended and she was continue maximal medical management with aspirin, metoprolol, and atorvastatin.  Cardiology felt the elevated troponins not consistent with ACS and only recommended aggressive risk mild ossification with medical management alone.  Her rib fractures contributed to her respiratory compromise given that she had poor respiratory effort due to pain. Treatment was rendered with incentive spirometry along with adequate pain control. The rib fractures did not require any surgical intervention.  For her pelvic fractures, which were minimally displaced, orthopedics felt contributed treatment was the best option and no surgery was needed.  She was managed with pain control which were adjusted down to minimize sedation, and treated with physical therapy and occupational therapy.  Patient continued to progress with rehab services recommending inpatient rehab.  She was initially denied for inpatient treatment but after a peer to peer review, it was approved.  She was transferred to [facility] inpatient rehab for continued treatment.

## 2019-06-28 NOTE — PHYSICIAN QUERY
PT Name: Zoey Ham  MR #: [id number]     Physician Query Form - Documentation Clarification      CDS/: Iris Godinez RN               Contact information:[email or web address]    This form is a permanent document in the medical record.     Query Date: June 28, 2019    By submitting this query, we are merely seeking further clarification of documentation. Please utilize your independent clinical judgment when addressing the question(s) below.    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  No diagnostic regional wall motion abnormalities.     2 - Left ventricular diastolic dysfunction.     3 - Mild mitral regurgitation.     4 - Trivial to mild tricuspid regurgitation.     5 - The estimated PA systolic pressure is 39 mmHg.     /69    /65    Glucose 101-442   Discharge summary    Discharge summary      H&P past medical history      Cardiology consult              Vital signs 6/23@0740  Vital signs 6/24@1958      Labs 6/23-27                                                                                  Doctor, Please specify diagnosis or diagnoses associated with above clinical findings.    Provider Use Only      [   ] Hypertension is a manifestation of DM (secondary HTN)  [  x ] Hypertension is not a manifestation of DM    [   ] Other __________________                                                                                                                 [  ] Clinically Undetermined

## 2019-06-28 NOTE — PT/OT/SLP DISCHARGE
Physical Therapy Discharge Summary    Name: Zoey Ham  MRN: [id number]   Principal Problem: Fracture of multiple ribs of left side     Patient Discharged from acute Physical Therapy on 19.  Please refer to prior PT notes for functional status.     Assessment:     Patient appropriate for care in another setting.    Objective:     GOALS:   Multidisciplinary Problems     Physical Therapy Goals        Problem: Physical Therapy Goal    Goal Priority Disciplines Outcome Goal Variances Interventions   Physical Therapy Goal     PT, PT/OT Ongoing (interventions implemented as appropriate)     Description:  Goals to be met by: 19    Patient will increase functional independence with mobility by performin. Supine to sit with Stand-by Assistance  2. Rolling to Left and Right with Stand-by Assistance  3. Sit to stand transfer with Minimal Assistance using RW  4. Bed to chair transfer with Minimal Assistance using Rolling Walker  5. Gait  x20-30 feet with Minimal Assistance using Rolling Walker and LLE PWB  6. Wheelchair propulsion x100 feet with Stand-by Assistance using bilateral upper extremities  7. Lower extremity exercise program (seated/supine) 3 sets x10 reps per handout, with independence                      Reasons for Discontinuation of Therapy Services  Transfer to alternate level of care.      Plan:     Patient Discharged to: Inpatient Rehab.    Yamileth Deshpande, PT  2019

## 2019-07-14 ENCOUNTER — HOSPITAL ENCOUNTER (INPATIENT)
Facility: HOSPITAL | Age: 74
LOS: 2 days | Discharge: HOME HEALTH | DRG: 291 | End: 2019-07-16
Attending: EMERGENCY MEDICINE | Admitting: EMERGENCY MEDICINE
Payer: MEDICARE

## 2019-07-14 DIAGNOSIS — N18.6 END STAGE RENAL DISEASE: ICD-10-CM

## 2019-07-14 DIAGNOSIS — R07.9 CHEST PAIN: ICD-10-CM

## 2019-07-14 DIAGNOSIS — R06.02 SOB (SHORTNESS OF BREATH): ICD-10-CM

## 2019-07-14 DIAGNOSIS — I50.33 ACUTE ON CHRONIC DIASTOLIC HEART FAILURE: ICD-10-CM

## 2019-07-14 DIAGNOSIS — R09.02 HYPOXEMIA: ICD-10-CM

## 2019-07-14 DIAGNOSIS — R79.89 ELEVATED TROPONIN: ICD-10-CM

## 2019-07-14 DIAGNOSIS — E87.70 HYPERVOLEMIA, UNSPECIFIED HYPERVOLEMIA TYPE: Primary | ICD-10-CM

## 2019-07-14 DIAGNOSIS — I25.10 CAD (CORONARY ARTERY DISEASE): ICD-10-CM

## 2019-07-14 LAB
BASOPHILS # BLD AUTO: 0.03 K/UL (ref 0–0.2)
BASOPHILS NFR BLD: 0.2 % (ref 0–1.9)
DIFFERENTIAL METHOD: ABNORMAL
EOSINOPHIL # BLD AUTO: 0.2 K/UL (ref 0–0.5)
EOSINOPHIL NFR BLD: 0.9 % (ref 0–8)
ERYTHROCYTE [DISTWIDTH] IN BLOOD BY AUTOMATED COUNT: 15.8 % (ref 11.5–14.5)
HCT VFR BLD AUTO: 28.1 % (ref 37–48.5)
HGB BLD-MCNC: 8.6 G/DL (ref 12–16)
LYMPHOCYTES # BLD AUTO: 0.9 K/UL (ref 1–4.8)
LYMPHOCYTES NFR BLD: 5.1 % (ref 18–48)
MCH RBC QN AUTO: 32.1 PG (ref 27–31)
MCHC RBC AUTO-ENTMCNC: 30.6 G/DL (ref 32–36)
MCV RBC AUTO: 105 FL (ref 82–98)
MONOCYTES # BLD AUTO: 1 K/UL (ref 0.3–1)
MONOCYTES NFR BLD: 5.7 % (ref 4–15)
NEUTROPHILS # BLD AUTO: 15.1 K/UL (ref 1.8–7.7)
NEUTROPHILS NFR BLD: 88.5 % (ref 38–73)
PLATELET # BLD AUTO: 455 K/UL (ref 150–350)
PMV BLD AUTO: 10 FL (ref 9.2–12.9)
RBC # BLD AUTO: 2.68 M/UL (ref 4–5.4)
WBC # BLD AUTO: 17.15 K/UL (ref 3.9–12.7)

## 2019-07-14 PROCEDURE — 96374 THER/PROPH/DIAG INJ IV PUSH: CPT | Mod: HCNC

## 2019-07-14 PROCEDURE — 12000002 HC ACUTE/MED SURGE SEMI-PRIVATE ROOM: Mod: HCNC

## 2019-07-14 PROCEDURE — 93005 ELECTROCARDIOGRAM TRACING: CPT | Mod: HCNC

## 2019-07-14 PROCEDURE — 93010 EKG 12-LEAD: ICD-10-PCS | Mod: HCNC,,, | Performed by: INTERNAL MEDICINE

## 2019-07-14 PROCEDURE — 83880 ASSAY OF NATRIURETIC PEPTIDE: CPT | Mod: HCNC

## 2019-07-14 PROCEDURE — 84484 ASSAY OF TROPONIN QUANT: CPT | Mod: HCNC

## 2019-07-14 PROCEDURE — 96376 TX/PRO/DX INJ SAME DRUG ADON: CPT | Mod: HCNC

## 2019-07-14 PROCEDURE — 96375 TX/PRO/DX INJ NEW DRUG ADDON: CPT | Mod: HCNC

## 2019-07-14 PROCEDURE — 83735 ASSAY OF MAGNESIUM: CPT | Mod: HCNC

## 2019-07-14 PROCEDURE — 93010 ELECTROCARDIOGRAM REPORT: CPT | Mod: HCNC,,, | Performed by: INTERNAL MEDICINE

## 2019-07-14 PROCEDURE — 85379 FIBRIN DEGRADATION QUANT: CPT | Mod: HCNC

## 2019-07-14 PROCEDURE — 85025 COMPLETE CBC W/AUTO DIFF WBC: CPT | Mod: HCNC

## 2019-07-14 PROCEDURE — 80053 COMPREHEN METABOLIC PANEL: CPT | Mod: HCNC

## 2019-07-14 PROCEDURE — 99285 EMERGENCY DEPT VISIT HI MDM: CPT | Mod: HCNC,25

## 2019-07-15 PROBLEM — E87.70 HYPERVOLEMIA: Status: ACTIVE | Noted: 2019-07-15

## 2019-07-15 LAB
ALBUMIN SERPL BCP-MCNC: 3.5 G/DL (ref 3.5–5.2)
ALBUMIN SERPL BCP-MCNC: 3.6 G/DL (ref 3.5–5.2)
ALP SERPL-CCNC: 298 U/L (ref 55–135)
ALP SERPL-CCNC: 320 U/L (ref 55–135)
ALT SERPL W/O P-5'-P-CCNC: 15 U/L (ref 10–44)
ALT SERPL W/O P-5'-P-CCNC: 15 U/L (ref 10–44)
ANION GAP SERPL CALC-SCNC: 12 MMOL/L (ref 8–16)
ANION GAP SERPL CALC-SCNC: 12 MMOL/L (ref 8–16)
AORTIC ROOT ANNULUS: 2.88 CM
AORTIC VALVE CUSP SEPERATION: 2.1 CM
ASCENDING AORTA: 2.86 CM
AST SERPL-CCNC: 21 U/L (ref 10–40)
AST SERPL-CCNC: 24 U/L (ref 10–40)
AV INDEX (PROSTH): 0.67
AV MEAN GRADIENT: 6 MMHG
AV PEAK GRADIENT: 9 MMHG
AV VALVE AREA: 2.23 CM2
AV VELOCITY RATIO: 0.62
BACTERIA #/AREA URNS HPF: ABNORMAL /HPF
BASOPHILS # BLD AUTO: 0.02 K/UL (ref 0–0.2)
BASOPHILS NFR BLD: 0.1 % (ref 0–1.9)
BILIRUB SERPL-MCNC: 0.4 MG/DL (ref 0.1–1)
BILIRUB SERPL-MCNC: 0.5 MG/DL (ref 0.1–1)
BILIRUB UR QL STRIP: NEGATIVE
BNP SERPL-MCNC: 3209 PG/ML (ref 0–99)
BSA FOR ECHO PROCEDURE: 1.87 M2
BUN SERPL-MCNC: 42 MG/DL (ref 8–23)
BUN SERPL-MCNC: 43 MG/DL (ref 8–23)
CALCIUM SERPL-MCNC: 9.1 MG/DL (ref 8.7–10.5)
CALCIUM SERPL-MCNC: 9.4 MG/DL (ref 8.7–10.5)
CHLORIDE SERPL-SCNC: 105 MMOL/L (ref 95–110)
CHLORIDE SERPL-SCNC: 105 MMOL/L (ref 95–110)
CLARITY UR: ABNORMAL
CO2 SERPL-SCNC: 20 MMOL/L (ref 23–29)
CO2 SERPL-SCNC: 22 MMOL/L (ref 23–29)
COLOR UR: YELLOW
CREAT SERPL-MCNC: 3.1 MG/DL (ref 0.5–1.4)
CREAT SERPL-MCNC: 3.2 MG/DL (ref 0.5–1.4)
CV ECHO LV RWT: 0.97 CM
D DIMER PPP IA.FEU-MCNC: 1.44 MG/L FEU
DIFFERENTIAL METHOD: ABNORMAL
DOP CALC AO PEAK VEL: 1.51 M/S
DOP CALC AO VTI: 39.66 CM
DOP CALC LVOT AREA: 3.3 CM2
DOP CALC LVOT DIAMETER: 2.06 CM
DOP CALC LVOT PEAK VEL: 0.94 M/S
DOP CALC LVOT STROKE VOLUME: 88.31 CM3
DOP CALCLVOT PEAK VEL VTI: 26.51 CM
E WAVE DECELERATION TIME: 237.02 MSEC
E/A RATIO: 1.15
E/E' RATIO: 33.33 M/S
ECHO LV POSTERIOR WALL: 1.74 CM (ref 0.6–1.1)
EOSINOPHIL # BLD AUTO: 0 K/UL (ref 0–0.5)
EOSINOPHIL NFR BLD: 0.3 % (ref 0–8)
ERYTHROCYTE [DISTWIDTH] IN BLOOD BY AUTOMATED COUNT: 15.6 % (ref 11.5–14.5)
EST. GFR  (AFRICAN AMERICAN): 16 ML/MIN/1.73 M^2
EST. GFR  (AFRICAN AMERICAN): 17 ML/MIN/1.73 M^2
EST. GFR  (NON AFRICAN AMERICAN): 14 ML/MIN/1.73 M^2
EST. GFR  (NON AFRICAN AMERICAN): 14 ML/MIN/1.73 M^2
FRACTIONAL SHORTENING: 23 % (ref 28–44)
GLUCOSE SERPL-MCNC: 185 MG/DL (ref 70–110)
GLUCOSE SERPL-MCNC: 190 MG/DL (ref 70–110)
GLUCOSE UR QL STRIP: NEGATIVE
HCT VFR BLD AUTO: 27.4 % (ref 37–48.5)
HGB BLD-MCNC: 8.6 G/DL (ref 12–16)
HGB UR QL STRIP: ABNORMAL
HYALINE CASTS #/AREA URNS LPF: 0 /LPF
INTERVENTRICULAR SEPTUM: 1.76 CM (ref 0.6–1.1)
IVRT: 0.11 MSEC
KETONES UR QL STRIP: NEGATIVE
LA MAJOR: 5.23 CM
LA MINOR: 4.8 CM
LA WIDTH: 3.77 CM
LEFT ATRIUM SIZE: 3.98 CM
LEFT ATRIUM VOLUME INDEX: 34.9 ML/M2
LEFT ATRIUM VOLUME: 63.84 CM3
LEFT INTERNAL DIMENSION IN SYSTOLE: 2.77 CM (ref 2.1–4)
LEFT VENTRICLE DIASTOLIC VOLUME INDEX: 29.33 ML/M2
LEFT VENTRICLE DIASTOLIC VOLUME: 53.67 ML
LEFT VENTRICLE MASS INDEX: 141 G/M2
LEFT VENTRICLE SYSTOLIC VOLUME INDEX: 15.7 ML/M2
LEFT VENTRICLE SYSTOLIC VOLUME: 28.7 ML
LEFT VENTRICULAR INTERNAL DIMENSION IN DIASTOLE: 3.58 CM (ref 3.5–6)
LEFT VENTRICULAR MASS: 257.7 G
LEUKOCYTE ESTERASE UR QL STRIP: ABNORMAL
LV LATERAL E/E' RATIO: 30 M/S
LV SEPTAL E/E' RATIO: 37.5 M/S
LYMPHOCYTES # BLD AUTO: 1.3 K/UL (ref 1–4.8)
LYMPHOCYTES NFR BLD: 8.3 % (ref 18–48)
MAGNESIUM SERPL-MCNC: 1.8 MG/DL (ref 1.6–2.6)
MCH RBC QN AUTO: 32.7 PG (ref 27–31)
MCHC RBC AUTO-ENTMCNC: 31.4 G/DL (ref 32–36)
MCV RBC AUTO: 104 FL (ref 82–98)
MICROSCOPIC COMMENT: ABNORMAL
MONOCYTES # BLD AUTO: 1 K/UL (ref 0.3–1)
MONOCYTES NFR BLD: 6.6 % (ref 4–15)
MV PEAK A VEL: 1.3 M/S
MV PEAK E VEL: 1.5 M/S
NEUTROPHILS # BLD AUTO: 12.9 K/UL (ref 1.8–7.7)
NEUTROPHILS NFR BLD: 85 % (ref 38–73)
NITRITE UR QL STRIP: NEGATIVE
PH UR STRIP: 5 [PH] (ref 5–8)
PISA TR MAX VEL: 3.7 M/S
PLATELET # BLD AUTO: 406 K/UL (ref 150–350)
PMV BLD AUTO: 10.1 FL (ref 9.2–12.9)
POCT GLUCOSE: 103 MG/DL (ref 70–110)
POCT GLUCOSE: 158 MG/DL (ref 70–110)
POCT GLUCOSE: 174 MG/DL (ref 70–110)
POTASSIUM SERPL-SCNC: 5.8 MMOL/L (ref 3.5–5.1)
POTASSIUM SERPL-SCNC: 6 MMOL/L (ref 3.5–5.1)
PROT SERPL-MCNC: 6.8 G/DL (ref 6–8.4)
PROT SERPL-MCNC: 7.1 G/DL (ref 6–8.4)
PROT UR QL STRIP: ABNORMAL
PULM VEIN S/D RATIO: 0.6
PV PEAK D VEL: 0.48 M/S
PV PEAK S VEL: 0.29 M/S
PV PEAK VELOCITY: 1 CM/S
RA MAJOR: 5.29 CM
RA PRESSURE: 8 MMHG
RA WIDTH: 3.97 CM
RBC # BLD AUTO: 2.63 M/UL (ref 4–5.4)
RBC #/AREA URNS HPF: 15 /HPF (ref 0–4)
RIGHT VENTRICULAR END-DIASTOLIC DIMENSION: 3.64 CM
RV TISSUE DOPPLER FREE WALL SYSTOLIC VELOCITY 1 (APICAL 4 CHAMBER VIEW): 8.88 CM/S
SINUS: 3.21 CM
SODIUM SERPL-SCNC: 137 MMOL/L (ref 136–145)
SODIUM SERPL-SCNC: 139 MMOL/L (ref 136–145)
SP GR UR STRIP: 1.02 (ref 1–1.03)
STJ: 2.59 CM
TDI LATERAL: 0.05 M/S
TDI SEPTAL: 0.04 M/S
TDI: 0.05 M/S
TR MAX PG: 55 MMHG
TRICUSPID ANNULAR PLANE SYSTOLIC EXCURSION: 1.28 CM
TROPONIN I SERPL DL<=0.01 NG/ML-MCNC: 0.48 NG/ML (ref 0–0.03)
TROPONIN I SERPL DL<=0.01 NG/ML-MCNC: 0.5 NG/ML (ref 0–0.03)
TROPONIN I SERPL DL<=0.01 NG/ML-MCNC: 1 NG/ML (ref 0–0.03)
TROPONIN I SERPL DL<=0.01 NG/ML-MCNC: 2.67 NG/ML (ref 0–0.03)
TV REST PULMONARY ARTERY PRESSURE: 63 MMHG
URN SPEC COLLECT METH UR: ABNORMAL
UROBILINOGEN UR STRIP-ACNC: NEGATIVE EU/DL
WBC # BLD AUTO: 15.25 K/UL (ref 3.9–12.7)
WBC #/AREA URNS HPF: >100 /HPF (ref 0–5)
WBC CLUMPS URNS QL MICRO: ABNORMAL

## 2019-07-15 PROCEDURE — 25000003 PHARM REV CODE 250: Mod: HCNC | Performed by: INTERNAL MEDICINE

## 2019-07-15 PROCEDURE — 84484 ASSAY OF TROPONIN QUANT: CPT | Mod: 91,HCNC

## 2019-07-15 PROCEDURE — 25000003 PHARM REV CODE 250: Mod: HCNC | Performed by: EMERGENCY MEDICINE

## 2019-07-15 PROCEDURE — 27000221 HC OXYGEN, UP TO 24 HOURS: Mod: HCNC

## 2019-07-15 PROCEDURE — 85025 COMPLETE CBC W/AUTO DIFF WBC: CPT | Mod: HCNC

## 2019-07-15 PROCEDURE — S5571 INSULIN DISPOS PEN 3 ML: HCPCS | Mod: HCNC | Performed by: INTERNAL MEDICINE

## 2019-07-15 PROCEDURE — 25000242 PHARM REV CODE 250 ALT 637 W/ HCPCS: Mod: HCNC | Performed by: INTERNAL MEDICINE

## 2019-07-15 PROCEDURE — 63600175 PHARM REV CODE 636 W HCPCS: Mod: HCNC | Performed by: EMERGENCY MEDICINE

## 2019-07-15 PROCEDURE — 25500020 PHARM REV CODE 255: Mod: HCNC | Performed by: EMERGENCY MEDICINE

## 2019-07-15 PROCEDURE — 25000003 PHARM REV CODE 250: Mod: HCNC | Performed by: PHYSICIAN ASSISTANT

## 2019-07-15 PROCEDURE — 25000242 PHARM REV CODE 250 ALT 637 W/ HCPCS: Mod: HCNC | Performed by: EMERGENCY MEDICINE

## 2019-07-15 PROCEDURE — 99223 1ST HOSP IP/OBS HIGH 75: CPT | Mod: HCNC,,, | Performed by: INTERNAL MEDICINE

## 2019-07-15 PROCEDURE — 81000 URINALYSIS NONAUTO W/SCOPE: CPT | Mod: HCNC

## 2019-07-15 PROCEDURE — 80100016 HC MAINTENANCE HEMODIALYSIS: Mod: HCNC

## 2019-07-15 PROCEDURE — 99223 PR INITIAL HOSPITAL CARE,LEVL III: ICD-10-PCS | Mod: HCNC,,, | Performed by: INTERNAL MEDICINE

## 2019-07-15 PROCEDURE — 87086 URINE CULTURE/COLONY COUNT: CPT | Mod: HCNC

## 2019-07-15 PROCEDURE — 80053 COMPREHEN METABOLIC PANEL: CPT | Mod: HCNC

## 2019-07-15 PROCEDURE — 94761 N-INVAS EAR/PLS OXIMETRY MLT: CPT | Mod: HCNC

## 2019-07-15 PROCEDURE — 87088 URINE BACTERIA CULTURE: CPT | Mod: HCNC

## 2019-07-15 PROCEDURE — 87077 CULTURE AEROBIC IDENTIFY: CPT | Mod: HCNC

## 2019-07-15 PROCEDURE — 94640 AIRWAY INHALATION TREATMENT: CPT | Mod: HCNC

## 2019-07-15 PROCEDURE — 63600175 PHARM REV CODE 636 W HCPCS: Mod: HCNC | Performed by: INTERNAL MEDICINE

## 2019-07-15 PROCEDURE — 87186 SC STD MICRODIL/AGAR DIL: CPT | Mod: HCNC

## 2019-07-15 PROCEDURE — 21400001 HC TELEMETRY ROOM: Mod: HCNC

## 2019-07-15 PROCEDURE — 36415 COLL VENOUS BLD VENIPUNCTURE: CPT | Mod: HCNC

## 2019-07-15 RX ORDER — FAMOTIDINE 20 MG/1
20 TABLET, FILM COATED ORAL DAILY
Status: DISCONTINUED | OUTPATIENT
Start: 2019-07-15 | End: 2019-07-15

## 2019-07-15 RX ORDER — INSULIN ASPART 100 [IU]/ML
0-5 INJECTION, SOLUTION INTRAVENOUS; SUBCUTANEOUS
Status: DISCONTINUED | OUTPATIENT
Start: 2019-07-15 | End: 2019-07-16 | Stop reason: HOSPADM

## 2019-07-15 RX ORDER — FUROSEMIDE 10 MG/ML
80 INJECTION INTRAMUSCULAR; INTRAVENOUS 2 TIMES DAILY
Status: DISCONTINUED | OUTPATIENT
Start: 2019-07-15 | End: 2019-07-16 | Stop reason: HOSPADM

## 2019-07-15 RX ORDER — FUROSEMIDE 10 MG/ML
80 INJECTION INTRAMUSCULAR; INTRAVENOUS ONCE
Status: COMPLETED | OUTPATIENT
Start: 2019-07-15 | End: 2019-07-15

## 2019-07-15 RX ORDER — MUPIROCIN 20 MG/G
OINTMENT TOPICAL 2 TIMES DAILY
Status: DISCONTINUED | OUTPATIENT
Start: 2019-07-15 | End: 2019-07-16 | Stop reason: HOSPADM

## 2019-07-15 RX ORDER — CALCITRIOL 0.25 UG/1
0.5 CAPSULE ORAL DAILY
Status: DISCONTINUED | OUTPATIENT
Start: 2019-07-15 | End: 2019-07-16 | Stop reason: HOSPADM

## 2019-07-15 RX ORDER — IBUPROFEN 200 MG
16 TABLET ORAL
Status: DISCONTINUED | OUTPATIENT
Start: 2019-07-15 | End: 2019-07-16 | Stop reason: HOSPADM

## 2019-07-15 RX ORDER — LEVOFLOXACIN 750 MG/1
750 TABLET ORAL ONCE
Status: COMPLETED | OUTPATIENT
Start: 2019-07-15 | End: 2019-07-15

## 2019-07-15 RX ORDER — LEVOFLOXACIN 750 MG/1
750 TABLET ORAL ONCE
Status: DISCONTINUED | OUTPATIENT
Start: 2019-07-15 | End: 2019-07-15

## 2019-07-15 RX ORDER — IPRATROPIUM BROMIDE AND ALBUTEROL SULFATE 2.5; .5 MG/3ML; MG/3ML
3 SOLUTION RESPIRATORY (INHALATION) EVERY 6 HOURS
Status: DISCONTINUED | OUTPATIENT
Start: 2019-07-15 | End: 2019-07-16 | Stop reason: HOSPADM

## 2019-07-15 RX ORDER — TRAMADOL HYDROCHLORIDE 50 MG/1
50 TABLET ORAL EVERY 8 HOURS PRN
Status: DISCONTINUED | OUTPATIENT
Start: 2019-07-15 | End: 2019-07-16 | Stop reason: HOSPADM

## 2019-07-15 RX ORDER — LEVOFLOXACIN 500 MG/1
500 TABLET, FILM COATED ORAL EVERY OTHER DAY
Status: DISCONTINUED | OUTPATIENT
Start: 2019-07-17 | End: 2019-07-16

## 2019-07-15 RX ORDER — SODIUM CHLORIDE 0.9 % (FLUSH) 0.9 %
10 SYRINGE (ML) INJECTION
Status: DISCONTINUED | OUTPATIENT
Start: 2019-07-15 | End: 2019-07-16 | Stop reason: HOSPADM

## 2019-07-15 RX ORDER — FAMOTIDINE 20 MG/1
20 TABLET, FILM COATED ORAL DAILY
Status: DISCONTINUED | OUTPATIENT
Start: 2019-07-15 | End: 2019-07-16 | Stop reason: HOSPADM

## 2019-07-15 RX ORDER — SODIUM CHLORIDE 9 MG/ML
INJECTION, SOLUTION INTRAVENOUS
Status: DISCONTINUED | OUTPATIENT
Start: 2019-07-15 | End: 2019-07-16 | Stop reason: HOSPADM

## 2019-07-15 RX ORDER — POLYETHYLENE GLYCOL 3350 17 G/17G
17 POWDER, FOR SOLUTION ORAL 2 TIMES DAILY PRN
Status: DISCONTINUED | OUTPATIENT
Start: 2019-07-15 | End: 2019-07-16 | Stop reason: HOSPADM

## 2019-07-15 RX ORDER — IPRATROPIUM BROMIDE AND ALBUTEROL SULFATE 2.5; .5 MG/3ML; MG/3ML
3 SOLUTION RESPIRATORY (INHALATION)
Status: COMPLETED | OUTPATIENT
Start: 2019-07-15 | End: 2019-07-15

## 2019-07-15 RX ORDER — ACETAMINOPHEN 325 MG/1
650 TABLET ORAL EVERY 8 HOURS PRN
Status: DISCONTINUED | OUTPATIENT
Start: 2019-07-15 | End: 2019-07-16 | Stop reason: HOSPADM

## 2019-07-15 RX ORDER — GLUCAGON 1 MG
1 KIT INJECTION
Status: DISCONTINUED | OUTPATIENT
Start: 2019-07-15 | End: 2019-07-16 | Stop reason: HOSPADM

## 2019-07-15 RX ORDER — CLOPIDOGREL BISULFATE 75 MG/1
75 TABLET ORAL DAILY
Status: DISCONTINUED | OUTPATIENT
Start: 2019-07-15 | End: 2019-07-16 | Stop reason: HOSPADM

## 2019-07-15 RX ORDER — ATORVASTATIN CALCIUM 40 MG/1
40 TABLET, FILM COATED ORAL EVERY MORNING
Status: DISCONTINUED | OUTPATIENT
Start: 2019-07-15 | End: 2019-07-16 | Stop reason: HOSPADM

## 2019-07-15 RX ORDER — ONDANSETRON 2 MG/ML
4 INJECTION INTRAMUSCULAR; INTRAVENOUS
Status: COMPLETED | OUTPATIENT
Start: 2019-07-15 | End: 2019-07-15

## 2019-07-15 RX ORDER — METOPROLOL TARTRATE 50 MG/1
100 TABLET ORAL 2 TIMES DAILY
Status: DISCONTINUED | OUTPATIENT
Start: 2019-07-15 | End: 2019-07-16 | Stop reason: HOSPADM

## 2019-07-15 RX ORDER — HEPARIN SODIUM 5000 [USP'U]/ML
5000 INJECTION, SOLUTION INTRAVENOUS; SUBCUTANEOUS EVERY 8 HOURS
Status: DISCONTINUED | OUTPATIENT
Start: 2019-07-15 | End: 2019-07-16 | Stop reason: HOSPADM

## 2019-07-15 RX ORDER — ONDANSETRON 2 MG/ML
4 INJECTION INTRAMUSCULAR; INTRAVENOUS EVERY 8 HOURS PRN
Status: DISCONTINUED | OUTPATIENT
Start: 2019-07-15 | End: 2019-07-16 | Stop reason: HOSPADM

## 2019-07-15 RX ORDER — MORPHINE SULFATE 10 MG/ML
2 INJECTION INTRAMUSCULAR; INTRAVENOUS; SUBCUTANEOUS
Status: COMPLETED | OUTPATIENT
Start: 2019-07-15 | End: 2019-07-15

## 2019-07-15 RX ORDER — MORPHINE SULFATE 10 MG/ML
2 INJECTION INTRAMUSCULAR; INTRAVENOUS; SUBCUTANEOUS EVERY 4 HOURS PRN
Status: DISCONTINUED | OUTPATIENT
Start: 2019-07-15 | End: 2019-07-16 | Stop reason: HOSPADM

## 2019-07-15 RX ORDER — ACETAMINOPHEN 500 MG
1000 TABLET ORAL
Status: DISPENSED | OUTPATIENT
Start: 2019-07-15 | End: 2019-07-15

## 2019-07-15 RX ORDER — ASPIRIN 325 MG
325 TABLET, DELAYED RELEASE (ENTERIC COATED) ORAL EVERY MORNING
Status: DISCONTINUED | OUTPATIENT
Start: 2019-07-15 | End: 2019-07-16 | Stop reason: HOSPADM

## 2019-07-15 RX ORDER — IBUPROFEN 200 MG
24 TABLET ORAL
Status: DISCONTINUED | OUTPATIENT
Start: 2019-07-15 | End: 2019-07-16 | Stop reason: HOSPADM

## 2019-07-15 RX ADMIN — FAMOTIDINE 20 MG: 20 TABLET ORAL at 10:07

## 2019-07-15 RX ADMIN — FAMOTIDINE 20 MG: 20 TABLET ORAL at 08:07

## 2019-07-15 RX ADMIN — IPRATROPIUM BROMIDE AND ALBUTEROL SULFATE 3 ML: .5; 3 SOLUTION RESPIRATORY (INHALATION) at 02:07

## 2019-07-15 RX ADMIN — IPRATROPIUM BROMIDE AND ALBUTEROL SULFATE 3 ML: .5; 3 SOLUTION RESPIRATORY (INHALATION) at 04:07

## 2019-07-15 RX ADMIN — METOPROLOL TARTRATE 100 MG: 50 TABLET ORAL at 11:07

## 2019-07-15 RX ADMIN — INSULIN DETEMIR 15 UNITS: 100 INJECTION, SOLUTION SUBCUTANEOUS at 08:07

## 2019-07-15 RX ADMIN — IOHEXOL 70 ML: 350 INJECTION, SOLUTION INTRAVENOUS at 02:07

## 2019-07-15 RX ADMIN — ONDANSETRON 4 MG: 2 INJECTION INTRAMUSCULAR; INTRAVENOUS at 02:07

## 2019-07-15 RX ADMIN — MORPHINE SULFATE 2 MG: 10 INJECTION INTRAVENOUS at 03:07

## 2019-07-15 RX ADMIN — ACETAMINOPHEN 650 MG: 325 TABLET, FILM COATED ORAL at 12:07

## 2019-07-15 RX ADMIN — CALCITRIOL CAPSULES 0.25 MCG 0.5 MCG: 0.25 CAPSULE ORAL at 08:07

## 2019-07-15 RX ADMIN — ATORVASTATIN CALCIUM 40 MG: 40 TABLET, FILM COATED ORAL at 08:07

## 2019-07-15 RX ADMIN — ACETAMINOPHEN 650 MG: 325 TABLET, FILM COATED ORAL at 06:07

## 2019-07-15 RX ADMIN — METOPROLOL TARTRATE 100 MG: 50 TABLET ORAL at 08:07

## 2019-07-15 RX ADMIN — LEVOFLOXACIN 750 MG: 750 TABLET, FILM COATED ORAL at 09:07

## 2019-07-15 RX ADMIN — HEPARIN SODIUM 5000 UNITS: 5000 INJECTION, SOLUTION INTRAVENOUS; SUBCUTANEOUS at 09:07

## 2019-07-15 RX ADMIN — CLOPIDOGREL BISULFATE 75 MG: 75 TABLET ORAL at 08:07

## 2019-07-15 RX ADMIN — HEPARIN SODIUM 5000 UNITS: 5000 INJECTION, SOLUTION INTRAVENOUS; SUBCUTANEOUS at 02:07

## 2019-07-15 RX ADMIN — FUROSEMIDE 80 MG: 10 INJECTION, SOLUTION INTRAVENOUS at 08:07

## 2019-07-15 RX ADMIN — MUPIROCIN: 20 OINTMENT TOPICAL at 09:07

## 2019-07-15 RX ADMIN — FUROSEMIDE 80 MG: 10 INJECTION, SOLUTION INTRAVENOUS at 09:07

## 2019-07-15 RX ADMIN — ASPIRIN 325 MG: 325 TABLET, DELAYED RELEASE ORAL at 08:07

## 2019-07-15 RX ADMIN — ONDANSETRON 4 MG: 2 INJECTION INTRAMUSCULAR; INTRAVENOUS at 03:07

## 2019-07-15 RX ADMIN — ACETAMINOPHEN 650 MG: 325 TABLET, FILM COATED ORAL at 09:07

## 2019-07-15 RX ADMIN — NEPHROCAP 1 CAPSULE: 1 CAP ORAL at 08:07

## 2019-07-15 RX ADMIN — IPRATROPIUM BROMIDE AND ALBUTEROL SULFATE 3 ML: .5; 3 SOLUTION RESPIRATORY (INHALATION) at 07:07

## 2019-07-15 NOTE — PT/OT/SLP PROGRESS
Missed Physical Therapy      Patient Name:  Zoey Ham   MRN:  [id number]    Patient not seen today secondary to Patient unwilling to participate(- pt reports increase pain requiring morphine and fluid build up around lungs and heart.  Pt awaiting hemo dialysis. ).     Nurse Vikki notified.      Will follow-up at later time/day.    Dennys York, PT   07/15/2019

## 2019-07-15 NOTE — ASSESSMENT & PLAN NOTE
UA concerning for UTI. Leukocytosis noted. Afebrile. Patient had intermittent dysuria. Will give oral Levaquin for now renally dosed for 3 days (2 doses, 750 1st day and 500 secondary day both post dialysis).

## 2019-07-15 NOTE — ASSESSMENT & PLAN NOTE
Denied any active chest pain, however she is having dyspnea. Cardiology consulted. Trend troponin. Patient was unable to lay flat for LHC in the past. Cards following. TTE pending at this time.

## 2019-07-15 NOTE — HPI
74 yo with hx of CAD s/p multiple PCIs, HLD, HTN, DM type 2, ESRD, PAF, Osteopenia, presenting with progressively worsening dyspnea for the past several days with associated non productive cough, nausea, orthopnea, and palpitations. Nothing at home seemed to help her prompted ED visit. Patient denies any fevers, chills, syncope, diaphoresis, chest pain, PND, abdominal pain, emesis, flank pain, diarrhea, LE swelling, leg pain or bleeds. Recently discharged from rehab for falls and rib fractures. Reports compliance to HD and medication although she does not check her weight or BP daily. Stated that during her last dialysis, she didn't have any fluids taken out.    In the ED patient was afebrile, hypertensive, in respiratory distress, saturating 89% on RA, placed on O2. Labs significant for K 5.8, BUN 42, Cr 3.2, , wbc 17.15, Hgb 8.6, plt 455, DD 1.44, Trop 0.477, BNP 3209. UA with concerns of UTI. EKG unchanged from piror and non ischemia. CXR consistent with pulmonary edema. CTA done due to elevated DD without PE, but noting vascular congestion. Nephrology and cardiology consulted. Patient admitted to hospital medicine for fluid overload needing dialysis.

## 2019-07-15 NOTE — ED NOTES
Pt notified of need for urine. Pt verbalized understanding but states she is unable to void at this time.

## 2019-07-15 NOTE — ASSESSMENT & PLAN NOTE
Resume home meds. On BB and lasix at home. May need to consider another agent if BP uncontrolled. She needs to check her BP daily at home.

## 2019-07-15 NOTE — CONSULTS
Renal Consult    Date of Admission:  7/14/2019 11:08 PM        Chief Complaint:   Chief Complaint   Patient presents with    Shortness of Breath     dialysis pt, in rehab for a fall the past couple weeks and discharged home 2 days ago, reports waking up yesterday feeling SOB and weak, 93% on RA upon EMS arrival, palced on 2L O2, denies CP, given 4mg zofran with EMS        HPI: 73 y.o. female known to me with a PMHx significant for: ESRD on HD (last Dialysis on Friday) CAD s/p MI (x 5-6), HLD, PNA and DM-2 who presents to the ED via EMS complaining of intermittent SOB earlier in day yesterday that progressed to constant SOB with associated palpitations last night   Patient denies chest pain, abdominal pain, fever, chills, dysuria, hematuria, leg swelling, or melena.   Pte. Reportedly discharged few days ago from Rehab. Following a Pelvic fracture.    PMH:  Past Medical History:   Diagnosis Date    Acid reflux     Acute myocardial infarction of anterolateral wall 7/9/2015    Anemia of chronic renal failure, stage 4 (severe) 1/22/2015    Ankle fracture     right    Anticoagulant long-term use     ASA and plavix    Atherosclerosis of aorta 10/3/2012    AV shunt malfunction     Benign hypertension with CKD (chronic kidney disease) stage IV 3/11/2016    Hypertension associated with Diabetes    Carpal tunnel syndrome, right     Chronic diastolic congestive heart failure 10/3/2012    Combined systolic and diastolic CHF    Chronic kidney disease, stage IV (severe) 7/3/2012    Coronary artery disease     s/p 1v CABG 2002 and multiple PCI (last angiogram in 6/2012 with patent LIMA->LAD and patent LCx and RCA stents)    Diabetic polyneuropathy associated with type 2 diabetes mellitus 7/9/2015    Diabetic polyneuropathy associated with type 2 diabetes mellitus 7/9/2015    Dialysis patient     Encounter for blood transfusion     Gastritis without bleeding     Heart  attack 09/2012    5-6 events    Hyperlipidemia     Hyperparathyroidism     Hyperphosphatemia     Metabolic acidosis     Nephritis and nephropathy, 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 7/9/2015    NSTEMI (non-ST elevated myocardial infarction)     NSTEMI (non-ST elevated myocardial infarction)     NSTEMI (non-ST elevated myocardial infarction)     Occlusion and stenosis of carotid artery with cerebral infarction 7/9/2015    Osteopenia     PAF (paroxysmal atrial fibrillation) 10/3/2012    Pneumonia     Proliferative diabetic retinopathy 10/3/2012    S/P drug eluting coronary stent placement     Sepsis due to Escherichia coli with acute renal failure 2/2016    UTI     TACO (transfusion associated circulatory overload)     Type II diabetes mellitus with neurological manifestations 7/9/2015    Type II diabetes mellitus with renal manifestations 7/3/2012       PSH:  Past Surgical History:   Procedure Laterality Date    APPENDECTOMY      CARDIAC SURGERY  2002    CABG    CHOLECYSTECTOMY      CHOLECYSTECTOMY-LAPAROSCOPIC N/A 2/4/2015    Performed by Quinton [facility] at [facility]    [id number]  - Left brachiocephalic Left 4/16/2018    [facility]    CORONARY ANGIOPLASTY  2004    CORONARY ARTERY BYPASS GRAFT      EYE SURGERY      cataracts bilaterally    Fistulogram Left 1/17/2019    Performed by YAMEL Perry III, [facility] at [facility]    Fistulogram Left 9/20/2018    Performed by YAMEL Perry III, [facility] at [facility]    Fistulogram left arm Left 6/20/2019    [facility]    HYSTERECTOMY      PTA (ANGIOPLASTY, PERCUTANEOUS, TRANSLUMINAL) N/A 6/20/2019    Performed by YAMEL Perry III, MD at [facility]    PTA (ANGIOPLASTY, PERCUTANEOUS, TRANSLUMINAL) N/A 1/17/2019    Performed by YAMEL Perry III, MD at [facility]    PTA (ANGIOPLASTY, PERCUTANEOUS,  TRANSLUMINAL) N/A 9/20/2018    Performed [facility] at [facility]    STENT, FISTULA Left 6/20/2019    Performed by [facility]EL Perry [facility] at [facility]    TONSILLECTOMY         Allergies:  Review of patient's allergies indicates:   Allergen Reactions    Percodan [oxycodone-aspirin] Hives     Welps     Pcn [penicillins] Hives and Swelling     Tolerated Rocephin IM in clinic      Phenergan [promethazine] Other (See Comments)     Altered mental status; jerking of extremities       No current facility-administered medications on file prior to encounter.      Current Outpatient Medications on File Prior to Encounter   Medication Sig Dispense Refill    acetaminophen (TYLENOL) 325 MG tablet Take 2 tablets (650 mg total) by mouth every 6 (six) hours as needed.  0    aspirin (ECOTRIN) 325 MG EC tablet Take 1 tablet (325 mg total) by mouth once daily. (Patient taking differently: Take 325 mg by mouth every morning. )      atorvastatin (LIPITOR) 80 MG tablet Take 1 tablet (80 mg total) by mouth every morning. (Patient taking differently: Take 40 mg by mouth every morning. ) 90 tablet 3    clopidogrel (PLAVIX) 75 mg tablet Take 1 tablet (75 mg total) by mouth once daily. 90 tablet 3    furosemide (LASIX) 80 MG tablet One 80 mg  tab once a day. 90 tablet 2    insulin glargine,hum.rec.anlog (LANTUS SUBQ) Inject into the skin.       metoprolol tartrate (LOPRESSOR) 100 MG tablet Take 1 tablet (100 mg total) by mouth 2 (two) times daily. 180 tablet 3    vitamin renal formula, B-complex-vitamin c-folic acid, (NEPHROCAPS) 1 mg Cap Take 1 capsule by mouth once daily.  0    calcitRIOL (ROCALTROL) 0.5 MCG Cap Take 1 capsule (0.5 mcg total) by mouth once daily. 90 capsule 1    glucose 4 GM chewable tablet Take 4 tablets (16 g total) by mouth as needed.  12    glucose 4 GM chewable tablet Take 6 tablets (24 g total) by mouth as needed.  12    heparin sodium,porcine (HEPARIN, PORCINE,) 5,000  unit/mL injection Inject 1 mL (5,000 Units total) into the skin every 8 (eight) hours.      insulin aspart U-100 (NOVOLOG) 100 unit/mL (3 mL) InPn pen Inject 1-10 Units into the skin before meals and at bedtime as needed (Hyperglycemia).  0    insulin detemir U-100 (LEVEMIR FLEXTOUCH) 100 unit/mL (3 mL) SubQ InPn pen Inject 15 Units into the skin once daily.  0    polyethylene glycol (GLYCOLAX) 17 gram PwPk Take 17 g by mouth 2 (two) times daily as needed.  0    traMADol (ULTRAM) 50 mg tablet Take 1 tablet (50 mg total) by mouth every 8 (eight) hours as needed.         Medications:  Current Facility-Administered Medications   Medication Dose Route Frequency Provider Last Rate Last Dose    0.9%  NaCl infusion   Intravenous PRN Henri Harrison MD        acetaminophen tablet 650 mg  650 mg Oral Q8H PRN Vernon Ward MD   650 mg at 07/15/19 1220    albuterol-ipratropium 2.5 mg-0.5 mg/3 mL nebulizer solution 3 mL  3 mL Nebulization Q6H Vernon Ward MD   3 mL at 07/15/19 1400    aspirin EC tablet 325 mg  325 mg Oral RANI Ward MD   325 mg at 07/15/19 0858    atorvastatin tablet 40 mg  40 mg Oral RANI Ward MD   40 mg at 07/15/19 0858    calcitRIOL capsule 0.5 mcg  0.5 mcg Oral Daily Vernon Ward MD   0.5 mcg at 07/15/19 0858    clopidogrel tablet 75 mg  75 mg Oral Daily Vernon Ward MD   75 mg at 07/15/19 0857    dextrose 50% injection 12.5 g  12.5 g Intravenous PRN Vernon Ward MD        dextrose 50% injection 25 g  25 g Intravenous PRN Vernon Ward MD        famotidine tablet 20 mg  20 mg Oral Daily Vernon Ward MD   20 mg at 07/15/19 0857    glucagon (human recombinant) injection 1 mg  1 mg Intramuscular PRN Vernon Ward MD        glucose chewable tablet 16 g  16 g Oral PRN Vernon Ward MD        glucose chewable tablet 24 g  24 g Oral PRN Vernon Ward MD        heparin (porcine) injection 5,000 Units  5,000 Units Subcutaneous Q8H Vernon Ward,  MD   5,000 Units at 07/15/19 1411    insulin aspart U-100 pen 0-5 Units  0-5 Units Subcutaneous QID (AC + HS) PRN Vernon Ward MD        insulin detemir U-100 pen 15 Units  15 Units Subcutaneous Daily Vernon Ward MD   15 Units at 07/15/19 0856    [START ON 7/17/2019] levoFLOXacin tablet 500 mg  500 mg Oral Every other day Vernon Ward MD        levoFLOXacin tablet 750 mg  750 mg Oral Once Vernon Ward MD        metoprolol tartrate (LOPRESSOR) tablet 100 mg  100 mg Oral BID Vernon Ward MD   100 mg at 07/15/19 0857    morphine injection 2 mg  2 mg Intravenous Q4H PRN Vernon Ward MD        mupirocin 2 % ointment   Nasal BID Henri Harrison MD        ondansetron injection 4 mg  4 mg Intravenous Q8H PRN Vernon Ward MD        polyethylene glycol packet 17 g  17 g Oral BID PRN Vernon Ward MD        sodium chloride 0.9% flush 10 mL  10 mL Intravenous PRN Vernon Ward MD        traMADol tablet 50 mg  50 mg Oral Q8H PRN Vernon Ward MD        vitamin renal formula (B-complex-vitamin c-folic acid) 1 mg per capsule 1 capsule  1 capsule Oral Daily Vernon Ward MD   1 capsule at 07/15/19 0857       FamHx:  Family History   Problem Relation Age of Onset    Heart disease Mother     Hypertension Mother     Cancer Mother         lung    Heart disease Father     Hypertension Father     Psoriasis Father     Dementia Father     Hypertension Sister     Stroke Sister     Hypertension Brother     Diabetes Daughter     No Known Problems Son     Breast cancer Maternal Aunt     Melanoma Neg Hx     Lupus Neg Hx     Eczema Neg Hx     Amblyopia Neg Hx     Blindness Neg Hx     Cataracts Neg Hx     Glaucoma Neg Hx     Macular degeneration Neg Hx     Retinal detachment Neg Hx     Strabismus Neg Hx     Thyroid disease Neg Hx        SocHx:  Social History     Socioeconomic History    Marital status: Single     Spouse name: Not on file    Number of children: Not on  file    Years of education: Not on file    Highest education level: Not on file   Occupational History    Occupation: Homemaker   Social Needs    Financial resource strain: Not on file    Food insecurity:     Worry: Not on file     Inability: Not on file    Transportation needs:     Medical: Not on file     Non-medical: Not on file   Tobacco Use    Smoking status: Never Smoker    Smokeless tobacco: Never Used   Substance and Sexual Activity    Alcohol use: No    Drug use: No    Sexual activity: Never   Lifestyle    Physical activity:     Days per week: Not on file     Minutes per session: Not on file    Stress: Not on file   Relationships    Social connections:     Talks on phone: Not on file     Gets together: Not on file     Attends Nondenominational service: Not on file     Active member of club or organization: Not on file     Attends meetings of clubs or organizations: Not on file     Relationship status: Not on file   Other Topics Concern    Are you pregnant or think you may be? No    Breast-feeding No   Social History Narrative    2 biological kids, 1 adopted9 grandkids (1 grandaucaleb lives with her while she 's attending pharmacy school at [employer])3 great-grandkidsGoing to [place] in November 2013 for 1 month           Review of Systems: see H&P       Physical Exam:  Vitals:   Vitals:    07/15/19 1400   BP:    Pulse: 78   Resp: 18   Temp:        I/O last 3 completed shifts:  In: -   Out: 70 [Urine:70]  I/O this shift:  In: 120 [P.O.:120]  Out: -     General: No apparent distress.   Neck: supple no JVD  Lungs: CTA bilateral  Heart: RRR, S1-S2  Abdomen: Soft. NT.  : n/a  Ext: No edema  Skin: The skin is warm and dry  Neurologic: Awake and alert, oriented x 3      Laboratories:    Recent Labs   Lab 07/15/19  0606   WBC 15.25*   RBC 2.63*   HGB 8.6*   HCT 27.4*   *   *   MCH 32.7*   MCHC 31.4*       Recent Labs   Lab 07/15/19  0606   CALCIUM 9.4   PROT 6.8      K 6.0*    CO2 22*      BUN 43*   CREATININE 3.1*   ALKPHOS 298*   ALT 15   AST 21   BILITOT 0.5       Recent Labs   Lab 07/15/19  0644   COLORU Yellow   SPECGRAV 1.025   PHUR 5.0   PROTEINUA 2+*   BACTERIA Many*       Microbiology Results (last 7 days)     Procedure Component Value Units Date/Time    Urine culture [[id number]] Collected:  07/15/19 0644    Order Status:  No result Specimen:  Urine Updated:  07/15/19 0801            Diagnostic Tests:  CTA Chest Non-Coronary (PE Study) [[id number]] Resulted: 07/15/19 0307   Order Status: Completed      Impression:       No evidence of acute pulmonary embolus.    Cardiomegaly with small bilateral pleural effusions, interlobular septal thickening, and mild patchy airspace disease.  Constellation of findings is most consistent with CHF exacerbation or volume overload.    Healing left-sided rib fractures.    Atherosclerosis and coronary artery disease.     X-Ray Chest AP Portable [[id number]] Resulted: 07/14/19 3550   Order Status: Completed      Impression:       Cardiomegaly with new small bilateral pleural effusions and unchanged appearance of increased attenuation of the pulmonary interstitium.  The findings are most consistent with pulmonary edema/fluid overload state.                 Assessment:    72 y/o female admitted with:    - Fluid overload  - ESRD on HD  - Acute on chronic diastolic HF  - Hyperkalemia  - Anemia of CKD  - Hx. CAD  - Hx. A-fib not on AOCs  - Abnormal troponin      Plan:    - Dialysis in progress, low K+ dialyzate  - UF as tolerated  - Epogen with HD  - Cardiology following  - Glycemic control per admitting

## 2019-07-15 NOTE — ED NOTES
Pt requesting tylenol as she takes it q4 hours at home and last took it at 6 pm yesterday. MD notified.

## 2019-07-15 NOTE — SUBJECTIVE & OBJECTIVE
Past Medical History:   Diagnosis Date    Acid reflux     Acute myocardial infarction of anterolateral wall 7/9/2015    Anemia of chronic renal failure, stage 4 (severe) 1/22/2015    Ankle fracture     right    Anticoagulant long-term use     ASA and plavix    Atherosclerosis of aorta 10/3/2012    AV shunt malfunction     Benign hypertension with CKD (chronic kidney disease) stage IV 3/11/2016    Hypertension associated with Diabetes    Carpal tunnel syndrome, right     Chronic diastolic congestive heart failure 10/3/2012    Combined systolic and diastolic CHF    Chronic kidney disease, stage IV (severe) 7/3/2012    Coronary artery disease     s/p 1v CABG 2002 and multiple PCI (last angiogram in 6/2012 with patent LIMA->LAD and patent LCx and RCA stents)    Diabetic polyneuropathy associated with type 2 diabetes mellitus 7/9/2015    Diabetic polyneuropathy associated with type 2 diabetes mellitus 7/9/2015    Dialysis patient     Encounter for blood transfusion     Gastritis without bleeding     Heart attack 09/2012    5-6 events    Hyperlipidemia     Hyperparathyroidism     Hyperphosphatemia     Metabolic acidosis     Nephritis and nephropathy, 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 7/9/2015    NSTEMI (non-ST elevated myocardial infarction)     NSTEMI (non-ST elevated myocardial infarction)     NSTEMI (non-ST elevated myocardial infarction)     Occlusion and stenosis of carotid artery with cerebral infarction 7/9/2015    Osteopenia     PAF (paroxysmal atrial fibrillation) 10/3/2012    Pneumonia     Proliferative diabetic retinopathy 10/3/2012    S/P drug eluting coronary stent placement     Sepsis due to Escherichia coli with acute renal failure 2/2016    UTI     TACO (transfusion associated circulatory overload)     Type II diabetes mellitus with neurological manifestations 7/9/2015    Type II diabetes mellitus with renal manifestations 7/3/2012       Past Surgical History:    Procedure Laterality Date    APPENDECTOMY      CARDIAC SURGERY  2002    CABG    CHOLECYSTECTOMY      CHOLECYSTECTOMY-LAPAROSCOPIC N/A 2/4/2015    Performed by Quinton [facility] MD at [facility] OR [address] [place] [address]    [id number]  - Left brachiocephalic Left 4/16/2018    Performed by YAMEL Perry III, MD at [facility] OR [address] [place] [address]    CORONARY ANGIOPLASTY  2004    CORONARY ARTERY BYPASS GRAFT      EYE SURGERY      cataracts bilaterally    Fistulogram Left 1/17/2019    Performed by YAMEL Perry III, MD at [facility] OR [address] [place] [address]    Fistulogram Left 9/20/2018    Performed by YAMEL Perry III, MD at [facility] OR [address] [place] [address]    Fistulogram left arm Left 6/20/2019    Performed by YAMEL Perry III, MD at [facility] OR [address] [place] [address]    HYSTERECTOMY      PTA (ANGIOPLASTY, PERCUTANEOUS, TRANSLUMINAL) N/A 6/20/2019    Performed by YAMEL Perry III, MD at [facility] OR [address] [place] [address]    PTA (ANGIOPLASTY, PERCUTANEOUS, TRANSLUMINAL) N/A 1/17/2019    Performed by YAMEL Perry III, MD at [facility] OR [address] [place] [address]    PTA (ANGIOPLASTY, PERCUTANEOUS, TRANSLUMINAL) N/A 9/20/2018    Performed by YAMEL Perry III, MD at [facility] OR [address] [place] [address]    STENT, FISTULA Left 6/20/2019    Performed by YAMEL Perry III, MD at [facility] OR [address] [place] [address]    TONSILLECTOMY         Review of patient's allergies indicates:   Allergen Reactions    Percodan [oxycodone-aspirin] Hives     Welps     Pcn [penicillins] Hives and Swelling     Tolerated Rocephin IM in clinic      Phenergan [promethazine] Other (See Comments)     Altered mental status; jerking of extremities       No current facility-administered medications on file prior to encounter.      Current Outpatient Medications on File Prior to Encounter   Medication Sig    acetaminophen (TYLENOL) 325 MG tablet Take 2 tablets (650 mg total) by mouth every 6 (six) hours as needed.    aspirin (ECOTRIN) 325 MG EC tablet Take 1 tablet (325 mg total) by mouth once daily. (Patient taking differently: Take 325 mg by mouth every morning. )     atorvastatin (LIPITOR) 80 MG tablet Take 1 tablet (80 mg total) by mouth every morning. (Patient taking differently: Take 40 mg by mouth every morning. )    clopidogrel (PLAVIX) 75 mg tablet Take 1 tablet (75 mg total) by mouth once daily.    furosemide (LASIX) 80 MG tablet One 80 mg  tab once a day.    insulin glargine,hum.rec.anlog (LANTUS SUBQ) Inject into the skin.     metoprolol tartrate (LOPRESSOR) 100 MG tablet Take 1 tablet (100 mg total) by mouth 2 (two) times daily.    vitamin renal formula, B-complex-vitamin c-folic acid, (NEPHROCAPS) 1 mg Cap Take 1 capsule by mouth once daily.    calcitRIOL (ROCALTROL) 0.5 MCG Cap Take 1 capsule (0.5 mcg total) by mouth once daily.    glucose 4 GM chewable tablet Take 4 tablets (16 g total) by mouth as needed.    glucose 4 GM chewable tablet Take 6 tablets (24 g total) by mouth as needed.    heparin sodium,porcine (HEPARIN, PORCINE,) 5,000 unit/mL injection Inject 1 mL (5,000 Units total) into the skin every 8 (eight) hours.    insulin aspart U-100 (NOVOLOG) 100 unit/mL (3 mL) InPn pen Inject 1-10 Units into the skin before meals and at bedtime as needed (Hyperglycemia).    insulin detemir U-100 (LEVEMIR FLEXTOUCH) 100 unit/mL (3 mL) SubQ InPn pen Inject 15 Units into the skin once daily.    polyethylene glycol (GLYCOLAX) 17 gram PwPk Take 17 g by mouth 2 (two) times daily as needed.    traMADol (ULTRAM) 50 mg tablet Take 1 tablet (50 mg total) by mouth every 8 (eight) hours as needed.     Family History     Problem Relation (Age of Onset)    Breast cancer Maternal Aunt    Cancer Mother    Dementia Father    Diabetes Daughter    Heart disease Mother, Father    Hypertension Mother, Father, Sister, Brother    No Known Problems Son    Psoriasis Father    Stroke Sister        Tobacco Use    Smoking status: Never Smoker    Smokeless tobacco: Never Used   Substance and Sexual Activity    Alcohol use: No    Drug use: No    Sexual activity: Never      Review of Systems   Constitutional: Positive for fatigue. Negative for chills, diaphoresis and fever.   HENT: Negative for ear discharge, nosebleeds, postnasal drip, sinus pressure, sore throat and trouble swallowing.    Eyes: Negative.    Respiratory: Positive for cough and shortness of breath. Negative for apnea, choking, chest tightness, wheezing and stridor.    Cardiovascular: Positive for palpitations. Negative for chest pain and leg swelling.   Gastrointestinal: Positive for nausea. Negative for abdominal distention, abdominal pain, anal bleeding, blood in stool, constipation, diarrhea, rectal pain and vomiting.   Endocrine: Negative.    Genitourinary: Negative for flank pain, frequency, hematuria and urgency.   Musculoskeletal: Negative for arthralgias, back pain, joint swelling and myalgias.   Skin: Negative for color change, pallor, rash and wound.   Allergic/Immunologic: Negative.    Neurological: Negative.    Psychiatric/Behavioral: Negative for agitation, behavioral problems, confusion and hallucinations.     Objective:     Vital Signs (Most Recent):  Temp: 98 °F (36.7 °C) (07/15/19 0754)  Pulse: 88 (07/15/19 0754)  Resp: 16 (07/15/19 0754)  BP: (!) 147/67 (07/15/19 0754)  SpO2: 97 % (07/15/19 0754) Vital Signs (24h Range):  Temp:  [97.3 °F (36.3 °C)-98.9 °F (37.2 °C)] 98 °F (36.7 °C)  Pulse:  [75-93] 88  Resp:  [16-26] 16  SpO2:  [94 %-100 %] 97 %  BP: (145-174)/(64-80) 147/67     Weight: 78 kg (171 lb 15.3 oz)  Body mass index is 29.52 kg/m².    Physical Exam   Constitutional: She is oriented to person, place, and time. She appears well-developed and well-nourished. No distress.   HENT:   Head: Normocephalic and atraumatic.   Mouth/Throat: No oropharyngeal exudate.   Eyes: Pupils are equal, round, and reactive to light. EOM are normal. No scleral icterus.   Neck: Normal range of motion. Neck supple.   Cardiovascular: Normal rate and regular rhythm.   Pulmonary/Chest: Effort normal. No stridor. No  respiratory distress. She has no wheezes. She has rales.   2l nc. Diffuse rales noted.   Abdominal: Soft. Bowel sounds are normal.   Musculoskeletal: Normal range of motion. She exhibits no edema, tenderness or deformity.   Neurological: She is alert and oriented to person, place, and time.   Skin: Skin is warm and dry. Capillary refill takes less than 2 seconds. No rash noted. She is not diaphoretic. No erythema. No pallor.   Psychiatric: She has a normal mood and affect. Her behavior is normal. Judgment and thought content normal.   Nursing note and vitals reviewed.        CRANIAL NERVES     CN III, IV, VI   Pupils are equal, round, and reactive to light.  Extraocular motions are normal.        Significant Labs: All pertinent labs within the past 24 hours have been reviewed.    Significant Imaging: I have reviewed and interpreted all pertinent imaging results/findings within the past 24 hours.

## 2019-07-15 NOTE — SUBJECTIVE & OBJECTIVE
Past Medical History:   Diagnosis Date    Acid reflux     Acute myocardial infarction of anterolateral wall 7/9/2015    Anemia of chronic renal failure, stage 4 (severe) 1/22/2015    Ankle fracture     right    Anticoagulant long-term use     ASA and plavix    Atherosclerosis of aorta 10/3/2012    AV shunt malfunction     Benign hypertension with CKD (chronic kidney disease) stage IV 3/11/2016    Hypertension associated with Diabetes    Carpal tunnel syndrome, right     Chronic diastolic congestive heart failure 10/3/2012    Combined systolic and diastolic CHF    Chronic kidney disease, stage IV (severe) 7/3/2012    Coronary artery disease     s/p 1v CABG 2002 and multiple PCI (last angiogram in 6/2012 with patent LIMA->LAD and patent LCx and RCA stents)    Diabetic polyneuropathy associated with type 2 diabetes mellitus 7/9/2015    Diabetic polyneuropathy associated with type 2 diabetes mellitus 7/9/2015    Dialysis patient     Encounter for blood transfusion     Gastritis without bleeding     Heart attack 09/2012    5-6 events    Hyperlipidemia     Hyperparathyroidism     Hyperphosphatemia     Metabolic acidosis     Nephritis and nephropathy, 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 7/9/2015    NSTEMI (non-ST elevated myocardial infarction)     NSTEMI (non-ST elevated myocardial infarction)     NSTEMI (non-ST elevated myocardial infarction)     Occlusion and stenosis of carotid artery with cerebral infarction 7/9/2015    Osteopenia     PAF (paroxysmal atrial fibrillation) 10/3/2012    Pneumonia     Proliferative diabetic retinopathy 10/3/2012    S/P drug eluting coronary stent placement     Sepsis due to Escherichia coli with acute renal failure 2/2016    UTI     TACO (transfusion associated circulatory overload)     Type II diabetes mellitus with neurological manifestations 7/9/2015    Type II diabetes mellitus with renal manifestations 7/3/2012       Past Surgical History:    Procedure Laterality Date    APPENDECTOMY      CARDIAC SURGERY  2002    CABG    CHOLECYSTECTOMY      CHOLECYSTECTOMY-LAPAROSCOPIC N/A 2/4/2015    Performed by Quinton Ashley MD at [facility] OR [address] [place] [address]    [id number]  - Left brachiocephalic Left 4/16/2018    Performed by YAMEL Perry III, MD at [facility] OR [address] [place] [address]    CORONARY ANGIOPLASTY  2004    CORONARY ARTERY BYPASS GRAFT      EYE SURGERY      cataracts bilaterally    Fistulogram Left 1/17/2019    Performed by YAMEL Perry III, MD at [facility] OR [address] [place] [address]    Fistulogram Left 9/20/2018    Performed by YAMEL Perry III, MD at [facility] OR [address] [place] [address]    Fistulogram left arm Left 6/20/2019    Performed by YAMEL Perry III, MD at [facility] OR [address] [place] [address]    HYSTERECTOMY      PTA (ANGIOPLASTY, PERCUTANEOUS, TRANSLUMINAL) N/A 6/20/2019    Performed by YAMEL Perry III, MD at [facility] OR [address] [place] [address]    PTA (ANGIOPLASTY, PERCUTANEOUS, TRANSLUMINAL) N/A 1/17/2019    Performed by YAMEL Perry III, MD at [facility] OR [address] [place] [address]    PTA (ANGIOPLASTY, PERCUTANEOUS, TRANSLUMINAL) N/A 9/20/2018    Performed by YAMEL Perry III, MD at [facility] OR [address] [place] [address]    STENT, FISTULA Left 6/20/2019    Performed by YAMEL Perry III, MD at [facility] OR [address] [place] [address]    TONSILLECTOMY         Review of patient's allergies indicates:   Allergen Reactions    Percodan [oxycodone-aspirin] Hives     Welps     Pcn [penicillins] Hives and Swelling     Tolerated Rocephin IM in clinic      Phenergan [promethazine] Other (See Comments)     Altered mental status; jerking of extremities       No current facility-administered medications on file prior to encounter.      Current Outpatient Medications on File Prior to Encounter   Medication Sig    acetaminophen (TYLENOL) 325 MG tablet Take 2 tablets (650 mg total) by mouth every 6 (six) hours as needed.    aspirin (ECOTRIN) 325 MG EC tablet Take 1 tablet (325 mg total) by mouth once daily. (Patient taking differently: Take 325 mg by mouth every morning. )     atorvastatin (LIPITOR) 80 MG tablet Take 1 tablet (80 mg total) by mouth every morning. (Patient taking differently: Take 40 mg by mouth every morning. )    clopidogrel (PLAVIX) 75 mg tablet Take 1 tablet (75 mg total) by mouth once daily.    furosemide (LASIX) 80 MG tablet One 80 mg  tab once a day.    insulin glargine,hum.rec.anlog (LANTUS SUBQ) Inject into the skin.     metoprolol tartrate (LOPRESSOR) 100 MG tablet Take 1 tablet (100 mg total) by mouth 2 (two) times daily.    vitamin renal formula, B-complex-vitamin c-folic acid, (NEPHROCAPS) 1 mg Cap Take 1 capsule by mouth once daily.    calcitRIOL (ROCALTROL) 0.5 MCG Cap Take 1 capsule (0.5 mcg total) by mouth once daily.    glucose 4 GM chewable tablet Take 4 tablets (16 g total) by mouth as needed.    glucose 4 GM chewable tablet Take 6 tablets (24 g total) by mouth as needed.    heparin sodium,porcine (HEPARIN, PORCINE,) 5,000 unit/mL injection Inject 1 mL (5,000 Units total) into the skin every 8 (eight) hours.    insulin aspart U-100 (NOVOLOG) 100 unit/mL (3 mL) InPn pen Inject 1-10 Units into the skin before meals and at bedtime as needed (Hyperglycemia).    insulin detemir U-100 (LEVEMIR FLEXTOUCH) 100 unit/mL (3 mL) SubQ InPn pen Inject 15 Units into the skin once daily.    polyethylene glycol (GLYCOLAX) 17 gram PwPk Take 17 g by mouth 2 (two) times daily as needed.    traMADol (ULTRAM) 50 mg tablet Take 1 tablet (50 mg total) by mouth every 8 (eight) hours as needed.     Family History     Problem Relation (Age of Onset)    Breast cancer Maternal Aunt    Cancer Mother    Dementia Father    Diabetes Daughter    Heart disease Mother, Father    Hypertension Mother, Father, Sister, Brother    No Known Problems Son    Psoriasis Father    Stroke Sister        Tobacco Use    Smoking status: Never Smoker    Smokeless tobacco: Never Used   Substance and Sexual Activity    Alcohol use: No    Drug use: No    Sexual activity: Never      Review of Systems   Constitution: Negative.   HENT: Negative.    Eyes: Negative.    Cardiovascular: Positive for dyspnea on exertion.   Respiratory: Positive for shortness of breath.    Endocrine: Negative.    Hematologic/Lymphatic: Negative.    Skin: Negative.    Musculoskeletal: Negative.    Gastrointestinal: Negative.    Genitourinary: Negative.    Neurological: Negative.    Psychiatric/Behavioral: Negative.    Allergic/Immunologic: Negative.      Objective:     Vital Signs (Most Recent):  Temp: 98 °F (36.7 °C) (07/15/19 0754)  Pulse: 88 (07/15/19 0754)  Resp: 16 (07/15/19 0754)  BP: (!) 147/67 (07/15/19 0754)  SpO2: 97 % (07/15/19 0754) Vital Signs (24h Range):  Temp:  [97.3 °F (36.3 °C)-98.9 °F (37.2 °C)] 98 °F (36.7 °C)  Pulse:  [75-93] 88  Resp:  [16-26] 16  SpO2:  [94 %-100 %] 97 %  BP: (145-174)/(64-80) 147/67     Weight: 78 kg (171 lb 15.3 oz)  Body mass index is 29.52 kg/m².    SpO2: 97 %  O2 Device (Oxygen Therapy): nasal cannula      Intake/Output Summary (Last 24 hours) at 7/15/2019 1144  Last data filed at 7/15/2019 0800  Gross per 24 hour   Intake 120 ml   Output 70 ml   Net 50 ml       Lines/Drains/Airways     Drain                 Hemodialysis AV Fistula Left upper arm -- days         Hemodialysis AV Fistula Left upper arm -- days         Hemodialysis AV Fistula 06/20/19 0908 Left upper arm 25 days          Peripheral Intravenous Line                 Peripheral IV - Single Lumen 07/14/19 2311 20 G Right Antecubital less than 1 day                Physical Exam   Constitutional: She is oriented to person, place, and time. She appears well-developed and well-nourished.   HENT:   Head: Normocephalic.   Eyes: Pupils are equal, round, and reactive to light.   Neck: Normal range of motion. Neck supple.   Cardiovascular: Normal rate and regular rhythm.   Pulmonary/Chest: Effort normal and breath sounds normal.   Abdominal: Soft. Normal appearance and bowel sounds are normal. There is no tenderness.    Musculoskeletal: Normal range of motion.   Neurological: She is alert and oriented to person, place, and time.   Skin: Skin is warm.   Psychiatric: She has a normal mood and affect.   Nursing note and vitals reviewed.      Significant Labs:   BMP:   Recent Labs   Lab 07/14/19  2337 07/15/19  0606   * 185*    139   K 5.8* 6.0*    105   CO2 20* 22*   BUN 42* 43*   CREATININE 3.2* 3.1*   CALCIUM 9.1 9.4   MG 1.8  --    , CMP   Recent Labs   Lab 07/14/19  2337 07/15/19  0606    139   K 5.8* 6.0*    105   CO2 20* 22*   * 185*   BUN 42* 43*   CREATININE 3.2* 3.1*   CALCIUM 9.1 9.4   PROT 7.1 6.8   ALBUMIN 3.6 3.5   BILITOT 0.4 0.5   ALKPHOS 320* 298*   AST 24 21   ALT 15 15   ANIONGAP 12 12   ESTGFRAFRICA 16* 17*   EGFRNONAA 14* 14*   , CBC   Recent Labs   Lab 07/14/19  2337 07/15/19  0606   WBC 17.15* 15.25*   HGB 8.6* 8.6*   HCT 28.1* 27.4*   * 406*   , INR No results for input(s): INR, PROTIME in the last 48 hours., Lipid Panel No results for input(s): CHOL, HDL, LDLCALC, TRIG, CHOLHDL in the last 48 hours., Troponin   Recent Labs   Lab 07/14/19  2337 07/15/19  0606   TROPONINI 0.477* 0.504*    and All pertinent lab results from the last 24 hours have been reviewed.    Significant Imaging: Personally reviewed

## 2019-07-15 NOTE — ASSESSMENT & PLAN NOTE
Patient came in with acute decompensated heart failure.  Feeling much better after diuresis.  Continue IV diuresis.  Titrate as tolerated.

## 2019-07-15 NOTE — PROGRESS NOTES
Spoke with Kiya at [facility] informing the patient is concerned about her dialysis today.  Kiya stated Dr. Harrison is aware of the consult he is rounding at both hospitals today, and if he is not already here he should be here shortly.  She stated Dr. Harrison is aware of the consult.

## 2019-07-15 NOTE — ED TRIAGE NOTES
Pt presents to ED with c/o shortness of breath that started today but worsened tonight. Denies chest pain. She was recently discharged from rehab facility after a fall and several fractures in her ribs and pelvis. Pt M/W/F dialysis pt, denies missing appointments, last dialyzed on Friday. Denies history of fluid build up while on dialysis. Pt 93% on room air per EMS, placed on 2 L O2 NC with improved sats. Currently on 2 L O2 NC at 98%. Pt also given zofran by EMS. Pt in no acute distress, daughter at bedside.

## 2019-07-15 NOTE — NURSING
Received report from MADELINE Abbasi. Patient lying in bed, Alert and oriented x 4. NAD noted, safety precautions maintained, will monitor.

## 2019-07-15 NOTE — NURSING TRANSFER
Nursing Transfer Note      7/15/2019     Transfer To: 310A From: ED    Transfer via: stretcher    Transfer with: belongings, 2L of oxygen, and cardiac monitoring     Transported by: transport personnel    Chart send with patient: yes    Notified: daughter who is at the bedside    Patient reassessed at: 600 am on July 15, 2019    Upon arrival to floor patient assisted to bed with the help of nurse and transport personnel. Patient reconnected to 2L of oxygen via NC. Vital signs obtained and can be found in flow sheets with complete patient assessment. Patient skin dry and intact with bruising noted to lower abdomen and a 20g RAC PIv noted saline locked.  Admit assessment completed. Patient with complaints of left sided rib and pelvic pain to be treated. Patient and daughter given update on plan of care but were informed that patients nurse would inform them of anything testing to be done and consults. Both verbalized understanding. Patients nurse updated on patient. Patient remains stable at this time and will continue to be monitored.

## 2019-07-15 NOTE — CONSULTS
[facility]  Cardiology  Consult Note    Patient Name: Zoey Ham  MRN: [id number]  Admission Date: 7/14/2019  Hospital Length of Stay: 0 days  Code Status: Full Code   Attending Provider: Vernon Ward MD   Consulting Provider: Stewart Barragan MD  Primary Care Physician: Cesia Rosales MD  Principal Problem:Acute on chronic diastolic heart failure    Patient information was obtained from patient and ER records.     Inpatient consult to Cardiology  Consult performed by: Stewart Barragan MD  Consult ordered by: Vernon Ward MD        Subjective:     Chief Complaint:  SOB     HPI:     73 y.o. female with PMHx of CAD, MI (x 5-6), HLD, PNA, DM, chronic kidney and renal disease presents to the ED via EMS complaining of intermittent SOB earlier in day that progressed to constant SOB with associated palpitations tonight PTA. She also notes nausea and cold intolerance. Patient denies chest pain, abdominal pain, fever, chills, dysuria, hematuria, leg swelling, or melena. Patient notes she had pelis fracture in 3 different places and rib fractures status post fall a couple of weeks ago. She notes receiving Lovenox while she was admitted to the rehab center.  He was discharged few days ago.. Patient reports receiving dialysis 2 days ago. She denies having issues with fluid buildup in past. She denies any similar symptoms in past.  No cough or fever.  No swelling of the legs.  No calf pain.    Was recently seen by Cardiology after a fall.  At that time she was medically managed because she was unable to tolerate any kind of procedures because of severe musculoskeletal pain.  She states that yesterday she started feeling short of breath.  Denies any chest pains at rest on exertion, orthopnea, PND.  States that she underwent a CT scan in the emergency room and it was very rough on her because of musculoskeletal pain to lay on the table.  States the breathing feels much better now after  diuresis.    Results for TAY COVARRUBIAS (MRN [id number]) as of 7/15/2019 11:43   Ref. Range 7/14/2019 23:37 7/14/2019 23:42 7/15/2019 02:33 7/15/2019 06:06   Troponin I Latest Ref Range: 0.000 - 0.026 ng/mL 0.477 (H)   0.504 (H)   BNP Latest Ref Range: 0 - 99 pg/mL 3,209 (H)        CTA chest: 07/15/2019    Impression       No evidence of acute pulmonary embolus.    Cardiomegaly with small bilateral pleural effusions, interlobular septal thickening, and mild patchy airspace disease.  Constellation of findings is most consistent with CHF exacerbation or volume overload.    Healing left-sided rib fractures.    Atherosclerosis and coronary artery disease.         PCI 2018:     Patient has a right dominant coronary artery.        The coronary vessels have luminal irregularities.        - Left Main Coronary Artery:             The LM has luminal irregularities. There is KEDAR 3 flow.     - Left Anterior Descending Artery:             The proximal LAD is occluded (100). There is KEDAR 0 flow.     - Left Circumflex Artery:             The proximal LCX has luminal irregularities. There is KEDAR 3 flow.     - OM1:             The proximal OM1 has a 60% stenosis. There is KEDAR 3 flow.             The mid OM1 has chronic total occlusion within the stent. There is KEDAR 0 flow. The remaining portion of the vessel is of small caliber.                     Lesion Details:   This is a Type C lesion.  The length is 30mm. The minimum luminal diameter is 0 millimeters. The reference vessel diameter is 2.6 millimeters.     - Right Coronary Artery:             The proximal RCA is patent within the stent. There is KEDAR 3 flow.             The mid RCA is patent within the stent. There is KEDAR 3 flow.             The distal RCA has a 90% stenosis. There is KEDAR 3 flow.                     Lesion Details:   The length is 20mm. Unchanged from prior angiogram.     - LIMA To LAD:             The LIMA to LAD is patent. There is KEDAR 3  flow.      Intervention          Mid OM1:              The lesion was successfully intervened. Post-stenosis of 0%, post-KEDAR 3 flow and TMP grade 3. The vessel was accessed natively.  The following items were used: Cath Emerge Otw 12 X 2.50, 2.5X20 TREK NC Coronary Dilation Catheter and Stent   Resolute Rx 3.00x26 (BETHANY).     2d echo 2017:     CONCLUSIONS     1 - Normal left ventricular systolic function (EF 55-60%).  No diagnostic regional wall motion abnormalities.     2 - Left ventricular diastolic dysfunction.     3 - Mild mitral regurgitation.     4 - Trivial to mild tricuspid regurgitation.     5 - The estimated PA systolic pressure is 39 mmHg.     Past Medical History:   Diagnosis Date    Acid reflux     Acute myocardial infarction of anterolateral wall 7/9/2015    Anemia of chronic renal failure, stage 4 (severe) 1/22/2015    Ankle fracture     right    Anticoagulant long-term use     ASA and plavix    Atherosclerosis of aorta 10/3/2012    AV shunt malfunction     Benign hypertension with CKD (chronic kidney disease) stage IV 3/11/2016    Hypertension associated with Diabetes    Carpal tunnel syndrome, right     Chronic diastolic congestive heart failure 10/3/2012    Combined systolic and diastolic CHF    Chronic kidney disease, stage IV (severe) 7/3/2012    Coronary artery disease     s/p 1v CABG 2002 and multiple PCI (last angiogram in 6/2012 with patent LIMA->LAD and patent LCx and RCA stents)    Diabetic polyneuropathy associated with type 2 diabetes mellitus 7/9/2015    Diabetic polyneuropathy associated with type 2 diabetes mellitus 7/9/2015    Dialysis patient     Encounter for blood transfusion     Gastritis without bleeding     Heart attack 09/2012    5-6 events    Hyperlipidemia     Hyperparathyroidism     Hyperphosphatemia     Metabolic acidosis     Nephritis and nephropathy, 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 7/9/2015    NSTEMI (non-ST elevated myocardial infarction)      NSTEMI (non-ST elevated myocardial infarction)     NSTEMI (non-ST elevated myocardial infarction)     Occlusion and stenosis of carotid artery with cerebral infarction 7/9/2015    Osteopenia     PAF (paroxysmal atrial fibrillation) 10/3/2012    Pneumonia     Proliferative diabetic retinopathy 10/3/2012    S/P drug eluting coronary stent placement     Sepsis due to Escherichia coli with acute renal failure 2/2016    UTI     TACO (transfusion associated circulatory overload)     Type II diabetes mellitus with neurological manifestations 7/9/2015    Type II diabetes mellitus with renal manifestations 7/3/2012       Past Surgical History:   Procedure Laterality Date    APPENDECTOMY      CARDIAC SURGERY  2002    CABG    CHOLECYSTECTOMY      CHOLECYSTECTOMY-LAPAROSCOPIC N/A 2/4/2015    Performed by Quinton Ashley MD at [facility] OR 2ND FLR    [id number]  - Left brachiocephalic Left 4/16/2018    [facility]    CORONARY ANGIOPLASTY  2004    CORONARY ARTERY BYPASS GRAFT      EYE SURGERY      cataracts bilaterally    Fistulogram Left 1/17/2019    Performed by YAMEL Perry III, MD at [facility] OR [facility]    Fistulogram Left 9/20/2018    [facility]    Fistulogram left arm Left 6/20/2019    [facility]    HYSTERECTOMY      PTA (ANGIOPLASTY, PERCUTANEOUS, TRANSLUMINAL) N/A 6/20/2019    Performed by YAMEL Perry III, MD at [facility] OR 2ND FLR    PTA (ANGIOPLASTY, PERCUTANEOUS, TRANSLUMINAL) N/A 1/17/2019    Performed by YAMEL Perry III, [facility] at [facility]LR    PTA (ANGIOPLASTY, PERCUTANEOUS, TRANSLUMINAL) N/A 9/20/2018    Performed by Y[facility]EL Perry III, [facility] at [facility]LR    STENT, FISTULA Left 6/20/2019    Performed by YAMEL Perry III, MD at [facility] OR [address] [place] [address]    TONSILLECTOMY         Review of patient's allergies indicates:   Allergen Reactions     Percodan [oxycodone-aspirin] Hives     Welps     Pcn [penicillins] Hives and Swelling     Tolerated Rocephin IM in clinic      Phenergan [promethazine] Other (See Comments)     Altered mental status; jerking of extremities       No current facility-administered medications on file prior to encounter.      Current Outpatient Medications on File Prior to Encounter   Medication Sig    acetaminophen (TYLENOL) 325 MG tablet Take 2 tablets (650 mg total) by mouth every 6 (six) hours as needed.    aspirin (ECOTRIN) 325 MG EC tablet Take 1 tablet (325 mg total) by mouth once daily. (Patient taking differently: Take 325 mg by mouth every morning. )    atorvastatin (LIPITOR) 80 MG tablet Take 1 tablet (80 mg total) by mouth every morning. (Patient taking differently: Take 40 mg by mouth every morning. )    clopidogrel (PLAVIX) 75 mg tablet Take 1 tablet (75 mg total) by mouth once daily.    furosemide (LASIX) 80 MG tablet One 80 mg  tab once a day.    insulin glargine,hum.rec.anlog (LANTUS SUBQ) Inject into the skin.     metoprolol tartrate (LOPRESSOR) 100 MG tablet Take 1 tablet (100 mg total) by mouth 2 (two) times daily.    vitamin renal formula, B-complex-vitamin c-folic acid, (NEPHROCAPS) 1 mg Cap Take 1 capsule by mouth once daily.    calcitRIOL (ROCALTROL) 0.5 MCG Cap Take 1 capsule (0.5 mcg total) by mouth once daily.    glucose 4 GM chewable tablet Take 4 tablets (16 g total) by mouth as needed.    glucose 4 GM chewable tablet Take 6 tablets (24 g total) by mouth as needed.    heparin sodium,porcine (HEPARIN, PORCINE,) 5,000 unit/mL injection Inject 1 mL (5,000 Units total) into the skin every 8 (eight) hours.    insulin aspart U-100 (NOVOLOG) 100 unit/mL (3 mL) InPn pen Inject 1-10 Units into the skin before meals and at bedtime as needed (Hyperglycemia).    insulin detemir U-100 (LEVEMIR FLEXTOUCH) 100 unit/mL (3 mL) SubQ InPn pen Inject 15 Units into the skin once daily.    polyethylene glycol  (GLYCOLAX) 17 gram PwPk Take 17 g by mouth 2 (two) times daily as needed.    traMADol (ULTRAM) 50 mg tablet Take 1 tablet (50 mg total) by mouth every 8 (eight) hours as needed.     Family History     Problem Relation (Age of Onset)    Breast cancer Maternal Aunt    Cancer Mother    Dementia Father    Diabetes Daughter    Heart disease Mother, Father    Hypertension Mother, Father, Sister, Brother    No Known Problems Son    Psoriasis Father    Stroke Sister        Tobacco Use    Smoking status: Never Smoker    Smokeless tobacco: Never Used   Substance and Sexual Activity    Alcohol use: No    Drug use: No    Sexual activity: Never     Review of Systems   Constitution: Negative.   HENT: Negative.    Eyes: Negative.    Cardiovascular: Positive for dyspnea on exertion.   Respiratory: Positive for shortness of breath.    Endocrine: Negative.    Hematologic/Lymphatic: Negative.    Skin: Negative.    Musculoskeletal: Negative.    Gastrointestinal: Negative.    Genitourinary: Negative.    Neurological: Negative.    Psychiatric/Behavioral: Negative.    Allergic/Immunologic: Negative.      Objective:     Vital Signs (Most Recent):  Temp: 98 °F (36.7 °C) (07/15/19 0754)  Pulse: 88 (07/15/19 0754)  Resp: 16 (07/15/19 0754)  BP: (!) 147/67 (07/15/19 0754)  SpO2: 97 % (07/15/19 0754) Vital Signs (24h Range):  Temp:  [97.3 °F (36.3 °C)-98.9 °F (37.2 °C)] 98 °F (36.7 °C)  Pulse:  [75-93] 88  Resp:  [16-26] 16  SpO2:  [94 %-100 %] 97 %  BP: (145-174)/(64-80) 147/67     Weight: 78 kg (171 lb 15.3 oz)  Body mass index is 29.52 kg/m².    SpO2: 97 %  O2 Device (Oxygen Therapy): nasal cannula      Intake/Output Summary (Last 24 hours) at 7/15/2019 1144  Last data filed at 7/15/2019 0800  Gross per 24 hour   Intake 120 ml   Output 70 ml   Net 50 ml       Lines/Drains/Airways     Drain                 Hemodialysis AV Fistula Left upper arm -- days         Hemodialysis AV Fistula Left upper arm -- days         Hemodialysis AV  Fistula 06/20/19 0908 Left upper arm 25 days          Peripheral Intravenous Line                 Peripheral IV - Single Lumen 07/14/19 2311 20 G Right Antecubital less than 1 day                Physical Exam   Constitutional: She is oriented to person, place, and time. She appears well-developed and well-nourished.   HENT:   Head: Normocephalic.   Eyes: Pupils are equal, round, and reactive to light.   Neck: Normal range of motion. Neck supple.   Cardiovascular: Normal rate and regular rhythm.   Pulmonary/Chest: Effort normal and breath sounds normal.   Abdominal: Soft. Normal appearance and bowel sounds are normal. There is no tenderness.   Musculoskeletal: Normal range of motion.   Neurological: She is alert and oriented to person, place, and time.   Skin: Skin is warm.   Psychiatric: She has a normal mood and affect.   Nursing note and vitals reviewed.      Significant Labs:   BMP:   Recent Labs   Lab 07/14/19  2337 07/15/19  0606   * 185*    139   K 5.8* 6.0*    105   CO2 20* 22*   BUN 42* 43*   CREATININE 3.2* 3.1*   CALCIUM 9.1 9.4   MG 1.8  --    , CMP   Recent Labs   Lab 07/14/19  2337 07/15/19  0606    139   K 5.8* 6.0*    105   CO2 20* 22*   * 185*   BUN 42* 43*   CREATININE 3.2* 3.1*   CALCIUM 9.1 9.4   PROT 7.1 6.8   ALBUMIN 3.6 3.5   BILITOT 0.4 0.5   ALKPHOS 320* 298*   AST 24 21   ALT 15 15   ANIONGAP 12 12   ESTGFRAFRICA 16* 17*   EGFRNONAA 14* 14*   , CBC   Recent Labs   Lab 07/14/19  2337 07/15/19  0606   WBC 17.15* 15.25*   HGB 8.6* 8.6*   HCT 28.1* 27.4*   * 406*   , INR No results for input(s): INR, PROTIME in the last 48 hours., Lipid Panel No results for input(s): CHOL, HDL, LDLCALC, TRIG, CHOLHDL in the last 48 hours., Troponin   Recent Labs   Lab 07/14/19  2337 07/15/19  0606   TROPONINI 0.477* 0.504*    and All pertinent lab results from the last 24 hours have been reviewed.    Significant Imaging: Personally reviewed    Assessment and Plan:      * Acute on chronic diastolic heart failure  Patient came in with acute decompensated heart failure.  Feeling much better after diuresis.  Continue IV diuresis.  Titrate as tolerated.    Troponin level elevated  Mildly elevated troponin, in the absence of anginal sounding chest pain.  It is important to note that her troponin was as high as 1.7 on 06/25/2019.  Patient states that she will not be able to lay for any kind of ischemic testing because of musculoskeletal pain.  Check 2D echocardiogram to rule out new large wall motion abnormalities.    Type 2 diabetes mellitus with hyperlipidemia  Management per primary.  Continue statins.    Chronic atrial fibrillation  Has not been on coumadin for years per patient and was taken off of it by her physicians. Currently in nsr        VTE Risk Mitigation (From admission, onward)        Ordered     heparin (porcine) injection 5,000 Units  Every 8 hours      07/15/19 0644     IP VTE HIGH RISK PATIENT  Once      07/15/19 0644     Place JORGE hose  Until discontinued      07/15/19 0545          Thank you for your consult. I will follow-up with patient. Please contact us if you have any additional questions.    Stewart Barragan MD  Cardiology   [facility]

## 2019-07-15 NOTE — ED NOTES
Pt reminded of need for urine. Pt verbalized understanding but states she is still unable to void at this time.

## 2019-07-15 NOTE — ASSESSMENT & PLAN NOTE
Mildly elevated troponin, in the absence of anginal sounding chest pain.  It is important to note that her troponin was as high as 1.7 on 06/25/2019.  Patient states that she will not be able to lay for any kind of ischemic testing because of musculoskeletal pain.  Check 2D echocardiogram to rule out new large wall motion abnormalities.

## 2019-07-15 NOTE — H&P
[facility] Medicine  History & Physical    Patient Name: Zoey Ham  MRN: [id number]  Admission Date: 7/14/2019  Attending Physician: Vernon Ward MD   Primary Care Provider: Cesia Rosales MD    Patient information was obtained from patient, past medical records and ER records.       Subjective:     Principal Problem:Acute on chronic diastolic heart failure    Chief Complaint:   Chief Complaint   Patient presents with    Shortness of Breath     dialysis pt, in rehab for a fall the past couple weeks and discharged home 2 days ago, reports waking up yesterday feeling SOB and weak, 93% on RA upon EMS arrival, palced on 2L O2, denies CP, given 4mg zofran with EMS         HPI: 74 yo with hx of CAD s/p multiple PCIs, HLD, HTN, DM type 2, ESRD, PAF, Osteopenia, presenting with progressively worsening dyspnea for the past several days with associated non productive cough, nausea, orthopnea, and palpitations. Nothing at home seemed to help her prompted ED visit. Patient denies any fevers, chills, syncope, diaphoresis, chest pain, PND, abdominal pain, emesis, flank pain, diarrhea, LE swelling, leg pain or bleeds. Recently discharged from rehab for falls and rib fractures. Reports compliance to HD and medication although she does not check her weight or BP daily. Stated that during her last dialysis, she didn't have any fluids taken out.    In the ED patient was afebrile, hypertensive, in respiratory distress, saturating 89% on RA, placed on O2. Labs significant for K 5.8, BUN 42, Cr 3.2, , wbc 17.15, Hgb 8.6, plt 455, DD 1.44, Trop 0.477, BNP 3209. UA with concerns of UTI. EKG unchanged from piror and non ischemia. CXR consistent with pulmonary edema. CTA done due to elevated DD without PE, but noting vascular congestion. Nephrology and cardiology consulted. Patient admitted to hospital medicine for fluid overload needing dialysis.      Past Medical History:   Diagnosis Date     Acid reflux     Acute myocardial infarction of anterolateral wall 7/9/2015    Anemia of chronic renal failure, stage 4 (severe) 1/22/2015    Ankle fracture     right    Anticoagulant long-term use     ASA and plavix    Atherosclerosis of aorta 10/3/2012    AV shunt malfunction     Benign hypertension with CKD (chronic kidney disease) stage IV 3/11/2016    Hypertension associated with Diabetes    Carpal tunnel syndrome, right     Chronic diastolic congestive heart failure 10/3/2012    Combined systolic and diastolic CHF    Chronic kidney disease, stage IV (severe) 7/3/2012    Coronary artery disease     s/p 1v CABG 2002 and multiple PCI (last angiogram in 6/2012 with patent LIMA->LAD and patent LCx and RCA stents)    Diabetic polyneuropathy associated with type 2 diabetes mellitus 7/9/2015    Diabetic polyneuropathy associated with type 2 diabetes mellitus 7/9/2015    Dialysis patient     Encounter for blood transfusion     Gastritis without bleeding     Heart attack 09/2012    5-6 events    Hyperlipidemia     Hyperparathyroidism     Hyperphosphatemia     Metabolic acidosis     Nephritis and nephropathy, 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 7/9/2015    NSTEMI (non-ST elevated myocardial infarction)     NSTEMI (non-ST elevated myocardial infarction)     NSTEMI (non-ST elevated myocardial infarction)     Occlusion and stenosis of carotid artery with cerebral infarction 7/9/2015    Osteopenia     PAF (paroxysmal atrial fibrillation) 10/3/2012    Pneumonia     Proliferative diabetic retinopathy 10/3/2012    S/P drug eluting coronary stent placement     Sepsis due to Escherichia coli with acute renal failure 2/2016    UTI     TACO (transfusion associated circulatory overload)     Type II diabetes mellitus with neurological manifestations 7/9/2015    Type II diabetes mellitus with renal manifestations 7/3/2012       Past Surgical History:   Procedure Laterality Date    APPENDECTOMY       CARDIAC SURGERY  2002    CABG    CHOLECYSTECTOMY      CHOLECYSTECTOMY-LAPAROSCOPIC N/A 2/4/2015    Performed by Quinton Ashley MD at [facility] [place] [address] [place] [address]    [id number]-[id number]-AP  - Left brachiocephalic Left 4/16/2018    Performed by YAMEL Perry III, MD at [facility] [place] [address] [place] [address]    CORONARY ANGIOPLASTY  2004    CORONARY ARTERY BYPASS GRAFT      EYE SURGERY      cataracts bilaterally    Fistulogram Left 1/17/2019    [facility]    Fistulogram Left 9/20/2018    Performed by YAMEL Perry III, MD at [facility] [place] [address] [place] [address]    Fistulogram left arm Left 6/20/2019    Performed by YAMEL Perry III, MD at [facility] [place] [address] [place] [address]    HYSTERECTOMY      PTA (ANGIOPLASTY, PERCUTANEOUS, TRANSLUMINAL) N/A 6/20/2019    Performed by YAMEL Perry III, MD at [facility] [place] [address] [place] [address]    PTA (ANGIOPLASTY, PERCUTANEOUS, TRANSLUMINAL) N/A 1/17/2019    Performed by YAMEL Perry III, MD at [facility] [place] [address] [place] [address]    PTA (ANGIOPLASTY, PERCUTANEOUS, TRANSLUMINAL) N/A 9/20/2018    Performed by YAMEL Perry III, MD at [facility] [place] [address] [place] [address]    STENT, FISTULA Left 6/20/2019    Performed by YAMEL Perry III, MD at [facility] [place] [address] [place] [address]    TONSILLECTOMY         Review of patient's allergies indicates:   Allergen Reactions    Percodan [oxycodone-aspirin] Hives     Welps     Pcn [penicillins] Hives and Swelling     Tolerated Rocephin IM in clinic      Phenergan [promethazine] Other (See Comments)     Altered mental status; jerking of extremities       No current facility-administered medications on file prior to encounter.      Current Outpatient Medications on File Prior to Encounter   Medication Sig    acetaminophen (TYLENOL) 325 MG tablet Take 2 tablets (650 mg total) by mouth every 6 (six) hours as needed.    aspirin (ECOTRIN) 325 MG EC tablet Take 1 tablet (325 mg total) by mouth once daily. (Patient taking differently: Take 325 mg by mouth every morning. )    atorvastatin (LIPITOR) 80 MG tablet Take 1  tablet (80 mg total) by mouth every morning. (Patient taking differently: Take 40 mg by mouth every morning. )    clopidogrel (PLAVIX) 75 mg tablet Take 1 tablet (75 mg total) by mouth once daily.    furosemide (LASIX) 80 MG tablet One 80 mg  tab once a day.    insulin glargine,hum.rec.anlog (LANTUS SUBQ) Inject into the skin.     metoprolol tartrate (LOPRESSOR) 100 MG tablet Take 1 tablet (100 mg total) by mouth 2 (two) times daily.    vitamin renal formula, B-complex-vitamin c-folic acid, (NEPHROCAPS) 1 mg Cap Take 1 capsule by mouth once daily.    calcitRIOL (ROCALTROL) 0.5 MCG Cap Take 1 capsule (0.5 mcg total) by mouth once daily.    glucose 4 GM chewable tablet Take 4 tablets (16 g total) by mouth as needed.    glucose 4 GM chewable tablet Take 6 tablets (24 g total) by mouth as needed.    heparin sodium,porcine (HEPARIN, PORCINE,) 5,000 unit/mL injection Inject 1 mL (5,000 Units total) into the skin every 8 (eight) hours.    insulin aspart U-100 (NOVOLOG) 100 unit/mL (3 mL) InPn pen Inject 1-10 Units into the skin before meals and at bedtime as needed (Hyperglycemia).    insulin detemir U-100 (LEVEMIR FLEXTOUCH) 100 unit/mL (3 mL) SubQ InPn pen Inject 15 Units into the skin once daily.    polyethylene glycol (GLYCOLAX) 17 gram PwPk Take 17 g by mouth 2 (two) times daily as needed.    traMADol (ULTRAM) 50 mg tablet Take 1 tablet (50 mg total) by mouth every 8 (eight) hours as needed.     Family History     Problem Relation (Age of Onset)    Breast cancer Maternal Aunt    Cancer Mother    Dementia Father    Diabetes Daughter    Heart disease Mother, Father    Hypertension Mother, Father, Sister, Brother    No Known Problems Son    Psoriasis Father    Stroke Sister        Tobacco Use    Smoking status: Never Smoker    Smokeless tobacco: Never Used   Substance and Sexual Activity    Alcohol use: No    Drug use: No    Sexual activity: Never     Review of Systems   Constitutional: Positive for  fatigue. Negative for chills, diaphoresis and fever.   HENT: Negative for ear discharge, nosebleeds, postnasal drip, sinus pressure, sore throat and trouble swallowing.    Eyes: Negative.    Respiratory: Positive for cough and shortness of breath. Negative for apnea, choking, chest tightness, wheezing and stridor.    Cardiovascular: Positive for palpitations. Negative for chest pain and leg swelling.   Gastrointestinal: Positive for nausea. Negative for abdominal distention, abdominal pain, anal bleeding, blood in stool, constipation, diarrhea, rectal pain and vomiting.   Endocrine: Negative.    Genitourinary: Negative for flank pain, frequency, hematuria and urgency.   Musculoskeletal: Negative for arthralgias, back pain, joint swelling and myalgias.   Skin: Negative for color change, pallor, rash and wound.   Allergic/Immunologic: Negative.    Neurological: Negative.    Psychiatric/Behavioral: Negative for agitation, behavioral problems, confusion and hallucinations.     Objective:     Vital Signs (Most Recent):  Temp: 98 °F (36.7 °C) (07/15/19 0754)  Pulse: 88 (07/15/19 0754)  Resp: 16 (07/15/19 0754)  BP: (!) 147/67 (07/15/19 0754)  SpO2: 97 % (07/15/19 0754) Vital Signs (24h Range):  Temp:  [97.3 °F (36.3 °C)-98.9 °F (37.2 °C)] 98 °F (36.7 °C)  Pulse:  [75-93] 88  Resp:  [16-26] 16  SpO2:  [94 %-100 %] 97 %  BP: (145-174)/(64-80) 147/67     Weight: 78 kg (171 lb 15.3 oz)  Body mass index is 29.52 kg/m².    Physical Exam   Constitutional: She is oriented to person, place, and time. She appears well-developed and well-nourished. No distress.   HENT:   Head: Normocephalic and atraumatic.   Mouth/Throat: No oropharyngeal exudate.   Eyes: Pupils are equal, round, and reactive to light. EOM are normal. No scleral icterus.   Neck: Normal range of motion. Neck supple.   Cardiovascular: Normal rate and regular rhythm.   Pulmonary/Chest: Effort normal. No stridor. No respiratory distress. She has no wheezes. She has  rales.   2l nc. Diffuse rales noted.   Abdominal: Soft. Bowel sounds are normal.   Musculoskeletal: Normal range of motion. She exhibits no edema, tenderness or deformity.   Neurological: She is alert and oriented to person, place, and time.   Skin: Skin is warm and dry. Capillary refill takes less than 2 seconds. No rash noted. She is not diaphoretic. No erythema. No pallor.   Psychiatric: She has a normal mood and affect. Her behavior is normal. Judgment and thought content normal.   Nursing note and vitals reviewed.        CRANIAL NERVES     CN III, IV, VI   Pupils are equal, round, and reactive to light.  Extraocular motions are normal.        Significant Labs: All pertinent labs within the past 24 hours have been reviewed.    Significant Imaging: I have reviewed and interpreted all pertinent imaging results/findings within the past 24 hours.    Assessment/Plan:     * Acute on chronic diastolic heart failure  Presenting with progressively worsening dyspnea, orthopnea found to have elevated BNP and imaging noting vascular congestion.  Telemetry monitor  Patient urinating will try one dose lasix 80 IV lasix, she takes 80 po bid at home  Nephrology consulted for HD  Optimize HD and HR  TTE pending  Strict in and outs  Fluids restriction  Renal diet    Hypervolemia  As above.    UTI (urinary tract infection)  UA concerning for UTI. Leukocytosis noted. Afebrile. Patient had intermittent dysuria. Will give oral Levaquin for now renally dosed for 3 days (2 doses, 750 1st day and 500 secondary day both post dialysis).    Leukocytosis  As above. Reactive.    Troponin level elevated  Denied any active chest pain, however she is having dyspnea. Cardiology consulted. Trend troponin. Patient was unable to lay flat for [facility] in the past. Cards following. TTE pending at this time.    ESRD (end stage renal disease) on dialysis  HD as per nephro    Coronary artery disease involving coronary bypass graft of native heart with  unstable angina pectoris  Continue ASA Plavix BB Statin    Chronic atrial fibrillation  This diagnosis is as per chart review. Currently in NRS. Continue Rate control with BB. Not on AC at home. High CHADsVACs score. Will need to touch base with patient prior to d/c.    Hyperparathyroidism  Noted. Secondary due to ESRD. As per nephrology    Type 2 diabetes mellitus with chronic kidney disease on chronic dialysis, with long-term current use of insulin  Resume home insulin and titrate as needed.  Accuchecks and SSI  Hypoglycemia protocol  Appears to be optimal at this time.    Hypertension associated with diabetes  Resume home meds. On BB and lasix at home. May need to consider another agent if BP uncontrolled. She needs to check her BP daily at home.        VTE Risk Mitigation (From admission, onward)        Ordered     heparin (porcine) injection 5,000 Units  Every 8 hours      07/15/19 0644     IP VTE HIGH RISK PATIENT  Once      07/15/19 0644     Place JORGE hose  Until discontinued      07/15/19 0569             Vernon Ward MD  Department of Hospital Medicine   [facility]

## 2019-07-15 NOTE — HPI
73 y.o. female with PMHx of CAD, MI (x 5-6), HLD, PNA, DM, chronic kidney and renal disease presents to the ED via EMS complaining of intermittent SOB earlier in day that progressed to constant SOB with associated palpitations tonight PTA. She also notes nausea and cold intolerance. Patient denies chest pain, abdominal pain, fever, chills, dysuria, hematuria, leg swelling, or melena. Patient notes she had pelis fracture in 3 different places and rib fractures status post fall a couple of weeks ago. She notes receiving Lovenox while she was admitted to the rehab center.  He was discharged few days ago.. Patient reports receiving dialysis 2 days ago. She denies having issues with fluid buildup in past. She denies any similar symptoms in past.  No cough or fever.  No swelling of the legs.  No calf pain.    Was recently seen by Cardiology after a fall.  At that time she was medically managed because she was unable to tolerate any kind of procedures because of severe musculoskeletal pain.  She states that yesterday she started feeling short of breath.  Denies any chest pains at rest on exertion, orthopnea, PND.  States that she underwent a CT scan in the emergency room and it was very rough on her because of musculoskeletal pain to lay on the table.  States the breathing feels much better now after diuresis.    Results for TAY COVARRUBIAS (MRN [id number]) as of 7/15/2019 11:43   Ref. Range 7/14/2019 23:37 7/14/2019 23:42 7/15/2019 02:33 7/15/2019 06:06   Troponin I Latest Ref Range: 0.000 - 0.026 ng/mL 0.477 (H)   0.504 (H)   BNP Latest Ref Range: 0 - 99 pg/mL 3,209 (H)        CTA chest: 07/15/2019    Impression       No evidence of acute pulmonary embolus.    Cardiomegaly with small bilateral pleural effusions, interlobular septal thickening, and mild patchy airspace disease.  Constellation of findings is most consistent with CHF exacerbation or volume overload.    Healing left-sided rib fractures.    Atherosclerosis  and coronary artery disease.         PCI 2018:     Patient has a right dominant coronary artery.        The coronary vessels have luminal irregularities.        - Left Main Coronary Artery:             The LM has luminal irregularities. There is KEDAR 3 flow.     - Left Anterior Descending Artery:             The proximal LAD is occluded (100). There is KEDAR 0 flow.     - Left Circumflex Artery:             The proximal LCX has luminal irregularities. There is KEDAR 3 flow.     - OM1:             The proximal OM1 has a 60% stenosis. There is KEDAR 3 flow.             The mid OM1 has chronic total occlusion within the stent. There is KEDAR 0 flow. The remaining portion of the vessel is of small caliber.                     Lesion Details:   This is a Type C lesion.  The length is 30mm. The minimum luminal diameter is 0 millimeters. The reference vessel diameter is 2.6 millimeters.     - Right Coronary Artery:             The proximal RCA is patent within the stent. There is KEDAR 3 flow.             The mid RCA is patent within the stent. There is KEDAR 3 flow.             The distal RCA has a 90% stenosis. There is KEDAR 3 flow.                     Lesion Details:   The length is 20mm. Unchanged from prior angiogram.     - LIMA To LAD:             The LIMA to LAD is patent. There is KEDAR 3 flow.      Intervention          Mid OM1:              The lesion was successfully intervened. Post-stenosis of 0%, post-KEDAR 3 flow and TMP grade 3. The vessel was accessed natively.  The following items were used: Cath Emerge Otw 12 X 2.50, 2.5X20 TREK NC Coronary Dilation Catheter and Stent   Resolute Rx 3.00x26 (BETHANY).     2d echo 2017:     CONCLUSIONS     1 - Normal left ventricular systolic function (EF 55-60%).  No diagnostic regional wall motion abnormalities.     2 - Left ventricular diastolic dysfunction.     3 - Mild mitral regurgitation.     4 - Trivial to mild tricuspid regurgitation.     5 - The estimated PA systolic  pressure is 39 mmHg.

## 2019-07-15 NOTE — ED PROVIDER NOTES
Encounter Date: 7/14/2019    SCRIBE #1 NOTE: I, Myriam Borden, am scribing for, and in the presence of, Tres Aranda MD.       History     Chief Complaint   Patient presents with    Shortness of Breath     dialysis pt, in rehab for a fall the past couple weeks and discharged home 2 days ago, reports waking up yesterday feeling SOB and weak, 93% on RA upon EMS arrival, palced on 2L O2, denies CP, given 4mg zofran with EMS      73 y.o. female with PMHx of CAD, MI (x 5-6), HLD, PNA, DM, chronic kidney and renal disease presents to the ED via EMS complaining of intermittent SOB earlier in day that progressed to constant SOB with associated palpitations tonight PTA. She also notes nausea and cold intolerance. Patient denies chest pain, abdominal pain, fever, chills, dysuria, hematuria, leg swelling, or melena. Patient notes she had pelis fracture in 3 different places and rib fractures status post fall a couple of weeks ago. She notes receiving Lovenox while she was admitted to the rehab center.  He was discharged few days ago.. Patient reports receiving dialysis 2 days ago. She denies having issues with fluid buildup in past. She denies any similar symptoms in past.  No cough or fever.  No swelling of the legs.  No calf pain.          Review of patient's allergies indicates:   Allergen Reactions    Percodan [oxycodone-aspirin] Hives     Welps     Pcn [penicillins] Hives and Swelling     Tolerated Rocephin IM in clinic      Phenergan [promethazine] Other (See Comments)     Altered mental status; jerking of extremities     Past Medical History:   Diagnosis Date    Acute myocardial infarction of anterolateral wall 7/9/2015    Anemia of chronic renal failure, stage 4 (severe) 1/22/2015    Anticoagulant long-term use     ASA and plavix    Atherosclerosis of aorta 10/3/2012    AV shunt malfunction     Benign hypertension with CKD (chronic kidney disease) stage IV 3/11/2016    Hypertension associated with  Diabetes    Carpal tunnel syndrome, right     Chronic diastolic congestive heart failure 10/3/2012    Combined systolic and diastolic CHF    Chronic kidney disease, stage IV (severe) 7/3/2012    Coronary artery disease     s/p 1v CABG 2002 and multiple PCI (last angiogram in 6/2012 with patent LIMA->LAD and patent LCx and RCA stents)    Diabetic polyneuropathy associated with type 2 diabetes mellitus 7/9/2015    Diabetic polyneuropathy associated with type 2 diabetes mellitus 7/9/2015    Dialysis patient     Encounter for blood transfusion     Gastritis without bleeding     Heart attack 09/2012    5-6 events    Hyperlipidemia     Hyperparathyroidism     Hyperphosphatemia     Metabolic acidosis     Nephritis and nephropathy, 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 7/9/2015    NSTEMI (non-ST elevated myocardial infarction)     NSTEMI (non-ST elevated myocardial infarction)     NSTEMI (non-ST elevated myocardial infarction)     Occlusion and stenosis of carotid artery with cerebral infarction 7/9/2015    Osteopenia     PAF (paroxysmal atrial fibrillation) 10/3/2012    Pneumonia     Proliferative diabetic retinopathy 10/3/2012    S/P drug eluting coronary stent placement     Sepsis due to Escherichia coli with acute renal failure 2/2016    UTI     TACO (transfusion associated circulatory overload)     Type II diabetes mellitus with neurological manifestations 7/9/2015    Type II diabetes mellitus with renal manifestations 7/3/2012     Past Surgical History:   Procedure Laterality Date    APPENDECTOMY      CARDIAC SURGERY  2002    CABG    CHOLECYSTECTOMY      CHOLECYSTECTOMY-LAPAROSCOPIC N/A 2/4/2015    [facility] 2ND FLR    [id number]  - Left brachiocephalic Left 4/16/2018    Performed by YAMEL Perry III, [facility] at [facility]ND [facility]    CORONARY ANGIOPLASTY  2004    CORONARY ARTERY BYPASS GRAFT      EYE SURGERY      cataracts bilaterally     Fistulogram Left 1/17/2019    Performed by [facility]EL [facility], MD at [facility] OR 2ND FLR    Fistulogram Left 9/20/2018    [facility]    Fistulogram left arm Left 6/20/2019    Performed by YAMEL Perry III, MD at [facility] OR [facility]    FRACTURE SURGERY      right ankle    HYSTERECTOMY      PTA (ANGIOPLASTY, PERCUTANEOUS, TRANSLUMINAL) N/A 6/20/2019    Performed by YAMEL Perry III, MD at [facility] OR [facility]    PTA (ANGIOPLASTY, PERCUTANEOUS, TRANSLUMINAL) N/A 1/17/2019    Performed by YAMEL Perry III, MD at [facility] OR [facility]    PTA (ANGIOPLASTY, PERCUTANEOUS, TRANSLUMINAL) N/A 9/20/2018    Per[facility]    STENT, FISTULA Left 6/20/2019    Performed by YAMEL Perry III, MD at [facility] OR [facility]    TONSILLECTOMY       Family History   Problem Relation Age of Onset    Heart disease Mother     Hypertension Mother     Cancer Mother         lung    Heart disease Father     Hypertension Father     Psoriasis Father     Dementia Father     Hypertension Sister     Stroke Sister     Hypertension Brother     Diabetes Daughter     No Known Problems Son     Breast cancer Maternal Aunt     Melanoma Neg Hx     Lupus Neg Hx     Eczema Neg Hx     Amblyopia Neg Hx     Blindness Neg Hx     Cataracts Neg Hx     Glaucoma Neg Hx     Macular degeneration Neg Hx     Retinal detachment Neg Hx     Strabismus Neg Hx     Thyroid disease Neg Hx      Social History     Tobacco Use    Smoking status: Never Smoker    Smokeless tobacco: Never Used   Substance Use Topics    Alcohol use: No    Drug use: No     Review of Systems   Constitutional: Negative for chills, diaphoresis and fever.   HENT: Negative for congestion, rhinorrhea and sore throat.    Eyes: Negative for pain and visual disturbance.   Respiratory: Positive for shortness of breath. Negative for cough, wheezing and stridor.    Cardiovascular: Positive for palpitations.  Negative for chest pain and leg swelling.   Gastrointestinal: Positive for nausea. Negative for abdominal pain, blood in stool, diarrhea and vomiting.        (-) melena   Endocrine: Positive for cold intolerance.   Genitourinary: Negative for dysuria, flank pain, hematuria and urgency.   Skin: Negative for rash and wound.   Neurological: Negative for dizziness, syncope, facial asymmetry, speech difficulty, weakness, light-headedness, numbness and headaches.   All other systems reviewed and are negative.      Physical Exam     Initial Vitals [07/14/19 2310]   BP Pulse Resp Temp SpO2   (!) 150/80 80 (!) 24 97.3 °F (36.3 °C) 100 %      MAP       --         Physical Exam    Nursing note and vitals reviewed.  Constitutional: She appears well-developed and well-nourished. She is not diaphoretic. No distress.   Obese.   HENT:   Head: Normocephalic and atraumatic.   Nose: Nose normal.   Mouth/Throat: Oropharynx is clear and moist.   Eyes: Conjunctivae and EOM are normal. Pupils are equal, round, and reactive to light. Right eye exhibits no discharge. Left eye exhibits no discharge. No scleral icterus.   Neck: Normal range of motion. Neck supple. No thyromegaly present. No tracheal deviation present. No JVD present.   Cardiovascular: Normal rate, regular rhythm and normal heart sounds.   No murmur heard.  Pulmonary/Chest: No stridor. No respiratory distress. She has no wheezes. She has no rhonchi. She has rales (mild) in the right lower field and the left lower field. She exhibits no tenderness.   Fluid in chest.   Abdominal: Soft. She exhibits no distension. There is no tenderness. There is no rebound and no guarding.   Musculoskeletal: Normal range of motion. She exhibits no edema or tenderness.   No calf tenderness.   Neurological: She is alert and oriented to person, place, and time. She has normal strength. No cranial nerve deficit. GCS score is 15. GCS eye subscore is 4. GCS verbal subscore is 5. GCS motor subscore is  6.   Skin: Skin is warm and dry. No rash noted. No erythema.   Psychiatric: She has a normal mood and affect. Her behavior is normal. Judgment and thought content normal.         ED Course   Critical Care  Date/Time: 7/15/2019 3:18 AM  Performed by: Tres Aranda MD  Authorized by: Tres Aranda MD   Direct patient critical care time: 10 minutes  Additional history critical care time: 10 minutes  Ordering / reviewing critical care time: 10 minutes  Documentation critical care time: 0 minutes  Total critical care time (exclusive of procedural time) : 30 minutes  Critical care was time spent personally by me on the following activities: review of old charts, re-evaluation of patient's condition, ordering and review of radiographic studies, ordering and performing treatments and interventions, examination of patient, pulse oximetry, ordering and review of laboratory studies, obtaining history from patient or surrogate and evaluation of patient's response to treatment.        Labs Reviewed   CBC W/ AUTO DIFFERENTIAL - Abnormal; Notable for the following components:       Result Value    WBC 17.15 (*)     RBC 2.68 (*)     Hemoglobin 8.6 (*)     Hematocrit 28.1 (*)     Mean Corpuscular Volume 105 (*)     Mean Corpuscular Hemoglobin 32.1 (*)     Mean Corpuscular Hemoglobin Conc 30.6 (*)     RDW 15.8 (*)     Platelets 455 (*)     Gran # (ANC) 15.1 (*)     Lymph # 0.9 (*)     Gran% 88.5 (*)     Lymph% 5.1 (*)     All other components within normal limits   COMPREHENSIVE METABOLIC PANEL - Abnormal; Notable for the following components:    Potassium 5.8 (*)     CO2 20 (*)     Glucose 190 (*)     BUN, Bld 42 (*)     Creatinine 3.2 (*)     Alkaline Phosphatase 320 (*)     eGFR if  16 (*)     eGFR if non  14 (*)     All other components within normal limits   TROPONIN I - Abnormal; Notable for the following components:    Troponin I 0.477 (*)     All other components within normal limits    B-TYPE NATRIURETIC PEPTIDE - Abnormal; Notable for the following components:    BNP 3,209 (*)     All other components within normal limits   D DIMER, QUANTITATIVE - Abnormal; Notable for the following components:    D-Dimer 1.44 (*)     All other components within normal limits   MAGNESIUM   URINALYSIS, REFLEX TO URINE CULTURE     EKG Readings: (Independently Interpreted)   Initial Reading: No STEMI. Rhythm: Normal Sinus Rhythm. Heart Rate: 85. ST Segment Depression: V4, V5, V6, I, II and AVL. T Waves Flipped: I and AVL. Axis: Normal. Q Waves: V1.   No significant changes when compared to 06/05/2018.       Imaging Results          CTA Chest Non-Coronary (PE Study) (Final result)  Result time 07/15/19 03:07:00    Final result by Ivan Denton MD (07/15/19 03:07:00)                 Impression:      No evidence of acute pulmonary embolus.    Cardiomegaly with small bilateral pleural effusions, interlobular septal thickening, and mild patchy airspace disease.  Constellation of findings is most consistent with CHF exacerbation or volume overload.    Healing left-sided rib fractures.    Atherosclerosis and coronary artery disease.      Electronically signed by: Ivan Denotn MD  Date:    07/15/2019  Time:    03:07             Narrative:    EXAMINATION:  CTA CHEST NON CORONARY    CLINICAL HISTORY:  Chest pain, acute, PE suspected, intermed prob, positive D-dimer;    TECHNIQUE:  Low dose axial images, sagittal and coronal reformations were obtained from the thoracic inlet to the lung bases following the IV administration of 100 mL of Omnipaque 350.  Contrast timing was optimized to evaluate the pulmonary arteries.  MIP images were performed.    COMPARISON:  CT chest, 06/23/2019.    FINDINGS:  Examination of the soft tissue and vascular structures at the base of the neck is unremarkable.    The thoracic aorta maintains normal caliber, contour, and course with moderate right atherosclerotic calcification.  There is  no evidence of aneurysmal dilation or dissection.    The pulmonary arteries distribute normally without evidence of filling defect to indicate pulmonary thromboembolism.    The trachea and proximal airways are patent.    The lungs are symmetrically expanded and demonstrate diffuse interlobular septal thickening with mild bilateral patchy airspace disease and small bilateral pleural effusions, right side greater than left.  Overall distribution of findings is most consistent with CHF exacerbation/volume overload.  Mild left pleural thickening adjacent to previously seen healing left posterior rib fractures.  No pneumothorax.    The heart is enlarged.  No pericardial effusion.  There are postoperative changes of prior median sternotomy.  Coronary artery calcifications and/or coronary stents noted.    Stable mildly prominent mediastinal lymph nodes.  No axillary or hilar adenopathy.    The esophagus maintains a normal course and caliber.    Limited images of the upper abdomen obtained during the course of this dedicated thoracic CT is negative for acute findings.  Partially visualized renal atrophy.    There are healing fractures of the left 3rd through 8th ribs, similar to the prior study.  The left 7th and 8th rib fractures remain mildly displaced.  No aggressive osseous lesion identified.                               X-Ray Chest AP Portable (Final result)  Result time 07/14/19 23:50:19    Final result by Ananth Puga MD (07/14/19 23:50:19)                 Impression:      Cardiomegaly with new small bilateral pleural effusions and unchanged appearance of increased attenuation of the pulmonary interstitium.  The findings are most consistent with pulmonary edema/fluid overload state.      Electronically signed by: Ananth Puga MD  Date:    07/14/2019  Time:    23:50             Narrative:    EXAMINATION:  XR CHEST AP PORTABLE    CLINICAL HISTORY:  Chest Pain;    TECHNIQUE:  Single frontal view of the chest was  performed.    COMPARISON:  CT scan of the chest dated 06/23/2019 and chest x-ray dated 06/23/2019.    FINDINGS:  Monitoring EKG leads are present.  There is a vascular stent within the left subclavian region.  There are postoperative changes of median sternotomy.  Coronary stents are present.    The trachea is unremarkable.  There is stable enlargement of the cardiomediastinal silhouette.  There are new small bilateral pleural effusions.  There is no evidence of a pneumothorax.  There is no evidence of pneumomediastinum.  There is unchanged appearance of increased attenuation of the pulmonary interstitium.  No focal consolidation is present.  There are degenerative changes in the osseous structures.                                 Medical Decision Making:   Differential Diagnosis:   Differential includes but is not limited to fluid overload, congestive heart failure, pulmonary embolism, or pneumonia.   Clinical Tests:   Lab Tests: Ordered and Reviewed  Radiological Study: Ordered and Reviewed  Medical Tests: Ordered and Reviewed  ED Management:  2350:  Patient NSTEMI in 6/18.  Stent was placed at that time.  She has known diffuse coronary disease.  She has had multiple stents in the past.  Last echocardiogram was 3 weeks ago.  Her ejection fraction was normal. The she has LVH.  No diastolic dysfunction was reported.    0315:  Chest x-ray shows fluid overload.  Patient continues to deny chest pain. No ischemic changes on EKG however EKG looks similar to previous findings which she had an NSTEMI.  Troponin is mild-to-moderately elevated.  Troponin is chronically elevated but it is a little higher than her baseline tonight.  She continues to denied chest pain other than her rib pain for recent fractures.  Patient continues to have shortness of breath. Patient's O2 sat is 89% on room air.  Patient required 2 L nasal cannula.  Patient is not on home oxygen.  Will admit for dialysis for fluid overload.  Will do serial  cardiac enzymes consult Cardiology.  Suspect cardiac enzymes elevated due to her renal insufficiency however NSTEMI cannot be ruled out at this time.  Patient had similar presentation previous admission for rib fractures.  She was unable to tolerate day Myoview scan due to the pain from lying on her back secondary to the rib fractures in her pelvic fracture.  He should be able to be done on this admission.  Discussed this with patient and family.            Scribe Attestation:   Scribe #1: I performed the above scribed service and the documentation accurately describes the services I performed. I attest to the accuracy of the note.    Attending Attestation:           Physician Attestation for Scribe:  Physician Attestation Statement for Scribe #1: I, Tres Aranda MD, reviewed documentation, as scribed by Myriam Borden in my presence, and it is both accurate and complete.                    Clinical Impression:       ICD-10-CM ICD-9-CM   1. Hypervolemia, unspecified hypervolemia type E87.70 276.69   2. SOB (shortness of breath) R06.02 786.05   3. Hypoxemia R09.02 799.02   4. End stage renal disease N18.6 585.6   5. Elevated troponin R74.8 790.6         Disposition:   Disposition: Admitted  Condition: Stable                        Tres Aranda MD  07/15/19 0318

## 2019-07-15 NOTE — PT/OT/SLP PROGRESS
Occupational Therapy      Patient Name:  Zoey Ham   MRN:  [id number]    1449 Pt off floor: electrocardiology. Will follow-up as able.     1142: Patient not seen today secondary to (PT and pt/daughter discussed this morning: pt reports increase pain requiring morphine and fluid build up around lungs and heart.  Pt awaiting hemo dialysis before participating in therapy. OT followed up, but pt off floor for dialysis). Will follow-up later as able.    Ayleen Spencer, OT  7/15/2019

## 2019-07-15 NOTE — ASSESSMENT & PLAN NOTE
Presenting with progressively worsening dyspnea, orthopnea found to have elevated BNP and imaging noting vascular congestion.  Telemetry monitor  Patient urinating will try one dose lasix 80 IV lasix, she takes 80 po bid at home  Nephrology consulted for HD  Optimize HD and HR  Strict in and outs  Fluids restriction  Renal diet

## 2019-07-15 NOTE — ASSESSMENT & PLAN NOTE
Resume home insulin and titrate as needed.  Accuchecks and SSI  Hypoglycemia protocol  Appears to be optimal at this time.

## 2019-07-15 NOTE — ASSESSMENT & PLAN NOTE
This diagnosis is as per chart review. Currently in NRS. Continue Rate control with BB. Not on AC at home. High CHADsVACs score. Will need to touch base with patient prior to d/c.

## 2019-07-16 ENCOUNTER — TELEPHONE (OUTPATIENT)
Dept: INTERNAL MEDICINE | Facility: CLINIC | Age: 74
End: 2019-07-16

## 2019-07-16 VITALS
HEIGHT: 64 IN | TEMPERATURE: 98 F | OXYGEN SATURATION: 93 % | HEART RATE: 75 BPM | BODY MASS INDEX: 27.55 KG/M2 | DIASTOLIC BLOOD PRESSURE: 60 MMHG | SYSTOLIC BLOOD PRESSURE: 125 MMHG | WEIGHT: 161.38 LBS | RESPIRATION RATE: 18 BRPM

## 2019-07-16 LAB
ALBUMIN SERPL BCP-MCNC: 3.1 G/DL (ref 3.5–5.2)
ALP SERPL-CCNC: 253 U/L (ref 55–135)
ALT SERPL W/O P-5'-P-CCNC: 14 U/L (ref 10–44)
ANION GAP SERPL CALC-SCNC: 12 MMOL/L (ref 8–16)
AST SERPL-CCNC: 23 U/L (ref 10–40)
BASOPHILS # BLD AUTO: 0.02 K/UL (ref 0–0.2)
BASOPHILS NFR BLD: 0.2 % (ref 0–1.9)
BILIRUB SERPL-MCNC: 0.5 MG/DL (ref 0.1–1)
BUN SERPL-MCNC: 23 MG/DL (ref 8–23)
CALCIUM SERPL-MCNC: 9 MG/DL (ref 8.7–10.5)
CHLORIDE SERPL-SCNC: 98 MMOL/L (ref 95–110)
CO2 SERPL-SCNC: 27 MMOL/L (ref 23–29)
CREAT SERPL-MCNC: 2.6 MG/DL (ref 0.5–1.4)
DIFFERENTIAL METHOD: ABNORMAL
EOSINOPHIL # BLD AUTO: 0.3 K/UL (ref 0–0.5)
EOSINOPHIL NFR BLD: 2.6 % (ref 0–8)
ERYTHROCYTE [DISTWIDTH] IN BLOOD BY AUTOMATED COUNT: 15.7 % (ref 11.5–14.5)
EST. GFR  (AFRICAN AMERICAN): 20 ML/MIN/1.73 M^2
EST. GFR  (NON AFRICAN AMERICAN): 18 ML/MIN/1.73 M^2
GLUCOSE SERPL-MCNC: 100 MG/DL (ref 70–110)
HCT VFR BLD AUTO: 27.3 % (ref 37–48.5)
HGB BLD-MCNC: 8.3 G/DL (ref 12–16)
LYMPHOCYTES # BLD AUTO: 1.4 K/UL (ref 1–4.8)
LYMPHOCYTES NFR BLD: 14.4 % (ref 18–48)
MAGNESIUM SERPL-MCNC: 1.6 MG/DL (ref 1.6–2.6)
MCH RBC QN AUTO: 31.9 PG (ref 27–31)
MCHC RBC AUTO-ENTMCNC: 30.4 G/DL (ref 32–36)
MCV RBC AUTO: 105 FL (ref 82–98)
MONOCYTES # BLD AUTO: 1 K/UL (ref 0.3–1)
MONOCYTES NFR BLD: 10 % (ref 4–15)
NEUTROPHILS # BLD AUTO: 7.2 K/UL (ref 1.8–7.7)
NEUTROPHILS NFR BLD: 73 % (ref 38–73)
PHOSPHATE SERPL-MCNC: 4.3 MG/DL (ref 2.7–4.5)
PLATELET # BLD AUTO: 342 K/UL (ref 150–350)
PMV BLD AUTO: 10.1 FL (ref 9.2–12.9)
POCT GLUCOSE: 107 MG/DL (ref 70–110)
POCT GLUCOSE: 128 MG/DL (ref 70–110)
POTASSIUM SERPL-SCNC: 4 MMOL/L (ref 3.5–5.1)
PROT SERPL-MCNC: 6.5 G/DL (ref 6–8.4)
RBC # BLD AUTO: 2.6 M/UL (ref 4–5.4)
SODIUM SERPL-SCNC: 137 MMOL/L (ref 136–145)
TROPONIN I SERPL DL<=0.01 NG/ML-MCNC: 2.03 NG/ML (ref 0–0.03)
TROPONIN I SERPL DL<=0.01 NG/ML-MCNC: 2.2 NG/ML (ref 0–0.03)
WBC # BLD AUTO: 9.82 K/UL (ref 3.9–12.7)

## 2019-07-16 PROCEDURE — 94761 N-INVAS EAR/PLS OXIMETRY MLT: CPT | Mod: HCNC

## 2019-07-16 PROCEDURE — 97165 OT EVAL LOW COMPLEX 30 MIN: CPT | Mod: HCNC

## 2019-07-16 PROCEDURE — 25000003 PHARM REV CODE 250: Mod: HCNC | Performed by: PHYSICIAN ASSISTANT

## 2019-07-16 PROCEDURE — 80053 COMPREHEN METABOLIC PANEL: CPT | Mod: HCNC

## 2019-07-16 PROCEDURE — 84484 ASSAY OF TROPONIN QUANT: CPT | Mod: HCNC

## 2019-07-16 PROCEDURE — 99232 SBSQ HOSP IP/OBS MODERATE 35: CPT | Mod: GT,HCNC,, | Performed by: INTERNAL MEDICINE

## 2019-07-16 PROCEDURE — 25000003 PHARM REV CODE 250: Mod: HCNC | Performed by: INTERNAL MEDICINE

## 2019-07-16 PROCEDURE — 99232 PR SUBSEQUENT HOSPITAL CARE,LEVL II: ICD-10-PCS | Mod: GT,HCNC,, | Performed by: INTERNAL MEDICINE

## 2019-07-16 PROCEDURE — 36415 COLL VENOUS BLD VENIPUNCTURE: CPT | Mod: HCNC

## 2019-07-16 PROCEDURE — 84484 ASSAY OF TROPONIN QUANT: CPT | Mod: 91,HCNC

## 2019-07-16 PROCEDURE — 94640 AIRWAY INHALATION TREATMENT: CPT | Mod: HCNC

## 2019-07-16 PROCEDURE — 25000242 PHARM REV CODE 250 ALT 637 W/ HCPCS: Mod: HCNC | Performed by: INTERNAL MEDICINE

## 2019-07-16 PROCEDURE — 63600175 PHARM REV CODE 636 W HCPCS: Mod: HCNC | Performed by: INTERNAL MEDICINE

## 2019-07-16 PROCEDURE — 83735 ASSAY OF MAGNESIUM: CPT | Mod: HCNC

## 2019-07-16 PROCEDURE — 85025 COMPLETE CBC W/AUTO DIFF WBC: CPT | Mod: HCNC

## 2019-07-16 PROCEDURE — 84100 ASSAY OF PHOSPHORUS: CPT | Mod: HCNC

## 2019-07-16 RX ORDER — LEVOFLOXACIN 500 MG/1
500 TABLET, FILM COATED ORAL EVERY OTHER DAY
Status: DISCONTINUED | OUTPATIENT
Start: 2019-07-17 | End: 2019-07-16 | Stop reason: HOSPADM

## 2019-07-16 RX ORDER — LEVOFLOXACIN 500 MG/1
500 TABLET, FILM COATED ORAL EVERY OTHER DAY
Qty: 2 TABLET | Refills: 0 | Status: SHIPPED | OUTPATIENT
Start: 2019-07-17 | End: 2019-07-21

## 2019-07-16 RX ORDER — LEVOFLOXACIN 500 MG/1
500 TABLET, FILM COATED ORAL EVERY OTHER DAY
Qty: 2 TABLET | Refills: 0 | Status: SHIPPED | OUTPATIENT
Start: 2019-07-17 | End: 2019-07-16

## 2019-07-16 RX ADMIN — CALCITRIOL CAPSULES 0.25 MCG 0.5 MCG: 0.25 CAPSULE ORAL at 08:07

## 2019-07-16 RX ADMIN — IPRATROPIUM BROMIDE AND ALBUTEROL SULFATE 3 ML: .5; 3 SOLUTION RESPIRATORY (INHALATION) at 07:07

## 2019-07-16 RX ADMIN — FAMOTIDINE 20 MG: 20 TABLET ORAL at 08:07

## 2019-07-16 RX ADMIN — NEPHROCAP 1 CAPSULE: 1 CAP ORAL at 08:07

## 2019-07-16 RX ADMIN — HEPARIN SODIUM 5000 UNITS: 5000 INJECTION, SOLUTION INTRAVENOUS; SUBCUTANEOUS at 06:07

## 2019-07-16 RX ADMIN — IPRATROPIUM BROMIDE AND ALBUTEROL SULFATE 3 ML: .5; 3 SOLUTION RESPIRATORY (INHALATION) at 12:07

## 2019-07-16 RX ADMIN — METOPROLOL TARTRATE 100 MG: 50 TABLET ORAL at 08:07

## 2019-07-16 RX ADMIN — ACETAMINOPHEN 650 MG: 325 TABLET, FILM COATED ORAL at 06:07

## 2019-07-16 RX ADMIN — CLOPIDOGREL BISULFATE 75 MG: 75 TABLET ORAL at 08:07

## 2019-07-16 RX ADMIN — MUPIROCIN: 20 OINTMENT TOPICAL at 08:07

## 2019-07-16 RX ADMIN — ASPIRIN 325 MG: 325 TABLET, DELAYED RELEASE ORAL at 06:07

## 2019-07-16 RX ADMIN — FUROSEMIDE 80 MG: 10 INJECTION, SOLUTION INTRAVENOUS at 08:07

## 2019-07-16 RX ADMIN — ATORVASTATIN CALCIUM 40 MG: 40 TABLET, FILM COATED ORAL at 06:07

## 2019-07-16 NOTE — TELEPHONE ENCOUNTER
Called and spoke to the patient I spoke to the daughter as well. They can come in on Tuesday for 1:30, she states she is feeling better, she sounded well over the phone incidentally.

## 2019-07-16 NOTE — PROGRESS NOTES
[facility]  Cardiology  Progress Note    Patient Name: Zoey Ham  MRN: [id number]  Admission Date: 7/14/2019  Hospital Length of Stay: 1 days  Code Status: Full Code   Attending Physician: Katty Green MD   Primary Care Physician: Cesia Rosales MD  Expected Discharge Date:   Principal Problem:Acute on chronic diastolic heart failure    Subjective:     Hospital Course:   7-16 Less SOB. Denies CP. Peak troponin 2.6    Echo 7/15/19  · Normal left ventricular systolic function. The estimated ejection fraction is 55%  · Moderate concentric left ventricular hypertrophy.  · Grade II (moderate) left ventricular diastolic dysfunction consistent with pseudonormalization.  · Elevated left atrial pressure.  · Normal right ventricular systolic function.  · Mild-moderate left atrial enlargement.  · Mild right atrial enlargement.  · Mild mitral regurgitation.  · Mild tricuspid regurgitation.  · Intermediate central venous pressure (8 mm Hg).  · The estimated PA systolic pressure is 63 mm Hg  · Pulmonary hypertension present.      Interval History:     Review of Systems   Constitution: Negative for decreased appetite.   HENT: Negative for ear discharge.    Eyes: Negative for blurred vision.   Respiratory: Negative for hemoptysis.    Endocrine: Negative for polyphagia.   Hematologic/Lymphatic: Negative for adenopathy.   Skin: Negative for color change.   Musculoskeletal: Negative for joint swelling.   Genitourinary: Negative for bladder incontinence.   Neurological: Negative for brief paralysis.   Psychiatric/Behavioral: Negative for hallucinations.   Allergic/Immunologic: Negative for hives.     Objective:     Vital Signs (Most Recent):  Temp: 97.6 °F (36.4 °C) (07/16/19 0748)  Pulse: 80 (07/16/19 0748)  Resp: 18 (07/16/19 0748)  BP: 122/60 (07/16/19 0748)  SpO2: 96 % (07/16/19 0748) Vital Signs (24h Range):  Temp:  [97.6 °F (36.4 °C)-98.8 °F (37.1 °C)] 97.6 °F (36.4 °C)  Pulse:  [74-96]  80  Resp:  [17-18] 18  SpO2:  [90 %-100 %] 96 %  BP: (105-166)/(46-71) 122/60     Weight: 73.2 kg (161 lb 6 oz)  Body mass index is 27.7 kg/m².     SpO2: 96 %  O2 Device (Oxygen Therapy): room air      Intake/Output Summary (Last 24 hours) at 7/16/2019 0937  Last data filed at 7/16/2019 0600  Gross per 24 hour   Intake 700 ml   Output 3350 ml   Net -2650 ml       Lines/Drains/Airways     Drain                 Hemodialysis AV Fistula Left upper arm -- days         Hemodialysis AV Fistula Left upper arm -- days         Hemodialysis AV Fistula 06/20/19 0908 Left upper arm 26 days          Peripheral Intravenous Line                 Peripheral IV - Single Lumen 07/15/19 2130 Posterior;Right Wrist less than 1 day                Physical Exam   Constitutional: She is oriented to person, place, and time. She appears well-developed and well-nourished.   HENT:   Head: Normocephalic.   Eyes: Pupils are equal, round, and reactive to light.   Neck: Normal range of motion. Neck supple.   Cardiovascular: Normal rate and regular rhythm.   Pulmonary/Chest: Effort normal and breath sounds normal.   Abdominal: Soft. Normal appearance and bowel sounds are normal. There is no tenderness.   Musculoskeletal: Normal range of motion.   Neurological: She is alert and oriented to person, place, and time.   Skin: Skin is warm.   Psychiatric: She has a normal mood and affect.   Nursing note and vitals reviewed.      Significant Labs: All pertinent lab results from the last 24 hours have been reviewed.    Significant Imaging: Echocardiogram:   2D echo with color flow doppler:   Results for orders placed or performed during the hospital encounter of 06/04/18   2D echo with color flow doppler   Result Value Ref Range    QEF 65 55 - 65    Diastolic Dysfunction Yes (A)     Est. PA Systolic Pressure 53.97 (A)     Tricuspid Valve Regurgitation TRIVIAL     Narrative    Date of Procedure: 06/05/2018        TEST DESCRIPTION   Technical Quality: This  study was performed in conjunction with a 3ml intravenous injection of Optison contrast agent.     Aorta: The aortic root is normal in size, measuring 2.7 cm at sinotubular junction and 3.1 cm at Sinuses of Valsalva. The proximal ascending aorta is normal in size, measuring 3.1 cm across.     Left Atrium: The left atrial volume index is normal, measuring 34.15 cc/m2.     Left Ventricle: The left ventricle is normal in size, with an end-diastolic diameter of 3.6 cm, and an end-systolic diameter of 2.5 cm. LV wall thickness is normal, with the septum measuring 1.5 cm and the posterior wall measuring 1.1 cm across. Relative   wall thickness was increased at 0.61, and the LV mass index was 99.3 g/m2 consistent with concentric remodeling. There are no regional wall motion abnormalities. Left ventricular systolic function appears normal. Visually estimated ejection fraction is   60-65%. The LV Doppler derived stroke volume equals 61.0 ccs.     Diastolic indices: E wave velocity 1.4 m/s, E/A ratio 1.5,  msec., E/e' ratio(avg) 28. There is pseudonormalization of mitral inflow pattern consistent with significant diastolic dysfunction.     Right Atrium: The right atrium is normal in size, measuring 4.9 cm in length and 3.0 cm in width in the apical view.     Right Ventricle: The right ventricle is normal in size measuring 3.6 cm at the base in the apical right ventricle-focused view. Global right ventricular systolic function appears normal. Tricuspid annular plane systolic excursion (TAPSE) is 2.3 cm.   Tissue Doppler-derived tricuspid annular peak systolic velocity (S prime) is 10.0 cm/s. The estimated PA systolic pressure is 54 mmHg.     Aortic Valve:  The aortic valve is mildly sclerotic. The aortic valve is tri-leaflet in structure. There is aortic annular calcification.     Mitral Valve:  The mitral valve is mildly sclerotic. There is mitral annular calcification.     Tricuspid Valve:  There is trivial tricuspid  regurgitation. Tricuspid valve is normal in structure with normal leaflet mobility.     Pulmonary Valve:  Pulmonary valve is normal in structure with normal leaflet mobility.     IVC: IVC is enlarged but collapses > 50% with a sniff, suggesting intermediate right atrial pressure of 8 mmHg.     Intracavitary: There is no evidence of pericardial effusion, intracavity mass, thrombi, or vegetation.         CONCLUSIONS     1 - Normal left ventricular systolic function (EF 60-65%).     2 - Impaired LV relaxation, elevated LAP (grade 2 diastolic dysfunction).     3 - Normal right ventricular systolic function .     4 - Pulmonary hypertension. The estimated PA systolic pressure is 54 mmHg.     5 - Trivial tricuspid regurgitation.     6 - Intermediate central venous pressure.     7 - Concentric remodeling.     8 - No wall motion abnormalities.             This document has been electronically    SIGNED BY: Katey Pineda MD On: 06/05/2018 15:57     Assessment and Plan:     Brief HPI:     * Acute on chronic diastolic heart failure  Patient came in with acute decompensated heart failure.  Feeling much better after diuresis.  Continue IV diuresis.  Titrate as tolerated.    Troponin level elevated  Mildly elevated troponin, in the absence of anginal sounding chest pain.  It is important to note that her troponin was as high as 1.7 on 06/25/2019.  Patient states that she will not be able to lay for any kind of ischemic testing because of musculoskeletal pain.  Echo with good EF. Continues to request medical Rx for CAD. Will f/u prn    Type 2 diabetes mellitus with hyperlipidemia  Management per primary.  Continue statins.    Chronic atrial fibrillation  Has not been on coumadin for years per patient and was taken off of it by her physicians. Currently in nsr        VTE Risk Mitigation (From admission, onward)        Ordered     heparin (porcine) injection 5,000 Units  Every 8 hours      07/15/19 0644     IP VTE HIGH RISK  PATIENT  Once      07/15/19 0644     Place JORGE hose  Until discontinued      07/15/19 0545          Miguel A Valle MD  Cardiology  [facility]

## 2019-07-16 NOTE — PLAN OF CARE
[facility]  HOME  HEALTH ORDERS     07/16/2019    Admit to Home Health    Diagnoses:  Active Hospital Problems    Diagnosis  POA    *Acute on chronic diastolic heart failure [I50.33]  Yes     Seen on echo in 6/2018, EF 60%, grade 2 diastolic dysfunction, with class B symptoms      Hypervolemia [E87.70]  Yes    ESRD (end stage renal disease) on dialysis [N18.6, Z99.2]  Not Applicable     Hemodialysis started in 8/2018, tolerating with improvement in QoL      Troponin level elevated [R74.8]  Yes    Coronary artery disease involving coronary bypass graft of native heart with unstable angina pectoris [I25.700]  Yes     6/4/18 BETHANY to OM1. Cath 2/2017, LHC and PCI on Lcx and OM1 with improvement. H/o CABG X2 2002, stent RCA, OM1 2003, NSTEMI with total occlusion of LAD in 2012.       Hyperparathyroidism [E21.3]  Yes     PTH trending up with worsening renal function       Type 2 diabetes mellitus with chronic kidney disease on chronic dialysis, with long-term current use of insulin [E11.22, N18.6, Z99.2, Z79.4]  Not Applicable     Compliant lantus 15U, Compliant with dialysis      Chronic atrial fibrillation [I48.2]  Yes     Rate-controlled on BB, [id number] score of 9, compliant with DAPT      UTI (urinary tract infection) [N39.0]  Yes    Leukocytosis [D72.829]  Yes    Hypertension associated with diabetes [E11.59, I10]  Yes     BP controlled on BB, diuretic. ARB discontinued by Renal due to hyperkalemia        Resolved Hospital Problems   No resolved problems to display.       Patient is homebound due to:  Acute on chronic diastolic heart failure    Allergies:  Review of patient's allergies indicates:   Allergen Reactions    Percodan [oxycodone-aspirin] Hives     Welps     Pcn [penicillins] Hives and Swelling     Tolerated Rocephin IM in clinic      Phenergan [promethazine] Other (See Comments)     Altered mental status; jerking of extremities       Diet: 1800 godwin ADA diet, heart  healthy, renal, low sodium    Acitivities: As tolerated    Nursing:   SN to complete comprehensive assessment including routine vital signs. Instruct on disease process and s/s of complications to report to MD. Review/verify medication list sent home with the patient at time of discharge  and instruct patient/caregiver as needed. Frequency may be adjusted depending on start of care date.    Notify MD if SBP > 160 or < 90; DBP > 90 or < 50; HR > 120 or < 50; Temp > 101; O2 sat < 88%    CONSULTS:    PT to evaluate and treat. Evaluate for home safety and equipment needs; Establish/upgrade home exercise program. Perform / instruct on therapeutic exercises, gait training, transfer training, and Range of Motion.    OT to evaluate and treat. Evaluate home environment for safety and equipment needs. Perform/Instruct on transfers, ADL training, ROM, and therapeutic exercises.        Medications: Review discharge medications with patient and family and provide education.       Zoey Ham   Home Medication Instructions FABIO:[id number]    Printed on:07/16/19 8684   Medication Information                      aspirin (ECOTRIN) 325 MG EC tablet  Take 1 tablet (325 mg total) by mouth once daily.             atorvastatin (LIPITOR) 80 MG tablet  Take 1 tablet (80 mg total) by mouth every morning.             calcitRIOL (ROCALTROL) 0.5 MCG Cap  Take 1 capsule (0.5 mcg total) by mouth once daily.             clopidogrel (PLAVIX) 75 mg tablet  Take 1 tablet (75 mg total) by mouth once daily.             furosemide (LASIX) 80 MG tablet  One 80 mg  tab once a day.             glucose 4 GM chewable tablet  Take 4 tablets (16 g total) by mouth as needed for glucose  < 60.             insulin aspart U-100 (NOVOLOG) 100 unit/mL (3 mL) InPn pen  Inject 1-10 Units into the skin before meals and at bedtime as needed (Hyperglycemia).             insulin detemir U-100 (LEVEMIR FLEXTOUCH) 100 unit/mL (3 mL) SubQ InPn pen  Inject 15 Units  into the skin once daily.             insulin glargine,hum.rec.anlog (LANTUS SUBQ)  Inject into the skin.              levoFLOXacin (LEVAQUIN) 500 MG tablet  Take 1 tablet (500 mg total) by mouth every other day. for 4 days             metoprolol tartrate (LOPRESSOR) 100 MG tablet  Take 1 tablet (100 mg total) by mouth 2 (two) times daily.             polyethylene glycol (GLYCOLAX) 17 gram PwPk  Take 17 g by mouth 2 (two) times daily as needed.             traMADol (ULTRAM) 50 mg tablet  Take 1 tablet (50 mg total) by mouth every 8 (eight) hours as needed.             vitamin renal formula, B-complex-vitamin c-folic acid, (NEPHROCAPS) 1 mg Cap  Take 1 capsule by mouth once daily.                       _________________________________  Katty Green MD  07/16/2019

## 2019-07-16 NOTE — PLAN OF CARE
07/16/19 1157   Post-Acute Status   Post-Acute Authorization Home Health/Hospice   Home Health/Hospice Status Awaiting Orders for HH   Discharge Delays (!) Other  (home health and HD )

## 2019-07-16 NOTE — SUBJECTIVE & OBJECTIVE
Interval History:     Review of Systems   Constitution: Negative for decreased appetite.   HENT: Negative for ear discharge.    Eyes: Negative for blurred vision.   Respiratory: Negative for hemoptysis.    Endocrine: Negative for polyphagia.   Hematologic/Lymphatic: Negative for adenopathy.   Skin: Negative for color change.   Musculoskeletal: Negative for joint swelling.   Genitourinary: Negative for bladder incontinence.   Neurological: Negative for brief paralysis.   Psychiatric/Behavioral: Negative for hallucinations.   Allergic/Immunologic: Negative for hives.     Objective:     Vital Signs (Most Recent):  Temp: 97.6 °F (36.4 °C) (07/16/19 0748)  Pulse: 80 (07/16/19 0748)  Resp: 18 (07/16/19 0748)  BP: 122/60 (07/16/19 0748)  SpO2: 96 % (07/16/19 0748) Vital Signs (24h Range):  Temp:  [97.6 °F (36.4 °C)-98.8 °F (37.1 °C)] 97.6 °F (36.4 °C)  Pulse:  [74-96] 80  Resp:  [17-18] 18  SpO2:  [90 %-100 %] 96 %  BP: (105-166)/(46-71) 122/60     Weight: 73.2 kg (161 lb 6 oz)  Body mass index is 27.7 kg/m².     SpO2: 96 %  O2 Device (Oxygen Therapy): room air      Intake/Output Summary (Last 24 hours) at 7/16/2019 0937  Last data filed at 7/16/2019 0600  Gross per 24 hour   Intake 700 ml   Output 3350 ml   Net -2650 ml       Lines/Drains/Airways     Drain                 Hemodialysis AV Fistula Left upper arm -- days         Hemodialysis AV Fistula Left upper arm -- days         Hemodialysis AV Fistula 06/20/19 0908 Left upper arm 26 days          Peripheral Intravenous Line                 Peripheral IV - Single Lumen 07/15/19 2130 Posterior;Right Wrist less than 1 day                Physical Exam   Constitutional: She is oriented to person, place, and time. She appears well-developed and well-nourished.   HENT:   Head: Normocephalic.   Eyes: Pupils are equal, round, and reactive to light.   Neck: Normal range of motion. Neck supple.   Cardiovascular: Normal rate and regular rhythm.   Pulmonary/Chest: Effort normal and  breath sounds normal.   Abdominal: Soft. Normal appearance and bowel sounds are normal. There is no tenderness.   Musculoskeletal: Normal range of motion.   Neurological: She is alert and oriented to person, place, and time.   Skin: Skin is warm.   Psychiatric: She has a normal mood and affect.   Nursing note and vitals reviewed.      Significant Labs: All pertinent lab results from the last 24 hours have been reviewed.    Significant Imaging: Echocardiogram:   2D echo with color flow doppler:   Results for orders placed or performed during the hospital encounter of 06/04/18   2D echo with color flow doppler   Result Value Ref Range    QEF 65 55 - 65    Diastolic Dysfunction Yes (A)     Est. PA Systolic Pressure 53.97 (A)     Tricuspid Valve Regurgitation TRIVIAL     Narrative    Date of Procedure: 06/05/2018        TEST DESCRIPTION   Technical Quality: This study was performed in conjunction with a 3ml intravenous injection of Optison contrast agent.     Aorta: The aortic root is normal in size, measuring 2.7 cm at sinotubular junction and 3.1 cm at Sinuses of Valsalva. The proximal ascending aorta is normal in size, measuring 3.1 cm across.     Left Atrium: The left atrial volume index is normal, measuring 34.15 cc/m2.     Left Ventricle: The left ventricle is normal in size, with an end-diastolic diameter of 3.6 cm, and an end-systolic diameter of 2.5 cm. LV wall thickness is normal, with the septum measuring 1.5 cm and the posterior wall measuring 1.1 cm across. Relative   wall thickness was increased at 0.61, and the LV mass index was 99.3 g/m2 consistent with concentric remodeling. There are no regional wall motion abnormalities. Left ventricular systolic function appears normal. Visually estimated ejection fraction is   60-65%. The LV Doppler derived stroke volume equals 61.0 ccs.     Diastolic indices: E wave velocity 1.4 m/s, E/A ratio 1.5,  msec., E/e' ratio(avg) 28. There is pseudonormalization of  mitral inflow pattern consistent with significant diastolic dysfunction.     Right Atrium: The right atrium is normal in size, measuring 4.9 cm in length and 3.0 cm in width in the apical view.     Right Ventricle: The right ventricle is normal in size measuring 3.6 cm at the base in the apical right ventricle-focused view. Global right ventricular systolic function appears normal. Tricuspid annular plane systolic excursion (TAPSE) is 2.3 cm.   Tissue Doppler-derived tricuspid annular peak systolic velocity (S prime) is 10.0 cm/s. The estimated PA systolic pressure is 54 mmHg.     Aortic Valve:  The aortic valve is mildly sclerotic. The aortic valve is tri-leaflet in structure. There is aortic annular calcification.     Mitral Valve:  The mitral valve is mildly sclerotic. There is mitral annular calcification.     Tricuspid Valve:  There is trivial tricuspid regurgitation. Tricuspid valve is normal in structure with normal leaflet mobility.     Pulmonary Valve:  Pulmonary valve is normal in structure with normal leaflet mobility.     IVC: IVC is enlarged but collapses > 50% with a sniff, suggesting intermediate right atrial pressure of 8 mmHg.     Intracavitary: There is no evidence of pericardial effusion, intracavity mass, thrombi, or vegetation.         CONCLUSIONS     1 - Normal left ventricular systolic function (EF 60-65%).     2 - Impaired LV relaxation, elevated LAP (grade 2 diastolic dysfunction).     3 - Normal right ventricular systolic function .     4 - Pulmonary hypertension. The estimated PA systolic pressure is 54 mmHg.     5 - Trivial tricuspid regurgitation.     6 - Intermediate central venous pressure.     7 - Concentric remodeling.     8 - No wall motion abnormalities.             This document has been electronically    SIGNED BY: Katey Pineda MD On: 06/05/2018 15:57

## 2019-07-16 NOTE — TELEPHONE ENCOUNTER
I will reach out to [facility] to see if the patient can see the [facility] DC in her home. Please see how she is doing and which days she can come to clinic (Tues and Thurs?).    Thanks,  KJ

## 2019-07-16 NOTE — PT/OT/SLP EVAL
Occupational Therapy   Evaluation and Discharge Note    Name: Zoey Ham  MRN: [id number]  Admitting Diagnosis:  Acute on chronic diastolic heart failure      Recommendations:     Discharge Recommendations: home health OT(with family assistance)  Discharge Equipment Recommendations:  none  Barriers to discharge:  None    Assessment:     Zoey Ham is a 73 y.o. female with a medical diagnosis of Acute on chronic diastolic heart failure. At this time, patient is SUP with ADLs and SBA with functional transfers and does not require further acute OT services. Pt OK to ambulate/complete ADL routine with RW and staff supervision. Pt will benefit from HHOT to regain PLOF.     Plan:     During this hospitalization, patient does not require further acute OT services.  Please re-consult if situation changes.    · Plan of Care Reviewed with: patient    Subjective     Chief Complaint: ready to d/c home   Patient/Family Comments/goals: sit up in the chair and wait for her daughter to come pick her up     Occupational Profile:  Living Environment: Pt lives with her son in a 2  with 1 CHELSEA from the garage. All needs are on the first floor. Bathroom set-up: tub/shower combo.   Previous level of function: Pt completing ADLs with SUP. Pt compliant with LLE PWB precaution with RW.   Roles and Routines: dialysis   Equipment Used at home:  shower chair, walker, rolling, wheelchair  Assistance upon Discharge: family (son, daughter)    Pain/Comfort:  · Pain Rating 1: 0/10    Patients cultural, spiritual, Yazdanism conflicts given the current situation: no    Objective:     Communicated with: nurseNithin, prior to session.  Patient found HOB elevated with bed alarm, telemetry upon OT entry to room.    General Precautions: Standard, fall, diabetic   Orthopedic Precautions:N/A   Braces: N/A     Occupational Performance:    Bed Mobility:    · Patient completed Scooting/Bridging with supervision  · Patient completed Supine to Sit  with supervision and HOB elevated    Functional Mobility/Transfers:  · Patient completed Sit <> Stand Transfer with stand by assistance  with  rolling walker   · Functional Mobility: Pt ambulated within room (bed>sink>chair) with SBA and RW. Pt required min v.c. For hand placement with safety during sit to stand transfer.     Activities of Daily Living:  · Feeding:  independence    · Lower Body Dressing: set-up with socks seated      Cognitive/Visual Perceptual:  Cognitive/Psychosocial Skills:     -       Oriented to: Person, Place, Time and Situation   -       Follows Commands/attention:Follows multistep  commands  -       Communication: clear/fluent  -       Memory: No Deficits noted  -       Safety awareness/insight to disability: intact   -       Mood/Affect/Coping skills/emotional control: Pleasant  Visual/Perceptual:      -Intact  acuity      Physical Exam:  Balance:    -       seated: independent; standing: SBA with RW  Postural examination/scapula alignment:    -       No postural abnormalities identified  Skin integrity: Visible skin intact  Edema:  no BUE noted  Sensation:    -       Intact  light/touch BUE  Dominant hand:    -       Right  Upper Extremity Range of Motion:     -       Right Upper Extremity: WFL  -       Left Upper Extremity: WFL  Upper Extremity Strength:    -       Right Upper Extremity: WFL  -       Left Upper Extremity: WFL   Strength:    -       Right Upper Extremity: WFL  -       Left Upper Extremity: WFL  Fine Motor Coordination:    -       Intact  Left hand, manipulation of objects and Right hand, manipulation of objects  Gross motor coordination:   WFL    AMPAC 6 Click ADL:  AMPAC Total Score: 21    Treatment & Education:  Pt re-educated on OT role/POC.   Importance of OOB activity with staff assistance.  Safety during functional t/f and mobility   White board updated   Multiple self-care tasks/functional mobility completed- assistance level noted above   All questions/concerns  answered within OT scope of practice     Education:    Patient left up in chair with all lines intact, call button in reach and nurseNithin, notified    GOALS:   Multidisciplinary Problems     Occupational Therapy Goals     Not on file          Multidisciplinary Problems (Resolved)        Problem: Occupational Therapy Goal    Goal Priority Disciplines Outcome Interventions   Occupational Therapy Goal   (Resolved)     OT, PT/OT Outcome(s) achieved                    History:     Past Medical History:   Diagnosis Date    Acid reflux     Acute myocardial infarction of anterolateral wall 7/9/2015    Anemia of chronic renal failure, stage 4 (severe) 1/22/2015    Ankle fracture     right    Anticoagulant long-term use     ASA and plavix    Atherosclerosis of aorta 10/3/2012    AV shunt malfunction     Benign hypertension with CKD (chronic kidney disease) stage IV 3/11/2016    Hypertension associated with Diabetes    Carpal tunnel syndrome, right     Chronic diastolic congestive heart failure 10/3/2012    Combined systolic and diastolic CHF    Chronic kidney disease, stage IV (severe) 7/3/2012    Coronary artery disease     s/p 1v CABG 2002 and multiple PCI (last angiogram in 6/2012 with patent LIMA->LAD and patent LCx and RCA stents)    Diabetic polyneuropathy associated with type 2 diabetes mellitus 7/9/2015    Diabetic polyneuropathy associated with type 2 diabetes mellitus 7/9/2015    Dialysis patient     Encounter for blood transfusion     Gastritis without bleeding     Heart attack 09/2012    5-6 events    Hyperlipidemia     Hyperparathyroidism     Hyperphosphatemia     Metabolic acidosis     Nephritis and nephropathy, 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 7/9/2015    NSTEMI (non-ST elevated myocardial infarction)     NSTEMI (non-ST elevated myocardial infarction)     NSTEMI (non-ST elevated myocardial infarction)     Occlusion and stenosis of carotid artery with cerebral infarction  7/9/2015    Osteopenia     PAF (paroxysmal atrial fibrillation) 10/3/2012    Pneumonia     Proliferative diabetic retinopathy 10/3/2012    S/P drug eluting coronary stent placement     Sepsis due to Escherichia coli with acute renal failure 2/2016    UTI     TACO (transfusion associated circulatory overload)     Type II diabetes mellitus with neurological manifestations 7/9/2015    Type II diabetes mellitus with renal manifestations 7/3/2012       Past Surgical History:   Procedure Laterality Date    APPENDECTOMY      CARDIAC SURGERY  2002    CABG    CHOLECYSTECTOMY      CHOLECYSTECTOMY-LAPAROSCOPIC N/A 2/4/2015    Performed by Quinton Ashley MD at [facility] OR [address] [place] [address]    [id number]  - Left brachiocephalic Left 4/16/2018    Performed by YAMEL Perry III, MD at [facility] OR [address] [place] [address]    CORONARY ANGIOPLASTY  2004    CORONARY ARTERY BYPASS GRAFT      EYE SURGERY      cataracts bilaterally    Fistulogram Left 1/17/2019    [facility]    Fistulogram Left 9/20/2018    Performed by YAMEL Perry III, MD at [facility] OR [address] [place] [address]    Fistulogram left arm Left 6/20/2019    Performed by YAMEL Perry III, MD at [facility] [address] [place] [address]    HYSTERECTOMY      PTA (ANGIOPLASTY, PERCUTANEOUS, TRANSLUMINAL) N/A 6/20/2019    [facility]    PTA (ANGIOPLASTY, PERCUTANEOUS, TRANSLUMINAL) N/A 1/17/2019    Performed by YAMEL Perry III, MD at [facility] OR [address] [place] [address]    PTA (ANGIOPLASTY, PERCUTANEOUS, TRANSLUMINAL) N/A 9/20/2018    Performed by YAMEL Perry III, MD at [facility] [place] [address] [place] [address]    STENT, FISTULA Left 6/20/2019    Performed by YAMEL Perry III, MD at [facility] OR [address] [place] [address]    TONSILLECTOMY         Time Tracking:     OT Date of Treatment: 07/16/19  OT Start Time: 1118  OT Stop Time: 1132  OT Total Time (min): 14 min    Billable Minutes:Evaluation 14 min    Ayleen Spencer OT  7/16/2019

## 2019-07-16 NOTE — HOSPITAL COURSE
7-16 Less SOB. Denies CP. Peak troponin 2.6    Echo 7/15/19  · Normal left ventricular systolic function. The estimated ejection fraction is 55%  · Moderate concentric left ventricular hypertrophy.  · Grade II (moderate) left ventricular diastolic dysfunction consistent with pseudonormalization.  · Elevated left atrial pressure.  · Normal right ventricular systolic function.  · Mild-moderate left atrial enlargement.  · Mild right atrial enlargement.  · Mild mitral regurgitation.  · Mild tricuspid regurgitation.  · Intermediate central venous pressure (8 mm Hg).  · The estimated PA systolic pressure is 63 mm Hg  · Pulmonary hypertension present.

## 2019-07-16 NOTE — PROGRESS NOTES
TN sent home health referral to [facility], will start care on Thursday. [facility] MWF.  Sent referral through [facility]..Christen Albert RN, BSN, STN [facility]  7/16/2019

## 2019-07-16 NOTE — PLAN OF CARE
07/16/19 1139   Discharge Assessment   Assessment Type Discharge Planning Assessment   Confirmed/corrected address and phone number on facesheet? Yes   Assessment information obtained from? Patient;Caregiver   Communicated expected length of stay with patient/caregiver no   Prior to hospitilization cognitive status: Alert/Oriented   Prior to hospitalization functional status: Independent;Assistive Equipment   Current cognitive status: Alert/Oriented   Current Functional Status: Assistive Equipment;Independent   Lives With child(chava), adult   Able to Return to Prior Arrangements yes   Is patient able to care for self after discharge? Yes   Who are your caregiver(s) and their phone number(s)?   (daughter, Aan Cristina [phone])   Patient's perception of discharge disposition admitted as an inpatient   Readmission Within the Last 30 Days unable to assess   Patient currently being followed by outpatient case management? No   Patient currently receives any other outside agency services? Yes   How many hours a day does the patient receive services? 3   Name and contact number of agency or person providing outside services   ([facility] )   Is it the patient/care giver preference to resume care with the current outside agency? Yes   Equipment Currently Used at Home walker, rolling;wheelchair;shower chair   Do you have any problems affording any of your prescribed medications? No   Is the patient taking medications as prescribed? yes   Does the patient have transportation home? Yes   Transportation Anticipated family or friend will provide   Does the patient receive services at the [facility]? No   Discharge Plan A Home with family;Home Health   Discharge Plan B Home with family   DME Needed Upon Discharge  none   Patient/Family in Agreement with Plan yes     [id number] [place] - [address]  [address]  [place] [address]  Phone: [phone] Fax: [phone]    [facility] - [place], [place] - [address]  [address]  [facility] [address]  Phone: [phone] Fax: [phone]

## 2019-07-16 NOTE — PLAN OF CARE
"   07/15/19 1146   Readmission Questionnaire   At the time of your discharge, did someone talk to you about what your health problems were? Yes   At the time of discharge, did someone talk to you about what to watch out for regarding worsening of your health problem? Yes   At the time of discharge, did someone talk to you about what to do if you experienced worsening of your health problem? Yes   At the time of discharge, did someone talk to you about which medication to take when you left the hospital and which ones to stop taking? Yes   At the time of discharge, did someone talk to you about when and where to follow up with a doctor after you left the hospital? Yes   What do you believe caused you to be sick enough to be re-admitted? fluid over load   How often do you need to have someone help you when you read instructions, pamphlets, or other written material from your doctor or pharmacy? Rarely   Do you have problems taking your medications as prescribed? No   Do you have any problems affording any of  your prescribed medications? No   Do you have problems obtaining/receiving your medications? No   Does the patient have transportation to healthcare appointments? Yes   Lives With child(chava), adult   Living Arrangements house   Does the patient have family/friends to help with healtcare needs after discharge? yes   Does your caregiver provide all the help you need? Yes   Are you currently feeling confused? No   Are you currently having problems thinking? No   Are you currently having memory problems? No   Have you felt down, depressed, or hopeless? 0   Have you felt little interest or pleasure in doing things? 0   In the last 7 days, my sleep quality was: fair   TN introduced herself and explained the 's role. TN utilized 2 patient identifiers: Name & .  TN placed Name and spectralink number on whiteboard.  TN provided and reviewed with patient "Blue My Health Packet". TN discussed what Help At " Home means to the patient and the name of the person AnaC ristina, who helps at home. TN discussed with patient the things the patient is responsible for to HELP manage patient's  healthcare at home. TN taught Symptoms and Problems with patient for CHF .Patient verbalized understanding & teachback:1.SOB, 2.Fuid overload . Patient prefers morning or after noon doctor appointments.  Christen Albert RN, BSN, [facility]  7/15/2019

## 2019-07-16 NOTE — ASSESSMENT & PLAN NOTE
Mildly elevated troponin, in the absence of anginal sounding chest pain.  It is important to note that her troponin was as high as 1.7 on 06/25/2019.  Patient states that she will not be able to lay for any kind of ischemic testing because of musculoskeletal pain.  Echo with good EF. Continues to request medical Rx for CAD. Will f/u prn

## 2019-07-16 NOTE — PROGRESS NOTES
TN informed telemetry nurse Nithin that pt is cleared from cm viewpoint for discharged..Christen Albert RN, BSN, STN [facility]  7/16/2019

## 2019-07-16 NOTE — PROGRESS NOTES
[facility] WRITTEN HEALTHCARE AND DISCHARGE INFORMATION:.  Follow-up Information     Cesia Rosales MD In 3 days.    Specialty:  Internal Medicine  Why:  OFFICE WILL CALL PATIENT WITH FOLLOW UP APPT. PCP will refer to pt's cardiologist as well  Contact information:  [address]  [place] [address]  [phone]             [facility] [place] On 7/17/2019.    Specialty:  Home Health Services  Why:  Home Health  Contact information:  [address]   [place] [address]  [phone]             [facility] On 7/17/2019.    Specialty:  Dialysis Center  Why:  out patient services  Contact information:  [address] [place] [id number] [phone]                   [facility] at Home           [phone]  After discharge for assistance [facility] On Call Nurse Care Line 24/7  Assistance   Things You are responsible For To Manage Your Care At Home:  1.    Getting your prescriptions filled   2.    Taking your medications as directed, DO NOT MISS ANY DOSES!  3.    Going to your follow-up doctor appointment. This is important because it  allow the doctor to monitor your progress and determine if  any changes need to made to your treatment plan.   Thank you for choosing [facility] for your care.  Please answer any calls you may receive from [facility] we want to continue to support you as you manage your healthcare needs. [facility] is happy to have the opportunity to serve you.    Sincerely,  Your [facility] Team,  Christen Albert RN, BSN, [facility]  [phone]

## 2019-07-16 NOTE — PLAN OF CARE
Problem: Occupational Therapy Goal  Goal: Occupational Therapy Goal  Outcome: Outcome(s) achieved Date Met: 07/16/19  Initial OT eval complete. Pt is SUP with ADLs and SBA with functional transfers. Pt will benefit from HHOT to regain PLOF. Pt OK to complete ADL routine/ambulate with staff. OT to sign off.

## 2019-07-17 PROBLEM — E87.70 HYPERVOLEMIA: Status: RESOLVED | Noted: 2019-07-15 | Resolved: 2019-07-17

## 2019-07-17 LAB — BACTERIA UR CULT: ABNORMAL

## 2019-07-17 NOTE — ASSESSMENT & PLAN NOTE
Denied any active chest pain, however she was having dyspnea. Cardiology consulted. Trended troponin. Patient was unable to lay flat for LHC in the past. Cards following. TTE w/ EF 55%, G2DD, PAP 63. No noted wall motion abnormality. Cards followed during admission. She needs to follow up with outpatient cardiology.

## 2019-07-17 NOTE — ASSESSMENT & PLAN NOTE
Resumed home insulin.  Accuchecks and SSI  Hypoglycemia protocol  Appears to be optimal at this time.

## 2019-07-17 NOTE — ASSESSMENT & PLAN NOTE
UA concerning for UTI. Leukocytosis noted. Afebrile. Patient had intermittent dysuria. She was give oral Levaquin for renally dosed for 3 days (2 doses, 750 1st day and 500 secondary day both post dialysis). UCx with pansensitive E coli.

## 2019-07-17 NOTE — ASSESSMENT & PLAN NOTE
Presented with progressively worsening dyspnea, orthopnea found to have elevated BNP and imaging noting vascular congestion.  Telemetry monitor during admission  She does make urine and was given lasix 80 IV lasix, she takes 80 po bid at home  Nephrology consulted for HD  Optimize HD  Fluid restricted, renal diet counseled  She reports recent weight loss and thinks her dry weight has probably changed. Follow up with her outpatient nephrologist.

## 2019-07-17 NOTE — DISCHARGE SUMMARY
[facility] Hospital Medicine  Discharge Summary      Patient Name: Zoey Ham  MRN: [id number]  Admission Date: 7/14/2019  Hospital Length of Stay: 1 days  Discharge Date and Time: 7/16/2019  2:25 PM  Attending Physician: No att. providers found   Discharging Provider: Katty Green MD  Primary Care Provider: Cesia Rosales MD      HPI:   72 yo with hx of CAD s/p multiple PCIs, HLD, HTN, DM type 2, ESRD, PAF, Osteopenia, presenting with progressively worsening dyspnea for the past several days with associated non productive cough, nausea, orthopnea, and palpitations. Nothing at home seemed to help her prompted ED visit. Patient denies any fevers, chills, syncope, diaphoresis, chest pain, PND, abdominal pain, emesis, flank pain, diarrhea, LE swelling, leg pain or bleeds. Recently discharged from rehab for falls and rib fractures. Reports compliance to HD and medication although she does not check her weight or BP daily. Stated that during her last dialysis, she didn't have any fluids taken out.    In the ED patient was afebrile, hypertensive, in respiratory distress, saturating 89% on RA, placed on O2. Labs significant for K 5.8, BUN 42, Cr 3.2, , wbc 17.15, Hgb 8.6, plt 455, DD 1.44, Trop 0.477, BNP 3209. UA with concerns of UTI. EKG unchanged from piror and non ischemia. CXR consistent with pulmonary edema. CTA done due to elevated DD without PE, but noting vascular congestion. Nephrology and cardiology consulted. Patient admitted to hospital medicine for fluid overload needing dialysis.    Consults:   Consults (From admission, onward)        Status Ordering Provider     Inpatient consult to Cardiology  Once     Provider:  (Not yet assigned)    Completed SPENCER GUZMAN          * Acute on chronic diastolic heart failure  Presented with progressively worsening dyspnea, orthopnea found to have elevated BNP and imaging noting vascular congestion.  Telemetry monitor during  admission  She does make urine and was given lasix 80 IV lasix, she takes 80 po bid at home  Nephrology consulted for HD  Optimize HD  Fluid restricted, renal diet counseled  She reports recent weight loss and thinks her dry weight has probably changed. Follow up with her outpatient nephrologist.    ESRD (end stage renal disease) on dialysis  HD as per nephro    Troponin level elevated  Denied any active chest pain, however she was having dyspnea. Cardiology consulted. Trended troponin. Patient was unable to lay flat for LHC in the past. Cards following. TTE w/ EF 55%, G2DD, PAP 63. No noted wall motion abnormality. Cards followed during admission. She needs to follow up with outpatient cardiology.     Coronary artery disease involving coronary bypass graft of native heart with unstable angina pectoris  Continue ASA, Plavix, BB, Statin    Chronic atrial fibrillation  This diagnosis is as per chart review. Was in NRS during admission. Continue rate control with BB. On DAPT but not OAC. High CHADsVACs score. Will defer to her PCP who is monitoring this issue.    Hyperparathyroidism  Noted. Secondary due to ESRD. As per nephrology    Type 2 diabetes mellitus with chronic kidney disease on chronic dialysis, with long-term current use of insulin  Resumed home insulin.  Accuchecks and SSI  Hypoglycemia protocol  Appears to be optimal at this time.    UTI (urinary tract infection)  UA concerning for UTI. Leukocytosis noted. Afebrile. Patient had intermittent dysuria. She was give oral Levaquin for renally dosed for 3 days (2 doses, 750 1st day and 500 secondary day both post dialysis). UCx with pansensitive E coli.    Leukocytosis  As above. Reactive from acute stress. Resolved.    Hypertension associated with diabetes  Resumed home meds. On BB and lasix at home.  She needs to check her BP daily at home and follow up with her PCP to ensure control.    Hypervolemia-resolved as of 7/17/2019  As above.      Final Active  Diagnoses:    Diagnosis Date Noted POA    PRINCIPAL PROBLEM:  Acute on chronic diastolic heart failure [I50.33] 04/04/2017 Yes    ESRD (end stage renal disease) on dialysis [N18.6, Z99.2]  Not Applicable    Troponin level elevated [R74.8] 02/09/2017 Yes    Coronary artery disease involving coronary bypass graft of native heart with unstable angina pectoris [I25.700] 01/18/2017 Yes    Hyperparathyroidism [E21.3] 12/06/2016 Yes    Type 2 diabetes mellitus with chronic kidney disease on chronic dialysis, with long-term current use of insulin [E11.22, N18.6, Z99.2, Z79.4] 12/06/2016 Not Applicable    Chronic atrial fibrillation [I48.2] 12/06/2016 Yes    UTI (urinary tract infection) [N39.0] 01/24/2015 Yes    Leukocytosis [D72.829] 04/16/2014 Yes    Hypertension associated with diabetes [E11.59, I10] 07/03/2012 Yes      Problems Resolved During this Admission:    Diagnosis Date Noted Date Resolved POA    Hypervolemia [E87.70] 07/15/2019 07/17/2019 Yes       Discharged Condition: stable    Disposition: Home or Self Care    Follow Up:  Follow-up Information     Cesia Rosales MD In 3 days.    Specialty:  Internal Medicine  Why:  OFFICE WILL CALL PATIENT WITH FOLLOW UP APPT. PCP will refer to pt's cardiologist as well  Contact information:  [address]  [facility] [address]  [phone]             [facility] On 7/18/2019.    Specialty:  Home Health Services  Why:  Home Health.  Contact information:  [address] [facility] [address]  [phone]             [facility] On 7/17/2019.    Specialty:  Dialysis Center  Why:  out patient services  Contact information:  [address] [place] [id number] [phone]                 Patient Instructions:      Diet Cardiac     Notify your health care provider if you experience any of the following:  increased confusion or weakness     Notify your health care provider if you experience any of the following:  persistent  dizziness, light-headedness, or visual disturbances     Notify your health care provider if you experience any of the following:  worsening rash     Notify your health care provider if you experience any of the following:  severe persistent headache     Notify your health care provider if you experience any of the following:  difficulty breathing or increased cough     Notify your health care provider if you experience any of the following:  redness, tenderness, or signs of infection (pain, swelling, redness, odor or green/yellow discharge around incision site)     Notify your health care provider if you experience any of the following:  severe uncontrolled pain     Notify your health care provider if you experience any of the following:  persistent nausea and vomiting or diarrhea     Notify your health care provider if you experience any of the following:  temperature >100.4     Activity as tolerated        Medications:  Reconciled Home Medications:      Medication List      START taking these medications    levoFLOXacin 500 MG tablet  Commonly known as:  LEVAQUIN  Take 1 tablet (500 mg total) by mouth every other day. for 4 days        CHANGE how you take these medications    aspirin 325 MG EC tablet  Commonly known as:  ECOTRIN  Take 1 tablet (325 mg total) by mouth once daily.  What changed:  when to take this     atorvastatin 80 MG tablet  Commonly known as:  LIPITOR  Take 1 tablet (80 mg total) by mouth every morning.  What changed:  how much to take        CONTINUE taking these medications    calcitRIOL 0.5 MCG Cap  Commonly known as:  ROCALTROL  Take 1 capsule (0.5 mcg total) by mouth once daily.     clopidogrel 75 mg tablet  Commonly known as:  PLAVIX  Take 1 tablet (75 mg total) by mouth once daily.     furosemide 80 MG tablet  Commonly known as:  LASIX  One 80 mg  tab once a day.     * glucose 4 GM chewable tablet  Take 4 tablets (16 g total) by mouth as needed.     * glucose 4 GM chewable tablet  Take 6  tablets (24 g total) by mouth as needed.     insulin aspart U-100 100 unit/mL (3 mL) Inpn pen  Commonly known as:  NovoLOG  Inject 1-10 Units into the skin before meals and at bedtime as needed (Hyperglycemia).     insulin detemir U-100 100 unit/mL (3 mL) Inpn pen  Commonly known as:  LEVEMIR FLEXTOUCH  Inject 15 Units into the skin once daily.     LANTUS SUBQ  Inject into the skin.     metoprolol tartrate 100 MG tablet  Commonly known as:  LOPRESSOR  Take 1 tablet (100 mg total) by mouth 2 (two) times daily.     polyethylene glycol 17 gram Pwpk  Commonly known as:  GLYCOLAX  Take 17 g by mouth 2 (two) times daily as needed.     traMADol 50 mg tablet  Commonly known as:  ULTRAM  Take 1 tablet (50 mg total) by mouth every 8 (eight) hours as needed.     vitamin renal formula (B-complex-vitamin c-folic acid) 1 mg Cap  Commonly known as:  NEPHROCAPS  Take 1 capsule by mouth once daily.         * This list has 2 medication(s) that are the same as other medications prescribed for you. Read the directions carefully, and ask your doctor or other care provider to review them with you.            STOP taking these medications    acetaminophen 325 MG tablet  Commonly known as:  TYLENOL     heparin (porcine) 5,000 unit/mL injection            Indwelling Lines/Drains at time of discharge:   Lines/Drains/Airways     Drain                 Hemodialysis AV Fistula Left upper arm -- days         Hemodialysis AV Fistula Left upper arm -- days         Hemodialysis AV Fistula 06/20/19 0908 Left upper arm 27 days                Time spent on the discharge of patient: 35 minutes  Patient was seen and examined on the date of discharge and determined to be suitable for discharge.         Katty Green MD  Department of Hospital Medicine  [facility]

## 2019-07-17 NOTE — ASSESSMENT & PLAN NOTE
Resumed home meds. On BB and lasix at home.  She needs to check her BP daily at home and follow up with her PCP to ensure control.

## 2019-07-17 NOTE — ASSESSMENT & PLAN NOTE
This diagnosis is as per chart review. Was in NRS during admission. Continue rate control with BB. On DAPT but not OAC. High CHADsVACs score. Will defer to her PCP who is monitoring this issue.

## 2019-07-18 ENCOUNTER — PATIENT OUTREACH (OUTPATIENT)
Dept: ADMINISTRATIVE | Facility: CLINIC | Age: 74
End: 2019-07-18

## 2019-07-18 PROCEDURE — G0180 PR HOME HEALTH MD CERTIFICATION: ICD-10-PCS | Mod: ,,, | Performed by: INTERNAL MEDICINE

## 2019-07-18 PROCEDURE — G0180 MD CERTIFICATION HHA PATIENT: HCPCS | Mod: ,,, | Performed by: INTERNAL MEDICINE

## 2019-07-18 NOTE — PATIENT INSTRUCTIONS
"Discharge Instructions for Heart Failure  You have been diagnosed with heart failure. The term "heart failure" sounds scary as it suggests the heart has stopped working. But it actually means the heart is not doing its job as well as it should. Heart failure happens when your heart muscle cannot keep up with your body's need for blood flow. Symptoms of heart failure can be controlled by changes in your lifestyle and by following your doctor's advice.   Home Care  Activity   Ask your doctor about an exercise program. You can benefit from simple activities such as walking or gardening. Exercising most days of the week can make you feel better. Don't be discouraged if your progress is slow at first. Rest as needed and stop activity if you develop symptoms such as chest pain, lightheadedness, or significant shortness of breath. Your doctor may prescribe a cardiac rehabilitation program. This is a program to help recover from heart disease through professional lifestyle counseling and education and medically supervised physical activity.   Diet   Follow a heart healthy diet and work hard to remove salt from your diet. Try to limit total salt intake to 2000 mg a day or less. Salt causes your body to retain water, which can make it harder for your heart to pump. You can limit salt by doing the following:  Limit canned, dried, packaged, and fast foods.  Don't add salt to your food at the table.  Season foods with herbs instead of salt when you cook.  Monitor your fluid intake. Drinking too much fluid can make heart failure worse. It is commonly advised to limit total fluid intake to less than 66 ounces (2 liters) a day.  Limit alcohol. Too much alcohol can be harmful to the heart. Alcohol should be limited to no more than one serving a day for women and two servings a day for men.  Tobacco   Break the smoking habit. Smoking increases your chance of having a heart attack, which will worsen heart failure. Quitting smoking is " the number one thing you can do to improve your health. Enroll in a stop smoking program and ask your doctor about medications or nicotine replacement therapy. These methods improve your chances of success.  Medications   Take your medications exactly as prescribed. Learn the names and purpose of each of your medications. Keep an accurate medication list with you at all times including current dosages. Don't skip doses. If you miss a dose of your medication, take it as soon as you remember, unless it's almost time for your next dose. In that case, just wait and take your next dose at the normal time. Don't take a double dose. If you are unsure, contact your doctor or pharmacist.  Weight monitoring   Weigh yourself every day. Do this at the same time of day and in the same kind of clothes. Ideally, weigh yourself first thing every morning after you empty your bladder, but before you eat breakfast. Record your weight and take a record of it to each of your doctor's visit. If your weight increases by 3 pounds in one day or 5 pounds in one week, you should contact your doctor. This is a sign that you are retaining more fluid than you should be, which can worsen heart failure.  Symptoms   Heart failure can cause a variety of symptoms including the following:  Shortness of breath  Difficulty breathing at night  Swelling in the legs and feet or in the abdomen  Becoming easily fatigued  Irregular or rapid heartbeat  Weakness or lightheadedness  It is important to know what to do if you develop signs of worsening heart failure.  Follow-Up  Make a follow-up appointment as directed by our staff. They will provide specific instruction for timing of appointments. Depending on the type and severity of heart failure you have, you may require follow up as early as 7 days from hospital discharge. Keep appointments for checkups and lab tests that are needed to check your medications and condition.  .    When to Call Your Doctor  Call  your doctor right away if you have any of the following signs of worsening heart failure:  Sudden weight gain (3 or more pounds in one day or 5 or more pounds in one week)  Trouble breathing not related to being active  New or increased swelling of your legs or ankles  Swelling or pain in your abdomen  Breathing trouble at night (waking up short of breath, needing more pillows to breathe)  Frequent coughing that doesnt go away  Feeling much more tired than usual  When to Seek Emergency Medical Attention   Call 911 right away if you develop:  Severe shortness of breath, such that you cannot catch your breath, even resting  Severe chest pain that does not resolve with rest or nitroglycerin  Pink, foamy mucus with cough and shortness of breath  A continuous rapid or irregular heartbeat  Passing out or fainting  Acute stroke symptoms such as sudden numbness or weakness on one side of your face, arm, or leg, or sudden confusion, trouble speaking or vision changes.   © 0285-5265 [employer], [address] [place] [address], [place] [address]. All rights reserved. This information is not intended as a substitute for professional medical care. Always follow your healthcare professional's instructions.

## 2019-07-23 ENCOUNTER — TELEPHONE (OUTPATIENT)
Dept: ORTHOPEDICS | Facility: CLINIC | Age: 74
End: 2019-07-23

## 2019-07-23 ENCOUNTER — OFFICE VISIT (OUTPATIENT)
Dept: PRIMARY CARE CLINIC | Facility: CLINIC | Age: 74
End: 2019-07-23
Payer: MEDICARE

## 2019-07-23 VITALS
WEIGHT: 165.56 LBS | DIASTOLIC BLOOD PRESSURE: 70 MMHG | OXYGEN SATURATION: 98 % | HEART RATE: 73 BPM | SYSTOLIC BLOOD PRESSURE: 120 MMHG | HEIGHT: 64 IN | BODY MASS INDEX: 28.27 KG/M2

## 2019-07-23 DIAGNOSIS — E44.1 MILD PROTEIN-CALORIE MALNUTRITION: ICD-10-CM

## 2019-07-23 DIAGNOSIS — S32.501D: ICD-10-CM

## 2019-07-23 DIAGNOSIS — I25.700 CORONARY ARTERY DISEASE INVOLVING CORONARY BYPASS GRAFT OF NATIVE HEART WITH UNSTABLE ANGINA PECTORIS: ICD-10-CM

## 2019-07-23 DIAGNOSIS — N18.6 ESRD (END STAGE RENAL DISEASE) ON DIALYSIS: ICD-10-CM

## 2019-07-23 DIAGNOSIS — Z99.2 ESRD (END STAGE RENAL DISEASE) ON DIALYSIS: ICD-10-CM

## 2019-07-23 DIAGNOSIS — K21.00 GASTROESOPHAGEAL REFLUX DISEASE WITH ESOPHAGITIS: ICD-10-CM

## 2019-07-23 DIAGNOSIS — S22.42XD CLOSED FRACTURE OF MULTIPLE RIBS OF LEFT SIDE WITH ROUTINE HEALING, SUBSEQUENT ENCOUNTER: ICD-10-CM

## 2019-07-23 PROBLEM — T82.858A STENOSIS OF ARTERIOVENOUS DIALYSIS FISTULA: Status: RESOLVED | Noted: 2018-05-22 | Resolved: 2019-07-23

## 2019-07-23 PROCEDURE — 99499 UNLISTED E&M SERVICE: CPT | Mod: HCNC,S$GLB,, | Performed by: INTERNAL MEDICINE

## 2019-07-23 PROCEDURE — 99214 PR OFFICE/OUTPT VISIT, EST, LEVL IV, 30-39 MIN: ICD-10-PCS | Mod: HCNC,S$GLB,, | Performed by: INTERNAL MEDICINE

## 2019-07-23 PROCEDURE — 99499 RISK ADDL DX/OHS AUDIT: ICD-10-PCS | Mod: HCNC,S$GLB,, | Performed by: INTERNAL MEDICINE

## 2019-07-23 PROCEDURE — 99214 OFFICE O/P EST MOD 30 MIN: CPT | Mod: HCNC,S$GLB,, | Performed by: INTERNAL MEDICINE

## 2019-07-23 RX ORDER — FAMOTIDINE 20 MG/1
20 TABLET, FILM COATED ORAL 2 TIMES DAILY
Qty: 180 TABLET | Refills: 3 | Status: SHIPPED | OUTPATIENT
Start: 2019-07-23 | End: 2020-01-01 | Stop reason: SDUPTHER

## 2019-07-23 RX ORDER — NITROGLYCERIN 0.4 MG/1
0.4 TABLET SUBLINGUAL EVERY 5 MIN PRN
Qty: 25 TABLET | Refills: 3 | Status: SHIPPED | OUTPATIENT
Start: 2019-07-23 | End: 2020-01-13 | Stop reason: SDUPTHER

## 2019-07-23 NOTE — ASSESSMENT & PLAN NOTE
Evaluated as inpatient, 2 weeks of rehab, HH PT/OT  · Needs ortho follow-up  · Defer to ortho for follow-up XRAYs

## 2019-07-23 NOTE — PATIENT INSTRUCTIONS
TODAY:  - continue PT/OT  - Orthopedic appointment  - refill renal vitamin, we will call you when ready  - nitroglycerine and pepcid sent to pharmacy  - take pepcid twice daily  - you no longer need short acting insulin  - appt with Dr Kelley    NEXT:  - discuss statin  - schedule eye exam  - routine exercise program

## 2019-07-23 NOTE — ASSESSMENT & PLAN NOTE
Low albumin, low muscle mass, fatigue, drinks Nepro at dialysis  · Advised to drink more Nepro eat lean meats  · Start renal MVI

## 2019-07-23 NOTE — TELEPHONE ENCOUNTER
----- Message from [employer] sent at 7/23/2019  1:44 PM CDT -----  Contact: [facility]  Pt fell in June and fractured her pelvis. Would like to be seen by Ortho. The person that called     Did not know if she fell at home or in a public area.     [phone]

## 2019-07-23 NOTE — TELEPHONE ENCOUNTER
Ortho Telephone Triage Message [id number]  Reviewed with [facility]. Spoke with Vikki clement, and advised that pt was seen at [facility] by Dr. July Luque, [facility] for pelvic fracture. Advised that she/pt contact Dr. Luque's office regarding Ortho follow up appt. States understanding. [facility] contact number provided.

## 2019-07-23 NOTE — PROGRESS NOTES
Primary Care Provider Appointment    Subjective:      Patient ID: Zoey Ham is a 73 y.o. female with ESRD, rib fractures, DM, HLD, HTN    Chief Complaint: Follow-up; Chest Pain (left side); and Fall (fall in June )    Patient with recent fall that resulted in fractured ribs and pelvis. She had no surgery, not wearing a brace now. She participated in rehab at [facility] for 2 weeks. Then was readmitted 2 days later for CHF exacerbation, then discharged after 2 days. She is doing HH PT/OT on the .    Patient and family want renal MVI. She is no longer receiving dialysis at [facility], but will return there in a few weeks. She wants to know if she should see Dr Iglesias as an outpatient.    She last lived with family members for the past 8 mos. Does not appear to be able to care for herself at this time, but her goal is to live independently. Is willing to participate in outpatient PT.     She had one event of glucose of 297 while in the hospital due to eating yogurt. She was given sliding scale insulin     Past Surgical History:   Procedure Laterality Date    APPENDECTOMY      CARDIAC SURGERY  2002    CABG    CHOLECYSTECTOMY      CHOLECYSTECTOMY-LAPAROSCOPIC N/A 2/4/2015    Performed by Quinton Ashley MD at [facility] OR [address] [place] [address]    [id number]  - Left brachiocephalic Left 4/16/2018    Performed by YAMEL Perry III, MD at [facility] OR [address] [place] [address]    CORONARY ANGIOPLASTY  2004    CORONARY ARTERY BYPASS GRAFT      EYE SURGERY      cataracts bilaterally    Fistulogram Left 1/17/2019    [facility]    Fistulogram Left 9/20/2018    [facility]    Fistulogram left arm Left 6/20/2019    Performed by YAMEL Perry III, MD at [facility] OR [address] [place] [address]    HYSTERECTOMY      PTA (ANGIOPLASTY, PERCUTANEOUS, TRANSLUMINAL) N/A 6/20/2019    Performed by YAMEL Perry III, MD at [facility] OR [address] [place] [address]    PTA (ANGIOPLASTY, PERCUTANEOUS,  TRANSLUMINAL) N/A 1/17/2019    Performed by YAMEL Perry III, MD at [facility] OR 2ND FLR    PTA (ANGIOPLASTY, PERCUTANEOUS, TRANSLUMINAL) N/A 9/20/2018    Performed by YAMEL Perry III, MD at [facility]    STENT, FISTULA Left 6/20/2019    Performed by YAMEL Perry III, MD at [facility] OR [facility]ND [facility]    TONSILLECTOMY         Past Medical History:   Diagnosis Date    Acid reflux     Acute myocardial infarction of anterolateral wall 7/9/2015    Anemia of chronic renal failure, stage 4 (severe) 1/22/2015    Ankle fracture     right    Anticoagulant long-term use     ASA and plavix    Atherosclerosis of aorta 10/3/2012    AV shunt malfunction     Benign hypertension with CKD (chronic kidney disease) stage IV 3/11/2016    Hypertension associated with Diabetes    Carpal tunnel syndrome, right     Chronic diastolic congestive heart failure 10/3/2012    Combined systolic and diastolic CHF    Chronic kidney disease, stage IV (severe) 7/3/2012    Coronary artery disease     s/p 1v CABG 2002 and multiple PCI (last angiogram in 6/2012 with patent LIMA->LAD and patent LCx and RCA stents)    Diabetic polyneuropathy associated with type 2 diabetes mellitus 7/9/2015    Diabetic polyneuropathy associated with type 2 diabetes mellitus 7/9/2015    Dialysis patient     Encounter for blood transfusion     Gastritis without bleeding     Heart attack 09/2012    5-6 events    Hyperlipidemia     Hyperparathyroidism     Hyperphosphatemia     Metabolic acidosis     Nephritis and nephropathy, 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 7/9/2015    NSTEMI (non-ST elevated myocardial infarction)     NSTEMI (non-ST elevated myocardial infarction)     NSTEMI (non-ST elevated myocardial infarction)     Occlusion and stenosis of carotid artery with cerebral infarction 7/9/2015    Osteopenia     PAF (paroxysmal atrial fibrillation) 10/3/2012    Pneumonia     Proliferative diabetic retinopathy 10/3/2012    S/P drug eluting  coronary stent placement     Sepsis due to Escherichia coli with acute renal failure 2/2016    UTI     TACO (transfusion associated circulatory overload)     Type II diabetes mellitus with neurological manifestations 7/9/2015    Type II diabetes mellitus with renal manifestations 7/3/2012       Social History     Socioeconomic History    Marital status: Single     Spouse name: Not on file    Number of children: Not on file    Years of education: Not on file    Highest education level: Not on file   Occupational History    Occupation: Homemaker   Social Needs    Financial resource strain: Not on file    Food insecurity:     Worry: Not on file     Inability: Not on file    Transportation needs:     Medical: Not on file     Non-medical: Not on file   Tobacco Use    Smoking status: Never Smoker    Smokeless tobacco: Never Used   Substance and Sexual Activity    Alcohol use: No    Drug use: No    Sexual activity: Never   Lifestyle    Physical activity:     Days per week: Not on file     Minutes per session: Not on file    Stress: Not on file   Relationships    Social connections:     Talks on phone: Not on file     Gets together: Not on file     Attends Church service: Not on file     Active member of club or organization: Not on file     Attends meetings of clubs or organizations: Not on file     Relationship status: Not on file   Other Topics Concern    Are you pregnant or think you may be? No    Breast-feeding No   Social History Narrative    2 biological kids, 1 adopted9 grandkids (1 grandaughter lives with her while she 's attending pharmacy school at [employer])3 great[place]kids[place]       Review of Systems   Constitutional: Positive for fatigue. Negative for activity change, appetite change and unexpected weight change.   Respiratory: Negative for cough and choking.    Cardiovascular: Negative for leg swelling.   Gastrointestinal: Negative  "for abdominal pain, diarrhea and nausea.   Musculoskeletal: Negative for back pain, gait problem and myalgias.   Neurological: Negative for dizziness, tremors, syncope, facial asymmetry, weakness, numbness and headaches.   Hematological: Bruises/bleeds easily.   Psychiatric/Behavioral: Negative for agitation, behavioral problems, confusion and decreased concentration.       Objective:   /70   Pulse 73   Ht 5' 4" (1.626 m)   Wt 75.1 kg (165 lb 9.1 oz)   LMP  (LMP Unknown)   SpO2 98%   BMI 28.42 kg/m²     Physical Exam   Constitutional: She is oriented to person, place, and time. She appears well-developed and well-nourished.   HENT:   Head: Normocephalic and atraumatic.   Neck: Normal range of motion.   Cardiovascular:   AV graft on L forearm (thrill palpated, bruit ausculated)   Abdominal: Soft. Bowel sounds are normal. There is no tenderness. There is no guarding.   Neurological: She is alert and oriented to person, place, and time.   Skin:   Ecchymoses on UE bilaterally   Psychiatric: She has a normal mood and affect. Her behavior is normal. Thought content normal.   Flat affect   Vitals reviewed.      Lab Results   Component Value Date    WBC 9.82 07/16/2019    HGB 8.3 (L) 07/16/2019    HCT 27.3 (L) 07/16/2019     07/16/2019    CHOL 127 06/05/2018    TRIG 163 (H) 06/05/2018    HDL 28 (L) 06/05/2018    ALT 14 07/16/2019    AST 23 07/16/2019     07/16/2019    K 4.0 07/16/2019    CL 98 07/16/2019    CREATININE 2.6 (H) 07/16/2019    BUN 23 07/16/2019    CO2 27 07/16/2019    TSH 2.801 06/09/2018    INR 0.9 06/23/2019    GLUF 132 (H) 05/03/2012    HGBA1C 8.7 (H) 06/23/2019       RESULTS: Reviewed labs and images today    Assessment:   73 y.o. female with multiple co-morbid illnesses here to continue work-up of chronic issues notably with ESRD, rib fractures, DM, HLD, HTN     Plan:     Problem List Items Addressed This Visit        Cardiac/Vascular    Coronary artery disease involving coronary " bypass graft of native heart with unstable angina pectoris     6/4/18 BETHANY to OM1. Cath 2/2017, LHC and PCI on Lcx and OM1 with improvement. H/o CABG X2 2002, stent RCA, OM1 2003, NSTEMI with total occlusion of LAD in 2012.   · Continue APT   · Did not participate in cardiac rehab  · Continue BP control, high-dose statin         Relevant Medications    nitroGLYCERIN (NITROSTAT) 0.4 MG SL tablet    Other Relevant Orders    Ambulatory consult to Cardiology       Renal/    ESRD (end stage renal disease) on dialysis     Hemodialysis started in 8/2018, tolerating with improvement in QoL  · Continue per Nephro  · Advised to address advanced care planning documentation  · Continue renvala once daily         Relevant Medications    B complex-vitamin C-folic acid (RENAL-EWA) 0.8 mg Tab       Endocrine    Mild protein-calorie malnutrition     Low albumin, low muscle mass, fatigue, drinks Nepro at dialysis  · Advised to drink more Nepro eat lean meats  · Start renal MVI         Relevant Medications    multivitamin with minerals tablet    B complex-vitamin C-folic acid (RENAL-WEA) 0.8 mg Tab       GI    Gastroesophageal reflux disease with esophagitis     Uncontrolled reflux  · Increase pepcid to BID         Relevant Medications    famotidine (PEPCID) 20 MG tablet       Orthopedic    Fracture of multiple ribs of left side     Evaluated as inpatient, 2 weeks of rehab,  PT/OT  · Needs ortho follow-up  · Defer to ortho for follow-up XRAYs         Relevant Medications    multivitamin with minerals tablet    Other Relevant Orders    Ambulatory consult to Orthopedics    Ambulatory Referral to Outpatient Case Management    Displaced fracture of pubis     Seen by Dr NAHEED Luque during admit, needs XR  · Refer to Ortho         Relevant Medications    multivitamin with minerals tablet    Other Relevant Orders    Ambulatory consult to Orthopedics    Ambulatory Referral to Outpatient Case Management          Health Maintenance       Date  Due Completion Date    Fecal Occult Blood Test (FOBT)/[employer] 1945 ---    Shingles Vaccine (2 of 3) 01/06/2011 11/11/2010    Mammogram 05/14/2019 5/14/2018- PATIENT TO POSTPONE    Lipid Panel 06/05/2019 6/5/2018- NOT NEEDED    Eye Exam 07/09/2019 7/9/2018- PATIENT WILL CALL    Colonoscopy 01/14/2020 (Originally 8/21/1963) ---    Influenza Vaccine 08/01/2019 10/1/2018    Override on 8/30/2018: Done (done at dialysis)    Override on 8/29/2016: Done    Override on 10/27/2013: Done    Foot Exam 05/14/2020 5/14/2019 (Done)    Override on 5/14/2019: Done    Override on 12/6/2016: Done    DEXA SCAN 07/13/2020 7/13/2017    TETANUS VACCINE 03/03/2026 3/3/2016          Follow up in about 6 weeks (around 9/3/2019). Total face-to-face time was 60 min, 50% of this was spent on counseling and coordination of care. The following issues were discussed: with ESRD, rib fractures, DM, HLD, HTN    Cesia Rosales MD/MPH  Internal Medicine  [facility]  [phone]

## 2019-07-23 NOTE — Clinical Note
Dr Iglesias,Mrs Ham is getting dfialyzed in [place] now while living with her daughter in order to have a safe living environment. She will return to the [facility] dialysis center in a few weeks. Does she need to see you in clinic, or can she wait until her dialysis restarts with you?Aly

## 2019-07-25 ENCOUNTER — OUTPATIENT CASE MANAGEMENT (OUTPATIENT)
Dept: ADMINISTRATIVE | Facility: OTHER | Age: 74
End: 2019-07-25

## 2019-07-25 NOTE — PATIENT INSTRUCTIONS
Managing Your Glucose Level for Diabetes and Kidney Disease    Diabetes makes your body less able to use the foods you eat as sources of energy. As a result, food sugar (glucose) builds up in the blood. Over time, having too much glucose in your blood can damage your blood vessels and kidneys. The amount of food you eat each day should match the amount of energy your body needs. The best way to keep your blood sugar at a healthy level is through diet, exercise, and medicines. Follow a strict diet for diabetes, exercise regularly, and take medicines as directed. By managing diabetes, you can maintain a healthy blood sugar (blood glucose) level and slow or prevent kidney damage. Test your blood glucose level as often as directed. Talk with your healthcare provider if your glucose level is often under 80 or above 200.  Test your glucose  · Wash your hands with soap and warm water. Dry them thoroughly.  · Follow directions for placing a test strip in the meter.  · Prick the side of your finger with a lancet. Squeeze your finger gently until you get enough blood. If you are unable to get enough blood, hold your hand down at your side and gently shake it.  · Place a drop of blood on the test trip according to your meters instructions.  · Read and record your results.  Too little glucose  If your glucose is too low, you may get a headache or feel hungry, weak, shaky, dizzy, sweaty, or nervous. Check your glucose level. If it is too low, do the following:  · Eat a fast-acting sugar, such as 6 hard candies, 1/2 cup juice, or 3 to 4 glucose tablets.  · If youre not feeling better in 15 minutes, test your glucose again.  · If your glucose is still too low, eat another dose of fast-acting sugar. If you arent better after that, seek medical help.  · Once your blood sugar is back in the normal range, have a healthy snack   Too much glucose  If your glucose is too high, you may feel thirsty, weak, dizzy, or nauseated. You may  also have blurry vision or need to urinate often. Check your glucose level. If it is too high, do the following:  · Drink a sugar-free liquid, such as water or diet soda.  · Take extra insulin or medicine if your healthcare provider has told you to do so.  · Call your healthcare provider if you are not feeling better within an hour.  Date Last Reviewed: 7/1/2016  © 3254-6338 [employer]. [address] [place] [address], [place] [address]. All rights reserved. This information is not intended as a substitute for professional medical care. Always follow your healthcare professional's instructions.        Kidney Disease: Eating a Safe Amount of Potassium  Potassium is a mineral found in many foods. The body needs some potassium to keep the heart working normally. But if your kidneys dont work well, potassium can build up in your blood. It can be serious and even deadly if the levels go up too high. By controlling the amount of potassium you eat, you can keep a safe level in your blood.  Using this guide  Use this serving guide along with the food list below. Always follow your dietitians instructions on the number and size of servings to eat. You can lawson some of the potassium out of some high potassium foods by soaking and cooking them in large amounts of water. Ask your dietitian about how to do this. Also, talk with your dietitian before eating foods that arent on this list.  · ___ daily servings of foods that have high potassium content (250 mg to 500 mg per serving).  · ___ daily servings of foods that have medium potassium content (150 mg to 250 mg per serving).  · ___ daily servings of foods that have low potassium content (5 mg to 150 mg per serving).  · You can substitute food choices in the following way:  Potassium content of some foods      Vegetable Fruit Starches   High   Artichokes (1)  Bok rachel (½ cup)  Spinach (½ cup)  Tomatoes (½ cup) Bananas (1)  Cantaloupe or honeydew  (½ melon)  Oranges  (1)  Peaches, fresh (1) Beans, dried (½ cup)  Lentils (½ cup)  Potatoes (½ cup  or 1 small)  Winter squash,  yams (½ cup)   Medium   Broccoli (½ cup)  Carrots (½ cup)  Eggplant (½ cup)  Peppers (1) Apples (1)  Cherries (½ cup)  Peaches, canned (½ cup)  Pears, fresh (½ cup) Bread, pumpernickel  (1 slice)  Chickpeas, cooked (½ cup)  Corn, fresh (½ cup)  Tortillas, corn (4 small)   Low   Asparagus (4 sams)  Green beans (½ cup)  Cauliflower (½ cup)  Cucumbers (½)    Blueberries (1 cup)  Grapefruit (½ cup)  Grapes (½ cup)  Strawberries (½ cup)  Watermelon (½ cup) Bagel, plain (1)  Bread, white (2 slices)  Oatmeal (¾ cup)  Pasta, plain (1 cup)  Rice, white (1 cup)   Date Last Reviewed: 1/1/2017  © 2539-5829 [employer]. [address] [place] [address]. All rights reserved. This information is not intended as a substitute for professional medical care. Always follow your healthcare professional's instructions.        Coping with Kidney Failure  Having kidney failure means many changes in your health and life. It may feel like too much to cope with at times, but you can learn how to deal with these emotions and feel better about your treatment and yourself. Learning as much as you can about kidney failure is a good place to start. Kidney failure is also called chronic kidney disease. It has 5 stages, from mild to severe, based on whether your kidneys are leaking protein and how well they are filtering your blood.      Talking to someone you feel close to may help when you're feeling down.   Understanding your emotions  Living with a medical condition like kidney failure can be very stressful. It is common at times to feel:  · Angry and frustrated over having to depend on others.  · Confused about all the instructions you've been given.  · Worried about things going wrong with your treatment.  · Upset with side effects of kidney failure or the treatment for it.   · Hopeless and depressed about your  future.  · Unhappy with your body. Don't keep these feelings to yourself. Talk with your healthcare team and your loved ones. They may know ways to help.  Accepting your body's changes  Kidney failure and its treatment cause changes in your body. These changes can affect the way you feel about your sexuality. Your desire for and feelings about sex may change. Be open with your partner about your feelings and talk with your healthcare providers. They can help you understand your body's changes.  Finding support  People sometimes find it hard to ask others for help. But it's also hard to face a chronic illness alone. When you need some help or just want to talk, turn to friends, family, and members of your healthcare team. Also consider joining a support group. In a support group, people meet to talk about common problems.  Ask your healthcare provider if there is a kidney failure support group nearby.  Resources  These organizations can give you more information on kidney failure. They may also guide you to local resources, such as support groups.  · American Association of Kidney Patients  549.709.2759  www.aakp.org  · American Kidney Fund  782-277-3204  www.akfinc.org  · National Kidney Foundation  320.312.5086  www.kidney.org  · National Kidney Disease Education Program 216-241-1837 www.nkdep.nih.gov       Date Last Reviewed: 1/1/2017  © 6665-6270 [employer]. [address] [place] [address]. All rights reserved. This information is not intended as a substitute for professional medical care. Always follow your healthcare professional's instructions.        Fall Prevention  Falls often occur due to slipping, tripping or losing your balance. Millions of people fall every year and injure themselves. Here are ways to reduce your risk of falling again.  · Think about your fall, was there anything that caused your fall that can be fixed, removed, or replaced?  · Make your home safe by keeping  walkways clear of objects you may trip over.  · Use non-slip pads under rugs. Do not use area rugs or small throw rugs.  · Use non-slip mats in bathtubs and showers.  · Install handrails and lights on staircases.  · Do not walk in poorly lit areas.  · Do not stand on chairs or wobbly ladders.  · Use caution when reaching overhead or looking upward. This position can cause a loss of balance.  · Be sure your shoes fit properly, have non-slip bottoms and are in good condition.   · Wear shoes both inside and out. Avoid going barefoot or wearing slippers.  · Be cautious when going up and down stairs, curbs, and when walking on uneven sidewalks.  · If your balance is poor, consider using a cane or walker.  · If your fall was related to alcohol use, stop or limit alcohol intake.   · If your fall was related to use of sleeping medicines, talk to your doctor about this. You may need to reduce your dosage at bedtime if you awaken during the night to go to the bathroom.    · To reduce the need for nighttime bathroom trips:  ¨ Avoid drinking fluids for several hours before going to bed  ¨ Empty your bladder before going to bed  ¨ Men can keep a urinal at the bedside  · Stay as active as you can. Balance, flexibility, strength, and endurance all come from exercise. They all play a role in preventing falls. Ask your healthcare provider which types of activity are right for you.  · Get your vision checked on a regular basis.  · If you have pets, know where they are before you stand up or walk so you don't trip over them.  · Use night lights.  Date Last Reviewed: 11/5/2015  © 6168-8003 [employer]. [address] [place] [address], [place] [address]. All rights reserved. This information is not intended as a substitute for professional medical care. Always follow your healthcare professional's instructions.        Preventing Falls: Are You At Risk of Falling?     Ask for help to reduce risk of falling in your home.     As you  "get older, you're not as steady on your feet as you once were. And you may have health problems you didn't have when you were younger. So, it's not surprising that older people are more likely to trip and fall. Falling can be very serious. It can change your overall health and quality of life. That's why it's important to be aware of your own risk of falling.  The dangers of falling  Falls are one of the main causes of injury in people over age 65. An older person who falls may take longer to get better than a younger person. And, after a fall, an older person is more likely to have problems that don't go away. So, preventing falls can help you avoid serious health problems.  Are you at risk of falling?  Answer these questions to rate your level of risk.  · Are you a woman?  · Have you fallen or stumbled in the last year?  · Are you over age 65?  · Are you ever dizzy or lightheaded with standing?  · Do you have a hard time getting in and out of the bathtub or on and off the toilet?  · Do you lean on objects to help you get around? Or do you use a cane or walker?  · Do you have vision or hearing problems? For example, do you need new glasses or hearing aids?  · Do you have 2 or more long-lasting (chronic) medical conditions?  · Do you take 3 or more medicines?  · Have you felt depressed recently?  · Have you had more trouble with your memory in recent months?  · Are there hazards in your home that might cause you to fall, such as loose rugs or poor lighting?  · Do you have a pet that jumps on you or might trip you?  · Have you stopped getting regular exercise?  · Do you have diabetes?   · Do you have a neurologic disease, such as Parkinson or Alzheimer disease?   · Do you drink alcohol?  · Do you wear athletic shoes or slippers, or go barefoot at home?  You can help prevent falls  If you answered "yes" to any of the above questions, you should take steps to reduce your risk of a fall. Monitoring health conditions and " keeping walkways in your home free of clutter are just 2 ways. Changing is sometimes easier said than done. But keep in mind that even small changes can make you less likely to fall.  The fear of falling  It's normal to be scared of falling, especially if you've fallen before. But being afraid can actually make you more likely to fall. This is because:  · Fear might cause you to become less active. Being less active can lead to a loss of strength and balance.  · Fear can lead to isolation from others, depression, or the use of more medicines or alcohol. And all these things make falling even more likely.  To break the cycle, learn more about ways to avoid falling. As you take control, you may find yourself feeling less afraid.   Date Last Reviewed: 6/12/2015  © 7028-4530 [employer]. [address] [place], [place] [id number]. All rights reserved. This information is not intended as a substitute for professional medical care. Always follow your healthcare professional's instructions.        Preventing Falls: How to Prepare and What to Do    Falling is not something you want to think about. But it can make a big difference to plan ahead. If you're prepared, you'll know how to get help. And you'll be less likely to panic if you fall. This means you'll be able to do what's needed to get help right away.  How to prepare  · Have someone check on you daily.  · Keep a list of emergency numbers near the phone.  · Always have a way to call for help. Keep a cell phone with you at all times. Or talk with your healthcare provider about how to set up a home monitoring service. This involves wearing a small device around your neck or wrist. If you fall, you can press the button on the device. This alerts emergency responders.  · Talk with your healthcare provider about an exercise program that's right for you. Regular exercise may reduce the risk of falling and the risk for injury related to a fall.  · It's important  to have good lighting in your home. Avoid using throw rugs, because they can raise your risk of tripping and falling. Add grab bars in the bathroom to help reduce the risk of falling. Small changes can make your home safer. Talk with your healthcare provider about making your home safer.  What to do if you fall  Above all, try to stay calm:  · If you start to fall, try to relax your body. This will reduce the impact of the fall.  · After you fall, press your monitor button, or phone for help.  · Don't rush to get up. First, make sure you're not hurt.  · Roll onto your side, then crawl to a chair. Pull yourself up onto the chair slowly.  · You should be checked if you struck your head, lost consciousness, were confused afterward, or have any other concerns for injury.  · Be sure to tell your doctor that you fell.  A note to family and friends  If you're with a loved one when he or she starts to fall, don't try to stop the fall. Ease the person to the floor carefully, so neither of you gets hurt. Don't leave the person alone. And don't try to move him or her. Put a pillow under his or her head. Check for injuries. If help is needed right away, be sure to call 911.   Date Last Reviewed: 6/13/2015  © 6912-0840 [employer]. [address] [place] [address], [place] [address]. All rights reserved. This information is not intended as a substitute for professional medical care. Always follow your healthcare professional's instructions.

## 2019-07-25 NOTE — PROGRESS NOTES
Summary: Pt reports that she gives permission to speak with her children. Pt's daughter, Ana Cristina, reports that the pt is currently  living with her on the [facility] at times and her brother on the [facility]. Reports that the patient can not live alone at this time. Reports that the patient had a fall and has fractured bones to the pelvis. Reports that the pt currently has OT present for therapy. Reports that PT visited earlier this morning. Reports that the pt is currently on dialysis on MWF at [facility] on [facility]. Reports that the pt is still voiding. Reports that that patient is not applying any wt to the affected extremity at this time.Pt's daughter reports that the pt is taking tylenol as needed for pain. Reports that the pt is compliant with her medications. Reports that the pt is only taking the Novolog 15 units at night. Pt reports that her BS this morning was 109. Pt's hemoglobin A1c on 6/23/19 was 8.7.  Reports that pt does not take the tylenol everyday. Daughter and pt reports interest in reviewing the paperwork for MPOA. Daughter reports that the pt is compliant with following diabetic and renal diets. Reports that the pt usually does not have a lot of fluid to remove at dialysis. Reports that the pt takes furoseminde 80 mg daily. Reports that the pt sometimes states on a Sunday that she feels the need to be dialyzed.            Interventions:Reviewed Med rec                         Reviewed problem list                        Reviewed OPCM services with pt's daughter                        Reviewed upcoming appts with pt's daughter                        Mailed KRAMES materials, Advance directives/MPOA, and DIABETIC GUIDE    Plan: Review diabetic diet and renal diet     Todays OPCM Self-Management Care Plan was developed with the patients/caregivers input and was based on identified barriers from todays assessment.  Goals were written today with the patient/caregiver and the patient has  agreed to work towards these goals to improve his/her overall well-being. Patient verbalized understanding of the care plan, goals, and all of today's instructions. Encouraged patient/caregiver to communicate with his/her physician and health care team about health conditions and the treatment plan.  Provided my contact information today and encouraged patient/caregiver to call me with any questions as needed.

## 2019-07-25 NOTE — LETTER
July 25, 2019    Zoey Ham  [address]  [place] [address]             [facility] [address] [facility] [place] [address] Dear Ms. Zoey Ham,    Welcome to [facility]s Complex Care Management Program.  It was a pleasure talking with you today.  My name is Iqra Delgadillo, RN and I look forward to being your Care Manager.  My goal is to help you function at the healthiest and highest level possible.  You can contact me directly at [phone].    As an [facility] patient with [employer], some of the services we may be able to provide include:      Development of an individualized care plan with a Registered Nurse    Connection with a    Connection with available resources and services     Coordinate communication among your care team members    Provide coaching and education    Help you understand your doctors treatment plan   Help you obtain information about your insurance coverage.     All services provided by [facility]s Complex Care Managers and other care team members are coordinated with and communicated to your primary care team.    As part of your enrollment, you will be receiving education materials and more information about these services in your [facility] account, by phone or through the mail.  If you do not wish to participate or receive information, please contact our office at [phone].      Sincerely,        Iqra Delgadillo, RN  [facility]   Out-patient RN Complex Care Manager

## 2019-07-26 ENCOUNTER — TELEPHONE (OUTPATIENT)
Dept: NEPHROLOGY | Facility: CLINIC | Age: 74
End: 2019-07-26

## 2019-07-26 ENCOUNTER — TELEPHONE (OUTPATIENT)
Dept: INTERNAL MEDICINE | Facility: CLINIC | Age: 74
End: 2019-07-26

## 2019-07-26 NOTE — TELEPHONE ENCOUNTER
Please let patietn know that Dr Iglesias will see her once she returns to the [facility]. She does not see dialysis patients in clinic.    Thanks  saul  ----- Message from Ginny Jung RN sent at 7/26/2019  9:29 AM CDT -----  Your message was sent to Dr. Iglesias. She usually does not see pts. in this clinic that are already on dialysis.  Thanks,  Ginny  ----- Message -----  From: Cesia Rosales MD  Sent: 7/23/2019   1:51 PM  To: [facility] Staff    Dr Iglesias,  Mrs Ham is getting dfialyzed in [place] now while living with her daughter in order to have a safe living environment. She will return to the [facility] dialysis center in a few weeks. Does she need to see you in clinic, or can she wait until her dialysis restarts with you?    Ignacia ROWE

## 2019-07-26 NOTE — TELEPHONE ENCOUNTER
----- Message from Cesia Rosales MD sent at 7/23/2019  1:51 PM CDT -----  Dr Iglesias,  Mrs Ham is getting dialyzed in [place] now while living with her daughter in order to have a safe living environment. She will return to the [facility] dialysis center in a few weeks. Does she need to see you in clinic, or can she wait until her dialysis restarts with you?    Ignacia ROWE  A message was sent to Dr. Rosales that the above message was sent to Dr. Iglesias and usually she does not see pts. in this clinic that are already on dialysis.

## 2019-07-28 ENCOUNTER — TELEPHONE (OUTPATIENT)
Dept: NEPHROLOGY | Facility: CLINIC | Age: 74
End: 2019-07-28

## 2019-07-28 NOTE — TELEPHONE ENCOUNTER
As she is being followed by a nephrologist there at the new unit, she doesn't need to see me and will be glad to see her at my unit.  I just read about her fall-I have been out of office  for last 2 weeks. Hopefully, she is recovering well.

## 2019-07-30 DIAGNOSIS — S32.9XXA PELVIS FRACTURE: Primary | ICD-10-CM

## 2019-08-02 ENCOUNTER — OFFICE VISIT (OUTPATIENT)
Dept: CARDIOLOGY | Facility: CLINIC | Age: 74
End: 2019-08-02
Payer: MEDICARE

## 2019-08-02 VITALS
DIASTOLIC BLOOD PRESSURE: 69 MMHG | WEIGHT: 169.06 LBS | HEART RATE: 60 BPM | BODY MASS INDEX: 28.86 KG/M2 | SYSTOLIC BLOOD PRESSURE: 123 MMHG | HEIGHT: 64 IN | OXYGEN SATURATION: 97 %

## 2019-08-02 DIAGNOSIS — R09.89 LEFT CAROTID BRUIT: ICD-10-CM

## 2019-08-02 DIAGNOSIS — Z95.5 S/P DRUG ELUTING CORONARY STENT PLACEMENT: Primary | ICD-10-CM

## 2019-08-02 DIAGNOSIS — N18.6 ESRD (END STAGE RENAL DISEASE) ON DIALYSIS: ICD-10-CM

## 2019-08-02 DIAGNOSIS — I50.32 CHRONIC DIASTOLIC HEART FAILURE: ICD-10-CM

## 2019-08-02 DIAGNOSIS — E11.69 TYPE 2 DIABETES MELLITUS WITH HYPERLIPIDEMIA: ICD-10-CM

## 2019-08-02 DIAGNOSIS — I15.2 HYPERTENSION ASSOCIATED WITH DIABETES: ICD-10-CM

## 2019-08-02 DIAGNOSIS — E11.42 DIABETIC POLYNEUROPATHY ASSOCIATED WITH TYPE 2 DIABETES MELLITUS: ICD-10-CM

## 2019-08-02 DIAGNOSIS — E78.5 TYPE 2 DIABETES MELLITUS WITH HYPERLIPIDEMIA: ICD-10-CM

## 2019-08-02 DIAGNOSIS — E78.5 HYPERLIPIDEMIA, UNSPECIFIED HYPERLIPIDEMIA TYPE: ICD-10-CM

## 2019-08-02 DIAGNOSIS — Z99.2 ESRD (END STAGE RENAL DISEASE) ON DIALYSIS: ICD-10-CM

## 2019-08-02 DIAGNOSIS — I48.20 CHRONIC ATRIAL FIBRILLATION: ICD-10-CM

## 2019-08-02 DIAGNOSIS — I70.0 ATHEROSCLEROSIS OF AORTA: ICD-10-CM

## 2019-08-02 DIAGNOSIS — S22.42XD CLOSED FRACTURE OF MULTIPLE RIBS OF LEFT SIDE WITH ROUTINE HEALING, SUBSEQUENT ENCOUNTER: ICD-10-CM

## 2019-08-02 DIAGNOSIS — E11.59 HYPERTENSION ASSOCIATED WITH DIABETES: ICD-10-CM

## 2019-08-02 PROCEDURE — 99499 RISK ADDL DX/OHS AUDIT: ICD-10-PCS | Mod: HCNC,S$GLB,, | Performed by: INTERNAL MEDICINE

## 2019-08-02 PROCEDURE — 3045F PR MOST RECENT HEMOGLOBIN A1C LEVEL 7.0-9.0%: ICD-10-PCS | Mod: HCNC,CPTII,S$GLB, | Performed by: INTERNAL MEDICINE

## 2019-08-02 PROCEDURE — 99999 PR PBB SHADOW E&M-EST. PATIENT-LVL IV: CPT | Mod: PBBFAC,HCNC,, | Performed by: INTERNAL MEDICINE

## 2019-08-02 PROCEDURE — 3045F PR MOST RECENT HEMOGLOBIN A1C LEVEL 7.0-9.0%: CPT | Mod: HCNC,CPTII,S$GLB, | Performed by: INTERNAL MEDICINE

## 2019-08-02 PROCEDURE — 99499 UNLISTED E&M SERVICE: CPT | Mod: HCNC,S$GLB,, | Performed by: INTERNAL MEDICINE

## 2019-08-02 PROCEDURE — 99999 PR PBB SHADOW E&M-EST. PATIENT-LVL IV: ICD-10-PCS | Mod: PBBFAC,HCNC,, | Performed by: INTERNAL MEDICINE

## 2019-08-02 PROCEDURE — 99214 PR OFFICE/OUTPT VISIT, EST, LEVL IV, 30-39 MIN: ICD-10-PCS | Mod: HCNC,S$GLB,, | Performed by: INTERNAL MEDICINE

## 2019-08-02 PROCEDURE — 99214 OFFICE O/P EST MOD 30 MIN: CPT | Mod: HCNC,S$GLB,, | Performed by: INTERNAL MEDICINE

## 2019-08-02 PROCEDURE — 1101F PR PT FALLS ASSESS DOC 0-1 FALLS W/OUT INJ PAST YR: ICD-10-PCS | Mod: HCNC,CPTII,S$GLB, | Performed by: INTERNAL MEDICINE

## 2019-08-02 PROCEDURE — 1101F PT FALLS ASSESS-DOCD LE1/YR: CPT | Mod: HCNC,CPTII,S$GLB, | Performed by: INTERNAL MEDICINE

## 2019-08-02 NOTE — Clinical Note
Thank you for referring Zoey DOMONIQUE Ham for follow-up of coronary artery disease and heart failure with preserved ejection fraction (HFpEF). Please see my note for details of this encounter. If you have any questions, please contact me.  Thank you again for the referral.

## 2019-08-02 NOTE — PATIENT INSTRUCTIONS
Ms Ham should have extra 500 cc taken off at dialysis today.    You need to get your cholesterol checked after a 12 hour fast.   No food or drink other than water, and any medication that needs to be taken, for 12 hours prior to the blood test.  You can eat as soon as you want after the sample blood sample is taken.

## 2019-08-02 NOTE — PROGRESS NOTES
Chart has been dictated using voice recognition software.  It is not been reviewed carefully for any transcriptional errors due to this technology.   Subjective:   Patient ID:  Zoey Ham is a 73 y.o. female who presents for follow-up of Hospital Follow Up      HPI: HPI: Patent with insulin-dependent diabetes, S/P CABG x 1 2002, S/P OM1 stent 03/26/2003, S/P RCA stent 03/26/2003, NSTEMI 2/05, PCI proximal LAD, OM2, OM3, s/ PCI OM1 (Promus) 30-Mar-2012, repeat cath 31-May-2015 showed  LAD with distal vessel supplied by intact LIMA), LVEF preserved, chronic atrial fibrillation, hypertension, dyslipidemia, and obesity.    Patient has had episodes of falling and rib fracture and had been in rehabilitation.  Patient had no syncope or palpitations with this episode.  Patient was hospitalized 14-16 July-2019 at [facility] for volume overload, E coli UTI, and a flat elevated troponin.  Patient underwent dialysis with removal of excess fluid.    Since discharge from the hospital,  Patient denies any dyspnea at rest or on exertion, orthopnea, PND, or edema.  Patient denies any chest discomfort on exertion or at rest. Patient denies any palpitations, lightheadedness, or syncope.     Echocardiogram (15-July-2019) (predialysis):  · Normal left ventricular systolic function. The estimated ejection fraction is 55%  · Moderate concentric left ventricular hypertrophy.  · Grade II (moderate) left ventricular diastolic dysfunction consistent with pseudonormalization.  · Elevated left atrial pressure.  · Normal right ventricular systolic function.  · Mild-moderate left atrial enlargement.  · Mild right atrial enlargement.  · Mild mitral regurgitation.  · Mild tricuspid regurgitation.  · Intermediate central venous pressure (8 mm Hg).  · The estimated PA systolic pressure is 63 mm Hg  · Pulmonary hypertension present.    Past Medical History:   Diagnosis Date    Acid reflux     Acute myocardial infarction of  anterolateral wall 7/9/2015    Anemia of chronic renal failure, stage 4 (severe) 1/22/2015    Ankle fracture     right    Anticoagulant long-term use     ASA and plavix    Atherosclerosis of aorta 10/3/2012    AV shunt malfunction     Benign hypertension with CKD (chronic kidney disease) stage IV 3/11/2016    Hypertension associated with Diabetes    Carpal tunnel syndrome, right     Chronic diastolic congestive heart failure 10/3/2012    Combined systolic and diastolic CHF    Chronic kidney disease, stage IV (severe) 7/3/2012    Coronary artery disease     s/p 1v CABG 2002 and multiple PCI (last angiogram in 6/2012 with patent LIMA->LAD and patent LCx and RCA stents)    Diabetic polyneuropathy associated with type 2 diabetes mellitus 7/9/2015    Diabetic polyneuropathy associated with type 2 diabetes mellitus 7/9/2015    Dialysis patient     Encounter for blood transfusion     Gastritis without bleeding     Heart attack 09/2012    5-6 events    Hyperlipidemia     Hyperparathyroidism     Hyperphosphatemia     Metabolic acidosis     Nephritis and nephropathy, 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 7/9/2015    NSTEMI (non-ST elevated myocardial infarction)     NSTEMI (non-ST elevated myocardial infarction)     NSTEMI (non-ST elevated myocardial infarction)     Occlusion and stenosis of carotid artery with cerebral infarction 7/9/2015    Osteopenia     PAF (paroxysmal atrial fibrillation) 10/3/2012    Pneumonia     Proliferative diabetic retinopathy 10/3/2012    S/P drug eluting coronary stent placement     Sepsis due to Escherichia coli with acute renal failure 2/2016    UTI     TACO (transfusion associated circulatory overload)     Type II diabetes mellitus with neurological manifestations 7/9/2015    Type II diabetes mellitus with renal manifestations 7/3/2012       Outpatient Medications Prior to Visit   Medication Sig Dispense Refill    aspirin (ECOTRIN) 325 MG EC tablet Take 1 tablet  (325 mg total) by mouth once daily. (Patient taking differently: Take 325 mg by mouth every morning. )      atorvastatin (LIPITOR) 80 MG tablet Take 1 tablet (80 mg total) by mouth every morning. (Patient taking differently: Take 40 mg by mouth every morning. ) 90 tablet 3    calcitRIOL (ROCALTROL) 0.5 MCG Cap Take 1 capsule (0.5 mcg total) by mouth once daily. 90 capsule 1    clopidogrel (PLAVIX) 75 mg tablet Take 1 tablet (75 mg total) by mouth once daily. 90 tablet 3    famotidine (PEPCID) 20 MG tablet Take 1 tablet (20 mg total) by mouth 2 (two) times daily. 180 tablet 3    furosemide (LASIX) 80 MG tablet One 80 mg  tab once a day. 90 tablet 2    glucose 4 GM chewable tablet Take 4 tablets (16 g total) by mouth as needed.  12    glucose 4 GM chewable tablet Take 6 tablets (24 g total) by mouth as needed.  12    insulin detemir U-100 (LEVEMIR FLEXTOUCH) 100 unit/mL (3 mL) SubQ InPn pen Inject 15 Units into the skin once daily.  0    insulin glargine,hum.rec.anlog (LANTUS SUBQ) Inject into the skin.       metoprolol tartrate (LOPRESSOR) 100 MG tablet Take 1 tablet (100 mg total) by mouth 2 (two) times daily. 180 tablet 3    multivitamin with minerals tablet Take 1 tablet by mouth once daily. 90 tablet 3    nitroGLYCERIN (NITROSTAT) 0.4 MG SL tablet Place 1 tablet (0.4 mg total) under the tongue every 5 (five) minutes as needed for Chest pain. 25 tablet 3    polyethylene glycol (GLYCOLAX) 17 gram PwPk Take 17 g by mouth 2 (two) times daily as needed.  0    traMADol (ULTRAM) 50 mg tablet Take 1 tablet (50 mg total) by mouth every 8 (eight) hours as needed.      vit b cmplx 3-fa-vit c-biotin 1- mg-mg-mcg (NEPHRO-EWA RX OR EQUIV) 1- mg-mg-mcg Tab Take 1 tablet by mouth once daily. 90 tablet 3    vitamin renal formula, B-complex-vitamin c-folic acid, (NEPHROCAPS) 1 mg Cap Take 1 capsule by mouth once daily.  0     No facility-administered medications prior to visit.        ROS - No change  "from prior visit in neurologic, respiratory, endocrine, GI, hematologic, or constitutional complaints, with the exception of left side pain from fractured ribs.  Objective:   Physical Exam   Constitutional: She is oriented to person, place, and time. She appears well-developed and well-nourished.   /69   Pulse 60   Ht 5' 4" (1.626 m)   Wt 76.7 kg (169 lb 1.5 oz)   LMP  (LMP Unknown)   SpO2 97%   BMI 29.02 kg/m²    Neck: Neck supple. No JVD present. Carotid bruit is present (left the). No thyromegaly present.   Cardiovascular: Normal rate, regular rhythm, S1 normal, S2 normal and intact distal pulses. Exam reveals gallop, S3 and S4. Exam reveals no friction rub.   No murmur heard.  Pulmonary/Chest: She has no wheezes. She has rales (at base) in the right lower field.   Abdominal: Soft. Bowel sounds are normal. There is no hepatosplenomegaly. There is no tenderness.   Musculoskeletal: She exhibits edema (bilateral 1+ ankle edema tapering to mid tibia).   Neurological: She is alert and oriented to person, place, and time. She has normal strength.   Skin: No cyanosis. Nails show no clubbing.         Lab Results   Component Value Date     07/16/2019    K 4.0 07/16/2019    BUN 23 07/16/2019    CREATININE 2.6 (H) 07/16/2019     07/16/2019    HGBA1C 8.7 (H) 06/23/2019    CHOL 127 06/05/2018    HDL 28 (L) 06/05/2018    LDLCALC 66.4 06/05/2018    TRIG 163 (H) 06/05/2018    CHOLHDL 22.0 06/05/2018    HGB 8.3 (L) 07/16/2019    HCT 27.3 (L) 07/16/2019     07/16/2019    INR 0.9 06/23/2019       Assessment:     1. S/P drug eluting coronary stent placement    2. Hypertension associated with diabetes    3. Chronic diastolic heart failure    4. Chronic atrial fibrillation    5. Atherosclerosis of aorta    6. Diabetic polyneuropathy associated with type 2 diabetes mellitus    7. Type 2 diabetes mellitus with hyperlipidemia    8. ESRD (end stage renal disease) on dialysis    9. Closed fracture of " multiple ribs of left side with routine healing, subsequent encounter    10. Left carotid bruit    11. Hyperlipidemia, unspecified hyperlipidemia type      Patient has no symptoms of cardiac ischemia, heart failure, or significant arrhythmias.  However, her examination shows increased fluid with mild jugular venous distention, right lower lung crackles, and bilateral ankle edema.  The patient should have an extra 0.5 L removed at dialysis today along with her normal fluid removal.      Additionally, the patient's lipid profile will be rechecked.  When checked a year ago, her LDL cholesterol was 66 mg/dL, which was at goal (less than 70 mg/dL).  The patient's lipid profile will be rechecked.  However, the patient should stay on atorvastatin to maintain her LDL less than 70 mg/dL, particularly as she has a low HDL (28 mg/dL).  If her LDL cholesterol is greater than 70 mg/dL, ezetimibe will be added to her regimen.      Finally, the patient was noted to have a left carotid bruit today.  Therefore, Doppler ultrasound will be obtained to determine the extent of carotid artery stenosis.  Unless there are intervening problems, patient should return for re-evaluation in 6 months.   Plan:     Zoey was seen today for hospital follow up.    Diagnoses and all orders for this visit:    S/P drug eluting coronary stent placement    Hypertension associated with diabetes    Chronic diastolic heart failure    Chronic atrial fibrillation    Atherosclerosis of aorta    Diabetic polyneuropathy associated with type 2 diabetes mellitus    Type 2 diabetes mellitus with hyperlipidemia  -     Lipid panel; Future; Expected date: 08/02/2019    ESRD (end stage renal disease) on dialysis    Closed fracture of multiple ribs of left side with routine healing, subsequent encounter    Left carotid bruit  -     CV Ultrasound Bilateral Doppler Carotid; Future; Expected date: 08/02/2019    Hyperlipidemia, unspecified hyperlipidemia type  -     Lipid  panel; Future; Expected date: 08/02/2019          Elver Kelley MD  Consultative Cardiology

## 2019-08-02 NOTE — LETTER
August 2, 2019      Cesia Rosales MD  [address]  [place] [address]           [facility] - Cardiology  [address]  [place] [address]  Phone: [phone]          Patient: Zoey Ham   MR Number: [id number]   YOB: 1945   Date of Visit: 8/2/2019       Dear Dr. Cesia Rosales:    Thank you for referring Zoey Ham to me for evaluation. Attached you will find relevant portions of my assessment and plan of care.    If you have questions, please do not hesitate to call me. I look forward to following Zoey Ham along with you.    Sincerely,    Elver Kelley MD    Enclosure  CC:  No Recipients    If you would like to receive this communication electronically, please contact [email or web address] or [phone] to request more information on [employer] access.    For providers and/or their staff who would like to refer a patient to [facility], please contact us through our one-stop-shop provider referral line, [facility] , at [phone].    If you feel you have received this communication in error or would no longer like to receive these types of communications, please e-mail [email or web address]

## 2019-08-05 ENCOUNTER — TELEPHONE (OUTPATIENT)
Dept: VASCULAR SURGERY | Facility: CLINIC | Age: 74
End: 2019-08-05

## 2019-08-05 NOTE — TELEPHONE ENCOUNTER
Contacted patient to confirm that she will be reporting to appt with Dr. Perry on 8/6/19. Pt states she will be at appt.

## 2019-08-06 ENCOUNTER — HOSPITAL ENCOUNTER (OUTPATIENT)
Dept: VASCULAR SURGERY | Facility: CLINIC | Age: 74
Discharge: HOME | End: 2019-08-06
Attending: SURGERY
Payer: MEDICARE

## 2019-08-06 ENCOUNTER — CLINICAL SUPPORT (OUTPATIENT)
Dept: CARDIOLOGY | Facility: CLINIC | Age: 74
End: 2019-08-06
Attending: INTERNAL MEDICINE
Payer: MEDICARE

## 2019-08-06 ENCOUNTER — PATIENT MESSAGE (OUTPATIENT)
Dept: CARDIOLOGY | Facility: CLINIC | Age: 74
End: 2019-08-06

## 2019-08-06 ENCOUNTER — OFFICE VISIT (OUTPATIENT)
Dept: VASCULAR SURGERY | Facility: CLINIC | Age: 74
End: 2019-08-06
Payer: MEDICARE

## 2019-08-06 VITALS
BODY MASS INDEX: 28.17 KG/M2 | DIASTOLIC BLOOD PRESSURE: 76 MMHG | HEART RATE: 63 BPM | SYSTOLIC BLOOD PRESSURE: 183 MMHG | TEMPERATURE: 98 F | HEIGHT: 64 IN | WEIGHT: 165 LBS

## 2019-08-06 DIAGNOSIS — R09.89 LEFT CAROTID BRUIT: Primary | ICD-10-CM

## 2019-08-06 DIAGNOSIS — Z99.2 HEMODIALYSIS ACCESS, AV GRAFT: Primary | ICD-10-CM

## 2019-08-06 DIAGNOSIS — Z99.2 ESRD (END STAGE RENAL DISEASE) ON DIALYSIS: ICD-10-CM

## 2019-08-06 DIAGNOSIS — N18.6 ESRD (END STAGE RENAL DISEASE) ON DIALYSIS: ICD-10-CM

## 2019-08-06 DIAGNOSIS — R09.89 LEFT CAROTID BRUIT: ICD-10-CM

## 2019-08-06 DIAGNOSIS — N18.6 ESRD (END STAGE RENAL DISEASE) ON DIALYSIS: Primary | ICD-10-CM

## 2019-08-06 DIAGNOSIS — Z99.2 ESRD (END STAGE RENAL DISEASE) ON DIALYSIS: Primary | ICD-10-CM

## 2019-08-06 LAB
LEFT CBA DIAS: 12 CM/S
LEFT CBA SYS: 126 CM/S
LEFT CCA DIST DIAS: 14 CM/S
LEFT CCA DIST SYS: 114 CM/S
LEFT CCA MID DIAS: 13 CM/S
LEFT CCA MID SYS: 125 CM/S
LEFT CCA PROX DIAS: 5 CM/S
LEFT CCA PROX SYS: 77 CM/S
LEFT ECA DIAS: 13 CM/S
LEFT ECA SYS: 150 CM/S
LEFT ICA DIST DIAS: 23 CM/S
LEFT ICA DIST SYS: 112 CM/S
LEFT ICA MID DIAS: 23 CM/S
LEFT ICA MID SYS: 190 CM/S
LEFT ICA PROX DIAS: 22 CM/S
LEFT ICA PROX SYS: 202 CM/S
LEFT VERTEBRAL DIAS: 7 CM/S
LEFT VERTEBRAL SYS: 53 CM/S
OHS CV CAROTID RIGHT ICA EDV HIGHEST: 47
OHS CV CAROTID ULTRASOUND LEFT ICA/CCA RATIO: 1.62
OHS CV CAROTID ULTRASOUND RIGHT ICA/CCA RATIO: 4.33
OHS CV PV CAROTID LEFT HIGHEST CCA: 125
OHS CV PV CAROTID LEFT HIGHEST ICA: 202
OHS CV PV CAROTID RIGHT HIGHEST CCA: 75
OHS CV PV CAROTID RIGHT HIGHEST ICA: 325
OHS CV US CAROTID LEFT HIGHEST EDV: 23
RIGHT ARM DIASTOLIC BLOOD PRESSURE: 70 MMHG
RIGHT ARM SYSTOLIC BLOOD PRESSURE: 118 MMHG
RIGHT CBA DIAS: 23 CM/S
RIGHT CBA SYS: 252 CM/S
RIGHT CCA DIST DIAS: 8 CM/S
RIGHT CCA DIST SYS: 75 CM/S
RIGHT CCA MID DIAS: 8 CM/S
RIGHT CCA MID SYS: 71 CM/S
RIGHT CCA PROX DIAS: 9 CM/S
RIGHT CCA PROX SYS: 74 CM/S
RIGHT ECA DIAS: 19 CM/S
RIGHT ECA SYS: 256 CM/S
RIGHT ICA DIST DIAS: 21 CM/S
RIGHT ICA DIST SYS: 115 CM/S
RIGHT ICA MID DIAS: 38 CM/S
RIGHT ICA MID SYS: 251 CM/S
RIGHT ICA PROX DIAS: 47 CM/S
RIGHT ICA PROX SYS: 325 CM/S
RIGHT VERTEBRAL DIAS: 16 CM/S
RIGHT VERTEBRAL SYS: 68 CM/S

## 2019-08-06 PROCEDURE — 99999 PR PBB SHADOW E&M-EST. PATIENT-LVL III: CPT | Mod: PBBFAC,HCNC,, | Performed by: SURGERY

## 2019-08-06 PROCEDURE — 93880 EXTRACRANIAL BILAT STUDY: CPT | Mod: HCNC,S$GLB,, | Performed by: INTERNAL MEDICINE

## 2019-08-06 PROCEDURE — 93880 CV US DOPPLER CAROTID (CUPID ONLY): ICD-10-PCS | Mod: HCNC,S$GLB,, | Performed by: INTERNAL MEDICINE

## 2019-08-06 PROCEDURE — 93990 PR DUPLEX HEMODIALYSIS ACCESS: ICD-10-PCS | Mod: HCNC,S$GLB,, | Performed by: SURGERY

## 2019-08-06 PROCEDURE — 99214 PR OFFICE/OUTPT VISIT, EST, LEVL IV, 30-39 MIN: ICD-10-PCS | Mod: HCNC,S$GLB,, | Performed by: SURGERY

## 2019-08-06 PROCEDURE — 99214 OFFICE O/P EST MOD 30 MIN: CPT | Mod: HCNC,S$GLB,, | Performed by: SURGERY

## 2019-08-06 PROCEDURE — 93990 DOPPLER FLOW TESTING: CPT | Mod: HCNC,S$GLB,, | Performed by: SURGERY

## 2019-08-06 PROCEDURE — 1101F PT FALLS ASSESS-DOCD LE1/YR: CPT | Mod: HCNC,CPTII,S$GLB, | Performed by: SURGERY

## 2019-08-06 PROCEDURE — 1101F PR PT FALLS ASSESS DOC 0-1 FALLS W/OUT INJ PAST YR: ICD-10-PCS | Mod: HCNC,CPTII,S$GLB, | Performed by: SURGERY

## 2019-08-06 PROCEDURE — 99999 PR PBB SHADOW E&M-EST. PATIENT-LVL III: ICD-10-PCS | Mod: PBBFAC,HCNC,, | Performed by: SURGERY

## 2019-08-06 NOTE — PROGRESS NOTES
REFERRING PHYSICIAN:  Karla Iglesias D.O.    HISTORY OF PRESENT ILLNESS:  A 73-year-old female with ESRD on HD since 8/2018, M/W/F, s/p    1. PTA, L AVG  6/20/19, 1/17/19  2. Drug coated PTA, L AVF (7x60 Lutonix) 9/20/18  3. PTa, L AVF 5/31/18  4.  L brachio-cephalic AVF 4/16/18.  No c/o    She fell and broke her pelvis in 3 places since this fistulogram is now just coming back for initial follow-up.  She denies any problems using fistula    PAST MEDICAL HISTORY:  1.  Coronary artery disease, status post MI x5.   MI 8/2018 with PCI  2.  Hypertension.  3.  Diabetes.  4.  Hyperparathyroidism.  5.  Chronic atrial fibrillation.    PAST SURGICAL HISTORY:  1.  Hysterectomy.  2.  Tonsillectomy.  3.  Appendectomy.  4.  Coronary artery bypass.  5.  PCI.  6.  Fracture surgery.    FAMILY HISTORY:  Positive for diabetes and hypertension.    MEDICATIONS:  Include Plavix, aspirin and statin.  See EPIC for full list.    ALLERGIES:  Penicillin, Percodan and Phenergan.    REVIEW OF SYSTEMS:  Denies postprandial pain or DVT.  All other systems including eyes, ENT, respiratory, musculoskeletal, breast,   neurologic, psychiatric, heme, lymph, allergy and immune are negative.    PHYSICAL EXAMINATION:  VITAL SIGNS:  See nursing notes.  GENERAL:  She is in no acute distress.  RESPIRATORY:  Normal effort.  Clear to claudication.  CARDIOVASCULAR:  Regular rhythm.  Nondisplaced PMI, no murmur.  VASCULAR:  L radial not palp  EXTREMITIES:   Good thrill proximally with mild pulsatility  NEUROLOGIC:  Cranial nerves VII through XII intact.  5/5 motor strength in all   extremities.    Imaging:  There is no stenosis of the AV fistula, flow volume 2.3 L    ASSESSMENT:  Well functioning left AV fistula after latest intervention    RECOMMENDATION:  1.  Three month follow-up with AV fistula duplex a clinically indicated    W. Don Perry III, MD, FACS  Professor and Chief, Vascular and Endovascular Surgery    CC: Karla Iglesias D.O.

## 2019-08-09 ENCOUNTER — OUTPATIENT CASE MANAGEMENT (OUTPATIENT)
Dept: ADMINISTRATIVE | Facility: OTHER | Age: 74
End: 2019-08-09

## 2019-08-09 NOTE — PROGRESS NOTES
Summary: Pt receives dialysis on MWF. Will schedule next visit on Tuesday.       Interventions:Chart review      Plan:na

## 2019-08-13 ENCOUNTER — TELEPHONE (OUTPATIENT)
Dept: HOME HEALTH SERVICES | Facility: HOSPITAL | Age: 74
End: 2019-08-13

## 2019-08-15 ENCOUNTER — OUTPATIENT CASE MANAGEMENT (OUTPATIENT)
Dept: ADMINISTRATIVE | Facility: OTHER | Age: 74
End: 2019-08-15

## 2019-08-15 NOTE — PROGRESS NOTES
Summary: Pt reports that she is doing well. Pt reports that she goes to dialysis on MWF. Pt reports that she goes to outpatient therapy on [facility]. Pt reports that she is tolerating the dialysis and therapy well.Pt reports that she does not have any new complaints at this time. Pt reports that she has a good appetite. Pt reports that her blood pressures are fine when checked at dialysis. Pt reports that her blood sugars are doing fine. Pt reports that the blood sugars are around 103-108. Pt reports that she has to get some aspirin today. Pt reports that she usually orders through [employer] but she only has one tablet left. Pt reports that she is at her daughter's home and does not have the [employer] [facility] [employer] pharmacy phone number.   Next follow-up scheduled with pt:2 weeks  Pt agreed: yes      Interventions:[employer] OTC number given to pt.                        [employer] OTC pharmacy catalog mailed to the daughter's address.                        Educated pt on the importance of checking Blood sugars.                         Change to episodic    Plan:Educate pt on the s/s of DM.    Todays OPCM Self-Management Care Plan was developed with the patients/caregivers input and was based on identified barriers from todays assessment.  Goals were written today with the patient/caregiver and the patient has agreed to work towards these goals to improve his/her overall well-being. Patient verbalized understanding of the care plan, goals, and all of today's instructions. Encouraged patient/caregiver to communicate with his/her physician and health care team about health conditions and the treatment plan.  Provided my contact information today and encouraged patient/caregiver to call me with any questions as needed.

## 2019-08-19 ENCOUNTER — EXTERNAL HOME HEALTH (OUTPATIENT)
Dept: HOME HEALTH SERVICES | Facility: HOSPITAL | Age: 74
End: 2019-08-19
Payer: MEDICARE

## 2019-08-26 DIAGNOSIS — E78.5 TYPE 2 DIABETES MELLITUS WITH HYPERLIPIDEMIA: ICD-10-CM

## 2019-08-26 DIAGNOSIS — E11.69 TYPE 2 DIABETES MELLITUS WITH HYPERLIPIDEMIA: ICD-10-CM

## 2019-09-10 ENCOUNTER — OUTPATIENT CASE MANAGEMENT (OUTPATIENT)
Dept: ADMINISTRATIVE | Facility: OTHER | Age: 74
End: 2019-09-10

## 2019-09-10 NOTE — PROGRESS NOTES
Summary: Pt reports that she is doing well. Reports that her blood pressures are always good. Reports that her blood sugars are fine also. Unable to give a numb karlos value at this time. Pt reports that yahir received the OTC booklet for [employer]. Pt reports that she was discharged from the orthopedic doctor. Pt reports that she has outpatient therapy until the 19th of this month. Pt reports that she is ambulating with a cane. Pt reports that she has pain in the pelvic area after therapy. Pt reports that the pain is relieved with tylenol.     Interventions:Reviewed with pt the risk factors of hypoglycemia and hyperglycemia.                        Reviewed the purpose of Hemoglobin A1c    Plan:Review preparing a well balanced plate    Todays OPCM Self-Management Care Plan was developed with the patients/caregivers input and was based on identified barriers from todays assessment.  Goals were written today with the patient/caregiver and the patient has agreed to work towards these goals to improve his/her overall well-being. Patient verbalized understanding of the care plan, goals, and all of today's instructions. Encouraged patient/caregiver to communicate with his/her physician and health care team about health conditions and the treatment plan.  Provided my contact information today and encouraged patient/caregiver to call me with any questions as needed.

## 2019-09-17 ENCOUNTER — OFFICE VISIT (OUTPATIENT)
Dept: PRIMARY CARE CLINIC | Facility: CLINIC | Age: 74
End: 2019-09-17
Payer: MEDICARE

## 2019-09-17 VITALS
BODY MASS INDEX: 27.95 KG/M2 | HEART RATE: 71 BPM | SYSTOLIC BLOOD PRESSURE: 118 MMHG | HEIGHT: 64 IN | OXYGEN SATURATION: 94 % | WEIGHT: 163.69 LBS | DIASTOLIC BLOOD PRESSURE: 60 MMHG

## 2019-09-17 DIAGNOSIS — Z23 NEED FOR SHINGLES VACCINE: Primary | ICD-10-CM

## 2019-09-17 DIAGNOSIS — E11.59 HYPERTENSION ASSOCIATED WITH DIABETES: ICD-10-CM

## 2019-09-17 DIAGNOSIS — I50.32 CHRONIC DIASTOLIC HEART FAILURE: ICD-10-CM

## 2019-09-17 DIAGNOSIS — E55.9 HYPOVITAMINOSIS D: ICD-10-CM

## 2019-09-17 DIAGNOSIS — E11.69 TYPE 2 DIABETES MELLITUS WITH HYPERLIPIDEMIA: ICD-10-CM

## 2019-09-17 DIAGNOSIS — E11.3553 STABLE PROLIFERATIVE DIABETIC RETINOPATHY OF BOTH EYES ASSOCIATED WITH TYPE 2 DIABETES MELLITUS: ICD-10-CM

## 2019-09-17 DIAGNOSIS — I65.23 BILATERAL CAROTID ARTERY STENOSIS: ICD-10-CM

## 2019-09-17 DIAGNOSIS — M81.0 AGE-RELATED OSTEOPOROSIS WITHOUT CURRENT PATHOLOGICAL FRACTURE: ICD-10-CM

## 2019-09-17 DIAGNOSIS — E78.5 TYPE 2 DIABETES MELLITUS WITH HYPERLIPIDEMIA: ICD-10-CM

## 2019-09-17 DIAGNOSIS — I15.2 HYPERTENSION ASSOCIATED WITH DIABETES: ICD-10-CM

## 2019-09-17 DIAGNOSIS — I50.42 CHRONIC COMBINED SYSTOLIC AND DIASTOLIC CONGESTIVE HEART FAILURE: ICD-10-CM

## 2019-09-17 DIAGNOSIS — I25.708 CORONARY ARTERY DISEASE OF BYPASS GRAFT OF NATIVE HEART WITH STABLE ANGINA PECTORIS: ICD-10-CM

## 2019-09-17 PROBLEM — D62 ACUTE BLOOD LOSS ANEMIA: Status: RESOLVED | Noted: 2018-06-07 | Resolved: 2019-09-17

## 2019-09-17 PROCEDURE — 99499 UNLISTED E&M SERVICE: CPT | Mod: HCNC,S$GLB,, | Performed by: INTERNAL MEDICINE

## 2019-09-17 PROCEDURE — 99499 RISK ADDL DX/OHS AUDIT: ICD-10-PCS | Mod: HCNC,S$GLB,, | Performed by: INTERNAL MEDICINE

## 2019-09-17 PROCEDURE — 1101F PT FALLS ASSESS-DOCD LE1/YR: CPT | Mod: HCNC,CPTII,S$GLB, | Performed by: INTERNAL MEDICINE

## 2019-09-17 PROCEDURE — 99215 OFFICE O/P EST HI 40 MIN: CPT | Mod: HCNC,S$GLB,, | Performed by: INTERNAL MEDICINE

## 2019-09-17 PROCEDURE — 3045F PR MOST RECENT HEMOGLOBIN A1C LEVEL 7.0-9.0%: CPT | Mod: HCNC,CPTII,S$GLB, | Performed by: INTERNAL MEDICINE

## 2019-09-17 PROCEDURE — 99215 PR OFFICE/OUTPT VISIT, EST, LEVL V, 40-54 MIN: ICD-10-PCS | Mod: HCNC,S$GLB,, | Performed by: INTERNAL MEDICINE

## 2019-09-17 PROCEDURE — 1101F PR PT FALLS ASSESS DOC 0-1 FALLS W/OUT INJ PAST YR: ICD-10-PCS | Mod: HCNC,CPTII,S$GLB, | Performed by: INTERNAL MEDICINE

## 2019-09-17 PROCEDURE — 3045F PR MOST RECENT HEMOGLOBIN A1C LEVEL 7.0-9.0%: ICD-10-PCS | Mod: HCNC,CPTII,S$GLB, | Performed by: INTERNAL MEDICINE

## 2019-09-17 RX ORDER — FUROSEMIDE 80 MG/1
80 TABLET ORAL 2 TIMES DAILY PRN
Qty: 180 TABLET | Refills: 3 | Status: SHIPPED | OUTPATIENT
Start: 2019-09-17 | End: 2020-01-01 | Stop reason: SDUPTHER

## 2019-09-17 NOTE — ASSESSMENT & PLAN NOTE
ESRD and so no HbA1C since will not be accurate.  No log today  · optho needed.  · Continue current meds  · Call with any hypoglycemia

## 2019-09-17 NOTE — ASSESSMENT & PLAN NOTE
Recent admission for exacerbation  · Lasix to be increased to once daily and BID PRN  · Renal to adjust dry wt

## 2019-09-17 NOTE — ASSESSMENT & PLAN NOTE
There is 70-79% right Internal Carotid Stenosis.  There is 50-59% left Internal Carotid Stenosis.\on carotid u/ 8/19  · Reviewed u/s results with pt  · Will continue high dose atorvo  · Pt will f/u vasc 11/19 to discuss surgical options if indicated

## 2019-09-17 NOTE — PATIENT INSTRUCTIONS
Dexa ordered  Labs next week.  Optho appointment with Dr Garza.    Please return your stool card.  Please consider having the shingles vaccine--an order was placed for the pharmacy here in clinic  Please have flu vaccine with dialysis

## 2019-09-17 NOTE — ASSESSMENT & PLAN NOTE
Recent fracture and last dexa 2017.  · dexa ordered to eval further though clinically osteoprosis with fx with fall  · Medication needs to be considered once results obtained  · Check vit D and PTH.  Pt to bring phos from labs doen with HD and we will review

## 2019-09-18 ENCOUNTER — PES CALL (OUTPATIENT)
Dept: ADMINISTRATIVE | Facility: CLINIC | Age: 74
End: 2019-09-18

## 2019-09-26 ENCOUNTER — HOSPITAL ENCOUNTER (OUTPATIENT)
Dept: RADIOLOGY | Facility: CLINIC | Age: 74
Discharge: HOME | End: 2019-09-26
Attending: INTERNAL MEDICINE
Payer: MEDICARE

## 2019-09-26 DIAGNOSIS — M81.0 AGE-RELATED OSTEOPOROSIS WITHOUT CURRENT PATHOLOGICAL FRACTURE: ICD-10-CM

## 2019-09-26 PROCEDURE — 77080 DXA BONE DENSITY AXIAL: CPT | Mod: TC,HCNC

## 2019-09-26 PROCEDURE — 77080 DEXA BONE DENSITY SPINE HIP: ICD-10-PCS | Mod: 26,HCNC,, | Performed by: INTERNAL MEDICINE

## 2019-09-26 PROCEDURE — 77080 DXA BONE DENSITY AXIAL: CPT | Mod: 26,HCNC,, | Performed by: INTERNAL MEDICINE

## 2019-09-30 ENCOUNTER — TELEPHONE (OUTPATIENT)
Dept: PRIMARY CARE CLINIC | Facility: CLINIC | Age: 74
End: 2019-09-30

## 2019-10-04 ENCOUNTER — TELEPHONE (OUTPATIENT)
Dept: PRIMARY CARE CLINIC | Facility: CLINIC | Age: 74
End: 2019-10-04

## 2019-10-04 NOTE — TELEPHONE ENCOUNTER
Notified pt of the following... that her Vit D was low. Also her bone density scan continues to show osteoporosis, she should continue her prolia.      She is still at the center on [address]. I gave her my number and she will have the nurse call us on Mon after 9a.            What's her nephrologist's name at her dialysis center?   Is she still At [facility] on [address]?   We should fax him the results.          Thanks,

## 2019-10-04 NOTE — TELEPHONE ENCOUNTER
Please let patient know that her Vit D was low. What's her nephrologist's name at her dialysis center? Is she still At [facility] on [address]? We should fax him the results.    Also her bone density scan continues to show osteoporosis, she should continue her prolia.    Thanks,  SOLEDAD

## 2019-10-09 ENCOUNTER — TELEPHONE (OUTPATIENT)
Dept: PRIMARY CARE CLINIC | Facility: CLINIC | Age: 74
End: 2019-10-09

## 2019-10-09 NOTE — TELEPHONE ENCOUNTER
Spoke to pt about receiving the Prolia injection and she said she will get it on tomorrow or Friday at Dialysis.

## 2019-10-11 ENCOUNTER — OUTPATIENT CASE MANAGEMENT (OUTPATIENT)
Dept: ADMINISTRATIVE | Facility: OTHER | Age: 74
End: 2019-10-11

## 2019-10-28 NOTE — TELEPHONE ENCOUNTER
Creatinine higher.  Asked her to hold losartan and get rfp this Wednesday  at [facility].  She will hold alsix  for the next 2 days as well.  Ask her to hydrate well.    left ankle pain/JOINT PAIN

## 2019-10-31 ENCOUNTER — PATIENT OUTREACH (OUTPATIENT)
Dept: ADMINISTRATIVE | Facility: OTHER | Age: 74
End: 2019-10-31

## 2019-10-31 ENCOUNTER — OUTPATIENT CASE MANAGEMENT (OUTPATIENT)
Dept: ADMINISTRATIVE | Facility: OTHER | Age: 74
End: 2019-10-31

## 2019-10-31 NOTE — PROGRESS NOTES
Outpatient Care Management  Plan of Care Follow Up Visit    Patient: Zoey Ham  MRN: [id number]  Date of Service: 10/31/2019  Completed by: Iqra Delgadillo RN  Referral Date: 07/23/2019  Program: Case Management (High Risk)    No chief complaint on file.      Patient Summary     Involvement of Care:  Do I have permission to speak with other family members about your care?       Problem List     Patient Active Problem List   Diagnosis    Hypertension associated with diabetes    Proliferative diabetic retinopathy    Atherosclerosis of aorta    Chronic combined systolic and diastolic congestive heart failure    Leukocytosis    Anemia of chronic renal failure, stage 5    Nausea    UTI (urinary tract infection)    Diabetic polyneuropathy associated with type 2 diabetes mellitus    Type 2 diabetes mellitus with chronic kidney disease on chronic dialysis, with long-term current use of insulin    Hyperparathyroidism    Chronic atrial fibrillation    Coronary artery disease involving coronary bypass graft of native heart with unstable angina pectoris    Senile purpura    Osteopenia of multiple sites    Type 2 diabetes mellitus with hyperlipidemia    Troponin level elevated    S/P drug eluting coronary stent placement    Chronic diastolic heart failure    Hypovitaminosis D    Wrist pain, acute, unspecified laterality    ESRD (end stage renal disease) on dialysis    Abnormal mammogram    Hemodialysis access, AV graft    Hyperphosphatemia    Colon cancer screening    End of life care    Mild protein-calorie malnutrition    ESRD (end stage renal disease)    Hypoxia    Enlarged lymph node    Fracture of multiple ribs of left side    Displaced fracture of pubis    Gastroesophageal reflux disease with esophagitis    Left carotid bruit    Hyperlipidemia    Bilateral carotid artery stenosis    Age-related osteoporosis without current pathological fracture       Reviewed Active Problem  List with patient and/or Caregiver.    Patient Reported Labs & Vitals:  1.  Any Patient Reported Labs & Vitals?     2.  Patient Reported Blood Pressure:     3.  Patient Reported Pulse:     4.  Patient Reported Weight (Kg):     5.  Patient Reported Blood Glucose (mg/dl): 103      Medical History:  Reviewed medical history with patient and/or caregiver    Social History:  Reviewed social history with patient and/or caregiver    Clinical Assessment     Reviewed and provided basic information on available community resources for mental health, transportation, wellness resources, and palliative care programs with patient and/or caregiver.    Complex Care Plan     Care plan was discussed and completed today with input from patient and/or caregiver.    Goals    None         Patient Instructions     Instructions were provided via the [employer] patient [employer] and are available for the patient to view on the patient portal.     No follow-ups on file.   Summary:  Pt reports that she is doing well. Pt that she has no pain at this time. Pt reports that she is making purchases from the [facility] pharmacy.      Interventions:Reviewed preparing a well balanced meal with the pt. Informed pt of case closure. Pt agrees with case closure at this time.       Plan:na          Todays OPCM Self-Management Care Plan was developed with the patients/caregivers input and was based on identified barriers from todays assessment.  Goals were written today with the patient/caregiver and the patient has agreed to work towards these goals to improve his/her overall well-being. Patient verbalized understanding of the care plan, goals, and all of today's instructions. Encouraged patient/caregiver to communicate with his/her physician and health care team about health conditions and the treatment plan.  Provided my contact information today and encouraged patient/caregiver to call me with any questions as needed.

## 2019-11-05 ENCOUNTER — HOSPITAL ENCOUNTER (OUTPATIENT)
Dept: VASCULAR SURGERY | Facility: CLINIC | Age: 74
Discharge: HOME | End: 2019-11-05
Attending: SURGERY
Payer: MEDICARE

## 2019-11-05 ENCOUNTER — OFFICE VISIT (OUTPATIENT)
Dept: VASCULAR SURGERY | Facility: CLINIC | Age: 74
End: 2019-11-05
Payer: MEDICARE

## 2019-11-05 VITALS
WEIGHT: 165.38 LBS | BODY MASS INDEX: 28.24 KG/M2 | DIASTOLIC BLOOD PRESSURE: 70 MMHG | HEART RATE: 70 BPM | HEIGHT: 64 IN | SYSTOLIC BLOOD PRESSURE: 167 MMHG | TEMPERATURE: 99 F

## 2019-11-05 DIAGNOSIS — Z99.2 ESRD (END STAGE RENAL DISEASE) ON DIALYSIS: Primary | ICD-10-CM

## 2019-11-05 DIAGNOSIS — N18.6 ESRD (END STAGE RENAL DISEASE): Primary | ICD-10-CM

## 2019-11-05 DIAGNOSIS — N18.6 ESRD (END STAGE RENAL DISEASE): ICD-10-CM

## 2019-11-05 DIAGNOSIS — N18.6 ESRD (END STAGE RENAL DISEASE) ON DIALYSIS: Primary | ICD-10-CM

## 2019-11-05 DIAGNOSIS — T82.858D STENOSIS OF ARTERIOVENOUS DIALYSIS FISTULA, SUBSEQUENT ENCOUNTER: ICD-10-CM

## 2019-11-05 PROCEDURE — 99214 PR OFFICE/OUTPT VISIT, EST, LEVL IV, 30-39 MIN: ICD-10-PCS | Mod: HCNC,S$GLB,, | Performed by: SURGERY

## 2019-11-05 PROCEDURE — 1101F PT FALLS ASSESS-DOCD LE1/YR: CPT | Mod: HCNC,CPTII,S$GLB, | Performed by: SURGERY

## 2019-11-05 PROCEDURE — 99999 PR PBB SHADOW E&M-EST. PATIENT-LVL IV: CPT | Mod: PBBFAC,HCNC,, | Performed by: SURGERY

## 2019-11-05 PROCEDURE — 93990 PR DUPLEX HEMODIALYSIS ACCESS: ICD-10-PCS | Mod: HCNC,S$GLB,, | Performed by: SURGERY

## 2019-11-05 PROCEDURE — 93990 DOPPLER FLOW TESTING: CPT | Mod: HCNC,S$GLB,, | Performed by: SURGERY

## 2019-11-05 PROCEDURE — 99214 OFFICE O/P EST MOD 30 MIN: CPT | Mod: HCNC,S$GLB,, | Performed by: SURGERY

## 2019-11-05 PROCEDURE — 1101F PR PT FALLS ASSESS DOC 0-1 FALLS W/OUT INJ PAST YR: ICD-10-PCS | Mod: HCNC,CPTII,S$GLB, | Performed by: SURGERY

## 2019-11-05 PROCEDURE — 99999 PR PBB SHADOW E&M-EST. PATIENT-LVL IV: ICD-10-PCS | Mod: PBBFAC,HCNC,, | Performed by: SURGERY

## 2019-11-05 NOTE — LETTER
[facility]  [address]  [place] [address]  Phone: [phone]  Fax: [phone] November 5, 2019      Elver Kelley MD  [address] [facility] [address]    Patient: Zoey Ham   MR Number: [id number]   YOB: 1945   Date of Visit: 11/5/2019       Dear Dr. Elver Kelley:    Thank you for referring Zoey Ham to me for evaluation. Attached you will find relevant portions of my assessment and plan of care.    If you have questions, please do not hesitate to call me. I look forward to following Zoey Ham along with you.    Sincerely,        JULIO CÉSAR Perry III, MD    WCS/etb    Enclosure

## 2019-11-05 NOTE — PROGRESS NOTES
REFERRING PHYSICIAN:  Karla Iglesias D.O.    HISTORY OF PRESENT ILLNESS:  A 73-year-old female with ESRD on HD since 8/2018, M/W/F, s/p    1. Covered stent placement (cephalic arch) and mid PTA, L AVG  6/20/19,  2. Drug coated PTA, L AVF (7x60 Lutonix) 9/20/18  3. PTa, L AVF 5/31/18  4.  L brachio-cephalic AVF 4/16/18.  No c/o    Now having increased bleeding    PAST MEDICAL HISTORY:  1.  Coronary artery disease, status post MI x5.   MI 8/2018 with PCI  2.  Hypertension.  3.  Diabetes.  4.  Hyperparathyroidism.  5.  Chronic atrial fibrillation.    PAST SURGICAL HISTORY:  1.  Hysterectomy.  2.  Tonsillectomy.  3.  Appendectomy.  4.  Coronary artery bypass.  5.  PCI.  6.  Fracture surgery.    FAMILY HISTORY:  Positive for diabetes and hypertension.    MEDICATIONS:  Include Plavix, aspirin and statin.  See EPIC for full list.    ALLERGIES:  Penicillin, Percodan and Phenergan.    REVIEW OF SYSTEMS:  Denies postprandial pain or DVT.  All other systems including eyes, ENT, respiratory, musculoskeletal, breast,   neurologic, psychiatric, heme, lymph, allergy and immune are negative.    PHYSICAL EXAMINATION:  VITAL SIGNS:  See nursing notes.  GENERAL:  She is in no acute distress.  RESPIRATORY:  Normal effort.  Clear to claudication.  CARDIOVASCULAR:  Regular rhythm.  Nondisplaced PMI, no murmur.  VASCULAR:  L radial not palp  EXTREMITIES:   Good thrill proximally with moderate pulsatility.  Ecchymosis in upper arm  NEUROLOGIC:  Cranial nerves VII through XII intact.  5/5 motor strength in all   extremities.    Imaging:  Recurent distal AVF stenosis, no confluence stenosis, flow volume decreased to 1.3 L (prior 2.3 L    Fistualgram personally revieed    ASSESSMENT:  Recurrent L AVF stneosis    RECOMMENDATION:  1.  L fistulagram and probable stent placement 11/7/19    I have explained the risks, benefits and alternatives for this procedure in detail.  The patients voices understanding and all questions have be answered,  and agrees to proceed with the procedure.    JULIO CÉSAR Perry III, MD, FACS  Professor and Chief, Vascular and Endovascular Surgery    CC: Karla Iglesias D.O.

## 2019-11-06 ENCOUNTER — TELEPHONE (OUTPATIENT)
Dept: VASCULAR SURGERY | Facility: CLINIC | Age: 74
End: 2019-11-06

## 2019-11-06 ENCOUNTER — ANESTHESIA EVENT (OUTPATIENT)
Dept: SURGERY | Facility: HOSPITAL | Age: 74
End: 2019-11-06
Payer: MEDICARE

## 2019-11-06 NOTE — PRE-PROCEDURE INSTRUCTIONS
PREOP INSTRUCTIONS:Patient instructed to have nothing by mouth past midnight.It is ok to take am medications with a few sips of water.Shower instructions as well as directions to the 2nd [facility] were given.Patient encouraged to wear loose fitting,comfortable clothing.Medication instructions for pm prior to and am of procedure reviewed.Instructed patient to avoid taking vitamins,supplements and ibuprofen the morning of surgery.Patient stated an understanding.    Patient reported that she did experience a problem with anesthesia in the past and was told to relate the story prior to any anesthetic event in the future.Many years ago at [facility] patient stated that she suffered a respiratory arrest upon induction.The procedure was evaluation for heart stents.    Patient does not know arrival time.Explained that this information comes from the surgeon's office and if they haven't heard from them by 2 or 3 pm to call the office.Patient stated an understanding.

## 2019-11-06 NOTE — ANESTHESIA PREPROCEDURE EVALUATION
11/06/2019  Zoey Ham is a 74 y.o., female.    Anesthesia Evaluation    I have reviewed the Patient Summary Reports.     I have reviewed the Medications.     Review of Systems  Anesthesia Hx:  History of prior surgery of interest to airway management or planning: Personal Hx of Anesthesia complications   Social:  Non-Smoker    Hematology/Oncology:  Hematology Normal        EENT/Dental:EENT/Dental Normal   Cardiovascular:   Exercise tolerance: good Hypertension Past MI CAD asymptomatic  CHF    Pulmonary:   Pneumonia Denies Shortness of breath.    Renal/:   Chronic Renal Disease, ESRD, Dialysis    Hepatic/GI:   GERD    Neurological:   Neuromuscular Disease,    Endocrine:   Diabetes        Physical Exam  General:  Well nourished    Airway/Jaw/Neck:  Airway Findings: Mouth Opening: Normal Tongue: Normal  General Airway Assessment: Adult  Mallampati: II  TM Distance: Normal, at least 6 cm  Jaw/Neck Findings:  Neck ROM: Normal ROM      Dental:  Dental Findings: In tact   Chest/Lungs:  Chest/Lungs Findings: Clear to auscultation, Normal Respiratory Rate     Heart/Vascular:  Heart Findings: Rate: Normal  Rhythm: Regular Rhythm  Sounds: Normal        Mental Status:  Mental Status Findings:  Cooperative, Alert and Oriented         Anesthesia Plan  Type of Anesthesia, risks & benefits discussed:  Anesthesia Type:  MAC  Patient's Preference:   Intra-op Monitoring Plan: standard ASA monitors  Intra-op Monitoring Plan Comments:   Post Op Pain Control Plan: multimodal analgesia  Post Op Pain Control Plan Comments:   Induction:   IV  Beta Blocker:  Patient is on a Beta-Blocker and has received one dose within the past 24 hours (No further documentation required).       Informed Consent: Patient understands risks and agrees with Anesthesia plan.  Questions answered. Anesthesia consent signed with patient.  ASA Score:  3     Day of Surgery Review of History & Physical:    H&P update referred to the surgeon.         Ready For Surgery From Anesthesia Perspective.    Patient reported that she did experience a problem with anesthesia in the past and was told to relate the story prior to any anesthetic event in the future.Many years ago at [facility] patient stated that she suffered a respiratory arrest-stopped breathing- upon induction.The procedure was evaluation for heart stents.

## 2019-11-07 ENCOUNTER — HOSPITAL ENCOUNTER (OUTPATIENT)
Facility: HOSPITAL | Age: 74
Discharge: HOME | End: 2019-11-07
Attending: SURGERY | Admitting: SURGERY
Payer: MEDICARE

## 2019-11-07 ENCOUNTER — ANESTHESIA (OUTPATIENT)
Dept: SURGERY | Facility: HOSPITAL | Age: 74
End: 2019-11-07
Payer: MEDICARE

## 2019-11-07 VITALS
DIASTOLIC BLOOD PRESSURE: 69 MMHG | OXYGEN SATURATION: 100 % | BODY MASS INDEX: 28.17 KG/M2 | HEART RATE: 66 BPM | SYSTOLIC BLOOD PRESSURE: 169 MMHG | HEIGHT: 64 IN | RESPIRATION RATE: 18 BRPM | WEIGHT: 165 LBS | TEMPERATURE: 98 F

## 2019-11-07 DIAGNOSIS — N18.6 ESRD (END STAGE RENAL DISEASE): ICD-10-CM

## 2019-11-07 LAB
POCT GLUCOSE: 75 MG/DL (ref 70–110)
POCT GLUCOSE: 83 MG/DL (ref 70–110)

## 2019-11-07 PROCEDURE — 25000003 PHARM REV CODE 250: Mod: HCNC | Performed by: SURGERY

## 2019-11-07 PROCEDURE — 25500020 PHARM REV CODE 255: Mod: HCNC | Performed by: SURGERY

## 2019-11-07 PROCEDURE — D9220A PRA ANESTHESIA: ICD-10-PCS | Mod: HCNC,ANES,, | Performed by: ANESTHESIOLOGY

## 2019-11-07 PROCEDURE — C1894 INTRO/SHEATH, NON-LASER: HCPCS | Mod: HCNC | Performed by: SURGERY

## 2019-11-07 PROCEDURE — 63600175 PHARM REV CODE 636 W HCPCS: Mod: HCNC | Performed by: NURSE ANESTHETIST, CERTIFIED REGISTERED

## 2019-11-07 PROCEDURE — 36000707: Mod: HCNC | Performed by: SURGERY

## 2019-11-07 PROCEDURE — 63600175 PHARM REV CODE 636 W HCPCS: Mod: HCNC | Performed by: SURGERY

## 2019-11-07 PROCEDURE — 37000009 HC ANESTHESIA EA ADD 15 MINS: Mod: HCNC | Performed by: SURGERY

## 2019-11-07 PROCEDURE — 36903 INTRO CATH DIALYSIS CIRCUIT: CPT | Mod: HCNC,,, | Performed by: SURGERY

## 2019-11-07 PROCEDURE — D9220A PRA ANESTHESIA: Mod: HCNC,CRNA,, | Performed by: NURSE ANESTHETIST, CERTIFIED REGISTERED

## 2019-11-07 PROCEDURE — C1725 CATH, TRANSLUMIN NON-LASER: HCPCS | Mod: HCNC | Performed by: SURGERY

## 2019-11-07 PROCEDURE — D9220A PRA ANESTHESIA: Mod: HCNC,ANES,, | Performed by: ANESTHESIOLOGY

## 2019-11-07 PROCEDURE — 36903 PR INTRO CATH, DIALYSIS CIRCUIT W/TRANSCATH PLCMNT, STENT: ICD-10-PCS | Mod: HCNC,,, | Performed by: SURGERY

## 2019-11-07 PROCEDURE — C1769 GUIDE WIRE: HCPCS | Mod: HCNC | Performed by: SURGERY

## 2019-11-07 PROCEDURE — 71000015 HC POSTOP RECOV 1ST HR: Mod: HCNC | Performed by: SURGERY

## 2019-11-07 PROCEDURE — C1876 STENT, NON-COA/NON-COV W/DEL: HCPCS | Mod: HCNC | Performed by: SURGERY

## 2019-11-07 PROCEDURE — D9220A PRA ANESTHESIA: ICD-10-PCS | Mod: HCNC,CRNA,, | Performed by: NURSE ANESTHETIST, CERTIFIED REGISTERED

## 2019-11-07 PROCEDURE — 82962 GLUCOSE BLOOD TEST: CPT | Mod: HCNC | Performed by: SURGERY

## 2019-11-07 PROCEDURE — 27201423 OPTIME MED/SURG SUP & DEVICES STERILE SUPPLY: Mod: HCNC | Performed by: SURGERY

## 2019-11-07 PROCEDURE — 82962 GLUCOSE BLOOD TEST: CPT | Mod: HCNC,91 | Performed by: SURGERY

## 2019-11-07 PROCEDURE — 36000706: Mod: HCNC | Performed by: SURGERY

## 2019-11-07 PROCEDURE — 37000008 HC ANESTHESIA 1ST 15 MINUTES: Mod: HCNC | Performed by: SURGERY

## 2019-11-07 DEVICE — IMPLANTABLE DEVICE: Type: IMPLANTABLE DEVICE | Site: ARTERIAL | Status: FUNCTIONAL

## 2019-11-07 RX ORDER — LIDOCAINE HYDROCHLORIDE 10 MG/ML
INJECTION, SOLUTION EPIDURAL; INFILTRATION; INTRACAUDAL; PERINEURAL
Status: DISCONTINUED | OUTPATIENT
Start: 2019-11-07 | End: 2019-11-07 | Stop reason: HOSPADM

## 2019-11-07 RX ORDER — FENTANYL CITRATE 50 UG/ML
INJECTION, SOLUTION INTRAMUSCULAR; INTRAVENOUS
Status: DISCONTINUED | OUTPATIENT
Start: 2019-11-07 | End: 2019-11-07

## 2019-11-07 RX ORDER — IODIXANOL 320 MG/ML
INJECTION, SOLUTION INTRAVASCULAR
Status: DISCONTINUED | OUTPATIENT
Start: 2019-11-07 | End: 2019-11-07 | Stop reason: HOSPADM

## 2019-11-07 RX ORDER — HYDROCODONE BITARTRATE AND ACETAMINOPHEN 5; 325 MG/1; MG/1
1 TABLET ORAL EVERY 4 HOURS PRN
Status: DISCONTINUED | OUTPATIENT
Start: 2019-11-07 | End: 2019-11-07 | Stop reason: HOSPADM

## 2019-11-07 RX ORDER — SODIUM CHLORIDE 0.9 % (FLUSH) 0.9 %
10 SYRINGE (ML) INJECTION
Status: DISCONTINUED | OUTPATIENT
Start: 2019-11-07 | End: 2019-11-07 | Stop reason: HOSPADM

## 2019-11-07 RX ORDER — SODIUM CHLORIDE 9 MG/ML
INJECTION, SOLUTION INTRAVENOUS CONTINUOUS PRN
Status: DISCONTINUED | OUTPATIENT
Start: 2019-11-07 | End: 2019-11-07

## 2019-11-07 RX ORDER — MIDAZOLAM HYDROCHLORIDE 1 MG/ML
INJECTION, SOLUTION INTRAMUSCULAR; INTRAVENOUS
Status: DISCONTINUED | OUTPATIENT
Start: 2019-11-07 | End: 2019-11-07

## 2019-11-07 RX ORDER — VANCOMYCIN HCL IN 5 % DEXTROSE 1G/250ML
1000 PLASTIC BAG, INJECTION (ML) INTRAVENOUS
Status: DISCONTINUED | OUTPATIENT
Start: 2019-11-07 | End: 2019-11-07

## 2019-11-07 RX ORDER — HEPARIN SODIUM 1000 [USP'U]/ML
INJECTION, SOLUTION INTRAVENOUS; SUBCUTANEOUS
Status: DISCONTINUED | OUTPATIENT
Start: 2019-11-07 | End: 2019-11-07 | Stop reason: HOSPADM

## 2019-11-07 RX ADMIN — SODIUM CHLORIDE: 9 INJECTION, SOLUTION INTRAVENOUS at 08:11

## 2019-11-07 RX ADMIN — FENTANYL CITRATE 50 MCG: 50 INJECTION, SOLUTION INTRAMUSCULAR; INTRAVENOUS at 09:11

## 2019-11-07 RX ADMIN — MIDAZOLAM HYDROCHLORIDE 2 MG: 1 INJECTION, SOLUTION INTRAMUSCULAR; INTRAVENOUS at 09:11

## 2019-11-07 NOTE — TRANSFER OF CARE
"Anesthesia Transfer of Care Note    Patient: Zoey Ham    Procedure(s) Performed: Procedure(s) (LRB):  Fistulogram (Left)  STENT, FISTULA (Left)    [facility]    Anesthesia Type: MAC    Transport from OR: Transported from OR on room air with adequate spontaneous ventilation    Post pain: adequate analgesia    Post assessment: no apparent anesthetic complications and tolerated procedure well    Post vital signs: stable    Level of consciousness: awake, alert and oriented    Nausea/Vomiting: no nausea/vomiting    Complications: none    Transfer of care protocol was followed      Last vitals:   Visit Vitals  BP (!) 178/74 (BP Location: Right arm, Patient Position: Lying)   Pulse 63   Temp 36.7 °C (98.1 °F) (Oral)   Resp 18   Ht 5' 4" (1.626 m)   Wt 74.8 kg (165 lb)   LMP  (LMP Unknown)   SpO2 100%   Breastfeeding? No   BMI 28.32 kg/m²     "

## 2019-11-07 NOTE — ANESTHESIA POSTPROCEDURE EVALUATION
Anesthesia Post Evaluation    Patient: Zoey Ham    Procedure(s) Performed: Procedure(s) (LRB):  Fistulogram (Left)  STENT, FISTULA (Left)    Final Anesthesia Type: MAC  [facility]  Patient participation: Yes- Able to Participate  Level of consciousness: awake and alert and oriented  Post-procedure vital signs: reviewed and stable  Pain management: adequate  Airway patency: patent  PONV status at discharge: No PONV  Anesthetic complications: no      Cardiovascular status: blood pressure returned to baseline and hemodynamically stable  Respiratory status: unassisted, spontaneous ventilation and room air  Hydration status: euvolemic  Follow-up not needed.          Vitals Value Taken Time   /84 11/7/2019 10:22 AM   Temp 36.4 °C (97.5 °F) 11/7/2019  9:50 AM   Pulse 64 11/7/2019 10:25 AM   Resp 27 11/7/2019 10:23 AM   SpO2 100 % 11/7/2019 10:25 AM   Vitals shown include unvalidated device data.      No case tracking events are documented in the log.      Pain/Claude Score: Claude Score: 10 (11/7/2019 10:30 AM)         Skin normal color for race, warm, dry and intact. No evidence of rash.

## 2019-11-07 NOTE — PLAN OF CARE
PT is AAOx4. VSS. NAD. IV discontinued. Denies pain or nausea. Discharge instructions given. Thrill felt. Positive pulses. Discharged home with family.

## 2019-11-07 NOTE — BRIEF OP NOTE
[facility]  Brief Operative Note     SUMMARY     Surgery Date: 11/7/2019     Surgeon(s) and Role:     * YAMEL Perry III, MD - Primary     * Joan Nieves MD - Fellow    Assisting Surgeon: None    Pre-op Diagnosis:  Stenosis of arteriovenous dialysis fistula, subsequent encounter [T82.858D]    Post-op Diagnosis:  Post-Op Diagnosis Codes:     * Stenosis of arteriovenous dialysis fistula, subsequent encounter [T82.858D]    PROCEDURES:    1. Stent placement, L AVF (10x40 Lifestent)  2. PTA, L AVF (8x40 [place])  3. Fistulagram    Anesthesia: Local MAc  Description of the findings of the procedure: Recurrent >70% mid AVF stenosis, < 10% residual    Findings/Key Components: as above    Estimated Blood Loss: <3cc         Specimens:   Specimen (12h ago, onward)    None          Discharge Note    SUMMARY     Admit Date: 11/7/2019    Discharge Date and Time: 11/7/19    Hospital Course (synopsis of major diagnoses, care, treatment, and services provided during the course of the hospital stay): successful outpatient procedure    Final Diagnosis: Post-Op Diagnosis Codes:     * Stenosis of arteriovenous dialysis fistula, subsequent encounter [T82.858D]    Disposition: home    Follow Up/Patient Instructions: Diet: renal  Act: ad marissa  FU: 1 week with AVF duplex     Medications: pre-op

## 2019-11-07 NOTE — DISCHARGE INSTRUCTIONS
Hemodialysis Access Bleeding  You have a hemodialysis access in your arm, either an arteriovenous (AV) fistula or an artery to vein graft. It has been bleeding. Blood needs to flow freely through the fistula or graft. As part of your treatment, you are also taking medicine that thins your blood. This makes you bleed more easily. It is important to stop your fistula or graft from bleeding as soon as possible--you can quickly bleed to death from a rapidly bleeding hemodialysis access.     For a quickly bleeding fistula or graft, use firm pressure with a gauze or cloth until it stops.   Home care  If your fistula or graft site is bleeding:  · If it is bleeding quickly, immediately put pressure right on the spot that is bleeding with a gauze or cloth. Push hard till it stops. Then, get medical help right away.  · If it is just oozing a small amount of blood, follow these directions:  · Wash your hands. Dry them on a clean towel.  · Put a small piece of sterile gauze on the area that is bleeding.  · Press gently with your fingertips only on the area that is bleeding. Hold your fingers there for 30 minutes. Do not press on the whole forearm. Do not wrap anything around the wrist or arm, including bandages.  · After 30 minutes, take your fingers off the area that was bleeding.  · Wash and dry your hands again.  · If it is still bleeding, call your healthcare provider or go to a hospital.  General access site care  The following guidelines will help you care for your access site:  · Wash your hands often. Keep the access site clean.  · Check the fistula or graft for the pulsing feeling (thrill) every morning and night.  · Do not:  ¨ Let anyone take your blood pressure on your hemodialysis access arm.  ¨ Let anyone take blood from or inject medication into the arm.  ¨ Wear jewelry or tight sleeves over the access site.  ¨ Wiggle the fistula or graft, or pick at your skin near the access.  ¨ Carry anything over the arm or  lift heavy objects with that arm.  ¨ Sleep on your arm with the access site.  Follow-up care  Follow up with your healthcare provider, or as advised.  Call 911  Call 911 if any of the following occur:  · Pain, cold, or numbness in the hand that won't go away  · Pale color of the hand that won't go back to pink  · Any major bleeding, or minor bleeding from the access site that won't stop  When to seek medical advice  Call your healthcare provider right away if you have any of the following:  · Fever of 100.4°F (38°C) or higher, or as directed by your healthcare provider  · Red, hot, or swollen area around the access site  · Light-colored fluid coming from the area around the access site  · Pain or hardness around the access site  · Loss of pulsing feeling (thrill) over the fistula or graft     Incision Care  Remember: Follow-up visits allow your healthcare provider to make sure your incision is healing well. Be sure to keep your appointments.     Stitches (sutures), surgical staples, special strips of surgical tape, or surgical skin glue may be used to close incisions. They also help stop bleeding and speed healing. To help your incision heal, follow the tips on this handout.  Home care  Tips for home care include the following:  · Always wash your hands before touching your incision.  · Keep your incision clean and dry.  · Avoid doing things that could cause dirt or sweat to get on your incision.  · Dont pick at scabs. They help protect the wound.  · Keep your incision out of water.  · Take a sponge bath to avoid getting your incision wet, unless your healthcare provider tells you otherwise.  · Ask your provider when can you take a shower or bathe.  · Ask your provider about the best way to keep your incision dry when bathing or showering.  · Pat stitches dry if they get wet. Dont rub.  · Leave the bandage (dressing) in place until you are told to remove it or change it. Change it only as directed, using clean  hands.  · After the first 12 hours, change your dressing every 24 hours, or as directed by your healthcare provider.  · Change your dressing if it gets wet or soiled.  Care for specific closures  Follow these guidelines unless your healthcare provider tells you otherwise:  · Stitches or staples. Once you no longer need to keep these dry, clean the wound daily. First remove the bandage using clean hands. Then wash the area gently with soap and warm water. Use a wet cotton swab to loosen and remove any blood or crust that forms. After cleaning, put a thin layer of antibiotic ointment on. Then put on a new bandage.  · Skin glue. Dont put liquid, ointment, or cream on your wound while the glue is in place. Avoid activities that cause heavy sweating. Protect the wound from sunlight. Do not scratch, rub, or pick at the glue. Do not put tape directly over the glue. The glue should peel off within 5 to 10 days.  · Surgical tape. Keep the area dry. If it gets wet, blot the area dry with a clean towel. Surgical tape usually falls off within 7 to 10 days. If it has not fallen off after 10 days, contact your healthcare provider before taking it off yourself. If you are told to remove the tape, put mineral oil or petroleum jelly on a cotton ball. Gently rub the tape until it is removed.  Changing your dressing  Leave the dressing (bandage) in place until you are told to remove it or change it. Follow the instructions below unless told otherwise by your healthcare provider:  · Always wash your hands before changing your dressing.  · After the first 48 hours, the incision wound usually will have closed. At this point, leave the incision uncovered and open to the air. If the incision has not closed keep it covered.  · Cover your incision only if your clothing is rubbing it or causing irritation.  · Change your dressing if it gets wet or soiled.  Follow-up care  Follow up with your healthcare provider to ask how long sutures or  staples should be left in place. Be sure to return for stitch or staple removal as directed. If dissolving stitches were used in your mouth, these will not need to be removed. They should fall out or dissolve on their own.  If tape closures were used, remove them yourself when your provider recommends if they have not fallen off on their own. If skin glue was used, the glue will wear off by itself.  When to seek medical care  Call your healthcare provider if you have any of the following:  · More pain, redness, swelling, bleeding, or foul-smelling discharge around the incision area  · Fever of 100.4°F (38ºC) or higher, or as directed by your healthcare provider  · Shaking chills  · Vomiting or nausea that doesn't go away  · Numbness, coldness, or tingling around the incision area, or changes in skin color  · Opening of the sutures or wound  · Stitches or staples come apart or fall out or surgical tape falls off before 7 days or as directed by your healthcare provider   Date Last Reviewed: 12/1/2016  © 3100-8603 [employer]. [address] [place] [address]. All rights reserved. This information is not intended as a substitute for professional medical care. Always follow your healthcare professional's instructions.

## 2019-11-08 NOTE — OP NOTE
Date: 11/07/2019    Surgeon(s) and Role:   YAMEL Perry III, MD, MD - Primary   Joan Nieves MD - Assisting     Pre-op Diagnosis:   Stenosis of arteriovenous dialysis fistula, subsequent encounter [T82.507D]    Post-op Diagnosis: Same     Procedure(s):   1. Stent placement, L AVF (10x40 Lifestent)  2. PTA, L AVF (8x40 [place])  3. Fistulagram    Anesthesia: Local/MAC    Findings/Key Components:   Recurrent >70% mid AVF stenosis, < 10% residuall     EBL: Minimal    PROCEDURE IN DETAIL:  The patient was brought to the OR, placed in supine. L arm was prepped and draped in the standard surgical fashion. The proximal aspect of the AVF was accessed with a micropuncture needle, followed by placement of 4/3-Dominican micropuncture dilator. Fistulogram was performed and revealed a recurrent >70% mid AVF stenosis. Through this, an 0.035-inch wire was placed in the short 6-Dominican sheath and advanced through the high-grade stenosis. This was treated with a 10x40 life stent, then post dilated with an 8x40 dorado balloon. <10% residual stenosis was noted on completion angiogram. Strong thrill could be felt. The sheath was removed, and a 4-0 monocryl stitch was placed with good hemostasis.    Dr Perry was present for the procedure    Joan Nieves MD   Fellow, Vascular/Endovascular Surgery

## 2019-11-11 ENCOUNTER — TELEPHONE (OUTPATIENT)
Dept: INTERNAL MEDICINE | Facility: CLINIC | Age: 74
End: 2019-11-11

## 2019-11-11 NOTE — TELEPHONE ENCOUNTER
LVM informing of [facility] 11/12/19, left [phone] to cancel/rescheudle

## 2019-11-22 ENCOUNTER — PATIENT OUTREACH (OUTPATIENT)
Dept: ADMINISTRATIVE | Facility: OTHER | Age: 74
End: 2019-11-22

## 2020-01-01 ENCOUNTER — LAB VISIT (OUTPATIENT)
Dept: SURGERY | Facility: CLINIC | Age: 75
End: 2020-01-01
Payer: MEDICARE

## 2020-01-01 ENCOUNTER — TELEPHONE (OUTPATIENT)
Dept: PRIMARY CARE CLINIC | Facility: CLINIC | Age: 75
End: 2020-01-01

## 2020-01-01 ENCOUNTER — OFFICE VISIT (OUTPATIENT)
Dept: PRIMARY CARE CLINIC | Facility: CLINIC | Age: 75
End: 2020-01-01
Payer: MEDICARE

## 2020-01-01 ENCOUNTER — TELEPHONE (OUTPATIENT)
Dept: NEPHROLOGY | Facility: CLINIC | Age: 75
End: 2020-01-01

## 2020-01-01 ENCOUNTER — HOSPITAL ENCOUNTER (OUTPATIENT)
Dept: VASCULAR SURGERY | Facility: CLINIC | Age: 75
Discharge: HOME | End: 2020-09-01
Attending: SURGERY
Payer: MEDICARE

## 2020-01-01 ENCOUNTER — PATIENT MESSAGE (OUTPATIENT)
Dept: PRIMARY CARE CLINIC | Facility: CLINIC | Age: 75
End: 2020-01-01

## 2020-01-01 ENCOUNTER — OFFICE VISIT (OUTPATIENT)
Dept: OPTOMETRY | Facility: CLINIC | Age: 75
End: 2020-01-01
Payer: MEDICARE

## 2020-01-01 ENCOUNTER — PES CALL (OUTPATIENT)
Dept: ADMINISTRATIVE | Facility: CLINIC | Age: 75
End: 2020-01-01

## 2020-01-01 ENCOUNTER — TELEPHONE (OUTPATIENT)
Dept: OPHTHALMOLOGY | Facility: CLINIC | Age: 75
End: 2020-01-01

## 2020-01-01 ENCOUNTER — HOSPITAL ENCOUNTER (OUTPATIENT)
Dept: VASCULAR SURGERY | Facility: CLINIC | Age: 75
Discharge: HOME | End: 2020-08-18
Attending: STUDENT IN AN ORGANIZED HEALTH CARE EDUCATION/TRAINING PROGRAM
Payer: MEDICARE

## 2020-01-01 ENCOUNTER — TELEPHONE (OUTPATIENT)
Dept: INTERNAL MEDICINE | Facility: CLINIC | Age: 75
End: 2020-01-01

## 2020-01-01 ENCOUNTER — OFFICE VISIT (OUTPATIENT)
Dept: VASCULAR SURGERY | Facility: CLINIC | Age: 75
End: 2020-01-01
Payer: MEDICARE

## 2020-01-01 ENCOUNTER — TELEPHONE (OUTPATIENT)
Dept: VASCULAR SURGERY | Facility: CLINIC | Age: 75
End: 2020-01-01

## 2020-01-01 ENCOUNTER — PATIENT MESSAGE (OUTPATIENT)
Dept: ADMINISTRATIVE | Facility: HOSPITAL | Age: 75
End: 2020-01-01

## 2020-01-01 ENCOUNTER — PATIENT OUTREACH (OUTPATIENT)
Dept: ADMINISTRATIVE | Facility: OTHER | Age: 75
End: 2020-01-01

## 2020-01-01 ENCOUNTER — OFFICE VISIT (OUTPATIENT)
Dept: VASCULAR SURGERY | Facility: CLINIC | Age: 75
End: 2020-01-01
Attending: SURGERY
Payer: MEDICARE

## 2020-01-01 ENCOUNTER — PATIENT OUTREACH (OUTPATIENT)
Dept: ADMINISTRATIVE | Facility: HOSPITAL | Age: 75
End: 2020-01-01

## 2020-01-01 ENCOUNTER — HOSPITAL ENCOUNTER (OUTPATIENT)
Dept: VASCULAR SURGERY | Facility: CLINIC | Age: 75
Discharge: HOME | End: 2020-07-21
Attending: SURGERY
Payer: MEDICARE

## 2020-01-01 ENCOUNTER — HOSPITAL ENCOUNTER (OUTPATIENT)
Dept: VASCULAR SURGERY | Facility: CLINIC | Age: 75
Discharge: HOME | End: 2020-10-06
Attending: SURGERY
Payer: MEDICARE

## 2020-01-01 ENCOUNTER — HOSPITAL ENCOUNTER (OUTPATIENT)
Facility: HOSPITAL | Age: 75
Discharge: HOME | End: 2020-07-30
Attending: SURGERY | Admitting: SURGERY
Payer: MEDICARE

## 2020-01-01 VITALS
SYSTOLIC BLOOD PRESSURE: 142 MMHG | WEIGHT: 162.69 LBS | DIASTOLIC BLOOD PRESSURE: 80 MMHG | HEART RATE: 73 BPM | HEIGHT: 64 IN | OXYGEN SATURATION: 96 % | BODY MASS INDEX: 27.77 KG/M2

## 2020-01-01 VITALS
DIASTOLIC BLOOD PRESSURE: 73 MMHG | SYSTOLIC BLOOD PRESSURE: 177 MMHG | HEART RATE: 67 BPM | TEMPERATURE: 99 F | WEIGHT: 160.94 LBS | BODY MASS INDEX: 27.48 KG/M2 | HEIGHT: 64 IN

## 2020-01-01 VITALS
SYSTOLIC BLOOD PRESSURE: 186 MMHG | HEIGHT: 64 IN | TEMPERATURE: 98 F | BODY MASS INDEX: 27.31 KG/M2 | WEIGHT: 160 LBS | DIASTOLIC BLOOD PRESSURE: 88 MMHG | HEART RATE: 63 BPM | RESPIRATION RATE: 18 BRPM | OXYGEN SATURATION: 97 %

## 2020-01-01 VITALS
HEART RATE: 71 BPM | DIASTOLIC BLOOD PRESSURE: 71 MMHG | HEIGHT: 64 IN | SYSTOLIC BLOOD PRESSURE: 187 MMHG | WEIGHT: 163.56 LBS | TEMPERATURE: 100 F | BODY MASS INDEX: 27.92 KG/M2

## 2020-01-01 VITALS
TEMPERATURE: 99 F | WEIGHT: 163.13 LBS | SYSTOLIC BLOOD PRESSURE: 190 MMHG | DIASTOLIC BLOOD PRESSURE: 84 MMHG | HEART RATE: 73 BPM | HEIGHT: 64 IN | BODY MASS INDEX: 27.85 KG/M2

## 2020-01-01 VITALS
HEART RATE: 67 BPM | HEIGHT: 64 IN | BODY MASS INDEX: 27.48 KG/M2 | TEMPERATURE: 99 F | SYSTOLIC BLOOD PRESSURE: 178 MMHG | DIASTOLIC BLOOD PRESSURE: 77 MMHG | WEIGHT: 160.94 LBS

## 2020-01-01 DIAGNOSIS — E21.3 HYPERPARATHYROIDISM: ICD-10-CM

## 2020-01-01 DIAGNOSIS — I77.1 ARTERIAL STENOSIS: ICD-10-CM

## 2020-01-01 DIAGNOSIS — N18.6 ESRD (END STAGE RENAL DISEASE) ON DIALYSIS: ICD-10-CM

## 2020-01-01 DIAGNOSIS — I25.700 CORONARY ARTERY DISEASE INVOLVING CORONARY BYPASS GRAFT OF NATIVE HEART WITH UNSTABLE ANGINA PECTORIS: ICD-10-CM

## 2020-01-01 DIAGNOSIS — Z99.2 END STAGE RENAL FAILURE ON DIALYSIS: ICD-10-CM

## 2020-01-01 DIAGNOSIS — N18.6 TYPE 2 DIABETES MELLITUS WITH CHRONIC KIDNEY DISEASE ON CHRONIC DIALYSIS, WITH LONG-TERM CURRENT USE OF INSULIN: ICD-10-CM

## 2020-01-01 DIAGNOSIS — T82.858D STENOSIS OF ARTERIOVENOUS DIALYSIS FISTULA, SUBSEQUENT ENCOUNTER: ICD-10-CM

## 2020-01-01 DIAGNOSIS — N18.6 END STAGE RENAL FAILURE ON DIALYSIS: Primary | ICD-10-CM

## 2020-01-01 DIAGNOSIS — N18.6 END STAGE RENAL FAILURE ON DIALYSIS: ICD-10-CM

## 2020-01-01 DIAGNOSIS — D69.2 SENILE PURPURA: ICD-10-CM

## 2020-01-01 DIAGNOSIS — Z66 DNR (DO NOT RESUSCITATE): ICD-10-CM

## 2020-01-01 DIAGNOSIS — Z99.2 TYPE 2 DIABETES MELLITUS WITH CHRONIC KIDNEY DISEASE ON CHRONIC DIALYSIS, WITH LONG-TERM CURRENT USE OF INSULIN: ICD-10-CM

## 2020-01-01 DIAGNOSIS — H02.834 DERMATOCHALASIS OF LEFT UPPER EYELID: ICD-10-CM

## 2020-01-01 DIAGNOSIS — I50.42 CHRONIC COMBINED SYSTOLIC AND DIASTOLIC CONGESTIVE HEART FAILURE: ICD-10-CM

## 2020-01-01 DIAGNOSIS — H26.492 AFTER-CATARACT OBSCURING VISION, LEFT: Primary | ICD-10-CM

## 2020-01-01 DIAGNOSIS — E11.22 TYPE 2 DIABETES MELLITUS WITH CHRONIC KIDNEY DISEASE ON CHRONIC DIALYSIS, WITH LONG-TERM CURRENT USE OF INSULIN: ICD-10-CM

## 2020-01-01 DIAGNOSIS — D63.1 ANEMIA OF CHRONIC RENAL FAILURE, STAGE 5: ICD-10-CM

## 2020-01-01 DIAGNOSIS — Z12.31 BREAST CANCER SCREENING BY MAMMOGRAM: Primary | ICD-10-CM

## 2020-01-01 DIAGNOSIS — H52.4 MYOPIA WITH PRESBYOPIA OF LEFT EYE: ICD-10-CM

## 2020-01-01 DIAGNOSIS — Z79.4 TYPE 2 DIABETES MELLITUS WITH CHRONIC KIDNEY DISEASE ON CHRONIC DIALYSIS, WITH LONG-TERM CURRENT USE OF INSULIN: ICD-10-CM

## 2020-01-01 DIAGNOSIS — H52.12 MYOPIA WITH PRESBYOPIA OF LEFT EYE: ICD-10-CM

## 2020-01-01 DIAGNOSIS — E11.3553 STABLE PROLIFERATIVE DIABETIC RETINOPATHY OF BOTH EYES ASSOCIATED WITH TYPE 2 DIABETES MELLITUS: Primary | ICD-10-CM

## 2020-01-01 DIAGNOSIS — N18.6 ESRD (END STAGE RENAL DISEASE): ICD-10-CM

## 2020-01-01 DIAGNOSIS — Z99.2 HEMODIALYSIS ACCESS, AV GRAFT: Primary | ICD-10-CM

## 2020-01-01 DIAGNOSIS — I48.20 CHRONIC ATRIAL FIBRILLATION: ICD-10-CM

## 2020-01-01 DIAGNOSIS — R54 AGE-RELATED PHYSICAL DEBILITY: ICD-10-CM

## 2020-01-01 DIAGNOSIS — H54.511A LOW VISION OF RIGHT EYE: ICD-10-CM

## 2020-01-01 DIAGNOSIS — T82.590D MALFUNCTION OF ARTERIOVENOUS DIALYSIS FISTULA, SUBSEQUENT ENCOUNTER: ICD-10-CM

## 2020-01-01 DIAGNOSIS — Z99.2 HEMODIALYSIS ACCESS, AV GRAFT: ICD-10-CM

## 2020-01-01 DIAGNOSIS — I15.0 RENOVASCULAR HYPERTENSION: ICD-10-CM

## 2020-01-01 DIAGNOSIS — H26.492 AFTER-CATARACT OBSCURING VISION, LEFT: ICD-10-CM

## 2020-01-01 DIAGNOSIS — Z01.818 PRE-OP EVALUATION: ICD-10-CM

## 2020-01-01 DIAGNOSIS — N18.6 ESRD (END STAGE RENAL DISEASE): Primary | ICD-10-CM

## 2020-01-01 DIAGNOSIS — T82.898D STEAL SYNDROME AS COMPLICATION OF DIALYSIS ACCESS, SUBSEQUENT ENCOUNTER: Primary | ICD-10-CM

## 2020-01-01 DIAGNOSIS — Z99.2 END STAGE RENAL FAILURE ON DIALYSIS: Primary | ICD-10-CM

## 2020-01-01 DIAGNOSIS — E11.59 HYPERTENSION ASSOCIATED WITH DIABETES: ICD-10-CM

## 2020-01-01 DIAGNOSIS — I25.708 CORONARY ARTERY DISEASE OF BYPASS GRAFT OF NATIVE HEART WITH STABLE ANGINA PECTORIS: ICD-10-CM

## 2020-01-01 DIAGNOSIS — E44.1 MILD PROTEIN-CALORIE MALNUTRITION: ICD-10-CM

## 2020-01-01 DIAGNOSIS — S22.42XD CLOSED FRACTURE OF MULTIPLE RIBS OF LEFT SIDE WITH ROUTINE HEALING, SUBSEQUENT ENCOUNTER: ICD-10-CM

## 2020-01-01 DIAGNOSIS — Z99.2 ESRD (END STAGE RENAL DISEASE) ON DIALYSIS: ICD-10-CM

## 2020-01-01 DIAGNOSIS — N18.5 ANEMIA OF CHRONIC RENAL FAILURE, STAGE 5: ICD-10-CM

## 2020-01-01 DIAGNOSIS — Z01.818 PRE-OP EVALUATION: Primary | ICD-10-CM

## 2020-01-01 DIAGNOSIS — S32.501D: ICD-10-CM

## 2020-01-01 DIAGNOSIS — E11.3553 STABLE PROLIFERATIVE DIABETIC RETINOPATHY OF BOTH EYES ASSOCIATED WITH TYPE 2 DIABETES MELLITUS: ICD-10-CM

## 2020-01-01 DIAGNOSIS — I15.2 HYPERTENSION ASSOCIATED WITH DIABETES: ICD-10-CM

## 2020-01-01 DIAGNOSIS — I70.0 ATHEROSCLEROSIS OF AORTA: ICD-10-CM

## 2020-01-01 DIAGNOSIS — I77.1 SUBCLAVIAN ARTERY STENOSIS, RIGHT: ICD-10-CM

## 2020-01-01 DIAGNOSIS — T82.898A STEAL SYNDROME AS COMPLICATION OF DIALYSIS ACCESS, INITIAL ENCOUNTER: ICD-10-CM

## 2020-01-01 DIAGNOSIS — T82.858D STENOSIS OF ARTERIOVENOUS DIALYSIS FISTULA, SUBSEQUENT ENCOUNTER: Primary | ICD-10-CM

## 2020-01-01 LAB
POCT GLUCOSE: 102 MG/DL (ref 70–110)
SARS-COV-2 RNA RESP QL NAA+PROBE: NOT DETECTED

## 2020-01-01 PROCEDURE — 82962 GLUCOSE BLOOD TEST: CPT | Mod: HCNC | Performed by: SURGERY

## 2020-01-01 PROCEDURE — 1100F PTFALLS ASSESS-DOCD GE2>/YR: CPT | Mod: HCNC,CPTII,S$GLB, | Performed by: OPTOMETRIST

## 2020-01-01 PROCEDURE — 36902 INTRO CATH DIALYSIS CIRCUIT: CPT | Mod: HCNC | Performed by: SURGERY

## 2020-01-01 PROCEDURE — 1126F AMNT PAIN NOTED NONE PRSNT: CPT | Mod: HCNC,S$GLB,, | Performed by: SURGERY

## 2020-01-01 PROCEDURE — 99999 PR PBB SHADOW E&M-EST. PATIENT-LVL III: CPT | Mod: PBBFAC,HCNC,, | Performed by: OPTOMETRIST

## 2020-01-01 PROCEDURE — 93990 PR DUPLEX HEMODIALYSIS ACCESS: ICD-10-PCS | Mod: HCNC,S$GLB,, | Performed by: SURGERY

## 2020-01-01 PROCEDURE — 93923 UPR/LXTR ART STDY 3+ LVLS: CPT | Mod: 52,HCNC,S$GLB, | Performed by: SURGERY

## 2020-01-01 PROCEDURE — 3008F PR BODY MASS INDEX (BMI) DOCUMENTED: ICD-10-PCS | Mod: HCNC,CPTII,S$GLB, | Performed by: SURGERY

## 2020-01-01 PROCEDURE — 36902 PR INTRO CATH, DIALYSIS CIRCUIT W/TRANSLML BALLOON ANGIO: ICD-10-PCS | Mod: HCNC,,, | Performed by: SURGERY

## 2020-01-01 PROCEDURE — 99999 PR PBB SHADOW E&M-EST. PATIENT-LVL IV: ICD-10-PCS | Mod: PBBFAC,HCNC,, | Performed by: SURGERY

## 2020-01-01 PROCEDURE — 1101F PT FALLS ASSESS-DOCD LE1/YR: CPT | Mod: HCNC,CPTII,S$GLB, | Performed by: SURGERY

## 2020-01-01 PROCEDURE — 99999 PR PBB SHADOW E&M-EST. PATIENT-LVL III: ICD-10-PCS | Mod: PBBFAC,HCNC,, | Performed by: SURGERY

## 2020-01-01 PROCEDURE — 1159F PR MEDICATION LIST DOCUMENTED IN MEDICAL RECORD: ICD-10-PCS | Mod: HCNC,S$GLB,, | Performed by: SURGERY

## 2020-01-01 PROCEDURE — 27201423 OPTIME MED/SURG SUP & DEVICES STERILE SUPPLY: Mod: HCNC | Performed by: SURGERY

## 2020-01-01 PROCEDURE — 1126F PR PAIN SEVERITY QUANTIFIED, NO PAIN PRESENT: ICD-10-PCS | Mod: HCNC,S$GLB,, | Performed by: OPTOMETRIST

## 2020-01-01 PROCEDURE — 92012 PR EYE EXAM, EST PATIENT,INTERMED: ICD-10-PCS | Mod: HCNC,S$GLB,, | Performed by: OPTOMETRIST

## 2020-01-01 PROCEDURE — C2623 CATH, TRANSLUMIN, DRUG-COAT: HCPCS | Mod: HCNC | Performed by: SURGERY

## 2020-01-01 PROCEDURE — U0003 INFECTIOUS AGENT DETECTION BY NUCLEIC ACID (DNA OR RNA); SEVERE ACUTE RESPIRATORY SYNDROME CORONAVIRUS 2 (SARS-COV-2) (CORONAVIRUS DISEASE [COVID-19]), AMPLIFIED PROBE TECHNIQUE, MAKING USE OF HIGH THROUGHPUT TECHNOLOGIES AS DESCRIBED BY CMS-2020-01-R: HCPCS | Mod: HCNC

## 2020-01-01 PROCEDURE — 93931 UPPER EXTREMITY STUDY: CPT | Mod: HCNC,S$GLB,, | Performed by: SURGERY

## 2020-01-01 PROCEDURE — 1159F MED LIST DOCD IN RCRD: CPT | Mod: HCNC,S$GLB,, | Performed by: SURGERY

## 2020-01-01 PROCEDURE — 1126F PR PAIN SEVERITY QUANTIFIED, NO PAIN PRESENT: ICD-10-PCS | Mod: HCNC,S$GLB,, | Performed by: INTERNAL MEDICINE

## 2020-01-01 PROCEDURE — 3288F PR FALLS RISK ASSESSMENT DOCUMENTED: ICD-10-PCS | Mod: HCNC,CPTII,S$GLB, | Performed by: OPTOMETRIST

## 2020-01-01 PROCEDURE — 93931 PR DUPLEX UP EXTREM ART UNILAT/LTD: ICD-10-PCS | Mod: HCNC,S$GLB,, | Performed by: SURGERY

## 2020-01-01 PROCEDURE — 99214 OFFICE O/P EST MOD 30 MIN: CPT | Mod: HCNC,S$GLB,, | Performed by: SURGERY

## 2020-01-01 PROCEDURE — 99152 MOD SED SAME PHYS/QHP 5/>YRS: CPT | Mod: HCNC,,, | Performed by: SURGERY

## 2020-01-01 PROCEDURE — 3288F FALL RISK ASSESSMENT DOCD: CPT | Mod: HCNC,CPTII,S$GLB, | Performed by: OPTOMETRIST

## 2020-01-01 PROCEDURE — 99999 PR PBB SHADOW E&M-EST. PATIENT-LVL III: ICD-10-PCS | Mod: PBBFAC,HCNC,, | Performed by: OPTOMETRIST

## 2020-01-01 PROCEDURE — 1159F PR MEDICATION LIST DOCUMENTED IN MEDICAL RECORD: ICD-10-PCS | Mod: HCNC,95,, | Performed by: INTERNAL MEDICINE

## 2020-01-01 PROCEDURE — 92014 COMPRE OPH EXAM EST PT 1/>: CPT | Mod: HCNC,S$GLB,, | Performed by: OPTOMETRIST

## 2020-01-01 PROCEDURE — 99214 PR OFFICE/OUTPT VISIT, EST, LEVL IV, 30-39 MIN: ICD-10-PCS | Mod: HCNC,S$GLB,, | Performed by: SURGERY

## 2020-01-01 PROCEDURE — 99499 UNLISTED E&M SERVICE: CPT | Mod: HCNC,S$GLB,, | Performed by: INTERNAL MEDICINE

## 2020-01-01 PROCEDURE — 36902 INTRO CATH DIALYSIS CIRCUIT: CPT | Mod: HCNC,,, | Performed by: SURGERY

## 2020-01-01 PROCEDURE — 1101F PR PT FALLS ASSESS DOC 0-1 FALLS W/OUT INJ PAST YR: ICD-10-PCS | Mod: HCNC,CPTII,S$GLB, | Performed by: INTERNAL MEDICINE

## 2020-01-01 PROCEDURE — 93990 DOPPLER FLOW TESTING: CPT | Mod: HCNC,S$GLB,, | Performed by: SURGERY

## 2020-01-01 PROCEDURE — 1159F PR MEDICATION LIST DOCUMENTED IN MEDICAL RECORD: ICD-10-PCS | Mod: HCNC,S$GLB,, | Performed by: INTERNAL MEDICINE

## 2020-01-01 PROCEDURE — C1894 INTRO/SHEATH, NON-LASER: HCPCS | Mod: HCNC | Performed by: SURGERY

## 2020-01-01 PROCEDURE — 3008F PR BODY MASS INDEX (BMI) DOCUMENTED: ICD-10-PCS | Mod: HCNC,CPTII,S$GLB, | Performed by: INTERNAL MEDICINE

## 2020-01-01 PROCEDURE — 99152 PR MOD CONSCIOUS SEDATION, SAME PHYS, 5+ YRS, FIRST 15 MIN: ICD-10-PCS | Mod: HCNC,,, | Performed by: SURGERY

## 2020-01-01 PROCEDURE — 63600175 PHARM REV CODE 636 W HCPCS: Mod: HCNC | Performed by: SURGERY

## 2020-01-01 PROCEDURE — 1101F PR PT FALLS ASSESS DOC 0-1 FALLS W/OUT INJ PAST YR: ICD-10-PCS | Mod: HCNC,CPTII,S$GLB, | Performed by: SURGERY

## 2020-01-01 PROCEDURE — 25500020 PHARM REV CODE 255: Mod: HCNC | Performed by: SURGERY

## 2020-01-01 PROCEDURE — 99499 RISK ADDL DX/OHS AUDIT: ICD-10-PCS | Mod: HCNC,S$GLB,, | Performed by: INTERNAL MEDICINE

## 2020-01-01 PROCEDURE — 1126F PR PAIN SEVERITY QUANTIFIED, NO PAIN PRESENT: ICD-10-PCS | Mod: HCNC,S$GLB,, | Performed by: SURGERY

## 2020-01-01 PROCEDURE — 1159F MED LIST DOCD IN RCRD: CPT | Mod: HCNC,95,, | Performed by: INTERNAL MEDICINE

## 2020-01-01 PROCEDURE — 3052F HG A1C>EQUAL 8.0%<EQUAL 9.0%: CPT | Mod: HCNC,CPTII,95,S$GLB | Performed by: INTERNAL MEDICINE

## 2020-01-01 PROCEDURE — 25000003 PHARM REV CODE 250: Mod: HCNC | Performed by: SURGERY

## 2020-01-01 PROCEDURE — 92014 PR EYE EXAM, EST PATIENT,COMPREHESV: ICD-10-PCS | Mod: HCNC,S$GLB,, | Performed by: OPTOMETRIST

## 2020-01-01 PROCEDURE — 99999 PR PBB SHADOW E&M-EST. PATIENT-LVL IV: CPT | Mod: PBBFAC,HCNC,, | Performed by: SURGERY

## 2020-01-01 PROCEDURE — 2023F DILAT RTA XM W/O RTNOPTHY: CPT | Mod: HCNC,S$GLB,, | Performed by: OPTOMETRIST

## 2020-01-01 PROCEDURE — 92134 POSTERIOR SEGMENT OCT RETINA (OCULAR COHERENCE TOMOGRAPHY)-BOTH EYES: ICD-10-PCS | Mod: HCNC,S$GLB,, | Performed by: OPTOMETRIST

## 2020-01-01 PROCEDURE — 2023F PR DILATED RETINAL EXAM W/O EVID OF RETINOPATHY: ICD-10-PCS | Mod: HCNC,S$GLB,, | Performed by: OPTOMETRIST

## 2020-01-01 PROCEDURE — 3052F PR MOST RECENT HEMOGLOBIN A1C LEVEL 8.0 - < 9.0%: ICD-10-PCS | Mod: HCNC,CPTII,95,S$GLB | Performed by: INTERNAL MEDICINE

## 2020-01-01 PROCEDURE — 1101F PT FALLS ASSESS-DOCD LE1/YR: CPT | Mod: HCNC,CPTII,95, | Performed by: INTERNAL MEDICINE

## 2020-01-01 PROCEDURE — 1126F AMNT PAIN NOTED NONE PRSNT: CPT | Mod: HCNC,S$GLB,, | Performed by: OPTOMETRIST

## 2020-01-01 PROCEDURE — 3008F BODY MASS INDEX DOCD: CPT | Mod: HCNC,CPTII,S$GLB, | Performed by: SURGERY

## 2020-01-01 PROCEDURE — 1101F PT FALLS ASSESS-DOCD LE1/YR: CPT | Mod: HCNC,CPTII,S$GLB, | Performed by: INTERNAL MEDICINE

## 2020-01-01 PROCEDURE — 99152 MOD SED SAME PHYS/QHP 5/>YRS: CPT | Mod: HCNC | Performed by: SURGERY

## 2020-01-01 PROCEDURE — 99215 PR OFFICE/OUTPT VISIT, EST, LEVL V, 40-54 MIN: ICD-10-PCS | Mod: HCNC,95,, | Performed by: INTERNAL MEDICINE

## 2020-01-01 PROCEDURE — 1100F PR PT FALLS ASSESS DOC 2+ FALLS/FALL W/INJURY/YR: ICD-10-PCS | Mod: HCNC,CPTII,S$GLB, | Performed by: OPTOMETRIST

## 2020-01-01 PROCEDURE — 92134 CPTRZ OPH DX IMG PST SGM RTA: CPT | Mod: HCNC,S$GLB,, | Performed by: OPTOMETRIST

## 2020-01-01 PROCEDURE — 99999 PR PBB SHADOW E&M-EST. PATIENT-LVL III: CPT | Mod: PBBFAC,HCNC,, | Performed by: SURGERY

## 2020-01-01 PROCEDURE — 99215 OFFICE O/P EST HI 40 MIN: CPT | Mod: HCNC,S$GLB,, | Performed by: INTERNAL MEDICINE

## 2020-01-01 PROCEDURE — 99499 RISK ADDL DX/OHS AUDIT: ICD-10-PCS | Mod: HCNC,95,, | Performed by: INTERNAL MEDICINE

## 2020-01-01 PROCEDURE — 92012 INTRM OPH EXAM EST PATIENT: CPT | Mod: HCNC,S$GLB,, | Performed by: OPTOMETRIST

## 2020-01-01 PROCEDURE — 99215 PR OFFICE/OUTPT VISIT, EST, LEVL V, 40-54 MIN: ICD-10-PCS | Mod: HCNC,S$GLB,, | Performed by: INTERNAL MEDICINE

## 2020-01-01 PROCEDURE — 1159F MED LIST DOCD IN RCRD: CPT | Mod: HCNC,S$GLB,, | Performed by: INTERNAL MEDICINE

## 2020-01-01 PROCEDURE — 93923 PR NON-INVASIVE PHYSIOLOGIC STUDY EXTREMITY 3 LEVELS: ICD-10-PCS | Mod: 52,HCNC,S$GLB, | Performed by: SURGERY

## 2020-01-01 PROCEDURE — C1725 CATH, TRANSLUMIN NON-LASER: HCPCS | Mod: HCNC | Performed by: SURGERY

## 2020-01-01 PROCEDURE — 3008F BODY MASS INDEX DOCD: CPT | Mod: HCNC,CPTII,S$GLB, | Performed by: INTERNAL MEDICINE

## 2020-01-01 PROCEDURE — C1769 GUIDE WIRE: HCPCS | Mod: HCNC | Performed by: SURGERY

## 2020-01-01 PROCEDURE — 99215 OFFICE O/P EST HI 40 MIN: CPT | Mod: HCNC,95,, | Performed by: INTERNAL MEDICINE

## 2020-01-01 PROCEDURE — 1126F AMNT PAIN NOTED NONE PRSNT: CPT | Mod: HCNC,S$GLB,, | Performed by: INTERNAL MEDICINE

## 2020-01-01 PROCEDURE — 1101F PR PT FALLS ASSESS DOC 0-1 FALLS W/OUT INJ PAST YR: ICD-10-PCS | Mod: HCNC,CPTII,95, | Performed by: INTERNAL MEDICINE

## 2020-01-01 PROCEDURE — 99499 UNLISTED E&M SERVICE: CPT | Mod: HCNC,95,, | Performed by: INTERNAL MEDICINE

## 2020-01-01 RX ORDER — CALCITRIOL 0.5 UG/1
0.5 CAPSULE ORAL DAILY
Qty: 90 CAPSULE | Refills: 3 | Status: SHIPPED | OUTPATIENT
Start: 2020-01-01 | End: 2020-01-01 | Stop reason: SDUPTHER

## 2020-01-01 RX ORDER — CLOPIDOGREL BISULFATE 75 MG/1
75 TABLET ORAL DAILY
Qty: 90 TABLET | Refills: 3 | Status: SHIPPED | OUTPATIENT
Start: 2020-01-01

## 2020-01-01 RX ORDER — LIDOCAINE HYDROCHLORIDE 10 MG/ML
1 INJECTION, SOLUTION EPIDURAL; INFILTRATION; INTRACAUDAL; PERINEURAL ONCE
Status: DISCONTINUED | OUTPATIENT
Start: 2020-01-01 | End: 2020-01-01 | Stop reason: HOSPADM

## 2020-01-01 RX ORDER — FENTANYL CITRATE 50 UG/ML
INJECTION, SOLUTION INTRAMUSCULAR; INTRAVENOUS
Status: DISCONTINUED | OUTPATIENT
Start: 2020-01-01 | End: 2020-01-01 | Stop reason: HOSPADM

## 2020-01-01 RX ORDER — MIDAZOLAM HYDROCHLORIDE 1 MG/ML
INJECTION, SOLUTION INTRAMUSCULAR; INTRAVENOUS
Status: DISCONTINUED | OUTPATIENT
Start: 2020-01-01 | End: 2020-01-01 | Stop reason: HOSPADM

## 2020-01-01 RX ORDER — METOPROLOL TARTRATE 100 MG/1
100 TABLET ORAL 2 TIMES DAILY
Qty: 180 TABLET | Refills: 3 | Status: SHIPPED | OUTPATIENT
Start: 2020-01-01 | End: 2020-01-01 | Stop reason: SDUPTHER

## 2020-01-01 RX ORDER — ATORVASTATIN CALCIUM 80 MG/1
80 TABLET, FILM COATED ORAL EVERY MORNING
Qty: 90 TABLET | Refills: 3 | Status: SHIPPED | OUTPATIENT
Start: 2020-01-01

## 2020-01-01 RX ORDER — SYRING-NEEDL,DISP,INSUL,0.3 ML 31GX15/64"
1 SYRINGE, EMPTY DISPOSABLE MISCELLANEOUS DAILY
Qty: 100 SYRINGE | Refills: 3 | Status: SHIPPED | OUTPATIENT
Start: 2020-01-01 | End: 2021-10-06

## 2020-01-01 RX ORDER — METHOXY POLYETHYLENE GLYCOL-EPOETIN BETA 75 UG/.3ML
75 INJECTION, SOLUTION INTRAVENOUS
COMMUNITY
Start: 2020-01-01 | End: 2021-10-04

## 2020-01-01 RX ORDER — CLOPIDOGREL BISULFATE 75 MG/1
75 TABLET ORAL DAILY
Qty: 90 TABLET | Refills: 3 | Status: SHIPPED | OUTPATIENT
Start: 2020-01-01 | End: 2020-01-01 | Stop reason: SDUPTHER

## 2020-01-01 RX ORDER — HEPARIN SODIUM 200 [USP'U]/100ML
INJECTION, SOLUTION INTRAVENOUS
Status: DISCONTINUED | OUTPATIENT
Start: 2020-01-01 | End: 2020-01-01 | Stop reason: HOSPADM

## 2020-01-01 RX ORDER — NITROGLYCERIN 0.4 MG/1
0.4 TABLET SUBLINGUAL EVERY 5 MIN PRN
Qty: 25 TABLET | Refills: 5 | Status: SHIPPED | OUTPATIENT
Start: 2020-01-01 | End: 2020-01-01 | Stop reason: SDUPTHER

## 2020-01-01 RX ORDER — MUPIROCIN 20 MG/G
OINTMENT TOPICAL
Status: DISCONTINUED | OUTPATIENT
Start: 2020-01-01 | End: 2020-01-01 | Stop reason: HOSPADM

## 2020-01-01 RX ORDER — SYRING-NEEDL,DISP,INSUL,0.3 ML 31GX15/64"
1 SYRINGE, EMPTY DISPOSABLE MISCELLANEOUS DAILY
Qty: 100 SYRINGE | Refills: 3 | Status: SHIPPED | OUTPATIENT
Start: 2020-01-01 | End: 2020-01-01 | Stop reason: SDUPTHER

## 2020-01-01 RX ORDER — SODIUM CHLORIDE 0.9 % (FLUSH) 0.9 %
10 SYRINGE (ML) INJECTION
Status: DISCONTINUED | OUTPATIENT
Start: 2020-01-01 | End: 2020-01-01 | Stop reason: HOSPADM

## 2020-01-01 RX ORDER — INSULIN GLARGINE 100 [IU]/ML
INJECTION, SOLUTION SUBCUTANEOUS
Qty: 30 ML | Refills: 3 | Status: ON HOLD | OUTPATIENT
Start: 2020-01-01 | End: 2021-01-01 | Stop reason: SDUPTHER

## 2020-01-01 RX ORDER — INSULIN GLARGINE 100 [IU]/ML
INJECTION, SOLUTION SUBCUTANEOUS
Qty: 30 ML | Refills: 3 | Status: SHIPPED | OUTPATIENT
Start: 2020-01-01 | End: 2020-01-01 | Stop reason: SDUPTHER

## 2020-01-01 RX ORDER — CALCITRIOL 0.5 UG/1
0.5 CAPSULE ORAL DAILY
Qty: 90 CAPSULE | Refills: 3 | Status: SHIPPED | OUTPATIENT
Start: 2020-01-01

## 2020-01-01 RX ORDER — NITROGLYCERIN 0.4 MG/1
0.4 TABLET SUBLINGUAL EVERY 5 MIN PRN
Qty: 25 TABLET | Refills: 5 | Status: SHIPPED | OUTPATIENT
Start: 2020-01-01 | End: 2021-10-06

## 2020-01-01 RX ORDER — SUCROFERRIC OXYHYDROXIDE 500 MG/1
1 TABLET, CHEWABLE ORAL
Status: ON HOLD | COMMUNITY
Start: 2020-01-01 | End: 2021-01-01 | Stop reason: HOSPADM

## 2020-01-01 RX ORDER — HEPARIN SODIUM 1000 [USP'U]/ML
INJECTION, SOLUTION INTRAVENOUS; SUBCUTANEOUS
COMMUNITY
Start: 2020-01-01 | End: 2021-05-24

## 2020-01-01 RX ORDER — METOPROLOL TARTRATE 100 MG/1
100 TABLET ORAL 2 TIMES DAILY
Qty: 180 TABLET | Refills: 3 | Status: ON HOLD | OUTPATIENT
Start: 2020-01-01 | End: 2021-01-01 | Stop reason: HOSPADM

## 2020-01-01 RX ORDER — ATORVASTATIN CALCIUM 80 MG/1
80 TABLET, FILM COATED ORAL EVERY MORNING
Qty: 90 TABLET | Refills: 3 | Status: SHIPPED | OUTPATIENT
Start: 2020-01-01 | End: 2020-01-01 | Stop reason: SDUPTHER

## 2020-01-01 RX ORDER — LIDOCAINE HYDROCHLORIDE 20 MG/ML
INJECTION, SOLUTION EPIDURAL; INFILTRATION; INTRACAUDAL; PERINEURAL
Status: DISCONTINUED | OUTPATIENT
Start: 2020-01-01 | End: 2020-01-01 | Stop reason: HOSPADM

## 2020-01-01 NOTE — TELEPHONE ENCOUNTER
Patient called to schedule 5 day FU with Dr. Perry with [facility]. Appts scheduled, pt verified.    2020

## 2020-01-10 ENCOUNTER — PATIENT OUTREACH (OUTPATIENT)
Dept: ADMINISTRATIVE | Facility: OTHER | Age: 75
End: 2020-01-10

## 2020-01-10 DIAGNOSIS — Z12.11 ENCOUNTER FOR FIT (FECAL IMMUNOCHEMICAL TEST) SCREENING: Primary | ICD-10-CM

## 2020-01-10 DIAGNOSIS — I25.700 CORONARY ARTERY DISEASE INVOLVING CORONARY BYPASS GRAFT OF NATIVE HEART WITH UNSTABLE ANGINA PECTORIS: ICD-10-CM

## 2020-01-13 RX ORDER — NITROGLYCERIN 0.4 MG/1
0.4 TABLET SUBLINGUAL EVERY 5 MIN PRN
Qty: 25 TABLET | Refills: 5 | Status: SHIPPED | OUTPATIENT
Start: 2020-01-13 | End: 2020-01-01 | Stop reason: SDUPTHER

## 2020-01-13 RX ORDER — NITROGLYCERIN 0.4 MG/1
TABLET SUBLINGUAL
Qty: 25 TABLET | Refills: 5 | OUTPATIENT
Start: 2020-01-13

## 2020-01-13 NOTE — TELEPHONE ENCOUNTER
Refill Authorization Note     is requesting a refill authorization.    Brief assessment and rationale for refill: APPROVE: prr          Medication Therapy Plan: Epic protocol error. Only nitrate on med list is Nitrostat; Refused request to reorder strength on med list                              Comments:     Requested Prescriptions   Signed Prescriptions Disp Refills    nitroGLYCERIN (NITROSTAT) 0.4 MG SL tablet 25 tablet 5     Sig: Place 1 tablet (0.4 mg total) under the tongue every 5 (five) minutes as needed for Chest pain.       There is no refill protocol information for this order      Refused Prescriptions Disp Refills    nitroGLYCERIN (NITROSTAT) 0.4 MG SL tablet 25 tablet 5     Sig: DO NOT CRUSH OR CHEW; DISSOLVE UNDER TONGUE. PRN CHEST PAIN x 3 DOSES, IF NO RELIEF IN 15 MINUTES CONTACT MD.       Nitrates Protocol Failed - 1/13/2020  8:39 AM        Failed - Absence of nitrates on med list         Passed - Patient is not currently pregnant        Passed - No positive pregnancy test in past 12 months         Passed - Last systolic greater than 90        Passed - Visit with authorizing provider in past 12 months or upcoming 90 days        Passed - Blood Pressure below 139/89 on file in past 12 months      BP Readings from Last 3 Encounters:   11/07/19 (!) 169/69   11/05/19 (!) 167/70   09/17/19 118/60             Appointments  past 12m or future 3m with PCP    Date Provider   Last Visit   7/23/2019 Cesia Rosales MD   Next Visit   Visit date not found Cesia Rosales MD

## 2020-01-14 ENCOUNTER — HOSPITAL ENCOUNTER (OUTPATIENT)
Dept: VASCULAR SURGERY | Facility: CLINIC | Age: 75
Discharge: HOME | End: 2020-01-14
Attending: SURGERY
Payer: MEDICARE

## 2020-01-14 ENCOUNTER — OFFICE VISIT (OUTPATIENT)
Dept: VASCULAR SURGERY | Facility: CLINIC | Age: 75
End: 2020-01-14
Payer: MEDICARE

## 2020-01-14 VITALS
BODY MASS INDEX: 27.7 KG/M2 | HEIGHT: 64 IN | WEIGHT: 162.25 LBS | HEART RATE: 63 BPM | SYSTOLIC BLOOD PRESSURE: 131 MMHG | TEMPERATURE: 98 F | DIASTOLIC BLOOD PRESSURE: 60 MMHG

## 2020-01-14 DIAGNOSIS — N18.6 ESRD (END STAGE RENAL DISEASE): Primary | ICD-10-CM

## 2020-01-14 DIAGNOSIS — N18.6 ESRD (END STAGE RENAL DISEASE) ON DIALYSIS: ICD-10-CM

## 2020-01-14 DIAGNOSIS — Z99.2 ESRD (END STAGE RENAL DISEASE) ON DIALYSIS: ICD-10-CM

## 2020-01-14 PROCEDURE — 1126F AMNT PAIN NOTED NONE PRSNT: CPT | Mod: HCNC,S$GLB,, | Performed by: SURGERY

## 2020-01-14 PROCEDURE — 1159F MED LIST DOCD IN RCRD: CPT | Mod: HCNC,S$GLB,, | Performed by: SURGERY

## 2020-01-14 PROCEDURE — 99214 OFFICE O/P EST MOD 30 MIN: CPT | Mod: HCNC,S$GLB,, | Performed by: SURGERY

## 2020-01-14 PROCEDURE — 99214 PR OFFICE/OUTPT VISIT, EST, LEVL IV, 30-39 MIN: ICD-10-PCS | Mod: HCNC,S$GLB,, | Performed by: SURGERY

## 2020-01-14 PROCEDURE — 93990 PR DUPLEX HEMODIALYSIS ACCESS: ICD-10-PCS | Mod: HCNC,S$GLB,, | Performed by: SURGERY

## 2020-01-14 PROCEDURE — 1126F PR PAIN SEVERITY QUANTIFIED, NO PAIN PRESENT: ICD-10-PCS | Mod: HCNC,S$GLB,, | Performed by: SURGERY

## 2020-01-14 PROCEDURE — 99999 PR PBB SHADOW E&M-EST. PATIENT-LVL III: CPT | Mod: PBBFAC,HCNC,, | Performed by: SURGERY

## 2020-01-14 PROCEDURE — 99999 PR PBB SHADOW E&M-EST. PATIENT-LVL III: ICD-10-PCS | Mod: PBBFAC,HCNC,, | Performed by: SURGERY

## 2020-01-14 PROCEDURE — 1101F PR PT FALLS ASSESS DOC 0-1 FALLS W/OUT INJ PAST YR: ICD-10-PCS | Mod: HCNC,CPTII,S$GLB, | Performed by: SURGERY

## 2020-01-14 PROCEDURE — 93990 DOPPLER FLOW TESTING: CPT | Mod: HCNC,S$GLB,, | Performed by: SURGERY

## 2020-01-14 PROCEDURE — 1159F PR MEDICATION LIST DOCUMENTED IN MEDICAL RECORD: ICD-10-PCS | Mod: HCNC,S$GLB,, | Performed by: SURGERY

## 2020-01-14 PROCEDURE — 1101F PT FALLS ASSESS-DOCD LE1/YR: CPT | Mod: HCNC,CPTII,S$GLB, | Performed by: SURGERY

## 2020-01-28 ENCOUNTER — TELEPHONE (OUTPATIENT)
Dept: PRIMARY CARE CLINIC | Facility: CLINIC | Age: 75
End: 2020-01-28

## 2020-01-28 DIAGNOSIS — N18.6 TYPE 2 DIABETES MELLITUS WITH CHRONIC KIDNEY DISEASE ON CHRONIC DIALYSIS, WITH LONG-TERM CURRENT USE OF INSULIN: Primary | ICD-10-CM

## 2020-01-28 DIAGNOSIS — Z99.2 TYPE 2 DIABETES MELLITUS WITH CHRONIC KIDNEY DISEASE ON CHRONIC DIALYSIS, WITH LONG-TERM CURRENT USE OF INSULIN: Primary | ICD-10-CM

## 2020-01-28 DIAGNOSIS — Z79.4 TYPE 2 DIABETES MELLITUS WITH CHRONIC KIDNEY DISEASE ON CHRONIC DIALYSIS, WITH LONG-TERM CURRENT USE OF INSULIN: Primary | ICD-10-CM

## 2020-01-28 DIAGNOSIS — E11.22 TYPE 2 DIABETES MELLITUS WITH CHRONIC KIDNEY DISEASE ON CHRONIC DIALYSIS, WITH LONG-TERM CURRENT USE OF INSULIN: Primary | ICD-10-CM

## 2020-01-28 NOTE — TELEPHONE ENCOUNTER
----- Message from Jessica Steward sent at 1/28/2020  8:42 AM CST -----  Rx Refill/Request     Is this a Refill:--Yes--    Rx Name and Strength:    1. Bd-veo insulin syringe BD ultra fine needle half unit      Preferred Pharmacy with phone number:--[facility]--[phone]--  [address] [facility] [address]    Communication Preference:--Zoey--[phone]--    Additional Information: Refill request for medication listed above. Per Zoey would like to see if she can put a rush to get this because she almost out. Please call to advise when sent to the pharmacy.

## 2020-02-03 NOTE — PLAN OF CARE
Problem: Physical Therapy Goal  Goal: Physical Therapy Goal  Goals to be met by: 19    Patient will increase functional independence with mobility by performin. Supine to sit with Stand-by Assistance  2. Rolling to Left and Right with Stand-by Assistance  3. Sit to stand transfer with Minimal Assistance using RW  4. Bed to chair transfer with Minimal Assistance using Rolling Walker  5. Gait  x20-30 feet with Minimal Assistance using Rolling Walker and LLE PWB  6. Wheelchair propulsion x100 feet with Stand-by Assistance using bilateral upper extremities  7. Lower extremity exercise program (seated/supine) 3 sets x10 reps per handout, with independence     Outcome: Ongoing (interventions implemented as appropriate)  Pt will benefit from further IP therapy in order to get back to PLOF.        normal thyroid gland/normal/no JVD/supple

## 2020-02-10 RX ORDER — INSULIN GLARGINE 100 [IU]/ML
INJECTION, SOLUTION SUBCUTANEOUS
Qty: 30 ML | Refills: 3 | Status: SHIPPED | OUTPATIENT
Start: 2020-02-10 | End: 2020-01-01 | Stop reason: SDUPTHER

## 2020-02-10 NOTE — TELEPHONE ENCOUNTER
Pt is asking for a new Rx for Lantus sent to [facility]. She is leaving town on 03/22/19 and needs the vial for travel. Pt has dialysis on MWF. Apologized for missing the Dec appt but is hoping to see you in March.

## 2020-03-11 NOTE — PROGRESS NOTES
Chart Reviewed - Vaccines Updated - Message sent to call our office to schedule her MMG  And Eye Exam.

## 2020-03-19 NOTE — TELEPHONE ENCOUNTER
Called patient back, she requested to speak only to Alina regarding changing the address where [facility] mails her Rxs. Please call back

## 2020-03-19 NOTE — TELEPHONE ENCOUNTER
----- Message from Fred Man sent at 3/19/2020  2:24 PM CDT -----  Contact: Self [phone]  Patient is returning a phone call.  Who left a message for the patient: Magdaleno  Does patient know what this is regarding:    Comments:

## 2020-03-25 NOTE — TELEPHONE ENCOUNTER
Pt states she have no one to help her do telemed.. And also she cannot see that well to fill out the documents when she gets company they will help she stated but she is unsure when that will be.. She wants to know can it just be done verbally?

## 2020-03-26 PROBLEM — R54 AGE-RELATED PHYSICAL DEBILITY: Status: ACTIVE | Noted: 2020-01-01

## 2020-03-26 NOTE — TELEPHONE ENCOUNTER
"[facility] Provider Telephone Check-In Call During COVID Crisis    The patient location is:  Patient Home   The chief complaint leading to consultation is: Adv Care Planning  Total time spent with patient: 30min    Visit type: Virtual visit with synchronous audio only  Each patient to whom he or she provides medical services by telemedicine is:  (1) informed of the relationship between the physician and patient and the respective role of any other health care provider with respect to management of the patient; and (2) notified that he or she may decline to receive medical services by telemedicine and may withdraw from such care at any time.      Subjective:      Patient ID: Zoey Ham is a 74 y.o. female with ESRD and Adv Care Planning    Patient is now in [place] with her children. She is doing dialysis in [place] and is not having difficulties. She states "I'm in the best place I could be."    She discusses having her daughter as Ana Cristina Tamayo as PoA. She wants to have care withdrawn if she is not improving. She does not want aggressive care if she is comatose or found without breath/pulse.    She wants to do telemed appointment tomorrow, we need to call in the morning to set it up a time for it.  Her niece is usually home all day is her primary care taker.    Dialysis is T/TH/FR at 6:30am, out by 12pm.      Lab Results   Component Value Date    WBC 9.81 11/05/2019    HGB 11.6 (L) 11/05/2019    HCT 36.4 (L) 11/05/2019     11/05/2019    CHOL 119 (L) 08/06/2019    TRIG 129 08/06/2019    HDL 28 (L) 08/06/2019    ALT 14 07/16/2019    AST 23 07/16/2019     11/05/2019    K 5.0 11/05/2019     11/05/2019    CREATININE 2.5 (H) 11/05/2019    BUN 30 (H) 11/05/2019    CO2 30 (H) 11/05/2019    TSH 2.801 06/09/2018    INR 0.9 06/23/2019    GLUF 132 (H) 05/03/2012    HGBA1C 8.7 (H) 06/23/2019         Assessment:   74 y.o. female with multiple co-morbid illnesses here to continue work-up of " chronic issues notably ESRD and Adv Care Planning.     Plan:     Problem List Items Addressed This Visit        Other    End of life care     Goals of care are aggressive for ESRD treatment, but does not want invasive screenings otherwise  · Needs Adv Directive         Age-related physical debility     Patient is home-bound with ESRD  · Continue home isolation               Health Maintenance       Date Due Completion Date    Colonoscopy 08/21/1963 ---    Mammogram 05/14/2019 5/14/2018    Eye Exam 07/09/2019 7/9/2018    Shingles Vaccine (3 of 3) 11/12/2019 9/17/2019    Foot Exam 05/14/2020 5/14/2019 (Done)    Override on 5/14/2019: Done    Override on 12/6/2016: Done    DEXA SCAN 09/26/2021 9/26/2019    TETANUS VACCINE 03/03/2026 3/3/2016          Telemed Appt Tomorrow. One hour spent with this patient today, more than half of that in counseling on the following issues: ESRD and Adv Care Planning    Cesia Rosales MD/MPH  Internal Medicine  [facility]  [phone]

## 2020-03-27 PROBLEM — Z66 DNR (DO NOT RESUSCITATE): Status: ACTIVE | Noted: 2020-01-01

## 2020-03-27 PROBLEM — M85.89 OSTEOPENIA OF MULTIPLE SITES: Status: RESOLVED | Noted: 2017-01-18 | Resolved: 2020-01-01

## 2020-03-27 NOTE — ASSESSMENT & PLAN NOTE
GFR 23, followed by Nephrology, started on sevalamer  · F/u Nephrology  · Continue sevelamer  · Advised AGAINST renal diet

## 2020-03-27 NOTE — ASSESSMENT & PLAN NOTE
Compliant with atorvastatin 80mg, high ASCVD with DM and age. Compliant with dialysis  · cotinue atorvastatin 80mg  · Continue high-dose ASA and plavix  · DM and BP control  · Continue lantus 15U

## 2020-03-27 NOTE — PROGRESS NOTES
Patient, Zoey Ham (MRN #[id number]), presented with a recent Estimated Glumerular Filtration Rate (EGFR) between 15 and 29 consistent with the definition of chronic kidney disease stage 4 (ICD10 - N18.4).    eGFR if non    Date Value Ref Range Status   11/05/2019 18.5 (A) >60 mL/min/1.73 m^2 Final     Comment:     Calculation used to obtain the estimated glomerular filtration  rate (eGFR) is the CKD-EPI equation.          The patient's chronic kidney disease stage 4 was monitored, evaluated, addressed and/or treated. This addendum to the medical record is made on 03/27/2020.

## 2020-03-27 NOTE — ASSESSMENT & PLAN NOTE
PTH trending up with worsening renal function, compliant with dialysis  · Monitor  · F/U Renal after COVID crisis

## 2020-03-27 NOTE — PROGRESS NOTES
Primary Care Provider Telemedicine Appointment      The patient location is:  Patient Home   The chief complaint leading to consultation is: routine care, goals of care discussion  Total time spent with patient: 90min    Visit type: Virtual visit with synchronous audio only and video  Each patient to whom he or she provides medical services by telemedicine is:  (1) informed of the relationship between the physician and patient and the respective role of any other health care provider with respect to management of the patient; and (2) notified that he or she may decline to receive medical services by telemedicine and may withdraw from such care at any time.      Subjective:      Patient ID: Zoey Ham is a 74 y.o. female with advanced age, CAD, HLD HTN,      Chief Complaint: ESRD, CAD, adv directives.    Patient has relocated to [place]. She is socially isolating. She is working on her [employer]. Is compliant with dialysis.    She has 69% risk score and understands that she is high risk of poor outcomes should she develop COVID. Has had numerous CAD events, ESRD, debility, advanced age.    Advanced directives discussed today by telemedicine.     Advance Care Planning         Power of   I initiated the process of advance care planning today and explained the importance of this process to the patient.  I introduced the concept of advance directives to the patient, as well. Then the patient received detailed information about the importance of designating a Health Care Power of  (HCPOA). She was also instructed to communicate with this person about their wishes for future healthcare, should she become sick and lose decision-making capacity. The patient has not previously appointed a HCPOA. After our discussion, the patient has decided to complete a HCPOA and has appointed her daughter and NAME: Ana Cristina Tamayo and Felix Ham  I spent a total time of 30min minutes discussing this issue with  the patient.    Living Will  During this visit, I engaged the patient  in the advance care planning process.  The patient and I reviewed the role for advance directives and their purpose in directing future healthcare if the patient's unable to speak for him/herself.  At this point in time, the patient does have full decision-making capacity.  We discussed different extreme health states that she could experience, and reviewed what kind of medical care she would want in those situations.  The patient communicated that if she were comatose and had little chance of a meaningful recovery, she would not want machines/life-sustaining treatments used. In addition to the above preference, other important end-of-life issues for the patient include DNR/DNI. The patient has completed a living will to reflect these preferences.  I spent a total of 30 minutes engaging the patient in this advance care planning discussion.    Code Status  In light of the patients advanced and life limiting illness,I engaged the the patient in a conversation about the patient's preferences for care  at the very end of life. The patient wishes to have a natural, peaceful death.  Along those lines, the patient does not wish to have CPR or other invasive treatments performed when her heart and/or breathing stops. I communicated to the patient that a DNR/DNI order would be placed in her medical record to reflect this preference.  I spent a total of 30 minutes engaging the patient in this advance care planning discussion.             Past Surgical History:   Procedure Laterality Date    APPENDECTOMY      CARDIAC SURGERY  2002    CABG    CHOLECYSTECTOMY      CORONARY ANGIOPLASTY  2004    CORONARY ARTERY BYPASS GRAFT      EYE SURGERY      cataracts bilaterally    FISTULOGRAM Left 9/20/2018    Procedure: Fistulogram;  Surgeon: YAMEL Perry III, MD;  Location: [facility] OR [address] [place] [address];  Service: Peripheral Vascular;  Laterality: Left;  mGy 19.91  minutes  2.7  contrast 25  local 1    FISTULOGRAM Left 1/17/2019    Procedure: Fistulogram;  Surgeon: YAMEL Perry III, MD;  Location: [facility] OR [facility];  Service: Peripheral Vascular;  Laterality: Left;  1.6 minutes Fluor  39.69 mGy   15.5ml contrast  1cc of local     FISTULOGRAM Left 6/20/2019    Procedure: Fistulogram left arm;  Surgeon: YAMEL Perry III, MD;  [facility];  Service: Peripheral Vascular;  Laterality: Left;  contrast: 49ml  fluro :2.9 min  mGy: 67.01    FISTULOGRAM Left 11/7/2019    Procedure: Fistulogram;  Surgeon: YAMEL Perry III, MD;  [facility];  Service: Peripheral Vascular;  Laterality: Left;  3.2 minutes of fluro    44.10 mGy    16ml contrast    HYSTERECTOMY      PERCUTANEOUS TRANSLUMINAL ANGIOPLASTY N/A 9/20/2018    Procedure: PTA (ANGIOPLASTY, PERCUTANEOUS, TRANSLUMINAL);  Surgeon: YAMEL Perry III, MD;  [facility];  Service: Peripheral Vascular;  Laterality: N/A;  Drug-coated PTA, L AVF (7x60 Lutonix)    PERCUTANEOUS TRANSLUMINAL ANGIOPLASTY N/A 1/17/2019    Procedure: PTA (ANGIOPLASTY, PERCUTANEOUS, TRANSLUMINAL);  Surgeon: YAMEL Perry III, MD;  [facility];  Service: Peripheral Vascular;  Laterality: N/A;    PERCUTANEOUS TRANSLUMINAL ANGIOPLASTY N/A 6/20/2019    Procedure: PTA (ANGIOPLASTY, PERCUTANEOUS, TRANSLUMINAL);  Surgeon: YAMEL Perry III, MD;  [facility];  Service: Peripheral Vascular;  Laterality: N/A;    TONSILLECTOMY         Past Medical History:   Diagnosis Date    Acid reflux     Acute myocardial infarction of anterolateral wall 7/9/2015    Anemia of chronic renal failure, stage 4 (severe) 1/22/2015    Ankle fracture     right    Anticoagulant long-term use     ASA and plavix    Atherosclerosis of aorta 10/3/2012    AV shunt malfunction     Benign hypertension with CKD (chronic kidney disease) stage IV 3/11/2016    Hypertension associated with Diabetes    Carpal tunnel  syndrome, right     Chronic diastolic congestive heart failure 10/3/2012    Combined systolic and diastolic CHF    Chronic kidney disease, stage IV (severe) 7/3/2012    Coronary artery disease     s/p 1v CABG 2002 and multiple PCI (last angiogram in 6/2012 with patent LIMA->LAD and patent LCx and RCA stents)    Diabetic polyneuropathy associated with type 2 diabetes mellitus 7/9/2015    Diabetic polyneuropathy associated with type 2 diabetes mellitus 7/9/2015    Dialysis patient     Encounter for blood transfusion     Gastritis without bleeding     Heart attack 09/2012    5-6 events    Hyperlipidemia     Hyperparathyroidism     Hyperphosphatemia     Metabolic acidosis     Nephritis and nephropathy, 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 7/9/2015    NSTEMI (non-ST elevated myocardial infarction)     NSTEMI (non-ST elevated myocardial infarction)     NSTEMI (non-ST elevated myocardial infarction)     Occlusion and stenosis of carotid artery with cerebral infarction 7/9/2015    Osteopenia     PAF (paroxysmal atrial fibrillation) 10/3/2012    Pneumonia     Proliferative diabetic retinopathy 10/3/2012    S/P drug eluting coronary stent placement     Sepsis due to Escherichia coli with acute renal failure 2/2016    UTI     TACO (transfusion associated circulatory overload)     Type II diabetes mellitus with neurological manifestations 7/9/2015    Type II diabetes mellitus with renal manifestations 7/3/2012       Review of Systems   Constitutional: Positive for fatigue. Negative for activity change, appetite change and unexpected weight change.   Respiratory: Negative for cough and choking.    Cardiovascular: Negative for leg swelling.   Gastrointestinal: Negative for abdominal pain, diarrhea and nausea.   Musculoskeletal: Negative for back pain, gait problem and myalgias.   Neurological: Negative for dizziness, tremors, syncope, facial asymmetry, weakness, numbness and headaches.   Hematological:  Bruises/bleeds easily.   Psychiatric/Behavioral: Negative for agitation, behavioral problems, confusion and decreased concentration.       Objective:   LMP  (LMP Unknown)     Physical Exam  NONE PERFOMED    Lab Results   Component Value Date    WBC 9.81 11/05/2019    HGB 11.6 (L) 11/05/2019    HCT 36.4 (L) 11/05/2019     11/05/2019    CHOL 119 (L) 08/06/2019    TRIG 129 08/06/2019    HDL 28 (L) 08/06/2019    ALT 14 07/16/2019    AST 23 07/16/2019     11/05/2019    K 5.0 11/05/2019     11/05/2019    CREATININE 2.5 (H) 11/05/2019    BUN 30 (H) 11/05/2019    CO2 30 (H) 11/05/2019    TSH 2.801 06/09/2018    INR 0.9 06/23/2019    GLUF 132 (H) 05/03/2012    HGBA1C 8.7 (H) 06/23/2019         Assessment:   74 y.o. female with multiple co-morbid illnesses here to continue work-up of chronic issues notably routine care, goals of care.     Plan:     Problem List Items Addressed This Visit        Ophtho    Proliferative diabetic retinopathy     stable DR Braulio santos following            Cardiac/Vascular    Atherosclerosis of aorta     Seen on CXR 5/2/2012  · Continue statin, ASA, DM control  · monitor         Chronic combined systolic and diastolic congestive heart failure     Seen on echo in 3/2016 and 2/2017, MIs in 2002, 2003, 2012, 2/2017  · Compliant with BB, ARB, DAPT, statin, diuretic  · Advised to take extra PM dose of lasix when experiencing LE swelling  · Improve DM control  · F/U cardiology         Coronary artery disease involving coronary bypass graft of native heart with unstable angina pectoris     6/4/18 BETHANY to OM1. Cath 2/2017, LHC and PCI on Lcx and OM1 with improvement. H/o CABG X2 2002, stent RCA, OM1 2003, NSTEMI with total occlusion of LAD in 2012.   · Continue APT   · Did not participate in cardiac rehab  · Continue BP control, high-dose statin         Stenosis of arteriovenous dialysis fistula     Possible stenosis, surgical repair in 1/2019  · F/U Vasc Surgery            Hematology     Senile purpura     Ecchymoses and purpura on LE bilterally, on DAPT  · Continue DAPT  · Advised mindfulness of movements            Oncology    Anemia of chronic renal failure, stage 5     GFR 23, followed by Nephrology, started on sevalamer  · F/u Nephrology  · Continue sevelamer  · Advised AGAINST renal diet            Endocrine    Type 2 diabetes mellitus with chronic kidney disease on chronic dialysis, with long-term current use of insulin     Compliant with atorvastatin 80mg, high ASCVD with DM and age. Compliant with dialysis  · cotinue atorvastatin 80mg  · Continue high-dose ASA and plavix  · DM and BP control  · Continue lantus 15U         Hyperparathyroidism     PTH trending up with worsening renal function, compliant with dialysis  · Monitor  · F/U Renal after COVID crisis         Mild protein-calorie malnutrition     Low albumin, low muscle mass, fatigue, drinks Nepro at dialysis  · Advised to drink more Nepro eat lean meats  · Continue renal MVI            Orthopedic    Displaced fracture of pubis     Seen by Dr NAHEED Luque during admit  · Previously followed by Ortho            Palliative Care    DNR (do not resuscitate)          Health Maintenance       Date Due Completion Date    Colonoscopy 08/21/1963 ---    Mammogram 05/14/2019 5/14/2018    Eye Exam 07/09/2019 7/9/2018    Shingles Vaccine (3 of 3) 11/12/2019 9/17/2019    Foot Exam 05/14/2020 5/14/2019 (Done)    Override on 5/14/2019: Done    Override on 12/6/2016: Done    DEXA SCAN 09/26/2021 9/26/2019    TETANUS VACCINE 03/03/2026 3/3/2016          Follow up in about 4 weeks (around 4/24/2020). 90m spent with this patient today, more than half of that in counseling on the following issues: routine care, goals of care, ESRD, CAD, adv directives. 30min spent on adv directives, patient elects to be DNR.    Cesia Rosales MD/MPH  Internal Medicine  [facility]  [phone]

## 2020-03-27 NOTE — ASSESSMENT & PLAN NOTE
Low albumin, low muscle mass, fatigue, drinks Nepro at dialysis  · Advised to drink more Nepro eat lean meats  · Continue renal MVI

## 2020-04-14 NOTE — TELEPHONE ENCOUNTER
Spoke with pt, I informed her that dr. roberts received a request to refill her ecotrin 325mg. Pt stated that she did not need this refilled at this time.     She will notifiy dr. roberts ofc if she needs this refilled.

## 2020-04-21 NOTE — TELEPHONE ENCOUNTER
----- Message from Adrian Nick sent at 4/20/2020  2:03 PM CDT -----  Please reschedule pt for telemedicine visit. She didn't login or answer the phone today.

## 2020-07-21 NOTE — H&P (VIEW-ONLY)
REFERRING PHYSICIAN:  Karla Iglesias D.O.    HISTORY OF PRESENT ILLNESS:  A 74-year-old female with ESRD on HD since 8/2018, M/W/F, s/p    1a/  Stent placement, L mid AVF and cephalic arch PTA 11/7/19  1. Covered stent placement (cephalic arch) and mid PTA, L AVG  6/20/19,  2. Drug coated PTA, L AVF (7x60 Lutonix) 9/20/18  3. PTa, L AVF 5/31/18  4.  L brachio-cephalic AVF 4/16/18.  No c/o    This is a 7 month follow-up.  Clinically she is having no issues whatsoever with her dialysis    PAST MEDICAL HISTORY:  1.  Coronary artery disease, status post MI x5.   MI 8/2018 with PCI  2.  Hypertension.  3.  Diabetes.  4.  Hyperparathyroidism.  5.  Chronic atrial fibrillation.    PAST SURGICAL HISTORY:  1.  Hysterectomy.  2.  Tonsillectomy.  3.  Appendectomy.  4.  Coronary artery bypass.  5.  PCI.  6.  Fracture surgery.    FAMILY HISTORY:  Positive for diabetes and hypertension.    MEDICATIONS:  Include Plavix, aspirin and statin.  See EPIC for full list.    ALLERGIES:  Penicillin, Percodan and Phenergan.    REVIEW OF SYSTEMS:  Denies postprandial pain or DVT.  All other systems including eyes, ENT, respiratory, musculoskeletal, breast,   neurologic, psychiatric, heme, lymph, allergy and immune are negative.    PHYSICAL EXAMINATION:  VITAL SIGNS:  See nursing notes.  GENERAL:  She is in no acute distress.  RESPIRATORY:  Normal effort.  Clear to claudication.  CARDIOVASCULAR:  Regular rhythm.  Nondisplaced PMI, no murmur.  VASCULAR:  L radial nonpalpable today  EXTREMITIES:   Left arm shows a very very pulse the proximal fistula which abruptly changed to week thrill approximately 5 cm from the arterial anastomosis  NEUROLOGIC:  Cranial nerves VII through XII intact.  5/5 motor strength in all   extremities.    Imaging:  Duplex of the AV fistula shows a proximal stenosis with a velocity of 522, flow rate is dramatically reduced at 380    PRior: Recurent distal AVF stenosis, no confluence stenosis, flow volume decreased to  1.3 L (prior 2.3 L    Fistualgram personally reviewed    ASSESSMENT:  [place] proximal stenosis with very decreased flow rates.  Intervention is warranted    RECOMMENDATION:  1.  Left fistulogram and intervention 07/30/2020  2.  Will access close to the arterial anastomosis  3.  Cath lab case  4.  COVID test 72 hr prior    I have explained the risks, benefits and alternatives for this procedure in detail.  The patient voices understanding and all questions have be answered, and agrees to proceed with the procedure.    JULIO CÉSAR Perry III, MD, FACS  Professor and Chief, Vascular and Endovascular Surgery    CC: Karla Iglesias D.O.

## 2020-07-30 PROBLEM — T82.590A DIALYSIS AV FISTULA MALFUNCTION: Status: ACTIVE | Noted: 2020-01-01

## 2020-07-30 NOTE — OP NOTE
[facility]   Operative Note    Surgery Date: 7/30/2020     Surgeon(s) and Role:     * YAMEL Perry III, MD - Primary     * Yordy Barragan MD - Fellow    Assisting Surgeon: None    Pre-op Diagnosis:  Malfunction of arteriovenous dialysis fistula, subsequent encounter [T82.590D]    Post-op Diagnosis:  Post-Op Diagnosis Codes:     * Malfunction of arteriovenous dialysis fistula, subsequent encounter [T82.590D]    PROCEDURES:    1. Drug-coated PTA, proximal L AVF (7x60 Lutonix)  2. PTA, L cephalic arch ([id number])  3. Fistualgram  4. Conscious Sedation    Anesthesia: RN IV Sedation    Description of the findings of the procedure(s): >90% stenosis in both locations, < 10% residual    Estimated Blood Loss: <4cc    Indications:  74-year-old female with markedly diminished flow rates her left brachiocephalic AV fistula with evidence of proximal stenosis    Procedure:  Patient brought to cath lab placed supine position.  After appropriate infiltration the left arm, the brachiocephalic AV fistula was accessed near the arterial anastomosis with micropuncture set and fistulogram was performed.  This room demonstrated severe greater than 95% stenosis over 45 cm proximally, and a greater than 90% edge to stenosis in the cephalic arch stent.  There is no central venous stenosis.  The remainder of the fistula was robust without stenosis.    Stiff angled Glidewire was placed a short 6 sheath placed.  Initially the proximal stenosis was dilated with a 6 x 100 [place] balloon.  This was held for 2 min.  There was significant improvement in the stenosis.  Could be recurrent nature of the stenosis elect to treat this with a drug coated balloon for better durability.    The tonic 7 x 60 balloon was brought on the field placed over the area and then inflated to 8 atmospheres and held for 2.5 min.  Completion fistulogram this area showed no significant residual stenosis and brisk flow.    Attention was then directed with  cephalic arch stenosis.  This was dilated with a 8 x 60 [place] balloon at 12 atmospheres held for 2 min.  Completion film revealed complete resolution of stenosis and brisk flow.    All catheters and guidewires removed hemostasis was achieved with Monocryl suture.  Sterile dressing was applied.    I continued to monitor the patient's cardiopulmonary function throughout this case.  See nurse's notes for dosing of Versed and fentanyl.  Total sedation time was 28 min    JULIO CÉSAR Perry III, MD, FACS  Professor and Chief, Vascular and Endovascular Surgery

## 2020-07-30 NOTE — Clinical Note
48 ml injected throughout the case. 52 mL total wasted during the case. 100 mL total used in the case.

## 2020-07-30 NOTE — Clinical Note
Angiography performed post intervention of the AV fistula. via hand injection with . Left av fistula

## 2020-07-30 NOTE — PLAN OF CARE
Received report from MADELINE West. Patient s/p fistulagram, AAOx3. VSS, no c/o pain or discomfort at this time, resp even and unlabored. Gauze/tegaderm dressing to L arm is CDI. No active bleeding. No hematoma noted. Post procedure protocol reviewed with patient and patient's family. Understanding verbalized. Family members at bedside. Nurse call bell within reach. Will continue to monitor per post procedure protocol.

## 2020-07-30 NOTE — BRIEF OP NOTE
[facility]  Brief Operative Note    Surgery Date: 7/30/2020     Surgeon(s) and Role:     * YAMEL Perry III, MD - Primary     * Yordy Barragan MD - Fellow    Assisting Surgeon: None    Pre-op Diagnosis:  Malfunction of arteriovenous dialysis fistula, subsequent encounter [T82.590D]    Post-op Diagnosis:  Post-Op Diagnosis Codes:     * Malfunction of arteriovenous dialysis fistula, subsequent encounter [T82.590D]    PROCEDURES:    1. Drug-coated PTA, proximal L AVF (7x60 Lutonix)  2. PTA, L cephalic arch ([id number])  3. Fistualgram  4. Conscious Sedation      Anesthesia: RN IV Sedation    Description of the findings of the procedure(s): >90% stenosis in both locations, < 10% residual    Estimated Blood Loss: <4cc         Specimens:   Specimen (12h ago, onward)    None            Discharge Note    OUTCOME: successful outpatient procedure    DISPOSITION: home    FINAL DIAGNOSIS:  Stenosis AVF    FOLLOWUP: Diet: renal  Act: ad marissa  FU: 1 week with AVF duplex     DISCHARGE INSTRUCTIONS:  See below

## 2020-07-30 NOTE — INTERVAL H&P NOTE
The patient has been examined and the H&P has been reviewed:    I concur with the findings and no changes have occurred since H&P was written.    Surgery risks, benefits and alternative options discussed and understood by patient/family.          Active Hospital Problems    Diagnosis  POA    Dialysis AV fistula malfunction [T82.904A]  Yes      Resolved Hospital Problems   No resolved problems to display.

## 2020-08-17 NOTE — PROGRESS NOTES
LINKS immunization registry updated  Care Everywhere updated  Health Maintenance updated  DIS/Chart reviewed for overdue Proactive [facility] Encounters ([facility]ZW[facility]) health maintenance testing (CRS, Breast Ca, Diabetic Eye Exam)   Orders entered:N/A

## 2020-08-18 PROBLEM — T82.898A STEAL SYNDROME DIALYSIS VASCULAR ACCESS: Status: ACTIVE | Noted: 2020-01-01

## 2020-08-18 NOTE — PROGRESS NOTES
REFERRING PHYSICIAN:  Karla Iglesias D.O.    HISTORY OF PRESENT ILLNESS:  A 74-year-old female with ESRD on HD since 8/2018, M/W/F, s/p    1. Drug coated PTA, prox AVF and PTA cephalic arch  7/30/2020  2.  Stent placement, L mid AVF and cephalic arch PTA 11/7/19  3. Covered stent placement (cephalic arch) and mid PTA, L AVG  6/20/19,  4. Drug coated PTA, L AVF (7x60 Lutonix) 9/20/18  5. PTa, L AVF 5/31/18  6.  L brachio-cephalic AVF 4/16/18.  No c/o    8/18/2020:  Her AV fistula is not working well still having issues with it.  However she now relates that she has had a 3 to four-month history of very occasional left hand/finger tenderness and occasional pain.  This happens approximately once a week, sometimes while on dialysis, sometimes waking her up from sleep.    PAST MEDICAL HISTORY:  1.  Coronary artery disease, status post MI x5.   MI 8/2018 with PCI  2.  Hypertension.  3.  Diabetes.  4.  Hyperparathyroidism.  5.  Chronic atrial fibrillation.    PAST SURGICAL HISTORY:  1.  Hysterectomy.  2.  Tonsillectomy.  3.  Appendectomy.  4.  Coronary artery bypass.  5.  PCI.  6.  Fracture surgery.    FAMILY HISTORY:  Positive for diabetes and hypertension.    MEDICATIONS:  Include Plavix, aspirin and statin.  See EPIC for full list.    ALLERGIES:  Penicillin, Percodan and Phenergan.    REVIEW OF SYSTEMS:  Denies postprandial pain or DVT.  All other systems including eyes, ENT, respiratory, musculoskeletal, breast,   neurologic, psychiatric, heme, lymph, allergy and immune are negative.    PHYSICAL EXAMINATION:  VITAL SIGNS:  See nursing notes.  GENERAL:  She is in no acute distress.  RESPIRATORY:  Normal effort.  Clear to claudication.  CARDIOVASCULAR:  Regular rhythm.  Nondisplaced PMI, no murmur.  VASCULAR:  L radial nonpalpable today.   This becomes palpable with occlusion of the AV fistula  EXTREMITIES:   Left arm shows a a nice thrill in the AV fistula with minimal pulsatility.  This is clinically much improved  from prior.  Hand  5/5, no cyanosis or ulceration    NEUROLOGIC:  Cranial nerves VII through XII intact.  5/5 motor strength in all   extremities.    Imaging:  Duplex of the AV fistula shows resolution of the inflow stenosis, flow volume now 1.8 L, dramatically improved from prior was only 380    ASSESSMENT:   1.  Markedly improved flow rate, left AV fistula  2.  3-4 month history of left hand vascular steal, although she notes that only approximately once per week.    RECOMMENDATION:  1.   Hold antihypertensive medicines the morning of dialysis  2.  Follow-up in 6 weeks with left hand PPG is with and without graft compression, an arterial duplex to look for any inflow stenosis    JULIO CÉASR Perry III, MD, FACS  Professor and Chief, Vascular and Endovascular Surgery    CC: Karla Iglesias D.O.

## 2020-08-18 NOTE — TELEPHONE ENCOUNTER
Contacted patient to notify her that [facility] has been scheduled for 1500 today due to cancellation in vascular lab schedule. Pt verbalized understanding and states she will notify her brother who brings her to appts.  ----- Message from Yordy Barragan MD sent at 7/30/2020  9:51 AM CDT -----  Hey team,     Can we get this patient 2 week follow up with duplex HD? She had a fistulogram today. Orders in.     Yordy

## 2020-08-31 NOTE — TELEPHONE ENCOUNTER
Meryl.  Mrs. Morales is having numbness in her hand and was really bad on Friday-steal syndrome.  She has been trying to reach someone in your office for an appointment ASAP. [place]ahn[place].

## 2020-08-31 NOTE — TELEPHONE ENCOUNTER
Attempted to contact patient to schedule appts for tomorrow with Dr. Perry and with vascular lab regarding numbness to hand during dialysis treatments. Voice message left for patient. Will reattempt to contact in AM.

## 2020-09-01 NOTE — TELEPHONE ENCOUNTER
Contacted patient to schedule same day appt with Dr. Perry regarding hand pain and numbness during HD. Appointment scheduled, pt verified.

## 2020-09-01 NOTE — PROGRESS NOTES
REFERRING PHYSICIAN:  Karla Iglesias D.O.    HISTORY OF PRESENT ILLNESS:  A 75-year-old female with ESRD on HD since 8/2018, M/W/F, s/p    1. Drug coated PTA, prox AVF and PTA cephalic arch  7/30/2020  2.  Stent placement, L mid AVF and cephalic arch PTA 11/7/19  3. Covered stent placement (cephalic arch) and mid PTA, L AVG  6/20/19,  4. Drug coated PTA, L AVF (7x60 Lutonix) 9/20/18  5. PTa, L AVF 5/31/18  6.  L brachio-cephalic AVF 4/16/18.  No c/o    8/18/2020:  Her AV fistula is not working well still having issues with it.  However she now relates that she has had a 3 to four-month history of very occasional left hand/finger tenderness and occasional pain.  This happens approximately once a week, sometimes while on dialysis, sometimes waking her up from sleep.    9/1/2020:  She now returns with worsening left hand pain.  This is most noticeable when she is on dialysis, but also sometimes wakes her up at night.    PAST MEDICAL HISTORY:  1.  Coronary artery disease, status post MI x5.   MI 8/2018 with PCI  2.  Hypertension.  3.  Diabetes.  4.  Hyperparathyroidism.  5.  Chronic atrial fibrillation.    PAST SURGICAL HISTORY:  1.  Hysterectomy.  2.  Tonsillectomy.  3.  Appendectomy.  4.  Coronary artery bypass.  5.  PCI.  6.  Fracture surgery.    FAMILY HISTORY:  Positive for diabetes and hypertension.    MEDICATIONS:  Include Plavix, aspirin and statin.  See EPIC for full list.    ALLERGIES:  Penicillin, Percodan and Phenergan.    REVIEW OF SYSTEMS:  Denies postprandial pain or DVT.  All other systems including eyes, ENT, respiratory, musculoskeletal, breast,   neurologic, psychiatric, heme, lymph, allergy and immune are negative.    PHYSICAL EXAMINATION:  VITAL SIGNS:  See nursing notes.  GENERAL:  She is in no acute distress.  RESPIRATORY:  Normal effort.  Clear to claudication.  CARDIOVASCULAR:  Regular rhythm.  Nondisplaced PMI, no murmur.  VASCULAR:  Left radial and ulnar arteries are not palpable.  Left  radial signal is a strong biphasic, left ulnar is strong triphasic.  With compression of the fistula, there is minimal augmentation noted    Right 2+ femoral pulse    EXTREMITIES:   Left arm shows a a nice thrill in the AV fistula with minimal pulsatility.  This is clinically much improved from prior.  Hand  5/5, no cyanosis or ulceration    NEUROLOGIC:  Cranial nerves VII through XII intact.  5/5 motor strength in all   extremities.    Imaging:  Duplex of the AV fistula today shows a recurrence stenosis flow volume down to approximately 1 L, which is significantly decreased from 1.8 prior    Arterial duplex suggests a moderate subclavian artery stenosis with a velocity of 263, compared with velocity of 121 proximally    Left finger PPGs show excellent pulsatility, with little change with graft augmentation    ASSESSMENT:   1.   Left hand pain.   Her symptoms are certainly classic for vascular steal.   However it is quite surprising that her finger PPG ease are so robust, and that clinically there is only very modest augmentation of flow of when measured by PPG analysis were listening with continuous wave Doppler.   It is possible that  a proximal subclavian artery stenosis could be the culprit, and contributing to the steal phenomenon    RECOMMENDATION:  1.  Reduce her beta-blocker to half normal dose, b.i.d.   2.  Right common femoral approach, arch and left arm arteriogram, possible subclavian artery stent placement 09/09/2020  3.  She is typically a Monday Wednesday Friday dialysis patient, will arrange for dialysis Tuesday so she can miss this Wednesday  3.  COVID test prior    I have explained the risks, benefits and alternatives for this procedure in detail.  The patient voices understanding and all questions have be answered, and agrees to proceed with the procedure.     JULIO CÉSAR Perry III, MD, FACS  Professor and Chief, Vascular and Endovascular Surgery    CC: Karla Iglesias D.O.

## 2020-09-02 NOTE — TELEPHONE ENCOUNTER
Contacted patient in response to voice message. Pt states she went to dialysis today and that her hand, fingers, and arm felt completely normal - no pain, numbness, or coldness. States she would like to know if Dr. Perry would still recommend that she has procedure given she is now asymptomatic. Message sent to Dr. Perry and will contact patient with response.

## 2020-09-04 NOTE — TELEPHONE ENCOUNTER
Received call from patient stating that she did not have any pain or discomfort dialyzing today and would like to cancel her procedure. Notified Dr. Perry of patient's wish to cancel procedure and procedure removed from schedule.

## 2020-09-22 NOTE — TELEPHONE ENCOUNTER
If she is still having a migraine, I'd recommend she be seen at [facility] were they can offer her some immediate relief and refer her to neurology. Neurology appointment could take weeks. She should come here first. How severe is the pain? Can she describe it? Any redness or rash?    What's the earliest we can see her?    Thanks,  KJ

## 2020-10-06 NOTE — ASSESSMENT & PLAN NOTE
Rate-controlled on metoprolol 100mg BID  · Continue BB BID  · Elevated [id number] score of 9 (10.8% risk of stroke)  · Cards eval for anticoagulation  · Refuses coumadin  · Continue DAPT  · High-dose ASA

## 2020-10-06 NOTE — PATIENT INSTRUCTIONS
TODAY:  - ask Machelle to call us with the exact vitamin that you are taking  - all meds refilled to [facility]  - due to frailty exclusion will not schedule mammogram and colonoscopy  - diabetic foot exam today  - Cardiology appt (Tues)  - Eye doctor appt (Tues)

## 2020-10-06 NOTE — PROGRESS NOTES
Primary Care Provider Appointment - [facility]  SHARED NOTE: Lita Byrnes (MS4), Dr Rosales (Attending)    Subjective:      Patient ID: Zoey Ham is a 75 y.o. female with female with advanced age, ESRD, CAD, HLD, HTN.     Chief Complaint: Follow-up and Blurred Vision    Patient states her vision is worse than last time. Patient states the blurring is worse. She states she had 3 falls due to the inability to see obstacles that she has tripped on. She is able to walk in her backyard, but unable to get out and walking for concerns with falls due to blurred vision.     Patient has no recent concerns. She has been staying isolated with COVID-19 precautions. She has been writing her cookbook and has almost finished, plans to give to her grandchildren soon.    Patient is a dialysis patient on M/W/F. She has been compliant with dialysis. She states she has come to enjoy dialysis as she is able to venecia and work on her cookbook.       Past Surgical History:   Procedure Laterality Date    APPENDECTOMY      CARDIAC SURGERY  2002    CABG    CHOLECYSTECTOMY      CORONARY ANGIOPLASTY  2004    CORONARY ARTERY BYPASS GRAFT      EYE SURGERY      cataracts bilaterally    FISTULOGRAM Left 9/20/2018    Procedure: Fistulogram;  Surgeon: YAMEL Perry III, MD;  Location: [facility] OR [address] [place] [address];  Service: Peripheral Vascular;  Laterality: Left;  mGy 19.91  minutes 2.7  contrast 25  local 1    FISTULOGRAM Left 1/17/2019    Procedure: Fistulogram;  Surgeon: YAMEL Perry III, MD;  Location: [facility] OR [address] [place] [address];  Service: Peripheral Vascular;  Laterality: Left;  1.6 minutes Fluor  39.69 mGy   15.5ml contrast  1cc of local     FISTULOGRAM Left 6/20/2019    Procedure: Fistulogram left arm;  Surgeon: YAMEL Perry III, MD;  Location: [address];  Service: Peripheral Vascular;  Laterality: Left;  contrast: 49ml  fluro :2.9 min  mGy: 67.01    FISTULOGRAM Left 11/7/2019    Procedure: Fistulogram;  Surgeon: YAMEL Perry  III, MD;  Location: [facility] OR [facility] FLR;  Service: Peripheral Vascular;  Laterality: Left;  3.2 minutes of fluro    44.10 mGy    16ml contrast    FISTULOGRAM Left 7/30/2020    Procedure: Fistulogram;  Surgeon: YAMEL Perry III, MD;  Location: [facility] CATH LAB;  Service: Peripheral Vascular;  Laterality: Left;    HYSTERECTOMY      PERCUTANEOUS TRANSLUMINAL ANGIOPLASTY N/A 9/20/2018    Procedure: PTA (ANGIOPLASTY, PERCUTANEOUS, TRANSLUMINAL);  Surgeon: YAMEL Perry III, MD;  Location: [facility] OR [facility] FLR;  Service: Peripheral Vascular;  Laterality: N/A;  Drug-coated PTA, L AVF (7x60 Lutonix)    PERCUTANEOUS TRANSLUMINAL ANGIOPLASTY N/A 1/17/2019    Procedure: PTA (ANGIOPLASTY, PERCUTANEOUS, TRANSLUMINAL);  Surgeon: YAMEL Perry III, MD;  [facility];  Service: Peripheral Vascular;  Laterality: N/A;    PERCUTANEOUS TRANSLUMINAL ANGIOPLASTY N/A 6/20/2019    Procedure: PTA (ANGIOPLASTY, PERCUTANEOUS, TRANSLUMINAL);  Surgeon: YAMEL Perry III, MD;  [facility];  Service: Peripheral Vascular;  Laterality: N/A;    TONSILLECTOMY         Past Medical History:   Diagnosis Date    Acid reflux     Acute myocardial infarction of anterolateral wall 7/9/2015    Anemia of chronic renal failure, stage 4 (severe) 1/22/2015    Ankle fracture     right    Anticoagulant long-term use     ASA and plavix    Atherosclerosis of aorta 10/3/2012    AV shunt malfunction     Benign hypertension with CKD (chronic kidney disease) stage IV 3/11/2016    Hypertension associated with Diabetes    Carpal tunnel syndrome, right     Chronic diastolic congestive heart failure 10/3/2012    Combined systolic and diastolic CHF    Chronic kidney disease, stage IV (severe) 7/3/2012    Coronary artery disease     s/p 1v CABG 2002 and multiple PCI (last angiogram in 6/2012 with patent LIMA->LAD and patent LCx and RCA stents)    Diabetic polyneuropathy associated with type 2 diabetes mellitus 7/9/2015    Diabetic  polyneuropathy associated with type 2 diabetes mellitus 7/9/2015    Dialysis patient     Encounter for blood transfusion     Gastritis without bleeding     Heart attack 09/2012    5-6 events    Hyperlipidemia     Hyperparathyroidism     Hyperphosphatemia     Metabolic acidosis     Nephritis and nephropathy, 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 7/9/2015    NSTEMI (non-ST elevated myocardial infarction)     NSTEMI (non-ST elevated myocardial infarction)     NSTEMI (non-ST elevated myocardial infarction)     Occlusion and stenosis of carotid artery with cerebral infarction 7/9/2015    Osteopenia     PAF (paroxysmal atrial fibrillation) 10/3/2012    Pneumonia     Proliferative diabetic retinopathy 10/3/2012    S/P drug eluting coronary stent placement     Sepsis due to Escherichia coli with acute renal failure 2/2016    UTI     TACO (transfusion associated circulatory overload)     Type II diabetes mellitus with neurological manifestations 7/9/2015    Type II diabetes mellitus with renal manifestations 7/3/2012       Social History     Socioeconomic History    Marital status: Single     Spouse name: Not on file    Number of children: Not on file    Years of education: Not on file    Highest education level: Not on file   Occupational History    Occupation: Homemaker   Social Needs    Financial resource strain: Not on file    Food insecurity     Worry: Not on file     Inability: Not on file    Transportation needs     Medical: Not on file     Non-medical: Not on file   Tobacco Use    Smoking status: Never Smoker    Smokeless tobacco: Never Used   Substance and Sexual Activity    Alcohol use: No    Drug use: No    Sexual activity: Never   Lifestyle    Physical activity     Days per week: Not on file     Minutes per session: Not on file    Stress: Not on file   Relationships    Social connections     Talks on phone: Not on file     Gets together: Not on file     Attends [employer]  "service: Not on file     Active member of club or organization: Not on file     Attends meetings of clubs or organizations: Not on file     Relationship status: Not on file   Other Topics Concern    Are you pregnant or think you may be? No    Breast-feeding No   Social History Narrative    2 biological kids, 1 adopted9 grandkids (1 granddavid lives with her while she 's attending pharmacy school at [employer])3 great-grandkidsGoing to [place]       Review of Systems   Constitutional: Negative for fatigue and fever.   HENT: Negative for rhinorrhea, sinus pressure, sinus pain and sneezing.    Eyes: Positive for visual disturbance.        Patient has worsening blurred vision   Respiratory: Negative for cough, chest tightness, shortness of breath and wheezing.    Cardiovascular: Negative for chest pain, palpitations and leg swelling.   Gastrointestinal: Negative for constipation, diarrhea, nausea and vomiting.   Musculoskeletal: Negative for gait problem, myalgias and neck pain.   Skin: Negative for color change and rash.   Neurological: Negative for dizziness, weakness, numbness and headaches.   Psychiatric/Behavioral: Negative for confusion and sleep disturbance.       Objective:   BP (!) 142/80   Pulse 73   Ht 5' 4" (1.626 m)   Wt 73.8 kg (162 lb 11.2 oz)   LMP  (LMP Unknown)   SpO2 96%   BMI 27.93 kg/m²     Physical Exam  Constitutional:       Appearance: Normal appearance. She is normal weight.   HENT:      Head: Normocephalic and atraumatic.   Eyes:      Extraocular Movements: Extraocular movements intact.      Conjunctiva/sclera: Conjunctivae normal.      Pupils: Pupils are equal, round, and reactive to light.   Neck:      Musculoskeletal: Normal range of motion and neck supple.   Cardiovascular:      Rate and Rhythm: Normal rate and regular rhythm.      Pulses: Normal pulses.      Heart sounds: Normal heart sounds.   Pulmonary:      Effort: Pulmonary effort is " normal.      Breath sounds: Normal breath sounds.   Abdominal:      General: Abdomen is flat.      Palpations: Abdomen is soft.   Skin:     General: Skin is warm and dry.   Neurological:      General: No focal deficit present.      Mental Status: She is alert and oriented to person, place, and time.   Psychiatric:         Mood and Affect: Mood normal.         Behavior: Behavior normal.         Thought Content: Thought content normal.         Judgment: Judgment normal.     Protective Sensation (w/ 10 gram monofilament):  Right: Intact  Left: Intact    Visual Inspection:  Normal -  bilateral. Patient has abnormal callus in bottom on left foot from ankle fracture healing     Pedal Pulses:   Right: Present  Left: Present    Posterior tibialis:   Right:Present  Left: Present            Lab Results   Component Value Date    WBC 9.81 07/21/2020    HGB 13.4 07/21/2020    HCT 43.3 07/21/2020     07/21/2020    CHOL 119 (L) 08/06/2019    TRIG 129 08/06/2019    HDL 28 (L) 08/06/2019    ALT 14 07/16/2019    AST 23 07/16/2019     07/21/2020    K 4.7 07/21/2020     07/21/2020    CREATININE 3.3 (H) 07/21/2020    BUN 31 (H) 07/21/2020    CO2 29 07/21/2020    TSH 2.801 06/09/2018    INR 0.9 06/23/2019    GLUF 132 (H) 05/03/2012    HGBA1C 8.7 (H) 06/23/2019       Current Outpatient Medications on File Prior to Visit   Medication Sig Dispense Refill    aspirin (ECOTRIN) 325 MG EC tablet Take 1 tablet (325 mg total) by mouth every morning. 90 tablet 3    furosemide (LASIX) 80 MG tablet Take 1 tablet (80 mg total) by mouth 2 (two) times daily as needed (as needed for increased swelling of weight gain). 180 tablet 3    multivitamin with minerals tablet Take 1 tablet by mouth once daily. 90 tablet 3    polyethylene glycol (GLYCOLAX) 17 gram PwPk Take 17 g by mouth 2 (two) times daily as needed.  0    vit b cmplx 3-fa-vit c-biotin 1- mg-mg-mcg (NEPHRO-EWA RX OR EQUIV) 1- mg-mg-mcg Tab Take 1 tablet by  "mouth once daily. 90 tablet 3    vitamin renal formula, B-complex-vitamin c-folic acid, (NEPHROCAPS) 1 mg Cap Take 1 capsule by mouth once daily.  0    [DISCONTINUED] atorvastatin (LIPITOR) 80 MG tablet Take 1 tablet (80 mg total) by mouth every morning. 90 tablet 3    [DISCONTINUED] calcitRIOL (ROCALTROL) 0.5 MCG Cap Take 1 capsule (0.5 mcg total) by mouth once daily. 90 capsule 3    [DISCONTINUED] clopidogreL (PLAVIX) 75 mg tablet Take 1 tablet (75 mg total) by mouth once daily. 90 tablet 3    [DISCONTINUED] famotidine (PEPCID) 20 MG tablet Take 1 tablet (20 mg total) by mouth 2 (two) times daily. 180 tablet 3    [DISCONTINUED] insulin syr/ndl U100 half nisreen (BD VEO INSULIN SYR HALF UNIT) 0.3 mL 31 gauge x 15/64" Syrg 1 application by Misc.(Non-Drug; Combo Route) route once daily. 100 Syringe 3    [DISCONTINUED] metoprolol tartrate (LOPRESSOR) 100 MG tablet Take 1 tablet (100 mg total) by mouth 2 (two) times daily. 180 tablet 3    [DISCONTINUED] nitroGLYCERIN (NITROSTAT) 0.4 MG SL tablet Place 1 tablet (0.4 mg total) under the tongue every 5 (five) minutes as needed for Chest pain. 25 tablet 5    [DISCONTINUED] insulin glargine (LANTUS U-100 INSULIN) 100 unit/mL injection INJECT  20  UNITS SUBCUTANEOUSLY EVERY EVENING (DISCARD AND OPEN NEW VIAL EVERY 28 DAYS) (Patient not taking: Reported on 10/6/2020) 30 mL 3    [DISCONTINUED] traMADol (ULTRAM) 50 mg tablet Take 1 tablet (50 mg total) by mouth every 8 (eight) hours as needed. (Patient not taking: Reported on 10/6/2020)       No current facility-administered medications on file prior to visit.          Assessment:   75 y.o. female with multiple co-morbid illnesses here to follow-up with PCP and continue work-up of chronic issues notably advanced age, CAD, HLD, HTN.     Plan:     Problem List Items Addressed This Visit        Cardiac/Vascular    Hypertension associated with diabetes    Relevant Medications    insulin glargine (LANTUS U-100 INSULIN) 100 " "unit/mL injection    metoprolol tartrate (LOPRESSOR) 100 MG tablet    Chronic combined systolic and diastolic congestive heart failure    Relevant Medications    atorvastatin (LIPITOR) 80 MG tablet    clopidogreL (PLAVIX) 75 mg tablet    Chronic atrial fibrillation     Rate-controlled on metoprolol 100mg BID  · Continue BB BID  · Elevated UYFEY-3-Wlok score of 9 (10.8% risk of stroke)  · Cards eval for anticoagulation  · Refuses coumadin  · Continue DAPT  · High-dose ASA         Coronary artery disease involving coronary bypass graft of native heart with unstable angina pectoris    Relevant Medications    atorvastatin (LIPITOR) 80 MG tablet    clopidogreL (PLAVIX) 75 mg tablet    metoprolol tartrate (LOPRESSOR) 100 MG tablet    nitroGLYCERIN (NITROSTAT) 0.4 MG SL tablet    Other Relevant Orders    Ambulatory referral/consult to Cardiology       Endocrine    Type 2 diabetes mellitus with chronic kidney disease on chronic dialysis, with long-term current use of insulin    Relevant Medications    atorvastatin (LIPITOR) 80 MG tablet    clopidogreL (PLAVIX) 75 mg tablet    insulin glargine (LANTUS U-100 INSULIN) 100 unit/mL injection    insulin syr/ndl U100 half nisreen (BD VEO INSULIN SYR HALF UNIT) 0.3 mL 31 gauge x 15/64" Syrg    Mild protein-calorie malnutrition    Relevant Medications    calcitRIOL (ROCALTROL) 0.5 MCG Cap       Orthopedic    Fracture of multiple ribs of left side    Relevant Medications    calcitRIOL (ROCALTROL) 0.5 MCG Cap    Displaced fracture of pubis    Relevant Medications    calcitRIOL (ROCALTROL) 0.5 MCG Cap       Other    Age-related physical debility     Recent fracture and last dexa 2017.  · dexa ordered to eval further though clinically osteoprosis with fx with fall  · Medication needs to be considered once results obtained  · Check vit D and PTH.  Pt to bring phos from labs doen with HD and we will review           Other Visit Diagnoses     Renovascular hypertension        Relevant " Medications    metoprolol tartrate (LOPRESSOR) 100 MG tablet    Coronary artery disease of bypass graft of native heart with stable angina pectoris        Relevant Medications    atorvastatin (LIPITOR) 80 MG tablet    clopidogreL (PLAVIX) 75 mg tablet    metoprolol tartrate (LOPRESSOR) 100 MG tablet    Other Relevant Orders    Ambulatory referral/consult to Optometry    Uncontrolled type 2 diabetes mellitus with stage 4 chronic kidney disease, with long-term current use of insulin        Relevant Medications    atorvastatin (LIPITOR) 80 MG tablet    clopidogreL (PLAVIX) 75 mg tablet    insulin glargine (LANTUS U-100 INSULIN) 100 unit/mL injection          Health Maintenance       Date Due Completion Date    Colorectal Cancer Screening 08/21/1964 ---FRAILTY EXCLUSION    Mammogram 05/14/2019 5/14/2018- FRAILTY EXCLUSION    Eye Exam 07/09/2019 7/9/2018    Shingles Vaccine (3 of 3) 11/12/2019 9/17/2019    Foot Exam 05/14/2020 5/14/2019 (Done)- TODAY    Override on 5/14/2019: Done    Override on 12/6/2016: Done    Influenza Vaccine (1) 08/01/2020 11/5/2019- ALREADY DONE    Override on 8/30/2018: Done (done at dialysis)    Override on 8/29/2016: Done    Override on 10/27/2013: Done    DEXA SCAN 09/26/2021 9/26/2019    TETANUS VACCINE 03/03/2026 3/3/2016          Follow up in about 3 months (around 1/6/2021). Total face-to-face time was 60 min, >50% of this was spent on counseling and coordination of care. The following issues were discussed: with female with advanced age, ESRD, CAD, HLD, HTN.     Lita Byrnes (student or resident)    Cesia Rosales MD/MPH  [facility]  [phone] [facility]

## 2020-10-06 NOTE — PROGRESS NOTES
REFERRING PHYSICIAN:  Karla Iglesias D.O.    HISTORY OF PRESENT ILLNESS:  A 75-year-old female with ESRD on HD since 8/2018, M/W/F, s/p    1. Drug coated PTA, prox AVF and PTA cephalic arch  7/30/2020  2.  Stent placement, L mid AVF and cephalic arch PTA 11/7/19  3. Covered stent placement (cephalic arch) and mid PTA, L AVG  6/20/19,  4. Drug coated PTA, L AVF (7x60 Lutonix) 9/20/18  5. PTa, L AVF 5/31/18  6.  L brachio-cephalic AVF 4/16/18.  No c/o    8/18/2020:  Her AV fistula is not working well still having issues with it.  However she now relates that she has had a 3 to four-month history of very occasional left hand/finger tenderness and occasional pain.  This happens approximately once a week, sometimes while on dialysis, sometimes waking her up from sleep.    9/1/2020:  She now returns with worsening left hand pain.  This is most noticeable when she is on dialysis, but also sometimes wakes her up at night.    10/6/2020:  She had been scheduled for a right arm and arteriogram to investigate possible steal, given her prior left hand pain.  Fortunately, her left hand pain resolved completely, and the angio was canceled.  She continues to be completely free of hand pain.  Notably S she does have several bulging disc in her neck, as suspect that her left hand pain was due cervical radiculopathy    PAST MEDICAL HISTORY:  1.  Coronary artery disease, status post MI x5.   MI 8/2018 with PCI  2.  Hypertension.  3.  Diabetes.  4.  Hyperparathyroidism.  5.  Chronic atrial fibrillation.    PAST SURGICAL HISTORY:  1.  Hysterectomy.  2.  Tonsillectomy.  3.  Appendectomy.  4.  Coronary artery bypass.  5.  PCI.  6.  Fracture surgery.    FAMILY HISTORY:  Positive for diabetes and hypertension.    MEDICATIONS:  Include Plavix, aspirin and statin.  See EPIC for full list.    ALLERGIES:  Penicillin, Percodan and Phenergan.    REVIEW OF SYSTEMS:  Denies postprandial pain or DVT.  All other systems including eyes, ENT,  respiratory, musculoskeletal, breast,   neurologic, psychiatric, heme, lymph, allergy and immune are negative.    PHYSICAL EXAMINATION:  VITAL SIGNS:  See nursing notes.  GENERAL:  She is in no acute distress.  RESPIRATORY:  Normal effort.  Clear to claudication.  CARDIOVASCULAR:  Regular rhythm.  Nondisplaced PMI, no murmur.  VASCULAR:  Left radial and ulnar arteries are not palpable.  Left radial signal is a strong biphasic, left ulnar is strong triphasic.  With compression of the fistula, there is minimal augmentation noted.  stable    Right 2+ femoral pulse    EXTREMITIES:   Left arm shows a a nice thrill in the AV fistula with minimal pulsatility.  Stable.  Hand  5/5, no cyanosis or ulceration    NEUROLOGIC:  Cranial nerves VII through XII intact.  5/5 motor strength in all   extremities.    Imaging:  Duplex of the AV fistula today shows a moderate recurrence folic arch stenosis with velocity of 566, flow volume is 1.28 L, increased from 1.0 L prior    Prior Arterial duplex suggests a moderate subclavian artery stenosis with a velocity of 263, compared with velocity of 121 proximally    Prior Left finger PPGs show excellent pulsatility, with little change with graft augmentation    ASSESSMENT:   1.  Resolution of left hand pain.   Likely a cervical radiculopathy  2.  Moderate cephalic arch stenosis, left, asymptomatic.  Will observe    RECOMMENDATION:  1.  Follow-up in 3 months with AV fistula duplex interval clinically indicated    JULIO CÉSAR Perry III, MD, FACS  Professor and Chief, Vascular and Endovascular Surgery    CC: Karla Iglesias D.O.

## 2020-11-03 NOTE — TELEPHONE ENCOUNTER
Spoke with pt, she receives her calcitriol at dialysis. She need these other meds reordered.  Pended meds and confirmed [facility], [facility] pharmacy.

## 2020-11-23 NOTE — PROGRESS NOTES
Chart reviewed.   Immunizations: updated  Orders placed: n/a  Upcoming appts to satisfy RIZWAN topics: Optometry 11/24

## 2020-11-24 NOTE — PROGRESS NOTES
HPI     DLS 07/23/2018 by Dr. LEI Garza, OD    74 YO F here today for her annual Diabetic Exam ( Type II DM w/ PDR OU)    LBS 97 this morning. [facility]     Hemoglobin A1C       Date                     Value               Ref Range             Status                06/23/2019               8.7 (H)             4.0 - 5.6 %           Final                 06/05/2018               8.9 (H)             4.0 - 5.6 %           Final                 05/14/2018               9.0 (H)             4.0 - 5.6 %           Final                    -eye pain  +flashes/floaters  -headaches  -veil/curtains    POHx:   1. Type II DM w/ PDR OU  2. S/P PRP      S/P PPVX/EL/GTX OS (03/02/2007)    3. Myopia w/ Presbyopia OU         Last edited by Juliann Bonilla MA on 11/24/2020  2:06 PM. (History)            Assessment /Plan     For exam results, see Encounter Report.    Stable proliferative diabetic retinopathy of both eyes associated with type 2 diabetes mellitus  -     Posterior Segment OCT Retina-Both eyes    Low vision of right eye  -     Posterior Segment OCT Retina-Both eyes    After-cataract obscuring vision, left    Dermatochalasis of left upper eyelid    Myopia with presbyopia of left eye            1-2.  Extensive history of laser to both retina.  Vision loss OD secondary to PDR.  No active retinopathy OU.  3.  Want to see pupil undilated--will see if PCO affecting vision.    4.  Most likely interfering with peripheral vision.  Refer to Dr. Curry.    5.  No rx given.  Recheck at next visit.               Patient reports recent episodes of tripping and falling.  Also running into a cabinet door.  Extensive history of laser to retina affecting peripheral vision but longstanding.  Feel upper eyelid OS interfering with peripheral vision.  Also PCO may be affecting inferior vision as well.        RTC 3 weeks for refraction and PCO check with undilated pupil.

## 2020-12-15 NOTE — PROGRESS NOTES
HPI     Concerns About Ocular Health      Additional comments: 3 wk MR/PCO ck              Comments     Last eye exam was 11/24/20 with Dr. Garza.  Patient states vision is the same since last exam-fluctuates day to day.  Patient denies diplopia, headaches, flashes/floaters, and pain.            Last edited by Emily Chaudhari on 12/15/2020  9:32 AM. (History)            Assessment /Plan     For exam results, see Encounter Report.    After-cataract obscuring vision, left    Dermatochalasis of left upper eyelid    Myopia with presbyopia of left eye            1.  Not interfering with central vision OS--monitor.  2.  Refer to Dr. Curry.    3.  Bifocal rx given

## 2020-12-18 NOTE — TELEPHONE ENCOUNTER
Patient has been advised that her DEXA scan showed osteopenia which means the bones are thinning. Patient has been informed that she should take calcium and vitamin D daily and try to do weight-bearing exercise. Things like walking, jogging, weight lifting are good. Patient has been informed that we will place order for Vitamin D + Calcium to [employer] [facility] and that she can start an OTC as of today . Patient verbalized great understanding .    
Please let patient know that her DEXA scan showed osteopenia which means the bones are thinning. She should take calcium and vitamin D daily and try to do weight-bearing exercise. Things like walking, jogging, weight lifting are good.    Thanks,  KJ  
English

## 2021-01-01 ENCOUNTER — IMMUNIZATION (OUTPATIENT)
Dept: INTERNAL MEDICINE | Facility: CLINIC | Age: 76
End: 2021-01-01
Payer: MEDICARE

## 2021-01-01 ENCOUNTER — PATIENT MESSAGE (OUTPATIENT)
Dept: PRIMARY CARE CLINIC | Facility: CLINIC | Age: 76
End: 2021-01-01

## 2021-01-01 ENCOUNTER — HOSPITAL ENCOUNTER (OUTPATIENT)
Facility: HOSPITAL | Age: 76
Discharge: HOME | End: 2021-01-21
Attending: SURGERY | Admitting: SURGERY
Payer: MEDICARE

## 2021-01-01 ENCOUNTER — LAB VISIT (OUTPATIENT)
Dept: INTERNAL MEDICINE | Facility: CLINIC | Age: 76
End: 2021-01-01
Payer: MEDICARE

## 2021-01-01 ENCOUNTER — OFFICE VISIT (OUTPATIENT)
Dept: VASCULAR SURGERY | Facility: CLINIC | Age: 76
End: 2021-01-01
Payer: MEDICARE

## 2021-01-01 ENCOUNTER — TELEPHONE (OUTPATIENT)
Dept: VASCULAR SURGERY | Facility: CLINIC | Age: 76
End: 2021-01-01

## 2021-01-01 ENCOUNTER — HOSPITAL ENCOUNTER (INPATIENT)
Facility: HOSPITAL | Age: 76
LOS: 7 days | DRG: 246 | End: 2021-03-06
Attending: INTERNAL MEDICINE | Admitting: INTERNAL MEDICINE
Payer: MEDICARE

## 2021-01-01 ENCOUNTER — PES CALL (OUTPATIENT)
Dept: ADMINISTRATIVE | Facility: CLINIC | Age: 76
End: 2021-01-01

## 2021-01-01 ENCOUNTER — LAB VISIT (OUTPATIENT)
Dept: LAB | Facility: HOSPITAL | Age: 76
End: 2021-01-01
Attending: SURGERY
Payer: MEDICARE

## 2021-01-01 ENCOUNTER — TELEPHONE (OUTPATIENT)
Dept: PRIMARY CARE CLINIC | Facility: CLINIC | Age: 76
End: 2021-01-01

## 2021-01-01 ENCOUNTER — TELEPHONE (OUTPATIENT)
Dept: INTERNAL MEDICINE | Facility: CLINIC | Age: 76
End: 2021-01-01

## 2021-01-01 ENCOUNTER — HOSPITAL ENCOUNTER (OUTPATIENT)
Dept: VASCULAR SURGERY | Facility: CLINIC | Age: 76
Discharge: HOME | End: 2021-01-12
Attending: SURGERY
Payer: MEDICARE

## 2021-01-01 ENCOUNTER — HOSPITAL ENCOUNTER (OUTPATIENT)
Dept: VASCULAR SURGERY | Facility: CLINIC | Age: 76
Discharge: HOME | End: 2021-01-26
Attending: SURGERY
Payer: MEDICARE

## 2021-01-01 VITALS
HEIGHT: 64 IN | BODY MASS INDEX: 27.48 KG/M2 | RESPIRATION RATE: 19 BRPM | OXYGEN SATURATION: 98 % | SYSTOLIC BLOOD PRESSURE: 116 MMHG | HEART RATE: 55 BPM | TEMPERATURE: 98 F | DIASTOLIC BLOOD PRESSURE: 56 MMHG | WEIGHT: 160.94 LBS

## 2021-01-01 VITALS
DIASTOLIC BLOOD PRESSURE: 88 MMHG | HEIGHT: 64 IN | BODY MASS INDEX: 27.52 KG/M2 | WEIGHT: 161.19 LBS | HEART RATE: 67 BPM | SYSTOLIC BLOOD PRESSURE: 149 MMHG | TEMPERATURE: 98 F

## 2021-01-01 VITALS
HEART RATE: 72 BPM | DIASTOLIC BLOOD PRESSURE: 65 MMHG | TEMPERATURE: 98 F | HEIGHT: 64 IN | SYSTOLIC BLOOD PRESSURE: 139 MMHG | OXYGEN SATURATION: 99 % | WEIGHT: 159 LBS | BODY MASS INDEX: 27.14 KG/M2 | RESPIRATION RATE: 18 BRPM

## 2021-01-01 VITALS
SYSTOLIC BLOOD PRESSURE: 113 MMHG | DIASTOLIC BLOOD PRESSURE: 56 MMHG | WEIGHT: 158.75 LBS | HEART RATE: 71 BPM | TEMPERATURE: 98 F | HEIGHT: 64 IN | BODY MASS INDEX: 27.1 KG/M2

## 2021-01-01 DIAGNOSIS — R07.9 CHEST PAIN: ICD-10-CM

## 2021-01-01 DIAGNOSIS — Z99.2 END STAGE RENAL FAILURE ON DIALYSIS: ICD-10-CM

## 2021-01-01 DIAGNOSIS — I21.9 MYOCARDIAL INFARCTION, UNSPECIFIED MI TYPE, UNSPECIFIED ARTERY: Primary | ICD-10-CM

## 2021-01-01 DIAGNOSIS — I21.4 NSTEMI (NON-ST ELEVATED MYOCARDIAL INFARCTION): ICD-10-CM

## 2021-01-01 DIAGNOSIS — N18.6 END STAGE RENAL FAILURE ON DIALYSIS: ICD-10-CM

## 2021-01-01 DIAGNOSIS — Z79.4 TYPE 2 DIABETES MELLITUS WITH CHRONIC KIDNEY DISEASE ON CHRONIC DIALYSIS, WITH LONG-TERM CURRENT USE OF INSULIN: Primary | ICD-10-CM

## 2021-01-01 DIAGNOSIS — Z99.2 ESRD (END STAGE RENAL DISEASE) ON DIALYSIS: Primary | ICD-10-CM

## 2021-01-01 DIAGNOSIS — T82.858D STENOSIS OF ARTERIOVENOUS GRAFT, SUBSEQUENT ENCOUNTER: ICD-10-CM

## 2021-01-01 DIAGNOSIS — N18.6 ESRD (END STAGE RENAL DISEASE) ON DIALYSIS: Primary | ICD-10-CM

## 2021-01-01 DIAGNOSIS — Z23 NEED FOR VACCINATION: ICD-10-CM

## 2021-01-01 DIAGNOSIS — Z01.818 PRE-OP EVALUATION: ICD-10-CM

## 2021-01-01 DIAGNOSIS — N18.5 ANEMIA OF CHRONIC RENAL FAILURE, STAGE 5: ICD-10-CM

## 2021-01-01 DIAGNOSIS — I21.9 MYOCARDIAL INFARCTION: ICD-10-CM

## 2021-01-01 DIAGNOSIS — Z99.2 ESRD (END STAGE RENAL DISEASE) ON DIALYSIS: ICD-10-CM

## 2021-01-01 DIAGNOSIS — N18.6 ESRD (END STAGE RENAL DISEASE): ICD-10-CM

## 2021-01-01 DIAGNOSIS — Z74.09 IMPAIRED FUNCTIONAL MOBILITY AND ENDURANCE: ICD-10-CM

## 2021-01-01 DIAGNOSIS — Z01.818 PRE-OP EVALUATION: Primary | ICD-10-CM

## 2021-01-01 DIAGNOSIS — E11.22 TYPE 2 DIABETES MELLITUS WITH CHRONIC KIDNEY DISEASE ON CHRONIC DIALYSIS, WITH LONG-TERM CURRENT USE OF INSULIN: Primary | ICD-10-CM

## 2021-01-01 DIAGNOSIS — N18.6 TYPE 2 DIABETES MELLITUS WITH CHRONIC KIDNEY DISEASE ON CHRONIC DIALYSIS, WITH LONG-TERM CURRENT USE OF INSULIN: ICD-10-CM

## 2021-01-01 DIAGNOSIS — I47.29 POLYMORPHIC VENTRICULAR TACHYCARDIA: ICD-10-CM

## 2021-01-01 DIAGNOSIS — I24.9 ACS (ACUTE CORONARY SYNDROME): ICD-10-CM

## 2021-01-01 DIAGNOSIS — Z79.4 TYPE 2 DIABETES MELLITUS WITH CHRONIC KIDNEY DISEASE ON CHRONIC DIALYSIS, WITH LONG-TERM CURRENT USE OF INSULIN: ICD-10-CM

## 2021-01-01 DIAGNOSIS — N18.6 ESRD (END STAGE RENAL DISEASE) ON DIALYSIS: ICD-10-CM

## 2021-01-01 DIAGNOSIS — Z99.2 HEMODIALYSIS PATIENT: ICD-10-CM

## 2021-01-01 DIAGNOSIS — E11.22 TYPE 2 DIABETES MELLITUS WITH CHRONIC KIDNEY DISEASE ON CHRONIC DIALYSIS, WITH LONG-TERM CURRENT USE OF INSULIN: ICD-10-CM

## 2021-01-01 DIAGNOSIS — Z99.2 TYPE 2 DIABETES MELLITUS WITH CHRONIC KIDNEY DISEASE ON CHRONIC DIALYSIS, WITH LONG-TERM CURRENT USE OF INSULIN: ICD-10-CM

## 2021-01-01 DIAGNOSIS — D63.1 ANEMIA OF CHRONIC RENAL FAILURE, STAGE 5: ICD-10-CM

## 2021-01-01 DIAGNOSIS — R06.02 SHORTNESS OF BREATH: ICD-10-CM

## 2021-01-01 DIAGNOSIS — Z99.2 TYPE 2 DIABETES MELLITUS WITH CHRONIC KIDNEY DISEASE ON CHRONIC DIALYSIS, WITH LONG-TERM CURRENT USE OF INSULIN: Primary | ICD-10-CM

## 2021-01-01 DIAGNOSIS — R06.02 SOB (SHORTNESS OF BREATH): ICD-10-CM

## 2021-01-01 DIAGNOSIS — Z23 NEED FOR VACCINATION: Primary | ICD-10-CM

## 2021-01-01 DIAGNOSIS — N18.6 TYPE 2 DIABETES MELLITUS WITH CHRONIC KIDNEY DISEASE ON CHRONIC DIALYSIS, WITH LONG-TERM CURRENT USE OF INSULIN: Primary | ICD-10-CM

## 2021-01-01 LAB
ABO + RH BLD: NORMAL
ALBUMIN SERPL BCP-MCNC: 2.9 G/DL (ref 3.5–5.2)
ALBUMIN SERPL BCP-MCNC: 3 G/DL (ref 3.5–5.2)
ALBUMIN SERPL BCP-MCNC: 3.1 G/DL (ref 3.5–5.2)
ALBUMIN SERPL BCP-MCNC: 3.2 G/DL (ref 3.5–5.2)
ALBUMIN SERPL BCP-MCNC: 3.3 G/DL (ref 3.5–5.2)
ALBUMIN SERPL BCP-MCNC: 3.4 G/DL (ref 3.5–5.2)
ALBUMIN SERPL BCP-MCNC: 3.9 G/DL (ref 3.5–5.2)
ALLENS TEST: ABNORMAL
ALLENS TEST: ABNORMAL
ALP SERPL-CCNC: 64 U/L (ref 55–135)
ALP SERPL-CCNC: 70 U/L (ref 55–135)
ALP SERPL-CCNC: 72 U/L (ref 55–135)
ALP SERPL-CCNC: 72 U/L (ref 55–135)
ALP SERPL-CCNC: 76 U/L (ref 55–135)
ALP SERPL-CCNC: 78 U/L (ref 55–135)
ALP SERPL-CCNC: 87 U/L (ref 55–135)
ALP SERPL-CCNC: 90 U/L (ref 55–135)
ALT SERPL W/O P-5'-P-CCNC: 121 U/L (ref 10–44)
ALT SERPL W/O P-5'-P-CCNC: 16 U/L (ref 10–44)
ALT SERPL W/O P-5'-P-CCNC: 18 U/L (ref 10–44)
ALT SERPL W/O P-5'-P-CCNC: 31 U/L (ref 10–44)
ALT SERPL W/O P-5'-P-CCNC: 376 U/L (ref 10–44)
ALT SERPL W/O P-5'-P-CCNC: 48 U/L (ref 10–44)
ALT SERPL W/O P-5'-P-CCNC: 65 U/L (ref 10–44)
ALT SERPL W/O P-5'-P-CCNC: 78 U/L (ref 10–44)
ANION GAP SERPL CALC-SCNC: 10 MMOL/L (ref 8–16)
ANION GAP SERPL CALC-SCNC: 11 MMOL/L (ref 8–16)
ANION GAP SERPL CALC-SCNC: 12 MMOL/L (ref 8–16)
ANION GAP SERPL CALC-SCNC: 13 MMOL/L (ref 8–16)
ANION GAP SERPL CALC-SCNC: 13 MMOL/L (ref 8–16)
ANION GAP SERPL CALC-SCNC: 14 MMOL/L (ref 8–16)
ANION GAP SERPL CALC-SCNC: 15 MMOL/L (ref 8–16)
ANION GAP SERPL CALC-SCNC: 16 MMOL/L (ref 8–16)
ANION GAP SERPL CALC-SCNC: 17 MMOL/L (ref 8–16)
ANION GAP SERPL CALC-SCNC: 21 MMOL/L (ref 8–16)
ANISOCYTOSIS BLD QL SMEAR: SLIGHT
ANISOCYTOSIS BLD QL SMEAR: SLIGHT
APTT BLDCRRT: 113.7 SEC (ref 21–32)
APTT BLDCRRT: 23.1 SEC (ref 21–32)
APTT BLDCRRT: 24.2 SEC (ref 21–32)
APTT BLDCRRT: 43.3 SEC (ref 21–32)
APTT BLDCRRT: 56.5 SEC (ref 21–32)
ASCENDING AORTA: 3.14 CM
AST SERPL-CCNC: 43 U/L (ref 10–40)
AST SERPL-CCNC: 47 U/L (ref 10–40)
AST SERPL-CCNC: 474 U/L (ref 10–40)
AST SERPL-CCNC: 52 U/L (ref 10–40)
AST SERPL-CCNC: 55 U/L (ref 10–40)
AST SERPL-CCNC: 68 U/L (ref 10–40)
AST SERPL-CCNC: 81 U/L (ref 10–40)
AST SERPL-CCNC: 90 U/L (ref 10–40)
AV INDEX (PROSTH): 0.5
AV MEAN GRADIENT: 4 MMHG
AV PEAK GRADIENT: 6 MMHG
AV VALVE AREA: 1.78 CM2
AV VELOCITY RATIO: 0.52
BACTERIA #/AREA URNS AUTO: ABNORMAL /HPF
BACTERIA BLD CULT: NORMAL
BACTERIA BLD CULT: NORMAL
BACTERIA UR CULT: ABNORMAL
BASO STIPL BLD QL SMEAR: ABNORMAL
BASOPHILS # BLD AUTO: 0.02 K/UL (ref 0–0.2)
BASOPHILS # BLD AUTO: 0.02 K/UL (ref 0–0.2)
BASOPHILS # BLD AUTO: 0.03 K/UL (ref 0–0.2)
BASOPHILS # BLD AUTO: 0.03 K/UL (ref 0–0.2)
BASOPHILS # BLD AUTO: 0.04 K/UL (ref 0–0.2)
BASOPHILS # BLD AUTO: 0.05 K/UL (ref 0–0.2)
BASOPHILS # BLD AUTO: 0.06 K/UL (ref 0–0.2)
BASOPHILS # BLD AUTO: 0.07 K/UL (ref 0–0.2)
BASOPHILS # BLD AUTO: 0.08 K/UL (ref 0–0.2)
BASOPHILS # BLD AUTO: 0.11 K/UL (ref 0–0.2)
BASOPHILS NFR BLD: 0 % (ref 0–1.9)
BASOPHILS NFR BLD: 0.2 % (ref 0–1.9)
BASOPHILS NFR BLD: 0.3 % (ref 0–1.9)
BASOPHILS NFR BLD: 0.3 % (ref 0–1.9)
BASOPHILS NFR BLD: 0.4 % (ref 0–1.9)
BASOPHILS NFR BLD: 0.5 % (ref 0–1.9)
BASOPHILS NFR BLD: 0.6 % (ref 0–1.9)
BASOPHILS NFR BLD: 0.7 % (ref 0–1.9)
BILIRUB SERPL-MCNC: 0.5 MG/DL (ref 0.1–1)
BILIRUB SERPL-MCNC: 0.6 MG/DL (ref 0.1–1)
BILIRUB SERPL-MCNC: 0.7 MG/DL (ref 0.1–1)
BILIRUB SERPL-MCNC: 1 MG/DL (ref 0.1–1)
BILIRUB UR QL STRIP: NEGATIVE
BLD GP AB SCN CELLS X3 SERPL QL: NORMAL
BLD PROD TYP BPU: NORMAL
BLOOD GROUP ANTIBODIES SERPL: NORMAL
BLOOD UNIT EXPIRATION DATE: NORMAL
BLOOD UNIT TYPE CODE: 5100
BLOOD UNIT TYPE: NORMAL
BNP SERPL-MCNC: 4388 PG/ML (ref 0–99)
BSA FOR ECHO PROCEDURE: 1.79 M2
BUN SERPL-MCNC: 31 MG/DL (ref 8–23)
BUN SERPL-MCNC: 33 MG/DL (ref 8–23)
BUN SERPL-MCNC: 35 MG/DL (ref 8–23)
BUN SERPL-MCNC: 37 MG/DL (ref 8–23)
BUN SERPL-MCNC: 38 MG/DL (ref 8–23)
BUN SERPL-MCNC: 40 MG/DL (ref 8–23)
BUN SERPL-MCNC: 41 MG/DL (ref 8–23)
BUN SERPL-MCNC: 43 MG/DL (ref 8–23)
BUN SERPL-MCNC: 48 MG/DL (ref 8–23)
BUN SERPL-MCNC: 48 MG/DL (ref 8–23)
BUN SERPL-MCNC: 59 MG/DL (ref 8–23)
BUN SERPL-MCNC: 65 MG/DL (ref 8–23)
BUN SERPL-MCNC: 76 MG/DL (ref 8–23)
CA-I BLDV-SCNC: 1.08 MMOL/L (ref 1.06–1.42)
CALCIUM SERPL-MCNC: 8.1 MG/DL (ref 8.7–10.5)
CALCIUM SERPL-MCNC: 8.2 MG/DL (ref 8.7–10.5)
CALCIUM SERPL-MCNC: 8.3 MG/DL (ref 8.7–10.5)
CALCIUM SERPL-MCNC: 8.4 MG/DL (ref 8.7–10.5)
CALCIUM SERPL-MCNC: 8.7 MG/DL (ref 8.7–10.5)
CALCIUM SERPL-MCNC: 8.7 MG/DL (ref 8.7–10.5)
CALCIUM SERPL-MCNC: 9 MG/DL (ref 8.7–10.5)
CALCIUM SERPL-MCNC: 9 MG/DL (ref 8.7–10.5)
CALCIUM SERPL-MCNC: 9.1 MG/DL (ref 8.7–10.5)
CALCIUM SERPL-MCNC: 9.1 MG/DL (ref 8.7–10.5)
CALCIUM SERPL-MCNC: 9.2 MG/DL (ref 8.7–10.5)
CALCIUM SERPL-MCNC: 9.2 MG/DL (ref 8.7–10.5)
CALCIUM SERPL-MCNC: 9.3 MG/DL (ref 8.7–10.5)
CHLORIDE SERPL-SCNC: 100 MMOL/L (ref 95–110)
CHLORIDE SERPL-SCNC: 100 MMOL/L (ref 95–110)
CHLORIDE SERPL-SCNC: 102 MMOL/L (ref 95–110)
CHLORIDE SERPL-SCNC: 102 MMOL/L (ref 95–110)
CHLORIDE SERPL-SCNC: 104 MMOL/L (ref 95–110)
CHLORIDE SERPL-SCNC: 105 MMOL/L (ref 95–110)
CHLORIDE SERPL-SCNC: 96 MMOL/L (ref 95–110)
CHLORIDE SERPL-SCNC: 99 MMOL/L (ref 95–110)
CK MB SERPL-MCNC: 2.1 NG/ML (ref 0.1–6.5)
CK MB SERPL-RTO: 5.8 % (ref 0–5)
CK SERPL-CCNC: 36 U/L (ref 20–180)
CK SERPL-CCNC: 36 U/L (ref 20–180)
CLARITY UR REFRACT.AUTO: ABNORMAL
CO2 SERPL-SCNC: 14 MMOL/L (ref 23–29)
CO2 SERPL-SCNC: 17 MMOL/L (ref 23–29)
CO2 SERPL-SCNC: 20 MMOL/L (ref 23–29)
CO2 SERPL-SCNC: 23 MMOL/L (ref 23–29)
CO2 SERPL-SCNC: 24 MMOL/L (ref 23–29)
CO2 SERPL-SCNC: 25 MMOL/L (ref 23–29)
CO2 SERPL-SCNC: 25 MMOL/L (ref 23–29)
CO2 SERPL-SCNC: 27 MMOL/L (ref 23–29)
CO2 SERPL-SCNC: 29 MMOL/L (ref 23–29)
CO2 SERPL-SCNC: 30 MMOL/L (ref 23–29)
CO2 SERPL-SCNC: 32 MMOL/L (ref 23–29)
CODING SYSTEM: NORMAL
COLOR UR AUTO: YELLOW
CREAT SERPL-MCNC: 3.4 MG/DL (ref 0.5–1.4)
CREAT SERPL-MCNC: 3.5 MG/DL (ref 0.5–1.4)
CREAT SERPL-MCNC: 4.2 MG/DL (ref 0.5–1.4)
CREAT SERPL-MCNC: 4.3 MG/DL (ref 0.5–1.4)
CREAT SERPL-MCNC: 4.6 MG/DL (ref 0.5–1.4)
CREAT SERPL-MCNC: 4.7 MG/DL (ref 0.5–1.4)
CREAT SERPL-MCNC: 4.7 MG/DL (ref 0.5–1.4)
CREAT SERPL-MCNC: 4.8 MG/DL (ref 0.5–1.4)
CREAT SERPL-MCNC: 4.9 MG/DL (ref 0.5–1.4)
CREAT SERPL-MCNC: 5 MG/DL (ref 0.5–1.4)
CREAT SERPL-MCNC: 5.6 MG/DL (ref 0.5–1.4)
CREAT SERPL-MCNC: 6.3 MG/DL (ref 0.5–1.4)
CREAT SERPL-MCNC: 7 MG/DL (ref 0.5–1.4)
CREAT UR-MCNC: 106 MG/DL (ref 15–325)
CTP QC/QA: YES
CV ECHO LV RWT: 0.58 CM
D DIMER PPP IA.FEU-MCNC: 0.43 MG/L FEU
DELSYS: ABNORMAL
DIFFERENTIAL METHOD: ABNORMAL
DISPENSE STATUS: NORMAL
DOP CALC AO PEAK VEL: 1.26 M/S
DOP CALC AO VTI: 32.68 CM
DOP CALC LVOT AREA: 3.6 CM2
DOP CALC LVOT DIAMETER: 2.13 CM
DOP CALC LVOT PEAK VEL: 0.65 M/S
DOP CALC LVOT STROKE VOLUME: 58.16 CM3
DOP CALCLVOT PEAK VEL VTI: 16.33 CM
E WAVE DECELERATION TIME: 215.47 MSEC
E/A RATIO: 3.31
E/E' RATIO: 32.4 M/S
ECHO LV POSTERIOR WALL: 1.24 CM (ref 0.6–1.1)
EOSINOPHIL # BLD AUTO: 0.1 K/UL (ref 0–0.5)
EOSINOPHIL # BLD AUTO: 0.2 K/UL (ref 0–0.5)
EOSINOPHIL # BLD AUTO: 0.3 K/UL (ref 0–0.5)
EOSINOPHIL NFR BLD: 0 % (ref 0–8)
EOSINOPHIL NFR BLD: 0.6 % (ref 0–8)
EOSINOPHIL NFR BLD: 0.7 % (ref 0–8)
EOSINOPHIL NFR BLD: 1 % (ref 0–8)
EOSINOPHIL NFR BLD: 1.2 % (ref 0–8)
EOSINOPHIL NFR BLD: 1.2 % (ref 0–8)
EOSINOPHIL NFR BLD: 1.8 % (ref 0–8)
EOSINOPHIL NFR BLD: 1.9 % (ref 0–8)
EOSINOPHIL NFR BLD: 1.9 % (ref 0–8)
EOSINOPHIL NFR BLD: 2 % (ref 0–8)
EOSINOPHIL NFR BLD: 2.2 % (ref 0–8)
EOSINOPHIL NFR BLD: 2.2 % (ref 0–8)
EOSINOPHIL NFR BLD: 2.3 % (ref 0–8)
EOSINOPHIL NFR BLD: 2.4 % (ref 0–8)
EOSINOPHIL NFR BLD: 2.5 % (ref 0–8)
EOSINOPHIL NFR BLD: 2.5 % (ref 0–8)
EOSINOPHIL NFR BLD: 3 % (ref 0–8)
EOSINOPHIL NFR BLD: 3.2 % (ref 0–8)
ERYTHROCYTE [DISTWIDTH] IN BLOOD BY AUTOMATED COUNT: 14.1 % (ref 11.5–14.5)
ERYTHROCYTE [DISTWIDTH] IN BLOOD BY AUTOMATED COUNT: 15.8 % (ref 11.5–14.5)
ERYTHROCYTE [DISTWIDTH] IN BLOOD BY AUTOMATED COUNT: 16.2 % (ref 11.5–14.5)
ERYTHROCYTE [DISTWIDTH] IN BLOOD BY AUTOMATED COUNT: 16.3 % (ref 11.5–14.5)
ERYTHROCYTE [DISTWIDTH] IN BLOOD BY AUTOMATED COUNT: 16.7 % (ref 11.5–14.5)
ERYTHROCYTE [DISTWIDTH] IN BLOOD BY AUTOMATED COUNT: 16.8 % (ref 11.5–14.5)
ERYTHROCYTE [DISTWIDTH] IN BLOOD BY AUTOMATED COUNT: 16.8 % (ref 11.5–14.5)
ERYTHROCYTE [DISTWIDTH] IN BLOOD BY AUTOMATED COUNT: 16.9 % (ref 11.5–14.5)
ERYTHROCYTE [DISTWIDTH] IN BLOOD BY AUTOMATED COUNT: 16.9 % (ref 11.5–14.5)
ERYTHROCYTE [DISTWIDTH] IN BLOOD BY AUTOMATED COUNT: 17 % (ref 11.5–14.5)
ERYTHROCYTE [DISTWIDTH] IN BLOOD BY AUTOMATED COUNT: 17 % (ref 11.5–14.5)
ERYTHROCYTE [DISTWIDTH] IN BLOOD BY AUTOMATED COUNT: 17.1 % (ref 11.5–14.5)
ERYTHROCYTE [DISTWIDTH] IN BLOOD BY AUTOMATED COUNT: 17.2 % (ref 11.5–14.5)
ERYTHROCYTE [DISTWIDTH] IN BLOOD BY AUTOMATED COUNT: 17.3 % (ref 11.5–14.5)
ERYTHROCYTE [DISTWIDTH] IN BLOOD BY AUTOMATED COUNT: 17.4 % (ref 11.5–14.5)
ERYTHROCYTE [DISTWIDTH] IN BLOOD BY AUTOMATED COUNT: 17.7 % (ref 11.5–14.5)
ERYTHROCYTE [DISTWIDTH] IN BLOOD BY AUTOMATED COUNT: 18 % (ref 11.5–14.5)
ERYTHROCYTE [DISTWIDTH] IN BLOOD BY AUTOMATED COUNT: 18.4 % (ref 11.5–14.5)
EST. GFR  (AFRICAN AMERICAN): 10.1 ML/MIN/1.73 M^2
EST. GFR  (AFRICAN AMERICAN): 11 ML/MIN/1.73 M^2
EST. GFR  (AFRICAN AMERICAN): 11.3 ML/MIN/1.73 M^2
EST. GFR  (AFRICAN AMERICAN): 14.1 ML/MIN/1.73 M^2
EST. GFR  (AFRICAN AMERICAN): 14.6 ML/MIN/1.73 M^2
EST. GFR  (AFRICAN AMERICAN): 6.1 ML/MIN/1.73 M^2
EST. GFR  (AFRICAN AMERICAN): 6.9 ML/MIN/1.73 M^2
EST. GFR  (AFRICAN AMERICAN): 8 ML/MIN/1.73 M^2
EST. GFR  (AFRICAN AMERICAN): 9.2 ML/MIN/1.73 M^2
EST. GFR  (AFRICAN AMERICAN): 9.4 ML/MIN/1.73 M^2
EST. GFR  (AFRICAN AMERICAN): 9.6 ML/MIN/1.73 M^2
EST. GFR  (AFRICAN AMERICAN): 9.9 ML/MIN/1.73 M^2
EST. GFR  (AFRICAN AMERICAN): 9.9 ML/MIN/1.73 M^2
EST. GFR  (NON AFRICAN AMERICAN): 12.2 ML/MIN/1.73 M^2
EST. GFR  (NON AFRICAN AMERICAN): 12.7 ML/MIN/1.73 M^2
EST. GFR  (NON AFRICAN AMERICAN): 5.3 ML/MIN/1.73 M^2
EST. GFR  (NON AFRICAN AMERICAN): 6 ML/MIN/1.73 M^2
EST. GFR  (NON AFRICAN AMERICAN): 6.9 ML/MIN/1.73 M^2
EST. GFR  (NON AFRICAN AMERICAN): 7.9 ML/MIN/1.73 M^2
EST. GFR  (NON AFRICAN AMERICAN): 8.1 ML/MIN/1.73 M^2
EST. GFR  (NON AFRICAN AMERICAN): 8.4 ML/MIN/1.73 M^2
EST. GFR  (NON AFRICAN AMERICAN): 8.6 ML/MIN/1.73 M^2
EST. GFR  (NON AFRICAN AMERICAN): 8.6 ML/MIN/1.73 M^2
EST. GFR  (NON AFRICAN AMERICAN): 8.8 ML/MIN/1.73 M^2
EST. GFR  (NON AFRICAN AMERICAN): 9.5 ML/MIN/1.73 M^2
EST. GFR  (NON AFRICAN AMERICAN): 9.8 ML/MIN/1.73 M^2
ESTIMATED AVG GLUCOSE: 114 MG/DL (ref 68–131)
FERRITIN SERPL-MCNC: 6109 NG/ML (ref 20–300)
FLOW: 2
FRACTIONAL SHORTENING: 21 % (ref 28–44)
GLUCOSE SERPL-MCNC: 101 MG/DL (ref 70–110)
GLUCOSE SERPL-MCNC: 106 MG/DL (ref 70–110)
GLUCOSE SERPL-MCNC: 110 MG/DL (ref 70–110)
GLUCOSE SERPL-MCNC: 112 MG/DL (ref 70–110)
GLUCOSE SERPL-MCNC: 117 MG/DL (ref 70–110)
GLUCOSE SERPL-MCNC: 120 MG/DL (ref 70–110)
GLUCOSE SERPL-MCNC: 124 MG/DL (ref 70–110)
GLUCOSE SERPL-MCNC: 125 MG/DL (ref 70–110)
GLUCOSE SERPL-MCNC: 157 MG/DL (ref 70–110)
GLUCOSE SERPL-MCNC: 165 MG/DL (ref 70–110)
GLUCOSE SERPL-MCNC: 201 MG/DL (ref 70–110)
GLUCOSE SERPL-MCNC: 89 MG/DL (ref 70–110)
GLUCOSE SERPL-MCNC: 93 MG/DL (ref 70–110)
GLUCOSE UR QL STRIP: NEGATIVE
HBA1C MFR BLD: 5.6 % (ref 4–5.6)
HCO3 UR-SCNC: 19.6 MMOL/L (ref 24–28)
HCO3 UR-SCNC: 23 MMOL/L (ref 24–28)
HCT VFR BLD AUTO: 17.8 % (ref 37–48.5)
HCT VFR BLD AUTO: 20.1 % (ref 37–48.5)
HCT VFR BLD AUTO: 21.4 % (ref 37–48.5)
HCT VFR BLD AUTO: 21.4 % (ref 37–48.5)
HCT VFR BLD AUTO: 23 % (ref 37–48.5)
HCT VFR BLD AUTO: 23.3 % (ref 37–48.5)
HCT VFR BLD AUTO: 23.5 % (ref 37–48.5)
HCT VFR BLD AUTO: 23.6 % (ref 37–48.5)
HCT VFR BLD AUTO: 24.1 % (ref 37–48.5)
HCT VFR BLD AUTO: 24.2 % (ref 37–48.5)
HCT VFR BLD AUTO: 24.4 % (ref 37–48.5)
HCT VFR BLD AUTO: 24.4 % (ref 37–48.5)
HCT VFR BLD AUTO: 24.9 % (ref 37–48.5)
HCT VFR BLD AUTO: 25.2 % (ref 37–48.5)
HCT VFR BLD AUTO: 26 % (ref 37–48.5)
HCT VFR BLD AUTO: 26.2 % (ref 37–48.5)
HCT VFR BLD AUTO: 26.7 % (ref 37–48.5)
HCT VFR BLD AUTO: 26.8 % (ref 37–48.5)
HCT VFR BLD AUTO: 27.1 % (ref 37–48.5)
HCT VFR BLD AUTO: 27.1 % (ref 37–48.5)
HCT VFR BLD AUTO: 27.9 % (ref 37–48.5)
HCT VFR BLD AUTO: 35.2 % (ref 37–48.5)
HCT VFR BLD CALC: 25 %PCV (ref 36–54)
HCV AB SERPL QL IA: NEGATIVE
HGB BLD-MCNC: 10.6 G/DL (ref 12–16)
HGB BLD-MCNC: 5 G/DL (ref 12–16)
HGB BLD-MCNC: 6.2 G/DL (ref 12–16)
HGB BLD-MCNC: 6.7 G/DL (ref 12–16)
HGB BLD-MCNC: 6.7 G/DL (ref 12–16)
HGB BLD-MCNC: 7.2 G/DL (ref 12–16)
HGB BLD-MCNC: 7.3 G/DL (ref 12–16)
HGB BLD-MCNC: 7.3 G/DL (ref 12–16)
HGB BLD-MCNC: 7.4 G/DL (ref 12–16)
HGB BLD-MCNC: 7.6 G/DL (ref 12–16)
HGB BLD-MCNC: 7.8 G/DL (ref 12–16)
HGB BLD-MCNC: 8.1 G/DL (ref 12–16)
HGB BLD-MCNC: 8.2 G/DL (ref 12–16)
HGB BLD-MCNC: 8.3 G/DL (ref 12–16)
HGB BLD-MCNC: 8.5 G/DL (ref 12–16)
HGB BLD-MCNC: 8.6 G/DL (ref 12–16)
HGB BLD-MCNC: 8.6 G/DL (ref 12–16)
HGB BLD-MCNC: 8.7 G/DL (ref 12–16)
HGB UR QL STRIP: NEGATIVE
HYALINE CASTS UR QL AUTO: 6 /LPF
HYPOCHROMIA BLD QL SMEAR: ABNORMAL
IMM GRANULOCYTES # BLD AUTO: 0.03 K/UL (ref 0–0.04)
IMM GRANULOCYTES # BLD AUTO: 0.04 K/UL (ref 0–0.04)
IMM GRANULOCYTES # BLD AUTO: 0.05 K/UL (ref 0–0.04)
IMM GRANULOCYTES # BLD AUTO: 0.06 K/UL (ref 0–0.04)
IMM GRANULOCYTES # BLD AUTO: 0.07 K/UL (ref 0–0.04)
IMM GRANULOCYTES # BLD AUTO: 0.08 K/UL (ref 0–0.04)
IMM GRANULOCYTES # BLD AUTO: 0.09 K/UL (ref 0–0.04)
IMM GRANULOCYTES # BLD AUTO: 0.11 K/UL (ref 0–0.04)
IMM GRANULOCYTES # BLD AUTO: 0.11 K/UL (ref 0–0.04)
IMM GRANULOCYTES # BLD AUTO: 0.15 K/UL (ref 0–0.04)
IMM GRANULOCYTES # BLD AUTO: 0.16 K/UL (ref 0–0.04)
IMM GRANULOCYTES # BLD AUTO: 0.18 K/UL (ref 0–0.04)
IMM GRANULOCYTES # BLD AUTO: 0.21 K/UL (ref 0–0.04)
IMM GRANULOCYTES # BLD AUTO: 0.38 K/UL (ref 0–0.04)
IMM GRANULOCYTES # BLD AUTO: ABNORMAL K/UL (ref 0–0.04)
IMM GRANULOCYTES NFR BLD AUTO: 0.4 % (ref 0–0.5)
IMM GRANULOCYTES NFR BLD AUTO: 0.4 % (ref 0–0.5)
IMM GRANULOCYTES NFR BLD AUTO: 0.5 % (ref 0–0.5)
IMM GRANULOCYTES NFR BLD AUTO: 0.5 % (ref 0–0.5)
IMM GRANULOCYTES NFR BLD AUTO: 0.6 % (ref 0–0.5)
IMM GRANULOCYTES NFR BLD AUTO: 0.7 % (ref 0–0.5)
IMM GRANULOCYTES NFR BLD AUTO: 0.8 % (ref 0–0.5)
IMM GRANULOCYTES NFR BLD AUTO: 0.9 % (ref 0–0.5)
IMM GRANULOCYTES NFR BLD AUTO: 1.1 % (ref 0–0.5)
IMM GRANULOCYTES NFR BLD AUTO: 1.1 % (ref 0–0.5)
IMM GRANULOCYTES NFR BLD AUTO: 1.3 % (ref 0–0.5)
IMM GRANULOCYTES NFR BLD AUTO: 1.3 % (ref 0–0.5)
IMM GRANULOCYTES NFR BLD AUTO: 1.6 % (ref 0–0.5)
IMM GRANULOCYTES NFR BLD AUTO: 1.8 % (ref 0–0.5)
IMM GRANULOCYTES NFR BLD AUTO: 2.2 % (ref 0–0.5)
IMM GRANULOCYTES NFR BLD AUTO: 2.4 % (ref 0–0.5)
IMM GRANULOCYTES NFR BLD AUTO: ABNORMAL % (ref 0–0.5)
INR PPP: 0.9 (ref 0.8–1.2)
INR PPP: 1 (ref 0.8–1.2)
INR PPP: 1 (ref 0.8–1.2)
INR PPP: 1.1 (ref 0.8–1.2)
INTERVENTRICULAR SEPTUM: 1.6 CM (ref 0.6–1.1)
IRON SERPL-MCNC: 27 UG/DL (ref 30–160)
KETONES UR QL STRIP: NEGATIVE
LA MAJOR: 5.38 CM
LA MINOR: 5.13 CM
LA WIDTH: 4.01 CM
LACTATE SERPL-SCNC: 0.6 MMOL/L (ref 0.5–2.2)
LACTATE SERPL-SCNC: 1.3 MMOL/L (ref 0.5–2.2)
LACTATE SERPL-SCNC: 1.6 MMOL/L (ref 0.5–2.2)
LACTATE SERPL-SCNC: 1.6 MMOL/L (ref 0.5–2.2)
LEFT ATRIUM SIZE: 3.98 CM
LEFT ATRIUM VOLUME INDEX MOD: 41.2 ML/M2
LEFT ATRIUM VOLUME INDEX: 40.5 ML/M2
LEFT ATRIUM VOLUME MOD: 72.53 CM3
LEFT ATRIUM VOLUME: 71.25 CM3
LEFT INTERNAL DIMENSION IN SYSTOLE: 3.41 CM (ref 2.1–4)
LEFT VENTRICLE DIASTOLIC VOLUME INDEX: 46.91 ML/M2
LEFT VENTRICLE DIASTOLIC VOLUME: 82.56 ML
LEFT VENTRICLE MASS INDEX: 134 G/M2
LEFT VENTRICLE SYSTOLIC VOLUME INDEX: 27.2 ML/M2
LEFT VENTRICLE SYSTOLIC VOLUME: 47.81 ML
LEFT VENTRICULAR INTERNAL DIMENSION IN DIASTOLE: 4.29 CM (ref 3.5–6)
LEFT VENTRICULAR MASS: 236.48 G
LEUKOCYTE ESTERASE UR QL STRIP: ABNORMAL
LV LATERAL E/E' RATIO: 27 M/S
LV SEPTAL E/E' RATIO: 40.5 M/S
LYMPHOCYTES # BLD AUTO: 0.6 K/UL (ref 1–4.8)
LYMPHOCYTES # BLD AUTO: 0.7 K/UL (ref 1–4.8)
LYMPHOCYTES # BLD AUTO: 0.8 K/UL (ref 1–4.8)
LYMPHOCYTES # BLD AUTO: 0.9 K/UL (ref 1–4.8)
LYMPHOCYTES # BLD AUTO: 1 K/UL (ref 1–4.8)
LYMPHOCYTES # BLD AUTO: 1.1 K/UL (ref 1–4.8)
LYMPHOCYTES # BLD AUTO: 1.3 K/UL (ref 1–4.8)
LYMPHOCYTES # BLD AUTO: 1.4 K/UL (ref 1–4.8)
LYMPHOCYTES # BLD AUTO: 1.4 K/UL (ref 1–4.8)
LYMPHOCYTES # BLD AUTO: 1.6 K/UL (ref 1–4.8)
LYMPHOCYTES # BLD AUTO: 1.9 K/UL (ref 1–4.8)
LYMPHOCYTES # BLD AUTO: 2 K/UL (ref 1–4.8)
LYMPHOCYTES NFR BLD: 1 % (ref 18–48)
LYMPHOCYTES NFR BLD: 11.6 % (ref 18–48)
LYMPHOCYTES NFR BLD: 12.1 % (ref 18–48)
LYMPHOCYTES NFR BLD: 12.1 % (ref 18–48)
LYMPHOCYTES NFR BLD: 12.4 % (ref 18–48)
LYMPHOCYTES NFR BLD: 12.6 % (ref 18–48)
LYMPHOCYTES NFR BLD: 12.8 % (ref 18–48)
LYMPHOCYTES NFR BLD: 13.1 % (ref 18–48)
LYMPHOCYTES NFR BLD: 13.1 % (ref 18–48)
LYMPHOCYTES NFR BLD: 13.2 % (ref 18–48)
LYMPHOCYTES NFR BLD: 19 % (ref 18–48)
LYMPHOCYTES NFR BLD: 20.9 % (ref 18–48)
LYMPHOCYTES NFR BLD: 6 % (ref 18–48)
LYMPHOCYTES NFR BLD: 6.3 % (ref 18–48)
LYMPHOCYTES NFR BLD: 7.3 % (ref 18–48)
LYMPHOCYTES NFR BLD: 7.7 % (ref 18–48)
LYMPHOCYTES NFR BLD: 7.8 % (ref 18–48)
LYMPHOCYTES NFR BLD: 7.8 % (ref 18–48)
LYMPHOCYTES NFR BLD: 8.1 % (ref 18–48)
LYMPHOCYTES NFR BLD: 8.8 % (ref 18–48)
LYMPHOCYTES NFR BLD: 8.8 % (ref 18–48)
LYMPHOCYTES NFR BLD: 9.1 % (ref 18–48)
MAGNESIUM SERPL-MCNC: 2.1 MG/DL (ref 1.6–2.6)
MAGNESIUM SERPL-MCNC: 2.1 MG/DL (ref 1.6–2.6)
MAGNESIUM SERPL-MCNC: 2.2 MG/DL (ref 1.6–2.6)
MAGNESIUM SERPL-MCNC: 2.3 MG/DL (ref 1.6–2.6)
MAGNESIUM SERPL-MCNC: 2.3 MG/DL (ref 1.6–2.6)
MAGNESIUM SERPL-MCNC: 2.4 MG/DL (ref 1.6–2.6)
MAGNESIUM SERPL-MCNC: 2.5 MG/DL (ref 1.6–2.6)
MAGNESIUM SERPL-MCNC: 2.6 MG/DL (ref 1.6–2.6)
MAGNESIUM SERPL-MCNC: 3 MG/DL (ref 1.6–2.6)
MCH RBC QN AUTO: 31.9 PG (ref 27–31)
MCH RBC QN AUTO: 32 PG (ref 27–31)
MCH RBC QN AUTO: 32.1 PG (ref 27–31)
MCH RBC QN AUTO: 32.2 PG (ref 27–31)
MCH RBC QN AUTO: 32.2 PG (ref 27–31)
MCH RBC QN AUTO: 32.3 PG (ref 27–31)
MCH RBC QN AUTO: 32.4 PG (ref 27–31)
MCH RBC QN AUTO: 32.5 PG (ref 27–31)
MCH RBC QN AUTO: 32.6 PG (ref 27–31)
MCH RBC QN AUTO: 32.7 PG (ref 27–31)
MCH RBC QN AUTO: 32.8 PG (ref 27–31)
MCH RBC QN AUTO: 32.9 PG (ref 27–31)
MCH RBC QN AUTO: 33.1 PG (ref 27–31)
MCH RBC QN AUTO: 33.2 PG (ref 27–31)
MCH RBC QN AUTO: 33.2 PG (ref 27–31)
MCHC RBC AUTO-ENTMCNC: 28.1 G/DL (ref 32–36)
MCHC RBC AUTO-ENTMCNC: 30.1 G/DL (ref 32–36)
MCHC RBC AUTO-ENTMCNC: 30.2 G/DL (ref 32–36)
MCHC RBC AUTO-ENTMCNC: 30.6 G/DL (ref 32–36)
MCHC RBC AUTO-ENTMCNC: 30.7 G/DL (ref 32–36)
MCHC RBC AUTO-ENTMCNC: 30.8 G/DL (ref 32–36)
MCHC RBC AUTO-ENTMCNC: 30.8 G/DL (ref 32–36)
MCHC RBC AUTO-ENTMCNC: 31.1 G/DL (ref 32–36)
MCHC RBC AUTO-ENTMCNC: 31.2 G/DL (ref 32–36)
MCHC RBC AUTO-ENTMCNC: 31.3 G/DL (ref 32–36)
MCHC RBC AUTO-ENTMCNC: 31.4 G/DL (ref 32–36)
MCHC RBC AUTO-ENTMCNC: 31.7 G/DL (ref 32–36)
MCHC RBC AUTO-ENTMCNC: 32 G/DL (ref 32–36)
MCHC RBC AUTO-ENTMCNC: 32 G/DL (ref 32–36)
MCHC RBC AUTO-ENTMCNC: 32.1 G/DL (ref 32–36)
MCHC RBC AUTO-ENTMCNC: 32.2 G/DL (ref 32–36)
MCHC RBC AUTO-ENTMCNC: 32.4 G/DL (ref 32–36)
MCHC RBC AUTO-ENTMCNC: 32.8 G/DL (ref 32–36)
MCV RBC AUTO: 100 FL (ref 82–98)
MCV RBC AUTO: 101 FL (ref 82–98)
MCV RBC AUTO: 102 FL (ref 82–98)
MCV RBC AUTO: 103 FL (ref 82–98)
MCV RBC AUTO: 104 FL (ref 82–98)
MCV RBC AUTO: 104 FL (ref 82–98)
MCV RBC AUTO: 105 FL (ref 82–98)
MCV RBC AUTO: 105 FL (ref 82–98)
MCV RBC AUTO: 106 FL (ref 82–98)
MCV RBC AUTO: 108 FL (ref 82–98)
MCV RBC AUTO: 110 FL (ref 82–98)
MCV RBC AUTO: 116 FL (ref 82–98)
MCV RBC AUTO: 97 FL (ref 82–98)
METAMYELOCYTES NFR BLD MANUAL: 3 %
MICROSCOPIC COMMENT: ABNORMAL
MODE: ABNORMAL
MONOCYTES # BLD AUTO: 0.6 K/UL (ref 0.3–1)
MONOCYTES # BLD AUTO: 0.7 K/UL (ref 0.3–1)
MONOCYTES # BLD AUTO: 0.8 K/UL (ref 0.3–1)
MONOCYTES # BLD AUTO: 0.9 K/UL (ref 0.3–1)
MONOCYTES # BLD AUTO: 1 K/UL (ref 0.3–1)
MONOCYTES # BLD AUTO: 1.5 K/UL (ref 0.3–1)
MONOCYTES NFR BLD: 10.2 % (ref 4–15)
MONOCYTES NFR BLD: 6.3 % (ref 4–15)
MONOCYTES NFR BLD: 6.6 % (ref 4–15)
MONOCYTES NFR BLD: 7.7 % (ref 4–15)
MONOCYTES NFR BLD: 7.9 % (ref 4–15)
MONOCYTES NFR BLD: 8 % (ref 4–15)
MONOCYTES NFR BLD: 8.2 % (ref 4–15)
MONOCYTES NFR BLD: 8.3 % (ref 4–15)
MONOCYTES NFR BLD: 8.3 % (ref 4–15)
MONOCYTES NFR BLD: 8.5 % (ref 4–15)
MONOCYTES NFR BLD: 8.9 % (ref 4–15)
MONOCYTES NFR BLD: 8.9 % (ref 4–15)
MONOCYTES NFR BLD: 9 % (ref 4–15)
MONOCYTES NFR BLD: 9 % (ref 4–15)
MONOCYTES NFR BLD: 9.1 % (ref 4–15)
MONOCYTES NFR BLD: 9.3 % (ref 4–15)
MONOCYTES NFR BLD: 9.3 % (ref 4–15)
MONOCYTES NFR BLD: 9.4 % (ref 4–15)
MONOCYTES NFR BLD: 9.7 % (ref 4–15)
MV A" WAVE DURATION": 10.66 MSEC
MV PEAK A VEL: 0.49 M/S
MV PEAK E VEL: 1.62 M/S
MV STENOSIS PRESSURE HALF TIME: 62.49 MS
MV VALVE AREA P 1/2 METHOD: 3.52 CM2
NEUTROPHILS # BLD AUTO: 10.7 K/UL (ref 1.8–7.7)
NEUTROPHILS # BLD AUTO: 13 K/UL (ref 1.8–7.7)
NEUTROPHILS # BLD AUTO: 5.3 K/UL (ref 1.8–7.7)
NEUTROPHILS # BLD AUTO: 5.5 K/UL (ref 1.8–7.7)
NEUTROPHILS # BLD AUTO: 5.5 K/UL (ref 1.8–7.7)
NEUTROPHILS # BLD AUTO: 5.9 K/UL (ref 1.8–7.7)
NEUTROPHILS # BLD AUTO: 6.2 K/UL (ref 1.8–7.7)
NEUTROPHILS # BLD AUTO: 6.2 K/UL (ref 1.8–7.7)
NEUTROPHILS # BLD AUTO: 6.6 K/UL (ref 1.8–7.7)
NEUTROPHILS # BLD AUTO: 6.7 K/UL (ref 1.8–7.7)
NEUTROPHILS # BLD AUTO: 6.8 K/UL (ref 1.8–7.7)
NEUTROPHILS # BLD AUTO: 6.9 K/UL (ref 1.8–7.7)
NEUTROPHILS # BLD AUTO: 7.3 K/UL (ref 1.8–7.7)
NEUTROPHILS # BLD AUTO: 7.4 K/UL (ref 1.8–7.7)
NEUTROPHILS # BLD AUTO: 7.7 K/UL (ref 1.8–7.7)
NEUTROPHILS # BLD AUTO: 8.1 K/UL (ref 1.8–7.7)
NEUTROPHILS # BLD AUTO: 8.2 K/UL (ref 1.8–7.7)
NEUTROPHILS # BLD AUTO: 8.2 K/UL (ref 1.8–7.7)
NEUTROPHILS # BLD AUTO: 8.4 K/UL (ref 1.8–7.7)
NEUTROPHILS # BLD AUTO: 8.5 K/UL (ref 1.8–7.7)
NEUTROPHILS # BLD AUTO: 9.3 K/UL (ref 1.8–7.7)
NEUTROPHILS NFR BLD: 68.4 % (ref 38–73)
NEUTROPHILS NFR BLD: 69.1 % (ref 38–73)
NEUTROPHILS NFR BLD: 73 % (ref 38–73)
NEUTROPHILS NFR BLD: 73.9 % (ref 38–73)
NEUTROPHILS NFR BLD: 74.1 % (ref 38–73)
NEUTROPHILS NFR BLD: 74.5 % (ref 38–73)
NEUTROPHILS NFR BLD: 76.2 % (ref 38–73)
NEUTROPHILS NFR BLD: 76.6 % (ref 38–73)
NEUTROPHILS NFR BLD: 76.9 % (ref 38–73)
NEUTROPHILS NFR BLD: 78.1 % (ref 38–73)
NEUTROPHILS NFR BLD: 78.1 % (ref 38–73)
NEUTROPHILS NFR BLD: 78.6 % (ref 38–73)
NEUTROPHILS NFR BLD: 79.1 % (ref 38–73)
NEUTROPHILS NFR BLD: 79.6 % (ref 38–73)
NEUTROPHILS NFR BLD: 79.6 % (ref 38–73)
NEUTROPHILS NFR BLD: 79.9 % (ref 38–73)
NEUTROPHILS NFR BLD: 80.6 % (ref 38–73)
NEUTROPHILS NFR BLD: 80.8 % (ref 38–73)
NEUTROPHILS NFR BLD: 80.9 % (ref 38–73)
NEUTROPHILS NFR BLD: 81.2 % (ref 38–73)
NEUTROPHILS NFR BLD: 82.9 % (ref 38–73)
NEUTROPHILS NFR BLD: 88 % (ref 38–73)
NITRITE UR QL STRIP: NEGATIVE
NRBC BLD-RTO: 0 /100 WBC
NRBC BLD-RTO: 1 /100 WBC
NRBC BLD-RTO: 2 /100 WBC
NUM UNITS TRANS PACKED RBC: NORMAL
OB PNL STL: POSITIVE
OVALOCYTES BLD QL SMEAR: ABNORMAL
PCO2 BLDA: 37.5 MMHG (ref 35–45)
PCO2 BLDA: 45.3 MMHG (ref 35–45)
PH SMN: 7.31 [PH] (ref 7.35–7.45)
PH SMN: 7.33 [PH] (ref 7.35–7.45)
PH UR STRIP: 5 [PH] (ref 5–8)
PHOSPHATE SERPL-MCNC: 3.7 MG/DL (ref 2.7–4.5)
PHOSPHATE SERPL-MCNC: 4 MG/DL (ref 2.7–4.5)
PHOSPHATE SERPL-MCNC: 4.1 MG/DL (ref 2.7–4.5)
PHOSPHATE SERPL-MCNC: 4.1 MG/DL (ref 2.7–4.5)
PHOSPHATE SERPL-MCNC: 4.8 MG/DL (ref 2.7–4.5)
PHOSPHATE SERPL-MCNC: 5 MG/DL (ref 2.7–4.5)
PHOSPHATE SERPL-MCNC: 5 MG/DL (ref 2.7–4.5)
PHOSPHATE SERPL-MCNC: 5.2 MG/DL (ref 2.7–4.5)
PHOSPHATE SERPL-MCNC: 5.2 MG/DL (ref 2.7–4.5)
PHOSPHATE SERPL-MCNC: 5.4 MG/DL (ref 2.7–4.5)
PHOSPHATE SERPL-MCNC: 5.5 MG/DL (ref 2.7–4.5)
PHOSPHATE SERPL-MCNC: 7.1 MG/DL (ref 2.7–4.5)
PISA TR MAX VEL: 4 M/S
PLATELET # BLD AUTO: 164 K/UL (ref 150–350)
PLATELET # BLD AUTO: 196 K/UL (ref 150–350)
PLATELET # BLD AUTO: 198 K/UL (ref 150–350)
PLATELET # BLD AUTO: 209 K/UL (ref 150–350)
PLATELET # BLD AUTO: 209 K/UL (ref 150–350)
PLATELET # BLD AUTO: 210 K/UL (ref 150–350)
PLATELET # BLD AUTO: 216 K/UL (ref 150–350)
PLATELET # BLD AUTO: 221 K/UL (ref 150–350)
PLATELET # BLD AUTO: 226 K/UL (ref 150–350)
PLATELET # BLD AUTO: 226 K/UL (ref 150–350)
PLATELET # BLD AUTO: 229 K/UL (ref 150–350)
PLATELET # BLD AUTO: 232 K/UL (ref 150–350)
PLATELET # BLD AUTO: 234 K/UL (ref 150–350)
PLATELET # BLD AUTO: 248 K/UL (ref 150–350)
PLATELET # BLD AUTO: 255 K/UL (ref 150–350)
PLATELET # BLD AUTO: 256 K/UL (ref 150–350)
PLATELET # BLD AUTO: 262 K/UL (ref 150–350)
PLATELET # BLD AUTO: 266 K/UL (ref 150–350)
PLATELET # BLD AUTO: 269 K/UL (ref 150–350)
PLATELET # BLD AUTO: 349 K/UL (ref 150–350)
PLATELET BLD QL SMEAR: ABNORMAL
PLATELET BLD QL SMEAR: ABNORMAL
PMV BLD AUTO: 10.5 FL (ref 9.2–12.9)
PMV BLD AUTO: 11 FL (ref 9.2–12.9)
PMV BLD AUTO: 11.1 FL (ref 9.2–12.9)
PMV BLD AUTO: 11.3 FL (ref 9.2–12.9)
PMV BLD AUTO: 11.4 FL (ref 9.2–12.9)
PMV BLD AUTO: 11.5 FL (ref 9.2–12.9)
PMV BLD AUTO: 11.5 FL (ref 9.2–12.9)
PMV BLD AUTO: 11.6 FL (ref 9.2–12.9)
PMV BLD AUTO: 11.6 FL (ref 9.2–12.9)
PMV BLD AUTO: 11.7 FL (ref 9.2–12.9)
PMV BLD AUTO: 11.7 FL (ref 9.2–12.9)
PMV BLD AUTO: 11.8 FL (ref 9.2–12.9)
PMV BLD AUTO: 11.8 FL (ref 9.2–12.9)
PMV BLD AUTO: 11.9 FL (ref 9.2–12.9)
PMV BLD AUTO: 12.1 FL (ref 9.2–12.9)
PMV BLD AUTO: 12.3 FL (ref 9.2–12.9)
PMV BLD AUTO: 12.3 FL (ref 9.2–12.9)
PO2 BLDA: 20 MMHG (ref 40–60)
PO2 BLDA: 28 MMHG (ref 40–60)
POC ACTIVATED CLOTTING TIME K: 224 SEC (ref 74–137)
POC BE: -3 MMOL/L
POC BE: -6 MMOL/L
POC IONIZED CALCIUM: 1.18 MMOL/L (ref 1.06–1.42)
POC SATURATED O2: 27 % (ref 95–100)
POC SATURATED O2: 48 % (ref 95–100)
POC TCO2: 21 MMOL/L (ref 24–29)
POC TCO2: 24 MMOL/L (ref 24–29)
POCT GLUCOSE: 100 MG/DL (ref 70–110)
POCT GLUCOSE: 100 MG/DL (ref 70–110)
POCT GLUCOSE: 101 MG/DL (ref 70–110)
POCT GLUCOSE: 103 MG/DL (ref 70–110)
POCT GLUCOSE: 113 MG/DL (ref 70–110)
POCT GLUCOSE: 115 MG/DL (ref 70–110)
POCT GLUCOSE: 118 MG/DL (ref 70–110)
POCT GLUCOSE: 119 MG/DL (ref 70–110)
POCT GLUCOSE: 123 MG/DL (ref 70–110)
POCT GLUCOSE: 123 MG/DL (ref 70–110)
POCT GLUCOSE: 124 MG/DL (ref 70–110)
POCT GLUCOSE: 127 MG/DL (ref 70–110)
POCT GLUCOSE: 128 MG/DL (ref 70–110)
POCT GLUCOSE: 132 MG/DL (ref 70–110)
POCT GLUCOSE: 135 MG/DL (ref 70–110)
POCT GLUCOSE: 136 MG/DL (ref 70–110)
POCT GLUCOSE: 145 MG/DL (ref 70–110)
POCT GLUCOSE: 147 MG/DL (ref 70–110)
POCT GLUCOSE: 153 MG/DL (ref 70–110)
POCT GLUCOSE: 154 MG/DL (ref 70–110)
POCT GLUCOSE: 164 MG/DL (ref 70–110)
POCT GLUCOSE: 168 MG/DL (ref 70–110)
POCT GLUCOSE: 175 MG/DL (ref 70–110)
POCT GLUCOSE: 187 MG/DL (ref 70–110)
POCT GLUCOSE: 224 MG/DL (ref 70–110)
POCT GLUCOSE: 231 MG/DL (ref 70–110)
POCT GLUCOSE: 74 MG/DL (ref 70–110)
POCT GLUCOSE: 88 MG/DL (ref 70–110)
POCT GLUCOSE: 96 MG/DL (ref 70–110)
POIKILOCYTOSIS BLD QL SMEAR: SLIGHT
POLYCHROMASIA BLD QL SMEAR: ABNORMAL
POTASSIUM BLD-SCNC: 4.9 MMOL/L (ref 3.5–5.1)
POTASSIUM SERPL-SCNC: 3.9 MMOL/L (ref 3.5–5.1)
POTASSIUM SERPL-SCNC: 4.1 MMOL/L (ref 3.5–5.1)
POTASSIUM SERPL-SCNC: 4.2 MMOL/L (ref 3.5–5.1)
POTASSIUM SERPL-SCNC: 4.3 MMOL/L (ref 3.5–5.1)
POTASSIUM SERPL-SCNC: 4.4 MMOL/L (ref 3.5–5.1)
POTASSIUM SERPL-SCNC: 4.5 MMOL/L (ref 3.5–5.1)
POTASSIUM SERPL-SCNC: 4.5 MMOL/L (ref 3.5–5.1)
POTASSIUM SERPL-SCNC: 4.6 MMOL/L (ref 3.5–5.1)
POTASSIUM SERPL-SCNC: 5.2 MMOL/L (ref 3.5–5.1)
POTASSIUM SERPL-SCNC: 5.2 MMOL/L (ref 3.5–5.1)
POTASSIUM SERPL-SCNC: 5.4 MMOL/L (ref 3.5–5.1)
POTASSIUM SERPL-SCNC: 5.6 MMOL/L (ref 3.5–5.1)
POTASSIUM SERPL-SCNC: 5.9 MMOL/L (ref 3.5–5.1)
PROCALCITONIN SERPL IA-MCNC: 0.43 NG/ML
PROT SERPL-MCNC: 5.8 G/DL (ref 6–8.4)
PROT SERPL-MCNC: 5.9 G/DL (ref 6–8.4)
PROT SERPL-MCNC: 5.9 G/DL (ref 6–8.4)
PROT SERPL-MCNC: 6 G/DL (ref 6–8.4)
PROT SERPL-MCNC: 6 G/DL (ref 6–8.4)
PROT SERPL-MCNC: 6.1 G/DL (ref 6–8.4)
PROT SERPL-MCNC: 6.1 G/DL (ref 6–8.4)
PROT SERPL-MCNC: 7 G/DL (ref 6–8.4)
PROT UR QL STRIP: ABNORMAL
PROT UR-MCNC: 68 MG/DL (ref 0–15)
PROT/CREAT UR: 0.64 MG/G{CREAT} (ref 0–0.2)
PROTHROMBIN TIME: 10.3 SEC (ref 9–12.5)
PROTHROMBIN TIME: 10.9 SEC (ref 9–12.5)
PROTHROMBIN TIME: 10.9 SEC (ref 9–12.5)
PROTHROMBIN TIME: 11.9 SEC (ref 9–12.5)
PULM VEIN S/D RATIO: 0.34
PV PEAK D VEL: 0.74 M/S
PV PEAK S VEL: 0.25 M/S
RA MAJOR: 4.88 CM
RA PRESSURE: 8 MMHG
RA WIDTH: 2.47 CM
RBC # BLD AUTO: 1.54 M/UL (ref 4–5.4)
RBC # BLD AUTO: 1.9 M/UL (ref 4–5.4)
RBC # BLD AUTO: 2.04 M/UL (ref 4–5.4)
RBC # BLD AUTO: 2.09 M/UL (ref 4–5.4)
RBC # BLD AUTO: 2.26 M/UL (ref 4–5.4)
RBC # BLD AUTO: 2.27 M/UL (ref 4–5.4)
RBC # BLD AUTO: 2.28 M/UL (ref 4–5.4)
RBC # BLD AUTO: 2.29 M/UL (ref 4–5.4)
RBC # BLD AUTO: 2.29 M/UL (ref 4–5.4)
RBC # BLD AUTO: 2.35 M/UL (ref 4–5.4)
RBC # BLD AUTO: 2.39 M/UL (ref 4–5.4)
RBC # BLD AUTO: 2.41 M/UL (ref 4–5.4)
RBC # BLD AUTO: 2.41 M/UL (ref 4–5.4)
RBC # BLD AUTO: 2.42 M/UL (ref 4–5.4)
RBC # BLD AUTO: 2.51 M/UL (ref 4–5.4)
RBC # BLD AUTO: 2.52 M/UL (ref 4–5.4)
RBC # BLD AUTO: 2.53 M/UL (ref 4–5.4)
RBC # BLD AUTO: 2.6 M/UL (ref 4–5.4)
RBC # BLD AUTO: 2.63 M/UL (ref 4–5.4)
RBC # BLD AUTO: 2.67 M/UL (ref 4–5.4)
RBC # BLD AUTO: 2.7 M/UL (ref 4–5.4)
RBC # BLD AUTO: 3.32 M/UL (ref 4–5.4)
RBC #/AREA URNS AUTO: 2 /HPF (ref 0–4)
RIGHT VENTRICULAR END-DIASTOLIC DIMENSION: 3.93 CM
RV TISSUE DOPPLER FREE WALL SYSTOLIC VELOCITY 1 (APICAL 4 CHAMBER VIEW): 7.52 CM/S
SAMPLE: ABNORMAL
SARS-COV-2 RDRP RESP QL NAA+PROBE: NEGATIVE
SARS-COV-2 RNA RESP QL NAA+PROBE: NOT DETECTED
SATURATED IRON: 10 % (ref 20–50)
SCHISTOCYTES BLD QL SMEAR: ABNORMAL
SINUS: 3.07 CM
SITE: ABNORMAL
SITE: ABNORMAL
SODIUM BLD-SCNC: 139 MMOL/L (ref 136–145)
SODIUM SERPL-SCNC: 134 MMOL/L (ref 136–145)
SODIUM SERPL-SCNC: 136 MMOL/L (ref 136–145)
SODIUM SERPL-SCNC: 137 MMOL/L (ref 136–145)
SODIUM SERPL-SCNC: 138 MMOL/L (ref 136–145)
SODIUM SERPL-SCNC: 139 MMOL/L (ref 136–145)
SODIUM SERPL-SCNC: 140 MMOL/L (ref 136–145)
SODIUM SERPL-SCNC: 140 MMOL/L (ref 136–145)
SODIUM SERPL-SCNC: 141 MMOL/L (ref 136–145)
SODIUM SERPL-SCNC: 142 MMOL/L (ref 136–145)
SP GR UR STRIP: 1.01 (ref 1–1.03)
SP02: 94
SQUAMOUS #/AREA URNS AUTO: 8 /HPF
STJ: 2.75 CM
TDI LATERAL: 0.06 M/S
TDI SEPTAL: 0.04 M/S
TDI: 0.05 M/S
TOTAL IRON BINDING CAPACITY: 265 UG/DL (ref 250–450)
TR MAX PG: 64 MMHG
TRANS ERYTHROCYTES VOL PATIENT: NORMAL ML
TRANSFERRIN SERPL-MCNC: 179 MG/DL (ref 200–375)
TRICUSPID ANNULAR PLANE SYSTOLIC EXCURSION: 1.66 CM
TROPONIN I SERPL DL<=0.01 NG/ML-MCNC: 20.75 NG/ML (ref 0–0.03)
TROPONIN I SERPL DL<=0.01 NG/ML-MCNC: 22.99 NG/ML (ref 0–0.03)
TROPONIN I SERPL DL<=0.01 NG/ML-MCNC: 24.94 NG/ML (ref 0–0.03)
TROPONIN I SERPL DL<=0.01 NG/ML-MCNC: 29.28 NG/ML (ref 0–0.03)
TROPONIN I SERPL DL<=0.01 NG/ML-MCNC: 29.41 NG/ML (ref 0–0.03)
TROPONIN I SERPL DL<=0.01 NG/ML-MCNC: 3.41 NG/ML (ref 0–0.03)
TSH SERPL DL<=0.005 MIU/L-ACNC: 1.16 UIU/ML (ref 0.4–4)
TV REST PULMONARY ARTERY PRESSURE: 72 MMHG
URN SPEC COLLECT METH UR: ABNORMAL
VIT B12 SERPL-MCNC: 293 PG/ML (ref 210–950)
WBC # BLD AUTO: 10.08 K/UL (ref 3.9–12.7)
WBC # BLD AUTO: 10.17 K/UL (ref 3.9–12.7)
WBC # BLD AUTO: 10.3 K/UL (ref 3.9–12.7)
WBC # BLD AUTO: 10.74 K/UL (ref 3.9–12.7)
WBC # BLD AUTO: 10.88 K/UL (ref 3.9–12.7)
WBC # BLD AUTO: 11.58 K/UL (ref 3.9–12.7)
WBC # BLD AUTO: 12.53 K/UL (ref 3.9–12.7)
WBC # BLD AUTO: 13.41 K/UL (ref 3.9–12.7)
WBC # BLD AUTO: 16.11 K/UL (ref 3.9–12.7)
WBC # BLD AUTO: 6.82 K/UL (ref 3.9–12.7)
WBC # BLD AUTO: 7.5 K/UL (ref 3.9–12.7)
WBC # BLD AUTO: 7.56 K/UL (ref 3.9–12.7)
WBC # BLD AUTO: 7.89 K/UL (ref 3.9–12.7)
WBC # BLD AUTO: 7.91 K/UL (ref 3.9–12.7)
WBC # BLD AUTO: 8.34 K/UL (ref 3.9–12.7)
WBC # BLD AUTO: 8.42 K/UL (ref 3.9–12.7)
WBC # BLD AUTO: 8.43 K/UL (ref 3.9–12.7)
WBC # BLD AUTO: 9.53 K/UL (ref 3.9–12.7)
WBC # BLD AUTO: 9.65 K/UL (ref 3.9–12.7)
WBC # BLD AUTO: 9.67 K/UL (ref 3.9–12.7)
WBC # BLD AUTO: 9.68 K/UL (ref 3.9–12.7)
WBC # BLD AUTO: 9.95 K/UL (ref 3.9–12.7)
WBC #/AREA URNS AUTO: 100 /HPF (ref 0–5)
WBC CLUMPS UR QL AUTO: ABNORMAL

## 2021-01-01 PROCEDURE — 63600175 PHARM REV CODE 636 W HCPCS: Performed by: STUDENT IN AN ORGANIZED HEALTH CARE EDUCATION/TRAINING PROGRAM

## 2021-01-01 PROCEDURE — 93010 ELECTROCARDIOGRAM REPORT: CPT | Mod: 76,,, | Performed by: INTERNAL MEDICINE

## 2021-01-01 PROCEDURE — 25000003 PHARM REV CODE 250: Performed by: EMERGENCY MEDICINE

## 2021-01-01 PROCEDURE — 99291 CRITICAL CARE FIRST HOUR: CPT | Mod: GC,,, | Performed by: INTERNAL MEDICINE

## 2021-01-01 PROCEDURE — 83735 ASSAY OF MAGNESIUM: CPT | Mod: 91 | Performed by: INTERNAL MEDICINE

## 2021-01-01 PROCEDURE — 99152 PR MOD CONSCIOUS SEDATION, SAME PHYS, 5+ YRS, FIRST 15 MIN: ICD-10-PCS | Mod: 59,,, | Performed by: INTERNAL MEDICINE

## 2021-01-01 PROCEDURE — 25000003 PHARM REV CODE 250: Performed by: STUDENT IN AN ORGANIZED HEALTH CARE EDUCATION/TRAINING PROGRAM

## 2021-01-01 PROCEDURE — P9016 RBC LEUKOCYTES REDUCED: HCPCS | Performed by: STUDENT IN AN ORGANIZED HEALTH CARE EDUCATION/TRAINING PROGRAM

## 2021-01-01 PROCEDURE — 85730 THROMBOPLASTIN TIME PARTIAL: CPT

## 2021-01-01 PROCEDURE — 87040 BLOOD CULTURE FOR BACTERIA: CPT

## 2021-01-01 PROCEDURE — 86920 COMPATIBILITY TEST SPIN: CPT

## 2021-01-01 PROCEDURE — 1159F MED LIST DOCD IN RCRD: CPT | Mod: HCNC,S$GLB,, | Performed by: SURGERY

## 2021-01-01 PROCEDURE — 36430 TRANSFUSION BLD/BLD COMPNT: CPT

## 2021-01-01 PROCEDURE — 83036 HEMOGLOBIN GLYCOSYLATED A1C: CPT

## 2021-01-01 PROCEDURE — 83735 ASSAY OF MAGNESIUM: CPT | Performed by: STUDENT IN AN ORGANIZED HEALTH CARE EDUCATION/TRAINING PROGRAM

## 2021-01-01 PROCEDURE — C1894 INTRO/SHEATH, NON-LASER: HCPCS | Performed by: INTERNAL MEDICINE

## 2021-01-01 PROCEDURE — 99284 EMERGENCY DEPT VISIT MOD MDM: CPT | Mod: 25

## 2021-01-01 PROCEDURE — 84100 ASSAY OF PHOSPHORUS: CPT | Mod: 91

## 2021-01-01 PROCEDURE — 99214 PR OFFICE/OUTPT VISIT, EST, LEVL IV, 30-39 MIN: ICD-10-PCS | Mod: HCNC,S$GLB,, | Performed by: SURGERY

## 2021-01-01 PROCEDURE — 25000003 PHARM REV CODE 250: Performed by: NURSE PRACTITIONER

## 2021-01-01 PROCEDURE — 85025 COMPLETE CBC W/AUTO DIFF WBC: CPT | Performed by: STUDENT IN AN ORGANIZED HEALTH CARE EDUCATION/TRAINING PROGRAM

## 2021-01-01 PROCEDURE — 20600001 HC STEP DOWN PRIVATE ROOM

## 2021-01-01 PROCEDURE — 99152 MOD SED SAME PHYS/QHP 5/>YRS: CPT | Mod: 59,,, | Performed by: INTERNAL MEDICINE

## 2021-01-01 PROCEDURE — 1126F AMNT PAIN NOTED NONE PRSNT: CPT | Mod: HCNC,S$GLB,, | Performed by: SURGERY

## 2021-01-01 PROCEDURE — 99499 RISK ADDL DX/OHS AUDIT: ICD-10-PCS | Mod: S$GLB,,, | Performed by: SURGERY

## 2021-01-01 PROCEDURE — 99232 PR SUBSEQUENT HOSPITAL CARE,LEVL II: ICD-10-PCS | Mod: ,,, | Performed by: INTERNAL MEDICINE

## 2021-01-01 PROCEDURE — 80100014 HC HEMODIALYSIS 1:1

## 2021-01-01 PROCEDURE — 36415 COLL VENOUS BLD VENIPUNCTURE: CPT | Performed by: STUDENT IN AN ORGANIZED HEALTH CARE EDUCATION/TRAINING PROGRAM

## 2021-01-01 PROCEDURE — 80053 COMPREHEN METABOLIC PANEL: CPT

## 2021-01-01 PROCEDURE — 80069 RENAL FUNCTION PANEL: CPT | Performed by: NURSE PRACTITIONER

## 2021-01-01 PROCEDURE — 83605 ASSAY OF LACTIC ACID: CPT | Performed by: STUDENT IN AN ORGANIZED HEALTH CARE EDUCATION/TRAINING PROGRAM

## 2021-01-01 PROCEDURE — 92928 PR STENT: ICD-10-PCS | Mod: RC,,, | Performed by: INTERNAL MEDICINE

## 2021-01-01 PROCEDURE — 99222 1ST HOSP IP/OBS MODERATE 55: CPT | Mod: ,,, | Performed by: NURSE PRACTITIONER

## 2021-01-01 PROCEDURE — 93005 ELECTROCARDIOGRAM TRACING: CPT

## 2021-01-01 PROCEDURE — 27201423 OPTIME MED/SURG SUP & DEVICES STERILE SUPPLY: Mod: HCNC | Performed by: SURGERY

## 2021-01-01 PROCEDURE — 90935 HEMODIALYSIS ONE EVALUATION: CPT | Mod: ,,, | Performed by: NURSE PRACTITIONER

## 2021-01-01 PROCEDURE — 25000003 PHARM REV CODE 250: Mod: HCNC | Performed by: STUDENT IN AN ORGANIZED HEALTH CARE EDUCATION/TRAINING PROGRAM

## 2021-01-01 PROCEDURE — P9016 RBC LEUKOCYTES REDUCED: HCPCS

## 2021-01-01 PROCEDURE — 93459: ICD-10-PCS | Mod: 26,59,51, | Performed by: INTERNAL MEDICINE

## 2021-01-01 PROCEDURE — 1101F PR PT FALLS ASSESS DOC 0-1 FALLS W/OUT INJ PAST YR: ICD-10-PCS | Mod: HCNC,CPTII,S$GLB, | Performed by: SURGERY

## 2021-01-01 PROCEDURE — 93454 CORONARY ARTERY ANGIO S&I: CPT | Mod: 78 | Performed by: INTERNAL MEDICINE

## 2021-01-01 PROCEDURE — 86922 COMPATIBILITY TEST ANTIGLOB: CPT | Performed by: INTERNAL MEDICINE

## 2021-01-01 PROCEDURE — P9021 RED BLOOD CELLS UNIT: HCPCS | Performed by: INTERNAL MEDICINE

## 2021-01-01 PROCEDURE — 25000003 PHARM REV CODE 250: Performed by: INTERNAL MEDICINE

## 2021-01-01 PROCEDURE — 99232 SBSQ HOSP IP/OBS MODERATE 35: CPT | Mod: ,,, | Performed by: INTERNAL MEDICINE

## 2021-01-01 PROCEDURE — 85007 BL SMEAR W/DIFF WBC COUNT: CPT | Performed by: STUDENT IN AN ORGANIZED HEALTH CARE EDUCATION/TRAINING PROGRAM

## 2021-01-01 PROCEDURE — 87088 URINE BACTERIA CULTURE: CPT

## 2021-01-01 PROCEDURE — 80053 COMPREHEN METABOLIC PANEL: CPT | Performed by: STUDENT IN AN ORGANIZED HEALTH CARE EDUCATION/TRAINING PROGRAM

## 2021-01-01 PROCEDURE — 82570 ASSAY OF URINE CREATININE: CPT

## 2021-01-01 PROCEDURE — 82803 BLOOD GASES ANY COMBINATION: CPT

## 2021-01-01 PROCEDURE — 99233 SBSQ HOSP IP/OBS HIGH 50: CPT | Mod: GC,,, | Performed by: INTERNAL MEDICINE

## 2021-01-01 PROCEDURE — 3008F PR BODY MASS INDEX (BMI) DOCUMENTED: ICD-10-PCS | Mod: HCNC,CPTII,S$GLB, | Performed by: SURGERY

## 2021-01-01 PROCEDURE — 92978 PR IVUS, CORONARY, 1ST VESSEL: ICD-10-PCS | Mod: 26,RC,, | Performed by: INTERNAL MEDICINE

## 2021-01-01 PROCEDURE — 99232 SBSQ HOSP IP/OBS MODERATE 35: CPT | Mod: ,,, | Performed by: NURSE PRACTITIONER

## 2021-01-01 PROCEDURE — 84100 ASSAY OF PHOSPHORUS: CPT | Performed by: STUDENT IN AN ORGANIZED HEALTH CARE EDUCATION/TRAINING PROGRAM

## 2021-01-01 PROCEDURE — 82330 ASSAY OF CALCIUM: CPT

## 2021-01-01 PROCEDURE — 99232 PR SUBSEQUENT HOSPITAL CARE,LEVL II: ICD-10-PCS | Mod: ,,, | Performed by: NURSE PRACTITIONER

## 2021-01-01 PROCEDURE — 27000221 HC OXYGEN, UP TO 24 HOURS

## 2021-01-01 PROCEDURE — C9600 PERC DRUG-EL COR STENT SING: HCPCS | Mod: RC | Performed by: INTERNAL MEDICINE

## 2021-01-01 PROCEDURE — C1725 CATH, TRANSLUMIN NON-LASER: HCPCS | Performed by: INTERNAL MEDICINE

## 2021-01-01 PROCEDURE — 20000000 HC ICU ROOM

## 2021-01-01 PROCEDURE — 90935 HEMODIALYSIS ONE EVALUATION: CPT

## 2021-01-01 PROCEDURE — 93010 EKG 12-LEAD: ICD-10-PCS | Mod: ,,, | Performed by: INTERNAL MEDICINE

## 2021-01-01 PROCEDURE — 82272 OCCULT BLD FECES 1-3 TESTS: CPT | Performed by: INTERNAL MEDICINE

## 2021-01-01 PROCEDURE — 93010 ELECTROCARDIOGRAM REPORT: CPT | Mod: ,,, | Performed by: INTERNAL MEDICINE

## 2021-01-01 PROCEDURE — 85025 COMPLETE CBC W/AUTO DIFF WBC: CPT | Mod: 91 | Performed by: STUDENT IN AN ORGANIZED HEALTH CARE EDUCATION/TRAINING PROGRAM

## 2021-01-01 PROCEDURE — 82728 ASSAY OF FERRITIN: CPT

## 2021-01-01 PROCEDURE — 97164 PT RE-EVAL EST PLAN CARE: CPT

## 2021-01-01 PROCEDURE — 36415 COLL VENOUS BLD VENIPUNCTURE: CPT | Performed by: NURSE PRACTITIONER

## 2021-01-01 PROCEDURE — 84100 ASSAY OF PHOSPHORUS: CPT

## 2021-01-01 PROCEDURE — 99233 SBSQ HOSP IP/OBS HIGH 50: CPT | Mod: ,,, | Performed by: INTERNAL MEDICINE

## 2021-01-01 PROCEDURE — 99231 PR SUBSEQUENT HOSPITAL CARE,LEVL I: ICD-10-PCS | Mod: GC,,, | Performed by: INTERNAL MEDICINE

## 2021-01-01 PROCEDURE — 99291 PR CRITICAL CARE, E/M 30-74 MINUTES: ICD-10-PCS | Mod: GC,,, | Performed by: INTERNAL MEDICINE

## 2021-01-01 PROCEDURE — 84484 ASSAY OF TROPONIN QUANT: CPT | Mod: 91

## 2021-01-01 PROCEDURE — 99999 PR PBB SHADOW E&M-EST. PATIENT-LVL IV: ICD-10-PCS | Mod: PBBFAC,HCNC,, | Performed by: SURGERY

## 2021-01-01 PROCEDURE — 97530 THERAPEUTIC ACTIVITIES: CPT

## 2021-01-01 PROCEDURE — 93459 L HRT ART/GRFT ANGIO: CPT | Mod: 59 | Performed by: INTERNAL MEDICINE

## 2021-01-01 PROCEDURE — 80048 BASIC METABOLIC PNL TOTAL CA: CPT | Mod: HCNC

## 2021-01-01 PROCEDURE — 86803 HEPATITIS C AB TEST: CPT

## 2021-01-01 PROCEDURE — 99152 PR MOD CONSCIOUS SEDATION, SAME PHYS, 5+ YRS, FIRST 15 MIN: ICD-10-PCS | Mod: HCNC,,, | Performed by: SURGERY

## 2021-01-01 PROCEDURE — 99233 PR SUBSEQUENT HOSPITAL CARE,LEVL III: ICD-10-PCS | Mod: ,,, | Performed by: INTERNAL MEDICINE

## 2021-01-01 PROCEDURE — C9399 UNCLASSIFIED DRUGS OR BIOLOG: HCPCS | Performed by: STUDENT IN AN ORGANIZED HEALTH CARE EDUCATION/TRAINING PROGRAM

## 2021-01-01 PROCEDURE — 1101F PT FALLS ASSESS-DOCD LE1/YR: CPT | Mod: HCNC,CPTII,S$GLB, | Performed by: SURGERY

## 2021-01-01 PROCEDURE — 99285 EMERGENCY DEPT VISIT HI MDM: CPT | Mod: 25

## 2021-01-01 PROCEDURE — 80048 BASIC METABOLIC PNL TOTAL CA: CPT

## 2021-01-01 PROCEDURE — 3288F FALL RISK ASSESSMENT DOCD: CPT | Mod: HCNC,CPTII,S$GLB, | Performed by: SURGERY

## 2021-01-01 PROCEDURE — 63600175 PHARM REV CODE 636 W HCPCS: Mod: TB | Performed by: NURSE PRACTITIONER

## 2021-01-01 PROCEDURE — 85379 FIBRIN DEGRADATION QUANT: CPT

## 2021-01-01 PROCEDURE — 87086 URINE CULTURE/COLONY COUNT: CPT

## 2021-01-01 PROCEDURE — 96374 THER/PROPH/DIAG INJ IV PUSH: CPT

## 2021-01-01 PROCEDURE — 3072F LOW RISK FOR RETINOPATHY: CPT | Mod: HCNC,S$GLB,, | Performed by: SURGERY

## 2021-01-01 PROCEDURE — 87077 CULTURE AEROBIC IDENTIFY: CPT

## 2021-01-01 PROCEDURE — 84443 ASSAY THYROID STIM HORMONE: CPT

## 2021-01-01 PROCEDURE — 90935 PR HEMODIALYSIS, ONE EVALUATION: ICD-10-PCS | Mod: ,,, | Performed by: NURSE PRACTITIONER

## 2021-01-01 PROCEDURE — 80100016 HC MAINTENANCE HEMODIALYSIS

## 2021-01-01 PROCEDURE — 99232 PR SUBSEQUENT HOSPITAL CARE,LEVL II: ICD-10-PCS | Mod: GC,,, | Performed by: INTERNAL MEDICINE

## 2021-01-01 PROCEDURE — 93454 PR CATH PLACE/CORONARY ANGIO, IMG SUPER/INTERP: ICD-10-PCS | Mod: 26,59,78, | Performed by: INTERNAL MEDICINE

## 2021-01-01 PROCEDURE — 85025 COMPLETE CBC W/AUTO DIFF WBC: CPT

## 2021-01-01 PROCEDURE — 82553 CREATINE MB FRACTION: CPT | Performed by: INTERNAL MEDICINE

## 2021-01-01 PROCEDURE — 99231 SBSQ HOSP IP/OBS SF/LOW 25: CPT | Mod: GC,,, | Performed by: INTERNAL MEDICINE

## 2021-01-01 PROCEDURE — 87186 SC STD MICRODIL/AGAR DIL: CPT

## 2021-01-01 PROCEDURE — 63600175 PHARM REV CODE 636 W HCPCS: Performed by: EMERGENCY MEDICINE

## 2021-01-01 PROCEDURE — 84100 ASSAY OF PHOSPHORUS: CPT | Mod: 91 | Performed by: STUDENT IN AN ORGANIZED HEALTH CARE EDUCATION/TRAINING PROGRAM

## 2021-01-01 PROCEDURE — 99499 UNLISTED E&M SERVICE: CPT | Mod: S$GLB,,, | Performed by: SURGERY

## 2021-01-01 PROCEDURE — 93990 PR DUPLEX HEMODIALYSIS ACCESS: ICD-10-PCS | Mod: HCNC,S$GLB,, | Performed by: SURGERY

## 2021-01-01 PROCEDURE — 97535 SELF CARE MNGMENT TRAINING: CPT | Mod: CO

## 2021-01-01 PROCEDURE — 25500020 PHARM REV CODE 255: Performed by: INTERNAL MEDICINE

## 2021-01-01 PROCEDURE — 97165 OT EVAL LOW COMPLEX 30 MIN: CPT

## 2021-01-01 PROCEDURE — 80053 COMPREHEN METABOLIC PANEL: CPT | Mod: 91 | Performed by: INTERNAL MEDICINE

## 2021-01-01 PROCEDURE — 99999 PR PBB SHADOW E&M-EST. PATIENT-LVL IV: CPT | Mod: PBBFAC,HCNC,, | Performed by: SURGERY

## 2021-01-01 PROCEDURE — 3288F PR FALLS RISK ASSESSMENT DOCUMENTED: ICD-10-PCS | Mod: HCNC,CPTII,S$GLB, | Performed by: SURGERY

## 2021-01-01 PROCEDURE — 85610 PROTHROMBIN TIME: CPT | Mod: 91

## 2021-01-01 PROCEDURE — 94761 N-INVAS EAR/PLS OXIMETRY MLT: CPT

## 2021-01-01 PROCEDURE — 92978 ENDOLUMINL IVUS OCT C 1ST: CPT | Mod: RC | Performed by: INTERNAL MEDICINE

## 2021-01-01 PROCEDURE — 63600175 PHARM REV CODE 636 W HCPCS

## 2021-01-01 PROCEDURE — C1894 INTRO/SHEATH, NON-LASER: HCPCS | Mod: HCNC | Performed by: SURGERY

## 2021-01-01 PROCEDURE — C1769 GUIDE WIRE: HCPCS | Mod: HCNC | Performed by: SURGERY

## 2021-01-01 PROCEDURE — 85610 PROTHROMBIN TIME: CPT

## 2021-01-01 PROCEDURE — 99223 PR INITIAL HOSPITAL CARE,LEVL III: ICD-10-PCS | Mod: AI,GC,, | Performed by: HOSPITALIST

## 2021-01-01 PROCEDURE — 99900035 HC TECH TIME PER 15 MIN (STAT)

## 2021-01-01 PROCEDURE — 3072F PR LOW RISK FOR RETINOPATHY: ICD-10-PCS | Mod: HCNC,S$GLB,, | Performed by: SURGERY

## 2021-01-01 PROCEDURE — 25000003 PHARM REV CODE 250: Mod: HCNC | Performed by: SURGERY

## 2021-01-01 PROCEDURE — 83880 ASSAY OF NATRIURETIC PEPTIDE: CPT

## 2021-01-01 PROCEDURE — 93454 CORONARY ARTERY ANGIO S&I: CPT | Mod: 26,59,78, | Performed by: INTERNAL MEDICINE

## 2021-01-01 PROCEDURE — 1126F PR PAIN SEVERITY QUANTIFIED, NO PAIN PRESENT: ICD-10-PCS | Mod: HCNC,S$GLB,, | Performed by: SURGERY

## 2021-01-01 PROCEDURE — 99223 1ST HOSP IP/OBS HIGH 75: CPT | Mod: GC,,, | Performed by: INTERNAL MEDICINE

## 2021-01-01 PROCEDURE — 82330 ASSAY OF CALCIUM: CPT | Performed by: STUDENT IN AN ORGANIZED HEALTH CARE EDUCATION/TRAINING PROGRAM

## 2021-01-01 PROCEDURE — 3008F BODY MASS INDEX DOCD: CPT | Mod: HCNC,CPTII,S$GLB, | Performed by: SURGERY

## 2021-01-01 PROCEDURE — C1725 CATH, TRANSLUMIN NON-LASER: HCPCS | Mod: HCNC | Performed by: SURGERY

## 2021-01-01 PROCEDURE — 93010 EKG 12-LEAD: ICD-10-PCS | Mod: 76,,, | Performed by: INTERNAL MEDICINE

## 2021-01-01 PROCEDURE — 25500020 PHARM REV CODE 255: Mod: HCNC | Performed by: SURGERY

## 2021-01-01 PROCEDURE — 82607 VITAMIN B-12: CPT | Performed by: INTERNAL MEDICINE

## 2021-01-01 PROCEDURE — 27201423 OPTIME MED/SURG SUP & DEVICES STERILE SUPPLY: Performed by: INTERNAL MEDICINE

## 2021-01-01 PROCEDURE — 99214 OFFICE O/P EST MOD 30 MIN: CPT | Mod: HCNC,S$GLB,, | Performed by: SURGERY

## 2021-01-01 PROCEDURE — 97161 PT EVAL LOW COMPLEX 20 MIN: CPT

## 2021-01-01 PROCEDURE — 85014 HEMATOCRIT: CPT

## 2021-01-01 PROCEDURE — 63600175 PHARM REV CODE 636 W HCPCS: Performed by: INTERNAL MEDICINE

## 2021-01-01 PROCEDURE — 97110 THERAPEUTIC EXERCISES: CPT

## 2021-01-01 PROCEDURE — 83735 ASSAY OF MAGNESIUM: CPT

## 2021-01-01 PROCEDURE — 91300 COVID-19, MRNA, LNP-S, PF, 30 MCG/0.3 ML DOSE VACCINE: CPT | Mod: PBBFAC | Performed by: INTERNAL MEDICINE

## 2021-01-01 PROCEDURE — 97530 THERAPEUTIC ACTIVITIES: CPT | Mod: CO

## 2021-01-01 PROCEDURE — 99223 PR INITIAL HOSPITAL CARE,LEVL III: ICD-10-PCS | Mod: GC,,, | Performed by: INTERNAL MEDICINE

## 2021-01-01 PROCEDURE — 99232 SBSQ HOSP IP/OBS MODERATE 35: CPT | Mod: GC,,, | Performed by: INTERNAL MEDICINE

## 2021-01-01 PROCEDURE — 85730 THROMBOPLASTIN TIME PARTIAL: CPT | Mod: 91

## 2021-01-01 PROCEDURE — U0002 COVID-19 LAB TEST NON-CDC: HCPCS | Performed by: EMERGENCY MEDICINE

## 2021-01-01 PROCEDURE — C1887 CATHETER, GUIDING: HCPCS | Performed by: INTERNAL MEDICINE

## 2021-01-01 PROCEDURE — 36903 INTRO CATH DIALYSIS CIRCUIT: CPT | Mod: HCNC,,, | Performed by: SURGERY

## 2021-01-01 PROCEDURE — C1874 STENT, COATED/COV W/DEL SYS: HCPCS | Performed by: INTERNAL MEDICINE

## 2021-01-01 PROCEDURE — 99152 MOD SED SAME PHYS/QHP 5/>YRS: CPT | Performed by: INTERNAL MEDICINE

## 2021-01-01 PROCEDURE — 84132 ASSAY OF SERUM POTASSIUM: CPT

## 2021-01-01 PROCEDURE — 93459 L HRT ART/GRFT ANGIO: CPT | Mod: 26,59,51, | Performed by: INTERNAL MEDICINE

## 2021-01-01 PROCEDURE — 99222 PR INITIAL HOSPITAL CARE,LEVL II: ICD-10-PCS | Mod: ,,, | Performed by: INTERNAL MEDICINE

## 2021-01-01 PROCEDURE — 83540 ASSAY OF IRON: CPT

## 2021-01-01 PROCEDURE — 82550 ASSAY OF CK (CPK): CPT | Performed by: INTERNAL MEDICINE

## 2021-01-01 PROCEDURE — 99222 PR INITIAL HOSPITAL CARE,LEVL II: ICD-10-PCS | Mod: ,,, | Performed by: NURSE PRACTITIONER

## 2021-01-01 PROCEDURE — 99222 1ST HOSP IP/OBS MODERATE 55: CPT | Mod: ,,, | Performed by: INTERNAL MEDICINE

## 2021-01-01 PROCEDURE — 36903 INTRO CATH DIALYSIS CIRCUIT: CPT | Mod: HCNC | Performed by: SURGERY

## 2021-01-01 PROCEDURE — 80048 BASIC METABOLIC PNL TOTAL CA: CPT | Performed by: INTERNAL MEDICINE

## 2021-01-01 PROCEDURE — 99285 EMERGENCY DEPT VISIT HI MDM: CPT | Mod: HCNC,CR,CS, | Performed by: EMERGENCY MEDICINE

## 2021-01-01 PROCEDURE — C1874 STENT, COATED/COV W/DEL SYS: HCPCS | Mod: HCNC | Performed by: SURGERY

## 2021-01-01 PROCEDURE — 36903 PR INTRO CATH, DIALYSIS CIRCUIT W/TRANSCATH PLCMNT, STENT: ICD-10-PCS | Mod: HCNC,,, | Performed by: SURGERY

## 2021-01-01 PROCEDURE — 0002A COVID-19, MRNA, LNP-S, PF, 30 MCG/0.3 ML DOSE VACCINE: CPT | Mod: PBBFAC | Performed by: INTERNAL MEDICINE

## 2021-01-01 PROCEDURE — 84484 ASSAY OF TROPONIN QUANT: CPT

## 2021-01-01 PROCEDURE — 93990 DOPPLER FLOW TESTING: CPT | Mod: HCNC,S$GLB,, | Performed by: SURGERY

## 2021-01-01 PROCEDURE — 82962 GLUCOSE BLOOD TEST: CPT | Mod: HCNC | Performed by: SURGERY

## 2021-01-01 PROCEDURE — C1753 CATH, INTRAVAS ULTRASOUND: HCPCS | Performed by: INTERNAL MEDICINE

## 2021-01-01 PROCEDURE — 92978 ENDOLUMINL IVUS OCT C 1ST: CPT | Mod: 26,RC,, | Performed by: INTERNAL MEDICINE

## 2021-01-01 PROCEDURE — 86900 BLOOD TYPING SEROLOGIC ABO: CPT

## 2021-01-01 PROCEDURE — 86900 BLOOD TYPING SEROLOGIC ABO: CPT | Performed by: INTERNAL MEDICINE

## 2021-01-01 PROCEDURE — 99153 MOD SED SAME PHYS/QHP EA: CPT | Performed by: INTERNAL MEDICINE

## 2021-01-01 PROCEDURE — 84100 ASSAY OF PHOSPHORUS: CPT | Mod: 91 | Performed by: INTERNAL MEDICINE

## 2021-01-01 PROCEDURE — 85025 COMPLETE CBC W/AUTO DIFF WBC: CPT | Mod: 91

## 2021-01-01 PROCEDURE — 83605 ASSAY OF LACTIC ACID: CPT

## 2021-01-01 PROCEDURE — 83605 ASSAY OF LACTIC ACID: CPT | Mod: 91

## 2021-01-01 PROCEDURE — 1159F PR MEDICATION LIST DOCUMENTED IN MEDICAL RECORD: ICD-10-PCS | Mod: HCNC,S$GLB,, | Performed by: SURGERY

## 2021-01-01 PROCEDURE — 86922 COMPATIBILITY TEST ANTIGLOB: CPT

## 2021-01-01 PROCEDURE — C1769 GUIDE WIRE: HCPCS | Performed by: INTERNAL MEDICINE

## 2021-01-01 PROCEDURE — 25000003 PHARM REV CODE 250

## 2021-01-01 PROCEDURE — 84484 ASSAY OF TROPONIN QUANT: CPT | Performed by: INTERNAL MEDICINE

## 2021-01-01 PROCEDURE — 85347 COAGULATION TIME ACTIVATED: CPT | Performed by: INTERNAL MEDICINE

## 2021-01-01 PROCEDURE — 85025 COMPLETE CBC W/AUTO DIFF WBC: CPT | Mod: HCNC

## 2021-01-01 PROCEDURE — 99285 PR EMERGENCY DEPT VISIT,LEVEL V: ICD-10-PCS | Mod: HCNC,CR,CS, | Performed by: EMERGENCY MEDICINE

## 2021-01-01 PROCEDURE — 63600175 PHARM REV CODE 636 W HCPCS: Mod: HCNC | Performed by: SURGERY

## 2021-01-01 PROCEDURE — 83735 ASSAY OF MAGNESIUM: CPT | Mod: 91

## 2021-01-01 PROCEDURE — 99152 MOD SED SAME PHYS/QHP 5/>YRS: CPT | Mod: HCNC | Performed by: SURGERY

## 2021-01-01 PROCEDURE — 85730 THROMBOPLASTIN TIME PARTIAL: CPT | Performed by: INTERNAL MEDICINE

## 2021-01-01 PROCEDURE — 84295 ASSAY OF SERUM SODIUM: CPT

## 2021-01-01 PROCEDURE — 99233 PR SUBSEQUENT HOSPITAL CARE,LEVL III: ICD-10-PCS | Mod: GC,,, | Performed by: INTERNAL MEDICINE

## 2021-01-01 PROCEDURE — 85027 COMPLETE CBC AUTOMATED: CPT | Performed by: STUDENT IN AN ORGANIZED HEALTH CARE EDUCATION/TRAINING PROGRAM

## 2021-01-01 PROCEDURE — 86922 COMPATIBILITY TEST ANTIGLOB: CPT | Performed by: STUDENT IN AN ORGANIZED HEALTH CARE EDUCATION/TRAINING PROGRAM

## 2021-01-01 PROCEDURE — 85610 PROTHROMBIN TIME: CPT | Performed by: INTERNAL MEDICINE

## 2021-01-01 PROCEDURE — 36415 COLL VENOUS BLD VENIPUNCTURE: CPT

## 2021-01-01 PROCEDURE — 99223 1ST HOSP IP/OBS HIGH 75: CPT | Mod: AI,GC,, | Performed by: HOSPITALIST

## 2021-01-01 PROCEDURE — 84145 PROCALCITONIN (PCT): CPT

## 2021-01-01 PROCEDURE — 99152 MOD SED SAME PHYS/QHP 5/>YRS: CPT | Mod: HCNC,,, | Performed by: SURGERY

## 2021-01-01 PROCEDURE — 36415 COLL VENOUS BLD VENIPUNCTURE: CPT | Mod: HCNC

## 2021-01-01 PROCEDURE — C1760 CLOSURE DEV, VASC: HCPCS | Performed by: INTERNAL MEDICINE

## 2021-01-01 PROCEDURE — 81001 URINALYSIS AUTO W/SCOPE: CPT

## 2021-01-01 PROCEDURE — 86870 RBC ANTIBODY IDENTIFICATION: CPT

## 2021-01-01 PROCEDURE — U0003 INFECTIOUS AGENT DETECTION BY NUCLEIC ACID (DNA OR RNA); SEVERE ACUTE RESPIRATORY SYNDROME CORONAVIRUS 2 (SARS-COV-2) (CORONAVIRUS DISEASE [COVID-19]), AMPLIFIED PROBE TECHNIQUE, MAKING USE OF HIGH THROUGHPUT TECHNOLOGIES AS DESCRIBED BY CMS-2020-01-R: HCPCS | Mod: HCNC

## 2021-01-01 PROCEDURE — 92928 PRQ TCAT PLMT NTRAC ST 1 LES: CPT | Mod: RC,,, | Performed by: INTERNAL MEDICINE

## 2021-01-01 DEVICE — VIABAHN SX ENDO HEPARIN 35 RO 8MMX5CM 8FR 75CMCATH
Type: IMPLANTABLE DEVICE | Site: ARM | Status: FUNCTIONAL
Brand: GORE VIABAHN ENDOPROSTHESIS WITH HEPARIN

## 2021-01-01 DEVICE — STENT RONYX30015UX RESOLUTE ONYX 3.00X15
Type: IMPLANTABLE DEVICE | Site: HEART | Status: FUNCTIONAL
Brand: RESOLUTE ONYX™

## 2021-01-01 RX ORDER — CLINDAMYCIN PHOSPHATE 900 MG/50ML
INJECTION, SOLUTION INTRAVENOUS
Status: DISCONTINUED | OUTPATIENT
Start: 2021-01-01 | End: 2021-01-01

## 2021-01-01 RX ORDER — HEPARIN SOD,PORCINE/0.9 % NACL 1000/500ML
INTRAVENOUS SOLUTION INTRAVENOUS
Status: DISCONTINUED | OUTPATIENT
Start: 2021-01-01 | End: 2021-01-01 | Stop reason: HOSPADM

## 2021-01-01 RX ORDER — LACTULOSE 10 G/15ML
15 SOLUTION ORAL 2 TIMES DAILY
Status: DISCONTINUED | OUTPATIENT
Start: 2021-01-01 | End: 2021-01-01

## 2021-01-01 RX ORDER — ISOSORBIDE MONONITRATE 30 MG/1
30 TABLET, EXTENDED RELEASE ORAL DAILY
Qty: 30 TABLET | Refills: 0 | Status: CANCELLED
Start: 2021-01-01 | End: 2022-03-04

## 2021-01-01 RX ORDER — MIDAZOLAM HYDROCHLORIDE 1 MG/ML
INJECTION INTRAMUSCULAR; INTRAVENOUS
Status: DISPENSED
Start: 2021-01-01 | End: 2021-03-07

## 2021-01-01 RX ORDER — AMOXICILLIN 250 MG
1 CAPSULE ORAL 2 TIMES DAILY
Status: DISCONTINUED | OUTPATIENT
Start: 2021-01-01 | End: 2021-03-07 | Stop reason: HOSPADM

## 2021-01-01 RX ORDER — ONDANSETRON 2 MG/ML
4 INJECTION INTRAMUSCULAR; INTRAVENOUS
Status: COMPLETED | OUTPATIENT
Start: 2021-01-01 | End: 2021-01-01

## 2021-01-01 RX ORDER — IBUPROFEN 200 MG
16 TABLET ORAL ONCE
Status: COMPLETED | OUTPATIENT
Start: 2021-01-01 | End: 2021-01-01

## 2021-01-01 RX ORDER — HEPARIN SODIUM 1000 [USP'U]/ML
INJECTION, SOLUTION INTRAVENOUS; SUBCUTANEOUS
Status: DISCONTINUED | OUTPATIENT
Start: 2021-01-01 | End: 2021-01-01 | Stop reason: HOSPADM

## 2021-01-01 RX ORDER — SEVELAMER CARBONATE 800 MG/1
800 TABLET, FILM COATED ORAL
Qty: 90 TABLET | Refills: 11 | Status: SHIPPED | OUTPATIENT
Start: 2021-01-01 | End: 2022-03-02

## 2021-01-01 RX ORDER — NEPHROCAP 1 MG
1 CAP ORAL DAILY
Qty: 90 CAPSULE | Refills: 3 | Status: SHIPPED | OUTPATIENT
Start: 2021-01-01

## 2021-01-01 RX ORDER — MAGNESIUM SULFATE HEPTAHYDRATE 40 MG/ML
2 INJECTION, SOLUTION INTRAVENOUS
Status: ACTIVE | OUTPATIENT
Start: 2021-01-01 | End: 2021-01-01

## 2021-01-01 RX ORDER — ONDANSETRON 8 MG/1
8 TABLET, ORALLY DISINTEGRATING ORAL EVERY 8 HOURS PRN
Status: DISCONTINUED | OUTPATIENT
Start: 2021-01-01 | End: 2021-01-01

## 2021-01-01 RX ORDER — AMIODARONE HYDROCHLORIDE 150 MG/3ML
150 INJECTION, SOLUTION INTRAVENOUS ONCE
Status: COMPLETED | OUTPATIENT
Start: 2021-01-01 | End: 2021-01-01

## 2021-01-01 RX ORDER — SODIUM CHLORIDE 0.9 % (FLUSH) 0.9 %
10 SYRINGE (ML) INJECTION
Status: DISCONTINUED | OUTPATIENT
Start: 2021-01-01 | End: 2021-01-01

## 2021-01-01 RX ORDER — AMIODARONE HYDROCHLORIDE 200 MG/1
200 TABLET ORAL 2 TIMES DAILY
Qty: 60 TABLET | Refills: 11
Start: 2021-03-12 | End: 2021-01-01

## 2021-01-01 RX ORDER — METOPROLOL TARTRATE 25 MG/1
25 TABLET, FILM COATED ORAL 2 TIMES DAILY
Status: COMPLETED | OUTPATIENT
Start: 2021-01-01 | End: 2021-01-01

## 2021-01-01 RX ORDER — ISOSORBIDE MONONITRATE 30 MG/1
30 TABLET, EXTENDED RELEASE ORAL DAILY
Qty: 30 TABLET | Refills: 11 | Status: SHIPPED | OUTPATIENT
Start: 2021-01-01 | End: 2022-03-03

## 2021-01-01 RX ORDER — LIDOCAINE HYDROCHLORIDE 20 MG/ML
INJECTION, SOLUTION INFILTRATION; PERINEURAL
Status: DISCONTINUED | OUTPATIENT
Start: 2021-01-01 | End: 2021-01-01 | Stop reason: HOSPADM

## 2021-01-01 RX ORDER — DIPHENHYDRAMINE HCL 25 MG
CAPSULE ORAL
Status: DISCONTINUED | OUTPATIENT
Start: 2021-01-01 | End: 2021-01-01 | Stop reason: HOSPADM

## 2021-01-01 RX ORDER — ONDANSETRON 2 MG/ML
INJECTION INTRAMUSCULAR; INTRAVENOUS
Status: DISCONTINUED | OUTPATIENT
Start: 2021-01-01 | End: 2021-01-01 | Stop reason: HOSPADM

## 2021-01-01 RX ORDER — LIDOCAINE HYDROCHLORIDE 10 MG/ML
INJECTION INFILTRATION; PERINEURAL
Status: COMPLETED
Start: 2021-01-01 | End: 2021-01-01

## 2021-01-01 RX ORDER — LANCETS
1 EACH MISCELLANEOUS 2 TIMES DAILY
Qty: 200 EACH | Refills: 3 | Status: SHIPPED | OUTPATIENT
Start: 2021-01-01

## 2021-01-01 RX ORDER — SEVELAMER CARBONATE 800 MG/1
800 TABLET, FILM COATED ORAL
Qty: 90 TABLET | Refills: 11 | Status: CANCELLED
Start: 2021-01-01 | End: 2022-03-04

## 2021-01-01 RX ORDER — INSULIN ASPART 100 [IU]/ML
0-5 INJECTION, SOLUTION INTRAVENOUS; SUBCUTANEOUS
Refills: 0
Start: 2021-01-01 | End: 2021-01-01 | Stop reason: HOSPADM

## 2021-01-01 RX ORDER — METOPROLOL SUCCINATE 50 MG/1
50 TABLET, EXTENDED RELEASE ORAL DAILY
Qty: 30 TABLET | Refills: 11 | Status: CANCELLED
Start: 2021-01-01 | End: 2022-03-04

## 2021-01-01 RX ORDER — ISOSORBIDE MONONITRATE 30 MG/1
30 TABLET, EXTENDED RELEASE ORAL DAILY
Qty: 30 TABLET | Refills: 11 | Status: CANCELLED
Start: 2021-01-01 | End: 2022-03-04

## 2021-01-01 RX ORDER — CEFPODOXIME PROXETIL 200 MG/1
200 TABLET, FILM COATED ORAL DAILY
Qty: 3 TABLET | Refills: 0
Start: 2021-01-01 | End: 2021-03-08

## 2021-01-01 RX ORDER — IBUPROFEN 200 MG
16 TABLET ORAL
Status: DISCONTINUED | OUTPATIENT
Start: 2021-01-01 | End: 2021-03-07 | Stop reason: HOSPADM

## 2021-01-01 RX ORDER — LIDOCAINE HYDROCHLORIDE ANHYDROUS AND DEXTROSE MONOHYDRATE .8; 5 G/100ML; G/100ML
0.5 INJECTION, SOLUTION INTRAVENOUS CONTINUOUS
Status: DISCONTINUED | OUTPATIENT
Start: 2021-01-01 | End: 2021-01-01

## 2021-01-01 RX ORDER — MUPIROCIN 20 MG/G
OINTMENT TOPICAL 2 TIMES DAILY
Status: DISPENSED | OUTPATIENT
Start: 2021-01-01 | End: 2021-01-01

## 2021-01-01 RX ORDER — AMIODARONE HYDROCHLORIDE 400 MG/1
400 TABLET ORAL 2 TIMES DAILY
Qty: 18 TABLET | Refills: 0
Start: 2021-01-01

## 2021-01-01 RX ORDER — FAMOTIDINE 20 MG/1
20 TABLET, FILM COATED ORAL DAILY
Status: DISCONTINUED | OUTPATIENT
Start: 2021-01-01 | End: 2021-03-07 | Stop reason: HOSPADM

## 2021-01-01 RX ORDER — SCOLOPAMINE TRANSDERMAL SYSTEM 1 MG/1
1 PATCH, EXTENDED RELEASE TRANSDERMAL
Status: DISCONTINUED | OUTPATIENT
Start: 2021-01-01 | End: 2021-03-07 | Stop reason: HOSPADM

## 2021-01-01 RX ORDER — SODIUM CHLORIDE 9 MG/ML
INJECTION, SOLUTION INTRAVENOUS ONCE
Status: COMPLETED | OUTPATIENT
Start: 2021-01-01 | End: 2021-01-01

## 2021-01-01 RX ORDER — ALUMINUM HYDROXIDE, MAGNESIUM HYDROXIDE, AND SIMETHICONE 2400; 240; 2400 MG/30ML; MG/30ML; MG/30ML
SUSPENSION ORAL ONCE
Status: DISCONTINUED | OUTPATIENT
Start: 2021-01-01 | End: 2021-01-01

## 2021-01-01 RX ORDER — ASPIRIN 81 MG/1
81 TABLET ORAL DAILY
Status: DISCONTINUED | OUTPATIENT
Start: 2021-01-01 | End: 2021-03-07 | Stop reason: HOSPADM

## 2021-01-01 RX ORDER — HEPARIN SODIUM 5000 [USP'U]/ML
5000 INJECTION, SOLUTION INTRAVENOUS; SUBCUTANEOUS EVERY 8 HOURS
Status: DISCONTINUED | OUTPATIENT
Start: 2021-01-01 | End: 2021-03-07 | Stop reason: HOSPADM

## 2021-01-01 RX ORDER — AMLODIPINE BESYLATE 5 MG/1
5 TABLET ORAL DAILY
Qty: 30 TABLET | Refills: 11 | Status: CANCELLED
Start: 2021-01-01 | End: 2022-03-04

## 2021-01-01 RX ORDER — HYDROCODONE BITARTRATE AND ACETAMINOPHEN 500; 5 MG/1; MG/1
TABLET ORAL
Status: DISCONTINUED | OUTPATIENT
Start: 2021-01-01 | End: 2021-03-07 | Stop reason: HOSPADM

## 2021-01-01 RX ORDER — ONDANSETRON 2 MG/ML
4 INJECTION INTRAMUSCULAR; INTRAVENOUS ONCE
Status: COMPLETED | OUTPATIENT
Start: 2021-01-01 | End: 2021-01-01

## 2021-01-01 RX ORDER — CLOPIDOGREL BISULFATE 75 MG/1
75 TABLET ORAL DAILY
Status: DISCONTINUED | OUTPATIENT
Start: 2021-01-01 | End: 2021-03-07 | Stop reason: HOSPADM

## 2021-01-01 RX ORDER — MIDAZOLAM HYDROCHLORIDE 1 MG/ML
INJECTION, SOLUTION INTRAMUSCULAR; INTRAVENOUS
Status: DISCONTINUED | OUTPATIENT
Start: 2021-01-01 | End: 2021-01-01 | Stop reason: HOSPADM

## 2021-01-01 RX ORDER — AMLODIPINE BESYLATE 5 MG/1
5 TABLET ORAL DAILY
Qty: 30 TABLET | Refills: 11 | Status: SHIPPED | OUTPATIENT
Start: 2021-01-01 | End: 2021-01-01 | Stop reason: HOSPADM

## 2021-01-01 RX ORDER — HYDRALAZINE HYDROCHLORIDE 20 MG/ML
10 INJECTION INTRAMUSCULAR; INTRAVENOUS EVERY 8 HOURS PRN
Status: DISCONTINUED | OUTPATIENT
Start: 2021-01-01 | End: 2021-03-07 | Stop reason: HOSPADM

## 2021-01-01 RX ORDER — ACETAMINOPHEN 325 MG/1
650 TABLET ORAL EVERY 4 HOURS PRN
Status: DISCONTINUED | OUTPATIENT
Start: 2021-01-01 | End: 2021-03-07 | Stop reason: HOSPADM

## 2021-01-01 RX ORDER — AMIODARONE HYDROCHLORIDE 200 MG/1
200 TABLET ORAL 2 TIMES DAILY
Status: DISCONTINUED | OUTPATIENT
Start: 2021-03-12 | End: 2021-03-07 | Stop reason: HOSPADM

## 2021-01-01 RX ORDER — POLYETHYLENE GLYCOL 3350 17 G/17G
17 POWDER, FOR SOLUTION ORAL 2 TIMES DAILY
Refills: 0
Start: 2021-01-01

## 2021-01-01 RX ORDER — LIDOCAINE HYDROCHLORIDE 20 MG/ML
INJECTION INTRAVENOUS
Status: COMPLETED
Start: 2021-01-01 | End: 2021-01-01

## 2021-01-01 RX ORDER — CLINDAMYCIN PHOSPHATE 900 MG/50ML
900 INJECTION, SOLUTION INTRAVENOUS
Status: DISCONTINUED | OUTPATIENT
Start: 2021-01-01 | End: 2021-01-01 | Stop reason: HOSPADM

## 2021-01-01 RX ORDER — ACETAMINOPHEN 325 MG/1
650 TABLET ORAL EVERY 4 HOURS PRN
Status: DISCONTINUED | OUTPATIENT
Start: 2021-01-01 | End: 2021-01-01

## 2021-01-01 RX ORDER — IBUPROFEN 200 MG
24 TABLET ORAL
Status: DISCONTINUED | OUTPATIENT
Start: 2021-01-01 | End: 2021-03-07 | Stop reason: HOSPADM

## 2021-01-01 RX ORDER — DEXMEDETOMIDINE HYDROCHLORIDE 4 UG/ML
INJECTION, SOLUTION INTRAVENOUS
Status: DISPENSED
Start: 2021-01-01 | End: 2021-03-07

## 2021-01-01 RX ORDER — FENTANYL CITRATE 50 UG/ML
INJECTION, SOLUTION INTRAMUSCULAR; INTRAVENOUS
Status: DISCONTINUED | OUTPATIENT
Start: 2021-01-01 | End: 2021-01-01 | Stop reason: HOSPADM

## 2021-01-01 RX ORDER — INSULIN GLARGINE 100 [IU]/ML
10 INJECTION, SOLUTION SUBCUTANEOUS NIGHTLY
Qty: 30 ML | Refills: 3
Start: 2021-01-01

## 2021-01-01 RX ORDER — AMLODIPINE BESYLATE 5 MG/1
5 TABLET ORAL DAILY
Status: DISCONTINUED | OUTPATIENT
Start: 2021-01-01 | End: 2021-01-01

## 2021-01-01 RX ORDER — CEFPODOXIME PROXETIL 200 MG/1
200 TABLET, FILM COATED ORAL DAILY
Qty: 6 TABLET | Refills: 0
Start: 2021-01-01 | End: 2021-01-01

## 2021-01-01 RX ORDER — HYDROXYZINE HYDROCHLORIDE 25 MG/1
25 TABLET, FILM COATED ORAL 3 TIMES DAILY PRN
Status: DISCONTINUED | OUTPATIENT
Start: 2021-01-01 | End: 2021-03-07 | Stop reason: HOSPADM

## 2021-01-01 RX ORDER — DOBUTAMINE HYDROCHLORIDE 400 MG/100ML
5 INJECTION INTRAVENOUS CONTINUOUS
Status: CANCELLED | OUTPATIENT
Start: 2021-01-01

## 2021-01-01 RX ORDER — DIPHENHYDRAMINE HYDROCHLORIDE 50 MG/ML
INJECTION INTRAMUSCULAR; INTRAVENOUS
Status: DISCONTINUED | OUTPATIENT
Start: 2021-01-01 | End: 2021-01-01 | Stop reason: HOSPADM

## 2021-01-01 RX ORDER — ISOSORBIDE MONONITRATE 30 MG/1
30 TABLET, EXTENDED RELEASE ORAL DAILY
Status: DISCONTINUED | OUTPATIENT
Start: 2021-01-01 | End: 2021-03-07 | Stop reason: HOSPADM

## 2021-01-01 RX ORDER — ONDANSETRON 2 MG/ML
4 INJECTION INTRAMUSCULAR; INTRAVENOUS EVERY 6 HOURS PRN
Status: DISCONTINUED | OUTPATIENT
Start: 2021-01-01 | End: 2021-01-01

## 2021-01-01 RX ORDER — HYDROCODONE BITARTRATE AND ACETAMINOPHEN 500; 5 MG/1; MG/1
TABLET ORAL
Status: DISCONTINUED | OUTPATIENT
Start: 2021-01-01 | End: 2021-01-01

## 2021-01-01 RX ORDER — AMIODARONE HYDROCHLORIDE 150 MG/3ML
INJECTION, SOLUTION INTRAVENOUS
Status: DISCONTINUED
Start: 2021-01-01 | End: 2021-01-01 | Stop reason: WASHOUT

## 2021-01-01 RX ORDER — AMIODARONE HYDROCHLORIDE 200 MG/1
400 TABLET ORAL 2 TIMES DAILY
Status: DISCONTINUED | OUTPATIENT
Start: 2021-01-01 | End: 2021-03-07 | Stop reason: HOSPADM

## 2021-01-01 RX ORDER — INSULIN ASPART 100 [IU]/ML
5 INJECTION, SOLUTION INTRAVENOUS; SUBCUTANEOUS
Status: DISCONTINUED | OUTPATIENT
Start: 2021-01-01 | End: 2021-01-01

## 2021-01-01 RX ORDER — CEFPODOXIME PROXETIL 200 MG/1
200 TABLET, FILM COATED ORAL DAILY
Qty: 4 TABLET | Refills: 0 | Status: CANCELLED
Start: 2021-01-01 | End: 2021-03-08

## 2021-01-01 RX ORDER — SEVELAMER CARBONATE 800 MG/1
800 TABLET, FILM COATED ORAL
Status: DISCONTINUED | OUTPATIENT
Start: 2021-01-01 | End: 2021-03-07 | Stop reason: HOSPADM

## 2021-01-01 RX ORDER — METOCLOPRAMIDE HYDROCHLORIDE 5 MG/ML
5 INJECTION INTRAMUSCULAR; INTRAVENOUS ONCE
Status: COMPLETED | OUTPATIENT
Start: 2021-01-01 | End: 2021-01-01

## 2021-01-01 RX ORDER — DOBUTAMINE HYDROCHLORIDE 400 MG/100ML
2.5 INJECTION INTRAVENOUS CONTINUOUS
Status: DISCONTINUED | OUTPATIENT
Start: 2021-01-01 | End: 2021-01-01

## 2021-01-01 RX ORDER — METOPROLOL SUCCINATE 50 MG/1
50 TABLET, EXTENDED RELEASE ORAL DAILY
Qty: 30 TABLET | Refills: 11 | Status: SHIPPED | OUTPATIENT
Start: 2021-01-01 | End: 2022-03-03

## 2021-01-01 RX ORDER — ISOSORBIDE MONONITRATE 30 MG/1
30 TABLET, EXTENDED RELEASE ORAL DAILY
Status: DISCONTINUED | OUTPATIENT
Start: 2021-01-01 | End: 2021-01-01

## 2021-01-01 RX ORDER — LIDOCAINE HYDROCHLORIDE 10 MG/ML
100 INJECTION, SOLUTION INTRAVENOUS ONCE
Status: COMPLETED | OUTPATIENT
Start: 2021-01-01 | End: 2021-01-01

## 2021-01-01 RX ORDER — HEPARIN SODIUM,PORCINE/D5W 25000/250
0-40 INTRAVENOUS SOLUTION INTRAVENOUS CONTINUOUS
Status: DISCONTINUED | OUTPATIENT
Start: 2021-01-01 | End: 2021-01-01

## 2021-01-01 RX ORDER — TALC
6 POWDER (GRAM) TOPICAL NIGHTLY PRN
Status: DISCONTINUED | OUTPATIENT
Start: 2021-01-01 | End: 2021-03-07 | Stop reason: HOSPADM

## 2021-01-01 RX ORDER — SENNOSIDES 8.6 MG/1
8.6 TABLET ORAL 2 TIMES DAILY
Status: DISCONTINUED | OUTPATIENT
Start: 2021-01-01 | End: 2021-01-01

## 2021-01-01 RX ORDER — INSULIN PUMP SYRINGE, 3 ML
EACH MISCELLANEOUS
Qty: 1 EACH | Refills: 0 | Status: SHIPPED | OUTPATIENT
Start: 2021-01-01

## 2021-01-01 RX ORDER — POLYETHYLENE GLYCOL 3350 17 G/17G
17 POWDER, FOR SOLUTION ORAL 2 TIMES DAILY
Status: DISCONTINUED | OUTPATIENT
Start: 2021-01-01 | End: 2021-03-07 | Stop reason: HOSPADM

## 2021-01-01 RX ORDER — CEFPODOXIME PROXETIL 200 MG/1
200 TABLET, FILM COATED ORAL DAILY
Status: DISCONTINUED | OUTPATIENT
Start: 2021-01-01 | End: 2021-03-07 | Stop reason: HOSPADM

## 2021-01-01 RX ORDER — HYDRALAZINE HYDROCHLORIDE 20 MG/ML
INJECTION INTRAMUSCULAR; INTRAVENOUS
Status: DISCONTINUED | OUTPATIENT
Start: 2021-01-01 | End: 2021-01-01 | Stop reason: HOSPADM

## 2021-01-01 RX ORDER — MAGNESIUM SULFATE HEPTAHYDRATE 40 MG/ML
INJECTION, SOLUTION INTRAVENOUS
Status: COMPLETED
Start: 2021-01-01 | End: 2021-01-01

## 2021-01-01 RX ORDER — AMIODARONE HYDROCHLORIDE 400 MG/1
400 TABLET ORAL 2 TIMES DAILY
Qty: 18 TABLET | Refills: 0
Start: 2021-01-01 | End: 2021-01-01

## 2021-01-01 RX ORDER — ASPIRIN 325 MG
325 TABLET ORAL
Status: ACTIVE | OUTPATIENT
Start: 2021-01-01 | End: 2021-01-01

## 2021-01-01 RX ORDER — INSULIN ASPART 100 [IU]/ML
0-5 INJECTION, SOLUTION INTRAVENOUS; SUBCUTANEOUS
Status: DISCONTINUED | OUTPATIENT
Start: 2021-01-01 | End: 2021-03-07 | Stop reason: HOSPADM

## 2021-01-01 RX ORDER — METOPROLOL SUCCINATE 50 MG/1
50 TABLET, EXTENDED RELEASE ORAL DAILY
Status: DISCONTINUED | OUTPATIENT
Start: 2021-01-01 | End: 2021-03-07 | Stop reason: HOSPADM

## 2021-01-01 RX ORDER — CALCITRIOL 0.25 UG/1
0.5 CAPSULE ORAL DAILY
Status: DISCONTINUED | OUTPATIENT
Start: 2021-01-01 | End: 2021-03-07 | Stop reason: HOSPADM

## 2021-01-01 RX ORDER — AMOXICILLIN 250 MG
1 CAPSULE ORAL 2 TIMES DAILY
Start: 2021-01-01

## 2021-01-01 RX ORDER — CLINDAMYCIN PHOSPHATE 900 MG/50ML
INJECTION, SOLUTION INTRAVENOUS
Status: DISCONTINUED | OUTPATIENT
Start: 2021-01-01 | End: 2021-01-01 | Stop reason: HOSPADM

## 2021-01-01 RX ORDER — NITROGLYCERIN 5 MG/ML
INJECTION, SOLUTION INTRAVENOUS
Status: DISCONTINUED | OUTPATIENT
Start: 2021-01-01 | End: 2021-01-01 | Stop reason: HOSPADM

## 2021-01-01 RX ORDER — AMIODARONE HYDROCHLORIDE 200 MG/1
200 TABLET ORAL 2 TIMES DAILY
Qty: 60 TABLET | Refills: 11
Start: 2021-03-15 | End: 2022-03-15

## 2021-01-01 RX ORDER — LIDOCAINE HYDROCHLORIDE 10 MG/ML
5 INJECTION INFILTRATION; PERINEURAL ONCE
Status: COMPLETED | OUTPATIENT
Start: 2021-01-01 | End: 2021-01-01

## 2021-01-01 RX ORDER — SODIUM CHLORIDE 0.9 % (FLUSH) 0.9 %
10 SYRINGE (ML) INJECTION
Status: DISCONTINUED | OUTPATIENT
Start: 2021-01-01 | End: 2021-03-07 | Stop reason: HOSPADM

## 2021-01-01 RX ORDER — FAMOTIDINE 20 MG/1
20 TABLET, FILM COATED ORAL ONCE
Status: COMPLETED | OUTPATIENT
Start: 2021-01-01 | End: 2021-01-01

## 2021-01-01 RX ORDER — MAGNESIUM SULFATE HEPTAHYDRATE 40 MG/ML
INJECTION, SOLUTION INTRAVENOUS
Status: DISPENSED
Start: 2021-01-01 | End: 2021-01-01

## 2021-01-01 RX ORDER — ASPIRIN 81 MG/1
81 TABLET ORAL DAILY
Qty: 90 TABLET | Refills: 3
Start: 2021-01-01 | End: 2022-03-05

## 2021-01-01 RX ORDER — FUROSEMIDE 10 MG/ML
40 INJECTION INTRAMUSCULAR; INTRAVENOUS ONCE
Status: DISCONTINUED | OUTPATIENT
Start: 2021-01-01 | End: 2021-03-07 | Stop reason: HOSPADM

## 2021-01-01 RX ORDER — GLUCAGON 1 MG
1 KIT INJECTION
Status: DISCONTINUED | OUTPATIENT
Start: 2021-01-01 | End: 2021-03-07 | Stop reason: HOSPADM

## 2021-01-01 RX ORDER — ATORVASTATIN CALCIUM 20 MG/1
80 TABLET, FILM COATED ORAL EVERY MORNING
Status: DISCONTINUED | OUTPATIENT
Start: 2021-01-01 | End: 2021-03-07 | Stop reason: HOSPADM

## 2021-01-01 RX ORDER — LORAZEPAM 2 MG/ML
0.5 INJECTION INTRAMUSCULAR ONCE
Status: COMPLETED | OUTPATIENT
Start: 2021-01-01 | End: 2021-01-01

## 2021-01-01 RX ORDER — AMLODIPINE BESYLATE 5 MG/1
5 TABLET ORAL DAILY
Qty: 30 TABLET | Refills: 0 | Status: CANCELLED
Start: 2021-01-01 | End: 2022-03-04

## 2021-01-01 RX ADMIN — ONDANSETRON 4 MG: 2 INJECTION INTRAMUSCULAR; INTRAVENOUS at 09:03

## 2021-01-01 RX ADMIN — HEPARIN SODIUM 5000 UNITS: 5000 INJECTION INTRAVENOUS; SUBCUTANEOUS at 01:03

## 2021-01-01 RX ADMIN — HEPARIN SODIUM 5000 UNITS: 5000 INJECTION INTRAVENOUS; SUBCUTANEOUS at 05:03

## 2021-01-01 RX ADMIN — LIDOCAINE HYDROCHLORIDE 1 MG/MIN: 8 INJECTION, SOLUTION INTRAVENOUS at 02:02

## 2021-01-01 RX ADMIN — HEPARIN SODIUM 5000 UNITS: 5000 INJECTION INTRAVENOUS; SUBCUTANEOUS at 02:03

## 2021-01-01 RX ADMIN — SEVELAMER CARBONATE 800 MG: 800 TABLET, FILM COATED ORAL at 05:03

## 2021-01-01 RX ADMIN — MUPIROCIN: 20 OINTMENT TOPICAL at 08:03

## 2021-01-01 RX ADMIN — AMIODARONE HYDROCHLORIDE 1 MG/MIN: 1.8 INJECTION, SOLUTION INTRAVENOUS at 01:02

## 2021-01-01 RX ADMIN — HEPARIN SODIUM 5000 UNITS: 5000 INJECTION INTRAVENOUS; SUBCUTANEOUS at 09:03

## 2021-01-01 RX ADMIN — AMIODARONE HYDROCHLORIDE 400 MG: 200 TABLET ORAL at 01:03

## 2021-01-01 RX ADMIN — CEFPODOXIME PROXETIL 200 MG: 200 TABLET, FILM COATED ORAL at 01:03

## 2021-01-01 RX ADMIN — METOPROLOL TARTRATE 25 MG: 25 TABLET, FILM COATED ORAL at 08:02

## 2021-01-01 RX ADMIN — AMIODARONE HYDROCHLORIDE 0.5 MG/MIN: 1.8 INJECTION, SOLUTION INTRAVENOUS at 02:03

## 2021-01-01 RX ADMIN — CEFPODOXIME PROXETIL 200 MG: 200 TABLET, FILM COATED ORAL at 08:03

## 2021-01-01 RX ADMIN — IRON SUCROSE 200 MG: 20 INJECTION, SOLUTION INTRAVENOUS at 01:03

## 2021-01-01 RX ADMIN — AMIODARONE HYDROCHLORIDE 400 MG: 200 TABLET ORAL at 09:03

## 2021-01-01 RX ADMIN — SEVELAMER CARBONATE 800 MG: 800 TABLET, FILM COATED ORAL at 08:03

## 2021-01-01 RX ADMIN — INSULIN DETEMIR 10 UNITS: 100 INJECTION, SOLUTION SUBCUTANEOUS at 10:03

## 2021-01-01 RX ADMIN — CLOPIDOGREL 75 MG: 75 TABLET, FILM COATED ORAL at 08:03

## 2021-01-01 RX ADMIN — DOCUSATE SODIUM 50MG AND SENNOSIDES 8.6MG 1 TABLET: 8.6; 5 TABLET, FILM COATED ORAL at 01:03

## 2021-01-01 RX ADMIN — MUPIROCIN: 20 OINTMENT TOPICAL at 12:03

## 2021-01-01 RX ADMIN — METOPROLOL TARTRATE 25 MG: 25 TABLET, FILM COATED ORAL at 09:02

## 2021-01-01 RX ADMIN — ONDANSETRON 4 MG: 2 INJECTION INTRAMUSCULAR; INTRAVENOUS at 06:02

## 2021-01-01 RX ADMIN — SENNOSIDES 8.6 MG: 8.6 TABLET, FILM COATED ORAL at 08:03

## 2021-01-01 RX ADMIN — METOPROLOL TARTRATE 25 MG: 25 TABLET, FILM COATED ORAL at 11:03

## 2021-01-01 RX ADMIN — AMIODARONE HYDROCHLORIDE 0.5 MG/MIN: 1.8 INJECTION, SOLUTION INTRAVENOUS at 07:02

## 2021-01-01 RX ADMIN — HEPARIN SODIUM AND DEXTROSE 18 UNITS/KG/HR: 10000; 5 INJECTION INTRAVENOUS at 01:02

## 2021-01-01 RX ADMIN — AMIODARONE HYDROCHLORIDE 400 MG: 200 TABLET ORAL at 12:03

## 2021-01-01 RX ADMIN — MAGNESIUM SULFATE IN WATER 2 G: 40 INJECTION, SOLUTION INTRAVENOUS at 01:02

## 2021-01-01 RX ADMIN — INSULIN DETEMIR 10 UNITS: 100 INJECTION, SOLUTION SUBCUTANEOUS at 09:03

## 2021-01-01 RX ADMIN — AMIODARONE HYDROCHLORIDE 150 MG: 50 INJECTION, SOLUTION INTRAVENOUS at 01:02

## 2021-01-01 RX ADMIN — CALCITRIOL CAPSULES 0.25 MCG 0.5 MCG: 0.25 CAPSULE ORAL at 08:03

## 2021-01-01 RX ADMIN — FAMOTIDINE 20 MG: 20 TABLET ORAL at 01:03

## 2021-01-01 RX ADMIN — MELATONIN TAB 3 MG 6 MG: 3 TAB at 09:03

## 2021-01-01 RX ADMIN — MAGNESIUM SULFATE HEPTAHYDRATE 2 G: 40 INJECTION, SOLUTION INTRAVENOUS at 01:02

## 2021-01-01 RX ADMIN — ISOSORBIDE MONONITRATE 30 MG: 30 TABLET, EXTENDED RELEASE ORAL at 01:03

## 2021-01-01 RX ADMIN — NEPHROCAP 1 CAPSULE: 1 CAP ORAL at 08:03

## 2021-01-01 RX ADMIN — ASPIRIN 81 MG: 81 TABLET, COATED ORAL at 01:03

## 2021-01-01 RX ADMIN — AMIODARONE HYDROCHLORIDE 400 MG: 200 TABLET ORAL at 08:03

## 2021-01-01 RX ADMIN — METOCLOPRAMIDE 5 MG: 5 INJECTION, SOLUTION INTRAMUSCULAR; INTRAVENOUS at 06:03

## 2021-01-01 RX ADMIN — ATORVASTATIN CALCIUM 80 MG: 20 TABLET, FILM COATED ORAL at 08:03

## 2021-01-01 RX ADMIN — HYDRALAZINE HYDROCHLORIDE 10 MG: 20 INJECTION, SOLUTION INTRAMUSCULAR; INTRAVENOUS at 10:03

## 2021-01-01 RX ADMIN — MUPIROCIN: 20 OINTMENT TOPICAL at 09:03

## 2021-01-01 RX ADMIN — INSULIN DETEMIR 10 UNITS: 100 INJECTION, SOLUTION SUBCUTANEOUS at 08:03

## 2021-01-01 RX ADMIN — DOCUSATE SODIUM 50MG AND SENNOSIDES 8.6MG 1 TABLET: 8.6; 5 TABLET, FILM COATED ORAL at 09:03

## 2021-01-01 RX ADMIN — ATORVASTATIN CALCIUM 80 MG: 20 TABLET, FILM COATED ORAL at 01:03

## 2021-01-01 RX ADMIN — FAMOTIDINE 20 MG: 20 TABLET ORAL at 08:03

## 2021-01-01 RX ADMIN — METOPROLOL TARTRATE 25 MG: 25 TABLET, FILM COATED ORAL at 08:03

## 2021-01-01 RX ADMIN — AMIODARONE HYDROCHLORIDE 0.5 MG/MIN: 1.8 INJECTION, SOLUTION INTRAVENOUS at 03:03

## 2021-01-01 RX ADMIN — AMIODARONE HYDROCHLORIDE 0.5 MG/MIN: 1.8 INJECTION, SOLUTION INTRAVENOUS at 01:03

## 2021-01-01 RX ADMIN — CALCITRIOL CAPSULES 0.25 MCG 0.5 MCG: 0.25 CAPSULE ORAL at 12:03

## 2021-01-01 RX ADMIN — NEPHROCAP 1 CAPSULE: 1 CAP ORAL at 01:03

## 2021-01-01 RX ADMIN — HEPARIN SODIUM 5000 UNITS: 5000 INJECTION INTRAVENOUS; SUBCUTANEOUS at 06:03

## 2021-01-01 RX ADMIN — LORAZEPAM 0.5 MG: 2 INJECTION INTRAMUSCULAR; INTRAVENOUS at 12:03

## 2021-01-01 RX ADMIN — METOPROLOL TARTRATE 25 MG: 25 TABLET, FILM COATED ORAL at 02:02

## 2021-01-01 RX ADMIN — ASPIRIN 81 MG: 81 TABLET, COATED ORAL at 08:03

## 2021-01-01 RX ADMIN — DOCUSATE SODIUM 50MG AND SENNOSIDES 8.6MG 1 TABLET: 8.6; 5 TABLET, FILM COATED ORAL at 08:03

## 2021-01-01 RX ADMIN — METOPROLOL TARTRATE 25 MG: 25 TABLET, FILM COATED ORAL at 09:03

## 2021-01-01 RX ADMIN — SEVELAMER CARBONATE 800 MG: 800 TABLET, FILM COATED ORAL at 12:03

## 2021-01-01 RX ADMIN — SENNOSIDES 8.6 MG: 8.6 TABLET, FILM COATED ORAL at 12:03

## 2021-01-01 RX ADMIN — ATORVASTATIN CALCIUM 80 MG: 20 TABLET, FILM COATED ORAL at 12:03

## 2021-01-01 RX ADMIN — INSULIN DETEMIR 10 UNITS: 100 INJECTION, SOLUTION SUBCUTANEOUS at 09:02

## 2021-01-01 RX ADMIN — CLOPIDOGREL 75 MG: 75 TABLET, FILM COATED ORAL at 08:02

## 2021-01-01 RX ADMIN — LIDOCAINE HYDROCHLORIDE 70 MG: 10 INJECTION, SOLUTION INTRAVENOUS at 01:02

## 2021-01-01 RX ADMIN — ASPIRIN 81 MG: 81 TABLET, COATED ORAL at 08:02

## 2021-01-01 RX ADMIN — HEPARIN SODIUM AND DEXTROSE 12 UNITS/KG/HR: 10000; 5 INJECTION INTRAVENOUS at 09:02

## 2021-01-01 RX ADMIN — SCOPALAMINE 1 PATCH: 1 PATCH, EXTENDED RELEASE TRANSDERMAL at 09:03

## 2021-01-01 RX ADMIN — ATORVASTATIN CALCIUM 80 MG: 20 TABLET, FILM COATED ORAL at 06:02

## 2021-01-01 RX ADMIN — METOPROLOL SUCCINATE 50 MG: 50 TABLET, EXTENDED RELEASE ORAL at 08:03

## 2021-01-01 RX ADMIN — ACETAMINOPHEN 650 MG: 325 TABLET ORAL at 09:03

## 2021-01-01 RX ADMIN — SEVELAMER CARBONATE 800 MG: 800 TABLET, FILM COATED ORAL at 01:03

## 2021-01-01 RX ADMIN — FAMOTIDINE 20 MG: 20 TABLET ORAL at 09:03

## 2021-01-01 RX ADMIN — INSULIN ASPART 2 UNITS: 100 INJECTION, SOLUTION INTRAVENOUS; SUBCUTANEOUS at 01:03

## 2021-01-01 RX ADMIN — SEVELAMER CARBONATE 800 MG: 800 TABLET, FILM COATED ORAL at 04:03

## 2021-01-01 RX ADMIN — ISOSORBIDE MONONITRATE 30 MG: 30 TABLET, EXTENDED RELEASE ORAL at 08:03

## 2021-01-01 RX ADMIN — CLOPIDOGREL 75 MG: 75 TABLET, FILM COATED ORAL at 01:03

## 2021-01-01 RX ADMIN — AMLODIPINE BESYLATE 5 MG: 5 TABLET ORAL at 08:03

## 2021-01-01 RX ADMIN — ACETAMINOPHEN 650 MG: 325 TABLET ORAL at 08:03

## 2021-01-01 RX ADMIN — FAMOTIDINE 20 MG: 20 TABLET ORAL at 12:03

## 2021-01-01 RX ADMIN — SODIUM CHLORIDE: 0.9 INJECTION, SOLUTION INTRAVENOUS at 09:03

## 2021-01-01 RX ADMIN — Medication 16 G: at 09:01

## 2021-01-01 RX ADMIN — SODIUM CHLORIDE: 0.9 INJECTION, SOLUTION INTRAVENOUS at 04:03

## 2021-01-01 RX ADMIN — POLYETHYLENE GLYCOL 3350 17 G: 17 POWDER, FOR SOLUTION ORAL at 12:03

## 2021-01-01 RX ADMIN — CLOPIDOGREL 75 MG: 75 TABLET, FILM COATED ORAL at 12:03

## 2021-01-01 RX ADMIN — ASPIRIN 81 MG: 81 TABLET, COATED ORAL at 12:03

## 2021-01-01 RX ADMIN — EPOETIN ALFA-EPBX 3640 UNITS: 4000 INJECTION, SOLUTION INTRAVENOUS; SUBCUTANEOUS at 11:03

## 2021-01-01 RX ADMIN — ISOSORBIDE MONONITRATE 30 MG: 30 TABLET, EXTENDED RELEASE ORAL at 11:03

## 2021-01-01 RX ADMIN — METOPROLOL SUCCINATE 50 MG: 50 TABLET, EXTENDED RELEASE ORAL at 12:03

## 2021-01-01 RX ADMIN — CEFPODOXIME PROXETIL 200 MG: 200 TABLET, FILM COATED ORAL at 02:03

## 2021-01-01 RX ADMIN — SEVELAMER CARBONATE 800 MG: 800 TABLET, FILM COATED ORAL at 11:03

## 2021-01-01 RX ADMIN — CALCITRIOL CAPSULES 0.25 MCG 0.5 MCG: 0.25 CAPSULE ORAL at 01:03

## 2021-01-01 RX ADMIN — NEPHROCAP 1 CAPSULE: 1 CAP ORAL at 12:03

## 2021-01-01 RX ADMIN — ASPIRIN 81 MG: 81 TABLET, COATED ORAL at 02:03

## 2021-01-01 RX ADMIN — HEPARIN SODIUM 5000 UNITS: 5000 INJECTION INTRAVENOUS; SUBCUTANEOUS at 08:03

## 2021-01-01 RX ADMIN — AMIODARONE HYDROCHLORIDE 1 MG/MIN: 1.8 INJECTION, SOLUTION INTRAVENOUS at 06:02

## 2021-01-01 RX ADMIN — LIDOCAINE HYDROCHLORIDE 1 MG/MIN: 8 INJECTION, SOLUTION INTRAVENOUS at 01:03

## 2021-01-01 RX ADMIN — INSULIN ASPART 2 UNITS: 100 INJECTION, SOLUTION INTRAVENOUS; SUBCUTANEOUS at 05:03

## 2021-01-01 RX ADMIN — METOPROLOL SUCCINATE 50 MG: 50 TABLET, EXTENDED RELEASE ORAL at 01:03

## 2021-01-01 RX ADMIN — POLYETHYLENE GLYCOL 3350 17 G: 17 POWDER, FOR SOLUTION ORAL at 08:03

## 2021-01-01 RX ADMIN — LIDOCAINE HYDROCHLORIDE 50 MG: 20 INJECTION, SOLUTION INTRAVENOUS at 02:02

## 2021-01-01 RX ADMIN — ACETAMINOPHEN 650 MG: 325 TABLET ORAL at 04:03

## 2021-02-27 PROBLEM — I21.9 MYOCARDIAL INFARCTION: Status: ACTIVE | Noted: 2021-01-01

## 2021-02-28 PROBLEM — D64.9 ANEMIA: Status: ACTIVE | Noted: 2021-01-01

## 2021-02-28 PROBLEM — I47.21 TORSADES DE POINTES: Status: ACTIVE | Noted: 2021-01-01

## 2021-02-28 PROBLEM — R00.0 TACHYARRHYTHMIA: Status: ACTIVE | Noted: 2021-01-01

## 2021-03-01 PROBLEM — I47.29 POLYMORPHIC VENTRICULAR TACHYCARDIA: Status: ACTIVE | Noted: 2021-01-01

## 2021-03-02 PROBLEM — Z71.89 COUNSELING REGARDING ADVANCED CARE PLANNING AND GOALS OF CARE: Status: ACTIVE | Noted: 2021-01-01

## 2021-03-02 PROBLEM — Z51.5 PALLIATIVE CARE ENCOUNTER: Status: ACTIVE | Noted: 2021-01-01

## 2021-03-03 PROBLEM — I21.9 MYOCARDIAL INFARCTION: Status: ACTIVE | Noted: 2021-01-01

## 2021-03-03 PROBLEM — Z74.09 IMPAIRED FUNCTIONAL MOBILITY AND ENDURANCE: Status: ACTIVE | Noted: 2021-01-01

## 2021-03-07 LAB
ALLENS TEST: ABNORMAL
ALLENS TEST: ABNORMAL
DELSYS: ABNORMAL
FLOW: 2
HCO3 UR-SCNC: 19.6 MMOL/L (ref 24–28)
HCO3 UR-SCNC: 23 MMOL/L (ref 24–28)
HCT VFR BLD CALC: 25 %PCV (ref 36–54)
MODE: ABNORMAL
PCO2 BLDA: 37.5 MMHG (ref 35–45)
PCO2 BLDA: 45.3 MMHG (ref 35–45)
PH SMN: 7.31 [PH] (ref 7.35–7.45)
PH SMN: 7.33 [PH] (ref 7.35–7.45)
PO2 BLDA: 20 MMHG (ref 40–60)
PO2 BLDA: 28 MMHG (ref 40–60)
POC BE: -3 MMOL/L
POC BE: -6 MMOL/L
POC IONIZED CALCIUM: 1.18 MMOL/L (ref 1.06–1.42)
POC SATURATED O2: 27 % (ref 95–100)
POC SATURATED O2: 48 % (ref 95–100)
POC TCO2: 21 MMOL/L (ref 24–29)
POC TCO2: 24 MMOL/L (ref 24–29)
POTASSIUM BLD-SCNC: 4.9 MMOL/L (ref 3.5–5.1)
SAMPLE: ABNORMAL
SAMPLE: ABNORMAL
SITE: ABNORMAL
SITE: ABNORMAL
SODIUM BLD-SCNC: 139 MMOL/L (ref 136–145)
SP02: 94

## 2021-03-10 LAB
BLD PROD TYP BPU: NORMAL
BLD PROD TYP BPU: NORMAL
BLOOD UNIT EXPIRATION DATE: NORMAL
BLOOD UNIT EXPIRATION DATE: NORMAL
BLOOD UNIT TYPE CODE: 5100
BLOOD UNIT TYPE CODE: 5100
BLOOD UNIT TYPE: NORMAL
BLOOD UNIT TYPE: NORMAL
CODING SYSTEM: NORMAL
CODING SYSTEM: NORMAL
DISPENSE STATUS: NORMAL
DISPENSE STATUS: NORMAL
NUM UNITS TRANS PACKED RBC: NORMAL
NUM UNITS TRANS PACKED RBC: NORMAL

## 2021-05-07 NOTE — PROGRESS NOTES
19 yo female awaiting bariatric surgery here for upper endoscopy    Call placed to [phone] and [phone] with message left containing contact information.

## 2021-06-24 NOTE — TELEPHONE ENCOUNTER
"Please let patient know that cardiac rehab will allow her to start and stop in order to have her peritoneal dialysis catheter placed.    Also, Dr Iglesias is working on getting her an appointment for nephrology follow-up.    Orders placed for urine studies with "soft" feature.    Thanks,  KJ  " standing/walking

## 2021-09-21 NOTE — TELEPHONE ENCOUNTER
Please let patient know that her mammogram was unchanged from previous, but she does need another one in 6 months to monitor the calcifications. I have placed the order, please schedule it for her.    Her labs showed that her A1c went up to 10.4. At her last appointment she reported that her AM sugars were controlled. Could you get her AM glucose readings from her from the past week?     Thanks,  SOLEDAD    You can access the FollowMyHealth Patient Portal offered by [facility] by registering at the following website: [email or web address]. By joining [facility]’s FollowMyHealth portal, you will also be able to view your health information using other applications (apps) compatible with our system.

## 2022-06-03 NOTE — NURSING
Collaborative Discharge [address]  :  1971  MRN:  [id number]    ADMIT DATE:  2022      Discharge Planning Discharge Planning  Potential Assistance Purchasing Medications: Yes  Meds-to-Beds: Does the patient want to have any new prescriptions delivered to bedside prior to discharge?: Yes  [facility] to [facility] Board Notes /Social Work Whiteboard Notes  /Social Work Whiteboard: : Plan return home with SO. No DME/HH pta    Discharge Plan Home with family  Plans home w SO Volney Shirley; monitor for med assist as self pay status; monitor AB needs; Public Benefits following    Discharge Milestones and Delays: Clinical status  E.COLI Bacteremia/UTI Sepsis/Pyelonephritis     IV AB continued      MRCP  Likely Renal Abscess w Perinephric Abscess Extension    Plans re-imaging in 1-2 days       SIGNED:  Maggy Howe RN   6/3/2022, 9:04 AM    6/3/22, 9:06 AM EDT    Patient goals/plan/ treatment preferences discussed by  and . Patient goals/plan/ treatment preferences reviewed with patient/ family. Patient/ family verbalize understanding of discharge plan and are in agreement with goal/plan/treatment preferences. Understanding was demonstrated using the teach back method. AVS provided by RN at time of discharge, which includes all necessary medical information pertaining to the patients current course of illness, treatment, post-discharge goals of care, and treatment preferences.      Services At/After Discharge: None Pt is AAOx3 and in no apparent distress.  Provided a copy of discharge instructions.  Teaching performed.  Pt verbalized understanding and denied any questions.  PIV d/c catheter tip intact.  2x2 applied and no active bleeding noted.  Provided pt with wheelchair and her daughter is going to wheel her out for transport home.

## 2022-11-01 NOTE — PROGRESS NOTES
Primary Care Provider Appointment    Subjective:      Patient ID: Zoey Ham is a 73 y.o. female with CHF, DM, CAD (numerous stents and CABGX2), dialysis status    Chief Complaint: Hypertension; Diabetes; and Follow-up    Patient is in good spirits today. She had a [place] trip to [place] with her family. She now lives with her son.    She is feeling better on dialysis at [facility] on [address] M,W,F. She has no complaints about her lifestyle changes on dialysis. She crochets during dialysis sessions. She is experiencing graft complications (possible stenosis) has fistulogram on Thurs.    She is compliant with lantus 20U, most morning readings are <120, but there are some lows when she doesn't snack at bedtime (readings in the 40s when she skips snack).     She has stable DR, is due for next optometry eval in 7/2019.    Past Surgical History:   Procedure Laterality Date    APPENDECTOMY      CARDIAC SURGERY  2002    CABG    CHOLECYSTECTOMY      CHOLECYSTECTOMY-LAPAROSCOPIC N/A 2/4/2015    Performed by Quinton [facility] OR 2ND FLR    IAMMYOVVZZAU-[id number]  - Left brachiocephalic Left 4/16/2018    Performed by YAMEL Perry III, MD at [facility] OR 2ND FLR    CORONARY ANGIOPLASTY  2004    CORONARY ARTERY BYPASS GRAFT      EYE SURGERY      cataracts bilaterally    Fistulogram Left 9/20/2018    Performed by Y[facility]EL [facility], [facility] at [facility] OR 2ND FLR    FRACTURE SURGERY      right ankle    HYSTERECTOMY      PTA (ANGIOPLASTY, PERCUTANEOUS, TRANSLUMINAL) N/A 9/20/2018    Performed by YAMEL Perry III, [facility] at [facility] OR 2ND FLR    TONSILLECTOMY         Past Medical History:   Diagnosis Date    Acute myocardial infarction of anterolateral wall 7/9/2015    Anemia of chronic renal failure, stage 4 (severe) 1/22/2015    Atherosclerosis of aorta 10/3/2012    AV shunt malfunction     Benign hypertension with CKD (chronic kidney disease) stage IV 3/11/2016    Chronic diastolic congestive heart  failure 10/3/2012    Chronic kidney disease, stage IV (severe) 7/3/2012    Coronary artery disease     s/p 1v CABG 2002 and multiple PCI (last angiogram in 6/2012 with patent LIMA->LAD and patent LCx and RCA stents)    Diabetic polyneuropathy associated with type 2 diabetes mellitus 7/9/2015    Diabetic polyneuropathy associated with type 2 diabetes mellitus 7/9/2015    Dialysis patient     Encounter for blood transfusion     Gastritis without bleeding     Heart attack 09/2012    5-6 events    Hyperlipidemia     Hyperparathyroidism     Metabolic acidosis     Nephritis and nephropathy, 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 7/9/2015    NSTEMI (non-ST elevated myocardial infarction)     NSTEMI (non-ST elevated myocardial infarction)     Occlusion and stenosis of carotid artery with cerebral infarction 7/9/2015    Osteopenia     PAF (paroxysmal atrial fibrillation) 10/3/2012    Pneumonia     Proliferative diabetic retinopathy 10/3/2012    Sepsis due to Escherichia coli with acute renal failure 2/2016    UTI     TACO (transfusion associated circulatory overload)     Type II diabetes mellitus with neurological manifestations 7/9/2015    Type II diabetes mellitus with renal manifestations 7/3/2012       Review of Systems   Constitutional: Positive for fatigue. Negative for activity change, appetite change and unexpected weight change.   Respiratory: Negative for cough and choking.    Cardiovascular: Negative for leg swelling.   Gastrointestinal: Negative for abdominal pain, diarrhea and nausea.   Musculoskeletal: Negative for back pain, gait problem and myalgias.   Neurological: Negative for dizziness, tremors, syncope, facial asymmetry, weakness, numbness and headaches.   Hematological: Bruises/bleeds easily.   Psychiatric/Behavioral: Negative for agitation, behavioral problems, confusion and decreased concentration.       Objective:   /76 (BP Location: Right arm, Patient Position: Sitting, BP  "Method: Medium (Manual))   Pulse 67   Ht 5' 4" (1.626 m)   Wt 76.5 kg (168 lb 10.4 oz)   LMP  (LMP Unknown)   SpO2 98%   BMI 28.95 kg/m²     Physical Exam   Constitutional: She is oriented to person, place, and time. She appears well-developed and well-nourished.   HENT:   Head: Normocephalic and atraumatic.   Neck: Normal range of motion.   Cardiovascular:   AV graft on L forearm (thrill palpated, bruit ausculated)   Abdominal: Soft. Bowel sounds are normal. There is no tenderness. There is no guarding.   Neurological: She is alert and oriented to person, place, and time.   Skin:   Ecchymoses on UE bilaterally   Psychiatric: She has a normal mood and affect. Her behavior is normal. Thought content normal.   Flat affect   Vitals reviewed.      Lab Results   Component Value Date    WBC 10.96 01/08/2019    HGB 11.6 (L) 01/08/2019    HCT 38.0 01/08/2019     01/08/2019    CHOL 127 06/05/2018    TRIG 163 (H) 06/05/2018    HDL 28 (L) 06/05/2018    ALT 11 08/22/2018    AST 16 08/22/2018     01/08/2019    K 4.9 01/08/2019     01/08/2019    CREATININE 2.4 (H) 01/08/2019    BUN 33 (H) 01/08/2019    CO2 27 01/08/2019    TSH 2.801 06/09/2018    INR 0.9 06/04/2018    GLUF 132 (H) 05/03/2012    HGBA1C 8.9 (H) 06/05/2018         Assessment:   73 y.o. female with multiple co-morbid illnesses here to continue work-up of chronic issues notably CHF, DM, CAD (numerous stents and CABGX2), dialysis status.     Plan:     Problem List Items Addressed This Visit        Neuro    Diabetic polyneuropathy associated with type 2 diabetes mellitus    Relevant Medications    insulin glargine (LANTUS U-100 INSULIN) 100 unit/mL injection       Ophtho    Proliferative diabetic retinopathy     Eye complaints in 12/17, stable DR  · Annual eye exams with optometry         Relevant Medications    insulin glargine (LANTUS U-100 INSULIN) 100 unit/mL injection       Cardiac/Vascular    Hypertension associated with diabetes     BP " controlled on BB, diuretic. ARB discontinued by Renal due to hyperkalemia  · norvasc added during hospital, but patient not discharged on this  · cozaar restarted by Nephro  · Cards increased metoprolol to 100mg BID  · Continue lasix 40mg PRN  · Can increase to BID dosing if needed  · F/U Cardiology         Relevant Medications    insulin glargine (LANTUS U-100 INSULIN) 100 unit/mL injection    Atherosclerosis of aorta     Seen on CXR 5/2/2012  · Continue statin, ASA, DM control  · monitor         Chronic atrial fibrillation     Rate-controlled on metoprolol 100mg BID  · Continue BB BID  · Elevated EIXZF-0-Uwva score of 9 (10.8% risk of stroke)  · Cards eval for anticoagulation  · Continue DAPT  · High-dose ASA         Coronary artery disease involving coronary bypass graft of native heart with unstable angina pectoris     6/4/18 BETHANY to OM1. Cath 2/2017, LHC and PCI on Lcx and OM1 with improvement. H/o CABG X2 2002, stent RCA, OM1 2003, NSTEMI with total occlusion of LAD in 2012.   · Continue APT   · Did not participate in cardiac rehab  · Continue BP control, high-dose statin         Relevant Medications    insulin glargine (LANTUS U-100 INSULIN) 100 unit/mL injection    Stenosis of arteriovenous dialysis fistula     Possible stenosis, plan for surgery in 1/2019  · F/U Vasc Surgery         AV shunt malfunction     Patient with possible AV graft stenosis, followed by Vas Surgery  · F/U Vasc Surgery         Dialysis AV fistula malfunction     Possible stenosis, plan for surgery in 1/2019  · F/U Vasc Surgery         RESOLVED: Acute on chronic diastolic heart failure     Seen on echo in 6/2018, EF 60%, grade 2 diastolic dysfunction, with class B symptoms, MIs in 2002, 2003, 2012, 2/2017  · Compliant with BB, ARB, DAPT, statin, diuretic  · Improve DM control  · F/U cardiology            Renal/    ESRD (end stage renal disease) on dialysis     Hemodialysis started in 8/2018, tolerating with improvement in  QoL  · Continue per Nephro  · Advised to address advanced care planning documentation  · Continue renvala once daily         Abnormal mammogram     Calcifications in previous mammo, completed monitoring period with q6 mo diagnostic mammo  · Screening mammo bilateral now annually  · Next due in 5/2019         Relevant Orders    Mammo Digital Screening Bilat       Hematology    Senile purpura     Ecchymoses and purpura on LE bilterally, on DAPT  · Continue DAPT  · Advised mindfulness of movements            Oncology    Anemia of chronic renal failure, stage 5       Endocrine    Type 2 diabetes mellitus with chronic kidney disease on chronic dialysis, with long-term current use of insulin     Compliant lantus 20U, Compliant with dialysis  · Decrease long-acting to 15U  · Continue dialysis         Relevant Medications    insulin glargine (LANTUS U-100 INSULIN) 100 unit/mL injection    Other Relevant Orders    Ambulatory Referral to Podiatry    Hyperparathyroidism     PTH trending up with worsening renal function, compliant with dialysis  · Monitor  · F/U Renal         Type 2 diabetes mellitus with hyperlipidemia     Compliant with atorvastatin 80mg, high ASCVD with DM and age  · cotinue atorvastatin 80mg  · Continue high-dose ASA and plavix  · DM and BP control         Relevant Medications    insulin glargine (LANTUS U-100 INSULIN) 100 unit/mL injection    Other Relevant Orders    Ambulatory Referral to Podiatry    Mild protein-calorie malnutrition     Low albumin, low muscle mass, fatigue, drinks Nepro at dialysis  · Advised to drink more Nepro eat lean meats           Other Visit Diagnoses     Acute renal failure superimposed on stage 5 chronic kidney disease, not on chronic dialysis, unspecified acute renal failure type        Relevant Medications    sevelamer carbonate (RENVELA) 800 mg Tab    CKD stage 4 due to type 2 diabetes mellitus        Relevant Medications    insulin glargine (LANTUS U-100 INSULIN) 100  unit/mL injection    Coronary artery disease of bypass graft of native heart with stable angina pectoris        Relevant Medications    insulin glargine (LANTUS U-100 INSULIN) 100 unit/mL injection    Encounter for screening mammogram for malignant neoplasm of breast         Relevant Orders    Mammo Digital Screening Bilat          Health Maintenance       Date Due Completion Date    Colonoscopy 08/21/1964 ---REFUSED    Hemoglobin A1c 04/08/2019 10/8/2018- NOT NECESSARY DUE TO DIALYSIS    Foot Exam 05/14/2019 5/14/2018- TODAY    Override on 12/6/2016: Done    Mammogram 05/14/2019 5/14/2018    Lipid Panel 06/05/2019 6/5/2018    Eye Exam 07/09/2019 7/9/2018    DEXA SCAN 07/13/2020 7/13/2017    TETANUS VACCINE 03/03/2026 3/3/2016          Follow-up in about 4 months (around 5/14/2019).  One hour spent with this patient today, half of that in counseling.    Cesia Rosales MD/MPH  Internal Medicine  [facility]  [phone]   done

## 2024-08-15 NOTE — PROGRESS NOTES
Summary:Pt reports that she is doing well at this time. Pt does not report any recent falls. Pt reports that her appetite is good. Pt does not report any pains at this time. Pt reports that she has dialysis today.Pt reports that she has completed PT. Next follow-up scheduled with pt::2 weeks  Pt agreed:yes     Interventions:Educated pt on safety measures to help prevent falls.  Plan:Educate pt on well balanced plate          Pending case closure    Todays OPCM Self-Management Care Plan was developed with the patients/caregivers input and was based on identified barriers from todays assessment.  Goals were written today with the patient/caregiver and the patient has agreed to work towards these goals to improve his/her overall well-being. Patient verbalized understanding of the care plan, goals, and all of today's instructions. Encouraged patient/caregiver to communicate with his/her physician and health care team about health conditions and the treatment plan.  Provided my contact information today and encouraged patient/caregiver to call me with any questions as needed.     History of conscious sedation

## (undated) DEVICE — KIT MICROINTRO 4F .018X40X7CM

## (undated) DEVICE — SNARE 10MM DEVICE

## (undated) DEVICE — SET DECANTER MEDICHOICE

## (undated) DEVICE — SEE MEDLINE ITEM 156894

## (undated) DEVICE — KIT PROBE COVER WITH GEL

## (undated) DEVICE — STOPCOCK 3-WAY

## (undated) DEVICE — CATH BLLN FG APEX MR 2.50X12MM

## (undated) DEVICE — CATH BLLN WOLVERINE 10X2.5MM

## (undated) DEVICE — GUIDEWIRE V-18 CONTROL .18

## (undated) DEVICE — OMNIPAQUE 350 200ML

## (undated) DEVICE — SHEATH PINNACLE 6FRX6CM

## (undated) DEVICE — SUT 4-0 12-18IN SILK BLACK

## (undated) DEVICE — DEVICE PERCLOSE SUT CLSR 6FR

## (undated) DEVICE — Device

## (undated) DEVICE — SOL NS 1000CC

## (undated) DEVICE — BLADE SURG CARBON STEEL SZ11

## (undated) DEVICE — SUT 2-0 12-18IN SILK

## (undated) DEVICE — STOPCOCK NDLS INJ MALE LL IV

## (undated) DEVICE — COVER INSTR ELASTIC BAND 40X20

## (undated) DEVICE — SHEATH 7F 6CM R/O

## (undated) DEVICE — CATH IMPULSE 5F 100CM FR4

## (undated) DEVICE — APPLICATOR CHLORAPREP ORN 26ML

## (undated) DEVICE — DRAPE OPTIMA MAJOR PEDIATRIC

## (undated) DEVICE — DRAPE PLASTIC U 60X72

## (undated) DEVICE — GUIDEWIRE STD .018X180CM ANG

## (undated) DEVICE — GUIDEWIRE EMERALD 150CM PTFE

## (undated) DEVICE — COVERS PROBE NR-48 STERILE

## (undated) DEVICE — CATH GUIDE LINER  V3 6F

## (undated) DEVICE — LOOP VESSEL BLUE MAXI

## (undated) DEVICE — TEGADERM IV

## (undated) DEVICE — ADHESIVE DERMABOND ADVANCED

## (undated) DEVICE — SEE MEDLINE ITEM 157187

## (undated) DEVICE — BLLN DORADO 7 X 60 X 80

## (undated) DEVICE — INFLATOR ENCORE 26 BLLN INFL

## (undated) DEVICE — CATH LUTONIX DCB 035X75X7X60

## (undated) DEVICE — SPONGE DERMACEA 4X4IN 12PLY

## (undated) DEVICE — CATH DORADO 6FR 6MMX10CM

## (undated) DEVICE — HEMOSTAT VASC BAND REG 24CM

## (undated) DEVICE — KIT INTRO MICRO NIT VSI 4FR

## (undated) DEVICE — SET MICROPUNCT 5FRMPIS-501

## (undated) DEVICE — DRESSING GAUZE 6PLY 4X4

## (undated) DEVICE — DEVICE CLOSURE MYNX GRIP 5FR

## (undated) DEVICE — COVER LIGHT HANDLE 80/CA

## (undated) DEVICE — SEE MEDLINE ITEM 153688

## (undated) DEVICE — SEE MEDLINE ITEM 152622

## (undated) DEVICE — GUIDEWIRE STD .035X180CM STR

## (undated) DEVICE — GOWN SURGICAL X-LARGE

## (undated) DEVICE — CATH IMA INFINITI 4FRX100CM

## (undated) DEVICE — SUT PROLENE 6-0 24 BV-1

## (undated) DEVICE — SHEATH INTRODUCER 6FR 11CM

## (undated) DEVICE — GUIDE LAUNCHER 6FR JR 4.0

## (undated) DEVICE — GUIDE WIRE WHOLLY FLOPPY

## (undated) DEVICE — STOCKINET 4INX48

## (undated) DEVICE — INFLATOR ENCORE

## (undated) DEVICE — DRESSING TRANS 4X4 TEGADERM

## (undated) DEVICE — CATH BLLN FG APEX MR 3.00X8MM

## (undated) DEVICE — DRESSING TELFA STRL 4X3 LF

## (undated) DEVICE — TRAY MINOR GEN SURG

## (undated) DEVICE — KIT CUSTOM MANIFOLD

## (undated) DEVICE — CATH BLLN WOLVERINE 10X3MM

## (undated) DEVICE — KIT GLIDESHEATH SLEND 6FR 10CM

## (undated) DEVICE — SUT LIGACLIP SMALL XTRA

## (undated) DEVICE — DRESSING TEGADERM IV 3.5 X 4.5

## (undated) DEVICE — SEE MEDLINE ITEM 157173

## (undated) DEVICE — GUIDEWIRE ANG STF .035INX18CM

## (undated) DEVICE — GUIDEWIRE SUPRA CORE 035 190CM

## (undated) DEVICE — WIRE GUIDE SAFE-T-J .035 260CM

## (undated) DEVICE — SUT MCRYL PLUS 4-0 PS2 27IN

## (undated) DEVICE — GUIDEWIRE STF .035X180CM STR

## (undated) DEVICE — BLLN DORADO 80X6X6

## (undated) DEVICE — SHEATH INTRODUCER 5F 10CM

## (undated) DEVICE — HEMOSTAT SURGICEL 4X8IN

## (undated) DEVICE — CATH DORADO 8X6

## (undated) DEVICE — SPIKE CONTRAST CONTROLLER

## (undated) DEVICE — INFLATION DEVICE ENCORE 26

## (undated) DEVICE — SYR ONLY LUER LOCK 20CC

## (undated) DEVICE — GUIDEWIRE STF .035X180CM ANG

## (undated) DEVICE — GUIDEWIRE STD .035X180CM ANG

## (undated) DEVICE — CATH IMPULSE FL4 5FR 100CM

## (undated) DEVICE — SUT SILK 2-0 SH 18IN BLACK

## (undated) DEVICE — ELECTRODE REM PLYHSV RETURN 9

## (undated) DEVICE — GUIDE WIRE WHOLEY EXCHANGE 300

## (undated) DEVICE — DRESSING TRANS 2X2 TEGADERM

## (undated) DEVICE — CATH INFINITI JUDKINS JR4

## (undated) DEVICE — GOWN SMART IMP BREATHABLE XXLG

## (undated) DEVICE — CATH NC QUANTUM APEX MR 3X8

## (undated) DEVICE — SUT 3-0 12-18IN SILK

## (undated) DEVICE — CATH BLLN DORADO 8-4

## (undated) DEVICE — INTRODUCER SHEATH PINNACLE 8FR

## (undated) DEVICE — KIT CO-PILOT

## (undated) DEVICE — SUT MONOCRYL 3-0 SH U/D

## (undated) DEVICE — BOOT SUTURE AID

## (undated) DEVICE — GUIDE WIRE BMW 014 X190

## (undated) DEVICE — CLIP MED TICALL

## (undated) DEVICE — CATH EAGLE EYE PLATINUM